# Patient Record
Sex: MALE | Race: WHITE | Employment: FULL TIME | URBAN - NONMETROPOLITAN AREA
[De-identification: names, ages, dates, MRNs, and addresses within clinical notes are randomized per-mention and may not be internally consistent; named-entity substitution may affect disease eponyms.]

---

## 2017-10-17 ENCOUNTER — APPOINTMENT (OUTPATIENT)
Dept: CT IMAGING | Age: 32
End: 2017-10-17
Payer: MEDICARE

## 2017-10-17 ENCOUNTER — HOSPITAL ENCOUNTER (EMERGENCY)
Age: 32
Discharge: HOME | End: 2017-10-18
Payer: MEDICARE

## 2017-10-17 DIAGNOSIS — D58.2 ELEVATED HEMOGLOBIN (HCC): Primary | ICD-10-CM

## 2017-10-17 LAB
ALBUMIN SERPL-MCNC: 4.7 G/DL (ref 3.5–5.1)
ALP BLD-CCNC: 144 U/L (ref 38–126)
ALT SERPL-CCNC: 101 U/L (ref 11–66)
ANION GAP SERPL CALCULATED.3IONS-SCNC: 24 MEQ/L (ref 8–16)
APTT: 38.1 SECONDS (ref 22–38)
AST SERPL-CCNC: 102 U/L (ref 5–40)
BASOPHILS # BLD: 0.7 %
BASOPHILS ABSOLUTE: 0.1 THOU/MM3 (ref 0–0.1)
BILIRUB SERPL-MCNC: 0.7 MG/DL (ref 0.3–1.2)
BILIRUBIN DIRECT: < 0.2 MG/DL (ref 0–0.3)
BUN BLDV-MCNC: 8 MG/DL (ref 7–22)
CALCIUM SERPL-MCNC: 10.3 MG/DL (ref 8.5–10.5)
CHLORIDE BLD-SCNC: 96 MEQ/L (ref 98–111)
CO2: 20 MEQ/L (ref 23–33)
CREAT SERPL-MCNC: 0.6 MG/DL (ref 0.4–1.2)
EOSINOPHIL # BLD: 2.2 %
EOSINOPHILS ABSOLUTE: 0.2 THOU/MM3 (ref 0–0.4)
ETHYL ALCOHOL, SERUM: 0.34 %
GFR SERPL CREATININE-BSD FRML MDRD: > 90 ML/MIN/1.73M2
GLUCOSE BLD-MCNC: 269 MG/DL (ref 70–108)
HCT VFR BLD CALC: 56.9 % (ref 42–52)
HEMOGLOBIN: 20 GM/DL (ref 14–18)
INR BLD: 0.86 (ref 0.85–1.13)
LIPASE: 26.3 U/L (ref 5.6–51.3)
LYMPHOCYTES # BLD: 19.2 %
LYMPHOCYTES ABSOLUTE: 2.1 THOU/MM3 (ref 1–4.8)
MAGNESIUM: 2.1 MG/DL (ref 1.6–2.4)
MCH RBC QN AUTO: 33.3 PG (ref 27–31)
MCHC RBC AUTO-ENTMCNC: 35.2 GM/DL (ref 33–37)
MCV RBC AUTO: 94.4 FL (ref 80–94)
MONOCYTES # BLD: 8.8 %
MONOCYTES ABSOLUTE: 1 THOU/MM3 (ref 0.4–1.3)
NUCLEATED RED BLOOD CELLS: 0 /100 WBC
OSMOLALITY CALCULATION: 287.2 MOSMOL/KG (ref 275–300)
PDW BLD-RTO: 13.5 % (ref 11.5–14.5)
PLATELET # BLD: 250 THOU/MM3 (ref 130–400)
PMV BLD AUTO: 8.6 MCM (ref 7.4–10.4)
POTASSIUM SERPL-SCNC: 4.4 MEQ/L (ref 3.5–5.2)
RBC # BLD: 6.02 MILL/MM3 (ref 4.7–6.1)
RBC # BLD: NORMAL 10*6/UL
SEG NEUTROPHILS: 69.1 %
SEGMENTED NEUTROPHILS ABSOLUTE COUNT: 7.5 THOU/MM3 (ref 1.8–7.7)
SODIUM BLD-SCNC: 140 MEQ/L (ref 135–145)
TOTAL PROTEIN: 8.1 G/DL (ref 6.1–8)
WBC # BLD: 10.8 THOU/MM3 (ref 4.8–10.8)

## 2017-10-17 PROCEDURE — 80307 DRUG TEST PRSMV CHEM ANLYZR: CPT

## 2017-10-17 PROCEDURE — G0480 DRUG TEST DEF 1-7 CLASSES: HCPCS

## 2017-10-17 PROCEDURE — 81001 URINALYSIS AUTO W/SCOPE: CPT

## 2017-10-17 PROCEDURE — 36415 COLL VENOUS BLD VENIPUNCTURE: CPT

## 2017-10-17 PROCEDURE — 93005 ELECTROCARDIOGRAM TRACING: CPT

## 2017-10-17 PROCEDURE — 83735 ASSAY OF MAGNESIUM: CPT

## 2017-10-17 PROCEDURE — 85025 COMPLETE CBC W/AUTO DIFF WBC: CPT

## 2017-10-17 PROCEDURE — 85610 PROTHROMBIN TIME: CPT

## 2017-10-17 PROCEDURE — 83690 ASSAY OF LIPASE: CPT

## 2017-10-17 PROCEDURE — 80053 COMPREHEN METABOLIC PANEL: CPT

## 2017-10-17 PROCEDURE — 83880 ASSAY OF NATRIURETIC PEPTIDE: CPT

## 2017-10-17 PROCEDURE — 84443 ASSAY THYROID STIM HORMONE: CPT

## 2017-10-17 PROCEDURE — 74177 CT ABD & PELVIS W/CONTRAST: CPT

## 2017-10-17 PROCEDURE — 6370000000 HC RX 637 (ALT 250 FOR IP): Performed by: EMERGENCY MEDICINE

## 2017-10-17 PROCEDURE — 82248 BILIRUBIN DIRECT: CPT

## 2017-10-17 PROCEDURE — 85730 THROMBOPLASTIN TIME PARTIAL: CPT

## 2017-10-17 PROCEDURE — 99285 EMERGENCY DEPT VISIT HI MDM: CPT

## 2017-10-17 PROCEDURE — 6360000002 HC RX W HCPCS: Performed by: EMERGENCY MEDICINE

## 2017-10-17 RX ORDER — PANTOPRAZOLE SODIUM 40 MG/1
40 TABLET, DELAYED RELEASE ORAL ONCE
Status: COMPLETED | OUTPATIENT
Start: 2017-10-17 | End: 2017-10-17

## 2017-10-17 RX ORDER — ONDANSETRON 4 MG/1
4 TABLET, ORALLY DISINTEGRATING ORAL ONCE
Status: COMPLETED | OUTPATIENT
Start: 2017-10-17 | End: 2017-10-17

## 2017-10-17 RX ORDER — ONDANSETRON 2 MG/ML
4 INJECTION INTRAMUSCULAR; INTRAVENOUS ONCE
Status: COMPLETED | OUTPATIENT
Start: 2017-10-18 | End: 2017-10-18

## 2017-10-17 RX ORDER — THIAMINE MONONITRATE (VIT B1) 100 MG
100 TABLET ORAL ONCE
Status: COMPLETED | OUTPATIENT
Start: 2017-10-17 | End: 2017-10-17

## 2017-10-17 RX ORDER — FOLIC ACID 1 MG/1
1 TABLET ORAL ONCE
Status: COMPLETED | OUTPATIENT
Start: 2017-10-17 | End: 2017-10-17

## 2017-10-17 RX ORDER — 0.9 % SODIUM CHLORIDE 0.9 %
1000 INTRAVENOUS SOLUTION INTRAVENOUS ONCE
Status: COMPLETED | OUTPATIENT
Start: 2017-10-17 | End: 2017-10-18

## 2017-10-17 RX ADMIN — PANTOPRAZOLE SODIUM 40 MG: 40 TABLET, DELAYED RELEASE ORAL at 22:47

## 2017-10-17 RX ADMIN — Medication 100 MG: at 22:47

## 2017-10-17 RX ADMIN — ONDANSETRON 4 MG: 4 TABLET, ORALLY DISINTEGRATING ORAL at 22:47

## 2017-10-17 RX ADMIN — FOLIC ACID 1 MG: 1 TABLET ORAL at 22:47

## 2017-10-17 ASSESSMENT — PAIN DESCRIPTION - PAIN TYPE: TYPE: ACUTE PAIN

## 2017-10-17 ASSESSMENT — PAIN SCALES - GENERAL: PAINLEVEL_OUTOF10: 8

## 2017-10-17 ASSESSMENT — PAIN DESCRIPTION - LOCATION: LOCATION: ABDOMEN

## 2017-10-18 VITALS
DIASTOLIC BLOOD PRESSURE: 88 MMHG | RESPIRATION RATE: 16 BRPM | BODY MASS INDEX: 34.96 KG/M2 | SYSTOLIC BLOOD PRESSURE: 127 MMHG | TEMPERATURE: 98.7 F | HEIGHT: 62 IN | HEART RATE: 113 BPM | OXYGEN SATURATION: 97 % | WEIGHT: 190 LBS

## 2017-10-18 LAB
AMPHETAMINE+METHAMPHETAMINE URINE SCREEN: NEGATIVE
BACTERIA: ABNORMAL /HPF
BARBITURATE QUANTITATIVE URINE: NEGATIVE
BENZODIAZEPINE QUANTITATIVE URINE: NEGATIVE
BILIRUBIN URINE: NEGATIVE
BLOOD, URINE: ABNORMAL
CANNABINOID QUANTITATIVE URINE: POSITIVE
CASTS 2: ABNORMAL /LPF
CASTS UA: ABNORMAL /LPF
CHARACTER, URINE: CLEAR
COCAINE METABOLITE QUANTITATIVE URINE: NEGATIVE
COLOR: YELLOW
CRYSTALS, UA: ABNORMAL
EKG ATRIAL RATE: 132 BPM
EKG P AXIS: 56 DEGREES
EKG P-R INTERVAL: 130 MS
EKG Q-T INTERVAL: 296 MS
EKG QRS DURATION: 74 MS
EKG QTC CALCULATION (BAZETT): 438 MS
EKG R AXIS: 37 DEGREES
EKG T AXIS: 17 DEGREES
EKG VENTRICULAR RATE: 132 BPM
EPITHELIAL CELLS, UA: ABNORMAL /HPF
GLUCOSE URINE: >= 1000 MG/DL
KETONES, URINE: 40
LEUKOCYTE ESTERASE, URINE: NEGATIVE
MISCELLANEOUS 2: ABNORMAL
MUCUS: ABNORMAL
NITRITE, URINE: NEGATIVE
OPIATES, URINE: NEGATIVE
OXYCODONE: NEGATIVE
PH UA: 5.5
PHENCYCLIDINE QUANTITATIVE URINE: NEGATIVE
PRO-BNP: < 5 PG/ML (ref 0–450)
PROTEIN UA: 300
RBC URINE: ABNORMAL /HPF
RENAL EPITHELIAL, UA: ABNORMAL
SPECIFIC GRAVITY, URINE: 1.02 (ref 1–1.03)
TSH SERPL DL<=0.05 MIU/L-ACNC: 1.94 UIU/ML (ref 0.4–4.2)
UROBILINOGEN, URINE: 0.2 EU/DL
WBC UA: ABNORMAL /HPF
YEAST: ABNORMAL

## 2017-10-18 PROCEDURE — 6360000002 HC RX W HCPCS: Performed by: PHYSICIAN ASSISTANT

## 2017-10-18 PROCEDURE — 2580000003 HC RX 258: Performed by: EMERGENCY MEDICINE

## 2017-10-18 PROCEDURE — 96366 THER/PROPH/DIAG IV INF ADDON: CPT

## 2017-10-18 PROCEDURE — 96365 THER/PROPH/DIAG IV INF INIT: CPT

## 2017-10-18 PROCEDURE — 93005 ELECTROCARDIOGRAM TRACING: CPT

## 2017-10-18 PROCEDURE — 2580000003 HC RX 258: Performed by: PHYSICIAN ASSISTANT

## 2017-10-18 PROCEDURE — 6370000000 HC RX 637 (ALT 250 FOR IP): Performed by: PHYSICIAN ASSISTANT

## 2017-10-18 PROCEDURE — 36415 COLL VENOUS BLD VENIPUNCTURE: CPT

## 2017-10-18 PROCEDURE — 6360000004 HC RX CONTRAST MEDICATION: Performed by: PHYSICIAN ASSISTANT

## 2017-10-18 PROCEDURE — 96375 TX/PRO/DX INJ NEW DRUG ADDON: CPT

## 2017-10-18 PROCEDURE — 96361 HYDRATE IV INFUSION ADD-ON: CPT

## 2017-10-18 PROCEDURE — 82668 ASSAY OF ERYTHROPOIETIN: CPT

## 2017-10-18 PROCEDURE — 2500000003 HC RX 250 WO HCPCS: Performed by: PHYSICIAN ASSISTANT

## 2017-10-18 RX ORDER — ASPIRIN 81 MG/1
81 TABLET, CHEWABLE ORAL DAILY
Qty: 30 TABLET | Refills: 0 | Status: SHIPPED | OUTPATIENT
Start: 2017-10-18 | End: 2017-12-12 | Stop reason: DRUGHIGH

## 2017-10-18 RX ADMIN — SODIUM CHLORIDE 1000 ML: 9 INJECTION, SOLUTION INTRAVENOUS at 00:01

## 2017-10-18 RX ADMIN — ONDANSETRON 4 MG: 2 INJECTION INTRAMUSCULAR; INTRAVENOUS at 00:08

## 2017-10-18 RX ADMIN — FOLIC ACID: 5 INJECTION, SOLUTION INTRAMUSCULAR; INTRAVENOUS; SUBCUTANEOUS at 01:40

## 2017-10-18 RX ADMIN — IOPAMIDOL 80 ML: 755 INJECTION, SOLUTION INTRAVENOUS at 02:09

## 2017-10-18 RX ADMIN — Medication 30 ML: at 02:26

## 2017-10-18 ASSESSMENT — PAIN DESCRIPTION - LOCATION
LOCATION: CHEST;ABDOMEN
LOCATION: CHEST;ABDOMEN

## 2017-10-18 ASSESSMENT — PAIN DESCRIPTION - FREQUENCY
FREQUENCY: CONTINUOUS
FREQUENCY: INTERMITTENT

## 2017-10-18 ASSESSMENT — PAIN DESCRIPTION - PAIN TYPE
TYPE: ACUTE PAIN
TYPE: ACUTE PAIN

## 2017-10-18 ASSESSMENT — PAIN DESCRIPTION - ORIENTATION
ORIENTATION: MID;UPPER
ORIENTATION: MID;UPPER

## 2017-10-18 ASSESSMENT — PAIN SCALES - GENERAL
PAINLEVEL_OUTOF10: 9
PAINLEVEL_OUTOF10: 8

## 2017-10-18 NOTE — ED PROVIDER NOTES
eMERGENCY dEPARTMENT eNCOUnter      [facility]    Chief Complaint   Patient presents with    Alcohol Problem    Abdominal Pain    Chest Pain       HPI    María Rodriguez is a 27 y.o. male who presents emergency Department complaining of left upper quadrant pain. Patient states she has a long-standing history of alcohol abuse. He is concerned that he is going to go into DTs. Patient states that he has had approximately 1/5 of vodka today. Patient denies nausea, vomiting, fever or complaints. Describes pain as sharp and moderately severe. Left upper quadrant. Without radiation. Not relieved by rest but is exacerbated by movement. Past medical history is reviewed. Temperature review of systems was reviewed and is otherwise unremarkable. REVIEW OF SYSTEMS    See HPI for further details. Review of systems otherwise negative. PAST MEDICAL HISTORY    Past Medical History:   Diagnosis Date    Alcohol abuse     Asthma     COPD (chronic obstructive pulmonary disease) ([facility] [address]     Eczema        SURGICAL HISTORY    History reviewed. No pertinent surgical history. CURRENT MEDICATIONS    Current Outpatient Rx   Medication Sig Dispense Refill    aspirin (ASPIRIN CHILDRENS) 81 MG chewable tablet Take 1 tablet by mouth daily 30 tablet 0    Multiple Vitamins-Minerals (MENS MULTIVITAMIN PLUS) TABS Take 1 tablet by mouth daily      fluticasone-salmeterol (ADVAIR) 250-50 MCG/DOSE AEPB Inhale 1 puff into the lungs every 12 hours      albuterol (PROVENTIL HFA) 108 (90 BASE) MCG/ACT inhaler Inhale 2 puffs into the lungs every 6 hours as needed for Wheezing. 1 Inhaler 0       ALLERGIES    Allergies   Allergen Reactions    Corn-Containing Products        FAMILY HISTORY    History reviewed. No pertinent family history.     SOCIAL HISTORY    Social History     Social History    Marital status:      Spouse name: N/A    Number of children: N/A    Years of education: N/A     Social History Main Topics  Smoking status: Current Some Day Smoker     Packs/day: 0.50    Smokeless tobacco: None    Alcohol use No      Comment: last drink August 26th     Drug use: No    Sexual activity: Not Asked     Other Topics Concern    None     Social History Narrative    None       PHYSICAL EXAM    VITAL SIGNS: /88   Pulse 113   Temp 98.7 °F (37.1 °C) (Oral)   Resp 16   Ht 5' 2\" (1.575 m)   Wt 190 lb (86.2 kg)   SpO2 97%   BMI 34.75 kg/m²   Constitutional:  Well developed, well nourished, no acute distress, non-toxic appearance   Eyes:  PERRL, conjunctiva normal   HENT:  Atraumatic, external ears normal, nose normal, oropharynx moist. Neck supple   Respiratory:  No respiratory distress, normal breath sounds   Cardiovascular:  Normal rate, normal rhythm, no murmurs, no gallops, no rubs   GI:  Mild left-sided tenderness without distention, guarding, rigidity, masses, bowel sounds are normal.   :  No CVA tenderness   Musculoskeletal:  No edema   Integument:  Well hydrated   Neurologic:  Alert & oriented x 3, no focal deficits     EKG    Genitourinary 132. QRS 74 QTc 438 sinus tachycardia without ST elevation or depression no ectopy was noted comparison to previous on October 2016    RADIOLOGY/PROCEDURES    Cerebrovascular contrast demonstrated considerable amount of sterile otherwise mild hepatomegaly no acute abnormalities were found please see radiology dictation    ED COURSE & MEDICAL DECISION MAKING    Pertinent Labs & Imaging studies reviewed. (See chart for details)   Labs demonstrated alcohol level all 0.3 2 ptherwise  no significant findings were noted patient resting more comfortably with pain medications. Discussed results with the patient. Patient is concerned that he is going to go into DTs. Explained to him that he would not according to DTs with an alcohol level as high as his. Patient was advised to follow up with his primary care physician who is at [facility] [address] [facility].   Patient will call in the morning for appointment. Return to the emergency department as needed. Discharged in stable condition. FINAL IMPRESSION    1. Acute alcohol intoxication  2.          POP Estrada  10/18/17 4417

## 2017-10-19 LAB — ERYTHROPOIETIN: 11 MU/ML (ref 4–27)

## 2017-12-07 ENCOUNTER — APPOINTMENT (OUTPATIENT)
Dept: INTERVENTIONAL RADIOLOGY/VASCULAR | Age: 32
DRG: 204 | End: 2017-12-07
Payer: MEDICARE

## 2017-12-07 ENCOUNTER — APPOINTMENT (OUTPATIENT)
Dept: GENERAL RADIOLOGY | Age: 32
DRG: 204 | End: 2017-12-07
Payer: MEDICARE

## 2017-12-07 ENCOUNTER — APPOINTMENT (OUTPATIENT)
Dept: CT IMAGING | Age: 32
DRG: 204 | End: 2017-12-07
Payer: MEDICARE

## 2017-12-07 ENCOUNTER — HOSPITAL ENCOUNTER (INPATIENT)
Age: 32
LOS: 2 days | Discharge: HOME | DRG: 204 | End: 2017-12-09
Attending: INTERNAL MEDICINE | Admitting: INTERNAL MEDICINE
Payer: MEDICARE

## 2017-12-07 DIAGNOSIS — E11.9 TYPE 2 DIABETES MELLITUS WITHOUT COMPLICATION, WITHOUT LONG-TERM CURRENT USE OF INSULIN (HCC): ICD-10-CM

## 2017-12-07 DIAGNOSIS — R55 SYNCOPE AND COLLAPSE: Primary | ICD-10-CM

## 2017-12-07 LAB
ALBUMIN SERPL-MCNC: 4.4 G/DL (ref 3.5–5.1)
ALP BLD-CCNC: 95 U/L (ref 38–126)
ALT SERPL-CCNC: 122 U/L (ref 11–66)
AMPHETAMINE+METHAMPHETAMINE URINE SCREEN: NEGATIVE
ANION GAP SERPL CALCULATED.3IONS-SCNC: 20 MEQ/L (ref 8–16)
AST SERPL-CCNC: 151 U/L (ref 5–40)
BACTERIA: ABNORMAL /HPF
BARBITURATE QUANTITATIVE URINE: NEGATIVE
BASOPHILS # BLD: 0.6 %
BASOPHILS ABSOLUTE: 0.1 THOU/MM3 (ref 0–0.1)
BENZODIAZEPINE QUANTITATIVE URINE: NEGATIVE
BILIRUB SERPL-MCNC: 1.3 MG/DL (ref 0.3–1.2)
BILIRUBIN DIRECT: 0.5 MG/DL (ref 0–0.3)
BILIRUBIN URINE: ABNORMAL
BLOOD, URINE: ABNORMAL
BUN BLDV-MCNC: 18 MG/DL (ref 7–22)
CALCIUM SERPL-MCNC: 8.9 MG/DL (ref 8.5–10.5)
CANNABINOID QUANTITATIVE URINE: POSITIVE
CASTS 2: ABNORMAL /LPF
CASTS UA: ABNORMAL /LPF
CHARACTER, URINE: CLEAR
CHLORIDE BLD-SCNC: 94 MEQ/L (ref 98–111)
CO2: 20 MEQ/L (ref 23–33)
COCAINE METABOLITE QUANTITATIVE URINE: NEGATIVE
COLOR: YELLOW
CREAT SERPL-MCNC: 0.5 MG/DL (ref 0.4–1.2)
CRYSTALS, UA: ABNORMAL
EKG ATRIAL RATE: 110 BPM
EKG P AXIS: 35 DEGREES
EKG P-R INTERVAL: 124 MS
EKG Q-T INTERVAL: 358 MS
EKG QRS DURATION: 80 MS
EKG QTC CALCULATION (BAZETT): 484 MS
EKG R AXIS: 26 DEGREES
EKG T AXIS: 35 DEGREES
EKG VENTRICULAR RATE: 110 BPM
EOSINOPHIL # BLD: 0.9 %
EOSINOPHILS ABSOLUTE: 0.1 THOU/MM3 (ref 0–0.4)
EPITHELIAL CELLS, UA: ABNORMAL /HPF
ETHYL ALCOHOL, SERUM: < 0.01 %
FOLATE: 13.1 NG/ML (ref 4.8–24.2)
GFR SERPL CREATININE-BSD FRML MDRD: > 90 ML/MIN/1.73M2
GLUCOSE BLD-MCNC: 175 MG/DL (ref 70–108)
GLUCOSE URINE: >= 1000 MG/DL
HCT VFR BLD CALC: 44 % (ref 42–52)
HEMOGLOBIN: 15 GM/DL (ref 14–18)
ICTOTEST: NEGATIVE
KETONES, URINE: 80
LEUKOCYTE ESTERASE, URINE: NEGATIVE
LIPASE: 106.4 U/L (ref 5.6–51.3)
LV EF: 60 %
LVEF MODALITY: NORMAL
LYMPHOCYTES # BLD: 7.3 %
LYMPHOCYTES ABSOLUTE: 0.8 THOU/MM3 (ref 1–4.8)
MCH RBC QN AUTO: 31.5 PG (ref 27–31)
MCHC RBC AUTO-ENTMCNC: 34.1 GM/DL (ref 33–37)
MCV RBC AUTO: 92.4 FL (ref 80–94)
MISCELLANEOUS 2: ABNORMAL
MONOCYTES # BLD: 8 %
MONOCYTES ABSOLUTE: 0.9 THOU/MM3 (ref 0.4–1.3)
NITRITE, URINE: NEGATIVE
NUCLEATED RED BLOOD CELLS: 0 /100 WBC
OPIATES, URINE: POSITIVE
OSMOLALITY CALCULATION: 274.4 MOSMOL/KG (ref 275–300)
OXYCODONE: NEGATIVE
PDW BLD-RTO: 13.3 % (ref 11.5–14.5)
PH UA: 6
PHENCYCLIDINE QUANTITATIVE URINE: NEGATIVE
PLATELET # BLD: 143 THOU/MM3 (ref 130–400)
PMV BLD AUTO: 9.8 MCM (ref 7.4–10.4)
POTASSIUM SERPL-SCNC: 3.8 MEQ/L (ref 3.5–5.2)
PROTEIN UA: ABNORMAL
RBC # BLD: 4.76 MILL/MM3 (ref 4.7–6.1)
RBC URINE: ABNORMAL /HPF
RENAL EPITHELIAL, UA: ABNORMAL
SEG NEUTROPHILS: 83.2 %
SEGMENTED NEUTROPHILS ABSOLUTE COUNT: 9 THOU/MM3 (ref 1.8–7.7)
SODIUM BLD-SCNC: 134 MEQ/L (ref 135–145)
SPECIFIC GRAVITY, URINE: 1.02 (ref 1–1.03)
TOTAL PROTEIN: 7 G/DL (ref 6.1–8)
TROPONIN T: < 0.01 NG/ML
UROBILINOGEN, URINE: 1 EU/DL
VITAMIN B-12: 721 PG/ML (ref 211–911)
WBC # BLD: 10.8 THOU/MM3 (ref 4.8–10.8)
WBC UA: ABNORMAL /HPF
YEAST: ABNORMAL

## 2017-12-07 PROCEDURE — 82746 ASSAY OF FOLIC ACID SERUM: CPT

## 2017-12-07 PROCEDURE — 6370000000 HC RX 637 (ALT 250 FOR IP): Performed by: INTERNAL MEDICINE

## 2017-12-07 PROCEDURE — 96374 THER/PROPH/DIAG INJ IV PUSH: CPT

## 2017-12-07 PROCEDURE — 6360000002 HC RX W HCPCS: Performed by: NURSE PRACTITIONER

## 2017-12-07 PROCEDURE — 93005 ELECTROCARDIOGRAM TRACING: CPT

## 2017-12-07 PROCEDURE — 6360000004 HC RX CONTRAST MEDICATION: Performed by: NURSE PRACTITIONER

## 2017-12-07 PROCEDURE — 99223 1ST HOSP IP/OBS HIGH 75: CPT | Performed by: INTERNAL MEDICINE

## 2017-12-07 PROCEDURE — 70487 CT MAXILLOFACIAL W/DYE: CPT

## 2017-12-07 PROCEDURE — 36415 COLL VENOUS BLD VENIPUNCTURE: CPT

## 2017-12-07 PROCEDURE — 96361 HYDRATE IV INFUSION ADD-ON: CPT

## 2017-12-07 PROCEDURE — 2580000003 HC RX 258: Performed by: NURSE PRACTITIONER

## 2017-12-07 PROCEDURE — 85025 COMPLETE CBC W/AUTO DIFF WBC: CPT

## 2017-12-07 PROCEDURE — 82248 BILIRUBIN DIRECT: CPT

## 2017-12-07 PROCEDURE — 80053 COMPREHEN METABOLIC PANEL: CPT

## 2017-12-07 PROCEDURE — C9113 INJ PANTOPRAZOLE SODIUM, VIA: HCPCS | Performed by: INTERNAL MEDICINE

## 2017-12-07 PROCEDURE — 94640 AIRWAY INHALATION TREATMENT: CPT

## 2017-12-07 PROCEDURE — 2580000003 HC RX 258: Performed by: INTERNAL MEDICINE

## 2017-12-07 PROCEDURE — 96375 TX/PRO/DX INJ NEW DRUG ADDON: CPT

## 2017-12-07 PROCEDURE — 6360000002 HC RX W HCPCS: Performed by: INTERNAL MEDICINE

## 2017-12-07 PROCEDURE — 99223 1ST HOSP IP/OBS HIGH 75: CPT | Performed by: PSYCHIATRY & NEUROLOGY

## 2017-12-07 PROCEDURE — 81001 URINALYSIS AUTO W/SCOPE: CPT

## 2017-12-07 PROCEDURE — 83690 ASSAY OF LIPASE: CPT

## 2017-12-07 PROCEDURE — 93306 TTE W/DOPPLER COMPLETE: CPT

## 2017-12-07 PROCEDURE — 80307 DRUG TEST PRSMV CHEM ANLYZR: CPT

## 2017-12-07 PROCEDURE — 1200000003 HC TELEMETRY R&B

## 2017-12-07 PROCEDURE — 71020 XR CHEST STANDARD TWO VW: CPT

## 2017-12-07 PROCEDURE — 82607 VITAMIN B-12: CPT

## 2017-12-07 PROCEDURE — 96376 TX/PRO/DX INJ SAME DRUG ADON: CPT

## 2017-12-07 PROCEDURE — 99285 EMERGENCY DEPT VISIT HI MDM: CPT

## 2017-12-07 PROCEDURE — 84484 ASSAY OF TROPONIN QUANT: CPT

## 2017-12-07 PROCEDURE — 70450 CT HEAD/BRAIN W/O DYE: CPT

## 2017-12-07 PROCEDURE — G0480 DRUG TEST DEF 1-7 CLASSES: HCPCS

## 2017-12-07 RX ORDER — MORPHINE SULFATE 2 MG/ML
2 INJECTION, SOLUTION INTRAMUSCULAR; INTRAVENOUS ONCE
Status: COMPLETED | OUTPATIENT
Start: 2017-12-07 | End: 2017-12-07

## 2017-12-07 RX ORDER — ALBUTEROL SULFATE 90 UG/1
2 AEROSOL, METERED RESPIRATORY (INHALATION) EVERY 6 HOURS PRN
Status: DISCONTINUED | OUTPATIENT
Start: 2017-12-07 | End: 2017-12-09 | Stop reason: HOSPADM

## 2017-12-07 RX ORDER — 0.9 % SODIUM CHLORIDE 0.9 %
1000 INTRAVENOUS SOLUTION INTRAVENOUS ONCE
Status: COMPLETED | OUTPATIENT
Start: 2017-12-07 | End: 2017-12-07

## 2017-12-07 RX ORDER — DIPHENHYDRAMINE HCL 25 MG
25 TABLET ORAL EVERY 6 HOURS PRN
Status: DISCONTINUED | OUTPATIENT
Start: 2017-12-07 | End: 2017-12-09 | Stop reason: HOSPADM

## 2017-12-07 RX ORDER — ACETAMINOPHEN 325 MG/1
650 TABLET ORAL EVERY 4 HOURS PRN
Status: DISCONTINUED | OUTPATIENT
Start: 2017-12-07 | End: 2017-12-09 | Stop reason: HOSPADM

## 2017-12-07 RX ORDER — METHYLPREDNISOLONE SODIUM SUCCINATE 125 MG/2ML
125 INJECTION, POWDER, LYOPHILIZED, FOR SOLUTION INTRAMUSCULAR; INTRAVENOUS ONCE
Status: COMPLETED | OUTPATIENT
Start: 2017-12-07 | End: 2017-12-07

## 2017-12-07 RX ORDER — SODIUM CHLORIDE 9 MG/ML
INJECTION, SOLUTION INTRAVENOUS CONTINUOUS
Status: DISCONTINUED | OUTPATIENT
Start: 2017-12-07 | End: 2017-12-08

## 2017-12-07 RX ORDER — PANTOPRAZOLE SODIUM 40 MG/10ML
40 INJECTION, POWDER, LYOPHILIZED, FOR SOLUTION INTRAVENOUS DAILY
Status: DISCONTINUED | OUTPATIENT
Start: 2017-12-07 | End: 2017-12-08

## 2017-12-07 RX ORDER — ASPIRIN 81 MG/1
81 TABLET, CHEWABLE ORAL DAILY
Status: DISCONTINUED | OUTPATIENT
Start: 2017-12-07 | End: 2017-12-09 | Stop reason: HOSPADM

## 2017-12-07 RX ORDER — SODIUM CHLORIDE 0.9 % (FLUSH) 0.9 %
10 SYRINGE (ML) INJECTION PRN
Status: DISCONTINUED | OUTPATIENT
Start: 2017-12-07 | End: 2017-12-08 | Stop reason: SDUPTHER

## 2017-12-07 RX ORDER — SODIUM CHLORIDE 0.9 % (FLUSH) 0.9 %
10 SYRINGE (ML) INJECTION EVERY 12 HOURS SCHEDULED
Status: DISCONTINUED | OUTPATIENT
Start: 2017-12-07 | End: 2017-12-08 | Stop reason: SDUPTHER

## 2017-12-07 RX ORDER — ONDANSETRON 2 MG/ML
4 INJECTION INTRAMUSCULAR; INTRAVENOUS EVERY 6 HOURS PRN
Status: DISCONTINUED | OUTPATIENT
Start: 2017-12-07 | End: 2017-12-09 | Stop reason: HOSPADM

## 2017-12-07 RX ORDER — M-VIT,TX,IRON,MINS/CALC/FOLIC 27MG-0.4MG
1 TABLET ORAL DAILY
Status: DISCONTINUED | OUTPATIENT
Start: 2017-12-07 | End: 2017-12-09 | Stop reason: HOSPADM

## 2017-12-07 RX ORDER — SODIUM CHLORIDE 9 MG/ML
INJECTION, SOLUTION INTRAVENOUS CONTINUOUS
Status: DISCONTINUED | OUTPATIENT
Start: 2017-12-07 | End: 2017-12-07

## 2017-12-07 RX ADMIN — ACETAMINOPHEN 650 MG: 325 TABLET ORAL at 10:40

## 2017-12-07 RX ADMIN — SODIUM CHLORIDE 1000 ML: 9 INJECTION, SOLUTION INTRAVENOUS at 05:13

## 2017-12-07 RX ADMIN — SODIUM CHLORIDE: 9 INJECTION, SOLUTION INTRAVENOUS at 20:08

## 2017-12-07 RX ADMIN — SODIUM CHLORIDE: 9 INJECTION, SOLUTION INTRAVENOUS at 08:40

## 2017-12-07 RX ADMIN — ASPIRIN 81 MG: 81 TABLET, CHEWABLE ORAL at 11:50

## 2017-12-07 RX ADMIN — METHYLPREDNISOLONE SODIUM SUCCINATE 125 MG: 125 INJECTION, POWDER, FOR SOLUTION INTRAMUSCULAR; INTRAVENOUS at 04:16

## 2017-12-07 RX ADMIN — MORPHINE SULFATE 2 MG: 2 INJECTION, SOLUTION INTRAMUSCULAR; INTRAVENOUS at 05:12

## 2017-12-07 RX ADMIN — IOPAMIDOL 75 ML: 755 INJECTION, SOLUTION INTRAVENOUS at 07:25

## 2017-12-07 RX ADMIN — ENOXAPARIN SODIUM 40 MG: 40 INJECTION SUBCUTANEOUS at 11:50

## 2017-12-07 RX ADMIN — Medication 2 PUFF: at 20:36

## 2017-12-07 RX ADMIN — ACETAMINOPHEN 650 MG: 325 TABLET ORAL at 17:23

## 2017-12-07 RX ADMIN — HYDROMORPHONE HYDROCHLORIDE 0.5 MG: 1 INJECTION, SOLUTION INTRAMUSCULAR; INTRAVENOUS; SUBCUTANEOUS at 22:35

## 2017-12-07 RX ADMIN — DIPHENHYDRAMINE HCL 25 MG: 25 TABLET ORAL at 21:32

## 2017-12-07 RX ADMIN — PANTOPRAZOLE SODIUM 40 MG: 40 INJECTION, POWDER, FOR SOLUTION INTRAVENOUS at 15:12

## 2017-12-07 RX ADMIN — HYDROMORPHONE HYDROCHLORIDE 0.5 MG: 1 INJECTION, SOLUTION INTRAMUSCULAR; INTRAVENOUS; SUBCUTANEOUS at 20:30

## 2017-12-07 RX ADMIN — DIPHENHYDRAMINE HCL 25 MG: 25 TABLET ORAL at 15:12

## 2017-12-07 RX ADMIN — MULTIPLE VITAMINS W/ MINERALS TAB 1 TABLET: TAB at 11:50

## 2017-12-07 RX ADMIN — MORPHINE SULFATE 2 MG: 2 INJECTION, SOLUTION INTRAMUSCULAR; INTRAVENOUS at 08:40

## 2017-12-07 ASSESSMENT — ENCOUNTER SYMPTOMS
ABDOMINAL PAIN: 0
VOMITING: 0
DIARRHEA: 0
BLOOD IN STOOL: 0
COUGH: 0
NAUSEA: 0
BACK PAIN: 0
SHORTNESS OF BREATH: 0
WHEEZING: 0
SORE THROAT: 0

## 2017-12-07 ASSESSMENT — PAIN SCALES - GENERAL
PAINLEVEL_OUTOF10: 6
PAINLEVEL_OUTOF10: 5
PAINLEVEL_OUTOF10: 8
PAINLEVEL_OUTOF10: 9
PAINLEVEL_OUTOF10: 10
PAINLEVEL_OUTOF10: 9
PAINLEVEL_OUTOF10: 7
PAINLEVEL_OUTOF10: 9
PAINLEVEL_OUTOF10: 10
PAINLEVEL_OUTOF10: 9
PAINLEVEL_OUTOF10: 10
PAINLEVEL_OUTOF10: 9

## 2017-12-07 ASSESSMENT — PAIN DESCRIPTION - FREQUENCY
FREQUENCY: INTERMITTENT
FREQUENCY: CONTINUOUS

## 2017-12-07 ASSESSMENT — PAIN DESCRIPTION - PAIN TYPE
TYPE: ACUTE PAIN

## 2017-12-07 ASSESSMENT — PAIN DESCRIPTION - ONSET
ONSET: ON-GOING

## 2017-12-07 ASSESSMENT — PAIN DESCRIPTION - LOCATION
LOCATION: MOUTH
LOCATION: MOUTH;THROAT;OTHER (COMMENT)

## 2017-12-07 ASSESSMENT — PAIN DESCRIPTION - ORIENTATION
ORIENTATION: INNER
ORIENTATION: RIGHT;LEFT
ORIENTATION: INNER

## 2017-12-07 ASSESSMENT — PAIN DESCRIPTION - DESCRIPTORS
DESCRIPTORS: THROBBING
DESCRIPTORS: DISCOMFORT;JABBING;SORE

## 2017-12-07 ASSESSMENT — PAIN DESCRIPTION - PROGRESSION: CLINICAL_PROGRESSION: NOT CHANGED

## 2017-12-07 NOTE — ED PROVIDER NOTES
External ear normal. No drainage. Nose: Nose normal. No epistaxis. Mouth/Throat: Mucous membranes are not dry and not cyanotic. No trismus in the jaw. Large, swollen tongue. Patient is maintaining his airway. Mild discomfort with swallowing. Hematoma noted on the forehead. Eyes: Conjunctivae and EOM are normal. Right eye exhibits no discharge. Left eye exhibits no discharge. No scleral icterus. Neck: Trachea normal, normal range of motion and phonation normal. Neck supple. No tracheal deviation present. Cardiovascular: Normal rate, regular rhythm, normal heart sounds and intact distal pulses. Exam reveals no gallop and no friction rub. No murmur heard. Pulses:       Radial pulses are 2+ on the right side. Pulmonary/Chest: Effort normal and breath sounds normal. No stridor. No respiratory distress. He has no wheezes. He has no rales. He exhibits no tenderness. Abdominal: Soft. Bowel sounds are normal. He exhibits no distension and no pulsatile midline mass. There is no tenderness. There is no rebound and no guarding. Musculoskeletal: Normal range of motion. He exhibits no edema or tenderness (No calf pain or tenderness. No evidence of DVT). Neurological: He is alert and oriented to person, place, and time. He has normal strength. He displays no tremor. He displays no seizure activity. Coordination normal. GCS eye subscore is 4. GCS verbal subscore is 5. GCS motor subscore is 6. Skin: Skin is warm and dry. No rash (on exposed surfaced) noted. He is not diaphoretic. No cyanosis or erythema. No pallor. Psychiatric: He has a normal mood and affect. His speech is normal and behavior is normal.   Nursing note and vitals reviewed.       DIFFERENTIAL DIAGNOSIS:   Syncope, ACS, Dizziness, ETOH withdrawal, Hematoma, Dehydration     DIAGNOSTIC RESULTS     EKG: All EKG's are interpreted by the Emergency Department Physician who either signs or Co-signs this chart in the absence of a

## 2017-12-07 NOTE — ED NOTES
Message left for Kaylee Trujillo (parent) per pt's request to call back.       Aleen Peabody, RN  12/07/17 0343

## 2017-12-07 NOTE — H&P
Vw)    Result Date: 12/7/2017  PROCEDURE: XR CHEST STANDARD TWO VW CLINICAL INFORMATION: syncope, . COMPARISON: PA and lateral chest x-ray July 31, 2013. TECHNIQUE: PA and lateral views the chest. FINDINGS: The heart size is normal.  The mediastinum is not widened. There are no pulmonary infiltrates or effusions. The pulmonary vascularity is normal. Redemonstrated is the thin deformed appearance of the right fourth rib probably developmental/congenital. Mild scoliosis of the upper thoracic spine to the left apex T4 looks unchanged. No suspicious osseous lesions are present. Stable radiographic appearance of the chest. No evidence of an acute process. **This report has been created using voice recognition software. It may contain minor errors which are inherent in voice recognition technology. ** Final report electronically signed by Dr. Gilmer Richardson on 12/7/2017 5:28 AM    Ct Head Wo Contrast    Result Date: 12/7/2017  PROCEDURE: CT HEAD WO CONTRAST CLINICAL INFORMATION: Syncope; dizziness. There is a major problem gadolinium earlier to get 1 on May Y cartilage sleep last night with are without associated with the longer mammographically) I header} the lateral iliac is occluded is a 10:30 according to the sign promptly at 1030 hours all over COMPARISON: No prior study. TECHNIQUE: 5 mm axial imaging through the head without IV contrast. All CT scans at this facility use dose modulation, iterative reconstruction, and/or weight based dosing when appropriate to reduce the radiation dose to as low as reasonably achievable. FINDINGS: POSTOPERATIVE CHANGES: None. BRAIN VOLUME: 1. Normal for the patient's age. VENTRICLES/CISTERNS: 1. Normal for the patient's age. INFARCT/WHITE MATTER DISEASE: Unremarkable. INTRACRANIAL HEMORRHAGE: None. MASS/MASS EFFECT/MIDLINE SHIFT: None. INTRA-AXIAL/EXTRA-AXIAL FLUID COLLECTIONS: None. INTRAORBITAL CONTENTS: Unremarkable. OTHER: None. SKULL/SCALP: Unremarkable.  PARANASAL SINUSES:

## 2017-12-07 NOTE — ED NOTES
Pt resting on cot, respires easy and unlabored. Pt stated pain is 10/10. Pain medication administered as ordered. Pt stated is dizzy, Ry Arriola, NP notified on waiting for orthostatic vital signs.       Javed Reilly RN  12/07/17 4762

## 2017-12-07 NOTE — ED NOTES
First contact w/pt. Pt sitting up on cot, breathing easy and unlabored. Pt rprts falling down in pt's own home after experiencing some sudden dizziness and lightheadedness. May have passed out and woke up to find himself in a pool of blood. States he had bit his tongue. Hematoma noted on mid anterior head just above forehead. Pt rprts hitting head on floor. Abrasion noted. No bleeding. Pt's tongue is swollen, red w/abrasions noted to tip and left side of tongue. Pt using Yankauer to self suction. Serosanguineous drainage noted in canister. Pt is shaky. States 10/10 pain to cheeks and tongue. Pt assisted to standing position to use urinal. Clear, yellow urine noted, approximately 400 ml. Pt repositioned back on cot and monitor. Call light within reach. Will continue to monitor for safety and comfort.              Patricia Cali RN  12/07/17 4233

## 2017-12-07 NOTE — PROGRESS NOTES
Pt admitted to  [phone] in a wheelchair. Complaints: pain in mouth. IV normal saline infusing into the forearm right, condition patent and no redness at a rate of 100 mls/ hour with about 600 mls in the bag still. IV site free of s/s of infection or infiltration. Vital signs obtained. Assessment and data collection initiated. Oriented to room. All questions answered with no further questions at this time. Fall prevention and safety brochure discussed with patient. The best day to schedule a follow up Dr appointment is:  Monday p.mShila Jang RN 12/7/2017 9:44 AM

## 2017-12-07 NOTE — ED NOTES
Pt resting in bed. VSS. Respirations even and unlabored. Call light within reach.      Laura Calderón RN  12/07/17 2001

## 2017-12-07 NOTE — ED TRIAGE NOTES
Pt to ED room 22. Pt stated passed out and bit his tongue. Pt stated was dizzy prior to loss of consciousness. EKG completed.

## 2017-12-07 NOTE — ED NOTES
Pt admitted to hospital. Transported to floor by wheelchair in stable condition. Nurse called at extension [phone] prior to transport.        Brigitte Paz, AMARJIT  [phone]

## 2017-12-08 ENCOUNTER — APPOINTMENT (OUTPATIENT)
Dept: INTERVENTIONAL RADIOLOGY/VASCULAR | Age: 32
DRG: 204 | End: 2017-12-08
Payer: MEDICARE

## 2017-12-08 ENCOUNTER — APPOINTMENT (OUTPATIENT)
Dept: NON INVASIVE DIAGNOSTICS | Age: 32
DRG: 204 | End: 2017-12-08
Payer: MEDICARE

## 2017-12-08 ENCOUNTER — APPOINTMENT (OUTPATIENT)
Dept: MRI IMAGING | Age: 32
DRG: 204 | End: 2017-12-08
Payer: MEDICARE

## 2017-12-08 LAB
ANION GAP SERPL CALCULATED.3IONS-SCNC: 20 MEQ/L (ref 8–16)
BASOPHILS # BLD: 0.3 %
BASOPHILS ABSOLUTE: 0 THOU/MM3 (ref 0–0.1)
BUN BLDV-MCNC: 9 MG/DL (ref 7–22)
CALCIUM SERPL-MCNC: 8.6 MG/DL (ref 8.5–10.5)
CHLORIDE BLD-SCNC: 95 MEQ/L (ref 98–111)
CO2: 21 MEQ/L (ref 23–33)
CREAT SERPL-MCNC: 0.5 MG/DL (ref 0.4–1.2)
EOSINOPHIL # BLD: 1.1 %
EOSINOPHILS ABSOLUTE: 0.1 THOU/MM3 (ref 0–0.4)
GFR SERPL CREATININE-BSD FRML MDRD: > 90 ML/MIN/1.73M2
GLUCOSE BLD-MCNC: 146 MG/DL (ref 70–108)
HCT VFR BLD CALC: 44 % (ref 42–52)
HEMOGLOBIN: 14.9 GM/DL (ref 14–18)
LYMPHOCYTES # BLD: 15 %
LYMPHOCYTES ABSOLUTE: 1.8 THOU/MM3 (ref 1–4.8)
MCH RBC QN AUTO: 32.2 PG (ref 27–31)
MCHC RBC AUTO-ENTMCNC: 33.8 GM/DL (ref 33–37)
MCV RBC AUTO: 95.2 FL (ref 80–94)
MONOCYTES # BLD: 12.8 %
MONOCYTES ABSOLUTE: 1.6 THOU/MM3 (ref 0.4–1.3)
NUCLEATED RED BLOOD CELLS: 0 /100 WBC
PDW BLD-RTO: 13.7 % (ref 11.5–14.5)
PLATELET # BLD: 135 THOU/MM3 (ref 130–400)
PMV BLD AUTO: 9.8 MCM (ref 7.4–10.4)
POTASSIUM SERPL-SCNC: 3.7 MEQ/L (ref 3.5–5.2)
RBC # BLD: 4.62 MILL/MM3 (ref 4.7–6.1)
SEG NEUTROPHILS: 70.8 %
SEGMENTED NEUTROPHILS ABSOLUTE COUNT: 8.6 THOU/MM3 (ref 1.8–7.7)
SODIUM BLD-SCNC: 136 MEQ/L (ref 135–145)
WBC # BLD: 12.2 THOU/MM3 (ref 4.8–10.8)

## 2017-12-08 PROCEDURE — 6360000002 HC RX W HCPCS: Performed by: INTERNAL MEDICINE

## 2017-12-08 PROCEDURE — 93880 EXTRACRANIAL BILAT STUDY: CPT

## 2017-12-08 PROCEDURE — 1200000003 HC TELEMETRY R&B

## 2017-12-08 PROCEDURE — 94640 AIRWAY INHALATION TREATMENT: CPT

## 2017-12-08 PROCEDURE — 80048 BASIC METABOLIC PNL TOTAL CA: CPT

## 2017-12-08 PROCEDURE — 93660 TILT TABLE EVALUATION: CPT

## 2017-12-08 PROCEDURE — 36415 COLL VENOUS BLD VENIPUNCTURE: CPT

## 2017-12-08 PROCEDURE — C9113 INJ PANTOPRAZOLE SODIUM, VIA: HCPCS | Performed by: INTERNAL MEDICINE

## 2017-12-08 PROCEDURE — 99232 SBSQ HOSP IP/OBS MODERATE 35: CPT | Performed by: PHYSICIAN ASSISTANT

## 2017-12-08 PROCEDURE — 6370000000 HC RX 637 (ALT 250 FOR IP): Performed by: PHYSICIAN ASSISTANT

## 2017-12-08 PROCEDURE — 2580000003 HC RX 258: Performed by: PHYSICIAN ASSISTANT

## 2017-12-08 PROCEDURE — 85025 COMPLETE CBC W/AUTO DIFF WBC: CPT

## 2017-12-08 PROCEDURE — 6370000000 HC RX 637 (ALT 250 FOR IP): Performed by: INTERNAL MEDICINE

## 2017-12-08 PROCEDURE — 87880 STREP A ASSAY W/OPTIC: CPT

## 2017-12-08 PROCEDURE — 99232 SBSQ HOSP IP/OBS MODERATE 35: CPT | Performed by: PSYCHIATRY & NEUROLOGY

## 2017-12-08 PROCEDURE — 95819 EEG AWAKE AND ASLEEP: CPT

## 2017-12-08 PROCEDURE — 87070 CULTURE OTHR SPECIMN AEROBIC: CPT

## 2017-12-08 PROCEDURE — 70551 MRI BRAIN STEM W/O DYE: CPT

## 2017-12-08 PROCEDURE — 93017 CV STRESS TEST TRACING ONLY: CPT

## 2017-12-08 PROCEDURE — 92610 EVALUATE SWALLOWING FUNCTION: CPT

## 2017-12-08 RX ORDER — LORAZEPAM 1 MG/1
1 TABLET ORAL
Status: DISCONTINUED | OUTPATIENT
Start: 2017-12-08 | End: 2017-12-09 | Stop reason: HOSPADM

## 2017-12-08 RX ORDER — LORAZEPAM 2 MG/ML
3 INJECTION INTRAMUSCULAR
Status: DISCONTINUED | OUTPATIENT
Start: 2017-12-08 | End: 2017-12-09 | Stop reason: HOSPADM

## 2017-12-08 RX ORDER — PANTOPRAZOLE SODIUM 40 MG/1
40 TABLET, DELAYED RELEASE ORAL
Status: DISCONTINUED | OUTPATIENT
Start: 2017-12-09 | End: 2017-12-09 | Stop reason: HOSPADM

## 2017-12-08 RX ORDER — SODIUM CHLORIDE 0.9 % (FLUSH) 0.9 %
10 SYRINGE (ML) INJECTION EVERY 12 HOURS SCHEDULED
Status: DISCONTINUED | OUTPATIENT
Start: 2017-12-08 | End: 2017-12-09 | Stop reason: HOSPADM

## 2017-12-08 RX ORDER — LORAZEPAM 2 MG/1
2 TABLET ORAL
Status: DISCONTINUED | OUTPATIENT
Start: 2017-12-08 | End: 2017-12-09 | Stop reason: HOSPADM

## 2017-12-08 RX ORDER — HYDROCODONE BITARTRATE AND ACETAMINOPHEN 5; 325 MG/1; MG/1
1 TABLET ORAL EVERY 6 HOURS PRN
Status: DISCONTINUED | OUTPATIENT
Start: 2017-12-08 | End: 2017-12-09 | Stop reason: HOSPADM

## 2017-12-08 RX ORDER — SODIUM CHLORIDE 0.9 % (FLUSH) 0.9 %
10 SYRINGE (ML) INJECTION PRN
Status: DISCONTINUED | OUTPATIENT
Start: 2017-12-08 | End: 2017-12-09 | Stop reason: HOSPADM

## 2017-12-08 RX ORDER — LORAZEPAM 2 MG/ML
2 INJECTION INTRAMUSCULAR
Status: DISCONTINUED | OUTPATIENT
Start: 2017-12-08 | End: 2017-12-09 | Stop reason: HOSPADM

## 2017-12-08 RX ORDER — LORAZEPAM 2 MG/ML
4 INJECTION INTRAMUSCULAR
Status: DISCONTINUED | OUTPATIENT
Start: 2017-12-08 | End: 2017-12-09 | Stop reason: HOSPADM

## 2017-12-08 RX ORDER — HYDROXYZINE PAMOATE 25 MG/1
25 CAPSULE ORAL 3 TIMES DAILY PRN
Status: DISCONTINUED | OUTPATIENT
Start: 2017-12-08 | End: 2017-12-09 | Stop reason: HOSPADM

## 2017-12-08 RX ORDER — LORAZEPAM 2 MG/1
4 TABLET ORAL
Status: DISCONTINUED | OUTPATIENT
Start: 2017-12-08 | End: 2017-12-09 | Stop reason: HOSPADM

## 2017-12-08 RX ORDER — LORAZEPAM 2 MG/ML
1 INJECTION INTRAMUSCULAR
Status: DISCONTINUED | OUTPATIENT
Start: 2017-12-08 | End: 2017-12-09 | Stop reason: HOSPADM

## 2017-12-08 RX ADMIN — CHLORASEPTIC 1 SPRAY: 1.5 LIQUID ORAL at 03:18

## 2017-12-08 RX ADMIN — CHLORASEPTIC 1 SPRAY: 1.5 LIQUID ORAL at 20:36

## 2017-12-08 RX ADMIN — HYDROMORPHONE HYDROCHLORIDE 0.5 MG: 1 INJECTION, SOLUTION INTRAMUSCULAR; INTRAVENOUS; SUBCUTANEOUS at 03:18

## 2017-12-08 RX ADMIN — HYDROMORPHONE HYDROCHLORIDE 0.5 MG: 1 INJECTION, SOLUTION INTRAMUSCULAR; INTRAVENOUS; SUBCUTANEOUS at 00:46

## 2017-12-08 RX ADMIN — Medication 2 PUFF: at 09:08

## 2017-12-08 RX ADMIN — ASPIRIN 81 MG: 81 TABLET, CHEWABLE ORAL at 11:51

## 2017-12-08 RX ADMIN — SODIUM CHLORIDE, PRESERVATIVE FREE 10 ML: 5 INJECTION INTRAVENOUS at 20:53

## 2017-12-08 RX ADMIN — LORAZEPAM 1 MG: 1 TABLET ORAL at 15:08

## 2017-12-08 RX ADMIN — Medication 2 PUFF: at 21:04

## 2017-12-08 RX ADMIN — ENOXAPARIN SODIUM 40 MG: 40 INJECTION SUBCUTANEOUS at 11:51

## 2017-12-08 RX ADMIN — HYDROCODONE BITARTRATE AND ACETAMINOPHEN 1 TABLET: 5; 325 TABLET ORAL at 15:08

## 2017-12-08 RX ADMIN — CHLORASEPTIC 1 SPRAY: 1.5 LIQUID ORAL at 05:38

## 2017-12-08 RX ADMIN — CHLORASEPTIC 1 SPRAY: 1.5 LIQUID ORAL at 11:51

## 2017-12-08 RX ADMIN — ACETAMINOPHEN 650 MG: 325 TABLET ORAL at 20:45

## 2017-12-08 RX ADMIN — LORAZEPAM 3 MG: 2 TABLET ORAL at 20:44

## 2017-12-08 RX ADMIN — Medication 10 ML: at 20:53

## 2017-12-08 RX ADMIN — MULTIPLE VITAMINS W/ MINERALS TAB 1 TABLET: TAB at 11:51

## 2017-12-08 RX ADMIN — DIPHENHYDRAMINE HCL 25 MG: 25 TABLET ORAL at 03:26

## 2017-12-08 RX ADMIN — HYDROMORPHONE HYDROCHLORIDE 0.5 MG: 1 INJECTION, SOLUTION INTRAMUSCULAR; INTRAVENOUS; SUBCUTANEOUS at 05:38

## 2017-12-08 RX ADMIN — LORAZEPAM 2 MG: 2 TABLET ORAL at 23:35

## 2017-12-08 RX ADMIN — HYDROMORPHONE HYDROCHLORIDE 0.5 MG: 1 INJECTION, SOLUTION INTRAMUSCULAR; INTRAVENOUS; SUBCUTANEOUS at 07:52

## 2017-12-08 RX ADMIN — HYDROMORPHONE HYDROCHLORIDE 0.5 MG: 1 INJECTION, SOLUTION INTRAMUSCULAR; INTRAVENOUS; SUBCUTANEOUS at 12:29

## 2017-12-08 RX ADMIN — PANTOPRAZOLE SODIUM 40 MG: 40 INJECTION, POWDER, FOR SOLUTION INTRAVENOUS at 05:40

## 2017-12-08 RX ADMIN — HYDROCODONE BITARTRATE AND ACETAMINOPHEN 1 TABLET: 5; 325 TABLET ORAL at 22:05

## 2017-12-08 RX ADMIN — CHLORASEPTIC 1 SPRAY: 1.5 LIQUID ORAL at 07:50

## 2017-12-08 ASSESSMENT — PAIN SCALES - GENERAL
PAINLEVEL_OUTOF10: 8
PAINLEVEL_OUTOF10: 8
PAINLEVEL_OUTOF10: 7
PAINLEVEL_OUTOF10: 7
PAINLEVEL_OUTOF10: 8
PAINLEVEL_OUTOF10: 6
PAINLEVEL_OUTOF10: 3
PAINLEVEL_OUTOF10: 4
PAINLEVEL_OUTOF10: 3
PAINLEVEL_OUTOF10: 7
PAINLEVEL_OUTOF10: 4
PAINLEVEL_OUTOF10: 8
PAINLEVEL_OUTOF10: 8
PAINLEVEL_OUTOF10: 6
PAINLEVEL_OUTOF10: 8
PAINLEVEL_OUTOF10: 4
PAINLEVEL_OUTOF10: 5

## 2017-12-08 ASSESSMENT — PAIN DESCRIPTION - PAIN TYPE
TYPE: ACUTE PAIN

## 2017-12-08 ASSESSMENT — PAIN DESCRIPTION - ORIENTATION: ORIENTATION: INNER

## 2017-12-08 ASSESSMENT — PAIN DESCRIPTION - LOCATION
LOCATION: THROAT
LOCATION: MOUTH;THROAT

## 2017-12-08 NOTE — PROGRESS NOTES
[address] [place] [address]  SPEECH THERAPY  STRZ ONC MED 5K  Bedside Swallowing Evaluation      SLP Individual Minutes  Time In: 4823  Time Out: 0155  Minutes: 11          Date: 2017  Patient Name: Wing Estevez      CSN: [id number]   : 1985  (27 y.o.)  Gender: male   Referring Physician:  Dr Keyona Perez  Diagnosis: Syncope and collapse  Secondary Diagnosis:  Dysphagia  History of Present Illness/Injury: Pt admitted to [facility] with above diagnosis. See H&P for further details. Pt does exhibit tongue trama post fall. Speech ordered to assess overall swallowing function and determine safest diet consistencies. Past Medical History:   Diagnosis Date    Alcohol abuse     Asthma     COPD (chronic obstructive pulmonary disease) (HCC)     Eczema        Pain:  Did not assess    Current Diet: General    Respiratory Status: [x] Independent [] Nasal Cannula [] Oxygen Mask      [] Tracheostomy [] Other:     [] Ventilator/Settings:    Behavioral Observation: [x] Alert [x] Oriented [] Confused [] Lethargic      [] Dysarthric [] Limited Direction Following [] Agitated      [] Other:    ORAL MECHANISM EVALUATION:         Comments:  Facial / Labial [x]WFL [] Impaired []DNT    Lingual []WFL [x] Impaired []DNT Decreased ROM. Tongue extremely swollen.    Dentition [x]WFL [] Impaired []DNT    Velum [x]WFL [] Impaired []DNT    Vocal Quality [x]WFL [] Impaired []DNT    Sensation [x]WFL [] Impaired []DNT    Cough []WFL [] Impaired [x]DNT    Other: []WFL [] Impaired []DNT    Other: []WFL [] Impaired []DNT        PATIENT WAS EVALUATED USING:  Ice chips, puree, soft solid, thin liquids via cup    ORAL PHASE: [] WFL  [x] Impaired   [] Loss of bolus from lips [x] Impaired AP movement [] Pocketing Left   [] Pocketing Right  [] Lingual Pumping  [x] Impaired Mastication   [x] Reduced Bolus Formation [] Impaired Oral Initiation    [] OTHER:    PHARYNGEAL PHASE: [x] WFL: Pharyngeal phase appears WFLs, but cannot completely rule out pharyngeal phase deficits from a bedside swallow evaluation alone. [] Impaired   [] Delayed Swallow  [] Decreased Hyolaryngeal Elevation [] Audible Swallow   [] Suspected Pharyngeal Residue due to spontaneous multiple swallow. [] OTHER:    SIGNS AND SYMPTOMS OF LARYNGEAL PENETRATION / ASPIRATION:  [x] NO sign/symptoms of aspiration evident with this evaluation, but cannot rule out silent aspiration. [] Throat Clear  [] Immediate Cough [] Delayed Cough [] Wet Vocal Quality  [] Change in Pulmonary Status  [] OTHER:    IMPRESSIONS: Pt presents with at least mild to moderate oral dysphagia as evidenced from findings above. Lingual strength and ROM decreased due to tongue trauma, causing pt being unable to masticate hard solids. Impaired, prolonged mastication noted, as well as reduced bolus formation, due to lack of lingual movement with all po trials. Timely swallow noted with good hyolaryngeal elevation and excursion. Recommend dental soft diet with thin liquids at this time as this is the safest diet consistency for the patient at this time. Speech therapy services recommended to improve lingual strength and ROM, as well as monitor cognition. RECOMMENDATIONS:     Modified Barium Swallow: [] Is indicated to further assess    [x] Is NOT indicated at this time; Will recommend as        appropriate. DIET RECOMMENDATIONS:  Dental soft with thin liquids    STRATEGIES: [] Strategies pending MBS results.   [x] Full upright position  [x] Small bite/sip [] No Straw [] Multiple Swallow  [] Chin tuck [] Head turn [] Pulmonary monitoring [x] Oral care after all meals  [] Supervision  [] Medication in applesauce []Direct 1:1 Supervision  [] Spoon all liquids [x] Alternate solid / liquid [] Limit distractions [x] Monitor for fatigue  [] PMV in place for all po [] OTHER:      EDUCATION:   Learner: [x]Patient [] Significant other [] Son/Daughter [x] Parent     [] Other:   Education: [x] Reviewed results and

## 2017-12-08 NOTE — PROGRESS NOTES
[address] [facility] Laboratory Technician Worksheet      EEG Date: 2017    Name: Claudell Hesselbach   : 1985   Age: 27 y.o.   SEX: male    ROOM: [facility] MRN: [id number]           CSN: [id number]      Ordering Provider: Yumi Rowland  EEG Number: [id number] Time of Test:  0800    Hand: Right   Sedation: no    H.V. Done: No VERY SORE TONGUE Photic: Yes    Sleep: Yes  Drowsy: Yes   Sleep Deprived: No    Seizures observed: no    Technician Impression:2    Mentality: alert      Clinical History:SYNCOPE VERSES SEIZURE,  FELT DIZZY, PASSED OUT HIT FOREHEAD AND BIT TONGUE    Past Medical History:       Diagnosis Date    Alcohol abuse     Asthma     COPD (chronic obstructive pulmonary disease) (HCC)     Eczema        Scheduled Meds:   mometasone-formoterol  2 puff Inhalation BID    therapeutic multivitamin-minerals  1 tablet Oral Daily    aspirin  81 mg Oral Daily    sodium chloride flush  10 mL Intravenous 2 times per day    enoxaparin  40 mg Subcutaneous Q24H    pantoprazole  40 mg Intravenous Daily     Continuous Infusions:   sodium chloride 75 mL/hr at 17     PRN Meds:.phenol, albuterol sulfate HFA, sodium chloride flush, acetaminophen, magnesium hydroxide, ondansetron, diphenhydrAMINE, HYDROmorphone    Technician: Cj Queen 2017

## 2017-12-08 NOTE — PROGRESS NOTES
stated age and cooperative. HEENT: Pupils equal, round, and reactive to light. Conjunctivae/corneas clear. Abrasion to forehead, no active bleeding. Noted tongue swelling, airway patent, patient talking and handling his secretions. Neck: Supple, with full range of motion. No jugular venous distention. Trachea midline. Respiratory:  Normal respiratory effort. Clear to auscultation, bilaterally without Rales/Wheezes/Rhonchi. On room air. Cardiovascular: Regular rate and rhythm with normal S1/S2 without murmurs, rubs or gallops. Abdomen: Soft, non-tender, non-distended with normal bowel sounds. Musculoskeletal: No clubbing, cyanosis or edema bilaterally. Full range of motion without deformity. Skin: Skin color, texture, turgor normal.  No rashes or lesions. Neurologic:  Neurovascularly intact without any focal sensory/motor deficits. Cranial nerves: II-XII intact, grossly non-focal.  Psychiatric: Alert and oriented  Capillary Refill: Brisk,< 3 seconds   Peripheral Pulses: +2 palpable, equal bilaterally       Labs:   Recent Labs      12/07/17   0411  12/08/17   0623   WBC  10.8  12.2*   HGB  15.0  14.9   HCT  44.0  44.0   PLT  143  135     Recent Labs      12/07/17   0550  12/08/17   0623   NA  134*  136   K  3.8  3.7   CL  94*  95*   CO2  20*  21*   BUN  18  9   CREATININE  0.5  0.5   CALCIUM  8.9  8.6     Recent Labs      12/07/17   0550   AST  151*   ALT  122*   BILIDIR  0.5*   BILITOT  1.3*   ALKPHOS  95       Urinalysis:    Lab Results   Component Value Date    NITRU NEGATIVE 12/07/2017    WBCUA 0-2 12/07/2017    BACTERIA NONE 12/07/2017    RBCUA 0-2 12/07/2017    BLOODU TRACE 12/07/2017    SPECGRAV 1.011 10/12/2013    GLUCOSEU >= 1000 12/07/2017       Radiology:  CT FACIAL BONES W CONTRAST   Final Result   1. There is no fracture. 2. There is a 1.7 x 0.9 cm mucous retention cyst or polyp within the inferior aspect of the left maxillary sinus.    3. There is a 1.1 x 0.6 cm mucous retention cyst or polyp 12/8: normal signal intensity in the brain, carotid ultrasound unremarkable, minimal stenosis. Echo on 12/7: normal LV size and systolic function, EF 22%. EEG and tilt table pending. 2. Tongue Swelling--airway patent, handling secretions, Discontinue IV Dilaudid and add prn Norco  3. Leukocytosis--likely reactive from #1. Afebrile, recheck in am.   4. Macrocytosis--MCV 95.2. Likely from chronic alcohol use. Folate and B12 are within normal limits on 12/7/17.  5. Elevated LFT--likely from chronic alcohol use. / suggestive of alcohol induced pattern. 6. Hyperglycemia--blood glucose 146, 175. Will check Hgb A1c in am  7. Alcohol Abuse--reports last drink 11/21/17. continue thiamine, folic acid, MVI. Add CIWA protocol. 8. Marijuana Use-- UDS positive  9. Anxiety--will add on prn vistaril    Dispo: Per review, has been followed by Dr. Elyssa Gomez group. I contacted Dr. Winston Joya on call for [facility] and asked Hospitalists continued to follow the patient during this admission. Follow pending testing above.      I have discussed this patient's care and plan with Dr. Enrrique Robertson   Electronically signed by Kodak Chowdary PA-C on 12/8/2017 at 10:26 AM

## 2017-12-08 NOTE — CONSULTS
Inpatient consult to Neurology  Consult performed by: [facility], [address] [facility] ordered by: [facility], [address] NOTE      Requesting Physician: Beny Fernandez MD    Reason for Consult:  Evaluate for Syncope, versus seizure. History of Present Illness:  Venita Jeffrey is a 27 y.o. male admitted to [facility] on 12/7/2017. He presents?to the Emergency Department for the evaluation of loss of consciousness. Patient states prior to arrival he was walking in the hallway when he passed out. The patient states that he felt dizzy prior to the event could not avoid a fall. He does not recall what happened afterwards. Unfortunately, he found that he has bit his tongue and had a bruise in his forehead. Symptoms were severe and intermittent. Modifiers are unknown. Onset was progressive. Frequency was once. He is not sure if he had seizure or not. He also had urinary incontinence. He complains of headaches. He reports history of EtOH abuse in the past. His last drink was 11/21/17 where he went on a binge and consumed a fifth of vodka. He actually got a DUI that night and is not currently driving. He denies any history of seizures in the past. Reports no chest pain. No shortness of breath with exertion. Reports no neck pain. No dysphagia. No fever. No rash. No weight loss. Past Medical History:        Diagnosis Date    Alcohol abuse     Asthma     COPD (chronic obstructive pulmonary disease) (HCC)     Eczema        History reviewed. No pertinent surgical history. Allergies:     Allergies   Allergen Reactions    Corn-Containing Products         Current Medications:     albuterol sulfate  (90 Base) MCG/ACT inhaler 2 puff Q6H PRN   mometasone-formoterol (DULERA) 200-5 MCG/ACT inhaler 2 puff BID   therapeutic multivitamin-minerals 1 tablet Daily   aspirin chewable tablet 81 mg Daily   sodium chloride flush 0.9 % injection 10 mL 2 times per day   sodium chloride flush Movements: intact   Cranial nerve V: Facial sensation: intact   Cranial nerve VII:Facial strength: intact   Cranial nerve VIII: Hearing: intact   Cranial nerve IX: Palate Elevation intact bilaterally  Cranial nerve XI: Shoulder shrug intact bilaterally  Cranial nerve XII: Tongue midline   neck supple without rigidity  DTR's are decreased distal and symmetric  Babinski sign is negative on bilaterally. Motor exam is 5/5 in the upper and lower extremities. Normal muscle tone . There is no muscle atrophy. There is no muscle fasciculation . Drift No.   Sensory is intact forlight touch and cortical sensation . Coordination: finger to nose intact  Gait and station not tested  Abnormal movement none. proprioception normal   Skin - no rashes or lesions  Superficial temporal artery pulses are normal.   Musculoskeletal: Has no hand arthritis, no limitation of ROM in any of the four extremities. no joint tenderness, deformity or swelling. There is no leg edema. The Heart was regular in rate and rhythm. No heart murmur  Chest- Clear  Abdomen: Soft, Intact bowel sounds. Labs:    CBC: Recent Labs      12/07/17   0411   WBC  10.8   HGB  15.0   PLT  143   MCV  92.4   MCH  31.5*   MCHC  34.1   RDW  13.3     CMP:  Recent Labs      12/07/17   0550   NA  134*   K  3.8   CL  94*   CO2  20*   BUN  18   CREATININE  0.5   LABGLOM  >90   GLUCOSE  175*   CALCIUM  8.9     Liver: Recent Labs      12/07/17   0550   AST  151*   ALT  122*   ALKPHOS  95   PROT  7.0   LABALBU  4.4   BILITOT  1.3*       CT head this 12/7/2017 Reviewed, agree with interpretation     Impression   1. Unremarkable noncontrast CT head. MRI BRAIN:  Pending. We reviewed the patient records and available information in the EHR       Impression:    Active Problems:    Syncope and collapse    This is a 26-year-old male with a presentation of syncope versus seizure. The patient states that he felt dizzy prior to the event could not avoid a fall.   He

## 2017-12-08 NOTE — PROGRESS NOTES
Patient continues to take telemetry off and on. Verbalized to patient that doctor wants telemetry to be on but patient continues to take telemetry stickers off and on.

## 2017-12-08 NOTE — CARE COORDINATION
12/8/17, 12:23 PM      Shelley Palomino day: 1  [facility]: [facility] Reason for admit: Syncope and collapse [R55]   Procedure: Echo 12/7/2017 EF at 60%  Treatment Plan of Care: Speech Therapy consult,IV fluids, CIWA scale, orthostatic B/P's every shift, telemetry  Core Measures: monitor,vte  PCP: Harvinder Melendez, CNP  Discharge Plan: Plans home at discharge, denies needs

## 2017-12-08 NOTE — PROCEDURES
[address] [place] [address]                                  TILT TABLE TEST    PATIENT NAME: Yeimi Abrams                   :        1985  MED REC NO:   [id number]                           ROOM:       0014  ACCOUNT NO:   [de-identified]                           ADMIT DATE: 2017  PROVIDER:     Cynthia Deng M.D.    DATE OF STUDY:  2017    CLINICAL HISTORY AND INDICATION:  This is a patient with syncope, referred  by Dr. Emily Estrella for tilt table test.  I was requested to perform the tilt  table test.    TILT TABLE TEST DESCRIPTION AND FINDINGS:  Baseline EKG showed sinus  rhythm. Baseline blood pressure was 137/78. Baseline heart rate was 94  beats per minute. The patient was tilted for a total of 28 minutes at  70-degree angle. Tilt was associated with no significant symptoms. Blood  pressure remained stable, fluctuating in the 130 to 318 range systolically. Heart rate was higher throughout the test ranging between 90 to 110. There  was no significant change in the hemodynamics to indicate or explain the  patient's syncope. Otherwise, unremarkable tilt. CONCLUSIONS:  1. Tilt table test associated with no significant symptoms. 2.  No significant hemodynamic changes to explain patient's symptoms. 3.  Further evaluation should follow accordingly. Nury Houston M.D.    D: 2017 10:05:01       T: 2017 10:26:37     [email or web address]  Job#: [id number]     Doc#: [id number]    CC: Tameka Reyes.  Emily Estrella MD

## 2017-12-08 NOTE — PROGRESS NOTES
CO2  20*  21*   BUN  18  9   CREATININE  0.5  0.5   GLUCOSE  175*  146*     MRI brain 12/8/2017 was reviewed, agree with interpretation. IMPRESSION:  This is a normal EEG. There was no evidence of epileptiform  activity appreciated. EEG 12/8/2017  IMPRESSION:  This is a normal EEG. There was no evidence of epileptiform  activity appreciated. Cardiac Echo:  Conclusions      Summary   Normal left ventricle size and systolic function. Ejection fraction was   estimated at 60 %. There were no regional left ventricular wall motion   abnormalities and wall thickness was within normal limits. Tilt table test 12/8/2017     CONCLUSIONS:  1. Tilt table test associated with no significant symptoms. 2.  No significant hemodynamic changes to explain patient's symptoms. 3.  Further evaluation should follow accordingly. Assessment:  Active Problems:    Syncope and collapse    This is a 78-year-old male, who presents with syncope, his workup is inconclusive from a neurologic standpoint. His EEG was unremarkable,   MRI brain was reviewed, agree with interpretation study was normal.  He will need to undergo further workup with cardiology. Plan:    1. Patient may be discharged from neurology standpoint. 2. Follow up with cardiology for further syncope workup  3. DVT prophylaxis. 4. Physical therapy. 5. Occupational therapy. 6. Discussed with the primary service.   7. All patient's questions were answered    Juarez Leahy MD, 12/8/2017 3:23 PM

## 2017-12-08 NOTE — PROCEDURES
[address] [place] [address]                            ELECTROENCEPHALOGRAM REPORT    PATIENT NAME: Francesca Coronado                   :        1985  MED REC NO:   [id number]                           ROOM:       0014  ACCOUNT NO:   [de-identified]                           ADMIT DATE: 2017  PROVIDER:     Zain Whelan. Suki Khan MD    DATE OF EE2017    REFERRING PROVIDER:  Zain Whelan. Suki Khan MD    CLINICAL HISTORY:  A 77-year-old male with a presentation of syncope with  hitting forehead, biting tongue, concern for possible seizure. This is a 17-channel EEG performed without sleep deprivation. Hyperventilation was not performed. Photic stimulation was performed. The  patient is described as alert. Background of activity is noted to be 10 Hz in the posterior parietal area,  symmetric, well modulated, attenuates with eye opening. The patient was  noted to be drowsy during parts of the recording. Hyperventilation was not  performed. Lead and muscle artifacts were noted limiting quality of data  obtained. Photic stimulation was performed without abnormality. There was  no evidence of epileptiform activity appreciated. IMPRESSION:  This is a normal EEG. There was no evidence of epileptiform  activity appreciated.         Eliseo Fernandez MD    D: 2017 15:28:39       T: 2017 15:29:31     [email or web address]  Job#: [id number]     Doc#: [id number]    CC:

## 2017-12-09 VITALS
DIASTOLIC BLOOD PRESSURE: 94 MMHG | OXYGEN SATURATION: 97 % | BODY MASS INDEX: 30.08 KG/M2 | WEIGHT: 163.44 LBS | SYSTOLIC BLOOD PRESSURE: 137 MMHG | RESPIRATION RATE: 18 BRPM | HEART RATE: 110 BPM | TEMPERATURE: 98.6 F | HEIGHT: 62 IN

## 2017-12-09 LAB
ANION GAP SERPL CALCULATED.3IONS-SCNC: 16 MEQ/L (ref 8–16)
AVERAGE GLUCOSE: 204 MG/DL (ref 70–126)
BUN BLDV-MCNC: 8 MG/DL (ref 7–22)
CALCIUM SERPL-MCNC: 9.2 MG/DL (ref 8.5–10.5)
CHLORIDE BLD-SCNC: 98 MEQ/L (ref 98–111)
CO2: 24 MEQ/L (ref 23–33)
CREAT SERPL-MCNC: 0.6 MG/DL (ref 0.4–1.2)
GFR SERPL CREATININE-BSD FRML MDRD: > 90 ML/MIN/1.73M2
GLUCOSE BLD-MCNC: 175 MG/DL (ref 70–108)
GROUP A STREP CULTURE, REFLEX: NEGATIVE
HBA1C MFR BLD: 8.8 % (ref 4.4–6.4)
HCT VFR BLD CALC: 45.4 % (ref 42–52)
HEMOGLOBIN: 15.5 GM/DL (ref 14–18)
MAGNESIUM: 2.2 MG/DL (ref 1.6–2.4)
MCH RBC QN AUTO: 32.1 PG (ref 27–31)
MCHC RBC AUTO-ENTMCNC: 34.1 GM/DL (ref 33–37)
MCV RBC AUTO: 93.9 FL (ref 80–94)
PDW BLD-RTO: 13.3 % (ref 11.5–14.5)
PLATELET # BLD: 145 THOU/MM3 (ref 130–400)
PMV BLD AUTO: 9.1 MCM (ref 7.4–10.4)
POTASSIUM SERPL-SCNC: 3.4 MEQ/L (ref 3.5–5.2)
RBC # BLD: 4.84 MILL/MM3 (ref 4.7–6.1)
REFLEX THROAT C + S: NORMAL
SODIUM BLD-SCNC: 138 MEQ/L (ref 135–145)
WBC # BLD: 10 THOU/MM3 (ref 4.8–10.8)

## 2017-12-09 PROCEDURE — 6360000002 HC RX W HCPCS: Performed by: INTERNAL MEDICINE

## 2017-12-09 PROCEDURE — 80048 BASIC METABOLIC PNL TOTAL CA: CPT

## 2017-12-09 PROCEDURE — 83735 ASSAY OF MAGNESIUM: CPT

## 2017-12-09 PROCEDURE — 99239 HOSP IP/OBS DSCHRG MGMT >30: CPT | Performed by: PHYSICIAN ASSISTANT

## 2017-12-09 PROCEDURE — 85027 COMPLETE CBC AUTOMATED: CPT

## 2017-12-09 PROCEDURE — 6370000000 HC RX 637 (ALT 250 FOR IP): Performed by: PHYSICIAN ASSISTANT

## 2017-12-09 PROCEDURE — 83036 HEMOGLOBIN GLYCOSYLATED A1C: CPT

## 2017-12-09 PROCEDURE — 36415 COLL VENOUS BLD VENIPUNCTURE: CPT

## 2017-12-09 PROCEDURE — 2580000003 HC RX 258: Performed by: PHYSICIAN ASSISTANT

## 2017-12-09 PROCEDURE — 6370000000 HC RX 637 (ALT 250 FOR IP): Performed by: INTERNAL MEDICINE

## 2017-12-09 RX ORDER — HYDROXYZINE PAMOATE 25 MG/1
25 CAPSULE ORAL 3 TIMES DAILY PRN
Qty: 15 CAPSULE | Refills: 0 | Status: SHIPPED | OUTPATIENT
Start: 2017-12-09 | End: 2017-12-16

## 2017-12-09 RX ORDER — PANTOPRAZOLE SODIUM 40 MG/1
40 TABLET, DELAYED RELEASE ORAL
Qty: 15 TABLET | Refills: 0 | Status: SHIPPED | OUTPATIENT
Start: 2017-12-10 | End: 2018-01-08

## 2017-12-09 RX ORDER — HYDROCODONE BITARTRATE AND ACETAMINOPHEN 5; 325 MG/1; MG/1
1 TABLET ORAL EVERY 6 HOURS PRN
Qty: 10 TABLET | Refills: 0 | Status: SHIPPED | OUTPATIENT
Start: 2017-12-09 | End: 2017-12-12 | Stop reason: ALTCHOICE

## 2017-12-09 RX ORDER — HYDROCODONE BITARTRATE AND ACETAMINOPHEN 5; 325 MG/1; MG/1
1 TABLET ORAL EVERY 6 HOURS PRN
Qty: 15 TABLET | Refills: 0 | Status: SHIPPED | OUTPATIENT
Start: 2017-12-09 | End: 2017-12-09

## 2017-12-09 RX ORDER — LANCETS
1 EACH MISCELLANEOUS DAILY
Qty: 100 EACH | Refills: 0 | Status: SHIPPED | OUTPATIENT
Start: 2017-12-09 | End: 2020-07-28

## 2017-12-09 RX ORDER — POTASSIUM CHLORIDE 20 MEQ/1
40 TABLET, EXTENDED RELEASE ORAL ONCE
Status: DISCONTINUED | OUTPATIENT
Start: 2017-12-09 | End: 2017-12-09 | Stop reason: HOSPADM

## 2017-12-09 RX ADMIN — HYDROCODONE BITARTRATE AND ACETAMINOPHEN 1 TABLET: 5; 325 TABLET ORAL at 04:17

## 2017-12-09 RX ADMIN — Medication 10 ML: at 09:03

## 2017-12-09 RX ADMIN — ASPIRIN 81 MG: 81 TABLET, CHEWABLE ORAL at 09:03

## 2017-12-09 RX ADMIN — LORAZEPAM 3 MG: 2 TABLET ORAL at 09:03

## 2017-12-09 RX ADMIN — PANTOPRAZOLE SODIUM 40 MG: 40 TABLET, DELAYED RELEASE ORAL at 06:29

## 2017-12-09 RX ADMIN — HYDROCODONE BITARTRATE AND ACETAMINOPHEN 1 TABLET: 5; 325 TABLET ORAL at 12:02

## 2017-12-09 RX ADMIN — ENOXAPARIN SODIUM 40 MG: 40 INJECTION SUBCUTANEOUS at 12:02

## 2017-12-09 RX ADMIN — LORAZEPAM 2 MG: 2 TABLET ORAL at 03:46

## 2017-12-09 RX ADMIN — ACETAMINOPHEN 650 MG: 325 TABLET ORAL at 09:03

## 2017-12-09 RX ADMIN — MULTIPLE VITAMINS W/ MINERALS TAB 1 TABLET: TAB at 09:03

## 2017-12-09 ASSESSMENT — PAIN SCALES - GENERAL
PAINLEVEL_OUTOF10: 8
PAINLEVEL_OUTOF10: 8
PAINLEVEL_OUTOF10: 7
PAINLEVEL_OUTOF10: 6
PAINLEVEL_OUTOF10: 6

## 2017-12-09 NOTE — DISCHARGE SUMMARY
Hospital Medicine Discharge Summary      Patient Identification:   Ce Levi   : 1985  MRN: [id number]   Account: [de-identified]      Patient's PCP: Brianda Elam CNP    Admit Date: 2017     Discharge Date: 2017    Admitting Physician: Atiya Vann MD     Discharge Physician: Zack Estrella PA-C     Discharge Diagnoses:  1. Syncope and collapse--unclear etiology. troponin (-) on , CT of head (-) acute on , CT of facial bones no fracture, CXR negative. Neurology consulted and followed, MRI of : normal signal intensity in the brain, carotid ultrasound unremarkable, minimal stenosis. Echo on : normal LV size and systolic function, EF 60%. EEG normal, tilt table no significant symptoms. Discharged on 48 hour holter monitor. 2. Tongue Swelling/Jaw Pain--airway patent, handling secretions, tolerating diet. Recommend PCP follow, short duration of Norco  3. Leukocytosis--likely reactive from #1. Resolved. 4. Macrocytosis--MCV 95.2. Likely from chronic alcohol use. Folate and B12 are within normal limits on 17.  5. Elevated LFT--likely from chronic alcohol use. / suggestive of alcohol induced pattern. 6. Hyperglycemia--DM-2, Of note, patient was noted to have persistent hyperglycemia on lab work, a Hgb A1c was checked at 8.8 and he was started on Metformin. Prescription provided for lancets, glucometer and testing strips. 7. Alcohol Abuse--reports last drink 17. Recommended continued cessation, MVI. 8. Marijuana Use-- UDS positive, counseled on cessation. 9. Anxiety--given prn vistaril, follow-up with PCP. The patient was seen and examined on day of discharge and this discharge summary is in conjunction with any daily progress note from day of discharge. Hospital Course:   Ce Levi is a 27 y.o. male admitted to [address] [place] [address] on 2017 for chief complaints of syncope.  The patient was admitted the brain. 2. Mild mucosal thickening along the floors of the maxillary sinuses. **This report has been created using voice recognition software. It may contain minor errors which are inherent in voice recognition technology. **      Final report electronically signed by Dr. Edward Gabriel on 12/8/2017 11:26 AM      CT FACIAL BONES W CONTRAST   Final Result   1. There is no fracture. 2. There is a 1.7 x 0.9 cm mucous retention cyst or polyp within the inferior aspect of the left maxillary sinus. 3. There is a 1.1 x 0.6 cm mucous retention cyst or polyp within the inferior aspect of the right maxillary sinus. 4. There is a 0.4 x 0.1 cm mucous retention cyst or polyp within the right side of the sphenoid sinus. **This report has been created using voice recognition software. It may contain minor errors which are inherent in voice recognition technology. **      Final report electronically signed by Dr. Ryder Perez on 12/7/2017 7:52 AM      CT HEAD WO CONTRAST   Final Result   1. Unremarkable noncontrast CT head. **This report has been created using voice recognition software. It may contain minor errors which are inherent in voice recognition technology. **      Final report electronically signed by Dr. Ryder Perez on 12/7/2017 7:45 AM      XR CHEST STANDARD (2 VW)   Final Result    Stable radiographic appearance of the chest. No evidence of an acute process. **This report has been created using voice recognition software. It may contain minor errors which are inherent in voice recognition technology. **      Final report electronically signed by Dr. Rosalie Bowen on 12/7/2017 5:28 AM        EEG  IMPRESSION:  This is a normal EEG. There was no evidence of epileptiform  activity appreciated. Tilt table test  CONCLUSIONS:  1. Tilt table test associated with no significant symptoms. 2.  No significant hemodynamic changes to explain patient's symptoms.   3. Further evaluation should follow accordingly. Echocardiogram  Summary   Normal left ventricle size and systolic function. Ejection fraction was   estimated at 60 %. There were no regional left ventricular wall motion   abnormalities and wall thickness was within normal limits.      Signature      ----------------------------------------------------------------   Electronically signed by Varun Mckeon MD (Interpreting   physician) on 12/07/2017 at 06:53 PM        Consults:     IP CONSULT TO NEUROLOGY    Disposition:    [x] Home       [] TCU       [] Rehab       [] Psych       [] SNF       [] [facility]       [] Other-    Condition at Discharge: Stable    Code Status:  Prior     Patient Instructions:    Discharge lab work: none  Activity: activity as tolerated  Diet:        Follow-up visits:   Star Chawla CNP  [address] [place] [phone]    In 1 week  as soon as possible         Discharge Medications:      Medication List      START taking these medications    ACCU-CHEK MULTICLIX LANCETS Misc  1 box by Does not apply route daily     HYDROcodone-acetaminophen 5-325 MG per tablet  Commonly known as:  Shantell Schlichter  Take 1 tablet by mouth every 6 hours as needed for Pain .     hydrOXYzine 25 MG capsule  Commonly known as:  VISTARIL  Take 1 capsule by mouth 3 times daily as needed for Anxiety     metFORMIN 500 MG tablet  Commonly known as:  GLUCOPHAGE  Take 1 tablet by mouth 2 times daily (with meals)     pantoprazole 40 MG tablet  Commonly known as:  PROTONIX  Take 1 tablet by mouth every morning (before breakfast)     phenol 1.4 % Liqd mouth spray  Take 1 spray by mouth every 2 hours as needed for Sore Throat        CONTINUE taking these medications    albuterol sulfate  (90 Base) MCG/ACT inhaler  Commonly known as:  PROVENTIL HFA  Inhale 2 puffs into the lungs every 6 hours as needed for Wheezing.      aspirin 81 MG chewable tablet  Commonly known as:  ASPIRIN CHILDRENS  Take 1 tablet

## 2017-12-09 NOTE — DISCHARGE INSTR - DIET

## 2017-12-09 NOTE — PLAN OF CARE
Problem: Pain:  Goal: Pain level will decrease  Pain level will decrease   Outcome: Ongoing  Patient continues to complain of throat pain. Phenol spray increased to q1 hour per Dr. Majano Cons. Receiving norco q4. Goal: Control of acute pain  Control of acute pain   Outcome: Ongoing    Goal: Control of chronic pain  Control of chronic pain   Outcome: Ongoing      Problem: Neurological  Goal: Maximum potential motor/sensory/cognitive function  Outcome: Ongoing  Patient continues to be amxious. Ativen given per CIWA scale. Problem: Cardiovascular  Goal: Anticoagulate/Hct stable  Outcome: Ongoing  Results for Sara Mon (MRN [id number]) as of 12/8/2017 23:05   Ref. Range 12/8/2017 06:23   Hematocrit Latest Ref Range: 42.0 - 52.0 % 44.0       Comments: Care plan reviewed with patient. Patient verbalizes understanding of plan of care.
Problem: Pain:  Goal: Pain level will decrease  Pain level will decrease   Outcome: Ongoing  Patient on PO pain meds. Patient satisfied with pain management at this time. Patient statws pain is 5-6 on pain scale. Goal: Control of acute pain  Control of acute pain   Outcome: Ongoing    Goal: Control of chronic pain  Control of chronic pain   Outcome: Ongoing      Problem: Neurological  Goal: Maximum potential motor/sensory/cognitive function  monitoring    Problem: Cardiovascular  Goal: Anticoagulate/Hct stable  Outcome: Ongoing      Problem: IP BREATHING  Goal: LTG - patient will mobilize secretions and maintain airway  Outcome: Ongoing  monitoring  Goal: STG - Patient/caregiver will maintain patent airway  Outcome: Ongoing    Goal: STG - Patient/caregiver demonstrates correct suctioning technique  Outcome: Ongoing      Comments: Care plan reviewed with patient. Patient  verbalize understanding of the plan of care and contribute to goal setting.
verbalizes understanding of the plan of care and is contributing to goal setting.

## 2017-12-09 NOTE — FLOWSHEET NOTE
12/08/17 2025   Encounter Summary   Services provided to: Patient   Referral/Consult From: [address] Family members;Parent   Place of [facility] None   Continue Visiting Yes  (12/8/17 continue support)   Complexity of Encounter Moderate   Length of Encounter 15 minutes   Spiritual Assessment Completed Yes   Routine   Type Initial   Assessment Approachable   Intervention Active listening;Nurtured hope;Discussed illness/injury and it's impact;Prayer   Outcome Expressed gratitude   Spiritual/Baptism   Type Spiritual support     Advance Directive Consult: Advance Directive materials were provided to patient and explained. Patient is not ready to complete at this time, gave information for Spiritual Care to be contacted if further assistance is needed.

## 2017-12-11 ENCOUNTER — TELEPHONE (OUTPATIENT)
Dept: FAMILY MEDICINE CLINIC | Age: 32
End: 2017-12-11

## 2017-12-11 ENCOUNTER — HOSPITAL ENCOUNTER (OUTPATIENT)
Dept: NON INVASIVE DIAGNOSTICS | Age: 32
Discharge: HOME | End: 2017-12-11
Payer: MEDICARE

## 2017-12-11 ENCOUNTER — TELEPHONE (OUTPATIENT)
Dept: INTERNAL MEDICINE CLINIC | Age: 32
End: 2017-12-11

## 2017-12-11 LAB — THROAT/NOSE CULTURE: NORMAL

## 2017-12-11 PROCEDURE — 93225 XTRNL ECG REC<48 HRS REC: CPT

## 2017-12-11 PROCEDURE — 93226 XTRNL ECG REC<48 HR SCAN A/R: CPT

## 2017-12-11 NOTE — PROCEDURES
The skin was prepped and a holter monitor was applied. The patient was instructed on the documentation of symptoms and the purpose of the holter as well as the things to avoid while wearing the holter. The patient was instructed to remove and return the holter on 12/13/17. The serial number of the holter that was applied is [id number].

## 2017-12-11 NOTE — TELEPHONE ENCOUNTER
Patient's mother, Joe Dick, called. She is requesting an appointment with Dr. Vikki Craig to establish a PCP. He was discharged from [facility] on 12/9/17 after a diabetic seizure. He is scheduled to follow up with Tony Hood CNP on 12/14/17. She stated that she sees Dr. Vikki Craig and she's requesting to get patient established with him as well. He has [place] medicaid and there are no medicaid slots available. Please advise.

## 2017-12-12 ENCOUNTER — OFFICE VISIT (OUTPATIENT)
Dept: INTERNAL MEDICINE CLINIC | Age: 32
End: 2017-12-12
Payer: MEDICARE

## 2017-12-12 VITALS
BODY MASS INDEX: 29.63 KG/M2 | HEART RATE: 88 BPM | WEIGHT: 161 LBS | HEIGHT: 62 IN | DIASTOLIC BLOOD PRESSURE: 76 MMHG | SYSTOLIC BLOOD PRESSURE: 118 MMHG

## 2017-12-12 DIAGNOSIS — Z23 NEED FOR INFLUENZA VACCINATION: ICD-10-CM

## 2017-12-12 DIAGNOSIS — Z13.220 LIPID SCREENING: ICD-10-CM

## 2017-12-12 DIAGNOSIS — S09.93XS: Primary | ICD-10-CM

## 2017-12-12 DIAGNOSIS — R74.8 ELEVATED LIVER ENZYMES: ICD-10-CM

## 2017-12-12 DIAGNOSIS — E11.69 TYPE 2 DIABETES MELLITUS WITH OTHER SPECIFIED COMPLICATION, WITHOUT LONG-TERM CURRENT USE OF INSULIN (HCC): ICD-10-CM

## 2017-12-12 DIAGNOSIS — R73.9 HYPERGLYCEMIA: ICD-10-CM

## 2017-12-12 DIAGNOSIS — R31.21 ASYMPTOMATIC MICROSCOPIC HEMATURIA: ICD-10-CM

## 2017-12-12 DIAGNOSIS — F10.10 ALCOHOL ABUSE: ICD-10-CM

## 2017-12-12 DIAGNOSIS — Z23 NEED FOR PNEUMOCOCCAL VACCINATION: ICD-10-CM

## 2017-12-12 DIAGNOSIS — R74.8 ELEVATED LIPASE: ICD-10-CM

## 2017-12-12 LAB — GRAM STAIN RESULT: NORMAL

## 2017-12-12 PROCEDURE — 90732 PPSV23 VACC 2 YRS+ SUBQ/IM: CPT | Performed by: NURSE PRACTITIONER

## 2017-12-12 PROCEDURE — 99496 TRANSJ CARE MGMT HIGH F2F 7D: CPT | Performed by: NURSE PRACTITIONER

## 2017-12-12 PROCEDURE — 90630 INFLUENZA, QUADV, 18-64 YRS, ID, PF, MICRO INJ, 0.1ML (FLUZONE QUADV, PF): CPT | Performed by: NURSE PRACTITIONER

## 2017-12-12 PROCEDURE — 90471 IMMUNIZATION ADMIN: CPT | Performed by: NURSE PRACTITIONER

## 2017-12-12 PROCEDURE — 90472 IMMUNIZATION ADMIN EACH ADD: CPT | Performed by: NURSE PRACTITIONER

## 2017-12-12 RX ORDER — HYDROCODONE BITARTRATE AND ACETAMINOPHEN 5; 325 MG/1; MG/1
1 TABLET ORAL EVERY 6 HOURS PRN
Qty: 28 TABLET | Refills: 0 | Status: SHIPPED | OUTPATIENT
Start: 2017-12-12 | End: 2017-12-19

## 2017-12-12 RX ORDER — ASPIRIN 325 MG
325 TABLET ORAL EVERY 6 HOURS PRN
COMMUNITY
End: 2017-12-21

## 2017-12-12 RX ORDER — IBUPROFEN 200 MG
400 TABLET ORAL EVERY 6 HOURS PRN
Status: ON HOLD | COMMUNITY
End: 2019-05-09 | Stop reason: HOSPADM

## 2017-12-12 RX ORDER — CLINDAMYCIN HYDROCHLORIDE 300 MG/1
300 CAPSULE ORAL 4 TIMES DAILY
Qty: 40 CAPSULE | Refills: 0 | Status: SHIPPED | OUTPATIENT
Start: 2017-12-12 | End: 2017-12-22

## 2017-12-12 RX ORDER — ACETAMINOPHEN 500 MG
1000 TABLET ORAL EVERY 6 HOURS PRN
COMMUNITY

## 2017-12-12 ASSESSMENT — ENCOUNTER SYMPTOMS
ABDOMINAL PAIN: 0
STRIDOR: 0
VOMITING: 0
COUGH: 0
PHOTOPHOBIA: 0
RHINORRHEA: 0
ABDOMINAL DISTENTION: 0
TROUBLE SWALLOWING: 1
WHEEZING: 0
SORE THROAT: 1
EYE PAIN: 0
SHORTNESS OF BREATH: 0
CHEST TIGHTNESS: 0
DIARRHEA: 0
CONSTIPATION: 0

## 2017-12-12 ASSESSMENT — PATIENT HEALTH QUESTIONNAIRE - PHQ9
SUM OF ALL RESPONSES TO PHQ QUESTIONS 1-9: 2
SUM OF ALL RESPONSES TO PHQ9 QUESTIONS 1 & 2: 2
2. FEELING DOWN, DEPRESSED OR HOPELESS: 1
1. LITTLE INTEREST OR PLEASURE IN DOING THINGS: 1

## 2017-12-12 NOTE — TELEPHONE ENCOUNTER
[facility] Transitions Initial Follow Up Call    Call within 2 business days of discharge: yes    Patient: Rosana Adrian Patient : 1985   MRN: [id number]  Reason for Admission: There are no discharge diagnoses documented for the most recent discharge. Discharge Date: 17 RARS: Scooby @TOO([phone])@     Spoke with: First attempt to contact patient , left message to call the office. Facility: [unfilled]    Non-face-to-face services provided: Transition of care questions .       Follow Up  Future Appointments  Date Time Provider Malachi Gray   2017 10:00 AM Sophia Cummings CNP [facility] - Kiana Stoll LPN

## 2017-12-12 NOTE — PATIENT INSTRUCTIONS
Obtain fasting lab work in 1 week. Then follow up with me to go over labs. Sip water, eat soft foods as tolerated. Consider Glucerna supplement drink. Take antibiotic as ordered. Magic mouth wash as needed. Norco as needed. You will be notified about an appointment with [facility].  I referred you for Intensive Outpatient therapy. AA is also an important part of rehab. I referred you to the [facility] to see a registered dietician and a diabetes nurse educator. Check blood sugars twice daily for the next week: Alternate days checking blood sugar before breakfast and dinner, with checking blood sugar before lunch and before bed. Bring your glucometer to next appointment. Goal blood sugar is . Patient Education        Learning About Diabetes Food Guidelines  Your Care Instructions    Meal planning is important to manage diabetes. It helps keep your blood sugar at a target level (which you set with your doctor). You don't have to eat special foods. You can eat what your family eats, including sweets once in a while. But you do have to pay attention to how often you eat and how much you eat of certain foods. You may want to work with a dietitian or a certified diabetes educator (CDE) to help you plan meals and snacks. A dietitian or CDE can also help you lose weight if that is one of your goals. What should you know about eating carbs? Managing the amount of carbohydrate (carbs) you eat is an important part of healthy meals when you have diabetes. Carbohydrate is found in many foods. · Learn which foods have carbs. And learn the amounts of carbs in different foods. ¨ Bread, cereal, pasta, and rice have about 15 grams of carbs in a serving. A serving is 1 slice of bread (1 ounce), ½ cup of cooked cereal, or 1/3 cup of cooked pasta or rice. ¨ Fruits have 15 grams of carbs in a serving.  A serving is 1 small fresh fruit, such as an apple or orange; ½ of a banana; ½ cup of cooked or canned fruit; ½ cup of fruit juice; 1 cup of melon or raspberries; or 2 tablespoons of dried fruit. ¨ Milk and no-sugar-added yogurt have 15 grams of carbs in a serving. A serving is 1 cup of milk or 2/3 cup of no-sugar-added yogurt. ¨ Starchy vegetables have 15 grams of carbs in a serving. A serving is ½ cup of mashed potatoes or sweet potato; 1 cup winter squash; ½ of a small baked potato; ½ cup of cooked beans; or ½ cup cooked corn or green peas. · Learn how much carbs to eat each day and at each meal. A dietitian or CDE can teach you how to keep track of the amount of carbs you eat. This is called carbohydrate counting. · If you are not sure how to count carbohydrate grams, use the Plate Method to plan meals. It is a good, quick way to make sure that you have a balanced meal. It also helps you spread carbs throughout the day. ¨ Divide your plate by types of foods. Put non-starchy vegetables on half the plate, meat or other protein food on one-quarter of the plate, and a grain or starchy vegetable in the final quarter of the plate. To this you can add a small piece of fruit and 1 cup of milk or yogurt, depending on how many carbs you are supposed to eat at a meal.  · Try to eat about the same amount of carbs at each meal. Do not \"save up\" your daily allowance of carbs to eat at one meal.  · Proteins have very little or no carbs per serving. Examples of proteins are beef, chicken, turkey, fish, eggs, tofu, cheese, cottage cheese, and peanut butter. A serving size of meat is 3 ounces, which is about the size of a deck of cards. Examples of meat substitute serving sizes (equal to 1 ounce of meat) are 1/4 cup of cottage cheese, 1 egg, 1 tablespoon of peanut butter, and ½ cup of tofu. How can you eat out and still eat healthy? · Learn to estimate the serving sizes of foods that have carbohydrate. If you measure food at home, it will be easier to estimate the amount in a serving of restaurant food.   · If the meal you order has too much carbohydrate (such as potatoes, corn, or baked beans), ask to have a low-carbohydrate food instead. Ask for a salad or green vegetables. · If you use insulin, check your blood sugar before and after eating out to help you plan how much to eat in the future. · If you eat more carbohydrate at a meal than you had planned, take a walk or do other exercise. This will help lower your blood sugar. What else should you know? · Limit saturated fat, such as the fat from meat and dairy products. This is a healthy choice because people who have diabetes are at higher risk of heart disease. So choose lean cuts of meat and nonfat or low-fat dairy products. Use olive or canola oil instead of butter or shortening when cooking. · Don't skip meals. Your blood sugar may drop too low if you skip meals and take insulin or certain medicines for diabetes. · Check with your doctor before you drink alcohol. Alcohol can cause your blood sugar to drop too low. Alcohol can also cause a bad reaction if you take certain diabetes medicines. Follow-up care is a key part of your treatment and safety. Be sure to make and go to all appointments, and call your doctor if you are having problems. It's also a good idea to know your test results and keep a list of the medicines you take. Where can you learn more? Go to https://Viraxgeraldine.EnerMotion. org and sign in to your Get Real Health account. Enter O571 in the Squrl box to learn more about \"Learning About Diabetes Food Guidelines. \"     If you do not have an account, please click on the \"Sign Up Now\" link. Current as of: March 13, 2017  Content Version: 11.4  © 9627-9546 Healthwise, [employer]. Care instructions adapted under license by [facility] ([facility]).  If you have questions about a medical condition or this instruction, always ask your healthcare professional. [address] any warranty or liability for your use of this about using the plate format. Talk to your doctor, a dietitian, or a diabetes educator about your concerns. Carbohydrate counting  With carbohydrate counting, you plan meals based on the amount of carbohydrate in each food. Carbohydrate raises blood sugar higher and more quickly than any other nutrient. It is found in desserts, breads and cereals, and fruit. It's also found in starchy vegetables such as potatoes and corn, grains such as rice and pasta, and milk and yogurt. Spreading carbohydrate throughout the day helps keep your blood sugar levels within your target range. Your daily amount depends on several things, including your weight, how active you are, which diabetes medicines you take, and what your goals are for your blood sugar levels. A registered dietitian or diabetes educator can help you plan how much carbohydrate to include in each meal and snack. A guideline for your daily amount of carbohydrate is:  · 45 to 60 grams at each meal. That's about the same as 3 to 4 carbohydrate servings. · 15 to 20 grams at each snack. That's about the same as 1 carbohydrate serving. The Nutrition Facts label on packaged foods tells you how much carbohydrate is in a serving of the food. First, look at the serving size on the food label. Is that the amount you eat in a serving? All of the nutrition information on a food label is based on that serving size. So if you eat more or less than that, you'll need to adjust the other numbers. Total carbohydrate is the next thing you need to look for on the label. If you count carbohydrate servings, one serving of carbohydrate is 15 grams. For foods that don't come with labels, such as fresh fruits and vegetables, you'll need a guide that lists carbohydrate in these foods. Ask your doctor, dietitian, or diabetes educator about books or other nutrition guides you can use.   If you take insulin, you need to know how many grams of carbohydrate are in a meal. This lets you know how much rapid-acting insulin to take before you eat. If you use an insulin pump, you get a constant rate of insulin during the day. So the pump must be programmed at meals to give you extra insulin to cover the rise in blood sugar after meals. When you know how much carbohydrate you will eat, you can take the right amount of insulin. Or, if you always use the same amount of insulin, you need to make sure that you eat the same amount of carbohydrate at meals. If you need more help to understand carbohydrate counting and food labels, ask your doctor, dietitian, or diabetes educator. How do you get started with meal planning? Here are some tips to get started:  · Plan your meals a week at a time. Don't forget to include snacks too. · Use cookbooks or online recipes to plan several main meals. Plan some quick meals for busy nights. You also can double some recipes that freeze well. Then you can save half for other busy nights when you don't have time to cook. · Make sure you have the ingredients you need for your recipes. If you're running low on basic items, put these items on your shopping list too. · List foods that you use to make breakfasts, lunches, and snacks. List plenty of fruits and vegetables. · Post this list on the refrigerator. Add to it as you think of more things you need. · Take the list to the store to do your weekly shopping. Follow-up care is a key part of your treatment and safety. Be sure to make and go to all appointments, and call your doctor if you are having problems. It's also a good idea to know your test results and keep a list of the medicines you take. Where can you learn more? Go to https://Studio Modernageraldine.3D Industri.es. org and sign in to your Hifi Engineering account. Enter M268 in the VSHORE box to learn more about \"Learning About Meal Planning for Diabetes. \"     If you do not have an account, please click on the \"Sign Up Now\" link.   Current as of: March 13, visit. If you have a foot problem, see your doctor. Do not try to treat an early foot problem at home. Home remedies or treatments that you can buy without a prescription (such as corn removers) can be harmful. · Always get early treatment for foot problems. A minor irritation can lead to a major problem if not properly cared for early. When should you call for help? Call your doctor now or seek immediate medical care if:  ? · You have a foot sore, an ulcer or break in the skin that is not healing after 4 days, bleeding corns or calluses, or an ingrown toenail. ? · You have blue or black areas, which can mean bruising or blood flow problems. ? · You have peeling skin or tiny blisters between your toes or cracking or oozing of the skin. ? · You have a fever for more than 24 hours and a foot sore. ? · You have new numbness or tingling in your feet that does not go away after you move your feet or change positions. ? · You have unexplained or unusual swelling of the foot or ankle. ? Watch closely for changes in your health, and be sure to contact your doctor if:  ? · You cannot do proper foot care. Where can you learn more? Go to https://Modustri.Indium Software Inc.. org and sign in to your Apptimize account. Enter J551 in the [facility] box to learn more about \"Diabetes Foot Health: Care Instructions. \"     If you do not have an account, please click on the \"Sign Up Now\" link. Current as of: March 13, 2017  Content Version: 11.4  © 2305-9481 Helijia. Care instructions adapted under license by [facility] ([facility]). If you have questions about a medical condition or this instruction, always ask your healthcare professional. [place] [address] any warranty or liability for your use of this information. Patient Education        Learning About Diabetes and Your Teeth  How does diabetes affect your teeth and gums?   When you have diabetes, managing blood sugar levels and taking good care of your teeth and gums are both important. When blood sugar levels are high, there's a greater risk for:  · Gum (periodontal) disease. · Tooth decay. · Fungal infections in the mouth, like thrush. · Dry mouth, or xerostomia (say \"uzair-del-STO-angelique-uh\"). The mouth needs saliva to neutralize the acids in your mouth. These acids can lead to gum disease and tooth decay. Keeping your blood sugar levels in your target range can help prevent problems with the teeth and gums. If you have any problems with your teeth or gums, see your dentist.  How do you care for your teeth and gums when you have diabetes? · Brush your teeth twice a day. · Floss daily. Make sure to press the floss against your teeth and not your gums. · Check each day for areas where your gums might be red or painful. Be sure to let your dentist know of any sores in your mouth. · See your dentist regularly for professional cleaning of your teeth and to look for gum problems. Many dentists recommend getting checkups twice a year. Remind your dentist that you have diabetes before any work is done. · Don't smoke or use smokeless tobacco. Tobacco use with diabetes can lead to a greater risk of severe gum disease. If you need help quitting, talk to your doctor about stop-smoking programs and medicines. These can increase your chances of quitting for good. Follow-up care is a key part of your treatment and safety. Be sure to make and go to all appointments, and call your doctor if you are having problems. It's also a good idea to know your test results and keep a list of the medicines you take. Where can you learn more? Go to https://Thrive Sologeraldine.Newvem. org and sign in to your Parkya account. Enter O319 in the zulily box to learn more about \"Learning About Diabetes and Your Teeth. \"     If you do not have an account, please click on the \"Sign Up Now\" link.   Current as of: March 13, 2017  Content Version: general, limit alcohol to 1 drink a day with a meal if you are a woman. If you are a man, limit alcohol to 2 drinks a day with a meal. The following is considered a standard drink:  ¨ One 12-ounce bottle of beer or wine cooler  ¨ One 5-ounce glass of wine  ¨ One mixed drink with 1.5 ounces of 80-proof hard liquor, such as gin, whiskey, or rum  · Choose alcoholic drinks wisely. With hard alcohol, use sugar-free mixers, such as diet tonic, water, or club soda. Pick drinks that have less alcohol, including light beer or dry wine. Or add club soda to wine to dilute it. Also remember that most alcoholic drinks have a lot of calories. · When you drink, check your blood sugar before you go to bed. Have a snack before bed so your blood sugar does not drop while you sleep. When not to drink  · Never drink on an empty stomach. If you do drink alcohol, drink it only with a meal or snack. Having as little as 2 drinks on an empty stomach could lead to low blood sugar. · Do not drink alcohol if you have problems recognizing the signs of low blood sugar until they become severe. · Do not drink alcohol after you exercise. The exercise itself lowers blood sugar. · Do not drink if you have nerve damage. Drinking can make it worse and increase the pain, numbness, and other symptoms. · Do not drink if you have high blood pressure. · Do not drink if you have diabetic eye disease. · Do not drink if you have high triglycerides, a type of fat in your blood. Drinking can raise triglycerides. · Do not drink if you are trying to lose weight. Alcohol provides empty calories that do not give you any nutrients. · Do not drink and drive. The effects of alcohol are greater if you have low blood sugar. When should you call for help? Call 911 anytime you think you may need emergency care. For example, call if:  ? · You passed out (lost consciousness). ? · You are confused or cannot think clearly.    ? · Your blood sugar is very high or your blood vessels, leading to heart disease and stroke. · Reduce blood flow and cause nerve damage to parts of your body, especially your feet. This can cause slow healing and pain when you walk. · Make your immune system weak and less able to fight infections. When people hear the word \"diabetes,\" they often think of problems like these. But daily care and treatment can help prevent or delay these problems. The goal is to keep your blood sugar in a target range. That's the best way to reduce your chance of having more problems from diabetes. What are the symptoms? Some people who have type 2 diabetes may not have any symptoms early on. Many people with the disease don't even know they have it at first. But with time, diabetes starts to cause symptoms. You experience most symptoms of type 2 diabetes when your blood sugar is either too high or too low. The most common symptoms of high blood sugar include:  · Thirst.  · Frequent urination. · Weight loss. · Blurry vision. The symptoms of low blood sugar include:  · Sweating. · Shakiness. · Weakness. · Hunger. · Confusion. How can you prevent type 2 diabetes? The best way to prevent or delay type 2 diabetes is to adopt healthy habits, which include:  · Staying at a healthy weight. · Exercising regularly. · Eating healthy foods. How is type 2 diabetes treated? If you have type 2 diabetes, here are the most important things you can do. · Take your diabetes medicines. · Check your blood sugar as often as your doctor recommends. Also, get a hemoglobin A1c test at least every 6 months. · Try to eat a variety of foods and to spread carbohydrate throughout the day. Carbohydrate raises blood sugar higher and more quickly than any other nutrient does. Carbohydrate is found in sugar, breads and cereals, fruit, starchy vegetables such as potatoes and corn, and milk and yogurt. · Get at least 30 minutes of exercise on most days of the week.  Walking is a good choice. You also may want to do other activities, such as running, swimming, cycling, or playing tennis or team sports. If your doctor says it's okay, do muscle-strengthening exercises at least 2 times a week. · See your doctor for checkups and tests on a regular schedule. · If you have high blood pressure or high cholesterol, take the medicines as prescribed by your doctor. · Do not smoke. Smoking can make health problems worse. This includes problems you might have with type 2 diabetes. If you need help quitting, talk to your doctor about stop-smoking programs and medicines. These can increase your chances of quitting for good. Follow-up care is a key part of your treatment and safety. Be sure to make and go to all appointments, and call your doctor if you are having problems. It's also a good idea to know your test results and keep a list of the medicines you take. Where can you learn more? Go to https://That's Us TechnologiespePentalum Technologies.Flocktory. org and sign in to your Vint Training account. Enter R089 in the IRX Therapeutics box to learn more about \"Learning About Type 2 Diabetes. \"     If you do not have an account, please click on the \"Sign Up Now\" link. Current as of: March 13, 2017  Content Version: 11.4  © 0274-2253 Healthwise, Incorporated. Care instructions adapted under license by [facility] ([facility]). If you have questions about a medical condition or this instruction, always ask your healthcare professional. [place] [address] any warranty or liability for your use of this information.

## 2017-12-12 NOTE — PROGRESS NOTES
[facility] PROFESSIONAL SERVS  PHYSICIANS INCShila Zhang [address]  [address]  [address]  Dept: [phone]  Dept Fax: [phone]  Loc: [phone]    Transition Care Office Visit    Date of Face-to-Face: 12/12/2017    Date of discharge: 12/9/2017    30 day post-discharge date: 1/7/2018    See transitional care telephone note on: 12/12/2017. Persons at visit: patient and his mother    Here for follow-up after hospitalization for: Syncope and collapse (unclear etiology), Tongue swelling/jaw pain, Leukocytosis (resolved), Macrocytosis, Elevated LFTs, Hyperglycemia/T2DM, Alcohol abuse, Marijuana use, Anxiety    Activity: activity as tolerated    Any medication changes since post-hospitalization phone call? No    Any treatment changes since post-hospitalization phone call? Yes - tongue pain and swelling    Any further education needed on medications/treatment plan? Alcoholism rehab, tongue injury management, diabetes    Current Medication List    Outpatient Encounter Prescriptions as of 12/12/2017   Medication Sig Dispense Refill    aspirin 325 MG tablet Take 325 mg by mouth every 6 hours as needed for Pain      ibuprofen (ADVIL;MOTRIN) 200 MG tablet Take 400 mg by mouth every 6 hours as needed for Pain      acetaminophen (TYLENOL) 500 MG tablet Take 1,000 mg by mouth every 6 hours as needed for Pain      clindamycin (CLEOCIN) 300 MG capsule Take 1 capsule by mouth 4 times daily for 10 days 40 capsule 0    HYDROcodone-acetaminophen (NORCO) 5-325 MG per tablet Take 1 tablet by mouth every 6 hours as needed for Pain . 28 tablet 0    Magic Mouthwash (MIRACLE MOUTHWASH) 2 oz. Viscous lidocaine, 2 oz. Nystatin 100,000 units, 2 oz.  Dexamethasone 0.5/5mL, 3 100mg capsules doxycyline dissolved in 2 oz. water    5 MLs by mouth every 4 hours swish and spit or swallow for pain 300 mL 1    hydrOXYzine (VISTARIL) 25 MG capsule Take 1 capsule by mouth 3 times daily as needed for Anxiety 15 capsule 0    phenol 1.4 % LIQD mouth spray Take 1 spray by mouth every 2 hours as needed for Sore Throat 1 Bottle 0    pantoprazole (PROTONIX) 40 MG tablet Take 1 tablet by mouth every morning (before breakfast) 15 tablet 0    metFORMIN (GLUCOPHAGE) 500 MG tablet Take 1 tablet by mouth 2 times daily (with meals) 60 tablet 0    Multiple Vitamins-Minerals (MENS MULTIVITAMIN PLUS) TABS Take 1 tablet by mouth daily      fluticasone-salmeterol (ADVAIR) 250-50 MCG/DOSE AEPB Inhale 1 puff into the lungs every 12 hours      albuterol (PROVENTIL HFA) 108 (90 BASE) MCG/ACT inhaler Inhale 2 puffs into the lungs every 6 hours as needed for Wheezing. 1 Inhaler 0    ACCU-CHEK MULTICLIX LANCETS MISC 1 box by Does not apply route daily 100 each 0    [DISCONTINUED] HYDROcodone-acetaminophen (NORCO) 5-325 MG per tablet Take 1 tablet by mouth every 6 hours as needed for Pain . 10 tablet 0    [DISCONTINUED] aspirin (ASPIRIN CHILDRENS) 81 MG chewable tablet Take 1 tablet by mouth daily 30 tablet 0     No facility-administered encounter medications on file as of 12/12/2017. Medication Reconciliation  Provider has reviewed medication record and it has been modified as necessary to accurately depict current medications since hospitalization. Kandis Parent has been instructed on these changes. HPI:     Patient was admitted to [facility]  from 12/7 to 12/9 for Syncope and collapse (unclear etiology), Tongue swelling/jaw pain, Leukocytosis (resolved), Macrocytosis, Elevated LFTs, Hyperglycemia/T2DM, Alcohol abuse, Marijuana use, Anxiety. Inpatient course: Discharge summary reviewed - see chart. Results of diagnostic tests performed in hospital reviewed - see chart. Current status: Patient moved up his appointment 2 days due to tongue pain, swelling, and concern for infection. HPI     Tongue injury  Since discharge in the past 3 days, patient reports tongue swelling and pain has worsened. Worse with eating, drinking, or swallowing. Unable to chew food. His carries a cup with him and frequently spits out foul-smelling bloody saliva/discharge. Associated with throat/ear pain. Norco helps pain. Ibuprofen and Tylenol do not help. He cannot eat foods and only is able to sip fluids. He is not sleeping well due to discomfort. History of alcohol abuse. He has been to rehab twice in the past, most recently 2 years ago in [place]. He denies alcohol intake since 11/21, no marijuana use since just prior to hospitalization. He currently has a court date set for 12/26 due to recent DUI. He is asking for a note recommending medical extension. He is concerned he will be sent to detention before his tongue has adequately healed. He is , currently . He had been living alone, and he shares caring for his 4 children (15, 15 10 and 1year olds) for part of the week. He is living with his mother temporarily, and his mother is his caregiver. History of syncope and collapse  No more episodes since discharge. He currently is wearing 48 hour Holter monitor. He reports this episode happened when he was high on marijuana. At that time he had also been taking Tylenol #3 \"left over\" for intermittent HAs. Diabetes  New diagnosis. A1C 8.8% in hospital. He just got glucometer and reports the following readings: 12/11 , 12/12 FBS , 1PM 136. He is adherent taking his Metformin 500mg twice daily as prescribed. Allergies   Allergen Reactions    Corn-Containing Products        Social History   Substance Use Topics    Smoking status: Former Smoker     Packs/day: 0.50     Quit date: 11/21/2017    Smokeless tobacco: Never Used      Comment: last smoked     Alcohol use No      Comment: last drink 21 days ago-last drink was 11/21/17       Past Medical History:   Diagnosis Date    Alcohol abuse     Asthma     COPD (chronic obstructive pulmonary disease) (HCC)     Eczema     Hx of seasonal allergies     Pancreatitis        History reviewed.  No pertinent surgical history. Family History   Problem Relation Age of Onset    Other Mother      gestational diabetes    Other Father 39     suicide    Other Sister      hypoglycemia       Diagnostic Tests performed in hospital:     CT of head neg  CT of facial bones no fracture  CXR neg  MRI normal signal intensity in the brain,  Carotid u/s unremarkable, minimal stenosis  Echo normal LV size and systolic function, EF 46%. EEG normal  Tilt table no significant symptoms  Discharged on 48 hour Holter Monitor    Labs:  HgbA1C 8.8%  K+3.4  UDS + Opiate and cannibinoid      Lipase 106.4  Component      Latest Ref Rng & Units 12/7/2017           5:59 AM   Glucose, UA      NEGATIVE mg/dl >= 1000 (A)   Bilirubin, Urine      NEGATIVE SMALL (A)   Ketones, Urine      NEGATIVE 80 (A)   Specific Gravity, Urine      1.002 - 1.03 1.020   Blood, Urine      NEGATIVE TRACE (A)   pH, UA      5.0 - 9.0 6.0   Protein, UA      NEGATIVE TRACE (A)   Urobilinogen, Urine      0.0 - 1.0 eu/dl 1.0   Nitrite, Urine      NEGATIVE NEGATIVE   Leukocyte Esterase, Urine      NEGATIVE NEGATIVE   Color, UA      STRAW-YELL YELLOW   Character, Urine      CLEAR-SL C CLEAR   RBC, UA      0-2/hpf /hpf 0-2   WBC, UA      0-4/hpf /hpf 0-2   Epi Cells      3-5/hpf /hpf 0-2   Bacteria, UA      FEW/NONE S /hpf NONE   Casts UA      NONE SEEN /lpf NONE SEEN   Crystals      NONE SEEN NONE SEEN   Renal Epithelial, Urine      NONE SEEN NONE SEEN   Yeast, Urine      NONE SEEN NONE SEEN   CASTS 2      NONE SEEN /lpf NONE SEEN   MISCELLANEOUS 2       NONE SEEN     Unremarkable BMP (except for slightly low potassium), mag, troponin, CBC, B12, folate  Group A Strep neg  Throat culture normal rizwana  Ethanol < 0.01    Review of Systems:     Review of Systems   Constitutional: Negative for activity change, appetite change, fatigue, fever and unexpected weight change.    HENT: Positive for ear pain (pressure), mouth sores (tongue), sore throat and trouble heard.  Pulmonary/Chest: Effort normal and breath sounds normal. No stridor. No respiratory distress. He has no wheezes. He has no rales. He exhibits no tenderness. Abdominal: Soft. Bowel sounds are normal. He exhibits no distension and no mass. There is no tenderness. There is no guarding. Musculoskeletal: He exhibits no edema or tenderness. Neurological: He is alert and oriented to person, place, and time. No cranial nerve deficit (grossly intact CNII-XII). Skin: Skin is warm and dry. He is not diaphoretic. No erythema. Psychiatric: He has a normal mood and affect. Judgment normal.   Vitals reviewed. Assessment/Plan:     1. Injury of tongue, sequela    Teeth evans on lateral aspects of tongue with concerning necrotic-looking area on left tip. Culture obtained. Start abx:  - clindamycin (CLEOCIN) 300 MG capsule; Take 1 capsule by mouth 4 times daily for 10 days  Dispense: 40 capsule; Refill: 0  For pain:  - HYDROcodone-acetaminophen (NORCO) 5-325 MG per tablet; Take 1 tablet by mouth every 6 hours as needed for Pain . Dispense: 28 tablet; Refill: 0  - Magic Mouthwash (MIRACLE MOUTHWASH); 2 oz. Viscous lidocaine, 2 oz. Nystatin 100,000 units, 2 oz. Dexamethasone 0.5/5mL, 3 100mg capsules doxycyline dissolved in 2 oz. Water  5 MLs by mouth every 4 hours swish and spit or swallow for pain  Dispense: 300 mL; Refill: 1  Per ENT recommendation, advised patient to rinse with half peroxide and half Scope rinse 4x daily to debride tongue. Sip water, eat soft foods as tolerated. Consider Glucerna supplement drink. Patient referred to ENT and will be evaluated there in 2 days. 2. Type 2 diabetes mellitus with other specified complication, without long-term current use of insulin ([address]    Newly diagnosed. In 1 week obtain fasting labs:  - C-Peptide, Serum; Future  - Lipid Panel; Future  - Basic Metabolic Panel;  Future  Referred to [facility] to see dietician and certified diabetes nurse

## 2017-12-13 NOTE — TELEPHONE ENCOUNTER
I can offer what is available, if not slots available then recommend offering appt with another provider.

## 2017-12-13 NOTE — TELEPHONE ENCOUNTER
Pt is already scheduled with lOe Bryant  on 1/11/17. Mom informed and was ok with it.     Future Appointments  Date Time Provider Malachi Marina   12/14/2017 2:00 PM Mariela Michele, [address] ENT [facility] [place] II.[email or web address]   12/20/2017 2:00 PM Erika Barrett CNP [facility]   1/11/2018 1:20 PM Billie Washington 86

## 2017-12-14 ENCOUNTER — OFFICE VISIT (OUTPATIENT)
Dept: ENT CLINIC | Age: 32
End: 2017-12-14
Payer: MEDICARE

## 2017-12-14 VITALS
HEART RATE: 88 BPM | DIASTOLIC BLOOD PRESSURE: 68 MMHG | TEMPERATURE: 98.2 F | RESPIRATION RATE: 14 BRPM | HEIGHT: 62 IN | WEIGHT: 157 LBS | SYSTOLIC BLOOD PRESSURE: 110 MMHG | BODY MASS INDEX: 28.89 KG/M2

## 2017-12-14 DIAGNOSIS — K14.0 TRAUMATIC ULCERATION OF TONGUE: Primary | ICD-10-CM

## 2017-12-14 PROCEDURE — 1036F TOBACCO NON-USER: CPT | Performed by: PHYSICIAN ASSISTANT

## 2017-12-14 PROCEDURE — 99203 OFFICE O/P NEW LOW 30 MIN: CPT | Performed by: PHYSICIAN ASSISTANT

## 2017-12-14 PROCEDURE — G8419 CALC BMI OUT NRM PARAM NOF/U: HCPCS | Performed by: PHYSICIAN ASSISTANT

## 2017-12-14 PROCEDURE — 1111F DSCHRG MED/CURRENT MED MERGE: CPT | Performed by: PHYSICIAN ASSISTANT

## 2017-12-14 PROCEDURE — G8427 DOCREV CUR MEDS BY ELIG CLIN: HCPCS | Performed by: PHYSICIAN ASSISTANT

## 2017-12-14 PROCEDURE — G8484 FLU IMMUNIZE NO ADMIN: HCPCS | Performed by: PHYSICIAN ASSISTANT

## 2017-12-14 ASSESSMENT — ENCOUNTER SYMPTOMS
CONSTIPATION: 0
VOMITING: 0
STRIDOR: 0
TROUBLE SWALLOWING: 0
WHEEZING: 0
COUGH: 0
APNEA: 0
ABDOMINAL DISTENTION: 0
EYE REDNESS: 0
PHOTOPHOBIA: 0
VOICE CHANGE: 0
ABDOMINAL PAIN: 0
EYE ITCHING: 0
EYE DISCHARGE: 0
RECTAL PAIN: 0
DIARRHEA: 0
CHEST TIGHTNESS: 0
BLOOD IN STOOL: 0
NAUSEA: 0
RHINORRHEA: 0
SHORTNESS OF BREATH: 0
FACIAL SWELLING: 0
COLOR CHANGE: 0
EYE PAIN: 0
ANAL BLEEDING: 0
CHOKING: 0
BACK PAIN: 0
SINUS PRESSURE: 0
SORE THROAT: 1

## 2017-12-14 NOTE — PROGRESS NOTES
[facility]  [address]  Libretad Ordonez  Dept: [phone]  Dept Fax: [phone]  Loc: [phone]      Wing Estevez is a 27 y.o. male who was referred by Aung Huitron CNP for:  Chief Complaint   Patient presents with   Sac Mustache     New patient is here for throat pain, ear pain and mouth soreness.  Pharyngitis   . HPI:     Alena Frias presents to the clinic after a traumatic fall due to a possible seizure where he had an injury to his tongue. Apparently he bit down of the lateral edges of his tongue. He saw [id number] on Tuesday. She put him on oral antibiotics and Peridex mouth wash. He has made immense improvement. His tongue has a normal color appearance with a view ridges. His pain is still fairly moderate. He is eating liquids through a straw at this point in time. Patient Active Problem List   Diagnosis    [address] withdrawal [address]    Alcoholic hepatitis    Pancreatitis, History    COPD (chronic obstructive pulmonary disease)/Asthma    Alcohol-induced acute pancreatitis    Syncope and collapse    Type 2 diabetes mellitus without complication, without long-term current use of insulin (HCC)    Asthma    Elevated liver enzymes    Elevated lipase     Allergies   Allergen Reactions    Corn-Containing Products      Past Medical History:   Diagnosis Date    Alcohol abuse     Asthma     COPD (chronic obstructive pulmonary disease) (HCC)     Eczema     Hx of seasonal allergies     Pancreatitis     [facility] mellitus [facility]      History reviewed. No pertinent surgical history. Subjective:      Review of Systems   Constitutional: Negative for activity change, appetite change, chills, diaphoresis, fatigue, fever and unexpected weight change. HENT: Positive for ear pain and sore throat.  Negative for congestion, dental problem, drooling, ear discharge, facial swelling, hearing loss, mouth sores, nosebleeds, postnasal drip, rhinorrhea, sinus pressure, sneezing, tinnitus, trouble swallowing and voice change. Eyes: Negative for photophobia, pain, discharge, redness, itching and visual disturbance. Respiratory: Negative for apnea, cough, choking, chest tightness, shortness of breath, wheezing and stridor. Cardiovascular: Negative for chest pain, palpitations and leg swelling. Gastrointestinal: Negative for abdominal distention, abdominal pain, anal bleeding, blood in stool, constipation, diarrhea, nausea, rectal pain and vomiting. Endocrine: Negative for cold intolerance, heat intolerance, polydipsia, polyphagia and polyuria. Genitourinary: Negative for decreased urine volume, difficulty urinating, discharge, dysuria, enuresis, flank pain, frequency, genital sores, hematuria, penile pain, penile swelling, scrotal swelling, testicular pain and urgency. Musculoskeletal: Negative for arthralgias, back pain, gait problem, joint swelling, myalgias, neck pain and neck stiffness. Skin: Negative for color change, pallor, rash and wound. Allergic/Immunologic: Negative for environmental allergies, food allergies and immunocompromised state. Neurological: Negative for dizziness, tremors, seizures, syncope, speech difficulty, weakness, light-headedness, numbness and headaches. Hematological: Negative for adenopathy. Does not bruise/bleed easily. Psychiatric/Behavioral: Negative for agitation, behavioral problems, confusion, decreased concentration, dysphoric mood, hallucinations, self-injury, sleep disturbance and suicidal ideas. The patient is not nervous/anxious and is not hyperactive. Objective:     Vitals:    12/14/17 1404   BP: 110/68   Site: Left Arm   Position: Sitting   Pulse: 88   Resp: 14   Temp: 98.2 °F (36.8 °C)   TempSrc: Oral   Weight: 157 lb (71.2 kg)   Height: 5' 2\" (1.575 m)       Physical Exam   Physical Exam    Head is normocephalic.  NOE, EOM full, no diplopia, no nystagmus. Conjunctivae pink, moist, no discharge. Pinnae are WNL  R External auditory canal clear and free of any pathology  L External auditory canal clear and free of any pathology   Tympanic membranes:  R normal  L normal    Nasal bones: intact  Septum:  normal  Mucosa: clear  Turbinates: normal   Discharge: normal    Lips, tongue and oral cavity show tongue is midline, mobile. Lateral edges appears mildly erythematous. Small divots noted on lateral edges. More so on Left vs Right. No active bleeding. Able to protrude tongue without difficulty. Dentition: good, no malocclusion  Oral mucosa: normal  Tonsils: normal  Oropharynx: normal-appearing mucosa and no pharyngitis, no exudate  Uvula midline. Gag reflex present  Nasopharynx: not seen    No facial redness, swelling or tenderness. Salivary glands not enlarged. Neck supple  Cervical adenopathy: no palpable lymphadenopathy    Trachea midline  Thyroid not enlarged, no palpable nodules or masses    Chest equal and symmetrical expansion, no retractions    Extremities: no clubbing, cyanosis or edema  Gait steady  Skin: normal exposed surfaces  Cranial nerves grossly intact      Data:  All of the past medical history, past surgical history, family history, social history, allergies and current medications were reviewed. Assessment:   Assessment   1. Traumatic ulceration of tongue            Plan:      Francesco Rachel has made immense improvement. He will continue with the antibiotics and the mouthwash. I have recommended the mouth wash for 6 weeks. His chewing and eating should improve over that time frame. No surgical intervention needed. He is to notify us if his tongue starts to change colors or if he starts to develop pus. He will also follow with Ivana Jenkins again. Medications Ordered:  No orders of the defined types were placed in this encounter. Orders Placed:  No orders of the defined types were placed in this encounter.       Return if

## 2017-12-16 LAB
AEROBIC CULTURE: NORMAL
ANAEROBIC CULTURE: NORMAL

## 2017-12-18 ENCOUNTER — HOSPITAL ENCOUNTER (OUTPATIENT)
Age: 32
Discharge: HOME | End: 2017-12-18
Payer: MEDICARE

## 2017-12-18 DIAGNOSIS — R74.8 ELEVATED LIPASE: ICD-10-CM

## 2017-12-18 DIAGNOSIS — F10.10 ALCOHOL ABUSE: ICD-10-CM

## 2017-12-18 DIAGNOSIS — R73.9 HYPERGLYCEMIA: ICD-10-CM

## 2017-12-18 DIAGNOSIS — E11.69 TYPE 2 DIABETES MELLITUS WITH OTHER SPECIFIED COMPLICATION, WITHOUT LONG-TERM CURRENT USE OF INSULIN (HCC): ICD-10-CM

## 2017-12-18 DIAGNOSIS — R74.8 ELEVATED LIVER ENZYMES: ICD-10-CM

## 2017-12-18 DIAGNOSIS — Z13.220 LIPID SCREENING: ICD-10-CM

## 2017-12-18 LAB
ALBUMIN SERPL-MCNC: 4 G/DL (ref 3.5–5.1)
ALP BLD-CCNC: 121 U/L (ref 38–126)
ALT SERPL-CCNC: 78 U/L (ref 11–66)
ANION GAP SERPL CALCULATED.3IONS-SCNC: 15 MEQ/L (ref 8–16)
AST SERPL-CCNC: 51 U/L (ref 5–40)
BILIRUB SERPL-MCNC: 0.5 MG/DL (ref 0.3–1.2)
BILIRUBIN DIRECT: < 0.2 MG/DL (ref 0–0.3)
BILIRUBIN URINE: NEGATIVE
BLOOD, URINE: NEGATIVE
BUN BLDV-MCNC: 8 MG/DL (ref 7–22)
C-PEPTIDE: 3 NG/ML (ref 1.1–4.4)
CALCIUM SERPL-MCNC: 9.5 MG/DL (ref 8.5–10.5)
CHARACTER, URINE: CLEAR
CHLORIDE BLD-SCNC: 97 MEQ/L (ref 98–111)
CHOLESTEROL, TOTAL: 165 MG/DL (ref 100–199)
CO2: 27 MEQ/L (ref 23–33)
COLOR: YELLOW
CREAT SERPL-MCNC: 0.6 MG/DL (ref 0.4–1.2)
GFR SERPL CREATININE-BSD FRML MDRD: > 90 ML/MIN/1.73M2
GLUCOSE BLD-MCNC: 94 MG/DL (ref 70–108)
GLUCOSE URINE: NEGATIVE MG/DL
HDLC SERPL-MCNC: 51 MG/DL
KETONES, URINE: NEGATIVE
LDL CHOLESTEROL CALCULATED: 87 MG/DL
LEUKOCYTE ESTERASE, URINE: NEGATIVE
LIPASE: 29.7 U/L (ref 5.6–51.3)
NITRITE, URINE: NEGATIVE
PH UA: 5.5
POTASSIUM SERPL-SCNC: 3.9 MEQ/L (ref 3.5–5.2)
PROTEIN UA: NEGATIVE
SODIUM BLD-SCNC: 139 MEQ/L (ref 135–145)
SPECIFIC GRAVITY, URINE: 1.01 (ref 1–1.03)
TOTAL PROTEIN: 6.9 G/DL (ref 6.1–8)
TRIGL SERPL-MCNC: 135 MG/DL (ref 0–199)
UROBILINOGEN, URINE: 0.2 EU/DL

## 2017-12-18 PROCEDURE — 81003 URINALYSIS AUTO W/O SCOPE: CPT

## 2017-12-18 PROCEDURE — 80053 COMPREHEN METABOLIC PANEL: CPT

## 2017-12-18 PROCEDURE — 84681 ASSAY OF C-PEPTIDE: CPT

## 2017-12-18 PROCEDURE — 36415 COLL VENOUS BLD VENIPUNCTURE: CPT

## 2017-12-18 PROCEDURE — 80061 LIPID PANEL: CPT

## 2017-12-18 PROCEDURE — 83690 ASSAY OF LIPASE: CPT

## 2017-12-18 PROCEDURE — 82248 BILIRUBIN DIRECT: CPT

## 2017-12-18 NOTE — PROCEDURES
[address] [place] [address]                                  HOLTER MONITOR    PATIENT NAME: Mihai Odell                   :        1985  MED REC NO:   [id number]                           ROOM:  ACCOUNT NO:   [de-identified]                           ADMIT DATE: 2017  PROVIDER:     DIPTI Maldonado STUDY:  2017    DURATION:  48 hours. QUALITY:  Reasonable. INDICATION:  Syncope. Diary presented and few entries noted. FINDING:  The patient is in normal sinus rhythm. Average heart rate of 104  beats per minute, ranging from 64 to 173 beats per minute. No ventricular  ectopic beat noted, 48 supraventricular ectopic beats noted, all isolated. No other form of arrhythmia. Diary presented and multiple entries noted on  the diaries. The diary, documentation showed, _____, dizziness. However, there is no  correlation between the diary and the Holter finding. CONCLUSION:  This is a normal sinus rhythm. Average heart rate of 104  beats per minute, ranging from 64 to 173 beats per minute. No ventricular  ectopic beat noted; 48 supraventricular ectopic beat noted, all isolated. No other form of arrhythmia. No atrial fibrillation. No correlation  between the diary and Holter finding. This Holter does not explain the syncope of the patient, need further  correlation. This patient's average heart rate is 104 beats per minute, that is a little  high, so consideration should be given if patient may benefit from beta  blocker. Tevin Abdi M.D.    D: 2017 21:35:34       T: 2017 1:58:50     [email or web address]  Job#: [id number]     Doc#: [id number]    CC:  Cathie Chester PA-C

## 2017-12-20 ENCOUNTER — OFFICE VISIT (OUTPATIENT)
Dept: INTERNAL MEDICINE CLINIC | Age: 32
End: 2017-12-20
Payer: MEDICARE

## 2017-12-20 VITALS
DIASTOLIC BLOOD PRESSURE: 64 MMHG | SYSTOLIC BLOOD PRESSURE: 116 MMHG | RESPIRATION RATE: 18 BRPM | BODY MASS INDEX: 30.09 KG/M2 | HEIGHT: 62 IN | WEIGHT: 163.5 LBS | HEART RATE: 74 BPM

## 2017-12-20 DIAGNOSIS — E11.9 TYPE 2 DIABETES MELLITUS WITHOUT COMPLICATION, WITHOUT LONG-TERM CURRENT USE OF INSULIN (HCC): Primary | ICD-10-CM

## 2017-12-20 DIAGNOSIS — F32.A ANXIETY AND DEPRESSION: ICD-10-CM

## 2017-12-20 DIAGNOSIS — F41.9 ANXIETY AND DEPRESSION: ICD-10-CM

## 2017-12-20 DIAGNOSIS — R74.8 ELEVATED LIVER ENZYMES: ICD-10-CM

## 2017-12-20 DIAGNOSIS — S09.93XS: ICD-10-CM

## 2017-12-20 PROCEDURE — 1111F DSCHRG MED/CURRENT MED MERGE: CPT | Performed by: NURSE PRACTITIONER

## 2017-12-20 PROCEDURE — 99214 OFFICE O/P EST MOD 30 MIN: CPT | Performed by: NURSE PRACTITIONER

## 2017-12-20 PROCEDURE — G8484 FLU IMMUNIZE NO ADMIN: HCPCS | Performed by: NURSE PRACTITIONER

## 2017-12-20 PROCEDURE — G8427 DOCREV CUR MEDS BY ELIG CLIN: HCPCS | Performed by: NURSE PRACTITIONER

## 2017-12-20 PROCEDURE — G8419 CALC BMI OUT NRM PARAM NOF/U: HCPCS | Performed by: NURSE PRACTITIONER

## 2017-12-20 PROCEDURE — 1036F TOBACCO NON-USER: CPT | Performed by: NURSE PRACTITIONER

## 2017-12-20 PROCEDURE — 3045F PR MOST RECENT HEMOGLOBIN A1C LEVEL 7.0-9.0%: CPT | Performed by: NURSE PRACTITIONER

## 2017-12-20 RX ORDER — CITALOPRAM 20 MG/1
TABLET ORAL
Qty: 30 TABLET | Refills: 0 | Status: SHIPPED | OUTPATIENT
Start: 2017-12-20 | End: 2018-01-11 | Stop reason: SDUPTHER

## 2017-12-20 NOTE — PROGRESS NOTES
[facility]AL [facility]  PHYSICIANS INCShila SenanherikaAnna V[place] [address]  [address]  [address] [facility]  Dept: [phone]  Dept Fax: [phone]  Loc: [phone]    Visit Date: 12/20/2017    Claudell Hesselbach is a 27 y.o. male who presents today for:  Chief Complaint   Patient presents with    Diabetes     Type 2 diabetes, uncontrolled. EP=274 fasting @ 7 am (his meter)     Other     elevated liver enzymes     Other     Tonuge pain - requesting refill of Magic Mouthwash    Other     patient states vistaril is not working \"doesn't do anything\"          Assessment/Plan:   1. Traumatic ulceration of tongue    Significant improvement. Complete course of Clindamycin. Continue half peroxide/Scope rinse. Refilled:  - Magic Mouthwash (MIRACLE MOUTHWASH); 2 oz. Viscous lidocaine, 2 oz. Nystatin 100,000 units, 2 oz. Dexamethasone 0.5/5mL, 3 100mg capsules doxycyline dissolved in 2 oz. water  5 MLs by mouth every 4 hours swish and spit or swallow for pain  Dispense: 300 mL; Refill: 2  Stop ASA he's been taking for pain. OTC Tylenol/Ibuprofen as needed. Recommend seeing dentist for mouth guard to prevent chewing his tongue at night. 2. Type 2 diabetes mellitus without complication, without long-term current use of insulin ([address]    Newly diagnosed earlier this month in the hospital.   Poorly controlled as evidenced by A1C of 8.8%, however blood sugar readings suggest improvement. Titrate Metformin to 1000mg twice daily. Counseled on blood sugar goals and treatment for hypoglycemia. Follow up with Diabetes Clinic as scheduled for nutritional counseling and diabetes education. Recommend rechecking A1C in 3 months. 3. Elevated liver enzymes    Moderate improvement. AST lowered to 51 and ALT lowered to 78. I anticipate continued lowering with avoidance of alcohol. Recommend rechecking hepatic panel in 3 months. 4. Anxiety and depression    Start Celexa 10mg daily x 1, then increasing to 20mg daily.   Recommended counseling. Referred to [facility] per his request.    5. H/o syncope    HR is not tachycardic today. He will continue to avoid use of marijuana and alcohol. Continue to monitor for dizziness, palpitations, and lightheadedness. Seek immediate medical attention if another episode occurs. 6. H/o alcoholism    Continue to abstain. Now that his tongue is feeling better, he will attend AA and IOP. He is motivated to move forward in his recovery. F/u with new PCP in 3 weeks. F/u with me in Transition of Care clinic as needed.     Future Appointments  Date Time Provider Malachi Gray   12/22/2017 8:00 AM [facility] GROUP THERAPY [facility] None   12/25/2017 8:00 AM [facility] GROUP THERAPY [facility] None   12/27/2017 8:00 AM [facility] GROUP THERAPY [facility] None   12/29/2017 8:00 AM STR [facility] GROUP THERAPY [facility] None   1/1/2018 8:00 AM STR [facility] GROUP THERAPY [facility] None   1/3/2018 8:00 AM STR [facility] GROUP THERAPY [facility] None   1/5/2018 8:00 AM STR [facility] GROUP THERAPY [facility] None   1/8/2018 8:00 AM STR [facility] GROUP THERAPY [facility] None   1/8/2018 3:00 PM Nahomy Thurston RN [facility] [address] [facility]   1/10/2018 8:00 AM STR B[facility] GROUP THERAPY [facility] None   1/11/2018 1:20 PM Vic Cotto CNP [facility] - [address] [place] [address]   1/12/2018 8:00 AM STR [facility] GROUP THERAPY [facility] None   1/15/2018 8:00 AM STR [facility] GROUP THERAPY [facility] None   1/17/2018 8:00 AM STR [facility] GROUP THERAPY [facility] None   1/19/2018 8:00 AM [facility] GROUP THERAPY [facility] None   1/22/2018 8:00 AM STR [facility] GROUP THERAPY [facility] None   1/24/2018 8:00 AM STR [facility] GROUP THERAPY [facility] None   1/26/2018 8:00 AM STR [facility] GROUP THERAPY [facility] None   1/29/2018 8:00 AM STR B[facility] GROUP THERAPY [facility] None   1/31/2018 8:00 AM STR B[facility] GROUP THERAPY [facility] None       HPI:     9-day follow-up in Transition of Care Clinic for: 1) traumatic ulceration of tongue, 2) h/o syncope, 3) h/o alcohol abuse with elevated liver enzymes, and 4) newly diagnosed type 2 diabetes. HPI    Traumatic ulceration of tongue  Doing significantly better since LOC and traumatic fall where he had an injury to his tongue. He is finishing up his course of Clindamycin, and he continues to rinse with magic mouth wash and 1/2 peroxide and half Scope rinse on a regular basis. He has started to eat solid foods now. Improved range of motion with opening his jaw. H/o syncope  Denies any more episodes. Denies dizziness, light headedness, weakness, LOC. Holter monitor results do not provide explanation for his episode - average HR slightly tachycardic (104). In hospital, Tilt table test negative, Echo unremarkable, and EEG unremarkable. TSH unremarkable in 10/2017. He reports he does feel palpitations about 3-4x per week when he experiences panic attacks. He has been taking Vistaril without any improvement in his anxiety/depression. He is requesting referral to behavioral health and is open to seeing counselor because of many stressors in his personal life. He does not wish to go to [employer]. H/o alcohol abuse with elevated liver enzymes  He has abstained from alcohol since 11/21/2017 when he was charged with [employer]. He prefers to go [facility], but would consider overnight detox rehab if necessary. Type 2 Diabetes  Diagnosed on 12/9/2017 with A1C of 8.8%. He is tolerating Metformin 500mg twice daily. Adherent checking his blood sugar twice daily. FBS AM range of 105-164 in the past week. AC lunch 106-192, AC supper 100-211, HS 80s-140s.     Recent labs  Component      Latest Ref Rng & Units 12/18/2017          10:27 AM   Glucose, UA      NEGATIVE mg/dl NEGATIVE   Bilirubin, Urine      NEGATIVE NEGATIVE   Ketones, Urine      NEGATIVE NEGATIVE   Specific Gravity, Urine      1.002 - 1.03 1.014   Blood, Urine      NEGATIVE NEGATIVE   pH, UA      5.0 - 9.0 5.5   Protein, UA      NEGATIVE NEGATIVE   Urobilinogen, Urine      0.0 - 1.0 eu/dl 0.2   Nitrite, Urine      NEGATIVE NEGATIVE   Leukocyte Esterase, Urine      NEGATIVE NEGATIVE   Color, UA      STRAW-YELL YELLOW   Character, Urine      CLEAR-SL C CLEAR   Lipase 29.7  Component      Latest Ref Rng & Units 12/18/2017          10:25 AM   Albumin      3.5 - 5.1 g/dL 4.0   Bilirubin      0.3 - 1.2 mg/dL 0.5   Bilirubin, Direct      0.0 - 0.3 mg/dL <0.2   Alk Phos      38 - 126 U/L 121   AST      5 - 40 U/L 51 (H)   ALT      11 - 66 U/L 78 (H)   Total Protein      6.1 - 8.0 g/dL 6.9     Component      Latest Ref Rng & Units 12/18/2017          10:25 AM   Sodium      135 - 145 meq/L 139   Potassium      3.5 - 5.2 meq/L 3.9   Chloride      98 - 111 meq/L 97 (L)   CO2      23 - 33 meq/L 27   Glucose      70 - 108 mg/dL 94   BUN      7 - 22 mg/dL 8   Creatinine      0.4 - 1.2 mg/dL 0.6   Calcium      8.5 - 10.5 mg/dL 9.5     Component      Latest Ref Rng & Units 12/18/2017          10:25 AM   Cholesterol, Total      100 - 199 mg/dL 165   Triglycerides      0 - 199 mg/dL 135   HDL Cholesterol      mg/dL 51   LDL Calculated      mg/dL 87   C-peptide 3.0  Tongue culture:  Component      Latest Ref Rng & Units 12/12/2017           4:20 PM   Aerobic Culture       Normal rizwana- preliminary . . .   Anaerobic Culture       No anaerobes isolated- preliminary . . . 48 Hour Holter Monitor Results  CONCLUSION:  This is a normal sinus rhythm. Average heart rate of 104  beats per minute, ranging from 64 to 173 beats per minute. No ventricular  ectopic beat noted; 48 supraventricular ectopic beat noted, all isolated. No other form of arrhythmia. No atrial fibrillation. No correlation  between the diary and Holter finding.     This Holter does not explain the syncope of the patient, need further  correlation.     This patient's average heart rate is 104 beats per minute, that is a little  high, so consideration should be given if patient may benefit from beta  blocker.     NABIL Ospina M.D.     D: 12/17/2017 21:35:34 PLUS) TABS Take 1 tablet by mouth daily      fluticasone-salmeterol (ADVAIR) 250-50 MCG/DOSE AEPB Inhale 1 puff into the lungs every 12 hours      albuterol (PROVENTIL HFA) 108 (90 BASE) MCG/ACT inhaler Inhale 2 puffs into the lungs every 6 hours as needed for Wheezing. 1 Inhaler 0     No current facility-administered medications for this visit. Allergies   Allergen Reactions    Corn-Containing Products        Subjective:      Review of Systems   Constitutional: Positive for appetite change (improving). Negative for chills and fever. HENT: Negative for ear pain, postnasal drip, sinus pain, sore throat and tinnitus. Tongue pain/irritation improving   Eyes: Negative for discharge and visual disturbance. Respiratory: Negative for cough, chest tightness and wheezing. Cardiovascular: Positive for palpitations (during panic attacks). Negative for chest pain and leg swelling. Gastrointestinal: Negative for abdominal pain, blood in stool, constipation and diarrhea. Endocrine: Positive for polyphagia. Negative for cold intolerance, heat intolerance, polydipsia and polyuria. Genitourinary: Negative for dysuria and hematuria. Neurological: Negative for dizziness, seizures, speech difficulty, weakness, light-headedness, numbness and headaches. Hematological: Does not bruise/bleed easily. Psychiatric/Behavioral: Negative for behavioral problems, self-injury and suicidal ideas. The patient is nervous/anxious (\"panic attacks\" - chronic). Objective:     Vitals:    12/20/17 1410   BP: 116/64   Site: Right Arm   Position: Sitting   Cuff Size: Medium Adult   Pulse: 74   Resp: 18   Weight: 163 lb 8 oz (74.2 kg)   Height: 5' 2.01\" (1.575 m)     Body mass index is 29.9 kg/m². Wt Readings from Last 3 Encounters:   12/20/17 163 lb 8 oz (74.2 kg)   12/14/17 157 lb (71.2 kg)   12/12/17 161 lb (73 kg)     Physical Exam   Constitutional: He is oriented to person, place, and time.  He appears

## 2017-12-21 ENCOUNTER — TELEPHONE (OUTPATIENT)
Dept: INTERNAL MEDICINE CLINIC | Age: 32
End: 2017-12-21

## 2017-12-21 PROBLEM — R74.8 ELEVATED LIPASE: Status: RESOLVED | Noted: 2017-12-12 | Resolved: 2017-12-21

## 2017-12-21 RX ORDER — CALCIUM CITRATE/VITAMIN D3 200MG-6.25
TABLET ORAL
COMMUNITY
Start: 2017-12-11 | End: 2017-12-21 | Stop reason: SDUPTHER

## 2017-12-21 ASSESSMENT — ENCOUNTER SYMPTOMS
CONSTIPATION: 0
EYE DISCHARGE: 0
SORE THROAT: 0
COUGH: 0
SINUS PAIN: 0
ABDOMINAL PAIN: 0
BLOOD IN STOOL: 0
DIARRHEA: 0
CHEST TIGHTNESS: 0
WHEEZING: 0

## 2017-12-21 NOTE — TELEPHONE ENCOUNTER
----- Message from Chandler Felty, CNP sent at 12/21/2017  9:16 AM EST -----  Please instruct patient to stop ASA he's been taking for pain along with Ibuprofen - due to risk for gi bleed. Recommend that he just take OTC Ibuprofen with food and/or Tylenol as needed. Recommend he check with dentist for mouth guard to protect teeth from chewing tongue - dentist should be able to help with that. Also, at our visit yesterday, we talked about overnight detox rehab, if necessary. Some local places for that are: [facility] in [place] or [address] [place] [address] in the [place] area. Continue with Intensive Outpatient Program for now as we discussed.

## 2017-12-21 NOTE — TELEPHONE ENCOUNTER
Notified patient that Lulumaryam Parekh recommends that he stop the aspirin that he is taking for pain with ibuprofen due to risj=k of GI bleed. Take the ibuprofen with food or use Tylenol as needed . Advise patient to check with his dentist for a mouth guard to protect teeth from chewing tongue . Gave patient some names of overnight detox rehabs and instructed to continue with intensive outpatient program as discussed.

## 2017-12-22 ENCOUNTER — HOSPITAL ENCOUNTER (OUTPATIENT)
Dept: PSYCHIATRY | Age: 32
Setting detail: THERAPIES SERIES
Discharge: HOME | End: 2017-12-22
Payer: MEDICARE

## 2017-12-22 PROCEDURE — 90853 GROUP PSYCHOTHERAPY: CPT | Performed by: SOCIAL WORKER

## 2017-12-22 PROCEDURE — 90853 GROUP PSYCHOTHERAPY: CPT

## 2017-12-22 RX ORDER — CALCIUM CITRATE/VITAMIN D3 200MG-6.25
TABLET ORAL
Qty: 100 EACH | Refills: 0 | Status: SHIPPED | OUTPATIENT
Start: 2017-12-22 | End: 2019-12-02 | Stop reason: SDUPTHER

## 2017-12-22 NOTE — BH NOTE
Group Therapy Note    Date: 12/22/2017  Start Time: 1115  End Time:  1215  Number of Participants: 5    Type of Group: Relapse Prevention    Patient's Goal:  Pt to complete [place] activity and share with peers one thing hoping to achieve in the program and improve upon during the holiday season during 60 minute treatment session. Notes:  Pt is engaging in group therapy and seen socializing with peers appropriately and is able to complete activity. Pt able to verbalize need to feel better about himself and plan to work on that through the program.  Status After Intervention:  Improved    Participation Level:  Active Listener and Interactive    Participation Quality: Appropriate, Attentive, Sharing and Supportive      Speech:  normal      Thought Process/Content: Logical      Affective Functioning: Congruent      Mood: euthymic      Level of consciousness:  Alert, Oriented x4 and Attentive      Response to Learning: Able to verbalize current knowledge/experience, Able to verbalize/acknowledge new learning, Able to retain information, Capable of insight and Progressing to goal      Endings: None Reported    Modes of Intervention: Education, Support, Socialization, Exploration, Problem-solving, Activity and Reality-testing      Discipline Responsible: Psychoeducational Specialist      Signature:  Angel Resendez

## 2017-12-22 NOTE — PROGRESS NOTES
Group Therapy Note    Date: 12/22/2017  Start Time: 8:30-9:45am  Number of Participants: 5    Type of Group: Psychotherapy    Wellness Binder Information  Module Name:  na    Patient's Goal:  To engage in the group psychotherapy process with peers    Notes:  Pt shared his struggles with depression and alcohol abuse. He states he is working on sobriety    Status After Intervention:  Unchanged    Participation Level:  Active Listener and Interactive    Participation Quality: Appropriate, Attentive, Sharing and Supportive      Speech:  clear      Thought Process/Content: Logical      Affective Functioning: Congruent      Mood: euthymic      Level of consciousness:  Alert, Oriented x4 and Attentive      Response to Learning: Able to verbalize current knowledge/experience, Able to verbalize/acknowledge new learning and Able to retain information      Endings: None Reported    Modes of Intervention: Education, Support, Socialization, Exploration, Clarifying and Problem-solving      Discipline Responsible: /Counselor      Signature:  WHITNEY Andersen

## 2017-12-22 NOTE — PROGRESS NOTES
Behavioral Health Day Treatment daily Check In      How is your day? \"Good\"  Are you having any thoughts of hurting yourself?  ____Yes _x___No  Are you having any thoughts of hurting someone else? ____Yes _x___No  If so, can you contract for safety?    ____NA ____Yes ____No    Using the following scale 0 = None 10 = worse  What is your depression level? _7___  What is your anger level? __4___  What is your anxiety level?  _7____    How is your:  Concentration:  ____Good __x__Fair ____Poor  Motivation:   ____Good ___x_Fair ____Poor  Appetite:   ____Good __x__Fair ____Poor  Sleep:    ____Good ____Fair __x__Poor  My meds are working  ____Good _x___Fair ____Poor  Concerns about meds? \"no\"  My thoughts are (clear, racing, paranoid) \"clear\"  I am remaining abstinent from non-prescribed mood altering substances   _x__Yes ___No  If not, I am using     My top 3 stressors are:  1.   \"money, kids, health issues\"

## 2017-12-27 ENCOUNTER — HOSPITAL ENCOUNTER (OUTPATIENT)
Dept: PSYCHIATRY | Age: 32
Setting detail: THERAPIES SERIES
Discharge: HOME | End: 2017-12-27
Payer: MEDICARE

## 2017-12-27 VITALS — DIASTOLIC BLOOD PRESSURE: 80 MMHG | HEART RATE: 88 BPM | SYSTOLIC BLOOD PRESSURE: 138 MMHG

## 2017-12-27 PROCEDURE — 90853 GROUP PSYCHOTHERAPY: CPT | Performed by: SOCIAL WORKER

## 2017-12-27 PROCEDURE — 99203 OFFICE O/P NEW LOW 30 MIN: CPT | Performed by: NURSE PRACTITIONER

## 2017-12-27 RX ORDER — TRAZODONE HYDROCHLORIDE 50 MG/1
50 TABLET ORAL NIGHTLY PRN
Status: DISCONTINUED | OUTPATIENT
Start: 2017-12-27 | End: 2017-12-28 | Stop reason: HOSPADM

## 2017-12-27 RX ORDER — TRAZODONE HYDROCHLORIDE 50 MG/1
50 TABLET ORAL NIGHTLY PRN
Qty: 30 TABLET | Refills: 0 | Status: SHIPPED | OUTPATIENT
Start: 2017-12-27 | End: 2018-01-11 | Stop reason: SDUPTHER

## 2017-12-27 NOTE — PROGRESS NOTES
Behavioral Health Day Treatment  Patient Daily Check In    How is your day? \"Good\"  Are you having any thoughts of hurting yourself?  ____Yes __x__No  Are you having any thoughts of hurting someone else? ____Yes __x__No  If so, can you contract for safety?    ____NA __x__Yes ____No    Using the following scale 0 = None 10 = worse  What is your depression level? __8___  What is your anger level?  __7___  What is your anxiety level?  __7___    How is your:  Concentration:  ____Good __x__Fair ____Poor  Motivation:   ____Good ____Fair __x__Poor  Appetite:   ____Good ____Fair _x___Poor  Sleep:    ____Good ____Fair __x__Poor  My meds are working  ____Good __x__Fair ____Poor  Concerns about meds? Nothing noted from patient  My thoughts are (clear, racing, paranoid) \"racing\"  I am remaining abstinent from non-prescribed mood altering substances   x___Yes ___No  If not, I am using     My top 3 stressors are:  1.   \"Family, anxiety, financial\"

## 2017-12-27 NOTE — PROGRESS NOTES
Pt admitted to [facility] on 12/22/2017. Allergy information reviewed - NKA. Medical history information reviewed. Patient did have a seizure  within the last month and was hospitalized for a couple days - the seizure was related to his diabetes. Medications reviewed. Pt verbalizes reason for admission is to \"get back on track with recovery and get help with his depression and anxiety\". Pt drug/alcohol use reviewed - patient attended rehab in [place] approximately 2 to 3 years ago. He has relapse twice since then in October and on November 21st of this year. He did get an DELANEY on Nov 21st.  He has not drank since that time. Pt denies current suicidal or homicidal thoughts.       Phone # to contact Pt = [phone]

## 2017-12-29 ENCOUNTER — HOSPITAL ENCOUNTER (OUTPATIENT)
Dept: PSYCHIATRY | Age: 32
Setting detail: THERAPIES SERIES
End: 2017-12-29
Payer: MEDICARE

## 2018-01-03 ENCOUNTER — HOSPITAL ENCOUNTER (OUTPATIENT)
Dept: PSYCHIATRY | Age: 33
Setting detail: THERAPIES SERIES
Discharge: HOME | End: 2018-01-03
Payer: MEDICARE

## 2018-01-03 PROCEDURE — 90853 GROUP PSYCHOTHERAPY: CPT | Performed by: SOCIAL WORKER

## 2018-01-03 NOTE — PROGRESS NOTES
Group Therapy Note    Date: 1/3/2018  Start Time: 11:15-12:15pm  Number of Participants: 3    Type of Group: Relapse Prevention    Wellness Binder Information  Module Name:  na    Patient's Goal:  To engage in the group discussion regarding affirmations and ways to think more positive about ourselves    Notes:  Pt shared ways to work on being a good father and coparent    Status After Intervention:  Improved    Participation Level:  Active Listener    Participation Quality: Appropriate, Attentive, Sharing and Supportive      Speech:  clear      Thought Process/Content: Logical      Affective Functioning: Congruent      Mood: euthymic      Level of consciousness:  Alert, Oriented x4 and Attentive      Response to Learning: Able to verbalize current knowledge/experience, Able to verbalize/acknowledge new learning, Able to retain information and Capable of insight      Endings: None Reported    Modes of Intervention: Education, Support, Socialization, Exploration, Clarifying and Problem-solving      Discipline Responsible: /Counselor      Signature:  WHITNEY Snadoval

## 2018-01-05 ENCOUNTER — HOSPITAL ENCOUNTER (OUTPATIENT)
Dept: PSYCHIATRY | Age: 33
Setting detail: THERAPIES SERIES
Discharge: HOME | End: 2018-01-05
Payer: MEDICARE

## 2018-01-05 PROCEDURE — 90853 GROUP PSYCHOTHERAPY: CPT | Performed by: SOCIAL WORKER

## 2018-01-08 ENCOUNTER — HOSPITAL ENCOUNTER (OUTPATIENT)
Dept: PSYCHIATRY | Age: 33
Setting detail: THERAPIES SERIES
Discharge: HOME | End: 2018-01-08
Payer: MEDICARE

## 2018-01-08 ENCOUNTER — HOSPITAL ENCOUNTER (OUTPATIENT)
Dept: PHARMACY | Age: 33
Setting detail: THERAPIES SERIES
Discharge: HOME | End: 2018-01-08
Payer: MEDICARE

## 2018-01-08 VITALS
BODY MASS INDEX: 28.98 KG/M2 | SYSTOLIC BLOOD PRESSURE: 122 MMHG | DIASTOLIC BLOOD PRESSURE: 64 MMHG | HEIGHT: 62 IN | WEIGHT: 157.5 LBS

## 2018-01-08 PROCEDURE — G0108 DIAB MANAGE TRN  PER INDIV: HCPCS | Performed by: REGISTERED NURSE

## 2018-01-08 RX ORDER — ASPIRIN 81 MG/1
81 TABLET ORAL DAILY
Status: ON HOLD | COMMUNITY
End: 2019-05-09 | Stop reason: HOSPADM

## 2018-01-08 NOTE — PROGRESS NOTES
and making some changes in his meal plan. He is receptive to making further changes to help get his blood sugars under better control. His mom is present with him today and appears supportive. He presents as motivated to make changes as needed. He does report his history of alcohol and the issues he has last Nov. He feels like he is in a good place at this time. Much encouragement given for his present efforts and goals set as below. Follow up-classes and appointment with RD for further dietary guidance. DSME PLAN:   Discussed general issues about diabetes pathophysiology and management. Counseling at today's visit: carbohdyrates, exercise, SGM, BG goals, metformin action. 1. Focus on getting 4 servings of carbohydrate (60 grams ) at each            15-20 grams carbohdyrate (1 serving) at a snack between meals          -no regular pop-no fruit juice. Remember 1 cup milk = 1 serving of carb          --limit  middeleof the night snacks to only 1 serving carbohydrate  2. Continue with exercise efforts. Great job! 3. Check blood sugars 1-2 times per day--rotate fasting, before meals and/ or bedtime             Goals     Appointments  Carb class 1/30/18 @ 10am                            Diabetes class 1/16/18 @ 4pm                            Dietician appintment 2/6/18 @ 1pm    Any questions--call the clinic [phone]           Meter download, medications, PMH and nursing assessment reviewed. Savanna Mak states He is willing to participate in this plan of care and verbalized understanding of all instructions provided. Teach back used to verify comprehension. Total time involved in direct patient education: 60 minutes.

## 2018-01-09 ENCOUNTER — TELEPHONE (OUTPATIENT)
Dept: FAMILY MEDICINE CLINIC | Age: 33
End: 2018-01-09

## 2018-01-10 ENCOUNTER — HOSPITAL ENCOUNTER (OUTPATIENT)
Dept: PSYCHIATRY | Age: 33
Setting detail: THERAPIES SERIES
Discharge: HOME | End: 2018-01-10
Payer: MEDICARE

## 2018-01-11 ENCOUNTER — OFFICE VISIT (OUTPATIENT)
Dept: FAMILY MEDICINE CLINIC | Age: 33
End: 2018-01-11
Payer: MEDICARE

## 2018-01-11 VITALS
TEMPERATURE: 99.1 F | HEIGHT: 63 IN | SYSTOLIC BLOOD PRESSURE: 134 MMHG | HEART RATE: 84 BPM | RESPIRATION RATE: 14 BRPM | BODY MASS INDEX: 28.53 KG/M2 | WEIGHT: 161 LBS | DIASTOLIC BLOOD PRESSURE: 64 MMHG

## 2018-01-11 DIAGNOSIS — B37.0 THRUSH: ICD-10-CM

## 2018-01-11 DIAGNOSIS — F10.10 ETOH ABUSE: ICD-10-CM

## 2018-01-11 DIAGNOSIS — J45.40 MODERATE PERSISTENT ASTHMA WITHOUT COMPLICATION: ICD-10-CM

## 2018-01-11 DIAGNOSIS — F33.1 MAJOR DEPRESSIVE DISORDER, RECURRENT EPISODE, MODERATE WITH ANXIOUS DISTRESS (HCC): ICD-10-CM

## 2018-01-11 DIAGNOSIS — E11.9 TYPE 2 DIABETES MELLITUS WITHOUT COMPLICATION, WITHOUT LONG-TERM CURRENT USE OF INSULIN (HCC): Primary | ICD-10-CM

## 2018-01-11 LAB
CREATININE URINE POCT: ABNORMAL
MICROALBUMIN/CREAT 24H UR: ABNORMAL MG/G{CREAT}
MICROALBUMIN/CREAT UR-RTO: ABNORMAL

## 2018-01-11 PROCEDURE — 82044 UR ALBUMIN SEMIQUANTITATIVE: CPT | Performed by: NURSE PRACTITIONER

## 2018-01-11 PROCEDURE — 99215 OFFICE O/P EST HI 40 MIN: CPT | Performed by: NURSE PRACTITIONER

## 2018-01-11 PROCEDURE — G8419 CALC BMI OUT NRM PARAM NOF/U: HCPCS | Performed by: NURSE PRACTITIONER

## 2018-01-11 PROCEDURE — 3046F HEMOGLOBIN A1C LEVEL >9.0%: CPT | Performed by: NURSE PRACTITIONER

## 2018-01-11 PROCEDURE — G8484 FLU IMMUNIZE NO ADMIN: HCPCS | Performed by: NURSE PRACTITIONER

## 2018-01-11 PROCEDURE — G8427 DOCREV CUR MEDS BY ELIG CLIN: HCPCS | Performed by: NURSE PRACTITIONER

## 2018-01-11 PROCEDURE — 1036F TOBACCO NON-USER: CPT | Performed by: NURSE PRACTITIONER

## 2018-01-11 RX ORDER — ALBUTEROL SULFATE 90 UG/1
2 AEROSOL, METERED RESPIRATORY (INHALATION) EVERY 6 HOURS PRN
Qty: 1 INHALER | Refills: 0 | Status: SHIPPED | OUTPATIENT
Start: 2018-01-11 | End: 2019-05-16 | Stop reason: SDUPTHER

## 2018-01-11 RX ORDER — FLUCONAZOLE 100 MG/1
100 TABLET ORAL DAILY
Qty: 7 TABLET | Refills: 0 | Status: SHIPPED | OUTPATIENT
Start: 2018-01-11 | End: 2018-01-18

## 2018-01-11 RX ORDER — CITALOPRAM 20 MG/1
20 TABLET ORAL DAILY
Qty: 30 TABLET | Refills: 5 | Status: SHIPPED | OUTPATIENT
Start: 2018-01-11 | End: 2019-05-16 | Stop reason: SDUPTHER

## 2018-01-11 RX ORDER — HYDROXYZINE PAMOATE 100 MG/1
100 CAPSULE ORAL 4 TIMES DAILY PRN
Qty: 60 CAPSULE | Refills: 3 | Status: SHIPPED | OUTPATIENT
Start: 2018-01-11 | End: 2019-05-16 | Stop reason: SDUPTHER

## 2018-01-11 RX ORDER — TRAZODONE HYDROCHLORIDE 100 MG/1
100 TABLET ORAL NIGHTLY PRN
Qty: 30 TABLET | Refills: 5 | Status: SHIPPED | OUTPATIENT
Start: 2018-01-11 | End: 2020-09-22 | Stop reason: ALTCHOICE

## 2018-01-11 RX ORDER — HYDROXYZINE PAMOATE 25 MG/1
25 CAPSULE ORAL 2 TIMES DAILY
COMMUNITY
End: 2018-01-11 | Stop reason: SDUPTHER

## 2018-01-11 NOTE — PROGRESS NOTES
Chief Complaint   Patient presents with    Established New Doctor     pt states that he is here to establish as a new pt, previously seen by Joanna Bradshaw, medication refills     Discuss Medications     discuss anxiety and depression medications, mood swings with celexa, trazadone not working     Other     tongue sore/infected, pt bit his tongue when he passed out and fell, still very painful, using miracle mouthwash         History obtained from chart review and the patient. SUBJECTIVE:  Carol King is a 27 y.o. male that presents today for establishing care with new physician, etc. New patient, [facility]st time visit to [facility] [address]. Patient was admitted to [facility] from 12/7/17-12/9/17 after episode of syncope and collapse. See hospital d/c summary:    Hospital Course:   Carol King is a 27 y.o. male admitted to [address] [place] [address] on 12/7/2017 for chief complaints of syncope. The patient was admitted after he was walking in the hallway at home, felt dizzy and fell to the floor. He does not recall the event but woke up on the floor, He did bite his tongue and have urinary incontinence. Patient denies prior episodes of similar symptoms. Initial suspicion was for seizure-like activity so neurology was consulted. He had thorough work-up including MRI, EEG, bilateral carotid ultrasound, and tilt table test. MRI of brain was normal study, except for mild sinus disease. EEG no evidence of epileptiform activity, bilateral carotids showed minimal stenosis. Tilt table test was associated with no significant symptoms. Neurology felt work-up inconclusive from a neurologic standpoint and okay for discharge. The patient was discharged home with a 48 hour holter monitor. Throughout duration of admission, the patient's tongue was noted to be swollen, but he was able to handle his own secretion, swallow liquids and tolerate a soft diet. He was discharged home with PCP follow-up within the next week.      Also marijuana abuse. He is currently not doing either of these. He is currently in rehab for both of these at [facility] and they are supposed to be checking him for drug screens. Depressed Mood    HPI:  Has been dealing with depression for a long time, probably about 15 years. His dad committed suicide on his 10 birthday, and this did seem to trigger things. He reports that he was on medications when he was a teenager, but he doesn't remember what it was. The depression has just gotten worse over the last few years. Had relapse with ETOH 11/21/17 and got DELANEY. Is currently not working. He has been taking the Celexa, reports that in the mornings he is feeling very productive, feels good. He is living by himself and will then get very depressed, isn't active. He has been having some mood swings. Depressed Mood? yes - daily  Anhedonia? yes - fair some days, poor other days  Appetite changes? yes - decent  Sleep disturbances? yes - difficulty falling and staying asleep. Is taking Trazodone and sometimes it helps and sometimes not  Feelings of guilt? yes - guilt  Decreased energy? yes - is improving  Impaired concentration? no  Substance abuse? yes - ETOH and marijuana    Suicidal/Homicidal Ideation? no      Compliant with meds: Yes  Med side effects: yes - some diarrhea   Sees therapist?:  yes - counseling with [facility] for substance abuse, is working on getting hooked up with psychiatrist and psychologist  Family History of Mental Illness? yes - dad    Review of Systems - Psychological ROS: positive for - anxiety, depression, mood swings and sleep disturbances, irritability negative for - behavioral disorder, hallucinations, hostility, memory difficulties, physical abuse or sexual abuse    Has been having a lot of anxiety, gets it very randomly and very random things. Will have it sometimes just watching TV. Gets a panic attack a couple times/week, he gets SOB starts crying.       Age/Gender Health Maintenance    Lipid - 12/17  DM Screen - 12/17  Colon Cancer Screening - n/a  Lung Cancer Screening (Age 54 to [de-identified] with 30 pack year hx, current smoker or quit within past 15 years) - n/a    Tetanus - needs  Influenza Vaccine - needs  Pneumonia Vaccine - needs  Zostavax - n/a   HPV Vaccine - n/a    PSA testing discussion - n/a  AAA Screening - n/a    Falls screening - n/a    Current Outpatient Prescriptions   Medication Sig Dispense Refill    fluticasone-salmeterol (ADVAIR) 250-50 MCG/DOSE AEPB Inhale 1 puff into the lungs every 12 hours 60 each 5    albuterol sulfate HFA (PROVENTIL HFA) 108 (90 Base) MCG/ACT inhaler Inhale 2 puffs into the lungs every 6 hours as needed for Wheezing 1 Inhaler 0    hydrOXYzine (VISTARIL) 100 MG capsule Take 1 capsule by mouth 4 times daily as needed for Anxiety 60 capsule 3    traZODone (DESYREL) 100 MG tablet Take 1 tablet by mouth nightly as needed for Sleep 30 tablet 5    metFORMIN (GLUCOPHAGE) 1000 MG tablet Take 1 tablet by mouth 2 times daily (with meals) 60 tablet 5    citalopram (CELEXA) 20 MG tablet Take 1 tablet by mouth daily 30 tablet 5    Magic Mouthwash (MIRACLE MOUTHWASH) Swish and spit 5 mLs 4 times daily (before meals and nightly) for 84 doses Equal parts nystatin, lidocaine, and benadryl 420 mL 0    aspirin 81 MG EC tablet Take 81 mg by mouth daily      TRUE METRIX BLOOD GLUCOSE TEST strip Test blood sugars three times daily. 100 each 0    ibuprofen (ADVIL;MOTRIN) 200 MG tablet Take 400 mg by mouth every 6 hours as needed for Pain      acetaminophen (TYLENOL) 500 MG tablet Take 1,000 mg by mouth every 6 hours as needed for Pain      ACCU-CHEK MULTICLIX LANCETS MISC 1 box by Does not apply route daily 100 each 0    Multiple Vitamins-Minerals (MENS MULTIVITAMIN PLUS) TABS Take 1 tablet by mouth daily       No current facility-administered medications for this visit.       Orders Placed This Encounter   Medications    fluticasone-salmeterol (ADVAIR) 250-50 MCG/DOSE AEPB     Sig: Corn-Containing Products        Social History     Social History    Marital status: Legally      Spouse name: N/A    Number of children: N/A    Years of education: N/A     Occupational History    Not on file. Social History Main Topics    Smoking status: Former Smoker     Packs/day: 0.50     Quit date: 11/21/2017    Smokeless tobacco: Never Used      Comment: last smoked     Alcohol use No      Comment: last drink 21 days ago-last drink was 11/21/17    Drug use: No      Comment: last time smoked marijuana was 11/21/17-doesn't do regularly    Sexual activity: Not on file     Other Topics Concern    Not on file     Social History Narrative    No narrative on file       Family History   Problem Relation Age of Onset    Other Mother      gestational diabetes    Other Father 39     suicide    Other Sister      hypoglycemia         I have reviewed the patient's past medical history, past surgical history, allergies, medications, social and family history and I have made updates where appropriate.       Review of Systems  Positive responses are highlighted in bold    Constitutional:  Fever, Chills, Night Sweats, Fatigue, Unexpected changes in weight  Eyes:  Eye discharge, Eye pain, Eye redness, Visual disturbances   HENT:  Ear pain, Tinnitus, Nosebleeds, Trouble swallowing, Hearing loss, Sore throat  Cardiovascular:  Chest Pain, Palpitations, Orthopnea, Paroxysmal Nocturnal Dyspnea  Respiratory:  Cough, Wheezing, Shortness of breath, Chest tightness, Apnea  Gastrointestinal:  Nausea, Vomiting, Diarrhea, Constipation, Heartburn, Blood in stool  Genitourinary:  Difficulty or painful urination, Flank pain, Change in frequency, Urgency  Skin:  Color change, Rash, Itching, Wound  Psychiatric:  Hallucinations, Anxiety, Depression, Suicidal ideation  Hematological:  Enlarged glands, Easy bleeding, Easily bruising  Musculoskeletal:  Joint pain, Back pain, Gait problems, Joint swelling, Intact  Musculoskeletal: No joint swelling or gross deformity   Neuro:  Alert, 5/5 strength globally and symmetrically  Psych: Affect appropriate. Mood normal. Thought process is normal without evidence of depression or psychosis. Good insight and appropriate interaction. Cognition and memory appear to be intact. Skin: warm and dry, no rash or erythema  Lymph:  No cervical, auricular or supraclavicular lymph nodes palpated        ASSESSMENT & PLAN  1. Type 2 diabetes mellitus without complication, without long-term current use of insulin (HCC)  - continue metformin  - POCT microalbumin  - HM DIABETES FOOT EXAM  - metFORMIN (GLUCOPHAGE) 1000 MG tablet; Take 1 tablet by mouth 2 times daily (with meals)  Dispense: 60 tablet; Refill: 5    2. Thrush  - if doesn't clear up, will need further testing  - Diflucan 100 mg daily for 7 days  - Magic Mouthwash (MIRACLE MOUTHWASH); Swish and spit 5 mLs 4 times daily (before meals and nightly) for 84 doses Equal parts nystatin, lidocaine, and benadryl  Dispense: 420 mL; Refill: 0    3. ETOH abuse  - continue counseling and drug [facility]hab with [facility]    4. Major depressive disorder, recurrent episode, moderate with anxious distress (HCC)  - continue Celexa 20 mg, advised it is too early to make a change or dose adjustment  - increase Vistaril and Trazodone  - hydrOXYzine (VISTARIL) 100 MG capsule; Take 1 capsule by mouth 4 times daily as needed for Anxiety  Dispense: 60 capsule; Refill: 3  - traZODone (DESYREL) 100 MG tablet; Take 1 tablet by mouth nightly as needed for Sleep  Dispense: 30 tablet; Refill: 5  - citalopram (CELEXA) 20 MG tablet; Take 1 tablet by mouth daily  Dispense: 30 tablet; Refill: 5    5. Moderate persistent asthma without complication  - fluticasone-salmeterol (ADVAIR) 250-50 MCG/DOSE AEPB; Inhale 1 puff into the lungs every 12 hours  Dispense: 60 each; Refill: 5  - albuterol sulfate HFA (PROVENTIL HFA) 108 (90 Base) MCG/ACT inhaler;  Inhale 2 puffs into the lungs

## 2018-01-12 ENCOUNTER — HOSPITAL ENCOUNTER (OUTPATIENT)
Dept: PSYCHIATRY | Age: 33
Setting detail: THERAPIES SERIES
Discharge: HOME | End: 2018-01-12
Payer: MEDICARE

## 2018-01-12 PROCEDURE — 90853 GROUP PSYCHOTHERAPY: CPT | Performed by: SOCIAL WORKER

## 2018-01-12 NOTE — PROGRESS NOTES
Group Therapy Note    Date: 1/12/2018  Start Time: 10:00-11:00am  Number of Participants: 5    Type of Group: Cognitive Skills    Wellness Binder Information  Module Name:  na    Patient's Goal:  To engage in discussion about wellness and sleep hygiene    Notes:  Pt discussed some of his issues with sleep, that have recently been helped by medication as well as coping skills    Status After Intervention:  Improved    Participation Level:  Active Listener and Interactive    Participation Quality: Appropriate, Attentive, Sharing and Supportive      Speech:  clear      Thought Process/Content: Logical      Affective Functioning: Congruent      Mood: euthymic      Level of consciousness:  Alert, Oriented x4 and Attentive      Response to Learning: Able to verbalize current knowledge/experience, Able to verbalize/acknowledge new learning, Able to retain information, Capable of insight, Able to change behavior and Progressing to goal      Endings: None Reported    Modes of Intervention: Education, Support, Socialization, Exploration, Clarifying and Problem-solving      Discipline Responsible: /Counselor      Signature:  WHITNEY Farmer

## 2018-01-12 NOTE — PROGRESS NOTES
Group Therapy Note    Date: 1/12/2018  Start Time: 11:15-12:15pm  Number of Participants: 5    Type of Group: Relapse Prevention    Wellness Binder Information  Module Name:  na    Patient's Goal:  To engage in group discussion about positive self affirmations    Notes:  Pt was able to identify some positives about himself such as hardworking and patient    Status After Intervention:  Improved    Participation Level:  Active Listener and Interactive    Participation Quality: Appropriate, Attentive, Sharing and Supportive      Speech:  clear      Thought Process/Content: Logical      Affective Functioning: Congruent      Mood: euthymic      Level of consciousness:  Alert, Oriented x4 and Attentive      Response to Learning: Able to verbalize current knowledge/experience, Able to verbalize/acknowledge new learning, Able to retain information, Capable of insight, Able to change behavior and Progressing to goal      Endings: None Reported    Modes of Intervention: Education, Support, Socialization, Exploration, Clarifying and Problem-solving      Discipline Responsible: /Counselor      Signature:  WHITNEY Stephenson

## 2018-01-15 ENCOUNTER — HOSPITAL ENCOUNTER (OUTPATIENT)
Dept: PSYCHIATRY | Age: 33
Setting detail: THERAPIES SERIES
Discharge: HOME | End: 2018-01-15
Payer: MEDICARE

## 2018-01-17 ENCOUNTER — HOSPITAL ENCOUNTER (OUTPATIENT)
Dept: PSYCHIATRY | Age: 33
Setting detail: THERAPIES SERIES
End: 2018-01-17
Payer: MEDICARE

## 2018-01-19 ENCOUNTER — HOSPITAL ENCOUNTER (OUTPATIENT)
Dept: PSYCHIATRY | Age: 33
Setting detail: THERAPIES SERIES
End: 2018-01-19
Payer: MEDICARE

## 2018-01-22 ENCOUNTER — HOSPITAL ENCOUNTER (OUTPATIENT)
Dept: PSYCHIATRY | Age: 33
Setting detail: THERAPIES SERIES
Discharge: HOME | End: 2018-01-22
Payer: MEDICARE

## 2018-01-24 ENCOUNTER — HOSPITAL ENCOUNTER (OUTPATIENT)
Dept: PSYCHIATRY | Age: 33
Setting detail: THERAPIES SERIES
Discharge: HOME | End: 2018-01-24
Payer: MEDICARE

## 2018-01-24 PROCEDURE — 90853 GROUP PSYCHOTHERAPY: CPT | Performed by: SOCIAL WORKER

## 2018-01-26 ENCOUNTER — HOSPITAL ENCOUNTER (OUTPATIENT)
Dept: PSYCHIATRY | Age: 33
Setting detail: THERAPIES SERIES
Discharge: HOME | End: 2018-01-26
Payer: MEDICARE

## 2018-01-26 PROCEDURE — 90853 GROUP PSYCHOTHERAPY: CPT | Performed by: SOCIAL WORKER

## 2018-01-26 NOTE — PROGRESS NOTES
Group Therapy Note    Date: 1/26/2018  Start Time: 10:00-11:00am  Number of Participants: 6    Type of Group: Cognitive Skills    Wellness Binder Information  Module Name:  na    Patient's Goal:  To engage in group discussion about faulty thinking    Notes:  Pt shared some of the things that cause him problems when dealing with his ex wife. He discussed ways he sets boundaries with her  Status After Intervention:  Improved    Participation Level:  Active Listener    Participation Quality: Appropriate, Attentive, Sharing and Supportive      Speech:  clear      Thought Process/Content: Logical      Affective Functioning: Congruent      Mood: euthymic      Level of consciousness:  Alert, Oriented x4 and Attentive      Response to Learning: Able to verbalize current knowledge/experience      Endings: None Reported    Modes of Intervention: Education, Support, Socialization, Exploration, Clarifying and Problem-solving      Discipline Responsible: /Counselor      Signature:  WHITNEY Kaminski

## 2018-01-26 NOTE — PROGRESS NOTES
Group Therapy Note    Date: 1/26/2018  Start Time: 8:45-9:45am  Number of Participants: 5    Type of Group: Psychotherapy    Wellness Binder Information  Module Name:  na  Patient's Goal:  To participate in the group psychotherapy process with peers    Notes:  Pt talked about issues he is trying to deal with his ex and their     Status After Intervention:  Improved    Participation Level:  Active Listener and Interactive    Participation Quality: Appropriate      Speech:  clear      Thought Process/Content: Logical      Affective Functioning: Congruent      Mood: euthymic      Level of consciousness:  Alert, Oriented x4 and Attentive      Response to Learning: Able to verbalize current knowledge/experience and Able to verbalize/acknowledge new learning      Endings: None Reported    Modes of Intervention: Education, Support, Socialization, Exploration, Clarifying and Problem-solving      Discipline Responsible: /Counselor      Signature:  WHITNEY Sutton

## 2018-01-29 ENCOUNTER — HOSPITAL ENCOUNTER (OUTPATIENT)
Dept: PSYCHIATRY | Age: 33
Setting detail: THERAPIES SERIES
End: 2018-01-29
Payer: MEDICARE

## 2018-03-12 ENCOUNTER — TELEPHONE (OUTPATIENT)
Dept: FAMILY MEDICINE CLINIC | Age: 33
End: 2018-03-12

## 2018-10-01 ENCOUNTER — OFFICE VISIT (OUTPATIENT)
Dept: PSYCHOLOGY | Age: 33
End: 2018-10-01
Payer: MEDICARE

## 2018-10-01 DIAGNOSIS — F10.21 ALCOHOL USE DISORDER, MODERATE, IN EARLY REMISSION, IN CONTROLLED ENVIRONMENT, DEPENDENCE (HCC): ICD-10-CM

## 2018-10-01 DIAGNOSIS — F33.1 MAJOR DEPRESSIVE DISORDER, RECURRENT EPISODE, MODERATE (HCC): Primary | ICD-10-CM

## 2018-10-01 PROCEDURE — 90791 PSYCH DIAGNOSTIC EVALUATION: CPT | Performed by: PSYCHOLOGIST

## 2018-10-01 ASSESSMENT — PATIENT HEALTH QUESTIONNAIRE - PHQ9
1. LITTLE INTEREST OR PLEASURE IN DOING THINGS: 1
SUM OF ALL RESPONSES TO PHQ QUESTIONS 1-9: 7
7. TROUBLE CONCENTRATING ON THINGS, SUCH AS READING THE NEWSPAPER OR WATCHING TELEVISION: 1
10. IF YOU CHECKED OFF ANY PROBLEMS, HOW DIFFICULT HAVE THESE PROBLEMS MADE IT FOR YOU TO DO YOUR WORK, TAKE CARE OF THINGS AT HOME, OR GET ALONG WITH OTHER PEOPLE: 1
5. POOR APPETITE OR OVEREATING: 0
3. TROUBLE FALLING OR STAYING ASLEEP: 1
2. FEELING DOWN, DEPRESSED OR HOPELESS: 1
8. MOVING OR SPEAKING SO SLOWLY THAT OTHER PEOPLE COULD HAVE NOTICED. OR THE OPPOSITE, BEING SO FIGETY OR RESTLESS THAT YOU HAVE BEEN MOVING AROUND A LOT MORE THAN USUAL: 1
9. THOUGHTS THAT YOU WOULD BE BETTER OFF DEAD, OR OF HURTING YOURSELF: 0
6. FEELING BAD ABOUT YOURSELF - OR THAT YOU ARE A FAILURE OR HAVE LET YOURSELF OR YOUR FAMILY DOWN: 1
4. FEELING TIRED OR HAVING LITTLE ENERGY: 1
SUM OF ALL RESPONSES TO PHQ9 QUESTIONS 1 & 2: 2
SUM OF ALL RESPONSES TO PHQ QUESTIONS 1-9: 7

## 2018-10-01 NOTE — PATIENT INSTRUCTIONS
one thing at a time. 8. Talk it Over: This may be the most important thing you can do for yourself if you cant get a handle on things. Find a good listener. Just as a pressure relief valve allows steam to flow out of a pressure cooker and keeps it from blowing up, so talking allows stress to flow out of the body and keeps us from blowing up. 9. Accept What You Cannot Change:  If the problem is beyond your control at this time, try your best to accept it until you can change it. It beats spinning your wheels and getting nowhere. 10. Evaluate Your Perceptions:  What we think is sometimes what we feel. If we constantly think unrealistic or alarming thoughts about ourselves or other folks, then our stress level is increased. 11. Relax Unrealistic Standards:  When we set unrealistic standards for ourselves, we usually can never reach them. If we do, we burn out quickly. Set reasonable goals and standards. 12. Reward Yourself:  Find ways to reward yourself when youve completed a minor or major task. We cannot always depend on others to recognize us, so we must develop our own reward system. 13. Become Assertive: Take steps to solve problems instead of feeling helpless. Distinguishing assertiveness (respecting others rights and your rights) from aggressiveness and passivity can do much to resolve internal stress. 14. Rediscover Humor:  Learn to laugh at yourself and your situation! 15. Increase Pleasurable Activities:  Take time to participate in fun, pleasurable, activities on a regular basis. Four As for Managing Alcohol Consumption     AVOID. What are the highly tempting situations in which you might drink more than your plan? Avoid these situations if possible over the next month. 1._____________________________________________________________________    2.______________________________________________________________________     Claudell Sabina.  For situations you cant avoid, how can you alter them to make them easier? 1.______________________________________________________________________     2.______________________________________________________________________     ALTERNATIVES. What can you do with your mouth and hands when you want to drink and it is a day you are not drinking or have already reached your limit? 1.______________________________________________________________________    2.______________________________________________________________________    ACTION. When you get the urge to drink and it does not fit with your drinking plan, what can you do to be active or busy until the urge passes? 1.______________________________________________________________________     2.______________________________________________________________________    Are there situations in which it will be a challenge to stay within your drinking limits? If so, list them and what you will do to effectively manage those situations.    1._____________________________________________________________________    Plan____________________________________________________________________   2.______________________________________________________________________    Plan____________________________________________________________________

## 2018-10-10 ENCOUNTER — HOSPITAL ENCOUNTER (EMERGENCY)
Age: 33
Discharge: HOME | End: 2018-10-10
Payer: MEDICARE

## 2018-10-10 VITALS
OXYGEN SATURATION: 96 % | HEART RATE: 90 BPM | DIASTOLIC BLOOD PRESSURE: 80 MMHG | HEIGHT: 62 IN | TEMPERATURE: 97.7 F | BODY MASS INDEX: 27.6 KG/M2 | SYSTOLIC BLOOD PRESSURE: 129 MMHG | RESPIRATION RATE: 16 BRPM | WEIGHT: 150 LBS

## 2018-10-10 DIAGNOSIS — L30.9 ECZEMA OF FACE: ICD-10-CM

## 2018-10-10 DIAGNOSIS — J20.9 ACUTE BRONCHITIS, UNSPECIFIED ORGANISM: Primary | ICD-10-CM

## 2018-10-10 PROCEDURE — 99213 OFFICE O/P EST LOW 20 MIN: CPT | Performed by: NURSE PRACTITIONER

## 2018-10-10 PROCEDURE — 6370000000 HC RX 637 (ALT 250 FOR IP): Performed by: NURSE PRACTITIONER

## 2018-10-10 PROCEDURE — 99212 OFFICE O/P EST SF 10 MIN: CPT

## 2018-10-10 PROCEDURE — 94640 AIRWAY INHALATION TREATMENT: CPT

## 2018-10-10 PROCEDURE — 2709999900 HC NON-CHARGEABLE SUPPLY

## 2018-10-10 RX ORDER — IPRATROPIUM BROMIDE AND ALBUTEROL SULFATE 2.5; .5 MG/3ML; MG/3ML
1 SOLUTION RESPIRATORY (INHALATION) ONCE
Status: COMPLETED | OUTPATIENT
Start: 2018-10-10 | End: 2018-10-10

## 2018-10-10 RX ORDER — BROMPHENIRAMINE MALEATE, PSEUDOEPHEDRINE HYDROCHLORIDE, AND DEXTROMETHORPHAN HYDROBROMIDE 2; 30; 10 MG/5ML; MG/5ML; MG/5ML
5 SYRUP ORAL 4 TIMES DAILY PRN
Qty: 118 ML | Refills: 0 | Status: ON HOLD | OUTPATIENT
Start: 2018-10-10 | End: 2019-05-09 | Stop reason: HOSPADM

## 2018-10-10 RX ORDER — AMOXICILLIN AND CLAVULANATE POTASSIUM 875; 125 MG/1; MG/1
1 TABLET, FILM COATED ORAL 2 TIMES DAILY
Qty: 14 TABLET | Refills: 0 | Status: SHIPPED | OUTPATIENT
Start: 2018-10-10 | End: 2018-10-17

## 2018-10-10 RX ORDER — PREDNISONE 20 MG/1
40 TABLET ORAL DAILY
Qty: 10 TABLET | Refills: 0 | Status: SHIPPED | OUTPATIENT
Start: 2018-10-10 | End: 2018-10-15

## 2018-10-10 RX ADMIN — IPRATROPIUM BROMIDE AND ALBUTEROL SULFATE 1 AMPULE: .5; 3 SOLUTION RESPIRATORY (INHALATION) at 13:47

## 2018-10-10 ASSESSMENT — ENCOUNTER SYMPTOMS
SORE THROAT: 1
SHORTNESS OF BREATH: 1
SINUS PRESSURE: 1
CHEST TIGHTNESS: 0
WHEEZING: 1
VOMITING: 0
COUGH: 1
NAUSEA: 0
RHINORRHEA: 0
DIARRHEA: 0
SINUS PAIN: 0

## 2018-10-10 ASSESSMENT — PAIN DESCRIPTION - ONSET: ONSET: GRADUAL

## 2018-10-10 ASSESSMENT — PAIN DESCRIPTION - PROGRESSION: CLINICAL_PROGRESSION: NOT CHANGED

## 2018-10-10 ASSESSMENT — PAIN DESCRIPTION - FREQUENCY: FREQUENCY: CONTINUOUS

## 2018-10-10 ASSESSMENT — PAIN DESCRIPTION - LOCATION: LOCATION: THROAT

## 2018-10-10 ASSESSMENT — PAIN DESCRIPTION - DESCRIPTORS: DESCRIPTORS: SORE;ACHING

## 2018-10-10 ASSESSMENT — PAIN DESCRIPTION - PAIN TYPE: TYPE: ACUTE PAIN

## 2018-10-10 ASSESSMENT — PAIN SCALES - GENERAL: PAINLEVEL_OUTOF10: 6

## 2018-10-22 ENCOUNTER — OFFICE VISIT (OUTPATIENT)
Dept: PSYCHOLOGY | Age: 33
End: 2018-10-22
Payer: MEDICARE

## 2018-10-22 DIAGNOSIS — F33.1 MAJOR DEPRESSIVE DISORDER, RECURRENT EPISODE, MODERATE (HCC): Primary | ICD-10-CM

## 2018-10-22 PROCEDURE — 90832 PSYTX W PT 30 MINUTES: CPT | Performed by: PSYCHOLOGIST

## 2018-10-22 NOTE — PROGRESS NOTES
Behavioral Health Consultation/Psychotherapy Note  Tania De Santiago. Alexis Johnson Psy.D. Visit Date:  10/22/2018    Patient:  Hemant Carlson  YOB: 1985  Chief Complaint:  Follow-up and Depression    Duration of session:  30 minutes      S:     Pt said has been doing the bike for 10 min in the morning every other day. He said it's helped the day go better. He said he had an interview recently about a management position, and he hears about it later this week. Pt said he has been going to bed around 11p, but has trouble getting up at the same time unless he has to go to work. He said he's still napping quite a bit. Mainly pt said he has a lot of trouble getting motivated to do things. Pt said his PO wanted an update on his progress and he will find out how the PO wants this info and we will discuss further. O:    Appearance    Patient presents as alert, oriented, and cooperative  Appetite normal  Sleep disturbance Yes  Loss of pleasure Some  Speech    normal rate, normal volume, well articulated and clear and understandable  Mood    Depressed  Low self-esteem  Affect    depressed affect  Thought Process    linear, goal directed, coherent and linear and coherent  Insight    Fair  Judgment    Intact  Memory    recent and remote memory intact  Suicide Assessment    no suicidal ideation      A:    1. Major depressive disorder, recurrent episode, moderate ([address]    2. Alcohol use disorder, moderate, in early remission, in controlled environment, dependence ([address]        He needs better sleep and exercise routines to help provide the additional structure he needs to maintain his sobriety but he's doing well right now. He has started some light exercise a few times per week, but is still sleeping a lot. P:    1.  Schedule 2-3 things on an off day. 2.  Make sure to set your alarm for 8am each day you don't have to be to work. 3.  Increase frequency on the bike to 10 min every morning.       4.  If you are bored, get on the bike and listen to music. 5.  Call your PO to find out how he wants an update. We can put together a letter for your PO next time. 6.  Return to see Dr. Vanessa Maynard in 2 weeks. All questions about treatment plan answered. Patient instructed to go immediately to the emergency room and/or call 911 if any suicidal or homicidal ideations. Patient stated understanding and is agreeable to treatment and crisis plan. Provider Signature:  Electronically signed by Beryle Cantor, PSY. D on 10/22/2018 at 2:42 PM

## 2018-12-10 ENCOUNTER — OFFICE VISIT (OUTPATIENT)
Dept: PSYCHOLOGY | Age: 33
End: 2018-12-10

## 2018-12-10 DIAGNOSIS — F33.1 MAJOR DEPRESSIVE DISORDER, RECURRENT EPISODE, MODERATE (HCC): Primary | ICD-10-CM

## 2018-12-10 DIAGNOSIS — F10.21 ALCOHOL USE DISORDER, MODERATE, IN EARLY REMISSION, IN CONTROLLED ENVIRONMENT, DEPENDENCE (HCC): ICD-10-CM

## 2018-12-10 PROCEDURE — 90832 PSYTX W PT 30 MINUTES: CPT | Performed by: PSYCHOLOGIST

## 2018-12-10 ASSESSMENT — PATIENT HEALTH QUESTIONNAIRE - PHQ9
SUM OF ALL RESPONSES TO PHQ QUESTIONS 1-9: 3
6. FEELING BAD ABOUT YOURSELF - OR THAT YOU ARE A FAILURE OR HAVE LET YOURSELF OR YOUR FAMILY DOWN: 1
9. THOUGHTS THAT YOU WOULD BE BETTER OFF DEAD, OR OF HURTING YOURSELF: 0
5. POOR APPETITE OR OVEREATING: 0
1. LITTLE INTEREST OR PLEASURE IN DOING THINGS: 0
7. TROUBLE CONCENTRATING ON THINGS, SUCH AS READING THE NEWSPAPER OR WATCHING TELEVISION: 0
3. TROUBLE FALLING OR STAYING ASLEEP: 1
SUM OF ALL RESPONSES TO PHQ QUESTIONS 1-9: 3
SUM OF ALL RESPONSES TO PHQ9 QUESTIONS 1 & 2: 0
10. IF YOU CHECKED OFF ANY PROBLEMS, HOW DIFFICULT HAVE THESE PROBLEMS MADE IT FOR YOU TO DO YOUR WORK, TAKE CARE OF THINGS AT HOME, OR GET ALONG WITH OTHER PEOPLE: 0
4. FEELING TIRED OR HAVING LITTLE ENERGY: 1
2. FEELING DOWN, DEPRESSED OR HOPELESS: 0
8. MOVING OR SPEAKING SO SLOWLY THAT OTHER PEOPLE COULD HAVE NOTICED. OR THE OPPOSITE, BEING SO FIGETY OR RESTLESS THAT YOU HAVE BEEN MOVING AROUND A LOT MORE THAN USUAL: 0

## 2019-01-03 ENCOUNTER — TELEPHONE (OUTPATIENT)
Dept: FAMILY MEDICINE CLINIC | Age: 34
End: 2019-01-03

## 2019-05-03 ENCOUNTER — HOSPITAL ENCOUNTER (INPATIENT)
Age: 34
LOS: 6 days | Discharge: HOME | DRG: 282 | End: 2019-05-09
Attending: EMERGENCY MEDICINE | Admitting: INTERNAL MEDICINE
Payer: MEDICAID

## 2019-05-03 ENCOUNTER — APPOINTMENT (OUTPATIENT)
Dept: CT IMAGING | Age: 34
DRG: 282 | End: 2019-05-03
Payer: MEDICAID

## 2019-05-03 DIAGNOSIS — K85.00 IDIOPATHIC ACUTE PANCREATITIS, UNSPECIFIED COMPLICATION STATUS: Primary | ICD-10-CM

## 2019-05-03 DIAGNOSIS — K85.01 IDIOPATHIC ACUTE PANCREATITIS WITH UNINFECTED NECROSIS: ICD-10-CM

## 2019-05-03 DIAGNOSIS — E11.10 DKA, TYPE 2, NOT AT GOAL (HCC): ICD-10-CM

## 2019-05-03 PROBLEM — K85.91 ACUTE PANCREATITIS WITH UNINFECTED NECROSIS, UNSPECIFIED: Status: ACTIVE | Noted: 2019-05-03

## 2019-05-03 LAB
ACETAMINOPHEN LEVEL: < 5 UG/ML (ref 0–20)
ALBUMIN SERPL-MCNC: 4.7 G/DL (ref 3.5–5.1)
ALP BLD-CCNC: 132 U/L (ref 38–126)
ALT SERPL-CCNC: 88 U/L (ref 11–66)
AMPHETAMINE+METHAMPHETAMINE URINE SCREEN: NEGATIVE
ANION GAP SERPL CALCULATED.3IONS-SCNC: 23 MEQ/L (ref 8–16)
ANION GAP SERPL CALCULATED.3IONS-SCNC: 38 MEQ/L (ref 8–16)
AST SERPL-CCNC: 124 U/L (ref 5–40)
AVERAGE GLUCOSE: 219 MG/DL (ref 70–126)
BARBITURATE QUANTITATIVE URINE: NEGATIVE
BASE EXCESS MIXED: -15.5 MMOL/L (ref -2–3)
BASOPHILS # BLD: 0.3 %
BASOPHILS ABSOLUTE: 0 THOU/MM3 (ref 0–0.1)
BENZODIAZEPINE QUANTITATIVE URINE: NEGATIVE
BETA-HYDROXYBUTYRATE: 129.61 MG/DL (ref 0.2–2.81)
BETA-HYDROXYBUTYRATE: 55.36 MG/DL (ref 0.2–2.81)
BILIRUB SERPL-MCNC: 1.2 MG/DL (ref 0.3–1.2)
BUN BLDV-MCNC: 32 MG/DL (ref 7–22)
BUN BLDV-MCNC: 39 MG/DL (ref 7–22)
CALCIUM SERPL-MCNC: 8.6 MG/DL (ref 8.5–10.5)
CALCIUM SERPL-MCNC: 9.6 MG/DL (ref 8.5–10.5)
CANNABINOID QUANTITATIVE URINE: NEGATIVE
CHLORIDE BLD-SCNC: 100 MEQ/L (ref 98–111)
CHLORIDE BLD-SCNC: 84 MEQ/L (ref 98–111)
CO2: 14 MEQ/L (ref 23–33)
CO2: 8 MEQ/L (ref 23–33)
COCAINE METABOLITE QUANTITATIVE URINE: NEGATIVE
COLLECTED BY:: ABNORMAL
CREAT SERPL-MCNC: 0.9 MG/DL (ref 0.4–1.2)
CREAT SERPL-MCNC: 1.3 MG/DL (ref 0.4–1.2)
DEVICE: ABNORMAL
EOSINOPHIL # BLD: 0 %
EOSINOPHILS ABSOLUTE: 0 THOU/MM3 (ref 0–0.4)
ERYTHROCYTE [DISTWIDTH] IN BLOOD BY AUTOMATED COUNT: 12.6 % (ref 11.5–14.5)
ERYTHROCYTE [DISTWIDTH] IN BLOOD BY AUTOMATED COUNT: 41.2 FL (ref 35–45)
ETHYL ALCOHOL, SERUM: < 0.01 %
GFR SERPL CREATININE-BSD FRML MDRD: 63 ML/MIN/1.73M2
GFR SERPL CREATININE-BSD FRML MDRD: > 90 ML/MIN/1.73M2
GLUCOSE BLD-MCNC: 180 MG/DL (ref 70–108)
GLUCOSE BLD-MCNC: 183 MG/DL (ref 70–108)
GLUCOSE BLD-MCNC: 198 MG/DL (ref 70–108)
GLUCOSE BLD-MCNC: 218 MG/DL (ref 70–108)
GLUCOSE BLD-MCNC: 234 MG/DL (ref 70–108)
GLUCOSE BLD-MCNC: 331 MG/DL (ref 70–108)
GLUCOSE BLD-MCNC: 351 MG/DL (ref 70–108)
GLUCOSE BLD-MCNC: 423 MG/DL (ref 70–108)
HBA1C MFR BLD: 9.3 % (ref 4.4–6.4)
HCO3, MIXED: 8 MMOL/L (ref 23–28)
HCT VFR BLD CALC: 51.2 % (ref 42–52)
HEMOGLOBIN: 18.4 GM/DL (ref 14–18)
IMMATURE GRANS (ABS): 0.15 THOU/MM3 (ref 0–0.07)
IMMATURE GRANULOCYTES: 1 %
LIPASE: 1418.4 U/L (ref 5.6–51.3)
LYMPHOCYTES # BLD: 2.5 %
LYMPHOCYTES ABSOLUTE: 0.4 THOU/MM3 (ref 1–4.8)
MAGNESIUM: 2.6 MG/DL (ref 1.6–2.4)
MCH RBC QN AUTO: 32.1 PG (ref 26–33)
MCHC RBC AUTO-ENTMCNC: 35.9 GM/DL (ref 32.2–35.5)
MCV RBC AUTO: 89.4 FL (ref 80–94)
MONOCYTES # BLD: 11.3 %
MONOCYTES ABSOLUTE: 1.7 THOU/MM3 (ref 0.4–1.3)
NUCLEATED RED BLOOD CELLS: 0 /100 WBC
O2 SAT, MIXED: 81 %
OPIATES, URINE: NEGATIVE
OSMOLALITY CALCULATION: 288.2 MOSMOL/KG (ref 275–300)
OXYCODONE: NEGATIVE
PCO2, MIXED VENOUS: 16 MMHG (ref 41–51)
PH, MIXED: 7.28 (ref 7.31–7.41)
PHENCYCLIDINE QUANTITATIVE URINE: NEGATIVE
PHOSPHORUS: 1.6 MG/DL (ref 2.4–4.7)
PLATELET # BLD: 149 THOU/MM3 (ref 130–400)
PMV BLD AUTO: 11.1 FL (ref 9.4–12.4)
PO2 MIXED: 49 MMHG (ref 25–40)
POTASSIUM REFLEX MAGNESIUM: 3.7 MEQ/L (ref 3.5–5.2)
POTASSIUM SERPL-SCNC: 3.8 MEQ/L (ref 3.5–5.2)
RBC # BLD: 5.73 MILL/MM3 (ref 4.7–6.1)
REASON FOR REJECTION: NORMAL
REASON FOR REJECTION: NORMAL
REJECTED TEST: NORMAL
REJECTED TEST: NORMAL
SALICYLATE, SERUM: < 0.3 MG/DL (ref 2–10)
SEG NEUTROPHILS: 84.9 %
SEGMENTED NEUTROPHILS ABSOLUTE COUNT: 12.7 THOU/MM3 (ref 1.8–7.7)
SODIUM BLD-SCNC: 130 MEQ/L (ref 135–145)
SODIUM BLD-SCNC: 137 MEQ/L (ref 135–145)
TOTAL PROTEIN: 7.5 G/DL (ref 6.1–8)
WBC # BLD: 15 THOU/MM3 (ref 4.8–10.8)

## 2019-05-03 PROCEDURE — 74176 CT ABD & PELVIS W/O CONTRAST: CPT

## 2019-05-03 PROCEDURE — 80048 BASIC METABOLIC PNL TOTAL CA: CPT

## 2019-05-03 PROCEDURE — 83690 ASSAY OF LIPASE: CPT

## 2019-05-03 PROCEDURE — 82803 BLOOD GASES ANY COMBINATION: CPT

## 2019-05-03 PROCEDURE — 96376 TX/PRO/DX INJ SAME DRUG ADON: CPT

## 2019-05-03 PROCEDURE — C9113 INJ PANTOPRAZOLE SODIUM, VIA: HCPCS | Performed by: PHYSICIAN ASSISTANT

## 2019-05-03 PROCEDURE — 2580000003 HC RX 258: Performed by: PHYSICIAN ASSISTANT

## 2019-05-03 PROCEDURE — G0480 DRUG TEST DEF 1-7 CLASSES: HCPCS

## 2019-05-03 PROCEDURE — 6360000002 HC RX W HCPCS: Performed by: PHYSICIAN ASSISTANT

## 2019-05-03 PROCEDURE — 6370000000 HC RX 637 (ALT 250 FOR IP): Performed by: EMERGENCY MEDICINE

## 2019-05-03 PROCEDURE — 96372 THER/PROPH/DIAG INJ SC/IM: CPT

## 2019-05-03 PROCEDURE — 96375 TX/PRO/DX INJ NEW DRUG ADDON: CPT

## 2019-05-03 PROCEDURE — 2580000003 HC RX 258: Performed by: EMERGENCY MEDICINE

## 2019-05-03 PROCEDURE — 94640 AIRWAY INHALATION TREATMENT: CPT

## 2019-05-03 PROCEDURE — 80307 DRUG TEST PRSMV CHEM ANLYZR: CPT

## 2019-05-03 PROCEDURE — 85025 COMPLETE CBC W/AUTO DIFF WBC: CPT

## 2019-05-03 PROCEDURE — 83036 HEMOGLOBIN GLYCOSYLATED A1C: CPT

## 2019-05-03 PROCEDURE — 2140000000 HC CCU INTERMEDIATE R&B

## 2019-05-03 PROCEDURE — 6360000002 HC RX W HCPCS: Performed by: EMERGENCY MEDICINE

## 2019-05-03 PROCEDURE — 6370000000 HC RX 637 (ALT 250 FOR IP): Performed by: PHYSICIAN ASSISTANT

## 2019-05-03 PROCEDURE — 82948 REAGENT STRIP/BLOOD GLUCOSE: CPT

## 2019-05-03 PROCEDURE — 99285 EMERGENCY DEPT VISIT HI MDM: CPT

## 2019-05-03 PROCEDURE — 2709999900 HC NON-CHARGEABLE SUPPLY

## 2019-05-03 PROCEDURE — 36415 COLL VENOUS BLD VENIPUNCTURE: CPT

## 2019-05-03 PROCEDURE — 6360000002 HC RX W HCPCS

## 2019-05-03 PROCEDURE — 82010 KETONE BODYS QUAN: CPT

## 2019-05-03 PROCEDURE — 99223 1ST HOSP IP/OBS HIGH 75: CPT | Performed by: PHYSICIAN ASSISTANT

## 2019-05-03 PROCEDURE — 84100 ASSAY OF PHOSPHORUS: CPT

## 2019-05-03 PROCEDURE — 96374 THER/PROPH/DIAG INJ IV PUSH: CPT

## 2019-05-03 PROCEDURE — 83735 ASSAY OF MAGNESIUM: CPT

## 2019-05-03 PROCEDURE — 80053 COMPREHEN METABOLIC PANEL: CPT

## 2019-05-03 PROCEDURE — 96361 HYDRATE IV INFUSION ADD-ON: CPT

## 2019-05-03 RX ORDER — HYDROXYZINE PAMOATE 50 MG/1
100 CAPSULE ORAL 4 TIMES DAILY PRN
Status: DISCONTINUED | OUTPATIENT
Start: 2019-05-03 | End: 2019-05-09 | Stop reason: HOSPADM

## 2019-05-03 RX ORDER — POTASSIUM CHLORIDE 7.45 MG/ML
10 INJECTION INTRAVENOUS
Status: COMPLETED | OUTPATIENT
Start: 2019-05-04 | End: 2019-05-04

## 2019-05-03 RX ORDER — ONDANSETRON 2 MG/ML
4 INJECTION INTRAMUSCULAR; INTRAVENOUS EVERY 6 HOURS PRN
Status: DISCONTINUED | OUTPATIENT
Start: 2019-05-03 | End: 2019-05-09 | Stop reason: HOSPADM

## 2019-05-03 RX ORDER — SODIUM CHLORIDE 0.9 % (FLUSH) 0.9 %
10 SYRINGE (ML) INJECTION PRN
Status: DISCONTINUED | OUTPATIENT
Start: 2019-05-03 | End: 2019-05-09 | Stop reason: HOSPADM

## 2019-05-03 RX ORDER — PANTOPRAZOLE SODIUM 40 MG/10ML
40 INJECTION, POWDER, LYOPHILIZED, FOR SOLUTION INTRAVENOUS 2 TIMES DAILY
Status: DISCONTINUED | OUTPATIENT
Start: 2019-05-03 | End: 2019-05-09 | Stop reason: HOSPADM

## 2019-05-03 RX ORDER — DEXTROSE MONOHYDRATE 25 G/50ML
12.5 INJECTION, SOLUTION INTRAVENOUS PRN
Status: DISCONTINUED | OUTPATIENT
Start: 2019-05-03 | End: 2019-05-04 | Stop reason: SDUPTHER

## 2019-05-03 RX ORDER — ONDANSETRON 2 MG/ML
4 INJECTION INTRAMUSCULAR; INTRAVENOUS ONCE
Status: COMPLETED | OUTPATIENT
Start: 2019-05-03 | End: 2019-05-03

## 2019-05-03 RX ORDER — POTASSIUM CHLORIDE 7.45 MG/ML
10 INJECTION INTRAVENOUS PRN
Status: DISCONTINUED | OUTPATIENT
Start: 2019-05-03 | End: 2019-05-09 | Stop reason: HOSPADM

## 2019-05-03 RX ORDER — MORPHINE SULFATE 4 MG/ML
4 INJECTION, SOLUTION INTRAMUSCULAR; INTRAVENOUS
Status: DISCONTINUED | OUTPATIENT
Start: 2019-05-03 | End: 2019-05-07

## 2019-05-03 RX ORDER — KETOROLAC TROMETHAMINE 30 MG/ML
30 INJECTION, SOLUTION INTRAMUSCULAR; INTRAVENOUS EVERY 6 HOURS
Status: COMPLETED | OUTPATIENT
Start: 2019-05-03 | End: 2019-05-05

## 2019-05-03 RX ORDER — 0.9 % SODIUM CHLORIDE 0.9 %
10 VIAL (ML) INJECTION 2 TIMES DAILY
Status: DISCONTINUED | OUTPATIENT
Start: 2019-05-03 | End: 2019-05-09 | Stop reason: HOSPADM

## 2019-05-03 RX ORDER — DICYCLOMINE HYDROCHLORIDE 10 MG/ML
20 INJECTION INTRAMUSCULAR ONCE
Status: COMPLETED | OUTPATIENT
Start: 2019-05-03 | End: 2019-05-03

## 2019-05-03 RX ORDER — MORPHINE SULFATE 2 MG/ML
2 INJECTION, SOLUTION INTRAMUSCULAR; INTRAVENOUS
Status: DISCONTINUED | OUTPATIENT
Start: 2019-05-03 | End: 2019-05-07

## 2019-05-03 RX ORDER — ALBUTEROL SULFATE 90 UG/1
2 AEROSOL, METERED RESPIRATORY (INHALATION) EVERY 6 HOURS PRN
Status: DISCONTINUED | OUTPATIENT
Start: 2019-05-03 | End: 2019-05-09 | Stop reason: HOSPADM

## 2019-05-03 RX ORDER — ACETAMINOPHEN 325 MG/1
650 TABLET ORAL EVERY 6 HOURS PRN
Status: DISCONTINUED | OUTPATIENT
Start: 2019-05-03 | End: 2019-05-03 | Stop reason: SDUPTHER

## 2019-05-03 RX ORDER — SODIUM CHLORIDE 0.9 % (FLUSH) 0.9 %
10 SYRINGE (ML) INJECTION EVERY 12 HOURS SCHEDULED
Status: DISCONTINUED | OUTPATIENT
Start: 2019-05-03 | End: 2019-05-09 | Stop reason: HOSPADM

## 2019-05-03 RX ORDER — CITALOPRAM 20 MG/1
20 TABLET ORAL DAILY
Status: DISCONTINUED | OUTPATIENT
Start: 2019-05-04 | End: 2019-05-09 | Stop reason: HOSPADM

## 2019-05-03 RX ORDER — ONDANSETRON 2 MG/ML
INJECTION INTRAMUSCULAR; INTRAVENOUS
Status: COMPLETED
Start: 2019-05-03 | End: 2019-05-03

## 2019-05-03 RX ORDER — TRAZODONE HYDROCHLORIDE 100 MG/1
100 TABLET ORAL NIGHTLY PRN
Status: DISCONTINUED | OUTPATIENT
Start: 2019-05-03 | End: 2019-05-09 | Stop reason: HOSPADM

## 2019-05-03 RX ORDER — MORPHINE SULFATE 4 MG/ML
4 INJECTION, SOLUTION INTRAMUSCULAR; INTRAVENOUS ONCE
Status: COMPLETED | OUTPATIENT
Start: 2019-05-03 | End: 2019-05-03

## 2019-05-03 RX ORDER — PANTOPRAZOLE SODIUM 40 MG/10ML
40 INJECTION, POWDER, LYOPHILIZED, FOR SOLUTION INTRAVENOUS 2 TIMES DAILY
Status: DISCONTINUED | OUTPATIENT
Start: 2019-05-03 | End: 2019-05-03 | Stop reason: SDUPTHER

## 2019-05-03 RX ORDER — 0.9 % SODIUM CHLORIDE 0.9 %
2000 INTRAVENOUS SOLUTION INTRAVENOUS ONCE
Status: COMPLETED | OUTPATIENT
Start: 2019-05-03 | End: 2019-05-03

## 2019-05-03 RX ORDER — SODIUM CHLORIDE 450 MG/100ML
INJECTION, SOLUTION INTRAVENOUS CONTINUOUS
Status: DISCONTINUED | OUTPATIENT
Start: 2019-05-03 | End: 2019-05-04

## 2019-05-03 RX ORDER — 0.9 % SODIUM CHLORIDE 0.9 %
1000 INTRAVENOUS SOLUTION INTRAVENOUS ONCE
Status: COMPLETED | OUTPATIENT
Start: 2019-05-03 | End: 2019-05-03

## 2019-05-03 RX ORDER — ASPIRIN 81 MG/1
81 TABLET ORAL DAILY
Status: DISCONTINUED | OUTPATIENT
Start: 2019-05-04 | End: 2019-05-09 | Stop reason: HOSPADM

## 2019-05-03 RX ORDER — ACETAMINOPHEN 325 MG/1
650 TABLET ORAL EVERY 4 HOURS PRN
Status: DISCONTINUED | OUTPATIENT
Start: 2019-05-03 | End: 2019-05-09 | Stop reason: HOSPADM

## 2019-05-03 RX ORDER — MORPHINE SULFATE 4 MG/ML
INJECTION, SOLUTION INTRAMUSCULAR; INTRAVENOUS
Status: COMPLETED
Start: 2019-05-03 | End: 2019-05-03

## 2019-05-03 RX ORDER — DEXTROSE AND SODIUM CHLORIDE 5; .45 G/100ML; G/100ML
INJECTION, SOLUTION INTRAVENOUS CONTINUOUS PRN
Status: DISCONTINUED | OUTPATIENT
Start: 2019-05-03 | End: 2019-05-04

## 2019-05-03 RX ADMIN — ONDANSETRON 4 MG: 2 INJECTION INTRAMUSCULAR; INTRAVENOUS at 16:50

## 2019-05-03 RX ADMIN — Medication 10 ML: at 20:11

## 2019-05-03 RX ADMIN — ENOXAPARIN SODIUM 40 MG: 40 INJECTION SUBCUTANEOUS at 23:13

## 2019-05-03 RX ADMIN — DICYCLOMINE HYDROCHLORIDE 20 MG: 20 INJECTION, SOLUTION INTRAMUSCULAR at 12:42

## 2019-05-03 RX ADMIN — SODIUM CHLORIDE: 4.5 INJECTION, SOLUTION INTRAVENOUS at 18:10

## 2019-05-03 RX ADMIN — KETOROLAC TROMETHAMINE 30 MG: 30 INJECTION, SOLUTION INTRAMUSCULAR at 20:09

## 2019-05-03 RX ADMIN — MORPHINE SULFATE 4 MG: 4 INJECTION, SOLUTION INTRAMUSCULAR; INTRAVENOUS at 16:49

## 2019-05-03 RX ADMIN — Medication 5.8 UNITS/HR: at 17:27

## 2019-05-03 RX ADMIN — SODIUM CHLORIDE 1000 ML: 9 INJECTION, SOLUTION INTRAVENOUS at 12:39

## 2019-05-03 RX ADMIN — ONDANSETRON 4 MG: 2 INJECTION INTRAMUSCULAR; INTRAVENOUS at 12:39

## 2019-05-03 RX ADMIN — Medication 2 PUFF: at 20:54

## 2019-05-03 RX ADMIN — TRAZODONE HYDROCHLORIDE 100 MG: 100 TABLET ORAL at 23:13

## 2019-05-03 RX ADMIN — DEXTROSE AND SODIUM CHLORIDE: 5; 450 INJECTION, SOLUTION INTRAVENOUS at 21:08

## 2019-05-03 RX ADMIN — SODIUM CHLORIDE 2000 ML: 9 INJECTION, SOLUTION INTRAVENOUS at 15:37

## 2019-05-03 RX ADMIN — MORPHINE SULFATE 4 MG: 4 INJECTION INTRAVENOUS at 16:49

## 2019-05-03 RX ADMIN — ONDANSETRON 4 MG: 2 INJECTION INTRAMUSCULAR; INTRAVENOUS at 23:12

## 2019-05-03 RX ADMIN — PANTOPRAZOLE SODIUM 40 MG: 40 INJECTION, POWDER, FOR SOLUTION INTRAVENOUS at 20:09

## 2019-05-03 ASSESSMENT — PAIN SCALES - GENERAL
PAINLEVEL_OUTOF10: 0
PAINLEVEL_OUTOF10: 10
PAINLEVEL_OUTOF10: 7
PAINLEVEL_OUTOF10: 9
PAINLEVEL_OUTOF10: 9
PAINLEVEL_OUTOF10: 8
PAINLEVEL_OUTOF10: 8
PAINLEVEL_OUTOF10: 9
PAINLEVEL_OUTOF10: 8
PAINLEVEL_OUTOF10: 8

## 2019-05-03 ASSESSMENT — ENCOUNTER SYMPTOMS
COUGH: 0
VOMITING: 1
WHEEZING: 0
BACK PAIN: 1
SORE THROAT: 0
SINUS PRESSURE: 0
SHORTNESS OF BREATH: 0
EYE DISCHARGE: 0
ABDOMINAL PAIN: 1
NAUSEA: 1
BACK PAIN: 0
CHEST TIGHTNESS: 0
SINUS PAIN: 0
EYE ITCHING: 0
DIARRHEA: 0
RHINORRHEA: 0
EYE REDNESS: 0
EYE PAIN: 0

## 2019-05-03 ASSESSMENT — PAIN DESCRIPTION - PAIN TYPE
TYPE: ACUTE PAIN

## 2019-05-03 ASSESSMENT — PAIN DESCRIPTION - LOCATION
LOCATION: ABDOMEN

## 2019-05-03 ASSESSMENT — PAIN DESCRIPTION - FREQUENCY: FREQUENCY: CONTINUOUS

## 2019-05-03 ASSESSMENT — PAIN DESCRIPTION - DESCRIPTORS
DESCRIPTORS: ACHING
DESCRIPTORS: ACHING

## 2019-05-03 ASSESSMENT — PAIN DESCRIPTION - ORIENTATION: ORIENTATION: RIGHT;LEFT;LOWER;MID;UPPER

## 2019-05-03 NOTE — ED NOTES
Pt resting on cot. Pt complaining of nausea at this time.      Juan Luis Prajapati RN  05/03/19 [id number]

## 2019-05-03 NOTE — ED NOTES
Pt resting on cot. Pt updated on plan of care and status of testing.      Hermelinda Denise, RN  05/03/19 [id number]

## 2019-05-03 NOTE — H&P
History & Physical        Patient:  Cody Nails  YOB: 1985    MRN: [id number]     Acct: [de-identified]    PCP: BARB Webber CNP    Date of Admission: 5/3/2019    Date of Service: Pt seen/examined on 5/3/2019 and Admitted to Inpatientwith expected LOS greater than two midnights due to medical therapy. Chief Complaint:  Abdominal Pain      History Of Present Illness:    26 y.o. male who presented to [address] [place] [address] with diffuse, severe abdominal pain. Pt states that he started to feel bad on Tuesday. On Wednesday, he started to be nausea and vomiting. Pt states that he has vomited too many times to count and has filled up a small trash can. Pt describes the vomit as a dark color. Pt denies vomiting in blood. Pt has not been able to take any of his PO meds and has not drank or ate since Wednesday. Pt rates the pain 5/10 now and he attributes the pain decrease to the Morphine, he was just given. Pt states that not eating is the only alleviating factor for his condition. Pt admits to headache, dizziness, fatigue and muscle aches. Of note, Pt has a history of severe alcohol abuse but now is sober and takes medication to stay sober. In the ED, the patient was found to be in DKA and have Acute Pancreatitis. Anion Gap 38. Bicarb 8. Creatinine 1.3. Lipase 1400. WBC 15. Pt was stable throughout his ED stay, pt was admitted to [facility] with DKA protocol in place. Past Medical History:          Diagnosis Date    Asthma     COPD (chronic obstructive pulmonary disease) ([facility] [address]     Eczema     History of alcohol abuse     History of marijuana use     History of tobacco abuse     quit 11/21/2017    Hx of seasonal allergies     Pancreatitis     Type 2 diabetes mellitus without complication ([facility] [address] 99/39/1924       PastSurgical History:      History reviewed. No pertinent surgical history.     Medications Prior to Admission:      Prior to Admission medications    Medication Sig Start Date End Date Taking? Authorizing Provider   hydrOXYzine (VISTARIL) 100 MG capsule Take 1 capsule by mouth 4 times daily as needed for Anxiety 1/11/18  Yes BARB Iglesias CNP   traZODone (DESYREL) 100 MG tablet Take 1 tablet by mouth nightly as needed for Sleep 1/11/18  Yes BARB Iglesias CNP   metFORMIN (GLUCOPHAGE) 1000 MG tablet Take 1 tablet by mouth 2 times daily (with meals) 1/11/18  Yes BARB Iglesias CNP   citalopram (CELEXA) 20 MG tablet Take 1 tablet by mouth daily 1/11/18  Yes BARB Iglesias CNP   aspirin 81 MG EC tablet Take 81 mg by mouth daily   Yes Historical Provider, MD   Multiple Vitamins-Minerals (MENS MULTIVITAMIN PLUS) TABS Take 1 tablet by mouth daily   Yes Historical Provider, MD   brompheniramine-pseudoephedrine-DM 30-2-10 MG/5ML syrup Take 5 mLs by mouth 4 times daily as needed for Congestion or Cough 10/10/18   Dillard Dubin Case, APRN - CNP   fluticasone-salmeterol (ADVAIR) 250-50 MCG/DOSE AEPB Inhale 1 puff into the lungs every 12 hours 1/11/18   BARB Iglesias CNP   albuterol sulfate HFA (PROVENTIL HFA) 108 (90 Base) MCG/ACT inhaler Inhale 2 puffs into the lungs every 6 hours as needed for Wheezing 1/11/18   BARB Iglesias CNP   TRUE METRIX BLOOD GLUCOSE TEST strip Test blood sugars three times daily. 12/22/17   BARB Seals CNP   ibuprofen (ADVIL;MOTRIN) 200 MG tablet Take 400 mg by mouth every 6 hours as needed for Pain    Historical Provider, MD   acetaminophen (TYLENOL) 500 MG tablet Take 1,000 mg by mouth every 6 hours as needed for Pain    Historical Provider, MD   ACCU-CHEK MULTICLIX LANCETS MISC 1 box by Does not apply route daily 12/9/17   Penny Saleh PA-C       Allergies:  Corn-containing products    Social History:      The patient currently lives at home    TOBACCO:   reports that he quit smoking about 17 months ago. He smoked 0.50 packs per day.  He has never used smokeless tobacco.  ETOH:   reports that he does not drink alcohol. Family History:       Reviewed in detail and negative for DM, CAD, Cancer, CVA. Positiveas follows:        Problem Relation Age of Onset    Other Mother         gestational diabetes    Other Father 39        suicide    Other Sister         hypoglycemia       Diet:  No diet orders on file    REVIEW OF SYSTEMS:   Review of Systems   Constitutional: Positive for activity change, appetite change, chills, diaphoresis and fatigue. HENT: Negative for sinus pressure, sinus pain and sneezing. Eyes: Negative for pain, redness and itching. Respiratory: Negative for cough, chest tightness and shortness of breath. Cardiovascular: Negative for chest pain, palpitations and leg swelling. Gastrointestinal: Positive for abdominal pain, nausea and vomiting. Genitourinary: Negative for difficulty urinating, flank pain and hematuria. Musculoskeletal: Positive for arthralgias, back pain and myalgias. Neurological: Positive for dizziness, light-headedness and headaches. Psychiatric/Behavioral: Negative for agitation, behavioral problems and confusion. PHYSICAL EXAM:    BP (!) 161/77   Pulse 116   Temp 97.6 °F (36.4 °C) (Oral)   Resp 20   Wt 160 lb (72.6 kg)   SpO2 100%   BMI 29.26 kg/m²     General appearance:  Acutely ill appearing white male   HEENT:  Normal cephalic, atraumatic without obvious deformity. Pupils equal, round, and reactive to light. Extra ocular muscles intact. Conjunctivae/corneas clear. Neck: Supple, with full range of motion. No jugularvenous distention. Trachea midline. Respiratory:  Normal respiratory effort on RA. Clear to auscultation,bilaterally without Rales/Wheezes/Rhonchi. Cardiovascular:  Regular rate and rhythm with normal S1/S2 withoutmurmurs, rubs or gallops. Abdomen: Soft, diffusely tender x4 quadrants   Musculoskeletal:  No clubbing, cyanosis or edema bilaterally. Full range of motion without deformity.   Skin: Skin color, texture, turgor normal.  No rashes or lesions. Neurologic:  Neurovascularly intact without any focal sensory/motor deficits. Cranial nerves: II-XII intact, grossly non-focal.  Psychiatric:  Alert and oriented, thought content appropriate, normal insight  CapillaryRefill: Brisk,< 3 seconds   Peripheral Pulses: +2palpable, equal bilaterally       Labs:     Recent Labs     05/03/19  1237   WBC 15.0*   HGB 18.4*   HCT 51.2        Recent Labs     05/03/19  1432   *   K 3.7   CL 84*   CO2 8*   BUN 39*   CREATININE 1.3*   CALCIUM 9.6     Recent Labs     05/03/19  1432   *   ALT 88*   BILITOT 1.2   ALKPHOS 132*     No results for input(s): INR in the last 72 hours. No results for input(s): Gulshanlivier Boyle in the last 72 hours. Urinalysis:      Lab Results   Component Value Date    NITRU NEGATIVE 12/18/2017    WBCUA 0-2 12/07/2017    BACTERIA NONE 12/07/2017    RBCUA 0-2 12/07/2017    BLOODU NEGATIVE 12/18/2017    SPECGRAV 1.011 10/12/2013    GLUCOSEU NEGATIVE 12/18/2017       Intake & Output:  No intake/output data recorded. No intake/output data recorded. Radiology:       CT ABDOMEN PELVIS WO CONTRAST Additional Contrast? None   Final Result      1. There is mild peripancreatic infiltration greatest around the pancreatic tail. Correlation with pancreatic enzymes is advised findings are concerning for mild pancreatitis. 2. There is a small hiatal hernia and mild thickening of the distal esophageal wall. Mild esophagitis cannot be excluded. 3. Diffuse fatty infiltration of the liver. **This report has been created using voice recognition software. It may contain minor errors which are inherent in voice recognition technology. **      Final report electronically signed by Dr. Tyrone Becerra on 5/3/2019 1:26 PM               DVTprophylaxis: [x] Lovenox  [] SCDs  [] SQ Heparin  [] Encourage ambulation    [] Already on Anticoagulation    Code Status: Prior      PT/OTEval Status: None at this time Disposition:    [x] Home       [] TCU       [] Rehab       [] Psych       [] SNF       []Long Term Care Facility       []Other-    ASSESSMENT & PLAN:    1. DKA, Type 2 2/2 Acute Pancreatitis    - Glucose 423, AG 38, CO2 8.    - Insulin Drip ordered, continue until AG closes. - BMP, Mg, Phos Q4H     - NPO     - 1/2 NS IVF @ 250 cc/hr    - Hgb A1c & Beta-Hydroxyurea pending     2. Acute Pancreatitis    - Lipase 1400    - NPO, IVF, Morphine PRN pain   - Pt states that he has had pancreatitis twice before. - Lipid panel in the AM     3. HANSEL 2/2 Dehydration    - On arrival, Creatinine 1.3    - 1/2 NS IVF @ 250 cc/hr     4. History of Alcohol Abuse    - Pt states that he use to have a severe alcohol problem but now takes medication that he cannot drink. Pt states that he has this medication with him. 5. Generalized Anxiety Disorder    - Continue home medication: Vistaril, Celexa, Trazadone     Thank you Percival Baumgarten, APRN - CNP for the opportunity to be involved in this patient's care.     Electronically signed by POP Jackson on5/3/2019 at 5:13 PM

## 2019-05-03 NOTE — ED NOTES
Unable to reach the floor, taking the patient to the floor at this time.      Ke Carrera, ZENIAN  60/97/21 [id number]

## 2019-05-03 NOTE — ED PROVIDER NOTES
[facility]  eMERGENCY dEPARTMENT eNCOUnter          [address] [facility]       Chief Complaint   Patient presents with    Emesis    Abdominal Pain       Nurses Notes reviewed and I agree except as noted in the HPI. HISTORY OF PRESENT ILLNESS    Chandler Knight is a 26 y.o. male who presents to the Emergency Department for the evaluation of nausea and vomiting onset 3 days ago. He reports abdominal pain. He describes his emesis as dark in character. He denies diarrhea or vomiting up blood. Patient is a smoker and chews tobacco. The patient denies alcohol or illicit drug use. He denies abdominal surgeries. The patient has a past medical history of asthma, COPD, alcohol abuse, pancreatitis and DM. No further complaints at the time of the initial encounter. The HPI was provided by the patient. REVIEW OF SYSTEMS     Review of Systems   Constitutional: Negative for appetite change, chills, fatigue and fever. HENT: Negative for congestion, ear pain, rhinorrhea and sore throat. Eyes: Negative for discharge, redness and visual disturbance. Respiratory: Negative for cough, shortness of breath and wheezing. Cardiovascular: Negative for chest pain, palpitations and leg swelling. Gastrointestinal: Positive for abdominal pain, nausea and vomiting. Negative for diarrhea. Genitourinary: Negative for decreased urine volume, difficulty urinating, dysuria and hematuria. Musculoskeletal: Negative for arthralgias, back pain, joint swelling and neck pain. Skin: Negative for pallor and rash. Allergic/Immunologic: Negative for environmental allergies. Neurological: Negative for dizziness, syncope, weakness, light-headedness, numbness and headaches. Hematological: Negative for adenopathy. Psychiatric/Behavioral: Negative for confusion and suicidal ideas. The patient is not nervous/anxious.         PAST MEDICAL HISTORY    has a past medical history of Asthma, COPD (chronic obstructive pulmonary disease) ([facility] [address] Eczema, GERD (gastroesophageal reflux disease), History of alcohol abuse, History of marijuana use, History of tobacco abuse, Hx of seasonal allergies, Hypertension, Pancreatitis, [facility] and Type 2 diabetes mellitus without complication ([facility] SURGICAL HISTORY    has a past surgical history that includes Upper gastrointestinal endoscopy (Left, 5/6/2019). CURRENT MEDICATIONS       Current Discharge Medication List      CONTINUE these medications which have NOT CHANGED    Details   albuterol sulfate HFA (PROVENTIL HFA) 108 (90 Base) MCG/ACT inhaler Inhale 2 puffs into the lungs every 6 hours as needed for Wheezing  Qty: 1 Inhaler, Refills: 0    Associated Diagnoses:  Moderate persistent asthma without complication      hydrOXYzine (VISTARIL) 100 MG capsule Take 1 capsule by mouth 4 times daily as needed for Anxiety  Qty: 60 capsule, Refills: 3    Associated Diagnoses: Major depressive disorder, recurrent episode, moderate with anxious distress (HCC)      traZODone (DESYREL) 100 MG tablet Take 1 tablet by mouth nightly as needed for Sleep  Qty: 30 tablet, Refills: 5    Associated Diagnoses: Major depressive disorder, recurrent episode, moderate with anxious distress (HCC)      metFORMIN (GLUCOPHAGE) 1000 MG tablet Take 1 tablet by mouth 2 times daily (with meals)  Qty: 60 tablet, Refills: 5    Associated Diagnoses: Type 2 diabetes mellitus without complication, without long-term current use of insulin (HCC)      citalopram (CELEXA) 20 MG tablet Take 1 tablet by mouth daily  Qty: 30 tablet, Refills: 5    Associated Diagnoses: Major depressive disorder, recurrent episode, moderate with anxious distress (HCC)      aspirin 81 MG EC tablet Take 81 mg by mouth daily      ibuprofen (ADVIL;MOTRIN) 200 MG tablet Take 400 mg by mouth every 6 hours as needed for Pain      acetaminophen (TYLENOL) 500 MG tablet Take 1,000 mg by mouth every 6 hours as needed for Pain      Multiple Vitamins-Minerals (MENS MULTIVITAMIN PLUS) TABS Take 1 tablet by mouth daily      brompheniramine-pseudoephedrine-DM 30-2-10 MG/5ML syrup Take 5 mLs by mouth 4 times daily as needed for Congestion or Cough  Qty: 118 mL, Refills: 0      fluticasone-salmeterol (ADVAIR) 250-50 MCG/DOSE AEPB Inhale 1 puff into the lungs every 12 hours  Qty: 60 each, Refills: 5    Associated Diagnoses: Moderate persistent asthma without complication      TRUE METRIX BLOOD GLUCOSE TEST strip Test blood sugars three times daily. Qty: 100 each, Refills: 0      ACCU-CHEK MULTICLIX LANCETS MISC 1 box by Does not apply route daily  Qty: 100 each, Refills: 0             ALLERGIES     is allergic to corn-containing products. FAMILY HISTORY     indicated that his mother is alive. He indicated that his father is . He indicated that the status of his sister is unknown.   family history includes Other in his mother and sister; Other (age of onset: 39) in his father. SOCIAL HISTORY      reports that he has quit smoking. His smoking use included cigarettes. He has a 11.00 pack-year smoking history. He has never used smokeless tobacco. He reports that he drank alcohol. He reports that he does not use drugs. PHYSICAL EXAM     INITIAL VITALS:  height is 5' 2\" (1.575 m) and weight is 174 lb 1.6 oz (79 kg). His oral temperature is 97.6 °F (36.4 °C). His blood pressure is 127/94 (abnormal) and his pulse is 91. His respiration is 16 and oxygen saturation is 98%. Physical Exam   Constitutional: He is oriented to person, place, and time. He appears well-developed and well-nourished. Non-toxic appearance. HENT:   Head: Normocephalic and atraumatic.    Right Ear: Tympanic membrane and external ear normal.   Left Ear: Tympanic membrane and external ear normal.   Nose: Nose normal.   Mouth/Throat: Oropharynx is clear and moist and mucous membranes are normal. No oropharyngeal exudate, posterior oropharyngeal edema or posterior oropharyngeal erythema. Dark material on tongue. Eyes: Conjunctivae and EOM are normal.   Neck: Normal range of motion. Neck supple. No JVD present. Cardiovascular: Normal rate, regular rhythm, normal heart sounds, intact distal pulses and normal pulses. Exam reveals no gallop and no friction rub. No murmur heard. Pulmonary/Chest: Effort normal and breath sounds normal. No respiratory distress. He has no decreased breath sounds. He has no wheezes. He has no rhonchi. He has no rales. Abdominal: Soft. He exhibits no distension. Bowel sounds are increased. There is no tenderness. There is no rebound, no guarding and no CVA tenderness. Diffuse abdominal pain. Musculoskeletal: Normal range of motion. He exhibits no edema. Neurological: He is alert and oriented to person, place, and time. He exhibits normal muscle tone. Coordination normal.   Skin: Skin is warm and dry. No rash noted. He is not diaphoretic. Nursing note and vitals reviewed. DIFFERENTIAL DIAGNOSIS:   Bowel obstruction, pancreatitis, ascending cholangitis, enteritis       DIAGNOSTIC RESULTS     EKG: All EKG's are interpreted by the Emergency Department Physician who either signs or Co-signs this chart in the absence of a cardiologist.  EKG interpreted by Eleanor Huynh DO:    None    RADIOLOGY: non-plain film images(s) such as CT, Ultrasound and MRI are read by the radiologist.    Jeyson Downs   Final Result       1. Echogenic liver suggesting fatty infiltration. 2. No gallstones. **This report has been created using voice recognition software. It may contain minor errors which are inherent in voice recognition technology. **      Final report electronically signed by Dr. Aramis Rivera on 5/4/2019 10:03 AM      CT ABDOMEN PELVIS WO CONTRAST Additional Contrast? None   Final Result      1. There is mild peripancreatic infiltration greatest around the pancreatic tail.  Correlation with pancreatic enzymes is advised findings are concerning for mild pancreatitis. 2. There is a small hiatal hernia and mild thickening of the distal esophageal wall. Mild esophagitis cannot be excluded. 3. Diffuse fatty infiltration of the liver. **This report has been created using voice recognition software. It may contain minor errors which are inherent in voice recognition technology. **      Final report electronically signed by Dr. Chad Olivera on 5/3/2019 1:26 PM           LABS:   Labs Reviewed   CBC WITH AUTO DIFFERENTIAL - Abnormal; Notable for the following components:       Result Value    WBC 15.0 (*)     Hemoglobin 18.4 (*)     MCHC 35.9 (*)     Segs Absolute 12.7 (*)     Lymphocytes # 0.4 (*)     Monocytes # 1.7 (*)     Immature Grans (Abs) 0.15 (*)     All other components within normal limits   COMPREHENSIVE METABOLIC PANEL W/ REFLEX TO MG FOR LOW K - Abnormal; Notable for the following components:    Glucose 423 (*)     CREATININE 1.3 (*)     BUN 39 (*)     Sodium 130 (*)     Chloride 84 (*)     CO2 8 (*)      (*)     Alkaline Phosphatase 132 (*)     ALT 88 (*)     All other components within normal limits   LIPASE - Abnormal; Notable for the following components:    Lipase 1,418.4 (*)     All other components within normal limits   SALICYLATE LEVEL - Abnormal; Notable for the following components:    Salicylate, Serum < 0.3 (*)     All other components within normal limits   ANION GAP - Abnormal; Notable for the following components:    Anion Gap 38.0 (*)     All other components within normal limits   GLOMERULAR FILTRATION RATE, ESTIMATED - Abnormal; Notable for the following components:    Est, Glom Filt Rate 63 (*)     All other components within normal limits   BLOOD GAS, VENOUS - Abnormal; Notable for the following components:    PH MIXED 7.28 (*)     PCO2, MIXED VENOUS 16 (*)     PO2, Mixed 49 (*)     HCO3, Mixed 8 (*)     Base Exc, Mixed -15.5 (*)     All other components within normal limits BETA-HYDROXYBUTYRATE - Abnormal; Notable for the following components:    Beta-Hydroxybutyrate 129.61 (*)     All other components within normal limits   HEMOGLOBIN A1C - Abnormal; Notable for the following components:    Hemoglobin A1C 9.3 (*)     AVERAGE GLUCOSE 219 (*)     All other components within normal limits   BASIC METABOLIC PANEL - Abnormal; Notable for the following components:    CO2 14 (*)     Glucose 183 (*)     BUN 32 (*)     All other components within normal limits   BASIC METABOLIC PANEL - Abnormal; Notable for the following components:    Potassium 3.4 (*)     CO2 16 (*)     Glucose 137 (*)     BUN 28 (*)     Calcium 8.2 (*)     All other components within normal limits   MAGNESIUM - Abnormal; Notable for the following components:    Magnesium 2.6 (*)     All other components within normal limits   PHOSPHORUS - Abnormal; Notable for the following components:    Phosphorus 1.6 (*)     All other components within normal limits   PHOSPHORUS - Abnormal; Notable for the following components:    Phosphorus 1.9 (*)     All other components within normal limits   BLOOD GAS, ARTERIAL - Abnormal; Notable for the following components:    PCO2 23 (*)     HCO3 15 (*)     Base Excess (Calculated) -8.0 (*)     All other components within normal limits   CBC WITH AUTO DIFFERENTIAL - Abnormal; Notable for the following components:    RBC 4.68 (*)     Hematocrit 41.9 (*)     MCHC 35.6 (*)     Platelets 90 (*)     Lymphocytes # 0.6 (*)     All other components within normal limits   HEPATIC FUNCTION PANEL - Abnormal; Notable for the following components:    Bilirubin, Direct 0.4 (*)     AST 93 (*)     ALT 69 (*)     All other components within normal limits   BASIC METABOLIC PANEL - Abnormal; Notable for the following components:    Sodium 133 (*)     CO2 15 (*)     Glucose 139 (*)     BUN 24 (*)     Calcium 8.2 (*)     All other components within normal limits   BETA-HYDROXYBUTYRATE - Abnormal; Notable for the following components:    Beta-Hydroxybutyrate 55.36 (*)     All other components within normal limits   ANION GAP - Abnormal; Notable for the following components:    Anion Gap 23.0 (*)     All other components within normal limits   BASIC METABOLIC PANEL - Abnormal; Notable for the following components:    Sodium 132 (*)     CO2 13 (*)     Glucose 174 (*)     BUN 23 (*)     Calcium 8.2 (*)     All other components within normal limits   BASIC METABOLIC PANEL - Abnormal; Notable for the following components:    Sodium 133 (*)     Potassium 3.3 (*)     CO2 14 (*)     Glucose 186 (*)     BUN 29 (*)     Calcium 8.3 (*)     All other components within normal limits   MAGNESIUM - Abnormal; Notable for the following components:    Magnesium 2.5 (*)     All other components within normal limits   PHOSPHORUS - Abnormal; Notable for the following components:    Phosphorus 1.0 (*)     All other components within normal limits   ANION GAP - Abnormal; Notable for the following components:    Anion Gap 20.0 (*)     All other components within normal limits   BASIC METABOLIC PANEL - Abnormal; Notable for the following components:    Sodium 133 (*)     Potassium 2.9 (*)     CO2 15 (*)     Glucose 111 (*)     Calcium 8.0 (*)     All other components within normal limits   ANION GAP - Abnormal; Notable for the following components:    Anion Gap 19.0 (*)     All other components within normal limits   LIPASE - Abnormal; Notable for the following components:    Lipase 343.1 (*)     All other components within normal limits   BASIC METABOLIC PANEL - Abnormal; Notable for the following components:    Sodium 133 (*)     CO2 14 (*)     Glucose 141 (*)     BUN 24 (*)     Calcium 8.0 (*)     All other components within normal limits   BASIC METABOLIC PANEL - Abnormal; Notable for the following components:    Sodium 133 (*)     Potassium 2.9 (*)     CO2 17 (*)     Glucose 124 (*)     Calcium 8.4 (*)     All other components within normal limits   ANION GAP - Abnormal; Notable for the following components:    Anion Gap 17.0 (*)     All other components within normal limits   ANION GAP - Abnormal; Notable for the following components:    Anion Gap 18.0 (*)     All other components within normal limits   PHOSPHORUS - Abnormal; Notable for the following components:    Phosphorus 0.9 (*)     All other components within normal limits   BASIC METABOLIC PANEL - Abnormal; Notable for the following components:    Potassium 2.9 (*)     CO2 17 (*)     Glucose 120 (*)     Calcium 8.2 (*)     All other components within normal limits   ANION GAP - Abnormal; Notable for the following components:    Anion Gap 19.0 (*)     All other components within normal limits   BASIC METABOLIC PANEL - Abnormal; Notable for the following components:    Sodium 133 (*)     Potassium 3.3 (*)     CO2 18 (*)     Glucose 147 (*)     Calcium 8.0 (*)     All other components within normal limits   ANION GAP - Abnormal; Notable for the following components:    Anion Gap 17.0 (*)     All other components within normal limits   LIPASE - Abnormal; Notable for the following components:    Lipase 182.9 (*)     All other components within normal limits   HEPATIC FUNCTION PANEL - Abnormal; Notable for the following components:    Alb 2.9 (*)     AST 61 (*)     Total Protein 5.3 (*)     All other components within normal limits   BASIC METABOLIC PANEL - Abnormal; Notable for the following components:    CO2 15 (*)     Glucose 158 (*)     Calcium 8.2 (*)     All other components within normal limits   BASIC METABOLIC PANEL - Abnormal; Notable for the following components:    CO2 16 (*)     Glucose 162 (*)     Calcium 8.2 (*)     All other components within normal limits   BASIC METABOLIC PANEL - Abnormal; Notable for the following components:    CO2 16 (*)     Glucose 166 (*)     All other components within normal limits   PHOSPHORUS - Abnormal; Notable for the following components:    Phosphorus 2.0 (*)     All other components within normal limits   ANION GAP - Abnormal; Notable for the following components:    Anion Gap 17.0 (*)     All other components within normal limits   ANION GAP - Abnormal; Notable for the following components:    Anion Gap 18.0 (*)     All other components within normal limits   LIPASE - Abnormal; Notable for the following components:    Lipase 293.1 (*)     All other components within normal limits   BASIC METABOLIC PANEL - Abnormal; Notable for the following components:    CO2 19 (*)     Glucose 144 (*)     All other components within normal limits   BASIC METABOLIC PANEL - Abnormal; Notable for the following components:    CO2 16 (*)     Glucose 182 (*)     All other components within normal limits   ANION GAP - Abnormal; Notable for the following components:    Anion Gap 19.0 (*)     All other components within normal limits   BASIC METABOLIC PANEL - Abnormal; Notable for the following components:    CO2 21 (*)     Glucose 157 (*)     All other components within normal limits   CBC - Abnormal; Notable for the following components:    RBC 4.53 (*)     Hematocrit 41.1 (*)     Platelets 297 (*)     All other components within normal limits   LIPASE - Abnormal; Notable for the following components:    Lipase 173.6 (*)     All other components within normal limits   BASIC METABOLIC PANEL - Abnormal; Notable for the following components:    CO2 21 (*)     All other components within normal limits   CBC - Abnormal; Notable for the following components:    RBC 4.57 (*)     All other components within normal limits   POCT GLUCOSE - Abnormal; Notable for the following components:    POC Glucose 351 (*)     All other components within normal limits   POCT GLUCOSE - Abnormal; Notable for the following components:    POC Glucose 331 (*)     All other components within normal limits   POCT GLUCOSE - Abnormal; Notable for the following components:    POC Glucose 218 (*)     All other components within normal limits   POCT GLUCOSE - Abnormal; Notable for the following components:    POC Glucose 180 (*)     All other components within normal limits   POCT GLUCOSE - Abnormal; Notable for the following components:    POC Glucose 198 (*)     All other components within normal limits   POCT GLUCOSE - Abnormal; Notable for the following components:    POC Glucose 234 (*)     All other components within normal limits   POCT GLUCOSE - Abnormal; Notable for the following components:    POC Glucose 214 (*)     All other components within normal limits   POCT GLUCOSE - Abnormal; Notable for the following components:    POC Glucose 183 (*)     All other components within normal limits   POCT GLUCOSE - Abnormal; Notable for the following components:    POC Glucose 159 (*)     All other components within normal limits   POCT GLUCOSE - Abnormal; Notable for the following components:    POC Glucose 147 (*)     All other components within normal limits   POCT GLUCOSE - Abnormal; Notable for the following components:    POC Glucose 112 (*)     All other components within normal limits   POCT GLUCOSE - Abnormal; Notable for the following components:    POC Glucose 127 (*)     All other components within normal limits   POCT GLUCOSE - Abnormal; Notable for the following components:    POC Glucose 155 (*)     All other components within normal limits   POCT GLUCOSE - Abnormal; Notable for the following components:    POC Glucose 137 (*)     All other components within normal limits   POCT GLUCOSE - Abnormal; Notable for the following components:    POC Glucose 135 (*)     All other components within normal limits   POCT GLUCOSE - Abnormal; Notable for the following components:    POC Glucose 136 (*)     All other components within normal limits   POCT GLUCOSE - Abnormal; Notable for the following components:    POC Glucose 163 (*)     All other components within normal limits   POCT GLUCOSE - Abnormal; Notable for the following components:    POC Glucose 163 (*)     All other components within normal limits   POCT GLUCOSE - Abnormal; Notable for the following components:    POC Glucose 195 (*)     All other components within normal limits   POCT GLUCOSE - Abnormal; Notable for the following components:    POC Glucose 168 (*)     All other components within normal limits   POCT GLUCOSE - Abnormal; Notable for the following components:    POC Glucose 109 (*)     All other components within normal limits   POCT GLUCOSE - Abnormal; Notable for the following components:    POC Glucose 128 (*)     All other components within normal limits   POCT GLUCOSE - Abnormal; Notable for the following components:    POC Glucose 121 (*)     All other components within normal limits   POCT GLUCOSE - Abnormal; Notable for the following components:    POC Glucose 122 (*)     All other components within normal limits   POCT GLUCOSE - Abnormal; Notable for the following components:    POC Glucose 135 (*)     All other components within normal limits   POCT GLUCOSE - Abnormal; Notable for the following components:    POC Glucose 156 (*)     All other components within normal limits   POCT GLUCOSE - Abnormal; Notable for the following components:    POC Glucose 146 (*)     All other components within normal limits   POCT GLUCOSE - Abnormal; Notable for the following components:    POC Glucose 153 (*)     All other components within normal limits   POCT GLUCOSE - Abnormal; Notable for the following components:    POC Glucose 138 (*)     All other components within normal limits   POCT GLUCOSE - Abnormal; Notable for the following components:    POC Glucose 187 (*)     All other components within normal limits   POCT GLUCOSE - Abnormal; Notable for the following components:    POC Glucose 150 (*)     All other components within normal limits   POCT GLUCOSE - Abnormal; Notable for the following components:    POC Glucose 162 (*)     All other components within normal limits   POCT GLUCOSE - Abnormal; Notable for the following components:    POC Glucose 152 (*)     All other components within normal limits   POCT GLUCOSE - Abnormal; Notable for the following components:    POC Glucose 125 (*)     All other components within normal limits   POCT GLUCOSE - Abnormal; Notable for the following components:    POC Glucose 122 (*)     All other components within normal limits   POCT GLUCOSE - Abnormal; Notable for the following components:    POC Glucose 136 (*)     All other components within normal limits   URINE DRUG SCREEN   SPECIMEN REJECTION   SPECIMEN REJECTION   ETHANOL   ACETAMINOPHEN LEVEL   OSMOLALITY   MAGNESIUM   LIPID PANEL   GLOMERULAR FILTRATION RATE, ESTIMATED   SPECIMEN REJECTION   GLOMERULAR FILTRATION RATE, ESTIMATED   GLOMERULAR FILTRATION RATE, ESTIMATED   BLOOD GAS, ARTERIAL   GLOMERULAR FILTRATION RATE, ESTIMATED   GLOMERULAR FILTRATION RATE, ESTIMATED   SCAN OF BLOOD SMEAR   GLOMERULAR FILTRATION RATE, ESTIMATED   GLOMERULAR FILTRATION RATE, ESTIMATED   ANION GAP   GLOMERULAR FILTRATION RATE, ESTIMATED   ANION GAP   GLOMERULAR FILTRATION RATE, ESTIMATED   ANION GAP   GLOMERULAR FILTRATION RATE, ESTIMATED   GLOMERULAR FILTRATION RATE, ESTIMATED   ANION GAP   GLOMERULAR FILTRATION RATE, ESTIMATED   GLOMERULAR FILTRATION RATE, ESTIMATED   PHOSPHORUS   GLOMERULAR FILTRATION RATE, ESTIMATED   ANION GAP   GLOMERULAR FILTRATION RATE, ESTIMATED   ANION GAP   GLOMERULAR FILTRATION RATE, ESTIMATED   PHOSPHORUS   ANION GAP   GLOMERULAR FILTRATION RATE, ESTIMATED   SURGICAL PATHOLOGY   SURGICAL PATHOLOGY   SURGICAL PATHOLOGY   POCT GLUCOSE   POCT GLUCOSE   POCT GLUCOSE   POCT GLUCOSE   POCT GLUCOSE   POCT GLUCOSE   POCT GLUCOSE   POCT GLUCOSE   POCT GLUCOSE   POCT GLUCOSE   POCT GLUCOSE   POCT GLUCOSE   POCT GLUCOSE   POCT GLUCOSE   POCT GLUCOSE   POCT GLUCOSE   POCT GLUCOSE   POCT GLUCOSE   POCT GLUCOSE   POCT GLUCOSE   POCT GLUCOSE   POCT GLUCOSE EMERGENCY DEPARTMENT COURSE:   Vitals:    Vitals:    05/06/19 1603 05/06/19 2050 05/07/19 0450 05/07/19 0854   BP: (!) 130/92 (!) 132/90 131/85 (!) 127/94   Pulse: 90 92 97 91   Resp: 18 16 14 16   Temp: 97.9 °F (36.6 °C) 98.1 °F (36.7 °C) 98.2 °F (36.8 °C) 97.6 °F (36.4 °C)   TempSrc: Oral Oral Oral Oral   SpO2: 98% 96% 100% 98%   Weight:       Height:           12:37 PM: The patient was seen and evaluated. MDM:  The patient was seen and evaluated within the department today following nausea and vomiting. Within the department, I observed the patient's vital signs to show tachycardia and hypertension. On exam, I appreciated diffuse abdominal pain, increased bowel sounds and dark material on tongue. Radiological studies revealed There is mild peripancreatic infiltration greatest around the pancreatic tail. Correlation with pancreatic enzymes is advised findings are concerning for mild pancreatitis. There is a small hiatal hernia and mild thickening of the distal esophageal wall. Mild esophagitis cannot be excluded. Diffuse fatty infiltration of the liver. Laboratory work were discussed with the patient. Within the department, the patient was treated with morphine, Zofran, IV fluids, Bentyl and insulin regular. I explained my proposed course of treatment to the patient, and they were amenable to my decision. The patient was admitted under Shahid Kearns PA-C.     CRITICAL CARE:     There was a high probability of clinically significant/life threatening deterioration in this patient's condition which required my urgent intervention. Total critical care time was 45 minutes. This excludes any time for separately reportable procedures. CONSULTS:  Shahid Kearns PA-C who accepts the patient for admission. PROCEDURES:  None     FINAL IMPRESSION      1. Idiopathic acute pancreatitis, unspecified complication status    2.  DKA, type 2, not at goal [facility])          DISPOSITION/PLAN   Admit     PATIENT

## 2019-05-03 NOTE — ED NOTES
Presents with complaints of abdominal pain and cramping for the past several days. States that he had also been vomiting and able to hold anything down. Denies any diarrhea. Does have some black/brown staining on his tongue.       Estuardo Davis RN  05/03/19 829 [address] Farrah Ansari RN  05/03/19 8524

## 2019-05-03 NOTE — ED NOTES
Dr. Myriam Schultz notified of CO2 level. Dr. Myriam Schultz at bedside.      Ke Montero RN  05/03/19 5624

## 2019-05-04 ENCOUNTER — APPOINTMENT (OUTPATIENT)
Dept: ULTRASOUND IMAGING | Age: 34
DRG: 282 | End: 2019-05-04
Payer: MEDICAID

## 2019-05-04 LAB
ALBUMIN SERPL-MCNC: 3.8 G/DL (ref 3.5–5.1)
ALLEN TEST: POSITIVE
ALP BLD-CCNC: 96 U/L (ref 38–126)
ALT SERPL-CCNC: 69 U/L (ref 11–66)
ANION GAP SERPL CALCULATED.3IONS-SCNC: 15 MEQ/L (ref 8–16)
ANION GAP SERPL CALCULATED.3IONS-SCNC: 17 MEQ/L (ref 8–16)
ANION GAP SERPL CALCULATED.3IONS-SCNC: 17 MEQ/L (ref 8–16)
ANION GAP SERPL CALCULATED.3IONS-SCNC: 18 MEQ/L (ref 8–16)
ANION GAP SERPL CALCULATED.3IONS-SCNC: 19 MEQ/L (ref 8–16)
ANION GAP SERPL CALCULATED.3IONS-SCNC: 19 MEQ/L (ref 8–16)
ANION GAP SERPL CALCULATED.3IONS-SCNC: 20 MEQ/L (ref 8–16)
AST SERPL-CCNC: 93 U/L (ref 5–40)
BASE EXCESS (CALCULATED): -8 MMOL/L (ref -2.5–2.5)
BASOPHILS # BLD: 0.1 %
BASOPHILS ABSOLUTE: 0 THOU/MM3 (ref 0–0.1)
BILIRUB SERPL-MCNC: 0.8 MG/DL (ref 0.3–1.2)
BILIRUBIN DIRECT: 0.4 MG/DL (ref 0–0.3)
BUN BLDV-MCNC: 15 MG/DL (ref 7–22)
BUN BLDV-MCNC: 18 MG/DL (ref 7–22)
BUN BLDV-MCNC: 23 MG/DL (ref 7–22)
BUN BLDV-MCNC: 24 MG/DL (ref 7–22)
BUN BLDV-MCNC: 24 MG/DL (ref 7–22)
BUN BLDV-MCNC: 28 MG/DL (ref 7–22)
BUN BLDV-MCNC: 29 MG/DL (ref 7–22)
CALCIUM SERPL-MCNC: 8 MG/DL (ref 8.5–10.5)
CALCIUM SERPL-MCNC: 8 MG/DL (ref 8.5–10.5)
CALCIUM SERPL-MCNC: 8.2 MG/DL (ref 8.5–10.5)
CALCIUM SERPL-MCNC: 8.3 MG/DL (ref 8.5–10.5)
CALCIUM SERPL-MCNC: 8.4 MG/DL (ref 8.5–10.5)
CHLORIDE BLD-SCNC: 100 MEQ/L (ref 98–111)
CHLORIDE BLD-SCNC: 101 MEQ/L (ref 98–111)
CHLORIDE BLD-SCNC: 103 MEQ/L (ref 98–111)
CHLORIDE BLD-SCNC: 99 MEQ/L (ref 98–111)
CHOLESTEROL, TOTAL: 182 MG/DL (ref 100–199)
CO2: 13 MEQ/L (ref 23–33)
CO2: 14 MEQ/L (ref 23–33)
CO2: 14 MEQ/L (ref 23–33)
CO2: 15 MEQ/L (ref 23–33)
CO2: 15 MEQ/L (ref 23–33)
CO2: 16 MEQ/L (ref 23–33)
CO2: 17 MEQ/L (ref 23–33)
COLLECTED BY:: ABNORMAL
CREAT SERPL-MCNC: 0.4 MG/DL (ref 0.4–1.2)
CREAT SERPL-MCNC: 0.5 MG/DL (ref 0.4–1.2)
CREAT SERPL-MCNC: 0.5 MG/DL (ref 0.4–1.2)
CREAT SERPL-MCNC: 0.6 MG/DL (ref 0.4–1.2)
CREAT SERPL-MCNC: 0.6 MG/DL (ref 0.4–1.2)
CREAT SERPL-MCNC: 0.7 MG/DL (ref 0.4–1.2)
CREAT SERPL-MCNC: 0.7 MG/DL (ref 0.4–1.2)
DEVICE: ABNORMAL
EOSINOPHIL # BLD: 0.1 %
EOSINOPHILS ABSOLUTE: 0 THOU/MM3 (ref 0–0.4)
ERYTHROCYTE [DISTWIDTH] IN BLOOD BY AUTOMATED COUNT: 13.1 % (ref 11.5–14.5)
ERYTHROCYTE [DISTWIDTH] IN BLOOD BY AUTOMATED COUNT: 42.5 FL (ref 35–45)
GFR SERPL CREATININE-BSD FRML MDRD: > 90 ML/MIN/1.73M2
GLUCOSE BLD-MCNC: 109 MG/DL (ref 70–108)
GLUCOSE BLD-MCNC: 111 MG/DL (ref 70–108)
GLUCOSE BLD-MCNC: 112 MG/DL (ref 70–108)
GLUCOSE BLD-MCNC: 121 MG/DL (ref 70–108)
GLUCOSE BLD-MCNC: 122 MG/DL (ref 70–108)
GLUCOSE BLD-MCNC: 124 MG/DL (ref 70–108)
GLUCOSE BLD-MCNC: 127 MG/DL (ref 70–108)
GLUCOSE BLD-MCNC: 128 MG/DL (ref 70–108)
GLUCOSE BLD-MCNC: 135 MG/DL (ref 70–108)
GLUCOSE BLD-MCNC: 135 MG/DL (ref 70–108)
GLUCOSE BLD-MCNC: 136 MG/DL (ref 70–108)
GLUCOSE BLD-MCNC: 137 MG/DL (ref 70–108)
GLUCOSE BLD-MCNC: 137 MG/DL (ref 70–108)
GLUCOSE BLD-MCNC: 139 MG/DL (ref 70–108)
GLUCOSE BLD-MCNC: 141 MG/DL (ref 70–108)
GLUCOSE BLD-MCNC: 147 MG/DL (ref 70–108)
GLUCOSE BLD-MCNC: 155 MG/DL (ref 70–108)
GLUCOSE BLD-MCNC: 156 MG/DL (ref 70–108)
GLUCOSE BLD-MCNC: 159 MG/DL (ref 70–108)
GLUCOSE BLD-MCNC: 163 MG/DL (ref 70–108)
GLUCOSE BLD-MCNC: 163 MG/DL (ref 70–108)
GLUCOSE BLD-MCNC: 168 MG/DL (ref 70–108)
GLUCOSE BLD-MCNC: 174 MG/DL (ref 70–108)
GLUCOSE BLD-MCNC: 183 MG/DL (ref 70–108)
GLUCOSE BLD-MCNC: 186 MG/DL (ref 70–108)
GLUCOSE BLD-MCNC: 195 MG/DL (ref 70–108)
GLUCOSE BLD-MCNC: 214 MG/DL (ref 70–108)
HCO3: 15 MMOL/L (ref 23–28)
HCT VFR BLD CALC: 41.9 % (ref 42–52)
HDLC SERPL-MCNC: 84 MG/DL
HEMOGLOBIN: 14.9 GM/DL (ref 14–18)
IMMATURE GRANS (ABS): 0.04 THOU/MM3 (ref 0–0.07)
IMMATURE GRANULOCYTES: 0.6 %
LDL CHOLESTEROL CALCULATED: 72 MG/DL
LIPASE: 343.1 U/L (ref 5.6–51.3)
LYMPHOCYTES # BLD: 8.6 %
LYMPHOCYTES ABSOLUTE: 0.6 THOU/MM3 (ref 1–4.8)
MAGNESIUM: 2.1 MG/DL (ref 1.6–2.4)
MAGNESIUM: 2.5 MG/DL (ref 1.6–2.4)
MCH RBC QN AUTO: 31.8 PG (ref 26–33)
MCHC RBC AUTO-ENTMCNC: 35.6 GM/DL (ref 32.2–35.5)
MCV RBC AUTO: 89.5 FL (ref 80–94)
MONOCYTES # BLD: 8.9 %
MONOCYTES ABSOLUTE: 0.6 THOU/MM3 (ref 0.4–1.3)
NUCLEATED RED BLOOD CELLS: 0 /100 WBC
O2 SATURATION: 98 %
PCO2: 23 MMHG (ref 35–45)
PH BLOOD GAS: 7.4 (ref 7.35–7.45)
PHOSPHORUS: 0.9 MG/DL (ref 2.4–4.7)
PHOSPHORUS: 1 MG/DL (ref 2.4–4.7)
PHOSPHORUS: 1.9 MG/DL (ref 2.4–4.7)
PLATELET # BLD: 90 THOU/MM3 (ref 130–400)
PLATELET ESTIMATE: ABNORMAL
PMV BLD AUTO: 10.4 FL (ref 9.4–12.4)
PO2: 94 MMHG (ref 71–104)
POTASSIUM SERPL-SCNC: 2.9 MEQ/L (ref 3.5–5.2)
POTASSIUM SERPL-SCNC: 2.9 MEQ/L (ref 3.5–5.2)
POTASSIUM SERPL-SCNC: 3.3 MEQ/L (ref 3.5–5.2)
POTASSIUM SERPL-SCNC: 3.4 MEQ/L (ref 3.5–5.2)
POTASSIUM SERPL-SCNC: 3.5 MEQ/L (ref 3.5–5.2)
POTASSIUM SERPL-SCNC: 3.7 MEQ/L (ref 3.5–5.2)
POTASSIUM SERPL-SCNC: 3.7 MEQ/L (ref 3.5–5.2)
RBC # BLD: 4.68 MILL/MM3 (ref 4.7–6.1)
REASON FOR REJECTION: NORMAL
REJECTED TEST: NORMAL
SCAN OF BLOOD SMEAR: NORMAL
SEG NEUTROPHILS: 81.7 %
SEGMENTED NEUTROPHILS ABSOLUTE COUNT: 5.6 THOU/MM3 (ref 1.8–7.7)
SODIUM BLD-SCNC: 132 MEQ/L (ref 135–145)
SODIUM BLD-SCNC: 133 MEQ/L (ref 135–145)
SODIUM BLD-SCNC: 138 MEQ/L (ref 135–145)
SOURCE, BLOOD GAS: ABNORMAL
TOTAL PROTEIN: 6.4 G/DL (ref 6.1–8)
TRIGL SERPL-MCNC: 128 MG/DL (ref 0–199)
WBC # BLD: 6.8 THOU/MM3 (ref 4.8–10.8)

## 2019-05-04 PROCEDURE — 36600 WITHDRAWAL OF ARTERIAL BLOOD: CPT

## 2019-05-04 PROCEDURE — 80061 LIPID PANEL: CPT

## 2019-05-04 PROCEDURE — 2140000000 HC CCU INTERMEDIATE R&B

## 2019-05-04 PROCEDURE — 82248 BILIRUBIN DIRECT: CPT

## 2019-05-04 PROCEDURE — 80048 BASIC METABOLIC PNL TOTAL CA: CPT

## 2019-05-04 PROCEDURE — 6360000002 HC RX W HCPCS: Performed by: PHYSICIAN ASSISTANT

## 2019-05-04 PROCEDURE — 2500000003 HC RX 250 WO HCPCS: Performed by: PHYSICIAN ASSISTANT

## 2019-05-04 PROCEDURE — 99232 SBSQ HOSP IP/OBS MODERATE 35: CPT | Performed by: FAMILY MEDICINE

## 2019-05-04 PROCEDURE — 2709999900 HC NON-CHARGEABLE SUPPLY

## 2019-05-04 PROCEDURE — 84100 ASSAY OF PHOSPHORUS: CPT

## 2019-05-04 PROCEDURE — 83735 ASSAY OF MAGNESIUM: CPT

## 2019-05-04 PROCEDURE — 2500000003 HC RX 250 WO HCPCS: Performed by: FAMILY MEDICINE

## 2019-05-04 PROCEDURE — 82803 BLOOD GASES ANY COMBINATION: CPT

## 2019-05-04 PROCEDURE — 76705 ECHO EXAM OF ABDOMEN: CPT

## 2019-05-04 PROCEDURE — 2580000003 HC RX 258: Performed by: PHYSICIAN ASSISTANT

## 2019-05-04 PROCEDURE — 80053 COMPREHEN METABOLIC PANEL: CPT

## 2019-05-04 PROCEDURE — 36415 COLL VENOUS BLD VENIPUNCTURE: CPT

## 2019-05-04 PROCEDURE — 82948 REAGENT STRIP/BLOOD GLUCOSE: CPT

## 2019-05-04 PROCEDURE — 6370000000 HC RX 637 (ALT 250 FOR IP): Performed by: PHYSICIAN ASSISTANT

## 2019-05-04 PROCEDURE — 2580000003 HC RX 258: Performed by: FAMILY MEDICINE

## 2019-05-04 PROCEDURE — 83690 ASSAY OF LIPASE: CPT

## 2019-05-04 PROCEDURE — C9113 INJ PANTOPRAZOLE SODIUM, VIA: HCPCS | Performed by: PHYSICIAN ASSISTANT

## 2019-05-04 PROCEDURE — 94640 AIRWAY INHALATION TREATMENT: CPT

## 2019-05-04 PROCEDURE — 85025 COMPLETE CBC W/AUTO DIFF WBC: CPT

## 2019-05-04 RX ORDER — POTASSIUM CHLORIDE 7.45 MG/ML
10 INJECTION INTRAVENOUS
Status: DISPENSED | OUTPATIENT
Start: 2019-05-05 | End: 2019-05-05

## 2019-05-04 RX ORDER — DEXTROSE MONOHYDRATE 50 MG/ML
100 INJECTION, SOLUTION INTRAVENOUS PRN
Status: DISCONTINUED | OUTPATIENT
Start: 2019-05-04 | End: 2019-05-09 | Stop reason: HOSPADM

## 2019-05-04 RX ORDER — DEXTROSE MONOHYDRATE 25 G/50ML
12.5 INJECTION, SOLUTION INTRAVENOUS PRN
Status: DISCONTINUED | OUTPATIENT
Start: 2019-05-04 | End: 2019-05-09 | Stop reason: HOSPADM

## 2019-05-04 RX ORDER — NICOTINE POLACRILEX 4 MG
15 LOZENGE BUCCAL PRN
Status: DISCONTINUED | OUTPATIENT
Start: 2019-05-04 | End: 2019-05-04 | Stop reason: SDUPTHER

## 2019-05-04 RX ORDER — DEXTROSE MONOHYDRATE 25 G/50ML
12.5 INJECTION, SOLUTION INTRAVENOUS PRN
Status: DISCONTINUED | OUTPATIENT
Start: 2019-05-04 | End: 2019-05-04 | Stop reason: SDUPTHER

## 2019-05-04 RX ORDER — DEXTROSE AND SODIUM CHLORIDE 5; .45 G/100ML; G/100ML
INJECTION, SOLUTION INTRAVENOUS CONTINUOUS
Status: DISCONTINUED | OUTPATIENT
Start: 2019-05-04 | End: 2019-05-09 | Stop reason: HOSPADM

## 2019-05-04 RX ORDER — DEXTROSE MONOHYDRATE 50 MG/ML
100 INJECTION, SOLUTION INTRAVENOUS PRN
Status: DISCONTINUED | OUTPATIENT
Start: 2019-05-04 | End: 2019-05-04 | Stop reason: SDUPTHER

## 2019-05-04 RX ORDER — BISACODYL 10 MG
10 SUPPOSITORY, RECTAL RECTAL PRN
Status: DISCONTINUED | OUTPATIENT
Start: 2019-05-04 | End: 2019-05-09 | Stop reason: HOSPADM

## 2019-05-04 RX ORDER — POTASSIUM CHLORIDE 7.45 MG/ML
10 INJECTION INTRAVENOUS
Status: DISPENSED | OUTPATIENT
Start: 2019-05-04 | End: 2019-05-04

## 2019-05-04 RX ORDER — INSULIN GLARGINE 100 [IU]/ML
0.25 INJECTION, SOLUTION SUBCUTANEOUS NIGHTLY
Status: DISCONTINUED | OUTPATIENT
Start: 2019-05-04 | End: 2019-05-05

## 2019-05-04 RX ORDER — NICOTINE POLACRILEX 4 MG
15 LOZENGE BUCCAL PRN
Status: DISCONTINUED | OUTPATIENT
Start: 2019-05-04 | End: 2019-05-09 | Stop reason: HOSPADM

## 2019-05-04 RX ADMIN — Medication 2 PUFF: at 10:11

## 2019-05-04 RX ADMIN — ONDANSETRON 4 MG: 2 INJECTION INTRAMUSCULAR; INTRAVENOUS at 14:49

## 2019-05-04 RX ADMIN — KETOROLAC TROMETHAMINE 30 MG: 30 INJECTION, SOLUTION INTRAMUSCULAR at 21:06

## 2019-05-04 RX ADMIN — ONDANSETRON 4 MG: 2 INJECTION INTRAMUSCULAR; INTRAVENOUS at 06:51

## 2019-05-04 RX ADMIN — ENOXAPARIN SODIUM 40 MG: 40 INJECTION SUBCUTANEOUS at 21:07

## 2019-05-04 RX ADMIN — POTASSIUM CHLORIDE 10 MEQ: 7.46 INJECTION, SOLUTION INTRAVENOUS at 23:23

## 2019-05-04 RX ADMIN — DEXTROSE AND SODIUM CHLORIDE: 5; 450 INJECTION, SOLUTION INTRAVENOUS at 04:08

## 2019-05-04 RX ADMIN — Medication 10 ML: at 10:53

## 2019-05-04 RX ADMIN — ASPIRIN 81 MG: 81 TABLET, COATED ORAL at 10:52

## 2019-05-04 RX ADMIN — POTASSIUM CHLORIDE 10 MEQ: 7.46 INJECTION, SOLUTION INTRAVENOUS at 01:25

## 2019-05-04 RX ADMIN — POTASSIUM CHLORIDE 10 MEQ: 7.46 INJECTION, SOLUTION INTRAVENOUS at 00:16

## 2019-05-04 RX ADMIN — PANTOPRAZOLE SODIUM 40 MG: 40 INJECTION, POWDER, FOR SOLUTION INTRAVENOUS at 21:08

## 2019-05-04 RX ADMIN — KETOROLAC TROMETHAMINE 30 MG: 30 INJECTION, SOLUTION INTRAMUSCULAR at 06:47

## 2019-05-04 RX ADMIN — Medication 10 ML: at 21:07

## 2019-05-04 RX ADMIN — SODIUM PHOSPHATE, MONOBASIC, MONOHYDRATE 27 MMOL: 276; 142 INJECTION, SOLUTION INTRAVENOUS at 13:04

## 2019-05-04 RX ADMIN — POTASSIUM CHLORIDE 10 MEQ: 7.46 INJECTION, SOLUTION INTRAVENOUS at 06:47

## 2019-05-04 RX ADMIN — SODIUM PHOSPHATE, MONOBASIC, MONOHYDRATE 15 MMOL: 276; 142 INJECTION, SOLUTION INTRAVENOUS at 01:25

## 2019-05-04 RX ADMIN — POTASSIUM CHLORIDE 10 MEQ: 7.46 INJECTION, SOLUTION INTRAVENOUS at 03:23

## 2019-05-04 RX ADMIN — Medication 2 PUFF: at 20:52

## 2019-05-04 RX ADMIN — KETOROLAC TROMETHAMINE 30 MG: 30 INJECTION, SOLUTION INTRAMUSCULAR at 01:28

## 2019-05-04 RX ADMIN — POTASSIUM CHLORIDE 10 MEQ: 7.46 INJECTION, SOLUTION INTRAVENOUS at 08:52

## 2019-05-04 RX ADMIN — CITALOPRAM 20 MG: 20 TABLET, FILM COATED ORAL at 10:52

## 2019-05-04 RX ADMIN — PANTOPRAZOLE SODIUM 40 MG: 40 INJECTION, POWDER, FOR SOLUTION INTRAVENOUS at 10:52

## 2019-05-04 RX ADMIN — ONDANSETRON 4 MG: 2 INJECTION INTRAMUSCULAR; INTRAVENOUS at 21:06

## 2019-05-04 RX ADMIN — KETOROLAC TROMETHAMINE 30 MG: 30 INJECTION, SOLUTION INTRAMUSCULAR at 14:50

## 2019-05-04 RX ADMIN — DEXTROSE AND SODIUM CHLORIDE: 5; 450 INJECTION, SOLUTION INTRAVENOUS at 13:03

## 2019-05-04 ASSESSMENT — PAIN SCALES - GENERAL
PAINLEVEL_OUTOF10: 6
PAINLEVEL_OUTOF10: 4
PAINLEVEL_OUTOF10: 3
PAINLEVEL_OUTOF10: 6
PAINLEVEL_OUTOF10: 4

## 2019-05-04 ASSESSMENT — PAIN DESCRIPTION - FREQUENCY: FREQUENCY: CONTINUOUS

## 2019-05-04 ASSESSMENT — PAIN DESCRIPTION - DESCRIPTORS: DESCRIPTORS: ACHING;DISCOMFORT

## 2019-05-04 ASSESSMENT — PAIN DESCRIPTION - PROGRESSION: CLINICAL_PROGRESSION: NOT CHANGED

## 2019-05-04 ASSESSMENT — PAIN DESCRIPTION - ONSET: ONSET: ON-GOING

## 2019-05-04 ASSESSMENT — PAIN DESCRIPTION - ORIENTATION: ORIENTATION: RIGHT;LEFT

## 2019-05-04 ASSESSMENT — PAIN DESCRIPTION - LOCATION: LOCATION: ABDOMEN

## 2019-05-04 ASSESSMENT — PAIN DESCRIPTION - PAIN TYPE: TYPE: ACUTE PAIN

## 2019-05-04 ASSESSMENT — PAIN - FUNCTIONAL ASSESSMENT: PAIN_FUNCTIONAL_ASSESSMENT: ACTIVITIES ARE NOT PREVENTED

## 2019-05-04 NOTE — FLOWSHEET NOTE
05/04/19 0800   Neurological   Neuro (WDL) WDL   Level of Consciousness 0   Orientation Level Oriented X4   Cognition Appropriate judgement; Appropriate safety awareness; Appropriate attention/concentration; Follows commands   Language Clear   R Hand  Moderate   L Hand  Moderate   R Foot Dorsiflexion Moderate   L Foot Dorsiflexion Moderate   R Foot Plantar Flexion Moderate   patient denies having any hallucinations through the night.  Dr. Luis Fernando Hernandez updated

## 2019-05-04 NOTE — CONSULTS
[address] [place] [address]                                  CONSULTATION    PATIENT NAME: Mario Cody                   :        1985  MED REC NO:   [id number]                           ROOM:       0030  ACCOUNT NO:   [de-identified]                           ADMIT DATE: 2019  PROVIDER:     Cassandra Nieto M.D.    Karsonjuana Shelley:  2019    REASON FOR CONSULTATION:  Abnormal CT, hematemesis and pancreatitis. HISTORY OF PRESENT ILLNESS:  This is a 26-year-old pleasant gentleman,  who was found to have diabetes one year back. The patient had a seizure  episode, and at that time, he was diagnosed to have diabetes and he has  been on medications, compliance has been in question, overall control  has been poor. The patient had been admitted with DKA and significant  acidosis and is being stabilized. Blood sugar initially was 423, bicarb  was only 8. After aggressive hydration and insulin drip, the sugars are improving  and the acidosis is improving. The patient tells me that he was not  able to take his medications in view of nausea, vomiting; and yesterday,  the nausea and vomiting came first, followed by an episode of where he  vomited some black charcoal-looking stuff, but there is no more vomiting  and no more hematemesis or coffee-ground emesis. His pain is also  better after the DKA is improving. The patient tells me that he had previous uncontrolled diabetes  admission as well. The patient also tells me that he had two attacks of  pancreatitis, but it was not this bad. The patient used to drink  alcohol, but he says he has quit now. He does take medication including  Vistaril, Desyrel, Glucophage, Celexa, and albuterol. There is no new  medication started. The patient has no melena.     The patient's initial labs showed white cells around 15, which is down  to 6.8; hemoglobin initially was 18, is down to 15; hematocrit around  42, platelet count is 90. The patient had initial acidosis, which has  improved from 8 to 15. Anion gap was initially 38, and it is improving  as though. Electrolytes are showing slightly low sodium in the range of  133. Potassium is 3.7, chloride is 101. The anion gap is down to 17. Recent sugars are running in the 140 to 150 range now. The patient had  a liver panel, which is initially showing alk phos of 132, it is down to  96; ALT was 88, it is down to 69; and the lipase was around 1400, it is  down to 343. The patient had imaging study, which shows the ultrasound of the  gallbladder showing gallstones and some fatty liver. The duct stones  look dilated. There is no pericholecystic fluid. CT of the abdomen is  showing pancreatitis, more on the tail area with peripancreatic  infiltration. Also, showing some thickening in the distal esophageal  wall and some fatty liver. PAST MEDICAL HISTORY:  Positive for diabetes and also asthma, COPD,  _____ pancreatitis, SEASONAL ALLERGIES, a history of alcoholism and  marijuana; and type 2 diabetes, which is poorly controlled over the  time. PAST SURGICAL HISTORY:  Negative. MEDICATION LIST:  As above. ALLERGIES:  To CORN-CONTAINING PRODUCT. PERSONAL HISTORY:  Lives at home. Quit smoking one and a half years  back. He does not drink alcohol. Denies any illicit drug use. FAMILY HISTORY:  Positive for diabetes and hypertension. REVIEW OF SYSTEMS:  GENERAL:  No fevers, chills, or weight loss, but fatigue is positive. EYES:  No cataract or double vision. ENT:  No history of hearing loss, sinus infections. CHEST:  No shortness of breath, cough, history of asthma. CVS:  No history of hypertension, ischemic heart disease. ABDOMEN:  As above. :  Negative. MUSCULOSKELETAL:  Positive for arthritis and back pain. NEUROLOGIC:  Dizziness, lightheadedness. No migraine, but positive for  seizures.   PSYCHIATRIC:  No anxiety or depression. CANCER:  None. PHYSICAL EXAMINATION:  GENERAL:  He is alert and oriented x3. He is sitting comfortably and  not in any distress. VITAL SIGNS:  Blood pressure is 124/78, pulse rate is 95, temperature  98, respiratory rate is 20. Weight is around 173 pounds. HEENT:  Pupils are equal and reactive to light. No pallor, cyanosis, or  jaundice. NECK:  Supple. No JVD. No palpable thyroid. No cervical adenopathy. CHEST:  Good air entry bilaterally. Normal to auscultation and  percussion. No added sounds. CVS:  S1 plus, S2 plus zero. No gallop or murmur. ABDOMEN:  Diffuse tenderness, more in the upper part. No guarding,  rebound tenderness. No hepatosplenomegaly. NEUROLOGIC:  No motor or sensory deficits. Normal cranial nerves. No  cerebral dysfunction. EXTREMITIES:  No edema. Normal peripheral pulses. SKIN:  Dry, warm to touch. LYMPHATICS:  No cervical or axillary lymph node. LABORATORY DATA:  As above. ASSESSMENT AND PLAN:  1. The patient came with DKA, which seems to be, with aggressive IV  fluid and insulin, improving with improvement in acidosis and overall  clinical picture. 2.  Episode of hematemesis after retching and thickening on the CTA of  the abdomen and the esophagus, likely Yasmine-Albert tear. The patient  is to stay NPO and once the patient is stable from DKA, we will think  about endoscopy, and IV Protonix will be continued. 3.  Pancreatitis. The patient does not drink alcohol. Denies any IVDA. The medicine-induced pancreatitis is possibility. Biliary area looks  fine. We will check for autoimmune markers and we will continue  aggressive IV fluid with pain control, and once it is somewhat stable,  we will think of imaging study to rule out any occult pathology in the  pancreas. 4.  Hemoconcentration. This is better after hydration. 5.  Acidosis, which is part of DKA, and we will followup the patient  closely.   I had a lot of discussion of about different angles of GI  bleed.         Winston Landis M.D.    D: 05/04/2019 12:02:30       T: 05/04/2019 13:24:21     [email or web address]  Job#: [id number]     Doc#: [id number]    CC:

## 2019-05-04 NOTE — PROGRESS NOTES
Hospitalist Progress Note    Patient:  Marisela Moore      Unit/Bed:3B-30/030-A    YOB: 1985    MRN: [id number]       Acct: [de-identified]     PCP: BARB Tucker CNP    Date of Admission: 5/3/2019    Chief Complaint:   Chief Complaint   Patient presents with    Emesis    Abdominal Pain         Hospital Course:     Please see H/P for details. In summary, this is a  26 y.o. male, with PMH DM type 2, COPD, hx of ETOH use (pt states his last drink was 1 year ago after he had DUI and was placed on monitoring), smoker, who  presented to [facility] with diffuse, severe abdominal pain. Pt states that he started to have abd pain 3 days prior admission, then 2 days PTA, he started having nausea and vomiting. Pt states that he has vomited too many times to count and has filled up a small trash can. Pt describes the vomit as black color. Pt denies vomiting in blood. Pt has not been able to take any of his PO meds and has not drank or ate since Wednesday.    In the ED, the patient was found to be in DKA and have Acute Pancreatitis. Anion Gap 38. Bicarb 8. Beta-hydroxybutyrate elevated. Creatinine 1.3. Lipase 1400. WBC 15. CT abd showed \"There is mild peripancreatic infiltration greatest around the pancreatic tail. Correlation with pancreatic enzymes is advised findings are concerning for mild pancreatitis. small hiatal hernia and mild thickening of the distal esophageal wall. Mild esophagitis cannot be excluded. Diffuse fatty infiltration of the liver\". Pt was stable throughout his ED stay, pt was admitted to [facility] under [facility] [place] [address] service for DKA protocol in place. RN reports overnight pt was seeing kids on his bathroom. Subjective:     Patient seen and examined. Pt states that his abd pain is getting better, located at epigastric and RUQ, intermittent, 4/10, from 10/10 on arrival. Still feels nauseous, no vomiting. Last vomiting was yesterday.  Denies fever and chills. Last BM was 4 days ago. When asked about hallucinations, pt denies visual and auditory hallucinations. Medications:  Reviewed    Infusion Medications    insulin (HUMAN R) non-weight based infusion 1.5 Units/hr (05/04/19 0748)    sodium chloride Stopped (05/03/19 2108)    dextrose 5 % and 0.45 % NaCl 150 mL/hr at 05/04/19 0408     Scheduled Medications    potassium chloride  10 mEq Intravenous every 2 hours    phosphorus replacement protocol   Other RX Placeholder    aspirin  81 mg Oral Daily    citalopram  20 mg Oral Daily    mometasone-formoterol  2 puff Inhalation BID    sodium chloride flush  10 mL Intravenous 2 times per day    enoxaparin  40 mg Subcutaneous Q24H    ketorolac  30 mg Intravenous Q6H    pantoprazole  40 mg Intravenous BID    And    sodium chloride (PF)  10 mL Intravenous BID     PRN Meds: albuterol sulfate HFA, hydrOXYzine, traZODone, insulin regular, dextrose, potassium chloride, magnesium sulfate, sodium phosphate IVPB **OR** sodium phosphate IVPB **OR** sodium phosphate IVPB, dextrose 5 % and 0.45 % NaCl, sodium chloride flush, acetaminophen, ondansetron, morphine **OR** morphine      Intake/Output Summary (Last 24 hours) at 5/4/2019 0751  Last data filed at 5/4/2019 0435  Gross per 24 hour   Intake 2286.16 ml   Output 750 ml   Net 1536.16 ml       Diet:  Diet NPO Effective Now Exceptions are: Ice Chips    Exam:  /70   Pulse 115   Temp 98.2 °F (36.8 °C) (Oral)   Resp 16   Ht 5' 2\" (1.575 m)   Wt 173 lb 6.4 oz (78.7 kg)   SpO2 99%   BMI 31.72 kg/m²     General appearance: alert, not in acute distress. HEENT: clear oral mucosa. Neck: Supple, with full range of motion. Respiratory:  Normal respiratory effort. Clear to auscultation, bilaterally without Rales/Wheezes/Rhonchi. Cardiovascular: normal rate, regular rhythm with normal S1/S2 without murmurs, rubs or gallops. Abdomen: non-distended with normal bowel sounds, soft, tender on epigastric area. Musculoskeletal: passive and active ROM x 4 extremities. Psychiatric: thought content appropriate, normal insight, denies hallucinations      Labs:   Recent Labs     05/03/19  1237 05/04/19  0509   WBC 15.0* 6.8   HGB 18.4* 14.9   HCT 51.2 41.9*    90*     Recent Labs     05/03/19  2022 05/04/19  0117 05/04/19  0509    133* 138   K 3.8 3.3* 3.4*    99 103   CO2 14* 14* 16*   BUN 32* 29* 28*   CREATININE 0.9 0.7 0.7   CALCIUM 8.6 8.3* 8.2*   PHOS 1.6* 1.0* 1.9*     Recent Labs     05/03/19  1432 05/04/19  0509   * 93*   ALT 88* 69*   BILIDIR  --  0.4*   BILITOT 1.2 0.8   ALKPHOS 132* 96     No results for input(s): INR in the last 72 hours. No results for input(s): Urbano Mylar in the last 72 hours. Urinalysis:      Lab Results   Component Value Date    NITRU NEGATIVE 12/18/2017    WBCUA 0-2 12/07/2017    BACTERIA NONE 12/07/2017    RBCUA 0-2 12/07/2017    BLOODU NEGATIVE 12/18/2017    SPECGRAV 1.011 10/12/2013    GLUCOSEU NEGATIVE 12/18/2017       Radiology:  CT ABDOMEN PELVIS WO CONTRAST Additional Contrast? None   Final Result      1. There is mild peripancreatic infiltration greatest around the pancreatic tail. Correlation with pancreatic enzymes is advised findings are concerning for mild pancreatitis. 2. There is a small hiatal hernia and mild thickening of the distal esophageal wall. Mild esophagitis cannot be excluded. 3. Diffuse fatty infiltration of the liver. **This report has been created using voice recognition software. It may contain minor errors which are inherent in voice recognition technology. **      Final report electronically signed by Dr. Destiny Barrera on 5/3/2019 1:26 PM      [address]    (Results Pending)           Assessment/Plan: This is a 26 y.o. Male      1. DKA possibly from acute pancreatitis vs ?non-compliance    -AG improving, blood sugar improving, now <200.  Will start SQ insulin and basal insulin and DC insulin gtt once AG close.   -cont insulin gtt, switch fluids to D5 1/2 NSS  -cont accu-checks, BMP q 4 hours  -NPO for now due to pancreatitis \  -A1C 9.3%, pt only takes metformin at home, discussed he will need insulin at [place]. Diabetic teaching. Discussed referral to diabetic health clinic.   -reviewed ABG ( however venous sample), low bicarb. Repeat ABG      2. Acute pancreatitis, etiology unclear yet    -pt denies recent ETOH intake  -triglyceride nl  -will check GB US to r/o gallstones   -CT abd/pelvis as stated above  -re-check lipase  -abd pain improving  -cont NPO    -pain meds prn       3. mild thickening of the distal esophageal wall, possible esophagitis    -cont PPI  -GI c/s for further recs    4. Leukocytosis, likely from problem #2, resolved       5. Mild hypokalemia    -likely related to DKA  -potassium replacement protocol  -re-check BMP as ordered    6. Hypophosphatemia     -phos replacement protocol  -phos level in am     7. Pseudohyponatremia, resolved     8. Constipation    -dulcolax supp prn  -monitor    9. \"black\" emesis     -reported by pt  -GI c/s for further eval and mx     10. Transaminases possibly from fatty liver     -check hepa panel  -RUQ US    11. Fatty liver noted on CT abd/pelvis    13. HANSEL likely pre-renal due to dehydration, resolved    -cont IVF as ordered  -monitor BMP    14. History of Alcohol Abuse     -pt states quit 1 year ago    15.  Generalized Anxiety Disorder               - Continue home medication: Vistaril, Celexa, Trazadone         Anticipated Discharge in : pending         Diet: Diet NPO Effective Now Exceptions are: Ice Chips    DVT prophylaxis: [] Lovenox                                 [] SCDs                                 [] SQ Heparin                                 [] Encourage ambulation           [] Already on Anticoagulation     Disposition:    [] Home       [] TCU       [] Rehab       [] Psych       [] SNF       [] [place]       [x] Other-Plan as above Code Status: Full Code        Electronically signed by Ashvin Suarez MD on 5/4/2019 at 7:51 AM       Addendum:    Reviewed labs, AG trended up to 19 again, and most recent accu-check 195. Cont insulin gtt and IVF as ordered. Repeat BMP q4 hours as ordered. Lipase improving. Keep NPO. Will cont to monitor. Electronically signed by Ashvin Suarez MD on 5/4/2019 at 3:03 PM     Addendum:    I received a message from [address] [place] [address] that pt is saying \"mom and sister were yelling and fighting in the hallway\". MELISSA Ragsdale, no family member(s) present in the hallway. MELISSA Dunn also reports this morning that overnight pt was seeing kids in his bathroom. When asked to pt this morning during my rounds, pt denies. Psych consult for further eval and mx. Electronically signed by Ashvin Suarez MD on 5/4/2019 at 4:14 PM     Addendum:    Reviewed recent BMP, AG closing. Cont Cont insulin gtt and IVF as ordered. Repeat BMP q4 hours as ordered. Monitor. Will start SQ insulin and basal insulin and DC insulin gtt once AG close. Electronically signed by Ashvin Suarez MD on 5/4/2019 at 5:39 PM     Addendum:    Signed off to [facility] to f/u next BMP and discuss plan for the DKA.      Electronically signed by Ashvin Suarez MD on 5/4/2019 at 7:29 PM

## 2019-05-05 LAB
ALBUMIN SERPL-MCNC: 2.9 G/DL (ref 3.5–5.1)
ALP BLD-CCNC: 83 U/L (ref 38–126)
ALT SERPL-CCNC: 52 U/L (ref 11–66)
ANION GAP SERPL CALCULATED.3IONS-SCNC: 12 MEQ/L (ref 8–16)
ANION GAP SERPL CALCULATED.3IONS-SCNC: 15 MEQ/L (ref 8–16)
ANION GAP SERPL CALCULATED.3IONS-SCNC: 16 MEQ/L (ref 8–16)
ANION GAP SERPL CALCULATED.3IONS-SCNC: 17 MEQ/L (ref 8–16)
ANION GAP SERPL CALCULATED.3IONS-SCNC: 18 MEQ/L (ref 8–16)
ANION GAP SERPL CALCULATED.3IONS-SCNC: 19 MEQ/L (ref 8–16)
AST SERPL-CCNC: 61 U/L (ref 5–40)
BILIRUB SERPL-MCNC: 0.8 MG/DL (ref 0.3–1.2)
BILIRUBIN DIRECT: 0.3 MG/DL (ref 0–0.3)
BUN BLDV-MCNC: 10 MG/DL (ref 7–22)
BUN BLDV-MCNC: 10 MG/DL (ref 7–22)
BUN BLDV-MCNC: 11 MG/DL (ref 7–22)
BUN BLDV-MCNC: 11 MG/DL (ref 7–22)
BUN BLDV-MCNC: 12 MG/DL (ref 7–22)
BUN BLDV-MCNC: 14 MG/DL (ref 7–22)
CALCIUM SERPL-MCNC: 8 MG/DL (ref 8.5–10.5)
CALCIUM SERPL-MCNC: 8.2 MG/DL (ref 8.5–10.5)
CALCIUM SERPL-MCNC: 8.6 MG/DL (ref 8.5–10.5)
CALCIUM SERPL-MCNC: 8.6 MG/DL (ref 8.5–10.5)
CHLORIDE BLD-SCNC: 102 MEQ/L (ref 98–111)
CHLORIDE BLD-SCNC: 102 MEQ/L (ref 98–111)
CHLORIDE BLD-SCNC: 103 MEQ/L (ref 98–111)
CHLORIDE BLD-SCNC: 104 MEQ/L (ref 98–111)
CO2: 15 MEQ/L (ref 23–33)
CO2: 16 MEQ/L (ref 23–33)
CO2: 17 MEQ/L (ref 23–33)
CO2: 18 MEQ/L (ref 23–33)
CREAT SERPL-MCNC: 0.4 MG/DL (ref 0.4–1.2)
CREAT SERPL-MCNC: 0.5 MG/DL (ref 0.4–1.2)
GFR SERPL CREATININE-BSD FRML MDRD: > 90 ML/MIN/1.73M2
GLUCOSE BLD-MCNC: 106 MG/DL (ref 70–108)
GLUCOSE BLD-MCNC: 120 MG/DL (ref 70–108)
GLUCOSE BLD-MCNC: 138 MG/DL (ref 70–108)
GLUCOSE BLD-MCNC: 146 MG/DL (ref 70–108)
GLUCOSE BLD-MCNC: 147 MG/DL (ref 70–108)
GLUCOSE BLD-MCNC: 150 MG/DL (ref 70–108)
GLUCOSE BLD-MCNC: 153 MG/DL (ref 70–108)
GLUCOSE BLD-MCNC: 158 MG/DL (ref 70–108)
GLUCOSE BLD-MCNC: 162 MG/DL (ref 70–108)
GLUCOSE BLD-MCNC: 162 MG/DL (ref 70–108)
GLUCOSE BLD-MCNC: 166 MG/DL (ref 70–108)
GLUCOSE BLD-MCNC: 182 MG/DL (ref 70–108)
GLUCOSE BLD-MCNC: 187 MG/DL (ref 70–108)
LIPASE: 182.9 U/L (ref 5.6–51.3)
PHOSPHORUS: 2 MG/DL (ref 2.4–4.7)
POTASSIUM SERPL-SCNC: 2.9 MEQ/L (ref 3.5–5.2)
POTASSIUM SERPL-SCNC: 3.3 MEQ/L (ref 3.5–5.2)
POTASSIUM SERPL-SCNC: 3.5 MEQ/L (ref 3.5–5.2)
POTASSIUM SERPL-SCNC: 3.7 MEQ/L (ref 3.5–5.2)
SODIUM BLD-SCNC: 133 MEQ/L (ref 135–145)
SODIUM BLD-SCNC: 136 MEQ/L (ref 135–145)
SODIUM BLD-SCNC: 137 MEQ/L (ref 135–145)
TOTAL PROTEIN: 5.3 G/DL (ref 6.1–8)

## 2019-05-05 PROCEDURE — 2709999900 HC NON-CHARGEABLE SUPPLY

## 2019-05-05 PROCEDURE — 6360000002 HC RX W HCPCS: Performed by: PHYSICIAN ASSISTANT

## 2019-05-05 PROCEDURE — 80053 COMPREHEN METABOLIC PANEL: CPT

## 2019-05-05 PROCEDURE — C9113 INJ PANTOPRAZOLE SODIUM, VIA: HCPCS | Performed by: PHYSICIAN ASSISTANT

## 2019-05-05 PROCEDURE — 80048 BASIC METABOLIC PNL TOTAL CA: CPT

## 2019-05-05 PROCEDURE — 6370000000 HC RX 637 (ALT 250 FOR IP): Performed by: PHYSICIAN ASSISTANT

## 2019-05-05 PROCEDURE — 82248 BILIRUBIN DIRECT: CPT

## 2019-05-05 PROCEDURE — 83690 ASSAY OF LIPASE: CPT

## 2019-05-05 PROCEDURE — 94640 AIRWAY INHALATION TREATMENT: CPT

## 2019-05-05 PROCEDURE — 2580000003 HC RX 258: Performed by: PHYSICIAN ASSISTANT

## 2019-05-05 PROCEDURE — 2580000003 HC RX 258: Performed by: FAMILY MEDICINE

## 2019-05-05 PROCEDURE — 36415 COLL VENOUS BLD VENIPUNCTURE: CPT

## 2019-05-05 PROCEDURE — 2500000003 HC RX 250 WO HCPCS: Performed by: FAMILY MEDICINE

## 2019-05-05 PROCEDURE — 84100 ASSAY OF PHOSPHORUS: CPT

## 2019-05-05 PROCEDURE — 82948 REAGENT STRIP/BLOOD GLUCOSE: CPT

## 2019-05-05 PROCEDURE — 2580000003 HC RX 258: Performed by: INTERNAL MEDICINE

## 2019-05-05 PROCEDURE — 2140000000 HC CCU INTERMEDIATE R&B

## 2019-05-05 PROCEDURE — 90792 PSYCH DIAG EVAL W/MED SRVCS: CPT | Performed by: PSYCHIATRY & NEUROLOGY

## 2019-05-05 PROCEDURE — 99232 SBSQ HOSP IP/OBS MODERATE 35: CPT | Performed by: INTERNAL MEDICINE

## 2019-05-05 PROCEDURE — 6370000000 HC RX 637 (ALT 250 FOR IP): Performed by: INTERNAL MEDICINE

## 2019-05-05 RX ORDER — POTASSIUM CHLORIDE 7.45 MG/ML
10 INJECTION INTRAVENOUS
Status: COMPLETED | OUTPATIENT
Start: 2019-05-05 | End: 2019-05-06

## 2019-05-05 RX ORDER — SODIUM CHLORIDE, SODIUM LACTATE, POTASSIUM CHLORIDE, CALCIUM CHLORIDE 600; 310; 30; 20 MG/100ML; MG/100ML; MG/100ML; MG/100ML
INJECTION, SOLUTION INTRAVENOUS CONTINUOUS
Status: DISCONTINUED | OUTPATIENT
Start: 2019-05-05 | End: 2019-05-09 | Stop reason: HOSPADM

## 2019-05-05 RX ORDER — INSULIN GLARGINE 100 [IU]/ML
15 INJECTION, SOLUTION SUBCUTANEOUS NIGHTLY
Status: DISCONTINUED | OUTPATIENT
Start: 2019-05-05 | End: 2019-05-09 | Stop reason: HOSPADM

## 2019-05-05 RX ADMIN — POTASSIUM CHLORIDE 10 MEQ: 7.46 INJECTION, SOLUTION INTRAVENOUS at 00:33

## 2019-05-05 RX ADMIN — POTASSIUM CHLORIDE 10 MEQ: 7.46 INJECTION, SOLUTION INTRAVENOUS at 02:56

## 2019-05-05 RX ADMIN — POTASSIUM CHLORIDE 10 MEQ: 7.46 INJECTION, SOLUTION INTRAVENOUS at 21:55

## 2019-05-05 RX ADMIN — ONDANSETRON 4 MG: 2 INJECTION INTRAMUSCULAR; INTRAVENOUS at 02:56

## 2019-05-05 RX ADMIN — MORPHINE SULFATE 4 MG: 4 INJECTION INTRAVENOUS at 19:40

## 2019-05-05 RX ADMIN — Medication 10 ML: at 09:00

## 2019-05-05 RX ADMIN — ONDANSETRON 4 MG: 2 INJECTION INTRAMUSCULAR; INTRAVENOUS at 21:59

## 2019-05-05 RX ADMIN — Medication 10 ML: at 08:51

## 2019-05-05 RX ADMIN — ONDANSETRON 4 MG: 2 INJECTION INTRAMUSCULAR; INTRAVENOUS at 15:33

## 2019-05-05 RX ADMIN — INSULIN GLARGINE 15 UNITS: 100 INJECTION, SOLUTION SUBCUTANEOUS at 22:01

## 2019-05-05 RX ADMIN — SODIUM CHLORIDE, POTASSIUM CHLORIDE, SODIUM LACTATE AND CALCIUM CHLORIDE: 600; 310; 30; 20 INJECTION, SOLUTION INTRAVENOUS at 19:40

## 2019-05-05 RX ADMIN — POTASSIUM CHLORIDE 10 MEQ: 7.46 INJECTION, SOLUTION INTRAVENOUS at 22:54

## 2019-05-05 RX ADMIN — KETOROLAC TROMETHAMINE 30 MG: 30 INJECTION, SOLUTION INTRAMUSCULAR at 02:56

## 2019-05-05 RX ADMIN — MORPHINE SULFATE 4 MG: 4 INJECTION INTRAVENOUS at 21:58

## 2019-05-05 RX ADMIN — POTASSIUM PHOSPHATE, MONOBASIC AND POTASSIUM PHOSPHATE, DIBASIC 12 MMOL: 224; 236 INJECTION, SOLUTION INTRAVENOUS at 15:40

## 2019-05-05 RX ADMIN — Medication 2 PUFF: at 08:30

## 2019-05-05 RX ADMIN — PANTOPRAZOLE SODIUM 40 MG: 40 INJECTION, POWDER, FOR SOLUTION INTRAVENOUS at 08:50

## 2019-05-05 RX ADMIN — POTASSIUM CHLORIDE 10 MEQ: 7.46 INJECTION, SOLUTION INTRAVENOUS at 10:51

## 2019-05-05 RX ADMIN — DEXTROSE AND SODIUM CHLORIDE: 5; 450 INJECTION, SOLUTION INTRAVENOUS at 03:25

## 2019-05-05 RX ADMIN — INSULIN LISPRO 1 UNITS: 100 INJECTION, SOLUTION INTRAVENOUS; SUBCUTANEOUS at 22:01

## 2019-05-05 RX ADMIN — ONDANSETRON 4 MG: 2 INJECTION INTRAMUSCULAR; INTRAVENOUS at 08:50

## 2019-05-05 RX ADMIN — Medication 10 ML: at 19:39

## 2019-05-05 RX ADMIN — ASPIRIN 81 MG: 81 TABLET, COATED ORAL at 10:57

## 2019-05-05 RX ADMIN — PANTOPRAZOLE SODIUM 40 MG: 40 INJECTION, POWDER, FOR SOLUTION INTRAVENOUS at 19:39

## 2019-05-05 RX ADMIN — POTASSIUM CHLORIDE 10 MEQ: 7.46 INJECTION, SOLUTION INTRAVENOUS at 04:07

## 2019-05-05 RX ADMIN — Medication 10 ML: at 19:41

## 2019-05-05 RX ADMIN — INSULIN LISPRO 2 UNITS: 100 INJECTION, SOLUTION INTRAVENOUS; SUBCUTANEOUS at 10:59

## 2019-05-05 RX ADMIN — KETOROLAC TROMETHAMINE 30 MG: 30 INJECTION, SOLUTION INTRAMUSCULAR at 08:50

## 2019-05-05 RX ADMIN — CITALOPRAM 20 MG: 20 TABLET, FILM COATED ORAL at 10:57

## 2019-05-05 RX ADMIN — INSULIN LISPRO 2 UNITS: 100 INJECTION, SOLUTION INTRAVENOUS; SUBCUTANEOUS at 16:04

## 2019-05-05 RX ADMIN — KETOROLAC TROMETHAMINE 30 MG: 30 INJECTION, SOLUTION INTRAMUSCULAR at 12:46

## 2019-05-05 ASSESSMENT — PAIN DESCRIPTION - PROGRESSION
CLINICAL_PROGRESSION: NOT CHANGED

## 2019-05-05 ASSESSMENT — PAIN DESCRIPTION - FREQUENCY
FREQUENCY: CONTINUOUS
FREQUENCY: CONTINUOUS

## 2019-05-05 ASSESSMENT — PAIN DESCRIPTION - PAIN TYPE
TYPE: ACUTE PAIN
TYPE: ACUTE PAIN

## 2019-05-05 ASSESSMENT — PAIN SCALES - GENERAL
PAINLEVEL_OUTOF10: 7
PAINLEVEL_OUTOF10: 4
PAINLEVEL_OUTOF10: 7
PAINLEVEL_OUTOF10: 8
PAINLEVEL_OUTOF10: 7

## 2019-05-05 ASSESSMENT — PAIN DESCRIPTION - DESCRIPTORS
DESCRIPTORS: DISCOMFORT;PRESSURE
DESCRIPTORS: PRESSURE

## 2019-05-05 ASSESSMENT — PAIN - FUNCTIONAL ASSESSMENT
PAIN_FUNCTIONAL_ASSESSMENT: ACTIVITIES ARE NOT PREVENTED
PAIN_FUNCTIONAL_ASSESSMENT: ACTIVITIES ARE NOT PREVENTED

## 2019-05-05 ASSESSMENT — PAIN DESCRIPTION - ORIENTATION
ORIENTATION: MID;LEFT
ORIENTATION: MID

## 2019-05-05 ASSESSMENT — PAIN DESCRIPTION - LOCATION
LOCATION: ABDOMEN
LOCATION: ABDOMEN

## 2019-05-05 ASSESSMENT — PAIN DESCRIPTION - ONSET
ONSET: ON-GOING
ONSET: ON-GOING

## 2019-05-05 NOTE — PROGRESS NOTES
Hospitalist Progress Note    Patient:  Heena Cabrera      Unit/Bed:3B-30/030-A    YOB: 1985    MRN: [id number]       Acct: [de-identified]     PCP: BARB Dupree CNP    Date of Admission: 5/3/2019    Chief Complaint:   Chief Complaint   Patient presents with    Emesis    Abdominal Pain         Hospital Course:     Please see H/P for details. In summary, this is a  26 y.o. male, with PMH DM type 2, COPD, hx of ETOH use (pt states his last drink was 1 year ago after he had DUI and was placed on monitoring), smoker, who  presented to [address] [place] [address] with diffuse, severe abdominal pain. Pt states that he started to have abd pain 3 days prior admission, then 2 days PTA, he started having nausea and vomiting. Pt states that he has vomited too many times to count and has filled up a small trash can. Pt describes the vomit as black color. Pt denies vomiting in blood. Pt has not been able to take any of his PO meds and has not drank or ate since Wednesday.    In the ED, the patient was found to be in DKA and have Acute Pancreatitis. Anion Gap 38. Bicarb 8. Beta-hydroxybutyrate elevated. Creatinine 1.3. Lipase 1400. WBC 15. CT abd showed \"There is mild peripancreatic infiltration greatest around the pancreatic tail. Correlation with pancreatic enzymes is advised findings are concerning for mild pancreatitis. small hiatal hernia and mild thickening of the distal esophageal wall. Mild esophagitis cannot be excluded. Diffuse fatty infiltration of the liver\". Pt was stable throughout his ED stay, pt was admitted to [facility] under [address] [place] [address] service for DKA protocol in place. RN reports overnight pt was seeing kids on his bathroom. Hallucinations resolved      Subjective:     Hallucinations resolved. Feeling improved. Still having nausea and abdominal pain.      Medications:  Reviewed    Infusion Medications    lactated ringers      dextrose      dextrose 5 % and 0.45 % NaCl Stopped (05/05/19 1710)     Scheduled Medications    insulin lispro  0-12 Units Subcutaneous TID WC    insulin lispro  0-6 Units Subcutaneous Nightly    potassium phosphate IVPB  12 mmol Intravenous Once    phosphorus replacement protocol   Other RX Placeholder    insulin glargine  0.25 Units/kg Subcutaneous Nightly    aspirin  81 mg Oral Daily    citalopram  20 mg Oral Daily    mometasone-formoterol  2 puff Inhalation BID    sodium chloride flush  10 mL Intravenous 2 times per day    pantoprazole  40 mg Intravenous BID    And    sodium chloride (PF)  10 mL Intravenous BID     PRN Meds: glucose, dextrose, glucagon (rDNA), dextrose, bisacodyl, albuterol sulfate HFA, hydrOXYzine, traZODone, insulin regular, potassium chloride, magnesium sulfate, sodium phosphate IVPB **OR** sodium phosphate IVPB **OR** sodium phosphate IVPB, sodium chloride flush, acetaminophen, ondansetron, morphine **OR** morphine      Intake/Output Summary (Last 24 hours) at 5/5/2019 1746  Last data filed at 5/5/2019 1342  Gross per 24 hour   Intake 1500.65 ml   Output 4050 ml   Net -2549.35 ml       Diet:  Diet NPO Effective Now Exceptions are: Ice Chips    Exam:  /89   Pulse 88   Temp 98.3 °F (36.8 °C) (Oral)   Resp 14   Ht 5' 2\" (1.575 m)   Wt 174 lb 1.6 oz (79 kg)   SpO2 98%   BMI 31.84 kg/m²     General appearance: alert, not in acute distress. HEENT: clear oral mucosa. Neck: Supple, with full range of motion. Respiratory:  Normal respiratory effort. Clear to auscultation, bilaterally without Rales/Wheezes/Rhonchi. Cardiovascular: normal rate, regular rhythm with normal S1/S2 without murmurs, rubs or gallops. Abdomen: non-distended with normal bowel sounds, soft, tender on epigastric area. Musculoskeletal: passive and active ROM x 4 extremities.   Psychiatric: thought content appropriate, normal insight, denies hallucinations      Labs:   Recent Labs     05/03/19  1237 05/04/19  0509   WBC 15.0* 6.8   HGB 18.4* 14.9   HCT 51.2 41.9*    90*     Recent Labs     05/04/19  0509 05/04/19  0835  05/05/19  0827 05/05/19  1143 05/05/19  1539    133*  133*   < > 136 136 136   K 3.4* 3.7  3.7   < > 3.7 3.7 3.5    101  101   < > 104 104 102   CO2 16* 14*  15*   < > 15* 16* 16*   BUN 28* 24*  24*   < > 10 12 11   CREATININE 0.7 0.6  0.6   < > 0.4 0.4 0.5   CALCIUM 8.2* 8.0*  8.2*   < > 8.2* 8.2* 8.6   PHOS 1.9* 0.9*  --  2.0*  --   --     < > = values in this interval not displayed. Recent Labs     05/03/19  1432 05/04/19  0509 05/05/19  0340   * 93* 61*   ALT 88* 69* 52   BILIDIR  --  0.4* 0.3   BILITOT 1.2 0.8 0.8   ALKPHOS 132* 96 83     No results for input(s): INR in the last 72 hours. No results for input(s): Jeffery Southerly in the last 72 hours. Urinalysis:      Lab Results   Component Value Date    NITRU NEGATIVE 12/18/2017    WBCUA 0-2 12/07/2017    BACTERIA NONE 12/07/2017    RBCUA 0-2 12/07/2017    BLOODU NEGATIVE 12/18/2017    SPECGRAV 1.011 10/12/2013    GLUCOSEU NEGATIVE 12/18/2017       Radiology:  US GALLBLADDER RUQ   Final Result       1. Echogenic liver suggesting fatty infiltration. 2. No gallstones. **This report has been created using voice recognition software. It may contain minor errors which are inherent in voice recognition technology. **      Final report electronically signed by Dr. Abdulkadir Tabares on 5/4/2019 10:03 AM      CT ABDOMEN PELVIS WO CONTRAST Additional Contrast? None   Final Result      1. There is mild peripancreatic infiltration greatest around the pancreatic tail. Correlation with pancreatic enzymes is advised findings are concerning for mild pancreatitis. 2. There is a small hiatal hernia and mild thickening of the distal esophageal wall. Mild esophagitis cannot be excluded. 3. Diffuse fatty infiltration of the liver. **This report has been created using voice recognition software.  It may contain minor errors which are inherent in voice recognition technology. **      Final report electronically signed by Dr. Fe Murphy on 5/3/2019 1:26 PM              Assessment/Plan: This is a 26 y.o. Male      1. DKA possibly from acute pancreatitis    -AG improving, blood sugar improving, now <200. Start lantus tonight.  -cont accu-checks, BMP q 4 hours  -NPO for now due to pancreatitis \  -A1C 9.3%, pt only takes metformin at home, discussed he will need insulin at [place]. Diabetic teaching. Discussed referral to diabetic health clinic. 2. Acute pancreatitis, etiology unclear yet    -pt denies recent ETOH intake  -triglyceride nl  -CT abd/pelvis as stated above  -re-check lipase  -abd pain improving  -cont NPO    -pain meds prn   - GI consulted      3. mild thickening of the distal esophageal wall, possible esophagitis    -cont PPI  -GI c/s for further recs  - EGD tomorrow    4. Leukocytosis, likely from problem #2, resolved       5. Mild hypokalemia    -likely related to DKA  -potassium replacement protocol  -re-check BMP as ordered    6. Hypophosphatemia     -phos replacement protocol  -phos level in am     7. Pseudohyponatremia, resolved     8. Constipation    -dulcolax supp prn  -monitor    9. \"black\" emesis     -reported by pt  -GI c/s for further eval and mx     10. Transaminases possibly from fatty liver     -check hepa panel  -RUQ US    11. Fatty liver noted on CT abd/pelvis    13. HANSEL likely pre-renal due to dehydration, resolved    -cont IVF as ordered  -monitor BMP    14. History of Alcohol Abuse     -pt states quit 1 year ago    15.  Generalized Anxiety Disorder               - Continue home medication: Vistaril, Celexa, Trazadone         Anticipated Discharge in : pending         Diet: Diet NPO Effective Now Exceptions are: Ice Chips    DVT prophylaxis: [] Lovenox                                 [] SCDs                                 [] SQ Heparin                                 [] Encourage ambulation           [] Already on Anticoagulation     Disposition:    [] Home       [] TCU       [] Rehab       [] Psych       [] SNF       [] [facility]       [x] Other-Plan as above       Code Status: Full Code        Electronically signed by Mack Al MD on 5/5/2019 at 5:46 PM          Electronically signed by Mack Al MD on 5/5/2019 at 5:46 PM

## 2019-05-05 NOTE — PLAN OF CARE
Problem: Impaired respiratory status  Goal: Clear lung sounds  5/5/2019 0834 by Steve Carrasquillo RCP  Outcome: Ongoing

## 2019-05-05 NOTE — PLAN OF CARE
Problem: Impaired respiratory status  Goal: Clear lung sounds  5/4/2019 2056 by Conner Bradshaw RCP  Outcome: Ongoing   Breath sounds clear, pt remains on dulera.

## 2019-05-05 NOTE — CONSULTS
Department of Psychiatry  Consult Service   Psychiatric Assessment      Thank you very much for allowing us to participate in the care of this patient. Reason for Consult:  hallucinations    HISTORY OF PRESENT ILLNESS:          The patient is a 26 y.o. male with significant history of alcohol use disorder who is admitted medically for DKA and acute pancreatitis. Staff reported the patient was having visual hallucinations as seeing children in the room, people telling him to stop the IV lnsulin and also him complaining about his family fighting in the hallway. Patient reports that he was feeling sick since last Tuesday and was continuously vomiting and retching for last 4 days. Patient reports that he had no sleep for last 4 days. Patient does report having some visual hallucinations yesterday as seeing children in the room. He denies ever having any hallucinations in past.  Patient denies having any hallucinations as people telling him to stop the IV line. Patient does agree that he does not fully recollect all of his hallucinations and mentions that it might have been true. Patient reports that he is feeling much better since today morning and denies having any more hallucinations. He denies any auditory hallucinations. He notes that this might be from sleep deprivation. Patient has an extensive history of alcohol abuse. He reports that he has been sober for over a year now. Patient is currently using a breathalyzer every few hours in a day and keeping the record as he is under probation after a DUI a year ago. He denies any recent alcohol use. He denies being in any withdrawals currently. Patient does report that he has been feeling down and low for last few days now. He identifies stressors as conflict with ex girlfriend and taking care of all 4 kids. He endorses some feelings of helplessness. He denies any suicidal or homicidal ideation plan or intent today.     PSYCHIATRIC HISTORY:h/o depression. Was on an antidepressant but not taking for a while now      · Outpatient psychiatric provider:  PCP- Dr Lisbet Woodson  · Suicide attempts: denies  · Inpatient psychiatric admissions: denies    Past psychiatric medications includes:     Reports trying a couple of antidepressants but could not recollect the names. Adverse reactions from psychotropic medications:    Denies any      Lifetime Psychiatric Review of Systems      ·    Obsessions and Compulsions: Denies    ·    Mellisa or Hypomania: Denies  ·    Hallucinations: Denies  ·    Panic Attacks:  Denies  ·    Delusions:  Denies  ·    Phobias:  Denies  ·    Trauma: Denies    Prior to Admission medications    Medication Sig Start Date End Date Taking?  Authorizing Provider   albuterol sulfate HFA (PROVENTIL HFA) 108 (90 Base) MCG/ACT inhaler Inhale 2 puffs into the lungs every 6 hours as needed for Wheezing 1/11/18  Yes BARB Chu CNP   hydrOXYzine (VISTARIL) 100 MG capsule Take 1 capsule by mouth 4 times daily as needed for Anxiety 1/11/18  Yes BARB Chu CNP   traZODone (DESYREL) 100 MG tablet Take 1 tablet by mouth nightly as needed for Sleep 1/11/18  Yes BARB Chu CNP   metFORMIN (GLUCOPHAGE) 1000 MG tablet Take 1 tablet by mouth 2 times daily (with meals) 1/11/18  Yes BARB Chu CNP   citalopram (CELEXA) 20 MG tablet Take 1 tablet by mouth daily 1/11/18  Yes BARB Chu CNP   aspirin 81 MG EC tablet Take 81 mg by mouth daily   Yes Historical Provider, MD   ibuprofen (ADVIL;MOTRIN) 200 MG tablet Take 400 mg by mouth every 6 hours as needed for Pain   Yes Historical Provider, MD   acetaminophen (TYLENOL) 500 MG tablet Take 1,000 mg by mouth every 6 hours as needed for Pain   Yes Historical Provider, MD   Multiple Vitamins-Minerals (MENS MULTIVITAMIN PLUS) TABS Take 1 tablet by mouth daily   Yes Historical Provider, MD   brompheniramine-pseudoephedrine-DM 30-2-10 MG/5ML syrup Take 5 mLs by mouth 4 dextrose 5 % 100 mL IVPB, 1 g, Intravenous, PRN  sodium phosphate 10 mmol in dextrose 5 % 250 mL IVPB, 10 mmol, Intravenous, PRN **OR** sodium phosphate 15 mmol in dextrose 5 % 250 mL IVPB, 15 mmol, Intravenous, PRN **OR** sodium phosphate 20 mmol in dextrose 5 % 500 mL IVPB, 20 mmol, Intravenous, PRN  sodium chloride flush 0.9 % injection 10 mL, 10 mL, Intravenous, 2 times per day  sodium chloride flush 0.9 % injection 10 mL, 10 mL, Intravenous, PRN  acetaminophen (TYLENOL) tablet 650 mg, 650 mg, Oral, Q4H PRN  ondansetron (ZOFRAN) injection 4 mg, 4 mg, Intravenous, Q6H PRN  enoxaparin (LOVENOX) injection 40 mg, 40 mg, Subcutaneous, Q24H  morphine (PF) injection 2 mg, 2 mg, Intravenous, Q2H PRN **OR** morphine injection 4 mg, 4 mg, Intravenous, Q2H PRN  ketorolac (TORADOL) injection 30 mg, 30 mg, Intravenous, Q6H  pantoprazole (PROTONIX) injection 40 mg, 40 mg, Intravenous, BID **AND** sodium chloride (PF) 0.9 % injection 10 mL, 10 mL, Intravenous, BID     Past Medical History:        Diagnosis Date    Asthma     COPD (chronic obstructive pulmonary disease) (HCC)     Eczema     GERD (gastroesophageal reflux disease)     History of alcohol abuse     History of marijuana use     History of tobacco abuse     quit 11/21/2017    Hx of seasonal allergies     Hypertension     Pancreatitis     Seizures ([facility]     etoh wdl    Type 2 diabetes mellitus without complication ([facility] 00/38/1823       Past Surgical History:    History reviewed. No pertinent surgical history. Allergies: Corn-containing products      Social History:    Patient reports he was born and raised in [place]. Moved to [place] for 10 years and moved back several years ago. He is  for 5 years and has 4 kids- 2 boys 2 girls. Lives by himself in [place]. He is currently unemployed. Worked as a cook in past.     SUBSTANCE USE HISTORY: Patient has an extensive history of alcohol use started at age 13.   Patient reports that he used to drink a fifth liver enzymes R74.8    Acute pancreatitis with uninfected necrosis, unspecified K85.91       Stressors     Severity of stressors is mild  Source of stressors include: Other psychosocial and environmental stressors    PLAN:    Per staff and patient, he is doing better today. No longer has any visual hallucinations. He is complaint with care  Continue Celexa and Trazodone  No further recommendations at this time. Will sign off. Please call back with any questions    Thank you very much for allowing us to participate in the care of this patient. Time spent 45 min.       Electronically signed by Kal Brown MD on 5/5/19 at 3:33 PM

## 2019-05-05 NOTE — PLAN OF CARE
Problem: Falls - Risk of:  Goal: Absence of physical injury  Description  Absence of physical injury  Outcome: Met This Shift     Problem: Pain:  Goal: Pain level will decrease  Description  Pain level will decrease  Outcome: Ongoing  Note:   Patient states has pain in upper abdomen/chest area. Has scheduled Toradol which seems to help as patient has seemed to sleep most of the shift     Problem: Falls - Risk of:  Goal: Will remain free from falls  Description  Will remain free from falls  Outcome: Ongoing  Note:   No falls this shift. Bed in locked and low position with side rails up x 2 and call light within reach.   Patient stands at bedside to void - is steady     Problem: Impaired respiratory status  Goal: Clear lung sounds  5/5/2019 1809 by Shira Matias RN  Outcome: Ongoing  Note:   Lungs are clear to ascultatio  5/5/2019 0834 by Lucia Ramirez RCP  Outcome: Ongoing     Problem: Discharge Planning:  Goal: Participates in care planning  Description  Participates in care planning  Outcome: Ongoing  Note:   Patient is from home and plans on returning there at discharge     Problem: Cardiac Output - Decreased:  Goal: Hemodynamic stability will improve  Description  Hemodynamic stability will improve  Outcome: Ongoing  Note:   Vital signs are stable, remains in NSR     Problem: Fluid Volume - Imbalance:  Goal: Absence of imbalanced fluid volume signs and symptoms  Description  Absence of imbalanced fluid volume signs and symptoms  Outcome: Ongoing     Problem: Serum Glucose Level - Abnormal:  Goal: Ability to maintain appropriate glucose levels will improve to within specified parameters  Description  Ability to maintain appropriate glucose levels will improve to within specified parameters  Outcome: Ongoing  Note:   Monitoring chems as ordered and giving insulin coverage per scale as ordered

## 2019-05-05 NOTE — PROGRESS NOTES
Hospital Follow Up Note  ELIZABETH   S   No more gi bleeding   Nausea and dry heaving    Current Facility-Administered Medications   Medication Dose Route Frequency Provider Last Rate Last Dose    insulin lispro (HUMALOG) injection vial 0-12 Units  0-12 Units Subcutaneous TID ABNER Smith PA-C   2 Units at 05/05/19 1059    insulin lispro (HUMALOG) injection vial 0-6 Units  0-6 Units Subcutaneous Nightly Carole Farley PA-C        potassium phosphate 12 mmol in dextrose 5 % 250 mL IVPB  12 mmol Intravenous Once Donald Nguyen MD        phosphorus replacement protocol   Other RX Placeholder Latoya Acosta MD        glucose (GLUTOSE) 40 % oral gel 15 g  15 g Oral PRN Latoya Acosta MD        dextrose 50 % solution 12.5 g  12.5 g Intravenous PRN Latoya Acosta MD        glucagon (rDNA) injection 1 mg  1 mg Intramuscular PRN Latoya Acosta MD        dextrose 5 % solution  100 mL/hr Intravenous PRN Latoya Acosta MD        dextrose 5 % and 0.45 % sodium chloride infusion   Intravenous Continuous Latoya Acosta MD   Stopped at 05/05/19 0456    bisacodyl (DULCOLAX) suppository 10 mg  10 mg Rectal PRN Latoya Acosta MD        insulin glargine (LANTUS) injection vial 20 Units  0.25 Units/kg Subcutaneous Nightly Carole Farley PA-C        albuterol sulfate  (90 Base) MCG/ACT inhaler 2 puff  2 puff Inhalation Q6H PRN POP Denson   2 puff at 05/03/19 2054    aspirin EC tablet 81 mg  81 mg Oral Daily Franci Densonma   81 mg at 05/05/19 1057    citalopram (CELEXA) tablet 20 mg  20 mg Oral Daily POP Denson   20 mg at 05/05/19 1057    mometasone-formoterol (DULERA) 100-5 MCG/ACT inhaler 2 puff  2 puff Inhalation BID POP Denson   2 puff at 05/05/19 0830    hydrOXYzine (VISTARIL) capsule 100 mg  100 mg Oral 4x Daily PRN POP Dumas        traZODone (DESYREL) tablet 100 mg  100 mg Oral Nightly PRN POP Denson   100 mg at 05/03/19 4800    insulin regular (HUMULIN R;NOVOLIN R) injection 0-10 Units  0-10 Units Intravenous PRN [employer], PA        potassium chloride 10 mEq/100 mL IVPB (Peripheral Line)  10 mEq Intravenous PRN POP Denson 100 mL/hr at 05/05/19 1051 10 mEq at 05/05/19 1051    magnesium sulfate 1 g in dextrose 5 % 100 mL IVPB  1 g Intravenous PRN [employer] PA        sodium phosphate 10 mmol in dextrose 5 % 250 mL IVPB  10 mmol Intravenous PRN POP Denson        Or    sodium phosphate 15 mmol in dextrose 5 % 250 mL IVPB  15 mmol Intravenous PRN POP Denson        Or    sodium phosphate 20 mmol in dextrose 5 % 500 mL IVPB  20 mmol Intravenous PRN [employer], PA        sodium chloride flush 0.9 % injection 10 mL  10 mL Intravenous 2 times per day [employer] PA   10 mL at 05/05/19 0900    sodium chloride flush 0.9 % injection 10 mL  10 mL Intravenous PRN [employer], [employer]        acetaminophen (TYLENOL) tablet 650 mg  650 mg Oral Q4H PRN POP Denson        ondansetron (ZOFRAN) injection 4 mg  4 mg Intravenous Q6H PRN POP Denson   4 mg at 05/05/19 0850    morphine (PF) injection 2 mg  2 mg Intravenous Q2H PRN POP Denson        Or    morphine injection 4 mg  4 mg Intravenous Q2H PRN POP Denson        pantoprazole (PROTONIX) injection 40 mg  40 mg Intravenous BID POP Denson   40 mg at 05/05/19 0850    And    sodium chloride (PF) 0.9 % injection 10 mL  10 mL Intravenous BID Alondra Lebron PA   10 mL at 05/05/19 0851           O. Afebrile, VSS  height is 5' 2\" (1.575 m) and weight is 174 lb 1.6 oz (79 kg). His oral temperature is 97.9 °F (36.6 °C). His blood pressure is 137/83 and his pulse is 86. His respiration is 14 and oxygen saturation is 99%.           A&O         Heart  RRR         Lungs CTAB          ABD S/NT/ND/+BS         Ext No LLE     CBC:   Recent Labs     05/03/19  1237 05/04/19  0509   WBC 15.0* 6.8   HGB 18.4* 14.9    90*     BMP:    Recent Labs     05/05/19  0340 05/05/19  0827 05/05/19  1143   * 136 136   K 3.3* 3.7 3.7    104 104   CO2 18* 15* 16*   BUN 11 10 12   CREATININE 0.4 0.4 0.4   GLUCOSE 147* 158* 162*     Calcium:  Recent Labs     05/05/19  1143   CALCIUM 8.2*     Ionized Calcium:No results for input(s): IONCA in the last 72 hours. Magnesium:  Recent Labs     05/04/19  0509   MG 2.1     Phosphorus:  Recent Labs     05/05/19  0827   PHOS 2.0*     BNP:No results for input(s): BNP in the last 72 hours. Glucose:  Recent Labs     05/05/19  0447 05/05/19  0634 05/05/19  1031   POCGLU 153* 138* 187*     HgbA1C:   Recent Labs     05/03/19  1237   LABA1C 9.3*     INR: No results for input(s): INR in the last 72 hours. Hepatic:   Recent Labs     05/05/19  0340   ALKPHOS 83   ALT 52   AST 61*   PROT 5.3*   BILITOT 0.8   BILIDIR 0.3   LABALBU 2.9*     Amylase and Lipase:  Recent Labs     05/05/19  0340   LIPASE 182.9*     Lactic Acid: No results for input(s): LACTA in the last 72 hours. Troponin: No results for input(s): CKTOTAL, CKMB, TROPONINI in the last 72 hours. Lipids:   Recent Labs     05/04/19  0509   CHOL 182   TRIG 128   HDL 84   LDLCALC 72     PT/INR:   No results for input(s): PROTIME, INR in the last 72 hours. PTT:   No results for input(s): APTT, PTT in the last 72 hours.      A. ugi bleed likely marianna-martinez tear  Pancreatitis better  dka better        P. egd at 11 with propofol, d/w pt  ppi  Npo  Hold lovonox

## 2019-05-05 NOTE — PLAN OF CARE
symptoms  Description  Absence of imbalanced fluid volume signs and symptoms  Outcome: Ongoing  Note:   Ongoing assessment & interventions monitored throughout shift. Intake & output measured. Patient has adequate urine output so far this shift. Encouraged adequate fluid intake. Problem: Serum Glucose Level - Abnormal:  Goal: Ability to maintain appropriate glucose levels will improve to within specified parameters  Description  Ability to maintain appropriate glucose levels will improve to within specified parameters  Outcome: Ongoing  Note:   Glucose checked & covered per physician's orders. Care plan reviewed with patient. Patient verbalizes understanding of the care plan and contributed to goal setting.

## 2019-05-05 NOTE — FLOWSHEET NOTE
Perfect serve message sent to [facility] in regards to patient's lab results    Patient: Tabitha Camejo   Sunday 05/05/2019   4:51 am  [phone] From: Florencia Gramajo RN [facility] 3B Step Down RE: Sindy De La O 3b-30/Current . Anion gap is 12 now. Glucose stabilizer is still stating for rate of 3.7 units/hr. Did you want him switched to sliding scale now?   Read 4:51 am   4:51 am  Yes please

## 2019-05-05 NOTE — FLOWSHEET NOTE
Perfect serve message sent to [facility] to clarify sliding scale coverage post insulin gtt for patient    5:16 am  DId you want the low sliding scale or higher level for Humalog?   Read 5:16 am   5:16 am  Medium please

## 2019-05-06 ENCOUNTER — ANESTHESIA EVENT (OUTPATIENT)
Dept: ENDOSCOPY | Age: 34
DRG: 282 | End: 2019-05-06
Payer: MEDICAID

## 2019-05-06 ENCOUNTER — ANESTHESIA (OUTPATIENT)
Dept: ENDOSCOPY | Age: 34
DRG: 282 | End: 2019-05-06
Payer: MEDICAID

## 2019-05-06 VITALS
DIASTOLIC BLOOD PRESSURE: 57 MMHG | RESPIRATION RATE: 10 BRPM | SYSTOLIC BLOOD PRESSURE: 106 MMHG | OXYGEN SATURATION: 99 %

## 2019-05-06 LAB
ANION GAP SERPL CALCULATED.3IONS-SCNC: 15 MEQ/L (ref 8–16)
ANION GAP SERPL CALCULATED.3IONS-SCNC: 15 MEQ/L (ref 8–16)
BUN BLDV-MCNC: 10 MG/DL (ref 7–22)
BUN BLDV-MCNC: 9 MG/DL (ref 7–22)
CALCIUM SERPL-MCNC: 8.5 MG/DL (ref 8.5–10.5)
CALCIUM SERPL-MCNC: 9.2 MG/DL (ref 8.5–10.5)
CHLORIDE BLD-SCNC: 100 MEQ/L (ref 98–111)
CHLORIDE BLD-SCNC: 102 MEQ/L (ref 98–111)
CO2: 19 MEQ/L (ref 23–33)
CO2: 21 MEQ/L (ref 23–33)
CREAT SERPL-MCNC: 0.4 MG/DL (ref 0.4–1.2)
CREAT SERPL-MCNC: 0.4 MG/DL (ref 0.4–1.2)
ERYTHROCYTE [DISTWIDTH] IN BLOOD BY AUTOMATED COUNT: 13.2 % (ref 11.5–14.5)
ERYTHROCYTE [DISTWIDTH] IN BLOOD BY AUTOMATED COUNT: 43.7 FL (ref 35–45)
GFR SERPL CREATININE-BSD FRML MDRD: > 90 ML/MIN/1.73M2
GFR SERPL CREATININE-BSD FRML MDRD: > 90 ML/MIN/1.73M2
GLUCOSE BLD-MCNC: 122 MG/DL (ref 70–108)
GLUCOSE BLD-MCNC: 125 MG/DL (ref 70–108)
GLUCOSE BLD-MCNC: 136 MG/DL (ref 70–108)
GLUCOSE BLD-MCNC: 144 MG/DL (ref 70–108)
GLUCOSE BLD-MCNC: 152 MG/DL (ref 70–108)
GLUCOSE BLD-MCNC: 157 MG/DL (ref 70–108)
HCT VFR BLD CALC: 41.1 % (ref 42–52)
HEMOGLOBIN: 14.6 GM/DL (ref 14–18)
LIPASE: 293.1 U/L (ref 5.6–51.3)
MCH RBC QN AUTO: 32.2 PG (ref 26–33)
MCHC RBC AUTO-ENTMCNC: 35.5 GM/DL (ref 32.2–35.5)
MCV RBC AUTO: 90.7 FL (ref 80–94)
PHOSPHORUS: 2.4 MG/DL (ref 2.4–4.7)
PLATELET # BLD: 125 THOU/MM3 (ref 130–400)
PMV BLD AUTO: 10.7 FL (ref 9.4–12.4)
POTASSIUM SERPL-SCNC: 3.6 MEQ/L (ref 3.5–5.2)
POTASSIUM SERPL-SCNC: 3.8 MEQ/L (ref 3.5–5.2)
RBC # BLD: 4.53 MILL/MM3 (ref 4.7–6.1)
SODIUM BLD-SCNC: 136 MEQ/L (ref 135–145)
SODIUM BLD-SCNC: 136 MEQ/L (ref 135–145)
WBC # BLD: 6.8 THOU/MM3 (ref 4.8–10.8)

## 2019-05-06 PROCEDURE — 80048 BASIC METABOLIC PNL TOTAL CA: CPT

## 2019-05-06 PROCEDURE — 6370000000 HC RX 637 (ALT 250 FOR IP): Performed by: FAMILY MEDICINE

## 2019-05-06 PROCEDURE — 2580000003 HC RX 258: Performed by: NURSE PRACTITIONER

## 2019-05-06 PROCEDURE — C9113 INJ PANTOPRAZOLE SODIUM, VIA: HCPCS | Performed by: PHYSICIAN ASSISTANT

## 2019-05-06 PROCEDURE — 0DB58ZX EXCISION OF ESOPHAGUS, VIA NATURAL OR ARTIFICIAL OPENING ENDOSCOPIC, DIAGNOSTIC: ICD-10-PCS | Performed by: INTERNAL MEDICINE

## 2019-05-06 PROCEDURE — 2709999900 HC NON-CHARGEABLE SUPPLY: Performed by: INTERNAL MEDICINE

## 2019-05-06 PROCEDURE — 2580000003 HC RX 258: Performed by: PHYSICIAN ASSISTANT

## 2019-05-06 PROCEDURE — 2500000003 HC RX 250 WO HCPCS: Performed by: REGISTERED NURSE

## 2019-05-06 PROCEDURE — 36415 COLL VENOUS BLD VENIPUNCTURE: CPT

## 2019-05-06 PROCEDURE — 82948 REAGENT STRIP/BLOOD GLUCOSE: CPT

## 2019-05-06 PROCEDURE — 6360000002 HC RX W HCPCS: Performed by: PHYSICIAN ASSISTANT

## 2019-05-06 PROCEDURE — 6370000000 HC RX 637 (ALT 250 FOR IP): Performed by: PHYSICIAN ASSISTANT

## 2019-05-06 PROCEDURE — 3700000000 HC ANESTHESIA ATTENDED CARE: Performed by: INTERNAL MEDICINE

## 2019-05-06 PROCEDURE — 6360000002 HC RX W HCPCS: Performed by: REGISTERED NURSE

## 2019-05-06 PROCEDURE — 0DB98ZX EXCISION OF DUODENUM, VIA NATURAL OR ARTIFICIAL OPENING ENDOSCOPIC, DIAGNOSTIC: ICD-10-PCS | Performed by: INTERNAL MEDICINE

## 2019-05-06 PROCEDURE — 88312 SPECIAL STAINS GROUP 1: CPT

## 2019-05-06 PROCEDURE — 94640 AIRWAY INHALATION TREATMENT: CPT

## 2019-05-06 PROCEDURE — 88305 TISSUE EXAM BY PATHOLOGIST: CPT

## 2019-05-06 PROCEDURE — 1200000003 HC TELEMETRY R&B

## 2019-05-06 PROCEDURE — 6370000000 HC RX 637 (ALT 250 FOR IP): Performed by: INTERNAL MEDICINE

## 2019-05-06 PROCEDURE — 2709999900 HC NON-CHARGEABLE SUPPLY

## 2019-05-06 PROCEDURE — 7100000000 HC PACU RECOVERY - FIRST 15 MIN: Performed by: INTERNAL MEDICINE

## 2019-05-06 PROCEDURE — 85027 COMPLETE CBC AUTOMATED: CPT

## 2019-05-06 PROCEDURE — 3609012400 HC EGD TRANSORAL BIOPSY SINGLE/MULTIPLE: Performed by: INTERNAL MEDICINE

## 2019-05-06 PROCEDURE — 0DB68ZX EXCISION OF STOMACH, VIA NATURAL OR ARTIFICIAL OPENING ENDOSCOPIC, DIAGNOSTIC: ICD-10-PCS | Performed by: INTERNAL MEDICINE

## 2019-05-06 PROCEDURE — 83690 ASSAY OF LIPASE: CPT

## 2019-05-06 PROCEDURE — 3700000001 HC ADD 15 MINUTES (ANESTHESIA): Performed by: INTERNAL MEDICINE

## 2019-05-06 PROCEDURE — 2580000003 HC RX 258: Performed by: INTERNAL MEDICINE

## 2019-05-06 PROCEDURE — 84100 ASSAY OF PHOSPHORUS: CPT

## 2019-05-06 RX ORDER — LIDOCAINE HYDROCHLORIDE 20 MG/ML
INJECTION, SOLUTION EPIDURAL; INFILTRATION; INTRACAUDAL; PERINEURAL PRN
Status: DISCONTINUED | OUTPATIENT
Start: 2019-05-06 | End: 2019-05-06 | Stop reason: SDUPTHER

## 2019-05-06 RX ORDER — POTASSIUM CHLORIDE 7.45 MG/ML
10 INJECTION INTRAVENOUS
Status: COMPLETED | OUTPATIENT
Start: 2019-05-06 | End: 2019-05-06

## 2019-05-06 RX ORDER — SODIUM CHLORIDE 450 MG/100ML
INJECTION, SOLUTION INTRAVENOUS CONTINUOUS
Status: DISCONTINUED | OUTPATIENT
Start: 2019-05-06 | End: 2019-05-07 | Stop reason: ALTCHOICE

## 2019-05-06 RX ORDER — SODIUM CHLORIDE 0.9 % (FLUSH) 0.9 %
10 SYRINGE (ML) INJECTION EVERY 12 HOURS SCHEDULED
Status: DISCONTINUED | OUTPATIENT
Start: 2019-05-06 | End: 2019-05-06 | Stop reason: SDUPTHER

## 2019-05-06 RX ORDER — PROPOFOL 10 MG/ML
INJECTION, EMULSION INTRAVENOUS PRN
Status: DISCONTINUED | OUTPATIENT
Start: 2019-05-06 | End: 2019-05-06 | Stop reason: SDUPTHER

## 2019-05-06 RX ORDER — 0.9 % SODIUM CHLORIDE 0.9 %
10 VIAL (ML) INJECTION PRN
Status: DISCONTINUED | OUTPATIENT
Start: 2019-05-06 | End: 2019-05-09 | Stop reason: HOSPADM

## 2019-05-06 RX ADMIN — Medication 10 ML: at 09:06

## 2019-05-06 RX ADMIN — POTASSIUM CHLORIDE 10 MEQ: 7.46 INJECTION, SOLUTION INTRAVENOUS at 18:40

## 2019-05-06 RX ADMIN — ONDANSETRON 4 MG: 2 INJECTION INTRAMUSCULAR; INTRAVENOUS at 03:28

## 2019-05-06 RX ADMIN — BISACODYL 10 MG: 10 SUPPOSITORY RECTAL at 22:41

## 2019-05-06 RX ADMIN — POTASSIUM CHLORIDE 10 MEQ: 7.46 INJECTION, SOLUTION INTRAVENOUS at 16:33

## 2019-05-06 RX ADMIN — ASPIRIN 81 MG: 81 TABLET, COATED ORAL at 11:50

## 2019-05-06 RX ADMIN — PANTOPRAZOLE SODIUM 40 MG: 40 INJECTION, POWDER, FOR SOLUTION INTRAVENOUS at 09:05

## 2019-05-06 RX ADMIN — ONDANSETRON 4 MG: 2 INJECTION INTRAMUSCULAR; INTRAVENOUS at 11:51

## 2019-05-06 RX ADMIN — SODIUM CHLORIDE, POTASSIUM CHLORIDE, SODIUM LACTATE AND CALCIUM CHLORIDE: 600; 310; 30; 20 INJECTION, SOLUTION INTRAVENOUS at 13:27

## 2019-05-06 RX ADMIN — SODIUM CHLORIDE, POTASSIUM CHLORIDE, SODIUM LACTATE AND CALCIUM CHLORIDE: 600; 310; 30; 20 INJECTION, SOLUTION INTRAVENOUS at 05:01

## 2019-05-06 RX ADMIN — MORPHINE SULFATE 2 MG: 2 INJECTION, SOLUTION INTRAMUSCULAR; INTRAVENOUS at 14:06

## 2019-05-06 RX ADMIN — SODIUM CHLORIDE: 4.5 INJECTION, SOLUTION INTRAVENOUS at 21:00

## 2019-05-06 RX ADMIN — CITALOPRAM 20 MG: 20 TABLET, FILM COATED ORAL at 11:50

## 2019-05-06 RX ADMIN — POTASSIUM CHLORIDE 10 MEQ: 7.46 INJECTION, SOLUTION INTRAVENOUS at 20:50

## 2019-05-06 RX ADMIN — MORPHINE SULFATE 2 MG: 2 INJECTION, SOLUTION INTRAMUSCULAR; INTRAVENOUS at 01:10

## 2019-05-06 RX ADMIN — INSULIN LISPRO 2 UNITS: 100 INJECTION, SOLUTION INTRAVENOUS; SUBCUTANEOUS at 09:08

## 2019-05-06 RX ADMIN — LIDOCAINE HYDROCHLORIDE 100 MG: 20 INJECTION, SOLUTION EPIDURAL; INFILTRATION; INTRACAUDAL; PERINEURAL at 09:54

## 2019-05-06 RX ADMIN — MORPHINE SULFATE 4 MG: 4 INJECTION INTRAVENOUS at 16:37

## 2019-05-06 RX ADMIN — MORPHINE SULFATE 2 MG: 2 INJECTION, SOLUTION INTRAMUSCULAR; INTRAVENOUS at 09:03

## 2019-05-06 RX ADMIN — MORPHINE SULFATE 2 MG: 2 INJECTION, SOLUTION INTRAMUSCULAR; INTRAVENOUS at 03:28

## 2019-05-06 RX ADMIN — POTASSIUM CHLORIDE 10 MEQ: 7.46 INJECTION, SOLUTION INTRAVENOUS at 01:13

## 2019-05-06 RX ADMIN — MORPHINE SULFATE 2 MG: 2 INJECTION, SOLUTION INTRAMUSCULAR; INTRAVENOUS at 06:19

## 2019-05-06 RX ADMIN — Medication 10 ML: at 21:04

## 2019-05-06 RX ADMIN — Medication 2 PUFF: at 08:03

## 2019-05-06 RX ADMIN — ONDANSETRON 4 MG: 2 INJECTION INTRAMUSCULAR; INTRAVENOUS at 18:41

## 2019-05-06 RX ADMIN — PROPOFOL 200 MG: 10 INJECTION, EMULSION INTRAVENOUS at 09:54

## 2019-05-06 RX ADMIN — INSULIN GLARGINE 15 UNITS: 100 INJECTION, SOLUTION SUBCUTANEOUS at 22:41

## 2019-05-06 RX ADMIN — MORPHINE SULFATE 4 MG: 4 INJECTION INTRAVENOUS at 22:40

## 2019-05-06 RX ADMIN — MORPHINE SULFATE 2 MG: 2 INJECTION, SOLUTION INTRAMUSCULAR; INTRAVENOUS at 11:51

## 2019-05-06 RX ADMIN — PANTOPRAZOLE SODIUM 40 MG: 40 INJECTION, POWDER, FOR SOLUTION INTRAVENOUS at 21:04

## 2019-05-06 RX ADMIN — MORPHINE SULFATE 4 MG: 4 INJECTION INTRAVENOUS at 18:41

## 2019-05-06 RX ADMIN — MORPHINE SULFATE 4 MG: 4 INJECTION INTRAVENOUS at 20:47

## 2019-05-06 ASSESSMENT — PAIN DESCRIPTION - DESCRIPTORS
DESCRIPTORS: DISCOMFORT
DESCRIPTORS: THROBBING

## 2019-05-06 ASSESSMENT — PAIN SCALES - GENERAL
PAINLEVEL_OUTOF10: 6
PAINLEVEL_OUTOF10: 7
PAINLEVEL_OUTOF10: 7
PAINLEVEL_OUTOF10: 6
PAINLEVEL_OUTOF10: 0
PAINLEVEL_OUTOF10: 6
PAINLEVEL_OUTOF10: 7
PAINLEVEL_OUTOF10: 7
PAINLEVEL_OUTOF10: 8
PAINLEVEL_OUTOF10: 6
PAINLEVEL_OUTOF10: 8
PAINLEVEL_OUTOF10: 8
PAINLEVEL_OUTOF10: 0
PAINLEVEL_OUTOF10: 7

## 2019-05-06 ASSESSMENT — PAIN DESCRIPTION - FREQUENCY
FREQUENCY: CONTINUOUS
FREQUENCY: CONTINUOUS

## 2019-05-06 ASSESSMENT — PAIN DESCRIPTION - PAIN TYPE
TYPE: ACUTE PAIN
TYPE: ACUTE PAIN

## 2019-05-06 ASSESSMENT — PAIN DESCRIPTION - LOCATION: LOCATION: CHEST

## 2019-05-06 ASSESSMENT — PAIN DESCRIPTION - PROGRESSION: CLINICAL_PROGRESSION: GRADUALLY IMPROVING

## 2019-05-06 ASSESSMENT — PAIN - FUNCTIONAL ASSESSMENT: PAIN_FUNCTIONAL_ASSESSMENT: ACTIVITIES ARE NOT PREVENTED

## 2019-05-06 ASSESSMENT — PAIN DESCRIPTION - ONSET
ONSET: ON-GOING
ONSET: ON-GOING

## 2019-05-06 NOTE — OP NOTE
[facility]  EGD Procedure Note    Patient: Sony Hill  : 1985      Procedure: Esophagogastroduodenoscopy with biopsy          Date:  2019     Endoscopist:   Alessandra Hernandez MD    Referring Physician: Tanner Butler MD    Indications: This is a 26y.o. year old male who presents with Hematemesis. Anesthesia: MAC per Anesthesia. Please see anesthesia report. Consent:  The patient or their legal guardian has signed a consent, and is aware of the potential risks, benefits, alternatives, and potential complications of this procedure. These include, but are not limited to hemorrhage, bleeding, post procedural pain, perforation, phlebitis, aspiration, hypotension, hypoxia, cardiovascular events such as arrhythmia, and possibly death. Description of Procedure: The patient was then taken to the endoscopy suite, placed in the left lateral decubitus position and the above IV sedation was administered. The Olympus video endoscope was placed through the patient's oropharynx without difficulty to the extent of the 2nd portion of the duodenum. Both forward and retroflexed views of the stomach were obtained. Findings:    Esophagus: The GE junction was noted to be at 35 cm. There was evidence of LA grade D esophagitis, localized to 25 cm to 35 cm. Cold biopsies were taken in placed in Jar 3. There was evidence of a possible pigmented spot, but given no active bleeding, the area was not treated. Stomach: The stomach appeared moderately to severely erythematous on forward and retroflexed views. Cold biopsies were taken and placed in Jar 2. Duodenum: The first portion of the duodenum appeared erythematous, cold biopsies were taken and placed in Jar 1. The 2nd portion of the duodenum appeared normal with normal villous pattern    The scope was then withdrawn back into the stomach, it was decompressed, and the scope was completely withdrawn.       Recommendations:   - Await pathology. - Follow clinical symptoms and laboratory studies for evidence of rebleeding  - [facility] to [facility] for [facility] care  - Continue BID PPI 40mg for at least 8 weeks  - Would recommend repeat EGD in 8 weeks to assess for healing of esophagitis  - Counseled patient on abstinence from alcohol use  - Unfortunately, the patient is a terminated patient of our practice and he will need to find another GI provider to follow up with upon discharge. We will provide hospital care if needed but he will need to transition to another GI provider upon discharge  - GI will sign off.  Please call for any questions or concerns    Megan Rouse MD  [facility]

## 2019-05-06 NOTE — ANESTHESIA PRE PROCEDURE
Department of Anesthesiology  Preprocedure Note       Name:  Susy Cerrato   Age:  26 y.o.  :  1985                                          MRN:  [id number]         Date:  2019      Surgeon: Rufino Katz):  Haylee Watkins MD    Procedure: EGD DIAGNOSTIC ONLY (Left )    Medications prior to admission:   Prior to Admission medications    Medication Sig Start Date End Date Taking?  Authorizing Provider   albuterol sulfate HFA (PROVENTIL HFA) 108 (90 Base) MCG/ACT inhaler Inhale 2 puffs into the lungs every 6 hours as needed for Wheezing 18  Yes BARB Kirkland CNP   hydrOXYzine (VISTARIL) 100 MG capsule Take 1 capsule by mouth 4 times daily as needed for Anxiety 18  Yes BARB Kirkland CNP   traZODone (DESYREL) 100 MG tablet Take 1 tablet by mouth nightly as needed for Sleep 18  Yes BARB Kirkland CNP   metFORMIN (GLUCOPHAGE) 1000 MG tablet Take 1 tablet by mouth 2 times daily (with meals) 18  Yes BARB Kirkland CNP   citalopram (CELEXA) 20 MG tablet Take 1 tablet by mouth daily 18  Yes BARB Kirkland CNP   aspirin 81 MG EC tablet Take 81 mg by mouth daily   Yes Historical Provider, MD   ibuprofen (ADVIL;MOTRIN) 200 MG tablet Take 400 mg by mouth every 6 hours as needed for Pain   Yes Historical Provider, MD   acetaminophen (TYLENOL) 500 MG tablet Take 1,000 mg by mouth every 6 hours as needed for Pain   Yes Historical Provider, MD   Multiple Vitamins-Minerals (MENS MULTIVITAMIN PLUS) TABS Take 1 tablet by mouth daily   Yes Historical Provider, MD   brompheniramine-pseudoephedrine-DM 30-2-10 MG/5ML syrup Take 5 mLs by mouth 4 times daily as needed for Congestion or Cough 10/10/18   BARB Cameron CNP   fluticasone-salmeterol (ADVAIR) 250-50 MCG/DOSE AEPB Inhale 1 puff into the lungs every 12 hours  Patient taking differently: Inhale 1 puff into the lungs every 12 hours as needed  18   BARB Kirkland CNP   TRUE METRIX BLOOD GLUCOSE TEST strip Test blood sugars three times daily.  12/22/17   Katharine Ramos, APRN - CNP   ACCU-CHEK MULTICLIX LANCETS MISC 1 box by Does not apply route daily 12/9/17   Penny Saleh PA-C       Current medications:    Current Facility-Administered Medications   Medication Dose Route Frequency Provider Last Rate Last Dose    insulin lispro (HUMALOG) injection vial 0-12 Units  0-12 Units Subcutaneous TID WC Hugoarlette MalesNAA   2 Units at 05/06/19 0908    insulin lispro (HUMALOG) injection vial 0-6 Units  0-6 Units Subcutaneous Nightly Hugovíctorlaurence Robertson PA-C   1 Units at 05/05/19 2201    lactated ringers infusion   Intravenous Continuous Kristan Silvestre  mL/hr at 05/06/19 0501      insulin glargine (LANTUS) injection vial 15 Units  15 Units Subcutaneous Nightly Kristan Silvestre MD   15 Units at 05/05/19 2201    phosphorus replacement protocol   Other RX Placeholder Latoya Negrete MD        glucose (GLUTOSE) 40 % oral gel 15 g  15 g Oral PRN Latoya Negrete MD        dextrose 50 % solution 12.5 g  12.5 g Intravenous PRN Latoya Negrete MD        glucagon (rDNA) injection 1 mg  1 mg Intramuscular PRN Zelalem Gloria MD        dextrose 5 % solution  100 mL/hr Intravenous PRN Zelalem Gloria MD        dextrose 5 % and 0.45 % sodium chloride infusion   Intravenous Continuous Latoya Negrete MD   Stopped at 05/05/19 0456    bisacodyl (DULCOLAX) suppository 10 mg  10 mg Rectal PRN Latoya Negrete MD        albuterol sulfate  (90 Base) MCG/ACT inhaler 2 puff  2 puff Inhalation Q6H PRN POP Denson   2 puff at 05/03/19 2054    aspirin EC tablet 81 mg  81 mg Oral Daily Melanie Denson [address]   81 mg at 05/05/19 1057    citalopram (CELEXA) tablet 20 mg  20 mg Oral Daily Melanie Denson [address]   20 mg at 05/05/19 1057    mometasone-formoterol (DULERA) 100-5 MCG/ACT inhaler 2 puff  2 puff Inhalation BID POP Denson   2 puff at 05/06/19 0803    hydrOXYzine (VISTARIL) capsule 100 mg  100 mg Oral 4x Daily PRN POP An        traZODone (DESYREL) tablet 100 mg  100 mg Oral Nightly PRN POP Denson   100 mg at 05/03/19 2313    potassium chloride 10 mEq/100 mL IVPB (Peripheral Line)  10 mEq Intravenous PRN POP Denson 100 mL/hr at 05/05/19 1051 10 mEq at 05/05/19 1051    magnesium sulfate 1 g in dextrose 5 % 100 mL IVPB  1 g Intravenous PRN POP An        sodium phosphate 10 mmol in dextrose 5 % 250 mL IVPB  10 mmol Intravenous PRN POP Denson        Or    sodium phosphate 15 mmol in dextrose 5 % 250 mL IVPB  15 mmol Intravenous PRN POP Denson        Or    sodium phosphate 20 mmol in dextrose 5 % 500 mL IVPB  20 mmol Intravenous PRN POP An        sodium chloride flush 0.9 % injection 10 mL  10 mL Intravenous 2 times per day POP An   10 mL at 05/05/19 1939    sodium chloride flush 0.9 % injection 10 mL  10 mL Intravenous PRN POP An        acetaminophen (TYLENOL) tablet 650 mg  650 mg Oral Q4H PRN POP Denson        ondansetron (ZOFRAN) injection 4 mg  4 mg Intravenous Q6H PRN POP Denson   4 mg at 05/06/19 0328    morphine (PF) injection 2 mg  2 mg Intravenous Q2H PRN POP Denson   2 mg at 05/06/19 4976    Or    morphine injection 4 mg  4 mg Intravenous Q2H PRN POP Denson   4 mg at 05/05/19 2158    pantoprazole (PROTONIX) injection 40 mg  40 mg Intravenous BID POP Denson   40 mg at 05/06/19 0905    And    sodium chloride (PF) 0.9 % injection 10 mL  10 mL Intravenous BID POP Denson   10 mL at 05/06/19 3601       Allergies:     Allergies   Allergen Reactions    Corn-Containing Products        Problem List:    Patient Active Problem List   Diagnosis Code    Alcohol withdrawal ([facility] [address] Q71.051    Alcoholic hepatitis Y76.61    Pancreatitis, History K85.90    COPD (chronic obstructive pulmonary disease)/Asthma J44.9    Date of last solid food consumption: 04/30/19    BMI:   Wt Readings from Last 3 Encounters:   05/05/19 174 lb 1.6 oz (79 kg)   10/10/18 150 lb (68 kg)   01/11/18 161 lb (73 kg)     Body mass index is 31.84 kg/m². CBC:   Lab Results   Component Value Date    WBC 6.8 05/06/2019    RBC 4.53 05/06/2019    RBC 4.75 04/09/2012    HGB 14.6 05/06/2019    HCT 41.1 05/06/2019    MCV 90.7 05/06/2019    RDW 13.3 12/09/2017     05/06/2019       CMP:   Lab Results   Component Value Date     05/05/2019    K 3.8 05/05/2019    K 3.7 05/03/2019     05/05/2019    CO2 19 05/05/2019    BUN 9 05/05/2019    CREATININE 0.4 05/05/2019    LABGLOM >90 05/05/2019    GLUCOSE 144 05/05/2019    PROT 5.3 05/05/2019    CALCIUM 8.5 05/05/2019    BILITOT 0.8 05/05/2019    ALKPHOS 83 05/05/2019    AST 61 05/05/2019    ALT 52 05/05/2019       POC Tests:   Recent Labs     05/06/19  0630   POCGLU 152*       Coags:   Lab Results   Component Value Date    INR 0.86 10/17/2017    APTT 38.1 10/17/2017       HCG (If Applicable): No results found for: PREGTESTUR, PREGSERUM, HCG, HCGQUANT     ABGs: No results found for: PHART, PO2ART, YJS7UFI, QDC3JHM, BEART, V6LZBCNJ     Type & Screen (If Applicable):  No results found for: LABABO, [address]    Anesthesia Evaluation  Patient summary reviewed no history of anesthetic complications:   Airway: Mallampati: II  TM distance: <3 FB   Neck ROM: full  Mouth opening: > = 3 FB Dental:          Pulmonary:normal exam    (+) COPD:  asthma:                            Cardiovascular:  Exercise tolerance: good (>4 METS),   (+) hypertension:,                   Neuro/Psych:   (+) seizures: no interval change, psychiatric history:             ROS comment: Seizure r/t DM/ETOH 1 year ago. No treatment. GI/Hepatic/Renal:   (+) GERD:, liver disease:,           Endo/Other:    (+) DiabetesType II DM, using insulin, . Abdominal:   (+) obese,     Abdomen: soft.     Vascular: negative vascular ROS. Anesthesia Plan      MAC     ASA 2       Induction: intravenous. Anesthetic plan and risks discussed with patient. Plan discussed with CRNA.     Attending anesthesiologist reviewed and agrees with [address] [place], [place] [id number] - CRNA   5/6/2019

## 2019-05-06 NOTE — PLAN OF CARE
Problem: Pain:  Goal: Pain level will decrease  Description  Pain level will decrease  Outcome: Ongoing  Note:   Patient continues to have pain of a 7 - 8 and takes IV Morphine fairly regularly. States Morphine usually only takes pain down to about a 6. Problem: Falls - Risk of:  Goal: Will remain free from falls  Description  Will remain free from falls  Outcome: Ongoing  Note:   No falls this shift. Bed in locked and low position with side rails up x 2 and call light within reach. Patient has spent most of the day in the bed with the exception of standing at side of the bed to urinate. Did walk to the cart for his EGD and gait was steady  Goal: Absence of physical injury  Description  Absence of physical injury  Outcome: Ongoing  Note:   No physical injury occurred to patient this shift     Problem: Impaired respiratory status  Goal: Clear lung sounds  Outcome: Ongoing  Note:   Lung sounds are clear to ascultation     Problem: Discharge Planning:  Goal: Participates in care planning  Description  Participates in care planning  Outcome: Ongoing  Note:   Patient from home by himself and plans on returning at discharge     Problem: Cardiac Output - Decreased:  Goal: Hemodynamic stability will improve  Description  Hemodynamic stability will improve  Outcome: Ongoing  Note:   Vital signs are stable and patient remains in NSR or borderline sinus tach. Problem: Fluid Volume - Imbalance:  Goal: Absence of imbalanced fluid volume signs and symptoms  Description  Absence of imbalanced fluid volume signs and symptoms  Outcome: Ongoing  Note:   Patient continues to be NPO due to his continued abdominal pain and nausea. Has LR IV at 125 ml per hr and does take some ice chips.   Voiding well in about 300 ml amounts several times a shift     Problem: Serum Glucose Level - Abnormal:  Goal: Ability to maintain appropriate glucose levels will improve to within specified parameters  Description  Ability to

## 2019-05-06 NOTE — FLOWSHEET NOTE
Pt is a young 26year old male in bed on 3B. He stated he is looking for a Yazidism. We talked about large active churches vs small ones. He welcomed prayer after our conversation about his family, his medical condition and his future. 05/06/19 1450   Encounter Summary   Services provided to: Patient   Referral/Consult From: [facility] [address] Visiting Yes  (5/6 looking for a Yazidism)   Complexity of Encounter Moderate   Length of Encounter 15 minutes   Spiritual/Hindu   Type Spiritual support   Assessment Approachable   Intervention Prayer;Nurtured hope; Active listening   Outcome Engaged in conversation   follow up as needed,

## 2019-05-06 NOTE — H&P
[facility]   Pre-Operative History and Physical: EGD    Patient: Umer Mattson  : 1985     History Obtained From:  patient, electronic medical record    HISTORY OF PRESENT ILLNESS:    The patient is a 26 y.o. male who presents for an EGD for hematemesis after a few days of N/V from DKA. Past Medical History:        Diagnosis Date    Asthma     COPD (chronic obstructive pulmonary disease) (HCC)     Eczema     GERD (gastroesophageal reflux disease)     History of alcohol abuse     History of marijuana use     History of tobacco abuse     quit 2017    Hx of seasonal allergies     Hypertension     Pancreatitis     Seizures (HCC)     etoh wdl    Type 2 diabetes mellitus without complication ([facility]      Past Surgical History:    History reviewed. No pertinent surgical history. Medications Prior to Admission:   No current facility-administered medications on file prior to encounter.       Current Outpatient Medications on File Prior to Encounter   Medication Sig Dispense Refill    albuterol sulfate HFA (PROVENTIL HFA) 108 (90 Base) MCG/ACT inhaler Inhale 2 puffs into the lungs every 6 hours as needed for Wheezing 1 Inhaler 0    hydrOXYzine (VISTARIL) 100 MG capsule Take 1 capsule by mouth 4 times daily as needed for Anxiety 60 capsule 3    traZODone (DESYREL) 100 MG tablet Take 1 tablet by mouth nightly as needed for Sleep 30 tablet 5    metFORMIN (GLUCOPHAGE) 1000 MG tablet Take 1 tablet by mouth 2 times daily (with meals) 60 tablet 5    citalopram (CELEXA) 20 MG tablet Take 1 tablet by mouth daily 30 tablet 5    aspirin 81 MG EC tablet Take 81 mg by mouth daily      ibuprofen (ADVIL;MOTRIN) 200 MG tablet Take 400 mg by mouth every 6 hours as needed for Pain      acetaminophen (TYLENOL) 500 MG tablet Take 1,000 mg by mouth every 6 hours as needed for Pain      Multiple Vitamins-Minerals (MENS MULTIVITAMIN PLUS) TABS Take 1 tablet by mouth daily [facility]

## 2019-05-06 NOTE — CARE COORDINATION
19, 1:52 PM      Belkys Calvillo       Admitted from: ED 5/3/2019/ [facility] [place] day: 3   Location: --A Reason for admit: Acute pancreatitis with uninfected necrosis, unspecified [K85.91] Status: IP  Admit order signed?: yes  PMH:  has a past medical history of Asthma, COPD (chronic obstructive pulmonary disease) ([facility] [address] Eczema, GERD (gastroesophageal reflux disease), History of alcohol abuse, History of marijuana use, History of tobacco abuse, Hx of seasonal allergies, Hypertension, Pancreatitis, Seizures ([facility] [address] and Type 2 diabetes mellitus without complication ([facility] [address] Procedure:  EGD  Pertinent abnormal Imagin/3 CT Abd - There is mild peripancreatic infiltration greatest around the pancreatic tail. Correlation with pancreatic enzymes is advised findings are concerning for mild pancreatitis. There is a small hiatal hernia and mild thickening of the distal esophageal wall. Mild esophagitis cannot be excluded. Diffuse fatty infiltration of the liver. Medications:  Scheduled Meds:   insulin lispro  0-12 Units Subcutaneous TID WC    insulin lispro  0-6 Units Subcutaneous Nightly    insulin glargine  15 Units Subcutaneous Nightly    phosphorus replacement protocol   Other RX Placeholder    aspirin  81 mg Oral Daily    citalopram  20 mg Oral Daily    mometasone-formoterol  2 puff Inhalation BID    sodium chloride flush  10 mL Intravenous 2 times per day    pantoprazole  40 mg Intravenous BID    And    sodium chloride (PF)  10 mL Intravenous BID     Continuous Infusions:   lactated ringers 125 mL/hr at 19 1327    dextrose      dextrose 5 % and 0.45 % NaCl Stopped (19 0456)      Pertinent Info/Orders/Treatment Plan:  Pt admitted through ED with complaints of severe abdominal pain, associated with nausea and vomiting. Lipase level over 1400, down to 293.1 today. CT abdomen results above. Pt has a history of severe alcohol abuse, states he takes medication to stay sober.  Started iv fluids and iv Protonix. Consulted GI. EGD completed today. Biopsies taken. Diet: Diet NPO Effective Now Exceptions are: Ice Chips   Smoking status:  reports that he has quit smoking. His smoking use included cigarettes. He has a 11.00 pack-year smoking history. He has never used smokeless tobacco.   PCP: BARB Guilelrmo CNP  Readmission: No  Readmission Risk Score: 8%    Discharge Planning  Current Residence:  Private Residence  Living Arrangements:  Alone   Support Systems:  Parent, Family Members  Current Services PTA:     Potential Assistance Needed:  N/A  Potential Assistance Purchasing Medications:  No  Does patient want to participate in local refill/ meds to beds program?  No  Type of Home Care Services:  None  Patient expects to be discharged to:  home alone  Expected Discharge date:  05/08/19  Follow Up Appointment: Best Day/ Time: Tuesday AM(anytime)    Discharge Plan: Pt lives at home alone. He is able to get to his appointments. He is a self pay, financial services to see. He states he has no equipment and is not interested in home health at this time.       Evaluation: no

## 2019-05-07 LAB
ANION GAP SERPL CALCULATED.3IONS-SCNC: 15 MEQ/L (ref 8–16)
BUN BLDV-MCNC: 10 MG/DL (ref 7–22)
CALCIUM SERPL-MCNC: 8.8 MG/DL (ref 8.5–10.5)
CHLORIDE BLD-SCNC: 102 MEQ/L (ref 98–111)
CO2: 21 MEQ/L (ref 23–33)
CREAT SERPL-MCNC: 0.4 MG/DL (ref 0.4–1.2)
ERYTHROCYTE [DISTWIDTH] IN BLOOD BY AUTOMATED COUNT: 13.2 % (ref 11.5–14.5)
ERYTHROCYTE [DISTWIDTH] IN BLOOD BY AUTOMATED COUNT: 44.7 FL (ref 35–45)
GFR SERPL CREATININE-BSD FRML MDRD: > 90 ML/MIN/1.73M2
GLUCOSE BLD-MCNC: 101 MG/DL (ref 70–108)
GLUCOSE BLD-MCNC: 103 MG/DL (ref 70–108)
GLUCOSE BLD-MCNC: 86 MG/DL (ref 70–108)
GLUCOSE BLD-MCNC: 88 MG/DL (ref 70–108)
GLUCOSE BLD-MCNC: 94 MG/DL (ref 70–108)
HCT VFR BLD CALC: 42.1 % (ref 42–52)
HEMOGLOBIN: 14.7 GM/DL (ref 14–18)
LIPASE: 173.6 U/L (ref 5.6–51.3)
MCH RBC QN AUTO: 32.2 PG (ref 26–33)
MCHC RBC AUTO-ENTMCNC: 34.9 GM/DL (ref 32.2–35.5)
MCV RBC AUTO: 92.1 FL (ref 80–94)
PHOSPHORUS: 3.2 MG/DL (ref 2.4–4.7)
PLATELET # BLD: 156 THOU/MM3 (ref 130–400)
PMV BLD AUTO: 10.3 FL (ref 9.4–12.4)
POTASSIUM SERPL-SCNC: 3.6 MEQ/L (ref 3.5–5.2)
RBC # BLD: 4.57 MILL/MM3 (ref 4.7–6.1)
SODIUM BLD-SCNC: 138 MEQ/L (ref 135–145)
WBC # BLD: 6.9 THOU/MM3 (ref 4.8–10.8)

## 2019-05-07 PROCEDURE — 99232 SBSQ HOSP IP/OBS MODERATE 35: CPT | Performed by: INTERNAL MEDICINE

## 2019-05-07 PROCEDURE — 36415 COLL VENOUS BLD VENIPUNCTURE: CPT

## 2019-05-07 PROCEDURE — C9113 INJ PANTOPRAZOLE SODIUM, VIA: HCPCS | Performed by: PHYSICIAN ASSISTANT

## 2019-05-07 PROCEDURE — 84100 ASSAY OF PHOSPHORUS: CPT

## 2019-05-07 PROCEDURE — 2709999900 HC NON-CHARGEABLE SUPPLY

## 2019-05-07 PROCEDURE — 6370000000 HC RX 637 (ALT 250 FOR IP): Performed by: INTERNAL MEDICINE

## 2019-05-07 PROCEDURE — 2580000003 HC RX 258: Performed by: PHYSICIAN ASSISTANT

## 2019-05-07 PROCEDURE — 82948 REAGENT STRIP/BLOOD GLUCOSE: CPT

## 2019-05-07 PROCEDURE — 6370000000 HC RX 637 (ALT 250 FOR IP): Performed by: PHYSICIAN ASSISTANT

## 2019-05-07 PROCEDURE — 1200000003 HC TELEMETRY R&B

## 2019-05-07 PROCEDURE — 83690 ASSAY OF LIPASE: CPT

## 2019-05-07 PROCEDURE — 6360000002 HC RX W HCPCS: Performed by: PHYSICIAN ASSISTANT

## 2019-05-07 PROCEDURE — 2580000003 HC RX 258: Performed by: INTERNAL MEDICINE

## 2019-05-07 PROCEDURE — 94640 AIRWAY INHALATION TREATMENT: CPT

## 2019-05-07 PROCEDURE — 85027 COMPLETE CBC AUTOMATED: CPT

## 2019-05-07 PROCEDURE — 80048 BASIC METABOLIC PNL TOTAL CA: CPT

## 2019-05-07 RX ORDER — OXYCODONE HYDROCHLORIDE AND ACETAMINOPHEN 5; 325 MG/1; MG/1
2 TABLET ORAL EVERY 4 HOURS PRN
Status: DISCONTINUED | OUTPATIENT
Start: 2019-05-07 | End: 2019-05-09 | Stop reason: HOSPADM

## 2019-05-07 RX ORDER — OXYCODONE HYDROCHLORIDE AND ACETAMINOPHEN 5; 325 MG/1; MG/1
1 TABLET ORAL EVERY 4 HOURS PRN
Status: DISCONTINUED | OUTPATIENT
Start: 2019-05-07 | End: 2019-05-09 | Stop reason: HOSPADM

## 2019-05-07 RX ADMIN — MORPHINE SULFATE 4 MG: 4 INJECTION INTRAVENOUS at 04:50

## 2019-05-07 RX ADMIN — ONDANSETRON 4 MG: 2 INJECTION INTRAMUSCULAR; INTRAVENOUS at 19:31

## 2019-05-07 RX ADMIN — OXYCODONE AND ACETAMINOPHEN 2 TABLET: 5; 325 TABLET ORAL at 17:48

## 2019-05-07 RX ADMIN — ONDANSETRON 4 MG: 2 INJECTION INTRAMUSCULAR; INTRAVENOUS at 00:56

## 2019-05-07 RX ADMIN — PANTOPRAZOLE SODIUM 40 MG: 40 INJECTION, POWDER, FOR SOLUTION INTRAVENOUS at 08:55

## 2019-05-07 RX ADMIN — MORPHINE SULFATE 4 MG: 4 INJECTION INTRAVENOUS at 11:04

## 2019-05-07 RX ADMIN — SODIUM CHLORIDE, POTASSIUM CHLORIDE, SODIUM LACTATE AND CALCIUM CHLORIDE: 600; 310; 30; 20 INJECTION, SOLUTION INTRAVENOUS at 19:45

## 2019-05-07 RX ADMIN — HYDROXYZINE PAMOATE 100 MG: 50 CAPSULE ORAL at 01:05

## 2019-05-07 RX ADMIN — Medication 10 ML: at 22:55

## 2019-05-07 RX ADMIN — HYDROXYZINE PAMOATE 100 MG: 50 CAPSULE ORAL at 22:41

## 2019-05-07 RX ADMIN — OXYCODONE AND ACETAMINOPHEN 2 TABLET: 5; 325 TABLET ORAL at 22:41

## 2019-05-07 RX ADMIN — ASPIRIN 81 MG: 81 TABLET, COATED ORAL at 08:54

## 2019-05-07 RX ADMIN — TRAZODONE HYDROCHLORIDE 100 MG: 100 TABLET ORAL at 01:05

## 2019-05-07 RX ADMIN — MORPHINE SULFATE 4 MG: 4 INJECTION INTRAVENOUS at 13:16

## 2019-05-07 RX ADMIN — MORPHINE SULFATE 2 MG: 2 INJECTION, SOLUTION INTRAMUSCULAR; INTRAVENOUS at 15:22

## 2019-05-07 RX ADMIN — HYDROXYZINE PAMOATE 100 MG: 50 CAPSULE ORAL at 15:25

## 2019-05-07 RX ADMIN — MORPHINE SULFATE 4 MG: 4 INJECTION INTRAVENOUS at 06:54

## 2019-05-07 RX ADMIN — MORPHINE SULFATE 4 MG: 4 INJECTION INTRAVENOUS at 08:54

## 2019-05-07 RX ADMIN — ONDANSETRON 4 MG: 2 INJECTION INTRAMUSCULAR; INTRAVENOUS at 13:16

## 2019-05-07 RX ADMIN — SODIUM CHLORIDE, POTASSIUM CHLORIDE, SODIUM LACTATE AND CALCIUM CHLORIDE: 600; 310; 30; 20 INJECTION, SOLUTION INTRAVENOUS at 11:52

## 2019-05-07 RX ADMIN — Medication 10 ML: at 08:55

## 2019-05-07 RX ADMIN — INSULIN GLARGINE 15 UNITS: 100 INJECTION, SOLUTION SUBCUTANEOUS at 23:33

## 2019-05-07 RX ADMIN — HYDROXYZINE PAMOATE 100 MG: 50 CAPSULE ORAL at 08:54

## 2019-05-07 RX ADMIN — Medication 2 PUFF: at 08:42

## 2019-05-07 RX ADMIN — CITALOPRAM 20 MG: 20 TABLET, FILM COATED ORAL at 08:57

## 2019-05-07 RX ADMIN — PANTOPRAZOLE SODIUM 40 MG: 40 INJECTION, POWDER, FOR SOLUTION INTRAVENOUS at 22:41

## 2019-05-07 RX ADMIN — ONDANSETRON 4 MG: 2 INJECTION INTRAMUSCULAR; INTRAVENOUS at 06:54

## 2019-05-07 RX ADMIN — Medication 10 ML: at 22:43

## 2019-05-07 RX ADMIN — Medication 2 PUFF: at 22:09

## 2019-05-07 RX ADMIN — MORPHINE SULFATE 4 MG: 4 INJECTION INTRAVENOUS at 00:56

## 2019-05-07 ASSESSMENT — PAIN SCALES - GENERAL
PAINLEVEL_OUTOF10: 8
PAINLEVEL_OUTOF10: 6
PAINLEVEL_OUTOF10: 7
PAINLEVEL_OUTOF10: 7
PAINLEVEL_OUTOF10: 5
PAINLEVEL_OUTOF10: 6
PAINLEVEL_OUTOF10: 6
PAINLEVEL_OUTOF10: 7
PAINLEVEL_OUTOF10: 6
PAINLEVEL_OUTOF10: 6
PAINLEVEL_OUTOF10: 3

## 2019-05-07 ASSESSMENT — PAIN DESCRIPTION - DESCRIPTORS: DESCRIPTORS: DISCOMFORT

## 2019-05-07 ASSESSMENT — PAIN DESCRIPTION - PAIN TYPE: TYPE: ACUTE PAIN

## 2019-05-07 ASSESSMENT — PAIN DESCRIPTION - PROGRESSION: CLINICAL_PROGRESSION: NOT CHANGED

## 2019-05-07 ASSESSMENT — PAIN DESCRIPTION - LOCATION: LOCATION: ABDOMEN

## 2019-05-07 ASSESSMENT — PAIN DESCRIPTION - FREQUENCY: FREQUENCY: CONTINUOUS

## 2019-05-07 ASSESSMENT — PAIN - FUNCTIONAL ASSESSMENT: PAIN_FUNCTIONAL_ASSESSMENT: ACTIVITIES ARE NOT PREVENTED

## 2019-05-07 ASSESSMENT — PAIN DESCRIPTION - ONSET: ONSET: ON-GOING

## 2019-05-08 LAB
ANION GAP SERPL CALCULATED.3IONS-SCNC: 16 MEQ/L (ref 8–16)
BUN BLDV-MCNC: 7 MG/DL (ref 7–22)
CALCIUM SERPL-MCNC: 8.4 MG/DL (ref 8.5–10.5)
CHLORIDE BLD-SCNC: 100 MEQ/L (ref 98–111)
CO2: 21 MEQ/L (ref 23–33)
CREAT SERPL-MCNC: 0.4 MG/DL (ref 0.4–1.2)
ERYTHROCYTE [DISTWIDTH] IN BLOOD BY AUTOMATED COUNT: 13.2 % (ref 11.5–14.5)
ERYTHROCYTE [DISTWIDTH] IN BLOOD BY AUTOMATED COUNT: 44.8 FL (ref 35–45)
GFR SERPL CREATININE-BSD FRML MDRD: > 90 ML/MIN/1.73M2
GLUCOSE BLD-MCNC: 128 MG/DL (ref 70–108)
GLUCOSE BLD-MCNC: 179 MG/DL (ref 70–108)
GLUCOSE BLD-MCNC: 180 MG/DL (ref 70–108)
GLUCOSE BLD-MCNC: 66 MG/DL (ref 70–108)
GLUCOSE BLD-MCNC: 71 MG/DL (ref 70–108)
GLUCOSE BLD-MCNC: 73 MG/DL (ref 70–108)
HCT VFR BLD CALC: 40.7 % (ref 42–52)
HEMOGLOBIN: 14 GM/DL (ref 14–18)
LIPASE: 112.3 U/L (ref 5.6–51.3)
MCH RBC QN AUTO: 32.1 PG (ref 26–33)
MCHC RBC AUTO-ENTMCNC: 34.4 GM/DL (ref 32.2–35.5)
MCV RBC AUTO: 93.3 FL (ref 80–94)
PHOSPHORUS: 3.9 MG/DL (ref 2.4–4.7)
PLATELET # BLD: 194 THOU/MM3 (ref 130–400)
PMV BLD AUTO: 9.8 FL (ref 9.4–12.4)
POTASSIUM SERPL-SCNC: 3.3 MEQ/L (ref 3.5–5.2)
POTASSIUM SERPL-SCNC: 3.5 MEQ/L (ref 3.5–5.2)
RBC # BLD: 4.36 MILL/MM3 (ref 4.7–6.1)
SODIUM BLD-SCNC: 137 MEQ/L (ref 135–145)
WBC # BLD: 7.2 THOU/MM3 (ref 4.8–10.8)

## 2019-05-08 PROCEDURE — 1200000003 HC TELEMETRY R&B

## 2019-05-08 PROCEDURE — 2580000003 HC RX 258: Performed by: PHYSICIAN ASSISTANT

## 2019-05-08 PROCEDURE — 36415 COLL VENOUS BLD VENIPUNCTURE: CPT

## 2019-05-08 PROCEDURE — 84132 ASSAY OF SERUM POTASSIUM: CPT

## 2019-05-08 PROCEDURE — 6370000000 HC RX 637 (ALT 250 FOR IP): Performed by: PHYSICIAN ASSISTANT

## 2019-05-08 PROCEDURE — 94760 N-INVAS EAR/PLS OXIMETRY 1: CPT

## 2019-05-08 PROCEDURE — 2580000003 HC RX 258: Performed by: INTERNAL MEDICINE

## 2019-05-08 PROCEDURE — 83690 ASSAY OF LIPASE: CPT

## 2019-05-08 PROCEDURE — 2709999900 HC NON-CHARGEABLE SUPPLY

## 2019-05-08 PROCEDURE — 84100 ASSAY OF PHOSPHORUS: CPT

## 2019-05-08 PROCEDURE — C9113 INJ PANTOPRAZOLE SODIUM, VIA: HCPCS | Performed by: PHYSICIAN ASSISTANT

## 2019-05-08 PROCEDURE — 6360000002 HC RX W HCPCS: Performed by: PHYSICIAN ASSISTANT

## 2019-05-08 PROCEDURE — 80048 BASIC METABOLIC PNL TOTAL CA: CPT

## 2019-05-08 PROCEDURE — 99232 SBSQ HOSP IP/OBS MODERATE 35: CPT | Performed by: FAMILY MEDICINE

## 2019-05-08 PROCEDURE — 82948 REAGENT STRIP/BLOOD GLUCOSE: CPT

## 2019-05-08 PROCEDURE — 94640 AIRWAY INHALATION TREATMENT: CPT

## 2019-05-08 PROCEDURE — 6370000000 HC RX 637 (ALT 250 FOR IP): Performed by: INTERNAL MEDICINE

## 2019-05-08 PROCEDURE — 85027 COMPLETE CBC AUTOMATED: CPT

## 2019-05-08 RX ORDER — POTASSIUM CHLORIDE 20 MEQ/1
40 TABLET, EXTENDED RELEASE ORAL ONCE
Status: COMPLETED | OUTPATIENT
Start: 2019-05-08 | End: 2019-05-08

## 2019-05-08 RX ADMIN — OXYCODONE AND ACETAMINOPHEN 2 TABLET: 5; 325 TABLET ORAL at 03:33

## 2019-05-08 RX ADMIN — OXYCODONE AND ACETAMINOPHEN 2 TABLET: 5; 325 TABLET ORAL at 19:11

## 2019-05-08 RX ADMIN — PANTOPRAZOLE SODIUM 40 MG: 40 INJECTION, POWDER, FOR SOLUTION INTRAVENOUS at 08:18

## 2019-05-08 RX ADMIN — Medication 10 ML: at 20:19

## 2019-05-08 RX ADMIN — ASPIRIN 81 MG: 81 TABLET, COATED ORAL at 08:29

## 2019-05-08 RX ADMIN — OXYCODONE AND ACETAMINOPHEN 2 TABLET: 5; 325 TABLET ORAL at 23:11

## 2019-05-08 RX ADMIN — PANTOPRAZOLE SODIUM 40 MG: 40 INJECTION, POWDER, FOR SOLUTION INTRAVENOUS at 20:17

## 2019-05-08 RX ADMIN — HYDROXYZINE PAMOATE 100 MG: 50 CAPSULE ORAL at 08:29

## 2019-05-08 RX ADMIN — Medication 10 ML: at 22:29

## 2019-05-08 RX ADMIN — SODIUM CHLORIDE, POTASSIUM CHLORIDE, SODIUM LACTATE AND CALCIUM CHLORIDE: 600; 310; 30; 20 INJECTION, SOLUTION INTRAVENOUS at 20:00

## 2019-05-08 RX ADMIN — Medication 10 ML: at 13:54

## 2019-05-08 RX ADMIN — ONDANSETRON 4 MG: 2 INJECTION INTRAMUSCULAR; INTRAVENOUS at 23:16

## 2019-05-08 RX ADMIN — Medication 10 ML: at 13:53

## 2019-05-08 RX ADMIN — OXYCODONE AND ACETAMINOPHEN 2 TABLET: 5; 325 TABLET ORAL at 08:29

## 2019-05-08 RX ADMIN — ONDANSETRON 4 MG: 2 INJECTION INTRAMUSCULAR; INTRAVENOUS at 16:31

## 2019-05-08 RX ADMIN — ONDANSETRON 4 MG: 2 INJECTION INTRAMUSCULAR; INTRAVENOUS at 03:33

## 2019-05-08 RX ADMIN — INSULIN GLARGINE 15 UNITS: 100 INJECTION, SOLUTION SUBCUTANEOUS at 22:27

## 2019-05-08 RX ADMIN — HYDROXYZINE PAMOATE 100 MG: 50 CAPSULE ORAL at 20:16

## 2019-05-08 RX ADMIN — POTASSIUM CHLORIDE 40 MEQ: 20 TABLET, EXTENDED RELEASE ORAL at 20:16

## 2019-05-08 RX ADMIN — ONDANSETRON 4 MG: 2 INJECTION INTRAMUSCULAR; INTRAVENOUS at 08:36

## 2019-05-08 RX ADMIN — TRAZODONE HYDROCHLORIDE 100 MG: 100 TABLET ORAL at 00:18

## 2019-05-08 RX ADMIN — CITALOPRAM 20 MG: 20 TABLET, FILM COATED ORAL at 08:32

## 2019-05-08 RX ADMIN — TRAZODONE HYDROCHLORIDE 100 MG: 100 TABLET ORAL at 23:11

## 2019-05-08 RX ADMIN — SODIUM CHLORIDE, POTASSIUM CHLORIDE, SODIUM LACTATE AND CALCIUM CHLORIDE: 600; 310; 30; 20 INJECTION, SOLUTION INTRAVENOUS at 03:26

## 2019-05-08 RX ADMIN — Medication 2 PUFF: at 21:52

## 2019-05-08 RX ADMIN — HYDROXYZINE PAMOATE 100 MG: 50 CAPSULE ORAL at 13:57

## 2019-05-08 RX ADMIN — OXYCODONE AND ACETAMINOPHEN 2 TABLET: 5; 325 TABLET ORAL at 13:56

## 2019-05-08 ASSESSMENT — PAIN SCALES - GENERAL
PAINLEVEL_OUTOF10: 5
PAINLEVEL_OUTOF10: 8
PAINLEVEL_OUTOF10: 7
PAINLEVEL_OUTOF10: 7
PAINLEVEL_OUTOF10: 4
PAINLEVEL_OUTOF10: 8
PAINLEVEL_OUTOF10: 7

## 2019-05-08 NOTE — PROGRESS NOTES
Hospitalist Progress Note    Patient:  Lewis Watkins      Unit/Bed:5K-20/020-A    YOB: 1985    MRN: [id number]       Acct: [de-identified]     PCP: BARB Ro CNP    Date of Admission: 5/3/2019    Chief Complaint:   Abdominal pain, vomiting    Hospital Course:   Please see H/P for details. In summary, this is a  26 y. o. male, with PMH DM type 2, COPD, hx of ETOH use (pt states his last drink was 1 year ago after he had DUI and was placed on monitoring), smoker, who  presented to [facility] with diffuse, severe abdominal pain. Pt states that he started to have abd pain 3 days prior admission, then 2 days PTA, he started having nausea and vomiting. Pt states that he has vomited too many times to count and has filled up a small trash can. Pt describes the vomit as black color. Pt denies vomiting in blood. Pt has not been able to take any of his PO meds and has not drank or ate since Wednesday.    In the ED, the patient was found to be in DKA and have Acute Pancreatitis. Anion Gap 38. Bicarb 8. Beta-hydroxybutyrate elevated. Creatinine 1.3. Lipase 1400. WBC 15. CT abd showed \"There is mild peripancreatic infiltration greatest around the pancreatic tail. Correlation with pancreatic enzymes is advised findings are concerning for mild pancreatitis. small hiatal hernia and mild thickening of the distal esophageal wall. Mild esophagitis cannot be excluded. Diffuse fatty infiltration of the liver\". Pt was stable throughout his ED stay, pt was admitted to [facility] under [address] service for DKA protocol in place.      Had hallucinations right after admission, likely due to sleep deprivation, now resolved.       Subjective:   Patient's abdominal is improving, currently about 6/10, epigastric. Tolerated clear liquids. No nausea or vomiting.      Medications:  Reviewed    Infusion Medications    lactated ringers 125 mL/hr at 05/08/19 0326    dextrose      dextrose 5 % and 0.45 % NaCl Stopped (05/05/19 0036)     Scheduled Medications    insulin lispro  0-12 Units Subcutaneous TID WC    insulin lispro  0-6 Units Subcutaneous Nightly    insulin glargine  15 Units Subcutaneous Nightly    phosphorus replacement protocol   Other RX Placeholder    aspirin  81 mg Oral Daily    citalopram  20 mg Oral Daily    mometasone-formoterol  2 puff Inhalation BID    sodium chloride flush  10 mL Intravenous 2 times per day    pantoprazole  40 mg Intravenous BID    And    sodium chloride (PF)  10 mL Intravenous BID     PRN Meds: oxyCODONE-acetaminophen **OR** oxyCODONE-acetaminophen, sodium chloride (PF), glucose, dextrose, glucagon (rDNA), dextrose, bisacodyl, albuterol sulfate HFA, hydrOXYzine, traZODone, potassium chloride, magnesium sulfate, sodium phosphate IVPB **OR** sodium phosphate IVPB **OR** sodium phosphate IVPB, sodium chloride flush, acetaminophen, ondansetron      Intake/Output Summary (Last 24 hours) at 5/8/2019 1445  Last data filed at 5/8/2019 0513  Gross per 24 hour   Intake 1809.01 ml   Output 0 ml   Net 1809.01 ml       Diet:  DIET CLEAR LIQUID;    Exam:  BP (!) 154/95   Pulse 79   Temp 97.8 °F (36.6 °C) (Oral)   Resp 16   Ht 5' 2\" (1.575 m)   Wt 174 lb 1.6 oz (79 kg)   SpO2 99%   BMI 31.84 kg/m²     General appearance: No apparent distress, appears stated age and cooperative. HEENT: Pupils equal, round, and reactive to light. Conjunctivae/corneas clear. Neck: Supple, with full range of motion. No jugular venous distention. Trachea midline. Respiratory:  Normal respiratory effort. Clear to auscultation, bilaterally without Rales/Wheezes/Rhonchi. Cardiovascular: Regular rate and rhythm with normal S1/S2 without murmurs, rubs or gallops. Abdomen: Soft, non-tender, non-distended with normal bowel sounds. Musculoskeletal: passive and active ROM x 4 extremities. Skin: Skin color, texture, turgor normal.  No rashes or lesions.   Neurologic:  Neurovascularly intact without any focal sensory/motor deficits. Cranial nerves: II-XII intact, grossly non-focal.  Psychiatric: Alert and oriented, thought content appropriate, normal insight  Capillary Refill: Brisk,< 3 seconds   Peripheral Pulses: +2 palpable, equal bilaterally       Labs:   Recent Labs     05/06/19  0907 05/07/19  0705 05/08/19  0630   WBC 6.8 6.9 7.2   HGB 14.6 14.7 14.0   HCT 41.1* 42.1 40.7*   * 156 194     Recent Labs     05/06/19  0407 05/06/19  0907 05/07/19  0705 05/08/19  0630   NA  --  136 138 137   K  --  3.6 3.6 3.3*   CL  --  100 102 100   CO2  --  21* 21* 21*   BUN  --  10 10 7   CREATININE  --  0.4 0.4 0.4   CALCIUM  --  9.2 8.8 8.4*   PHOS 2.4  --  3.2 3.9     No results for input(s): AST, ALT, BILIDIR, BILITOT, ALKPHOS in the last 72 hours. No results for input(s): INR in the last 72 hours. No results for input(s): Jeffery Southerly in the last 72 hours. Urinalysis:      Lab Results   Component Value Date    NITRU NEGATIVE 12/18/2017    WBCUA 0-2 12/07/2017    BACTERIA NONE 12/07/2017    RBCUA 0-2 12/07/2017    BLOODU NEGATIVE 12/18/2017    SPECGRAV 1.011 10/12/2013    GLUCOSEU NEGATIVE 12/18/2017       Radiology:  US GALLBLADDER RUQ   Final Result       1. Echogenic liver suggesting fatty infiltration. 2. No gallstones. **This report has been created using voice recognition software. It may contain minor errors which are inherent in voice recognition technology. **      Final report electronically signed by Dr. Abdulkadir Tabares on 5/4/2019 10:03 AM      CT ABDOMEN PELVIS WO CONTRAST Additional Contrast? None   Final Result      1. There is mild peripancreatic infiltration greatest around the pancreatic tail. Correlation with pancreatic enzymes is advised findings are concerning for mild pancreatitis. 2. There is a small hiatal hernia and mild thickening of the distal esophageal wall. Mild esophagitis cannot be excluded. 3. Diffuse fatty infiltration of the liver. **This report has been created using voice recognition software. It may contain minor errors which are inherent in voice recognition technology. **      Final report electronically signed by Dr. Boston Coker on 5/3/2019 1:26 PM          Diet: DIET CLEAR LIQUID;    DVT prophylaxis: [] Lovenox                                 [] SCDs                                 [] SQ Heparin                                 [] Encourage ambulation           [] Already on Anticoagulation     Disposition:    [] Home       [] TCU       [] Rehab       [] Psych       [] SNF       [] [place]       [] Other-    Code Status: Full Code    PT/OT Eval Status:   Assessment/Plan:    Anticipated Discharge in :     C/Lizzy Ng 110[address] Problems    Diagnosis Date Noted    Metabolic encephalopathy [C57.39]     Alcohol use disorder, moderate, in sustained remission, dependence ([facility] [address] [F10.21]     Acute pancreatitis with uninfected necrosis, unspecified [K85.91] 05/03/2019     1. DKA possibly from acute pancreatitis     -AG improving, blood sugar improving, now <200. On Lantus  -cont accu-checks, BMP q 4 hours  -A1C 9.3%, pt only takes metformin at home, discussed he will need insulin at [place]. Diabetic teaching. Discussed referral to diabetic health clinic.      2. Acute pancreatitis, etiology unclear yet     -pt denies recent ETOH intake  -triglyceride nl  -CT abd/pelvis as stated above  -re-check lipase, improving  -abd pain improving  - clears started. -pain meds prn   - GI consulted        3. mild thickening of the distal esophageal wall, possible esophagitis     -cont PPI  -GI c/s for further recs  - EGD tomorrow     4. Leukocytosis, likely from problem #2, resolved      5. Mild hypokalemia     -likely related to DKA  -potassium replacement protocol  -re-check BMP as ordered     6. Hypophosphatemia      -phos replacement protocol  -phos level in am      7. Pseudohyponatremia, resolved      8.  Constipation     -dulcolax supp prn  -monitor     9. \"black\" emesis      -reported by pt  -GI c/s for further eval and mx      10. Transaminases possibly from fatty liver      -hepa panel better  -RUQ US fatty liver     11. Fatty liver noted on CT abd/pelvis     13. HANSEL likely pre-renal due to dehydration, resolved     -cont IVF as ordered  -monitor BMP     14. History of Alcohol Abuse      -pt states quit 1 year ago     15.  Generalized Anxiety Disorder               - Continue home medication: Vistaril, Celexa, Trazadone      Continue BID PPI 40mg for at least 8 weeks  GI recommended repeat EGD in 8 weeks to assess for healing of esophagitis  Will need a new GI provider on discharge            Electronically signed by Nav Reyes MD on 5/8/2019 at 2:45 PM

## 2019-05-08 NOTE — PROGRESS NOTES
Hospitalist Progress Note    Patient:  Rhett Gee      Unit/Bed:5K-20/020-A    YOB: 1985    MRN: [id number]       Acct: [de-identified]     PCP: BARB Ye CNP    Date of Admission: 5/3/2019    Chief Complaint:   Chief Complaint   Patient presents with    Emesis    Abdominal Pain         Hospital Course:     Please see H/P for details. In summary, this is a  26 y.o. male, with PMH DM type 2, COPD, hx of ETOH use (pt states his last drink was 1 year ago after he had DUI and was placed on monitoring), smoker, who  presented to [address] [place] [address] with diffuse, severe abdominal pain. Pt states that he started to have abd pain 3 days prior admission, then 2 days PTA, he started having nausea and vomiting. Pt states that he has vomited too many times to count and has filled up a small trash can. Pt describes the vomit as black color. Pt denies vomiting in blood. Pt has not been able to take any of his PO meds and has not drank or ate since Wednesday.    In the ED, the patient was found to be in DKA and have Acute Pancreatitis. Anion Gap 38. Bicarb 8. Beta-hydroxybutyrate elevated. Creatinine 1.3. Lipase 1400. WBC 15. CT abd showed \"There is mild peripancreatic infiltration greatest around the pancreatic tail. Correlation with pancreatic enzymes is advised findings are concerning for mild pancreatitis. small hiatal hernia and mild thickening of the distal esophageal wall. Mild esophagitis cannot be excluded. Diffuse fatty infiltration of the liver\". Pt was stable throughout his ED stay, pt was admitted to [facility] under [address] service for DKA protocol in place. RN reports overnight pt was seeing kids on his bathroom. Hallucinations resolved      Subjective:     Abdominal pain is improving. Still quite tender.      Medications:  Reviewed    Infusion Medications    lactated ringers 125 mL/hr at 05/07/19 1945    dextrose      dextrose 5 % and 0.45 % NaCl 156     Recent Labs     05/05/19  0827  05/05/19  2352 05/06/19  0407 05/06/19  0907 05/07/19  0705      < > 136  --  136 138   K 3.7   < > 3.8  --  3.6 3.6      < > 102  --  100 102   CO2 15*   < > 19*  --  21* 21*   BUN 10   < > 9  --  10 10   CREATININE 0.4   < > 0.4  --  0.4 0.4   CALCIUM 8.2*   < > 8.5  --  9.2 8.8   PHOS 2.0*  --   --  2.4  --  3.2    < > = values in this interval not displayed. Recent Labs     05/05/19  0340   AST 61*   ALT 52   BILIDIR 0.3   BILITOT 0.8   ALKPHOS 83     No results for input(s): INR in the last 72 hours. No results for input(s): Vicky Lacrosse in the last 72 hours. Urinalysis:      Lab Results   Component Value Date    NITRU NEGATIVE 12/18/2017    WBCUA 0-2 12/07/2017    BACTERIA NONE 12/07/2017    RBCUA 0-2 12/07/2017    BLOODU NEGATIVE 12/18/2017    SPECGRAV 1.011 10/12/2013    GLUCOSEU NEGATIVE 12/18/2017       Radiology:  US GALLBLADDER RUQ   Final Result       1. Echogenic liver suggesting fatty infiltration. 2. No gallstones. **This report has been created using voice recognition software. It may contain minor errors which are inherent in voice recognition technology. **      Final report electronically signed by Dr. Alex Castaneda on 5/4/2019 10:03 AM      CT ABDOMEN PELVIS WO CONTRAST Additional Contrast? None   Final Result      1. There is mild peripancreatic infiltration greatest around the pancreatic tail. Correlation with pancreatic enzymes is advised findings are concerning for mild pancreatitis. 2. There is a small hiatal hernia and mild thickening of the distal esophageal wall. Mild esophagitis cannot be excluded. 3. Diffuse fatty infiltration of the liver. **This report has been created using voice recognition software. It may contain minor errors which are inherent in voice recognition technology. **      Final report electronically signed by Dr. Echo Jerome on 5/3/2019 1:26 PM [] Amy       [x] Other-Plan as above       Code Status: Full Code        Electronically signed by Alexander Bonds MD on 5/7/2019 at 11:21 PM          Electronically signed by Alexander Bonds MD on 5/7/2019 at 11:21 PM

## 2019-05-08 NOTE — PLAN OF CARE
Problem: Falls - Risk of:  Goal: Will remain free from falls  Description  Will remain free from falls  5/8/2019 1606 by Sandra Bryan RN  Outcome: Met This Shift  5/8/2019 0510 by Lit Childs RN  Outcome: Ongoing  Note:   Pt remains free of falls this shift. Pt uses call light approprietly, waits for staff before getting up. Bed in lowest position can call light within reach. Bed alarm on. Goal: Absence of physical injury  Description  Absence of physical injury  5/8/2019 1606 by Sandra Bryan RN  Outcome: Met This Shift  Note:   Patient verbalizes understanding of fall precautions, uses call light appropriately. 5/8/2019 0510 by Lit Childs RN  Outcome: Ongoing     Problem: Impaired respiratory status  Goal: Clear lung sounds  5/8/2019 1606 by Sandra Bryan RN  Outcome: Met This Shift  5/8/2019 0510 by Lit Childs RN  Outcome: Ongoing     Problem: Pain:  Goal: Pain level will decrease  Description  Pain level will decrease  5/8/2019 1606 by Sandra Bryan RN  Outcome: Ongoing  5/8/2019 0510 by Lit Childs RN  Outcome: Ongoing  Note:   Pts stated pain goal 4/10. Pt has abdomibal pain rated at 7/10. PO percocet given every 4 hrs. Goal: Control of acute pain  Description  Control of acute pain  5/8/2019 1606 by Sandra Bryan RN  Outcome: Ongoing  5/8/2019 0510 by Lit Childs RN  Outcome: Ongoing  Goal: Control of chronic pain  Description  Control of chronic pain  5/8/2019 1606 by Sandra Bryan RN  Outcome: Ongoing  Note:   Taking pain medication as ordered,satisfied with relief. 5/8/2019 0510 by Lit Childs RN  Outcome: Ongoing     Problem: Discharge Planning:  Goal: Participates in care planning  Description  Participates in care planning  5/8/2019 1606 by Sandra Bryan RN  Outcome: Ongoing  5/8/2019 0510 by Lit Childs RN  Outcome: Ongoing  Note:   DC plan back home.    Goal: Discharged to appropriate level of care  Description  Discharged to appropriate level of care  5/8/2019 1606 by Cristina Rosa RN  Outcome: Ongoing  Note:   Working toward goal of discharge to home. 5/8/2019 0510 by Bryan Grider RN  Outcome: Ongoing     Problem: Cardiac Output - Decreased:  Goal: Hemodynamic stability will improve  Description  Hemodynamic stability will improve  5/8/2019 1606 by Cristina Rosa RN  Outcome: Ongoing  5/8/2019 0510 by Bryan Grider RN  Outcome: Ongoing     Problem: Fluid Volume - Imbalance:  Goal: Absence of imbalanced fluid volume signs and symptoms  Description  Absence of imbalanced fluid volume signs and symptoms  5/8/2019 0510 by Bryan Grider RN  Outcome: Ongoing     Problem: Serum Glucose Level - Abnormal:  Goal: Ability to maintain appropriate glucose levels will improve to within specified parameters  Description  Ability to maintain appropriate glucose levels will improve to within specified parameters  5/8/2019 1606 by Cristina Rosa RN  Outcome: Ongoing  5/8/2019 0510 by Bryan Grider RN  Outcome: Ongoing  Note:   Pt on lantus and humalog sliding scale. This shift only lantus was given. Problem: Pain:  Goal: Pain level will decrease  Description  Pain level will decrease  5/8/2019 1606 by Cristina Rosa RN  Outcome: Ongoing  5/8/2019 0510 by Bryan Grider RN  Outcome: Ongoing  Note:   Pts stated pain goal 4/10. Pt has abdomibal pain rated at 7/10. PO percocet given every 4 hrs. Goal: Control of acute pain  Description  Control of acute pain  5/8/2019 1606 by Cristina Rosa RN  Outcome: Ongoing  5/8/2019 0510 by Bryan Grider RN  Outcome: Ongoing  Goal: Control of chronic pain  Description  Control of chronic pain  5/8/2019 1606 by Cristina Rosa RN  Outcome: Ongoing  Note:   Taking pain medication as ordered,satisfied with relief.   5/8/2019 0510 by Bryan Grider RN  Outcome: Ongoing     Problem: Discharge Planning:  Goal: Participates in care planning  Description  Participates in care planning  5/8/2019 1606 by Frederick Sanderson RN  Outcome: Ongoing  5/8/2019 0510 by Lesa Kramer RN  Outcome: Ongoing  Note:   DC plan back home. Goal: Discharged to appropriate level of care  Description  Discharged to appropriate level of care  5/8/2019 1606 by Frederick Sanderson RN  Outcome: Ongoing  Note:   Working toward goal of discharge to home. 5/8/2019 0510 by Lesa Kramer RN  Outcome: Ongoing     Problem: Cardiac Output - Decreased:  Goal: Hemodynamic stability will improve  Description  Hemodynamic stability will improve  5/8/2019 1606 by Frederick Sanderson RN  Outcome: Ongoing  5/8/2019 0510 by Lesa Kramer RN  Outcome: Ongoing     Problem: Fluid Volume - Imbalance:  Goal: Absence of imbalanced fluid volume signs and symptoms  Description  Absence of imbalanced fluid volume signs and symptoms  5/8/2019 0510 by Lesa Kramer RN  Outcome: Ongoing     Problem: Serum Glucose Level - Abnormal:  Goal: Ability to maintain appropriate glucose levels will improve to within specified parameters  Description  Ability to maintain appropriate glucose levels will improve to within specified parameters  5/8/2019 1606 by Frederick Sanderson RN  Outcome: Ongoing  5/8/2019 0510 by Lesa Kramer RN  Outcome: Ongoing  Note:   Pt on lantus and humalog sliding scale. This shift only lantus was given. Problem: Cardiac Output - Decreased:  Goal: Hemodynamic stability will improve  Description  Hemodynamic stability will improve  5/8/2019 1606 by Frederick Sanderson RN  Outcome: Ongoing     Problem: Discharge Planning:  Goal: Discharged to appropriate level of care  Description  Discharged to appropriate level of care  5/8/2019 1606 by Frederick Sanderson RN  Outcome: Ongoing  Note:   Working toward goal of discharge to home.

## 2019-05-09 ENCOUNTER — TELEPHONE (OUTPATIENT)
Dept: FAMILY MEDICINE CLINIC | Age: 34
End: 2019-05-09

## 2019-05-09 VITALS
WEIGHT: 174.1 LBS | RESPIRATION RATE: 18 BRPM | BODY MASS INDEX: 32.04 KG/M2 | SYSTOLIC BLOOD PRESSURE: 131 MMHG | HEIGHT: 62 IN | DIASTOLIC BLOOD PRESSURE: 87 MMHG | HEART RATE: 83 BPM | TEMPERATURE: 98 F | OXYGEN SATURATION: 98 %

## 2019-05-09 LAB
ANION GAP SERPL CALCULATED.3IONS-SCNC: 14 MEQ/L (ref 8–16)
BUN BLDV-MCNC: 4 MG/DL (ref 7–22)
CALCIUM SERPL-MCNC: 8.7 MG/DL (ref 8.5–10.5)
CHLORIDE BLD-SCNC: 103 MEQ/L (ref 98–111)
CO2: 25 MEQ/L (ref 23–33)
CREAT SERPL-MCNC: 0.4 MG/DL (ref 0.4–1.2)
ERYTHROCYTE [DISTWIDTH] IN BLOOD BY AUTOMATED COUNT: 13.1 % (ref 11.5–14.5)
ERYTHROCYTE [DISTWIDTH] IN BLOOD BY AUTOMATED COUNT: 44.1 FL (ref 35–45)
GFR SERPL CREATININE-BSD FRML MDRD: > 90 ML/MIN/1.73M2
GLUCOSE BLD-MCNC: 125 MG/DL (ref 70–108)
GLUCOSE BLD-MCNC: 130 MG/DL (ref 70–108)
GLUCOSE BLD-MCNC: 137 MG/DL (ref 70–108)
HCT VFR BLD CALC: 40.2 % (ref 42–52)
HEMOGLOBIN: 13.7 GM/DL (ref 14–18)
LIPASE: 112.7 U/L (ref 5.6–51.3)
MCH RBC QN AUTO: 31.4 PG (ref 26–33)
MCHC RBC AUTO-ENTMCNC: 34.1 GM/DL (ref 32.2–35.5)
MCV RBC AUTO: 92 FL (ref 80–94)
PHOSPHORUS: 3.2 MG/DL (ref 2.4–4.7)
PLATELET # BLD: 297 THOU/MM3 (ref 130–400)
PMV BLD AUTO: 9.8 FL (ref 9.4–12.4)
POTASSIUM SERPL-SCNC: 3.6 MEQ/L (ref 3.5–5.2)
RBC # BLD: 4.37 MILL/MM3 (ref 4.7–6.1)
SODIUM BLD-SCNC: 142 MEQ/L (ref 135–145)
WBC # BLD: 6.6 THOU/MM3 (ref 4.8–10.8)

## 2019-05-09 PROCEDURE — 6370000000 HC RX 637 (ALT 250 FOR IP): Performed by: INTERNAL MEDICINE

## 2019-05-09 PROCEDURE — 99238 HOSP IP/OBS DSCHRG MGMT 30/<: CPT | Performed by: FAMILY MEDICINE

## 2019-05-09 PROCEDURE — 94640 AIRWAY INHALATION TREATMENT: CPT

## 2019-05-09 PROCEDURE — 82948 REAGENT STRIP/BLOOD GLUCOSE: CPT

## 2019-05-09 PROCEDURE — 85027 COMPLETE CBC AUTOMATED: CPT

## 2019-05-09 PROCEDURE — 2580000003 HC RX 258: Performed by: INTERNAL MEDICINE

## 2019-05-09 PROCEDURE — 6360000002 HC RX W HCPCS: Performed by: PHYSICIAN ASSISTANT

## 2019-05-09 PROCEDURE — 2580000003 HC RX 258: Performed by: PHYSICIAN ASSISTANT

## 2019-05-09 PROCEDURE — 2709999900 HC NON-CHARGEABLE SUPPLY

## 2019-05-09 PROCEDURE — 80048 BASIC METABOLIC PNL TOTAL CA: CPT

## 2019-05-09 PROCEDURE — C9113 INJ PANTOPRAZOLE SODIUM, VIA: HCPCS | Performed by: PHYSICIAN ASSISTANT

## 2019-05-09 PROCEDURE — 36415 COLL VENOUS BLD VENIPUNCTURE: CPT

## 2019-05-09 PROCEDURE — 83690 ASSAY OF LIPASE: CPT

## 2019-05-09 PROCEDURE — 84100 ASSAY OF PHOSPHORUS: CPT

## 2019-05-09 PROCEDURE — 6370000000 HC RX 637 (ALT 250 FOR IP): Performed by: PHYSICIAN ASSISTANT

## 2019-05-09 RX ORDER — PANTOPRAZOLE SODIUM 40 MG/1
40 TABLET, DELAYED RELEASE ORAL
Qty: 120 TABLET | Refills: 0 | Status: SHIPPED | OUTPATIENT
Start: 2019-05-09 | End: 2019-09-01

## 2019-05-09 RX ORDER — DOCUSATE SODIUM 100 MG/1
100 CAPSULE, LIQUID FILLED ORAL 2 TIMES DAILY PRN
Qty: 60 CAPSULE | Refills: 0 | Status: SHIPPED | OUTPATIENT
Start: 2019-05-09 | End: 2019-06-08

## 2019-05-09 RX ORDER — PANTOPRAZOLE SODIUM 40 MG/1
40 TABLET, DELAYED RELEASE ORAL
Qty: 120 TABLET | Refills: 0 | Status: SHIPPED | OUTPATIENT
Start: 2019-05-09 | End: 2019-05-09 | Stop reason: SDUPTHER

## 2019-05-09 RX ORDER — ASPIRIN 81 MG/1
81 TABLET ORAL DAILY
Qty: 30 TABLET | Refills: 3 | Status: SHIPPED | OUTPATIENT
Start: 2019-05-10

## 2019-05-09 RX ORDER — OXYCODONE HYDROCHLORIDE AND ACETAMINOPHEN 5; 325 MG/1; MG/1
1 TABLET ORAL EVERY 8 HOURS PRN
Qty: 9 TABLET | Refills: 0 | Status: SHIPPED | OUTPATIENT
Start: 2019-05-09 | End: 2019-05-09

## 2019-05-09 RX ORDER — BISACODYL 10 MG
10 SUPPOSITORY, RECTAL RECTAL PRN
Qty: 30 SUPPOSITORY | Refills: 0 | Status: SHIPPED | OUTPATIENT
Start: 2019-05-09 | End: 2019-05-09

## 2019-05-09 RX ORDER — LANCETS 30 GAUGE
1 EACH MISCELLANEOUS 3 TIMES DAILY
Qty: 200 EACH | Refills: 0 | Status: SHIPPED | OUTPATIENT
Start: 2019-05-09 | End: 2020-07-28

## 2019-05-09 RX ORDER — DOCUSATE SODIUM 100 MG/1
100 CAPSULE, LIQUID FILLED ORAL 2 TIMES DAILY PRN
Qty: 60 CAPSULE | Refills: 0 | Status: SHIPPED | OUTPATIENT
Start: 2019-05-09 | End: 2019-05-09 | Stop reason: SDUPTHER

## 2019-05-09 RX ORDER — BISACODYL 10 MG
10 SUPPOSITORY, RECTAL RECTAL PRN
Qty: 30 SUPPOSITORY | Refills: 0 | Status: SHIPPED | OUTPATIENT
Start: 2019-05-09 | End: 2019-06-08

## 2019-05-09 RX ORDER — OXYCODONE HYDROCHLORIDE AND ACETAMINOPHEN 5; 325 MG/1; MG/1
1 TABLET ORAL EVERY 8 HOURS PRN
Qty: 9 TABLET | Refills: 0 | Status: SHIPPED | OUTPATIENT
Start: 2019-05-09 | End: 2019-05-12

## 2019-05-09 RX ORDER — LANCETS 30 GAUGE
1 EACH MISCELLANEOUS 3 TIMES DAILY
Qty: 200 EACH | Refills: 0 | Status: SHIPPED | OUTPATIENT
Start: 2019-05-09 | End: 2019-05-09 | Stop reason: SDUPTHER

## 2019-05-09 RX ADMIN — SODIUM CHLORIDE, POTASSIUM CHLORIDE, SODIUM LACTATE AND CALCIUM CHLORIDE: 600; 310; 30; 20 INJECTION, SOLUTION INTRAVENOUS at 04:09

## 2019-05-09 RX ADMIN — OXYCODONE AND ACETAMINOPHEN 2 TABLET: 5; 325 TABLET ORAL at 08:26

## 2019-05-09 RX ADMIN — Medication 2 PUFF: at 09:03

## 2019-05-09 RX ADMIN — Medication 10 ML: at 08:21

## 2019-05-09 RX ADMIN — CITALOPRAM 20 MG: 20 TABLET, FILM COATED ORAL at 08:21

## 2019-05-09 RX ADMIN — Medication 10 ML: at 08:22

## 2019-05-09 RX ADMIN — PANTOPRAZOLE SODIUM 40 MG: 40 INJECTION, POWDER, FOR SOLUTION INTRAVENOUS at 08:20

## 2019-05-09 RX ADMIN — OXYCODONE AND ACETAMINOPHEN 2 TABLET: 5; 325 TABLET ORAL at 04:12

## 2019-05-09 RX ADMIN — ASPIRIN 81 MG: 81 TABLET, COATED ORAL at 08:21

## 2019-05-09 RX ADMIN — SODIUM CHLORIDE, POTASSIUM CHLORIDE, SODIUM LACTATE AND CALCIUM CHLORIDE: 600; 310; 30; 20 INJECTION, SOLUTION INTRAVENOUS at 12:46

## 2019-05-09 RX ADMIN — OXYCODONE AND ACETAMINOPHEN 2 TABLET: 5; 325 TABLET ORAL at 12:46

## 2019-05-09 RX ADMIN — HYDROXYZINE PAMOATE 100 MG: 50 CAPSULE ORAL at 04:12

## 2019-05-09 RX ADMIN — ONDANSETRON 4 MG: 2 INJECTION INTRAMUSCULAR; INTRAVENOUS at 07:48

## 2019-05-09 ASSESSMENT — PAIN SCALES - GENERAL
PAINLEVEL_OUTOF10: 6
PAINLEVEL_OUTOF10: 8
PAINLEVEL_OUTOF10: 7
PAINLEVEL_OUTOF10: 7

## 2019-05-09 ASSESSMENT — PAIN DESCRIPTION - LOCATION: LOCATION: ABDOMEN

## 2019-05-09 ASSESSMENT — PAIN DESCRIPTION - PAIN TYPE: TYPE: ACUTE PAIN

## 2019-05-09 NOTE — PLAN OF CARE
Problem: Impaired respiratory status  Goal: Clear lung sounds  5/9/2019 0905 by Richard Low RCP  Outcome: Ongoing   Pt had no questions on purpose or side effects of medication   Will continue with treatments to help improve aeration t/o lungs

## 2019-05-09 NOTE — PLAN OF CARE
Problem: Pain:  Goal: Pain level will decrease  Description  Pain level will decrease  5/9/2019 0601 by Ryan Schaeffer RN  Outcome: Met This Shift  Note:   PO percocet given every 4 hrs. Problem: Falls - Risk of:  Goal: Will remain free from falls  Description  Will remain free from falls  5/9/2019 0601 by Ryan Schaeffer RN  Outcome: Ongoing  Note:   Pt remains free of falls this shift. Bed alarm on call light in reach. Problem: Discharge Planning:  Goal: Participates in care planning  Description  Participates in care planning  5/9/2019 0601 by Ryan Schaeffer RN  Outcome: Ongoing  Note:   DC plan back to home. Problem: Fluid Volume - Imbalance:  Goal: Absence of imbalanced fluid volume signs and symptoms  Description  Absence of imbalanced fluid volume signs and symptoms  Outcome: Ongoing  Note:   Pt receiving LR @ 125ml/hr.

## 2019-05-09 NOTE — FLOWSHEET NOTE
Prayer and encouragement as he will be going home today, he said. 05/09/19 1532   Encounter Summary   Services provided to: Patient   Referral/Consult From: Rounding   Continue Visiting No  (5/9 )   Complexity of Encounter Low   Length of Encounter 15 minutes   Routine   Type Follow up   Assessment Approachable;Calm   Intervention Prayer;Nurtured hope   Outcome Acceptance;Expressed gratitude;Encouraged; Hopeful;Receptive

## 2019-05-09 NOTE — PROGRESS NOTES
Hospitalist Progress Note    Patient:  Stella Roger      Unit/Bed:5K-20/020-A    YOB: 1985    MRN: [id number]       Acct: [de-identified]     PCP: BARB Landers CNP    Date of Admission: 5/3/2019    Chief Complaint:   Abdominal pain, vomiting    Hospital Course:   Please see H/P for details. In summary, this is a  26 y. o. male, with PMH DM type 2, COPD, hx of ETOH use (pt states his last drink was 1 year ago after he had DUI and was placed on monitoring), smoker, who  presented to [address] [place] [address] with diffuse, severe abdominal pain. Pt states that he started to have abd pain 3 days prior admission, then 2 days PTA, he started having nausea and vomiting. Pt states that he has vomited too many times to count and has filled up a small trash can. Pt describes the vomit as black color. Pt denies vomiting in blood. Pt has not been able to take any of his PO meds and has not drank or ate since Wednesday.    In the ED, the patient was found to be in DKA and have Acute Pancreatitis. Anion Gap 38. Bicarb 8. Beta-hydroxybutyrate elevated. Creatinine 1.3. Lipase 1400. WBC 15. CT abd showed \"There is mild peripancreatic infiltration greatest around the pancreatic tail. Correlation with pancreatic enzymes is advised findings are concerning for mild pancreatitis. small hiatal hernia and mild thickening of the distal esophageal wall. Mild esophagitis cannot be excluded. Diffuse fatty infiltration of the liver\". Pt was stable throughout his ED stay, pt was admitted to [facility] under [facility] [place] [address] service for DKA protocol in place.      Had hallucinations right after admission, likely due to sleep deprivation, now resolved. Subjective:   Patient's abdominal is better, epigastric. Appears comfortable in bed. Tolerated full liquids. No nausea or vomiting.  Transition to regular today    Medications:  Reviewed    Infusion Medications    lactated ringers 125 mL/hr at 05/09/19 9053    dextrose      dextrose 5 % and 0.45 % NaCl Stopped (05/05/19 4886)     Scheduled Medications    potassium replacement protocol   Other RX Placeholder    insulin lispro  0-12 Units Subcutaneous TID WC    insulin lispro  0-6 Units Subcutaneous Nightly    insulin glargine  15 Units Subcutaneous Nightly    phosphorus replacement protocol   Other RX Placeholder    aspirin  81 mg Oral Daily    citalopram  20 mg Oral Daily    mometasone-formoterol  2 puff Inhalation BID    sodium chloride flush  10 mL Intravenous 2 times per day    pantoprazole  40 mg Intravenous BID    And    sodium chloride (PF)  10 mL Intravenous BID     PRN Meds: oxyCODONE-acetaminophen **OR** oxyCODONE-acetaminophen, sodium chloride (PF), glucose, dextrose, glucagon (rDNA), dextrose, bisacodyl, albuterol sulfate HFA, hydrOXYzine, traZODone, potassium chloride, magnesium sulfate, sodium phosphate IVPB **OR** sodium phosphate IVPB **OR** sodium phosphate IVPB, sodium chloride flush, acetaminophen, ondansetron      Intake/Output Summary (Last 24 hours) at 5/9/2019 0934  Last data filed at 5/8/2019 2103  Gross per 24 hour   Intake 2066.92 ml   Output --   Net 2066.92 ml       Diet:  DIET GENERAL;    Exam:  /87   Pulse 83   Temp 98 °F (36.7 °C) (Oral)   Resp 18   Ht 5' 2\" (1.575 m)   Wt 174 lb 1.6 oz (79 kg)   SpO2 98%   BMI 31.84 kg/m²     General appearance: No apparent distress, appears stated age and cooperative. HEENT: Pupils equal, round, and reactive to light. Conjunctivae/corneas clear. Neck: Supple, with full range of motion. No jugular venous distention. Trachea midline. Respiratory:  Normal respiratory effort. Clear to auscultation, bilaterally without Rales/Wheezes/Rhonchi. Cardiovascular: Regular rate and rhythm with normal S1/S2 without murmurs, rubs or gallops. Abdomen: Soft, non-tender, non-distended with normal bowel sounds. Musculoskeletal: passive and active ROM x 4 extremities.   Skin: Skin color, texture, turgor normal.  No rashes or lesions. Neurologic:  Neurovascularly intact without any focal sensory/motor deficits. Cranial nerves: II-XII intact, grossly non-focal.  Psychiatric: Alert and oriented, thought content appropriate, normal insight  Capillary Refill: Brisk,< 3 seconds   Peripheral Pulses: +2 palpable, equal bilaterally       Labs:   Recent Labs     05/07/19  0705 05/08/19  0630 05/09/19  0659   WBC 6.9 7.2 6.6   HGB 14.7 14.0 13.7*   HCT 42.1 40.7* 40.2*    194 297     Recent Labs     05/07/19  0705 05/08/19  0630 05/08/19  1641 05/09/19  0659    137  --  142   K 3.6 3.3* 3.5 3.6    100  --  103   CO2 21* 21*  --  25   BUN 10 7  --  4*   CREATININE 0.4 0.4  --  0.4   CALCIUM 8.8 8.4*  --  8.7   PHOS 3.2 3.9  --  3.2     No results for input(s): AST, ALT, BILIDIR, BILITOT, ALKPHOS in the last 72 hours. No results for input(s): INR in the last 72 hours. No results for input(s): Susan Maha in the last 72 hours. Urinalysis:      Lab Results   Component Value Date    NITRU NEGATIVE 12/18/2017    WBCUA 0-2 12/07/2017    BACTERIA NONE 12/07/2017    RBCUA 0-2 12/07/2017    BLOODU NEGATIVE 12/18/2017    SPECGRAV 1.011 10/12/2013    GLUCOSEU NEGATIVE 12/18/2017       Radiology:  US GALLBLADDER RUQ   Final Result       1. Echogenic liver suggesting fatty infiltration. 2. No gallstones. **This report has been created using voice recognition software. It may contain minor errors which are inherent in voice recognition technology. **      Final report electronically signed by Dr. Nan Medina on 5/4/2019 10:03 AM      CT ABDOMEN PELVIS WO CONTRAST Additional Contrast? None   Final Result      1. There is mild peripancreatic infiltration greatest around the pancreatic tail. Correlation with pancreatic enzymes is advised findings are concerning for mild pancreatitis. 2. There is a small hiatal hernia and mild thickening of the distal esophageal wall.  Mild esophagitis cannot be excluded. 3. Diffuse fatty infiltration of the liver. **This report has been created using voice recognition software. It may contain minor errors which are inherent in voice recognition technology. **      Final report electronically signed by Dr. Judi Porter on 5/3/2019 1:26 PM          Diet: DIET GENERAL;    DVT prophylaxis: [] Lovenox                                 [] SCDs                                 [] SQ Heparin                                 [] Encourage ambulation           [] Already on Anticoagulation     Disposition:    [] Home       [] TCU       [] Rehab       [] Psych       [] SNF       [] [facility]       [] Other-    Code Status: Full Code    PT/OT Eval Status:   Assessment/Plan:    Anticipated Discharge in :     C/Lizzy Ng 1106 Problems    Diagnosis Date Noted    Metabolic encephalopathy [L51.04]     Alcohol use disorder, moderate, in sustained remission, dependence ([facility] [address] [F10.21]     Acute pancreatitis with uninfected necrosis, unspecified [K85.91] 05/03/2019     1. DKA possibly from acute pancreatitis     -AG improving, blood sugar improving, now <200. On Lantus  -cont accu-checks, BMP q 4 hours  -A1C 9.3%, pt only takes metformin at home, discussed he will need insulin at [place]. Diabetic teaching. Discussed referral to diabetic health clinic.      2. Acute pancreatitis, etiology unclear yet     -pt denies recent ETOH intake  -triglyceride nl  -CT abd/pelvis as stated above  -re-check lipase, improving  -abd pain improving  - clears started. -pain meds prn   - GI consulted        3. mild thickening of the distal esophageal wall, possible esophagitis     -cont PPI  -GI c/s for further recs  - EGD done     4. Leukocytosis, likely from problem #2, resolved      5. Mild hypokalemia     -likely related to DKA  -potassium replacement protocol  -re-check BMP as ordered     6. Hypophosphatemia      -phos replacement protocol  -phos level in am      7.

## 2019-05-10 ENCOUNTER — TELEPHONE (OUTPATIENT)
Dept: FAMILY MEDICINE CLINIC | Age: 34
End: 2019-05-10

## 2019-05-10 NOTE — TELEPHONE ENCOUNTER
[facility] Transitions Initial Follow Up Call    Outreach made within 2 business days of discharge: Yes    Patient: Cody Nails Patient : 1985   MRN: [id number]  Reason for Admission: There are no discharge diagnoses documented for the most recent discharge. Discharge Date: 19       Spoke with: patient     Discharge department/facility: [facility]     TCM Interactive Patient Contact:  Was patient able to fill all prescriptions: Yes  Was patient instructed to bring all medications to the follow-up visit: Yes  Is patient taking all medications as directed in the discharge summary?  Yes  Does patient understand their discharge instructions: Yes  Does patient have questions or concerns that need addressed prior to 7-14 day follow up office visit: no    Scheduled appointment with PCP within 7-14 days    Follow Up  Future Appointments   Date Time Provider Malachi Gray   2019  9:40 AM BARB Webber - MILADIS Ordaz LPN

## 2019-05-10 NOTE — CARE COORDINATION
Late entry, patient discharged to home last evening. 5/10/19, 7:10 AM    Discharge plan discussed by  and . Discharge plan reviewed with patient/ family. Patient/ family verbalize understanding of discharge plan and are in agreement with plan. Understanding was demonstrated using the teach back method.

## 2019-05-13 NOTE — DISCHARGE SUMMARY
Hospital Medicine Discharge Summary      Patient Identification:   Carollynn Hodgkins   : 1985  MRN: [id number]   Account: [de-identified]      Patient's PCP: BARB Bray CNP    Admit Date: 5/3/2019     Discharge Date: 2019     Admitting Physician: Flores Valdes MD     Discharge Physician: Cecilia Moe MD     Discharge Diagnoses: Active Hospital Problems    Diagnosis Date Noted    Metabolic encephalopathy [V70.75]     Alcohol use disorder, moderate, in sustained remission, dependence ([facility] [address] [F10.21]     Acute pancreatitis with uninfected necrosis, unspecified [K85.91] 2019       The patient was seen and examined on day of discharge and this discharge summary is in conjunction with any daily progress note from day of discharge. Hospital Course:   Carollynn Hodgkins is a 26 y.o. male admitted to [address] [place] [address] on 5/3/2019. Please see H/P for details. In summary, this is a  26 y. o. male, with PMH DM type 2, COPD, hx of ETOH use (pt states his last drink was 1 year ago after he had DUI and was placed on monitoring), smoker, who  presented to [address] [place] [address] with diffuse, severe abdominal pain. Pt states that he started to have abd pain 3 days prior admission, then 2 days PTA, he started having nausea and vomiting. Pt states that he has vomited too many times to count and has filled up a small trash can. Pt describes the vomit as black color. Pt denies vomiting in blood. Pt has not been able to take any of his PO meds and has not drank or ate since Wednesday.      In the ED, the patient was found to be in DKA and have Acute Pancreatitis. Anion Gap 38. Bicarb 8. Beta-hydroxybutyrate elevated.  Creatinine 1.3. Lipase 1400. WBC 15. CT abd showed \"There is mild peripancreatic infiltration greatest around the pancreatic tail. Correlation with pancreatic enzymes is advised findings are concerning for mild pancreatitis.  small hiatal hernia and mild thickening of the distal esophageal wall. Mild esophagitis cannot be excluded. Diffuse fatty infiltration of the liver\". Pt was stable throughout his ED stay, pt was admitted to [facility] under [address] [place] [address] service for DKA protocol in place. He was eventually started on Lantus, his hgbA1C was 9.3%.     Had hallucinations right after admission, likely due to sleep deprivation, now resolved. Abdominal pain continued to improve, lipase trended down and diet was progressed. Discharged stable and will follow-up with PCP and GI as outpatient. Referred to DM clinic. For rpt EGD in 8 weeks, but need to find another GI provider as he was terminated from [facility] in the past. PPI BID continued for at least 8 weeks. Exam:     Vitals:  Vitals:    05/08/19 2000 05/08/19 2152 05/09/19 0412 05/09/19 0730   BP: 125/89  135/87 131/87   Pulse: 102  79 83   Resp: 16 16 16 18   Temp: 98.6 °F (37 °C)  97.7 °F (36.5 °C) 98 °F (36.7 °C)   TempSrc: Oral  Oral Oral   SpO2: 96% 97% 98% 98%   Weight:       Height:         Weight: Weight: 174 lb 1.6 oz (79 kg)     24 hour intake/output:No intake or output data in the 24 hours ending 05/13/19 0109      General appearance: No apparent distress, appears stated age and cooperative. HEENT: Pupils equal, round, and reactive to light. Conjunctivae/corneas clear. Neck: Supple, with full range of motion. No jugular venous distention. Trachea midline. Respiratory:  Normal respiratory effort. Clear to auscultation, bilaterally without Rales/Wheezes/Rhonchi. Cardiovascular: Regular rate and rhythm with normal S1/S2 without murmurs, rubs or gallops. Abdomen: Soft, non-tender, non-distended with normal bowel sounds. Musculoskeletal: passive and active ROM x 4 extremities. Skin: Skin color, texture, turgor normal.  No rashes or lesions. Neurologic:  Neurovascularly intact without any focal sensory/motor deficits.  Cranial nerves: II-XII intact, grossly non-focal.  Psychiatric: Alert and oriented, thought content appropriate, normal insight  Capillary Refill: Brisk,< 3 seconds   Peripheral Pulses: +2 palpable, equal bilaterally         Labs: For convenience and continuity at follow-up the following most recent labs are provided:      CBC:    Lab Results   Component Value Date    WBC 6.6 05/09/2019    HGB 13.7 05/09/2019    HCT 40.2 05/09/2019     05/09/2019       Renal:    Lab Results   Component Value Date     05/09/2019    K 3.6 05/09/2019    K 3.7 05/03/2019     05/09/2019    CO2 25 05/09/2019    BUN 4 05/09/2019    CREATININE 0.4 05/09/2019    CALCIUM 8.7 05/09/2019    PHOS 3.2 05/09/2019         Significant Diagnostic Studies    Radiology:   US GALLBLADDER RUQ   Final Result       1. Echogenic liver suggesting fatty infiltration. 2. No gallstones. **This report has been created using voice recognition software. It may contain minor errors which are inherent in voice recognition technology. **      Final report electronically signed by Dr. Cortes Nicoel on 5/4/2019 10:03 AM      CT ABDOMEN PELVIS WO CONTRAST Additional Contrast? None   Final Result      1. There is mild peripancreatic infiltration greatest around the pancreatic tail. Correlation with pancreatic enzymes is advised findings are concerning for mild pancreatitis. 2. There is a small hiatal hernia and mild thickening of the distal esophageal wall. Mild esophagitis cannot be excluded. 3. Diffuse fatty infiltration of the liver. **This report has been created using voice recognition software. It may contain minor errors which are inherent in voice recognition technology. **      Final report electronically signed by Dr. Berna Herzog on 5/3/2019 1:26 PM             Consults:     IP CONSULT TO GI  IP CONSULT TO PSYCHIATRY    Disposition:    [x] Home       [] TCU       [] Rehab       [] Psych       [] SNF       [] [facility]       [] Other-    Condition at Discharge: TEST strip  Test blood sugars three times daily. Time Spent on discharge is more than 30 minutes in the examination, evaluation, counseling and review of medications and discharge plan. Signed: Thank you BARB Tucker CNP for the opportunity to be involved in this patient's care.     Electronically signed by Geneva Bess MD on 5/13/2019 at 1:09 AM

## 2019-05-14 ENCOUNTER — TELEPHONE (OUTPATIENT)
Dept: INTERNAL MEDICINE CLINIC | Age: 34
End: 2019-05-14

## 2019-05-15 ENCOUNTER — APPOINTMENT (OUTPATIENT)
Dept: GENERAL RADIOLOGY | Age: 34
End: 2019-05-15
Payer: MEDICAID

## 2019-05-15 ENCOUNTER — HOSPITAL ENCOUNTER (EMERGENCY)
Age: 34
Discharge: HOME | End: 2019-05-15
Attending: INTERNAL MEDICINE
Payer: MEDICAID

## 2019-05-15 VITALS
TEMPERATURE: 98.5 F | HEIGHT: 62 IN | BODY MASS INDEX: 30.36 KG/M2 | RESPIRATION RATE: 14 BRPM | HEART RATE: 78 BPM | SYSTOLIC BLOOD PRESSURE: 119 MMHG | DIASTOLIC BLOOD PRESSURE: 89 MMHG | WEIGHT: 165 LBS | OXYGEN SATURATION: 97 %

## 2019-05-15 DIAGNOSIS — R73.9 HYPERGLYCEMIA: Primary | ICD-10-CM

## 2019-05-15 LAB
ALBUMIN SERPL-MCNC: 3.3 G/DL (ref 3.5–5.1)
ALP BLD-CCNC: 122 U/L (ref 38–126)
ALT SERPL-CCNC: 77 U/L (ref 11–66)
ANION GAP SERPL CALCULATED.3IONS-SCNC: 14 MEQ/L (ref 8–16)
AST SERPL-CCNC: 94 U/L (ref 5–40)
BASOPHILS # BLD: 1 %
BASOPHILS ABSOLUTE: 0.1 THOU/MM3 (ref 0–0.1)
BETA-HYDROXYBUTYRATE: 1.12 MG/DL (ref 0.2–2.81)
BILIRUB SERPL-MCNC: 0.3 MG/DL (ref 0.3–1.2)
BILIRUBIN DIRECT: < 0.2 MG/DL (ref 0–0.3)
BILIRUBIN URINE: NEGATIVE
BLOOD, URINE: NEGATIVE
BUN BLDV-MCNC: 9 MG/DL (ref 7–22)
CALCIUM SERPL-MCNC: 9.6 MG/DL (ref 8.5–10.5)
CHARACTER, URINE: CLEAR
CHLORIDE BLD-SCNC: 103 MEQ/L (ref 98–111)
CO2: 22 MEQ/L (ref 23–33)
COLOR: YELLOW
CREAT SERPL-MCNC: 0.6 MG/DL (ref 0.4–1.2)
EKG ATRIAL RATE: 78 BPM
EKG P AXIS: 23 DEGREES
EKG P-R INTERVAL: 110 MS
EKG Q-T INTERVAL: 400 MS
EKG QRS DURATION: 82 MS
EKG QTC CALCULATION (BAZETT): 456 MS
EKG R AXIS: 9 DEGREES
EKG T AXIS: 34 DEGREES
EKG VENTRICULAR RATE: 78 BPM
EOSINOPHIL # BLD: 1.7 %
EOSINOPHILS ABSOLUTE: 0.2 THOU/MM3 (ref 0–0.4)
ERYTHROCYTE [DISTWIDTH] IN BLOOD BY AUTOMATED COUNT: 13.2 % (ref 11.5–14.5)
ERYTHROCYTE [DISTWIDTH] IN BLOOD BY AUTOMATED COUNT: 45 FL (ref 35–45)
GFR SERPL CREATININE-BSD FRML MDRD: > 90 ML/MIN/1.73M2
GLUCOSE BLD-MCNC: 221 MG/DL (ref 70–108)
GLUCOSE BLD-MCNC: 281 MG/DL (ref 70–108)
GLUCOSE URINE: >= 1000 MG/DL
HCT VFR BLD CALC: 44.3 % (ref 42–52)
HEMOGLOBIN: 14.9 GM/DL (ref 14–18)
IMMATURE GRANS (ABS): 0.07 THOU/MM3 (ref 0–0.07)
IMMATURE GRANULOCYTES: 0.7 %
KETONES, URINE: NEGATIVE
LEUKOCYTE ESTERASE, URINE: NEGATIVE
LIPASE: 41.5 U/L (ref 5.6–51.3)
LYMPHOCYTES # BLD: 29 %
LYMPHOCYTES ABSOLUTE: 2.8 THOU/MM3 (ref 1–4.8)
MCH RBC QN AUTO: 31.6 PG (ref 26–33)
MCHC RBC AUTO-ENTMCNC: 33.6 GM/DL (ref 32.2–35.5)
MCV RBC AUTO: 93.9 FL (ref 80–94)
MONOCYTES # BLD: 11.3 %
MONOCYTES ABSOLUTE: 1.1 THOU/MM3 (ref 0.4–1.3)
NITRITE, URINE: NEGATIVE
NUCLEATED RED BLOOD CELLS: 0 /100 WBC
OSMOLALITY CALCULATION: 286.4 MOSMOL/KG (ref 275–300)
PH UA: 5.5 (ref 5–9)
PLATELET # BLD: 463 THOU/MM3 (ref 130–400)
PMV BLD AUTO: 9.3 FL (ref 9.4–12.4)
POTASSIUM SERPL-SCNC: 4.2 MEQ/L (ref 3.5–5.2)
PROTEIN UA: NEGATIVE
RBC # BLD: 4.72 MILL/MM3 (ref 4.7–6.1)
SEG NEUTROPHILS: 56.3 %
SEGMENTED NEUTROPHILS ABSOLUTE COUNT: 5.5 THOU/MM3 (ref 1.8–7.7)
SODIUM BLD-SCNC: 139 MEQ/L (ref 135–145)
SPECIFIC GRAVITY, URINE: > 1.03 (ref 1–1.03)
TOTAL PROTEIN: 6.4 G/DL (ref 6.1–8)
UROBILINOGEN, URINE: 0.2 EU/DL (ref 0–1)
WBC # BLD: 9.8 THOU/MM3 (ref 4.8–10.8)

## 2019-05-15 PROCEDURE — 81003 URINALYSIS AUTO W/O SCOPE: CPT

## 2019-05-15 PROCEDURE — 82948 REAGENT STRIP/BLOOD GLUCOSE: CPT

## 2019-05-15 PROCEDURE — 83690 ASSAY OF LIPASE: CPT

## 2019-05-15 PROCEDURE — 99285 EMERGENCY DEPT VISIT HI MDM: CPT

## 2019-05-15 PROCEDURE — 85025 COMPLETE CBC W/AUTO DIFF WBC: CPT

## 2019-05-15 PROCEDURE — 93005 ELECTROCARDIOGRAM TRACING: CPT | Performed by: INTERNAL MEDICINE

## 2019-05-15 PROCEDURE — 80053 COMPREHEN METABOLIC PANEL: CPT

## 2019-05-15 PROCEDURE — 82248 BILIRUBIN DIRECT: CPT

## 2019-05-15 PROCEDURE — 2580000003 HC RX 258: Performed by: INTERNAL MEDICINE

## 2019-05-15 PROCEDURE — 82010 KETONE BODYS QUAN: CPT

## 2019-05-15 PROCEDURE — 36415 COLL VENOUS BLD VENIPUNCTURE: CPT

## 2019-05-15 PROCEDURE — 93010 ELECTROCARDIOGRAM REPORT: CPT | Performed by: INTERNAL MEDICINE

## 2019-05-15 PROCEDURE — 71046 X-RAY EXAM CHEST 2 VIEWS: CPT

## 2019-05-15 RX ORDER — 0.9 % SODIUM CHLORIDE 0.9 %
1000 INTRAVENOUS SOLUTION INTRAVENOUS ONCE
Status: COMPLETED | OUTPATIENT
Start: 2019-05-15 | End: 2019-05-15

## 2019-05-15 RX ADMIN — SODIUM CHLORIDE 1000 ML: 9 INJECTION, SOLUTION INTRAVENOUS at 08:23

## 2019-05-15 ASSESSMENT — ENCOUNTER SYMPTOMS
SHORTNESS OF BREATH: 0
RHINORRHEA: 0
ABDOMINAL PAIN: 0
EYE REDNESS: 0
DIARRHEA: 0
WHEEZING: 0
BACK PAIN: 0
COUGH: 0
SORE THROAT: 0
NAUSEA: 0
VOMITING: 0
EYE DISCHARGE: 0

## 2019-05-15 NOTE — PROGRESS NOTES
CLINICAL PHARMACY NOTE: MEDS TO [address] Select Patient?: No  Total # of Prescriptions Filled: 5   The following medications were delivered to the patient:    Total # of Interventions Completed: 4  Time Spent (min): 60    Additional Documentation:

## 2019-05-15 NOTE — ED TRIAGE NOTES
Pt presents to ER with c/o uncontrolled blood sugar. Pt reports he was recently released from hospital last week on Friday and he has been feeling dizziness, tremors and near passing out episodes since then. Pt reports his blood sugar was 205 around 7:30 am in the morning prior to arrival. Pt's blood sugar 221 at this time. Call light within reach. Dr Mark Causey at bedside.

## 2019-05-15 NOTE — ED PROVIDER NOTES
[facility]  eMERGENCY dEPARTMENT eNCOUnter          CHIEF COMPLAINT       Chief Complaint   Patient presents with    Blood Sugar Problem       Nurses Notes reviewed and I agree except as noted in the HPI. HISTORY OF PRESENT ILLNESS    Nick Klein is a 26 y.o. male who presents to the Emergency Department for the evaluation of uncontrolled blood sugar. Patient states he has been a diabetic for a year. He began on metformin and was changed to Lantus last week. Patient was admitted into the hospital for a week for acute pancreatitis and uncontrolled blood sugar. He was discharged on 5/10/19. Since discharge he has been feeling dizzy and \"shaking\". He checks his sugar every couple hours and at 0730 today it read 205. Patient explains that it goes from 205 to 400s quickly. He has an appointment with his PCP tomorrow but states he could not wait. He denies dysuria, hematuria, fever, chills, nausea, emesis, abdominal pain, cough, shortness of breath, or chest pain. No further complaints at initial evaluation. The HPI was provided by the patient. REVIEW OF SYSTEMS     Review of Systems   Constitutional: Negative for appetite change, chills, fatigue and fever. HENT: Negative for congestion, ear pain, rhinorrhea and sore throat. Eyes: Negative for discharge, redness and visual disturbance. Respiratory: Negative for cough, shortness of breath and wheezing. Cardiovascular: Negative for chest pain, palpitations and leg swelling. Gastrointestinal: Negative for abdominal pain, diarrhea, nausea and vomiting. Genitourinary: Negative for decreased urine volume, difficulty urinating, dysuria and hematuria. Musculoskeletal: Negative for arthralgias, back pain, joint swelling and neck pain. Skin: Negative for pallor and rash. Allergic/Immunologic: Negative for environmental allergies. Neurological: Positive for dizziness and tremors.  Negative for syncope, weakness, mouth daily    PANTOPRAZOLE (PROTONIX) 40 MG TABLET    Take 1 tablet by mouth 2 times daily (before meals)    TRAZODONE (DESYREL) 100 MG TABLET    Take 1 tablet by mouth nightly as needed for Sleep    TRUE METRIX BLOOD GLUCOSE TEST STRIP    Test blood sugars three times daily. ALLERGIES     has No Known Allergies. FAMILY HISTORY     indicated that his mother is alive. He indicated that his father is . He indicated that the status of his sister is unknown.   family history includes Other in his mother and sister; Other (age of onset: 39) in his father. SOCIAL HISTORY      reports that he has quit smoking. His smoking use included cigarettes. He has a 11.00 pack-year smoking history. He has never used smokeless tobacco. He reports that he drank alcohol. He reports that he does not use drugs. PHYSICAL EXAM     INITIAL VITALS:  height is 5' 2\" (1.575 m) and weight is 165 lb (74.8 kg). His oral temperature is 98.5 °F (36.9 °C). His blood pressure is 119/89 and his pulse is 78. His respiration is 14 and oxygen saturation is 97%. Physical Exam   Constitutional: He is oriented to person, place, and time. He appears well-developed and well-nourished. Non-toxic appearance. HENT:   Head: Normocephalic and atraumatic. Right Ear: Tympanic membrane and external ear normal.   Left Ear: Tympanic membrane and external ear normal.   Nose: Nose normal.   Mouth/Throat: Oropharynx is clear and moist and mucous membranes are normal. No oropharyngeal exudate, posterior oropharyngeal edema or posterior oropharyngeal erythema. Eyes: Conjunctivae and EOM are normal.   Neck: Normal range of motion. Neck supple. No JVD present. Cardiovascular: Normal rate, regular rhythm, normal heart sounds, intact distal pulses and normal pulses. Exam reveals no gallop and no friction rub. No murmur heard. Pulmonary/Chest: Effort normal and breath sounds normal. No respiratory distress.  He has no decreased breath 1.030 (*)     All other components within normal limits   POCT GLUCOSE - Abnormal; Notable for the following components:    POC Glucose 221 (*)     All other components within normal limits   BETA-HYDROXYBUTYRATE   LIPASE   ANION GAP   GLOMERULAR FILTRATION RATE, ESTIMATED   OSMOLALITY       EMERGENCY DEPARTMENT COURSE:   Vitals:    Vitals:    05/15/19 0800 05/15/19 0825 05/15/19 0948   BP: 112/73 106/71 119/89   Pulse: 88 73 78   Resp: 18 15 14   Temp: 98.5 °F (36.9 °C)     TempSrc: Oral     SpO2: 97%     Weight: 165 lb (74.8 kg)     Height: 5' 2\" (1.575 m)         8:07 AM: The patient was seen and evaluated in a timely fashion. MDM:   blood glucose was not too high. Patient beta hydroxybutyrate. Other labs are also reviewed. Patient was given a liter of fluid and patient started feeling better. Patient is advised to keep his appointment tomorrow with his primary care physician. Patient is also advised to come back to the emergency department with the symptoms get worse. All of patient's lab the testing that reviewed discussed with the patient    CRITICAL CARE:   None     CONSULTS:      PROCEDURES:  None     FINAL IMPRESSION      1.  Hyperglycemia          DISPOSITION/PLAN   Discharge    PATIENT REFERRED TO:  BARB Fairbanks - CNP  [address] [facility]  [phone]    Go in 1 day      [address] [facility] [address] [place] [address],[address]  Go in 1 day  If symptoms worsen      DISCHARGE MEDICATIONS:  New Prescriptions    No medications on file       (Please note that portions of this note were completed with a voice recognition program.Efforts were made to edit thedictations but occasionally words are mis-transcribed.)    Scribe:  Heather Hartman 5/15/19 8:07 AM Scribing forand in the presence Darvin Causey MD.    Signed by: Geoffrey Cardoso, 05/15/19 10:44 AM    Provider:  I personally performed the services described in the documentation, reviewed and edited the documentation which was dictated to the scribe in my presence, and it accurately records mywords and actions.     Osito Viramontes MD 5/15/19 10:44 AM                  Osito Viramontes MD  05/15/19 6645

## 2019-05-15 NOTE — ED NOTES
COMP METABOLIC PANEL   Order: [id number]   Status:  Final result   Visible to patient:  Yes (MyChart)   Notes recorded by JANE Agosto on 7/18/2018 at 3:15 PM PDT  His labs are abnormal. Can he follow up with us soon or FU with his PCP.  ------    Notes recorded by Olga Martinez R.N. on 7/18/2018 at 12:03 PM PDT  On recall to see you 5/2019     Patient called on home phone #. No answer, LVM.    Patient called on mobile phone #. No answer, LVM.     Left contact information and request for call back.      =============================================================    Pt calls back and  transfers call.    Pt is seeing Dr. Pradhan tomorrow at 1630     Pt will call us after that appt if Dr. Pradhan would like him to be seen here still.    Reassessment of the patients Blood Sugar Problem   is unchanged, the patients pain reassessment is a 0/10, Side rails up times 2, call light in reach, will continue to monitor.      Armida Miranda RN  05/15/19 5483

## 2019-05-16 ENCOUNTER — OFFICE VISIT (OUTPATIENT)
Dept: FAMILY MEDICINE CLINIC | Age: 34
End: 2019-05-16
Payer: MEDICAID

## 2019-05-16 VITALS
RESPIRATION RATE: 14 BRPM | TEMPERATURE: 98.2 F | HEART RATE: 70 BPM | SYSTOLIC BLOOD PRESSURE: 102 MMHG | WEIGHT: 167 LBS | DIASTOLIC BLOOD PRESSURE: 60 MMHG | HEIGHT: 67 IN | BODY MASS INDEX: 26.21 KG/M2

## 2019-05-16 DIAGNOSIS — K85.01 IDIOPATHIC ACUTE PANCREATITIS WITH UNINFECTED NECROSIS: ICD-10-CM

## 2019-05-16 DIAGNOSIS — R74.8 ELEVATED LIVER ENZYMES: Primary | ICD-10-CM

## 2019-05-16 DIAGNOSIS — J45.40 MODERATE PERSISTENT ASTHMA WITHOUT COMPLICATION: ICD-10-CM

## 2019-05-16 DIAGNOSIS — E11.9 TYPE 2 DIABETES MELLITUS WITHOUT COMPLICATION, WITHOUT LONG-TERM CURRENT USE OF INSULIN (HCC): ICD-10-CM

## 2019-05-16 DIAGNOSIS — F33.1 MAJOR DEPRESSIVE DISORDER, RECURRENT EPISODE, MODERATE WITH ANXIOUS DISTRESS (HCC): ICD-10-CM

## 2019-05-16 PROCEDURE — 1111F DSCHRG MED/CURRENT MED MERGE: CPT | Performed by: NURSE PRACTITIONER

## 2019-05-16 PROCEDURE — 99495 TRANSJ CARE MGMT MOD F2F 14D: CPT | Performed by: NURSE PRACTITIONER

## 2019-05-16 RX ORDER — CITALOPRAM 20 MG/1
20 TABLET ORAL DAILY
Qty: 30 TABLET | Refills: 5 | Status: SHIPPED | OUTPATIENT
Start: 2019-05-16 | End: 2019-12-02 | Stop reason: ALTCHOICE

## 2019-05-16 RX ORDER — HYDROXYZINE PAMOATE 100 MG/1
100 CAPSULE ORAL 4 TIMES DAILY PRN
Qty: 60 CAPSULE | Refills: 3 | Status: SHIPPED | OUTPATIENT
Start: 2019-05-16 | End: 2020-06-11 | Stop reason: SDUPTHER

## 2019-05-16 RX ORDER — ALBUTEROL SULFATE 90 UG/1
2 AEROSOL, METERED RESPIRATORY (INHALATION) EVERY 6 HOURS PRN
Qty: 1 INHALER | Refills: 0 | Status: SHIPPED | OUTPATIENT
Start: 2019-05-16 | End: 2019-12-02 | Stop reason: SDUPTHER

## 2019-05-16 ASSESSMENT — PATIENT HEALTH QUESTIONNAIRE - PHQ9
1. LITTLE INTEREST OR PLEASURE IN DOING THINGS: 0
SUM OF ALL RESPONSES TO PHQ QUESTIONS 1-9: 0
SUM OF ALL RESPONSES TO PHQ QUESTIONS 1-9: 0
SUM OF ALL RESPONSES TO PHQ9 QUESTIONS 1 & 2: 0
2. FEELING DOWN, DEPRESSED OR HOPELESS: 0

## 2019-05-16 NOTE — PROGRESS NOTES
humalTransition of Care Visit/Hospital Follow Up:      Yani Cabrera is a 26 y.o. male that presents for Follow-Up from Hospital (sugar has been high and dizzy and shakes) and Pharyngitis (started last night)        Date of Discharge:   5/9/19  Was patient contacted within 2 business days of discharge (see chart for documentation):  yes - 5/10/19      Patient presents for hospital follow up. Patient recently hospitalized at [facility] for treatment of acute pancreatitis, type 2 DM, metabolic encephalopathy, ETOH use disorder    Symptoms prior to admission:  abd pain    Hospital Course per DC Summary:    Hospital Course:   Yani Cabrera is a 26 y.o. male admitted to [facility] on 5/3/2019. Please see H/P for details. In summary, this is a  26 y. o. male, with PMH DM type 2, COPD, hx of ETOH use (pt states his last drink was 1 year ago after he had DUI and was placed on monitoring), smoker, who  presented to [facility] with diffuse, severe abdominal pain. Pt states that he started to have abd pain 3 days prior admission, then 2 days PTA, he started having nausea and vomiting. Pt states that he has vomited too many times to count and has filled up a small trash can. Pt describes the vomit as black color. Pt denies vomiting in blood. Pt has not been able to take any of his PO meds and has not drank or ate since Wednesday.       In the ED, the patient was found to be in DKA and have Acute Pancreatitis. Anion Gap 38. Bicarb 8. Beta-hydroxybutyrate elevated.  Creatinine 1.3. Lipase 1400. WBC 15. CT abd showed \"There is mild peripancreatic infiltration greatest around the pancreatic tail. Correlation with pancreatic enzymes is advised findings are concerning for mild pancreatitis. small hiatal hernia and mild thickening of the distal esophageal wall. Mild esophagitis cannot be excluded. Diffuse fatty infiltration of the liver\".    Pt was stable throughout his ED stay, pt was admitted to Stepdown under Hospital medicine service for DKA protocol in place. He was eventually started on Lantus, his hgbA1C was 9.3%.     Had hallucinations right after admission, likely due to sleep deprivation, now resolved.     Abdominal pain continued to improve, lipase trended down and diet was progressed.     Discharged stable and will follow-up with PCP and GI as outpatient. Referred to DM clinic. For rpt EGD in 8 weeks, but need to find another GI provider as he was [facility] from [facility] in the past. PPI BID continued for at least 8 weeks. Medication changes at discharge:  Lantus 15 units, Celexa, Trazodone    Clinical course since discharge:      Is home and is doing fairly well, he is still weak, tired, and he is getting dizzy and lightheaded whenever he gets up to do things. If he is up and moving around, he gets very lightheaded, even standing for a long period of time. DM  Is checking sugars 4-5 times/day. Fasting sugars have been running around 200-250. He is checking sugars at mealtimes, sugars run between 300-400. He is more weak and shaky at mealtimes when he is moving. He does complain of polyuria, polydipsia. No polyphagia. Denies any CP/SOB. He has been coughing up some phlegm. He does have some blurred vision, sometimes will have some tingling in his left arm. He saw Dr. Radha Greene in the hospital who did an EGD, but does not have a f/u appt with Dr. Radha Greene because he does have an outstanding balance there. He is taking Protonix twice a day. Denies any abd pain. ETOH- is 1 year and 40 days clean from alcohol. He does have to do frequent breathalyzer tests through the [employer] system. MDD  Moods are doing ok, he is tired a lot and drained, fatigue. When he gets shakes in his arms, he gets very anxious. He is taking the Celexa, he is tolerating it well. He does feel like his depression/anxiety is slowing improving. Denies any SI/HI.       Current Outpatient Medications   Medication Sig Dispense Refill    insulin aspart (NOVOLOG FLEXPEN) 100 UNIT/ML injection pen Inject 2-12 units SQ TID with meals as directed per sliding scale, max dose 36 units/day 5 pen 3    aspirin 81 MG EC tablet Take 1 tablet by mouth daily 30 tablet 3    docusate sodium (COLACE) 100 MG capsule Take 1 capsule by mouth 2 times daily as needed for Constipation 60 capsule 0    Insulin Pen Needle 30G X 8 MM MISC 1 each by Does not apply route daily 100 each 3    Lancets MISC 1 each by Does not apply route 3 times daily 200 each 0    pantoprazole (PROTONIX) 40 MG tablet Take 1 tablet by mouth 2 times daily (before meals) 120 tablet 0    insulin glargine (LANTUS SOLOSTAR) 100 UNIT/ML injection pen Inject 15 Units into the skin nightly 5 pen 0    bisacodyl (DULCOLAX) 10 MG suppository Place 1 suppository rectally as needed for Constipation 30 suppository 0    traZODone (DESYREL) 100 MG tablet Take 1 tablet by mouth nightly as needed for Sleep 30 tablet 5    TRUE METRIX BLOOD GLUCOSE TEST strip Test blood sugars three times daily. 100 each 0    acetaminophen (TYLENOL) 500 MG tablet Take 1,000 mg by mouth every 6 hours as needed for Pain      ACCU-CHEK MULTICLIX LANCETS MISC 1 box by Does not apply route daily 100 each 0    Multiple Vitamins-Minerals (MENS MULTIVITAMIN PLUS) TABS Take 1 tablet by mouth daily      hydrOXYzine (VISTARIL) 100 MG capsule Take 1 capsule by mouth 4 times daily as needed for Anxiety 60 capsule 3    fluticasone-salmeterol (ADVAIR) 250-50 MCG/DOSE AEPB Inhale 1 puff into the lungs every 12 hours 60 each 5    albuterol sulfate HFA (PROVENTIL HFA) 108 (90 Base) MCG/ACT inhaler Inhale 2 puffs into the lungs every 6 hours as needed for Wheezing 1 Inhaler 0    citalopram (CELEXA) 20 MG tablet Take 1 tablet by mouth daily 30 tablet 5     No current facility-administered medications for this visit.         Past Medical History:   Diagnosis Date    Asthma     COPD (chronic obstructive pulmonary disease) ([facility]     Eczema     GERD (gastroesophageal reflux disease)     History of alcohol abuse     History of marijuana use     History of tobacco abuse     quit 11/21/2017    Hx of seasonal allergies     Hypertension     Pancreatitis     Seizures (HCC)     etoh wdl    Type 2 diabetes mellitus without complication ([facility] 55/69/3899       Past Surgical History:   Procedure Laterality Date    UPPER GASTROINTESTINAL ENDOSCOPY Left 5/6/2019    EGD BIOPSY performed by Eduardo Hannah MD at [facility]       Social History     Tobacco Use    Smoking status: Former Smoker     Packs/day: 0.50     Years: 22.00     Pack years: 11.00     Types: Cigarettes    Smokeless tobacco: Never Used   Substance Use Topics    Alcohol use: Not Currently     Comment: last drink 4/6/19    Drug use: No     Types: Marijuana     Comment: last time smoked marijuana was 11/21/17-doesn't do regularly       Family History   Problem Relation Age of Onset    Other Mother         gestational diabetes    Other Father 39        suicide    Other Sister         hypoglycemia         I have reviewed the patient's past medical history, past surgical history, allergies, medications, social and family history and I have made updates where appropriate.         PHYSICAL EXAM:  /60   Pulse 70   Temp 98.2 °F (36.8 °C) (Oral)   Resp 14   Ht 5' 7\" (1.702 m)   Wt 167 lb (75.8 kg)   BMI 26.16 kg/m²   General Appearance: well developed and well- nourished, in no acute distress  Head: normocephalic and atraumatic  ENT: external ear and ear canal normal bilaterally, nose without deformity, nasal mucosa and turbinates normal without polyps, oropharynx normal, dentition is normal for age, no lip or gum lesions noted  Neck: supple and non-tender without mass, no thyromegaly or thyroid nodules, no cervical lymphadenopathy  Pulmonary/Chest: clear to auscultation bilaterally- no wheezes, rales or rhonchi, normal air movement, no respiratory depression. Level of medical complexity:  moderate    -Overall, patient is improving  -Continue current meds  -Advised to continue to closely monitor her symptoms and if any worsening to seek treatment    Mark Anthony Jensen received counseling on the following healthy behaviors: medication adherence  Reviewed prior labs and health maintenance. Continue current medications, diet and exercise. Discussed use, benefit, and side effects of prescribed medications. Barriers to medication compliance addressed. Patient given educational materials - see patient instructions. All patient questions answered. Patient voiced understanding.

## 2019-05-16 NOTE — PATIENT INSTRUCTIONS
Titrate Lantus up by 2 units every 2-3 days to keep fasting glucose < 140    Blood Sugar Dose fast acting insulin    None   140-199 2 unit   200-249 4 units   250-299 6 units   300-349 8 units   350-399 10 units   400 and above 12 units

## 2019-06-10 ENCOUNTER — TELEPHONE (OUTPATIENT)
Dept: FAMILY MEDICINE CLINIC | Age: 34
End: 2019-06-10

## 2019-06-10 DIAGNOSIS — K70.10 ALCOHOLIC HEPATITIS WITHOUT ASCITES: Primary | ICD-10-CM

## 2019-06-10 DIAGNOSIS — K85.20 ALCOHOL-INDUCED ACUTE PANCREATITIS, UNSPECIFIED COMPLICATION STATUS: ICD-10-CM

## 2019-06-10 NOTE — TELEPHONE ENCOUNTER
Lula Velázquez from Dr. Leopoldo Kwok office called and stated patient has been discharged from their office. Patient will need another referral for GI to another office. Please be advised.

## 2019-06-17 ENCOUNTER — OFFICE VISIT (OUTPATIENT)
Dept: FAMILY MEDICINE CLINIC | Age: 34
End: 2019-06-17
Payer: MEDICAID

## 2019-06-17 VITALS
BODY MASS INDEX: 32.35 KG/M2 | HEIGHT: 62 IN | RESPIRATION RATE: 16 BRPM | HEART RATE: 62 BPM | WEIGHT: 175.8 LBS | TEMPERATURE: 98.3 F | DIASTOLIC BLOOD PRESSURE: 78 MMHG | SYSTOLIC BLOOD PRESSURE: 124 MMHG

## 2019-06-17 DIAGNOSIS — E11.9 TYPE 2 DIABETES MELLITUS WITHOUT COMPLICATION, WITHOUT LONG-TERM CURRENT USE OF INSULIN (HCC): Primary | ICD-10-CM

## 2019-06-17 DIAGNOSIS — L30.9 ECZEMA, UNSPECIFIED TYPE: ICD-10-CM

## 2019-06-17 DIAGNOSIS — J20.9 ACUTE BRONCHITIS, UNSPECIFIED ORGANISM: ICD-10-CM

## 2019-06-17 LAB
CREATININE URINE POCT: 300
MICROALBUMIN/CREAT 24H UR: 30 MG/G{CREAT}
MICROALBUMIN/CREAT UR-RTO: <30

## 2019-06-17 PROCEDURE — 99214 OFFICE O/P EST MOD 30 MIN: CPT | Performed by: NURSE PRACTITIONER

## 2019-06-17 PROCEDURE — 82044 UR ALBUMIN SEMIQUANTITATIVE: CPT | Performed by: NURSE PRACTITIONER

## 2019-06-17 RX ORDER — AZITHROMYCIN 250 MG/1
250 TABLET, FILM COATED ORAL SEE ADMIN INSTRUCTIONS
Qty: 6 TABLET | Refills: 0 | Status: SHIPPED | OUTPATIENT
Start: 2019-06-17 | End: 2019-06-22

## 2019-06-17 NOTE — PROGRESS NOTES
Chief Complaint   Patient presents with    Follow-up     f/u Dm- going good (at 23u on lantus- barely used humalog, qd), anxiety & depression doing good    Shaking     shakes have gotten better but still comes and goes (5/3/19)     Cough     coughing up yellow mucuous for couple weeks- tried otc cough meds and nebulizer with no affect    Other     hasnt seen gastro doc b/c he doesnt have insurance anymore and they never called him- says he hasnt had any stomach pains       History obtained from chart review and the patient. SUBJECTIVE:  Bailey Melendez is a 26 y.o. male that presents today for follow up DM    Diabetes Type 2    Glucose control:   Does patient check blood glucoses at home? Yes - fasting sugars running 120, 190. Mealtime sugars running 120-190. Report of hypoglycemia: 70  Lab Results   Component Value Date    LABA1C 9.3 (H) 05/03/2019     No results found for: EAG    Symptoms  Polyuria, Polydipsia or Polyphagia? No  Chest Pain, SOB, or Palpitations? -  No  New Vision complaints? No  Paresthesias of the extremities? No    Medications  Current medication were reviewed. Compliant with medications? Yes Humalog per sliding scale, Lantus 23 units  Medication side effects? No  On ACE-I or ARB? No  On antiplatelet therapy? Yes  On Statin? No    Last Diabetic Eye Exam: needs    Exercise  Exercise? Yes   Wt Readings from Last 3 Encounters:   06/17/19 175 lb 12.8 oz (79.7 kg)   05/16/19 167 lb (75.8 kg)   05/15/19 165 lb (74.8 kg)       Diet discipline?:  Low salt, fat, sugar diet? yes    Blood pressure control:  BP Readings from Last 3 Encounters:   06/17/19 124/78   05/16/19 102/60   05/15/19 119/89       No results found for: Caroline Dumont    Lab Results   Component Value Date    LDLCALC 72 05/04/2019     URI Symptoms    HPI:      Symptoms have been present for 3 week(s). Symptoms are unchanged since they initially started. Fever? No  Runny nose or congestion? Yes   Cough? Yes  Sore throat? No  Headache, fatigue, joint pains, muscle aches? No  Shortness of breath/Wheezing? Yes - some wheezing and SOB  Nausea/Vomiting/Diarrhea? No  Double Sickening? No  Sick contacts? No    Patient has tried OTC medications, Albuterol with out improvement. Rash    HPI:    Length of time Sx have been present - years. Prior Hx of eczema. He had eczema on his hands which he was able to clear up with OTC hydrocortisone cream and moisture trapping. He still has the eczema on his feet. Rash has gotten worse since initially starting  Affected areas - feet  Inciting events or exposures prior to rash starting? No  Pruritic? Yes  Erythematous? Yes  Weeping or drainage? No  History of Urticaria? No  Fever? No  Painful?   No    Review of Systems - General ROS: negative for - chills, fever or night sweats  Respiratory ROS: negative for - shortness of breath, stridor or wheezing    Age/Gender Health Maintenance    Lipid   Lab Results   Component Value Date    CHOL 182 05/04/2019    CHOL 165 12/18/2017     Lab Results   Component Value Date    TRIG 128 05/04/2019    TRIG 135 12/18/2017     Lab Results   Component Value Date    HDL 84 05/04/2019    HDL 51 12/18/2017     Lab Results   Component Value Date    LDLCALC 72 05/04/2019    [address] 87 12/18/2017     DM Screen   Lab Results   Component Value Date    LABA1C 9.3 (H) 05/03/2019     Colon Cancer Screening - 50  Lung Cancer Screening (Age 54 to [de-identified] with 30 pack year hx, current smoker or quit within past 15 years) - n/a    Tetanus - needs  Influenza Vaccine - needs  Pneumonia Vaccine - 65  Zostavax - 50  HPV Vaccine - n/a    PSA testing discussion - 55  AAA Screening - n/a    Falls screening - n/a    Current Outpatient Medications   Medication Sig Dispense Refill    azithromycin (ZITHROMAX) 250 MG tablet Take 1 tablet by mouth See Admin Instructions for 5 days 500mg on day 1 followed by 250mg on days 2 - 5 6 tablet 0    hydrOXYzine (VISTARIL) 100 MG List   Diagnosis    Alcohol withdrawal ([facility]    Alcoholic hepatitis    Pancreatitis, History    COPD (chronic obstructive pulmonary disease)/Asthma    Alcohol-induced acute pancreatitis    Syncope and collapse    Type 2 diabetes mellitus without complication, without long-term current use of insulin (HCC)    Asthma    Elevated liver enzymes    Acute pancreatitis with uninfected necrosis, unspecified    Metabolic encephalopathy    Alcohol use disorder, moderate, in sustained remission, dependence ([facility]    Eczema       Past Medical History:   Diagnosis Date    Asthma     COPD (chronic obstructive pulmonary disease) (HCC)     Eczema     GERD (gastroesophageal reflux disease)     History of alcohol abuse     History of marijuana use     History of tobacco abuse     quit 11/21/2017    Hx of seasonal allergies     Hypertension     Pancreatitis     Seizures (HCC)     etoh wdl    Type 2 diabetes mellitus without complication ([facility] 08/99/1800       Past Surgical History:   Procedure Laterality Date    UPPER GASTROINTESTINAL ENDOSCOPY Left 5/6/2019    EGD BIOPSY performed by Josiane De Luna MD at [facility]       No Known Allergies    Social History     Socioeconomic History    Marital status: Legally      Spouse name: Not on file    Number of children: 3    Years of education: Not on file    Highest education level: Not on file   Occupational History    Not on file   Social Needs    Financial resource strain: Not on file    Food insecurity:     Worry: Not on file     Inability: Not on file    Transportation needs:     Medical: Not on file     Non-medical: Not on file   Tobacco Use    Smoking status: Former Smoker     Packs/day: 0.50     Years: 22.00     Pack years: 11.00     Types: Cigarettes    Smokeless tobacco: Never Used   Substance and Sexual Activity    Alcohol use: Not Currently     Comment: last drink 4/6/19    Drug use: No     Types: Marijuana     Comment: last time evidence of depression or psychosis. Good insight and appropriate interaction. Cognition and memory appear to be intact. Skin: warm and dry, erythematous maculopapular eczematous rash bilateral feet/ankles  Lymph:  No cervical, auricular or supraclavicular lymph nodes palpated    ASSESSMENT & PLAN  Devin Case was seen today for follow-up, shaking, cough and other. Diagnoses and all orders for this visit:    Type 2 diabetes mellitus without complication, without long-term current use of insulin ([facility])  -      DIABETES FOOT EXAM  -     POCT microalbumin    Acute bronchitis, unspecified organism  -     azithromycin (ZITHROMAX) 250 MG tablet; Take 1 tablet by mouth See Admin Instructions for 5 days 500mg on day 1 followed by 250mg on days 2 - 5    Eczema, unspecified type      - con't Lantus and Humalog per sliding scale  - start Zpak, con't Advair and albuterol, hold on prednisone for now due to DM  - ok to take the OTC hydrocortisone cream and use moisture trapping with both his feet, call if no improvement    DISPOSITION    Return in about 2 months (around 8/17/2019) for DM, needs A1C. Devin Case released without restrictions. PATIENT COUNSELING    Counseling was provided today regarding the following topics: Healthy eating habits, Regular exercise, substance abuse and healthy sleep habits. Devin Case received counseling on the following healthy behaviors: medication adherence and decrease in alcohol consumption    Patient given educational materials on: See Attached    I have instructed Devin Case to complete a self tracking handout on none and instructed them to bring it with them to his next appointment. Barriers to learning and self management: none    Discussed use, benefit, and side effects of prescribed medications. Barriers to medication compliance addressed. All patient questions answered. Pt voiced understanding.        Electronically signed by BARB Landers CNP on 6/17/2019 at 10:37 AM

## 2019-08-19 ENCOUNTER — TELEPHONE (OUTPATIENT)
Dept: FAMILY MEDICINE CLINIC | Age: 34
End: 2019-08-19

## 2019-08-19 DIAGNOSIS — E11.9 TYPE 2 DIABETES MELLITUS WITHOUT COMPLICATION, WITHOUT LONG-TERM CURRENT USE OF INSULIN (HCC): Primary | ICD-10-CM

## 2019-08-19 NOTE — TELEPHONE ENCOUNTER
Rx for Fixmo sent to [facility]. Advise patient that he no showed appt today and needs to be seen.

## 2019-08-19 NOTE — LETTER
[address] [place] [address]. [facility] [address]  Phone: [phone]  Fax: [phone]          August 22, 2019    Judy Sargent  [address] [place] [address]      Dear Hair Townsend: We have made several attempts to contact you by phone and have   been unsuccessful. Please call our office at your earliest convenience  At [phone] opt [phone]. Thank you.           Sincerely,        [email or web address] [email or web address]([facility])

## 2019-09-01 ENCOUNTER — APPOINTMENT (OUTPATIENT)
Dept: CT IMAGING | Age: 34
End: 2019-09-01

## 2019-09-01 ENCOUNTER — HOSPITAL ENCOUNTER (EMERGENCY)
Age: 34
Discharge: HOME | End: 2019-09-01

## 2019-09-01 VITALS
HEART RATE: 111 BPM | SYSTOLIC BLOOD PRESSURE: 134 MMHG | TEMPERATURE: 98.1 F | OXYGEN SATURATION: 97 % | WEIGHT: 160 LBS | RESPIRATION RATE: 16 BRPM | DIASTOLIC BLOOD PRESSURE: 83 MMHG | BODY MASS INDEX: 29.44 KG/M2 | HEIGHT: 62 IN
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
## 2019-09-01 DIAGNOSIS — S02.85XA CLOSED FRACTURE OF ORBIT, INITIAL ENCOUNTER (HCC): Primary | ICD-10-CM

## 2019-09-01 PROCEDURE — 2709999900 HC NON-CHARGEABLE SUPPLY

## 2019-09-01 PROCEDURE — 72125 CT NECK SPINE W/O DYE: CPT

## 2019-09-01 PROCEDURE — 70450 CT HEAD/BRAIN W/O DYE: CPT

## 2019-09-01 PROCEDURE — 99283 EMERGENCY DEPT VISIT LOW MDM: CPT

## 2019-09-01 PROCEDURE — 70486 CT MAXILLOFACIAL W/O DYE: CPT

## 2019-09-01 RX ORDER — HYDROCODONE BITARTRATE AND ACETAMINOPHEN 5; 325 MG/1; MG/1
1 TABLET ORAL EVERY 6 HOURS PRN
Qty: 12 TABLET | Refills: 0 | Status: SHIPPED | OUTPATIENT
Start: 2019-09-01 | End: 2019-09-04

## 2019-09-01 ASSESSMENT — ENCOUNTER SYMPTOMS
BACK PAIN: 0
CHEST TIGHTNESS: 0
SHORTNESS OF BREATH: 0
FACIAL SWELLING: 1
TROUBLE SWALLOWING: 0
PHOTOPHOBIA: 0
RHINORRHEA: 0
WHEEZING: 0
SORE THROAT: 0
SINUS PRESSURE: 0
VOMITING: 0
ABDOMINAL PAIN: 0
SINUS PAIN: 0
EYE DISCHARGE: 0
NAUSEA: 0
EYE PAIN: 0
COLOR CHANGE: 0

## 2019-09-01 ASSESSMENT — PAIN DESCRIPTION - ORIENTATION: ORIENTATION: LEFT;RIGHT

## 2019-09-01 ASSESSMENT — VISUAL ACUITY
OU: 20/20
OS: 20/30
OD: 20/30

## 2019-09-01 ASSESSMENT — PAIN DESCRIPTION - PAIN TYPE: TYPE: ACUTE PAIN

## 2019-09-01 ASSESSMENT — PAIN SCALES - GENERAL: PAINLEVEL_OUTOF10: 5

## 2019-09-01 ASSESSMENT — PAIN DESCRIPTION - LOCATION: LOCATION: FACE

## 2019-09-01 ASSESSMENT — PAIN DESCRIPTION - DESCRIPTORS: DESCRIPTORS: ACHING

## 2019-09-01 ASSESSMENT — PAIN DESCRIPTION - FREQUENCY: FREQUENCY: INTERMITTENT
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
## 2019-09-03 ENCOUNTER — NURSE ONLY (OUTPATIENT)
Dept: LAB | Age: 34
End: 2019-09-03

## 2019-09-03 ENCOUNTER — TELEPHONE (OUTPATIENT)
Dept: FAMILY MEDICINE CLINIC | Age: 34
End: 2019-09-03

## 2019-09-03 ENCOUNTER — OFFICE VISIT (OUTPATIENT)
Dept: FAMILY MEDICINE CLINIC | Age: 34
End: 2019-09-03

## 2019-09-03 VITALS
DIASTOLIC BLOOD PRESSURE: 84 MMHG | HEART RATE: 119 BPM | BODY MASS INDEX: 30.22 KG/M2 | TEMPERATURE: 99.1 F | RESPIRATION RATE: 16 BRPM | HEIGHT: 64 IN | WEIGHT: 177 LBS | SYSTOLIC BLOOD PRESSURE: 134 MMHG

## 2019-09-03 DIAGNOSIS — E11.9 TYPE 2 DIABETES MELLITUS WITHOUT COMPLICATION, WITHOUT LONG-TERM CURRENT USE OF INSULIN (HCC): Primary | ICD-10-CM

## 2019-09-03 DIAGNOSIS — E11.9 TYPE 2 DIABETES MELLITUS WITHOUT COMPLICATION, WITHOUT LONG-TERM CURRENT USE OF INSULIN (HCC): ICD-10-CM

## 2019-09-03 DIAGNOSIS — S02.85XA CLOSED FRACTURE OF RIGHT ORBIT, INITIAL ENCOUNTER (HCC): ICD-10-CM
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
## 2019-09-03 LAB
AVERAGE GLUCOSE: 132 MG/DL (ref 70–126)
HBA1C MFR BLD: 6.4 % (ref 4.4–6.4)

## 2019-09-03 PROCEDURE — 99214 OFFICE O/P EST MOD 30 MIN: CPT | Performed by: NURSE PRACTITIONER

## 2019-09-04 ENCOUNTER — TELEPHONE (OUTPATIENT)
Dept: FAMILY MEDICINE CLINIC | Age: 34
End: 2019-09-04

## 2019-09-04 DIAGNOSIS — S02.85XA CLOSED FRACTURE OF RIGHT ORBIT, INITIAL ENCOUNTER (HCC): Primary | ICD-10-CM
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
## 2019-12-02 ENCOUNTER — OFFICE VISIT (OUTPATIENT)
Dept: FAMILY MEDICINE CLINIC | Age: 34
End: 2019-12-02
Payer: COMMERCIAL

## 2019-12-02 VITALS
WEIGHT: 188.6 LBS | RESPIRATION RATE: 18 BRPM | SYSTOLIC BLOOD PRESSURE: 130 MMHG | BODY MASS INDEX: 32.2 KG/M2 | HEART RATE: 96 BPM | DIASTOLIC BLOOD PRESSURE: 82 MMHG | HEIGHT: 64 IN | TEMPERATURE: 98.3 F

## 2019-12-02 DIAGNOSIS — J01.90 ACUTE RHINOSINUSITIS: ICD-10-CM

## 2019-12-02 DIAGNOSIS — L30.9 ECZEMA, UNSPECIFIED TYPE: ICD-10-CM

## 2019-12-02 DIAGNOSIS — J44.9 CHRONIC OBSTRUCTIVE PULMONARY DISEASE, UNSPECIFIED COPD TYPE (HCC): ICD-10-CM

## 2019-12-02 DIAGNOSIS — K70.10 ALCOHOLIC HEPATITIS WITHOUT ASCITES: ICD-10-CM

## 2019-12-02 DIAGNOSIS — E11.9 TYPE 2 DIABETES MELLITUS WITHOUT COMPLICATION, UNSPECIFIED WHETHER LONG TERM INSULIN USE (HCC): Primary | ICD-10-CM

## 2019-12-02 DIAGNOSIS — J45.40 MODERATE PERSISTENT ASTHMA WITHOUT COMPLICATION: ICD-10-CM

## 2019-12-02 LAB — HBA1C MFR BLD: 8.3 %

## 2019-12-02 PROCEDURE — G8417 CALC BMI ABV UP PARAM F/U: HCPCS | Performed by: NURSE PRACTITIONER

## 2019-12-02 PROCEDURE — 2022F DILAT RTA XM EVC RTNOPTHY: CPT | Performed by: NURSE PRACTITIONER

## 2019-12-02 PROCEDURE — G8427 DOCREV CUR MEDS BY ELIG CLIN: HCPCS | Performed by: NURSE PRACTITIONER

## 2019-12-02 PROCEDURE — G8926 SPIRO NO PERF OR DOC: HCPCS | Performed by: NURSE PRACTITIONER

## 2019-12-02 PROCEDURE — 83036 HEMOGLOBIN GLYCOSYLATED A1C: CPT | Performed by: NURSE PRACTITIONER

## 2019-12-02 PROCEDURE — 3052F HG A1C>EQUAL 8.0%<EQUAL 9.0%: CPT | Performed by: NURSE PRACTITIONER

## 2019-12-02 PROCEDURE — G8484 FLU IMMUNIZE NO ADMIN: HCPCS | Performed by: NURSE PRACTITIONER

## 2019-12-02 PROCEDURE — 99214 OFFICE O/P EST MOD 30 MIN: CPT | Performed by: NURSE PRACTITIONER

## 2019-12-02 PROCEDURE — 3023F SPIROM DOC REV: CPT | Performed by: NURSE PRACTITIONER

## 2019-12-02 PROCEDURE — 4004F PT TOBACCO SCREEN RCVD TLK: CPT | Performed by: NURSE PRACTITIONER

## 2019-12-02 RX ORDER — ALBUTEROL SULFATE 90 UG/1
2 AEROSOL, METERED RESPIRATORY (INHALATION) EVERY 6 HOURS PRN
Qty: 1 INHALER | Refills: 0 | Status: SHIPPED | OUTPATIENT
Start: 2019-12-02 | End: 2021-03-12 | Stop reason: SDUPTHER

## 2019-12-02 RX ORDER — AZITHROMYCIN 250 MG/1
250 TABLET, FILM COATED ORAL SEE ADMIN INSTRUCTIONS
Qty: 6 TABLET | Refills: 0 | Status: SHIPPED | OUTPATIENT
Start: 2019-12-02 | End: 2019-12-07

## 2019-12-02 RX ORDER — CALCIUM CITRATE/VITAMIN D3 200MG-6.25
TABLET ORAL
Qty: 500 EACH | Refills: 3 | Status: SHIPPED | OUTPATIENT
Start: 2019-12-02 | End: 2020-07-28

## 2020-01-08 ENCOUNTER — CARE COORDINATION (OUTPATIENT)
Dept: CARE COORDINATION | Age: 35
End: 2020-01-08

## 2020-01-08 SDOH — SOCIAL STABILITY: SOCIAL NETWORK: HOW OFTEN DO YOU GET TOGETHER WITH FRIENDS OR RELATIVES?: MORE THAN THREE TIMES A WEEK

## 2020-01-08 SDOH — HEALTH STABILITY: MENTAL HEALTH
STRESS IS WHEN SOMEONE FEELS TENSE, NERVOUS, ANXIOUS, OR CAN'T SLEEP AT NIGHT BECAUSE THEIR MIND IS TROUBLED. HOW STRESSED ARE YOU?: ONLY A LITTLE

## 2020-01-08 SDOH — ECONOMIC STABILITY: FOOD INSECURITY: WITHIN THE PAST 12 MONTHS, YOU WORRIED THAT YOUR FOOD WOULD RUN OUT BEFORE YOU GOT MONEY TO BUY MORE.: NEVER TRUE

## 2020-01-08 SDOH — SOCIAL STABILITY: SOCIAL NETWORK: ARE YOU MARRIED, WIDOWED, DIVORCED, SEPARATED, NEVER MARRIED, OR LIVING WITH A PARTNER?: SEPARATED

## 2020-01-08 SDOH — HEALTH STABILITY: PHYSICAL HEALTH: ON AVERAGE, HOW MANY MINUTES DO YOU ENGAGE IN EXERCISE AT THIS LEVEL?: 40 MIN

## 2020-01-08 SDOH — ECONOMIC STABILITY: TRANSPORTATION INSECURITY
IN THE PAST 12 MONTHS, HAS LACK OF TRANSPORTATION KEPT YOU FROM MEETINGS, WORK, OR FROM GETTING THINGS NEEDED FOR DAILY LIVING?: NO

## 2020-01-08 SDOH — SOCIAL STABILITY: SOCIAL NETWORK
IN A TYPICAL WEEK, HOW MANY TIMES DO YOU TALK ON THE PHONE WITH FAMILY, FRIENDS, OR NEIGHBORS?: MORE THAN THREE TIMES A WEEK

## 2020-01-08 SDOH — ECONOMIC STABILITY: FOOD INSECURITY: WITHIN THE PAST 12 MONTHS, THE FOOD YOU BOUGHT JUST DIDN'T LAST AND YOU DIDN'T HAVE MONEY TO GET MORE.: NEVER TRUE

## 2020-01-08 SDOH — ECONOMIC STABILITY: TRANSPORTATION INSECURITY
IN THE PAST 12 MONTHS, HAS THE LACK OF TRANSPORTATION KEPT YOU FROM MEDICAL APPOINTMENTS OR FROM GETTING MEDICATIONS?: NO

## 2020-01-08 SDOH — HEALTH STABILITY: PHYSICAL HEALTH: ON AVERAGE, HOW MANY DAYS PER WEEK DO YOU ENGAGE IN MODERATE TO STRENUOUS EXERCISE (LIKE A BRISK WALK)?: 5 DAYS

## 2020-01-08 SDOH — ECONOMIC STABILITY: INCOME INSECURITY: HOW HARD IS IT FOR YOU TO PAY FOR THE VERY BASICS LIKE FOOD, HOUSING, MEDICAL CARE, AND HEATING?: NOT VERY HARD

## 2020-01-08 NOTE — CARE COORDINATION
use as reference  Diabetes Assessment    Medic Alert ID:  No  Meal Planning:  Avoidance of concentrated sweets   How often do you test your blood sugar?:  Daily, Meals   Do you have barriers with adherence to non-pharmacologic self-management interventions? (Nutrition/Exercise/Self-Monitoring):  No   Have you ever had to go to the ED for symptoms of low blood sugar?:  No       Increase or Decrease trend in Blood Sugars   Do you have hyperglycemia symptoms?:  No   Do you have hypoglycemia symptoms?:  No   Last Blood Sugar Value:  170   Blood Sugar Monitoring Regimen:  Morning Fasting, Before Meals   Blood Sugar Trends:  Steady Decrease          -    Ambulatory Care Coordination Assessment    Care Coordination Protocol  Program Enrollment:  Rising Risk  Referral from Primary Care Provider:  No  Week 1 - Initial Assessment     Do you have all of your prescriptions and are they filled?:  Yes  Barriers to medication adherence:  None  Are you able to afford your medications?:  Yes  How often do you have trouble taking your medications the way you have been told to take them?:  I always take them as prescribed.      Do you have Home O2 Therapy?:  No      Is patient able to live independently?:  Yes     Current Housing:  Private Residence        Per the Fall Risk Screening, did the patient have 2 or more falls or 1 fall with injury in the past year?:  No        Are you experiencing loss of meaning?:  No  Are you experiencing loss of hope and peace?:  No     Thinking about your patient's physical health needs, are there any symptoms or problems (risk indicators) you are unsure about that require further investigation?:  No identified areas of uncertainly or problems already being investigated   Are the patients physical health problems impacting on their mental well-being?:  No identified areas of concern   Are there any problems with your patients lifestyle behaviors (alcohol, drugs, diet, exercise) that are impacting on

## 2020-01-15 ENCOUNTER — CARE COORDINATION (OUTPATIENT)
Dept: CARE COORDINATION | Age: 35
End: 2020-01-15

## 2020-01-22 ENCOUNTER — CARE COORDINATION (OUTPATIENT)
Dept: CARE COORDINATION | Age: 35
End: 2020-01-22

## 2020-01-30 ENCOUNTER — CARE COORDINATION (OUTPATIENT)
Dept: CARE COORDINATION | Age: 35
End: 2020-01-30

## 2020-02-06 ENCOUNTER — CARE COORDINATION (OUTPATIENT)
Dept: CARE COORDINATION | Age: 35
End: 2020-02-06

## 2020-02-06 NOTE — CARE COORDINATION
Have been unable to reach with no return calls from messages left. Letter sent. Will discharge if no response.

## 2020-02-13 ENCOUNTER — CARE COORDINATION (OUTPATIENT)
Dept: CARE COORDINATION | Age: 35
End: 2020-02-13

## 2020-02-13 NOTE — CARE COORDINATION
Alva, I have been unable to reach [facility] for continued care coordination and he has not returned my phone calls.   I will discharge him due to inability to reach pending your approval.  Thank you

## 2020-03-02 ENCOUNTER — HOSPITAL ENCOUNTER (INPATIENT)
Age: 35
LOS: 2 days | Discharge: HOME | DRG: 420 | End: 2020-03-04
Attending: INTERNAL MEDICINE | Admitting: INTERNAL MEDICINE
Payer: COMMERCIAL

## 2020-03-02 ENCOUNTER — APPOINTMENT (OUTPATIENT)
Dept: CT IMAGING | Age: 35
DRG: 420 | End: 2020-03-02
Payer: COMMERCIAL

## 2020-03-02 PROBLEM — E11.10 DKA, TYPE 2, NOT AT GOAL (HCC): Status: ACTIVE | Noted: 2020-03-02

## 2020-03-02 PROBLEM — F17.200 CURRENT SMOKER: Status: ACTIVE | Noted: 2020-03-02

## 2020-03-02 PROBLEM — R79.89 ELEVATED LFTS: Status: ACTIVE | Noted: 2020-03-02

## 2020-03-02 LAB
ALBUMIN SERPL-MCNC: 5.5 G/DL (ref 3.5–5.1)
ALLEN TEST: POSITIVE
ALP BLD-CCNC: 155 U/L (ref 38–126)
ALT SERPL-CCNC: 161 U/L (ref 11–66)
AMORPHOUS: ABNORMAL
AMPHETAMINE+METHAMPHETAMINE URINE SCREEN: NEGATIVE
ANION GAP SERPL CALCULATED.3IONS-SCNC: 17 MEQ/L (ref 8–16)
ANION GAP SERPL CALCULATED.3IONS-SCNC: 20 MEQ/L (ref 8–16)
ANION GAP SERPL CALCULATED.3IONS-SCNC: 29 MEQ/L (ref 8–16)
ANION GAP SERPL CALCULATED.3IONS-SCNC: 39 MEQ/L (ref 8–16)
AST SERPL-CCNC: 144 U/L (ref 5–40)
BACTERIA: ABNORMAL /HPF
BARBITURATE QUANTITATIVE URINE: NEGATIVE
BASE EXCESS (CALCULATED): -12.3 MMOL/L (ref -2.5–2.5)
BASOPHILS # BLD: 0.2 %
BASOPHILS ABSOLUTE: 0 THOU/MM3 (ref 0–0.1)
BENZODIAZEPINE QUANTITATIVE URINE: NEGATIVE
BETA-HYDROXYBUTYRATE: 86.11 MG/DL (ref 0.2–2.81)
BILIRUB SERPL-MCNC: 0.9 MG/DL (ref 0.3–1.2)
BILIRUBIN DIRECT: 0.4 MG/DL (ref 0–0.3)
BILIRUBIN URINE: NEGATIVE
BLOOD, URINE: ABNORMAL
BUN BLDV-MCNC: 13 MG/DL (ref 7–22)
BUN BLDV-MCNC: 13 MG/DL (ref 7–22)
BUN BLDV-MCNC: 16 MG/DL (ref 7–22)
BUN BLDV-MCNC: 19 MG/DL (ref 7–22)
CALCIUM SERPL-MCNC: 8 MG/DL (ref 8.5–10.5)
CALCIUM SERPL-MCNC: 8 MG/DL (ref 8.5–10.5)
CALCIUM SERPL-MCNC: 8.1 MG/DL (ref 8.5–10.5)
CALCIUM SERPL-MCNC: 9 MG/DL (ref 8.5–10.5)
CANNABINOID QUANTITATIVE URINE: NEGATIVE
CASTS 2: ABNORMAL /LPF
CASTS UA: ABNORMAL /LPF
CHARACTER, URINE: CLEAR
CHLORIDE BLD-SCNC: 86 MEQ/L (ref 98–111)
CHLORIDE BLD-SCNC: 95 MEQ/L (ref 98–111)
CHLORIDE BLD-SCNC: 97 MEQ/L (ref 98–111)
CHLORIDE BLD-SCNC: 98 MEQ/L (ref 98–111)
CO2: 11 MEQ/L (ref 23–33)
CO2: 16 MEQ/L (ref 23–33)
CO2: 20 MEQ/L (ref 23–33)
CO2: 9 MEQ/L (ref 23–33)
COCAINE METABOLITE QUANTITATIVE URINE: NEGATIVE
COLLECTED BY:: ABNORMAL
COLOR: YELLOW
CREAT SERPL-MCNC: 0.5 MG/DL (ref 0.4–1.2)
CREAT SERPL-MCNC: 0.7 MG/DL (ref 0.4–1.2)
CREAT SERPL-MCNC: 0.7 MG/DL (ref 0.4–1.2)
CREAT SERPL-MCNC: 1.1 MG/DL (ref 0.4–1.2)
CRYSTALS, UA: ABNORMAL
DEVICE: ABNORMAL
EOSINOPHIL # BLD: 0 %
EOSINOPHILS ABSOLUTE: 0 THOU/MM3 (ref 0–0.4)
EPITHELIAL CELLS, UA: ABNORMAL /HPF
ERYTHROCYTE [DISTWIDTH] IN BLOOD BY AUTOMATED COUNT: 12.6 % (ref 11.5–14.5)
ERYTHROCYTE [DISTWIDTH] IN BLOOD BY AUTOMATED COUNT: 43.3 FL (ref 35–45)
ETHYL ALCOHOL, SERUM: 0.19 %
GAMMA GLUTAMYL TRANSFERASE: 449 U/L (ref 8–69)
GFR SERPL CREATININE-BSD FRML MDRD: 76 ML/MIN/1.73M2
GFR SERPL CREATININE-BSD FRML MDRD: > 90 ML/MIN/1.73M2
GLUCOSE BLD-MCNC: 123 MG/DL (ref 70–108)
GLUCOSE BLD-MCNC: 134 MG/DL (ref 70–108)
GLUCOSE BLD-MCNC: 140 MG/DL (ref 70–108)
GLUCOSE BLD-MCNC: 144 MG/DL (ref 70–108)
GLUCOSE BLD-MCNC: 144 MG/DL (ref 70–108)
GLUCOSE BLD-MCNC: 152 MG/DL (ref 70–108)
GLUCOSE BLD-MCNC: 153 MG/DL (ref 70–108)
GLUCOSE BLD-MCNC: 161 MG/DL (ref 70–108)
GLUCOSE BLD-MCNC: 162 MG/DL (ref 70–108)
GLUCOSE BLD-MCNC: 200 MG/DL (ref 70–108)
GLUCOSE BLD-MCNC: 207 MG/DL (ref 70–108)
GLUCOSE BLD-MCNC: 212 MG/DL (ref 70–108)
GLUCOSE BLD-MCNC: 213 MG/DL (ref 70–108)
GLUCOSE BLD-MCNC: 213 MG/DL (ref 70–108)
GLUCOSE BLD-MCNC: 221 MG/DL (ref 70–108)
GLUCOSE BLD-MCNC: 289 MG/DL (ref 70–108)
GLUCOSE BLD-MCNC: 332 MG/DL (ref 70–108)
GLUCOSE BLD-MCNC: 359 MG/DL (ref 70–108)
GLUCOSE URINE: >= 1000 MG/DL
HCO3: 9 MMOL/L (ref 23–28)
HCT VFR BLD CALC: 52.8 % (ref 42–52)
HEMOGLOBIN: 17.8 GM/DL (ref 14–18)
IMMATURE GRANS (ABS): 0.15 THOU/MM3 (ref 0–0.07)
IMMATURE GRANULOCYTES: 0.9 %
KETONES, URINE: 80
LACTIC ACID: 1.6 MMOL/L (ref 0.5–2.2)
LACTIC ACID: 3.5 MMOL/L (ref 0.5–2.2)
LEUKOCYTE ESTERASE, URINE: NEGATIVE
LIPASE: 438.5 U/L (ref 5.6–51.3)
LYMPHOCYTES # BLD: 4.2 %
LYMPHOCYTES ABSOLUTE: 0.7 THOU/MM3 (ref 1–4.8)
MAGNESIUM: 1.7 MG/DL (ref 1.6–2.4)
MAGNESIUM: 1.8 MG/DL (ref 1.6–2.4)
MAGNESIUM: 2 MG/DL (ref 1.6–2.4)
MCH RBC QN AUTO: 31.6 PG (ref 26–33)
MCHC RBC AUTO-ENTMCNC: 33.7 GM/DL (ref 32.2–35.5)
MCV RBC AUTO: 93.6 FL (ref 80–94)
MONOCYTES # BLD: 5.1 %
MONOCYTES ABSOLUTE: 0.8 THOU/MM3 (ref 0.4–1.3)
MUCUS: ABNORMAL
NITRITE, URINE: NEGATIVE
NUCLEATED RED BLOOD CELLS: 0 /100 WBC
O2 SATURATION: 99 %
OPIATES, URINE: NEGATIVE
OSMOLALITY CALCULATION: 288.7 MOSMOL/KG (ref 275–300)
OXYCODONE: NEGATIVE
PCO2: 16 MMHG (ref 35–45)
PH BLOOD GAS: 7.38 (ref 7.35–7.45)
PH UA: 5 (ref 5–9)
PHENCYCLIDINE QUANTITATIVE URINE: NEGATIVE
PHOSPHORUS: 1.3 MG/DL (ref 2.4–4.7)
PHOSPHORUS: 2 MG/DL (ref 2.4–4.7)
PHOSPHORUS: 2.3 MG/DL (ref 2.4–4.7)
PLATELET # BLD: 208 THOU/MM3 (ref 130–400)
PMV BLD AUTO: 10.3 FL (ref 9.4–12.4)
PO2: 136 MMHG (ref 71–104)
POTASSIUM REFLEX MAGNESIUM: 4.6 MEQ/L (ref 3.5–5.2)
POTASSIUM SERPL-SCNC: 3.4 MEQ/L (ref 3.5–5.2)
POTASSIUM SERPL-SCNC: 3.7 MEQ/L (ref 3.5–5.2)
POTASSIUM SERPL-SCNC: 4 MEQ/L (ref 3.5–5.2)
PROTEIN UA: 300
RBC # BLD: 5.64 MILL/MM3 (ref 4.7–6.1)
RBC URINE: ABNORMAL /HPF
SEG NEUTROPHILS: 89.6 %
SEGMENTED NEUTROPHILS ABSOLUTE COUNT: 14.3 THOU/MM3 (ref 1.8–7.7)
SODIUM BLD-SCNC: 132 MEQ/L (ref 135–145)
SODIUM BLD-SCNC: 134 MEQ/L (ref 135–145)
SODIUM BLD-SCNC: 135 MEQ/L (ref 135–145)
SODIUM BLD-SCNC: 136 MEQ/L (ref 135–145)
SOURCE, BLOOD GAS: ABNORMAL
SPECIFIC GRAVITY, URINE: 1.02 (ref 1–1.03)
TOTAL PROTEIN: 8.4 G/DL (ref 6.1–8)
UROBILINOGEN, URINE: 1 EU/DL (ref 0–1)
WBC # BLD: 16 THOU/MM3 (ref 4.8–10.8)
WBC UA: ABNORMAL /HPF

## 2020-03-02 PROCEDURE — 6360000004 HC RX CONTRAST MEDICATION: Performed by: NURSE PRACTITIONER

## 2020-03-02 PROCEDURE — 85025 COMPLETE CBC W/AUTO DIFF WBC: CPT

## 2020-03-02 PROCEDURE — 80076 HEPATIC FUNCTION PANEL: CPT

## 2020-03-02 PROCEDURE — 83735 ASSAY OF MAGNESIUM: CPT

## 2020-03-02 PROCEDURE — 6370000000 HC RX 637 (ALT 250 FOR IP): Performed by: NURSE PRACTITIONER

## 2020-03-02 PROCEDURE — 82803 BLOOD GASES ANY COMBINATION: CPT

## 2020-03-02 PROCEDURE — 82010 KETONE BODYS QUAN: CPT

## 2020-03-02 PROCEDURE — 80048 BASIC METABOLIC PNL TOTAL CA: CPT

## 2020-03-02 PROCEDURE — G0480 DRUG TEST DEF 1-7 CLASSES: HCPCS

## 2020-03-02 PROCEDURE — 2709999900 HC NON-CHARGEABLE SUPPLY

## 2020-03-02 PROCEDURE — 96374 THER/PROPH/DIAG INJ IV PUSH: CPT

## 2020-03-02 PROCEDURE — 82977 ASSAY OF GGT: CPT

## 2020-03-02 PROCEDURE — 36415 COLL VENOUS BLD VENIPUNCTURE: CPT

## 2020-03-02 PROCEDURE — 6360000002 HC RX W HCPCS: Performed by: INTERNAL MEDICINE

## 2020-03-02 PROCEDURE — 6360000002 HC RX W HCPCS

## 2020-03-02 PROCEDURE — 6370000000 HC RX 637 (ALT 250 FOR IP): Performed by: INTERNAL MEDICINE

## 2020-03-02 PROCEDURE — 96361 HYDRATE IV INFUSION ADD-ON: CPT

## 2020-03-02 PROCEDURE — 2500000003 HC RX 250 WO HCPCS: Performed by: INTERNAL MEDICINE

## 2020-03-02 PROCEDURE — 84100 ASSAY OF PHOSPHORUS: CPT

## 2020-03-02 PROCEDURE — 81001 URINALYSIS AUTO W/SCOPE: CPT

## 2020-03-02 PROCEDURE — 83690 ASSAY OF LIPASE: CPT

## 2020-03-02 PROCEDURE — 99223 1ST HOSP IP/OBS HIGH 75: CPT | Performed by: INTERNAL MEDICINE

## 2020-03-02 PROCEDURE — 2580000003 HC RX 258: Performed by: INTERNAL MEDICINE

## 2020-03-02 PROCEDURE — 99285 EMERGENCY DEPT VISIT HI MDM: CPT

## 2020-03-02 PROCEDURE — 6360000002 HC RX W HCPCS: Performed by: NURSE PRACTITIONER

## 2020-03-02 PROCEDURE — 80305 DRUG TEST PRSMV DIR OPT OBS: CPT

## 2020-03-02 PROCEDURE — 83605 ASSAY OF LACTIC ACID: CPT

## 2020-03-02 PROCEDURE — 6370000000 HC RX 637 (ALT 250 FOR IP): Performed by: PHYSICIAN ASSISTANT

## 2020-03-02 PROCEDURE — 94640 AIRWAY INHALATION TREATMENT: CPT

## 2020-03-02 PROCEDURE — 94761 N-INVAS EAR/PLS OXIMETRY MLT: CPT

## 2020-03-02 PROCEDURE — 74177 CT ABD & PELVIS W/CONTRAST: CPT

## 2020-03-02 PROCEDURE — 82948 REAGENT STRIP/BLOOD GLUCOSE: CPT

## 2020-03-02 PROCEDURE — 2580000003 HC RX 258: Performed by: NURSE PRACTITIONER

## 2020-03-02 PROCEDURE — 2060000000 HC ICU INTERMEDIATE R&B

## 2020-03-02 PROCEDURE — 36600 WITHDRAWAL OF ARTERIAL BLOOD: CPT

## 2020-03-02 PROCEDURE — C9113 INJ PANTOPRAZOLE SODIUM, VIA: HCPCS | Performed by: INTERNAL MEDICINE

## 2020-03-02 RX ORDER — ONDANSETRON 2 MG/ML
4 INJECTION INTRAMUSCULAR; INTRAVENOUS EVERY 6 HOURS PRN
Status: DISCONTINUED | OUTPATIENT
Start: 2020-03-02 | End: 2020-03-02

## 2020-03-02 RX ORDER — TRAZODONE HYDROCHLORIDE 100 MG/1
100 TABLET ORAL NIGHTLY PRN
Status: DISCONTINUED | OUTPATIENT
Start: 2020-03-02 | End: 2020-03-02

## 2020-03-02 RX ORDER — SODIUM CHLORIDE 9 MG/ML
INJECTION, SOLUTION INTRAVENOUS CONTINUOUS
Status: DISCONTINUED | OUTPATIENT
Start: 2020-03-02 | End: 2020-03-03 | Stop reason: ALTCHOICE

## 2020-03-02 RX ORDER — METOCLOPRAMIDE HYDROCHLORIDE 5 MG/ML
10 INJECTION INTRAMUSCULAR; INTRAVENOUS ONCE
Status: COMPLETED | OUTPATIENT
Start: 2020-03-02 | End: 2020-03-02

## 2020-03-02 RX ORDER — MORPHINE SULFATE 4 MG/ML
4 INJECTION, SOLUTION INTRAMUSCULAR; INTRAVENOUS ONCE
Status: COMPLETED | OUTPATIENT
Start: 2020-03-02 | End: 2020-03-02

## 2020-03-02 RX ORDER — BUDESONIDE AND FORMOTEROL FUMARATE DIHYDRATE 160; 4.5 UG/1; UG/1
2 AEROSOL RESPIRATORY (INHALATION) 2 TIMES DAILY
Status: DISCONTINUED | OUTPATIENT
Start: 2020-03-02 | End: 2020-03-04 | Stop reason: HOSPADM

## 2020-03-02 RX ORDER — ACETAMINOPHEN 325 MG/1
650 TABLET ORAL EVERY 4 HOURS PRN
Status: DISCONTINUED | OUTPATIENT
Start: 2020-03-02 | End: 2020-03-04 | Stop reason: HOSPADM

## 2020-03-02 RX ORDER — ALBUTEROL SULFATE 90 UG/1
2 AEROSOL, METERED RESPIRATORY (INHALATION) EVERY 6 HOURS PRN
Status: DISCONTINUED | OUTPATIENT
Start: 2020-03-02 | End: 2020-03-04 | Stop reason: HOSPADM

## 2020-03-02 RX ORDER — PANTOPRAZOLE SODIUM 40 MG/10ML
40 INJECTION, POWDER, LYOPHILIZED, FOR SOLUTION INTRAVENOUS DAILY
Status: DISCONTINUED | OUTPATIENT
Start: 2020-03-02 | End: 2020-03-04 | Stop reason: HOSPADM

## 2020-03-02 RX ORDER — LANOLIN ALCOHOL/MO/W.PET/CERES
5 CREAM (GRAM) TOPICAL NIGHTLY PRN
Status: DISCONTINUED | OUTPATIENT
Start: 2020-03-02 | End: 2020-03-04 | Stop reason: HOSPADM

## 2020-03-02 RX ORDER — PROMETHAZINE HYDROCHLORIDE 25 MG/ML
25 INJECTION, SOLUTION INTRAMUSCULAR; INTRAVENOUS ONCE
Status: COMPLETED | OUTPATIENT
Start: 2020-03-02 | End: 2020-03-02

## 2020-03-02 RX ORDER — 0.9 % SODIUM CHLORIDE 0.9 %
1000 INTRAVENOUS SOLUTION INTRAVENOUS ONCE
Status: COMPLETED | OUTPATIENT
Start: 2020-03-02 | End: 2020-03-02

## 2020-03-02 RX ORDER — DEXTROSE, SODIUM CHLORIDE, AND POTASSIUM CHLORIDE 5; .45; .15 G/100ML; G/100ML; G/100ML
INJECTION INTRAVENOUS CONTINUOUS PRN
Status: DISCONTINUED | OUTPATIENT
Start: 2020-03-02 | End: 2020-03-04 | Stop reason: HOSPADM

## 2020-03-02 RX ORDER — NICOTINE 21 MG/24HR
1 PATCH, TRANSDERMAL 24 HOURS TRANSDERMAL DAILY
Status: DISCONTINUED | OUTPATIENT
Start: 2020-03-02 | End: 2020-03-04 | Stop reason: HOSPADM

## 2020-03-02 RX ORDER — POTASSIUM CHLORIDE 20 MEQ/1
40 TABLET, EXTENDED RELEASE ORAL PRN
Status: DISCONTINUED | OUTPATIENT
Start: 2020-03-02 | End: 2020-03-04 | Stop reason: HOSPADM

## 2020-03-02 RX ORDER — ONDANSETRON 2 MG/ML
4 INJECTION INTRAMUSCULAR; INTRAVENOUS ONCE
Status: COMPLETED | OUTPATIENT
Start: 2020-03-02 | End: 2020-03-02

## 2020-03-02 RX ORDER — POTASSIUM CHLORIDE 7.45 MG/ML
10 INJECTION INTRAVENOUS PRN
Status: DISCONTINUED | OUTPATIENT
Start: 2020-03-02 | End: 2020-03-04 | Stop reason: HOSPADM

## 2020-03-02 RX ORDER — ONDANSETRON 2 MG/ML
INJECTION INTRAMUSCULAR; INTRAVENOUS
Status: COMPLETED
Start: 2020-03-02 | End: 2020-03-02

## 2020-03-02 RX ORDER — ONDANSETRON 2 MG/ML
4 INJECTION INTRAMUSCULAR; INTRAVENOUS EVERY 4 HOURS PRN
Status: DISCONTINUED | OUTPATIENT
Start: 2020-03-02 | End: 2020-03-04 | Stop reason: HOSPADM

## 2020-03-02 RX ORDER — HYDROXYZINE PAMOATE 50 MG/1
100 CAPSULE ORAL 4 TIMES DAILY PRN
Status: DISCONTINUED | OUTPATIENT
Start: 2020-03-02 | End: 2020-03-04 | Stop reason: HOSPADM

## 2020-03-02 RX ORDER — DEXTROSE MONOHYDRATE 25 G/50ML
12.5 INJECTION, SOLUTION INTRAVENOUS PRN
Status: DISCONTINUED | OUTPATIENT
Start: 2020-03-02 | End: 2020-03-04 | Stop reason: HOSPADM

## 2020-03-02 RX ORDER — TRAZODONE HYDROCHLORIDE 100 MG/1
100 TABLET ORAL DAILY PRN
Status: DISCONTINUED | OUTPATIENT
Start: 2020-03-02 | End: 2020-03-04 | Stop reason: HOSPADM

## 2020-03-02 RX ORDER — MORPHINE SULFATE 2 MG/ML
2 INJECTION, SOLUTION INTRAMUSCULAR; INTRAVENOUS EVERY 4 HOURS PRN
Status: DISCONTINUED | OUTPATIENT
Start: 2020-03-02 | End: 2020-03-03

## 2020-03-02 RX ORDER — MORPHINE SULFATE 2 MG/ML
1 INJECTION, SOLUTION INTRAMUSCULAR; INTRAVENOUS EVERY 4 HOURS PRN
Status: DISCONTINUED | OUTPATIENT
Start: 2020-03-02 | End: 2020-03-03

## 2020-03-02 RX ADMIN — IOPAMIDOL 80 ML: 755 INJECTION, SOLUTION INTRAVENOUS at 07:34

## 2020-03-02 RX ADMIN — MORPHINE SULFATE 2 MG: 2 INJECTION, SOLUTION INTRAMUSCULAR; INTRAVENOUS at 22:31

## 2020-03-02 RX ADMIN — TRAZODONE HYDROCHLORIDE 100 MG: 100 TABLET ORAL at 17:36

## 2020-03-02 RX ADMIN — SODIUM CHLORIDE 1000 ML: 9 INJECTION, SOLUTION INTRAVENOUS at 08:02

## 2020-03-02 RX ADMIN — METOCLOPRAMIDE 10 MG: 5 INJECTION, SOLUTION INTRAMUSCULAR; INTRAVENOUS at 06:21

## 2020-03-02 RX ADMIN — MORPHINE SULFATE 2 MG: 2 INJECTION, SOLUTION INTRAMUSCULAR; INTRAVENOUS at 11:28

## 2020-03-02 RX ADMIN — SODIUM PHOSPHATE, MONOBASIC, MONOHYDRATE 15 MMOL: 276; 142 INJECTION, SOLUTION INTRAVENOUS at 12:41

## 2020-03-02 RX ADMIN — Medication 2 PUFF: at 20:01

## 2020-03-02 RX ADMIN — PROMETHAZINE HYDROCHLORIDE 25 MG: 25 INJECTION INTRAMUSCULAR; INTRAVENOUS at 09:47

## 2020-03-02 RX ADMIN — ONDANSETRON 4 MG: 2 INJECTION INTRAMUSCULAR; INTRAVENOUS at 20:53

## 2020-03-02 RX ADMIN — POTASSIUM CHLORIDE, DEXTROSE MONOHYDRATE AND SODIUM CHLORIDE: 150; 5; 450 INJECTION, SOLUTION INTRAVENOUS at 15:46

## 2020-03-02 RX ADMIN — MORPHINE SULFATE 2 MG: 2 INJECTION, SOLUTION INTRAMUSCULAR; INTRAVENOUS at 19:27

## 2020-03-02 RX ADMIN — SODIUM PHOSPHATE, MONOBASIC, MONOHYDRATE 20 MMOL: 276; 142 INJECTION, SOLUTION INTRAVENOUS at 17:07

## 2020-03-02 RX ADMIN — ONDANSETRON 4 MG: 2 INJECTION INTRAMUSCULAR; INTRAVENOUS at 12:56

## 2020-03-02 RX ADMIN — ENOXAPARIN SODIUM 40 MG: 40 INJECTION SUBCUTANEOUS at 12:41

## 2020-03-02 RX ADMIN — Medication 4.5 MG: at 21:39

## 2020-03-02 RX ADMIN — ONDANSETRON 4 MG: 2 INJECTION INTRAMUSCULAR; INTRAVENOUS at 08:15

## 2020-03-02 RX ADMIN — INSULIN HUMAN 5 UNITS: 100 INJECTION, SOLUTION PARENTERAL at 08:14

## 2020-03-02 RX ADMIN — MORPHINE SULFATE 4 MG: 4 INJECTION, SOLUTION INTRAMUSCULAR; INTRAVENOUS at 08:16

## 2020-03-02 RX ADMIN — MORPHINE SULFATE 2 MG: 2 INJECTION, SOLUTION INTRAMUSCULAR; INTRAVENOUS at 15:34

## 2020-03-02 RX ADMIN — ONDANSETRON 4 MG: 2 INJECTION INTRAMUSCULAR; INTRAVENOUS at 17:08

## 2020-03-02 RX ADMIN — POTASSIUM CHLORIDE, DEXTROSE MONOHYDRATE AND SODIUM CHLORIDE: 150; 5; 450 INJECTION, SOLUTION INTRAVENOUS at 23:31

## 2020-03-02 RX ADMIN — POTASSIUM CHLORIDE 40 MEQ: 1500 TABLET, EXTENDED RELEASE ORAL at 21:56

## 2020-03-02 RX ADMIN — SODIUM CHLORIDE 3.1 UNITS/HR: 9 INJECTION, SOLUTION INTRAVENOUS at 09:39

## 2020-03-02 RX ADMIN — POTASSIUM CHLORIDE, DEXTROSE MONOHYDRATE AND SODIUM CHLORIDE: 150; 5; 450 INJECTION, SOLUTION INTRAVENOUS at 09:30

## 2020-03-02 RX ADMIN — PANTOPRAZOLE SODIUM 40 MG: 40 INJECTION, POWDER, FOR SOLUTION INTRAVENOUS at 09:47

## 2020-03-02 RX ADMIN — SODIUM CHLORIDE 1000 ML: 9 INJECTION, SOLUTION INTRAVENOUS at 06:20

## 2020-03-02 ASSESSMENT — ENCOUNTER SYMPTOMS
FACIAL SWELLING: 0
SHORTNESS OF BREATH: 0
COUGH: 0
RHINORRHEA: 0
SINUS PAIN: 0
NAUSEA: 1
CHEST TIGHTNESS: 0
SINUS PRESSURE: 0
BACK PAIN: 0
ABDOMINAL PAIN: 1
ABDOMINAL DISTENTION: 1
DIARRHEA: 0
CONSTIPATION: 0
VOMITING: 1
WHEEZING: 0
SORE THROAT: 0
TROUBLE SWALLOWING: 0
APNEA: 0
COLOR CHANGE: 0

## 2020-03-02 ASSESSMENT — PAIN DESCRIPTION - FREQUENCY
FREQUENCY: CONTINUOUS
FREQUENCY: CONTINUOUS

## 2020-03-02 ASSESSMENT — PAIN DESCRIPTION - PAIN TYPE
TYPE: ACUTE PAIN

## 2020-03-02 ASSESSMENT — PAIN - FUNCTIONAL ASSESSMENT
PAIN_FUNCTIONAL_ASSESSMENT: PREVENTS OR INTERFERES SOME ACTIVE ACTIVITIES AND ADLS
PAIN_FUNCTIONAL_ASSESSMENT: PREVENTS OR INTERFERES SOME ACTIVE ACTIVITIES AND ADLS

## 2020-03-02 ASSESSMENT — PAIN DESCRIPTION - PROGRESSION
CLINICAL_PROGRESSION: NOT CHANGED
CLINICAL_PROGRESSION: NOT CHANGED

## 2020-03-02 ASSESSMENT — PAIN DESCRIPTION - DESCRIPTORS
DESCRIPTORS: CONSTANT
DESCRIPTORS: CONSTANT

## 2020-03-02 ASSESSMENT — PAIN DESCRIPTION - ORIENTATION
ORIENTATION: MID
ORIENTATION: MID

## 2020-03-02 ASSESSMENT — PAIN SCALES - GENERAL
PAINLEVEL_OUTOF10: 8
PAINLEVEL_OUTOF10: 9
PAINLEVEL_OUTOF10: 9
PAINLEVEL_OUTOF10: 8
PAINLEVEL_OUTOF10: 9
PAINLEVEL_OUTOF10: 7
PAINLEVEL_OUTOF10: 9
PAINLEVEL_OUTOF10: 9

## 2020-03-02 ASSESSMENT — PAIN DESCRIPTION - ONSET
ONSET: ON-GOING
ONSET: ON-GOING

## 2020-03-02 ASSESSMENT — PAIN DESCRIPTION - LOCATION
LOCATION: ABDOMEN

## 2020-03-02 NOTE — ED NOTES
Called [facility] and informed Jen Stark that the patient is on their way to the unit.      Anamika Kristi  03/02/20 5083

## 2020-03-02 NOTE — H&P
History & Physical        Patient:  Kita Hui  YOB: 1985    MRN: [id number]     Acct: [de-identified]    PCP: BARB Alcantara CNP    Date of Admission: 3/2/2020    Date of Service: Pt seen/examined on 03/02/20  and Admitted to Inpatient with expected LOS greater than two midnights due to medical therapy. Chief Complaint:   nausea and vomiting and abdominal pain      History Of Present Illness:    37 y.o. male who presented to [facility] with with chief complaint of nausea and vomiting with mild epigastric abdominal pain. Patient presented to the emergency room with chief complaint of persistent, recurrent nausea and vomiting for the last 3 days and was not able to keep any food intake. Apparently patient missed 1 day of insulin prior to that and usually sugars are fairly well controlled as per him. This morning with recurrent vomiting he started having mild epigastric abdominal discomfort without any radiation and has been having associated darkish-looking vomitus. Denies drinking alcohol even though the alcohol level is elevated at 0.19. Could not rule out the possibility of false elevation     On further review of systems, states he was not feeling good prior to this and thought he was having the flu but denied having any runny nose or fever but did have the chills. Denies any diarrhea. No dizziness or lightheadedness or loss of consciousness. Admits to smoking 1 pack cigarettes per day. On further evaluation in the emergency room noticed to have elevated WBC of 16,000, BUN 19 creatinine 1.1, bicarb 11, anion gap 39, blood glucose 359. Elevated LFTs with , , bilirubin 0.9, lipase 438, alk phos 155.         Past Medical History:          Diagnosis Date    Asthma     COPD (chronic obstructive pulmonary disease) (HCC)     Eczema     GERD (gastroesophageal reflux disease)     History of alcohol abuse     History of marijuana use     management for nausea and vomiting, Phenergan 1 dose and continue Zofran, BMP magnesium and phosphorus every 4 hours  Once anion gap metabolic acidosis resolved-, will start diet and advance    Elevated LFTs unclear of the etiology: Patient firmly denies drinking alcohol since last April, will recheck, previous ultrasound suggested fatty infiltration, check GGT  If persistently elevated will need additional work-up-    Elevated lipase most likely from the nausea and vomiting-no definitive evidence of pancreatitis on CT, suggesting calcifications which are old. -Abdominal pain is most likely from the DKA,   -    Current smoker counseling done, nicotine patch    Elevated alcohol level-patient family denies drinking alcohol, will check for lactic acidosis, possibly false positive as patient is telling the truth, rare possibility of auto brewery syndrome, but patient does not feel drunk or has any symptoms from elevated alcohol level. COPD/asthma without exacerbation continue bronchodilators    History of  hypertension, currently not on any medications at home. Thank you BARB Rivas - [facility] for the opportunity to be involved in this patient's care.     Electronically signed by Lauren Carrasquillo MD on 3/2/2020 at 9:07 AM

## 2020-03-02 NOTE — ED TRIAGE NOTES
Pt presents to the ED with c/o black emesis for three days. Pt reports he is not able to keep anything down. Pt is a diabetic and last checked his sugar two days ago where it was 176. Pt blood sugar upon arrival was 336. Pt actively vomiting light black emesis in room. Pt alert and oriented. Pt made comfortable in cot. VS obtained.

## 2020-03-02 NOTE — ED NOTES
Pt resting more comfortably in cot. Pt breathing easy and unlabored. Pt alert and oriented. Fluids infusing without complications. Pt provided urine specimen. Specimen sent to lab at this time.       She Lee RN  03/02/20 0699

## 2020-03-02 NOTE — ED NOTES
Patient resting quietly in cot showing no signs of distress at this time. Complains of abdominal pain, 8/10 and is requesting pain and nausea medication. Jomar Flynn CNP notified. Will continue to monitor.       Sunil Michaels RN  03/02/20 [address] [place] Nicci Chappell RN  03/02/20 9439

## 2020-03-02 NOTE — ED NOTES
Upon first contact with patient this RN receives bedside shift report from [facility]. Patient resting quietly in cot showing no signs of distress at this time. Given ice chips. Updated on POC. Will continue to monitor.       Chris Ricardo RN  03/02/20 5903

## 2020-03-02 NOTE — ED PROVIDER NOTES
[facility]  eMERGENCY dEPARTMENT eNCOUnter          [address] [facility]       Chief Complaint   Patient presents with    Emesis    Hyperglycemia       Nurses Notes reviewed and I agree except as noted in the HPI. HISTORY OF PRESENT ILLNESS    Kiran Timmons is a 37 y.o. male who presents to the Emergency Department for the evaluation of 3 days of nausea and vomiting as well as epigastric pain. Patient has had dark liquid emesis. States that he has been able to keep nothing down, not even water. Patient has history of type 2 diabetes, states that he has had similar symptoms in the past when he has been in DKA. Patient also has history of alcohol induced pancreatitis, states that his last drink was 2 years ago. The HPI was provided by the patient. REVIEW OF SYSTEMS     Review of Systems   Constitutional: Negative for chills, diaphoresis, fatigue and fever. HENT: Negative for congestion, ear pain, facial swelling, rhinorrhea, sinus pressure, sinus pain, sneezing, sore throat and trouble swallowing. Respiratory: Negative for apnea, cough, chest tightness, shortness of breath and wheezing. Cardiovascular: Negative for chest pain and palpitations. Gastrointestinal: Positive for abdominal distention, abdominal pain, nausea and vomiting. Negative for constipation and diarrhea. Endocrine: Negative for polydipsia, polyphagia and polyuria. Genitourinary: Negative for decreased urine volume, dysuria, flank pain, frequency and urgency. Musculoskeletal: Negative for arthralgias, back pain, joint swelling, myalgias, neck pain and neck stiffness. Skin: Negative for color change and wound. Neurological: Negative for dizziness, tremors, weakness, light-headedness, numbness and headaches.        PAST MEDICAL HISTORY    has a past medical history of Asthma, COPD (chronic obstructive pulmonary disease) ([address] Eczema, GERD (gastroesophageal reflux disease), History of alcohol Behavior normal. Behavior is cooperative. DIFFERENTIAL DIAGNOSIS:   Pancreatitis, DKA, bowel obstruction, hyperglycemia    DIAGNOSTIC RESULTS     EKG: All EKG's are interpreted by the Emergency Department Physician who either signs or Co-signs this chart in the absence of a cardiologist.    None    RADIOLOGY: non-plainfilm images(s) such as CT, Ultrasound and MRI are read by the radiologist.    CT ABDOMEN PELVIS W IV CONTRAST Additional Contrast? None   Final Result       1. Coarse calcifications in the uncinate process of the pancreas consistent with prior pancreatitis. There is no CT evidence of acute pancreatitis. 2. Fatty infiltration of the liver. **This report has been created using voice recognition software. It may contain minor errors which are inherent in voice recognition technology. **      Final report electronically signed by Dr. Destiny Jon on 3/2/2020 7:53 AM          LABS:     Labs Reviewed   CBC WITH AUTO DIFFERENTIAL - Abnormal; Notable for the following components:       Result Value    WBC 16.0 (*)     Hematocrit 52.8 (*)     Segs Absolute 14.3 (*)     Lymphocytes Absolute 0.7 (*)     Immature Grans (Abs) 0.15 (*)     All other components within normal limits   BASIC METABOLIC PANEL W/ REFLEX TO MG FOR LOW K - Abnormal; Notable for the following components:    Chloride 86 (*)     CO2 11 (*)     Glucose 359 (*)     All other components within normal limits   HEPATIC FUNCTION PANEL - Abnormal; Notable for the following components:     Alb 5.5 (*)     Bilirubin, Direct 0.4 (*)     Alkaline Phosphatase 155 (*)      (*)      (*)     Total Protein 8.4 (*)     All other components within normal limits   LIPASE - Abnormal; Notable for the following components:    Lipase 438.5 (*)     All other components within normal limits   BETA-HYDROXYBUTYRATE - Abnormal; Notable for the following components:    Beta-Hydroxybutyrate 86.11 (*)     All other components within normal limits   BLOOD GAS, ARTERIAL - Abnormal; Notable for the following components:    PCO2 16 (*)     PO2 136 (*)     HCO3 9 (*)     Base Excess (Calculated) -12.3 (*)     All other components within normal limits   ANION GAP - Abnormal; Notable for the following components:    Anion Gap 39.0 (*)     All other components within normal limits   GLOMERULAR FILTRATION RATE, ESTIMATED - Abnormal; Notable for the following components:    Est, Glom Filt Rate 76 (*)     All other components within normal limits   URINE WITH REFLEXED MICRO - Abnormal; Notable for the following components:    Glucose, Ur >= 1000 (*)     Ketones, Urine 80 (*)     Blood, Urine MODERATE (*)     Protein,  (*)     All other components within normal limits   BASIC METABOLIC PANEL - Abnormal; Notable for the following components:    Chloride 97 (*)     CO2 9 (*)     Glucose 221 (*)     Calcium 8.0 (*)     All other components within normal limits   BASIC METABOLIC PANEL - Abnormal; Notable for the following components:    Sodium 134 (*)     CO2 16 (*)     Glucose 161 (*)     Calcium 8.1 (*)     All other components within normal limits   BASIC METABOLIC PANEL - Abnormal; Notable for the following components:    Sodium 132 (*)     Potassium 3.4 (*)     Chloride 95 (*)     CO2 20 (*)     Glucose 140 (*)     Calcium 8.0 (*)     All other components within normal limits   PHOSPHORUS - Abnormal; Notable for the following components:    Phosphorus 2.3 (*)     All other components within normal limits   PHOSPHORUS - Abnormal; Notable for the following components:    Phosphorus 1.3 (*)     All other components within normal limits   PHOSPHORUS - Abnormal; Notable for the following components:    Phosphorus 2.0 (*)     All other components within normal limits   LACTIC ACID, PLASMA - Abnormal; Notable for the following components:    Lactic Acid 3.5 (*)     All other components within normal limits   GAMMA GT - Abnormal; Notable for the following components:     (*)     All other components within normal limits   ANION GAP - Abnormal; Notable for the following components:    Anion Gap 29.0 (*)     All other components within normal limits   BASIC METABOLIC PANEL - Abnormal; Notable for the following components:    Sodium 131 (*)     Chloride 97 (*)     CO2 17 (*)     Glucose 129 (*)     Calcium 7.7 (*)     All other components within normal limits   PHOSPHORUS - Abnormal; Notable for the following components:    Phosphorus 2.0 (*)     All other components within normal limits   ANION GAP - Abnormal; Notable for the following components:    Anion Gap 20.0 (*)     All other components within normal limits   BASIC METABOLIC PANEL - Abnormal; Notable for the following components:    Sodium 132 (*)     CO2 19 (*)     Glucose 121 (*)     Calcium 8.0 (*)     All other components within normal limits   PHOSPHORUS - Abnormal; Notable for the following components:    Phosphorus 1.5 (*)     All other components within normal limits   HEPATIC FUNCTION PANEL - Abnormal; Notable for the following components:    AST 84 (*)     ALT 88 (*)     All other components within normal limits   CBC - Abnormal; Notable for the following components:    RBC 4.36 (*)     Hemoglobin 13.6 (*)     Hematocrit 40.3 (*)     Platelets 259 (*)     All other components within normal limits   LIPASE - Abnormal; Notable for the following components:    Lipase 589.7 (*)     All other components within normal limits   AMYLASE - Abnormal; Notable for the following components:    Amylase 255 (*)     All other components within normal limits   BASIC METABOLIC PANEL - Abnormal; Notable for the following components:    Sodium 134 (*)     CO2 19 (*)     Glucose 156 (*)     Calcium 8.4 (*)     All other components within normal limits   PHOSPHORUS - Abnormal; Notable for the following components:    Phosphorus 1.4 (*)     All other components within normal limits   ANION GAP - Abnormal; Notable for following components:    POC Glucose 207 (*)     All other components within normal limits   POCT GLUCOSE - Abnormal; Notable for the following components:    POC Glucose 212 (*)     All other components within normal limits   POCT GLUCOSE - Abnormal; Notable for the following components:    POC Glucose 200 (*)     All other components within normal limits   POCT GLUCOSE - Abnormal; Notable for the following components:    POC Glucose 162 (*)     All other components within normal limits   POCT GLUCOSE - Abnormal; Notable for the following components:    POC Glucose 152 (*)     All other components within normal limits   POCT GLUCOSE - Abnormal; Notable for the following components:    POC Glucose 144 (*)     All other components within normal limits   POCT GLUCOSE - Abnormal; Notable for the following components:    POC Glucose 144 (*)     All other components within normal limits   POCT GLUCOSE - Abnormal; Notable for the following components:    POC Glucose 153 (*)     All other components within normal limits   POCT GLUCOSE - Abnormal; Notable for the following components:    POC Glucose 134 (*)     All other components within normal limits   POCT GLUCOSE - Abnormal; Notable for the following components:    POC Glucose 123 (*)     All other components within normal limits   POCT GLUCOSE - Abnormal; Notable for the following components:    POC Glucose 156 (*)     All other components within normal limits   POCT GLUCOSE - Abnormal; Notable for the following components:    POC Glucose 109 (*)     All other components within normal limits   POCT GLUCOSE - Abnormal; Notable for the following components:    POC Glucose 131 (*)     All other components within normal limits   POCT GLUCOSE - Abnormal; Notable for the following components:    POC Glucose 162 (*)     All other components within normal limits   POCT GLUCOSE - Abnormal; Notable for the following components:    POC Glucose 163 (*)     All other components GLOMERULAR FILTRATION RATE, ESTIMATED   ANION GAP   GLOMERULAR FILTRATION RATE, ESTIMATED   MAGNESIUM   PHOSPHORUS   GLOMERULAR FILTRATION RATE, ESTIMATED   ANION GAP   GLOMERULAR FILTRATION RATE, ESTIMATED   POCT GLUCOSE   POCT GLUCOSE   POCT GLUCOSE       EMERGENCY DEPARTMENT COURSE:   Vitals:    Vitals:    03/04/20 0633 03/04/20 0645 03/04/20 0758 03/04/20 1122   BP:  127/82 (!) 155/90 (!) 141/87   Pulse:  95 88 91   Resp:  20 18 18   Temp:   98.2 °F (36.8 °C) 98.2 °F (36.8 °C)   TempSrc:   Oral Oral   SpO2: 98% 97% 98% 98%   Weight:       Height:           5:56 AM: The patient was seen and evaluated. MDM:  POCT glucose obtained by nursing staff upon patient's arrival, noted to be 332. Lipase noted to be greater than 400. Patient although he denied drinking alcohol, ethanol level of 0.19. Beta hydroxybutyrate was elevated. Believe this is accommodation of alcohol induced pancreatitis as well as poorly controlled diabetes resulting in DKA. Patient hydrated with IV fluids, given 5 units of insulin while in the emergency department. Patient's condition observed to mildly improved while in the ED, I believe he will benefit from admission, management of DKA, pain management of pancreatitis. I discussed my findings with Dr. Lola Vickers from the hospitalist service who agreed to admit the patient for further evaluation and treatment. CRITICAL CARE:   None    CONSULTS:  Dr. Lola Vickers, hospitalist    PROCEDURES:  None    FINAL IMPRESSION      1. Alcohol-induced acute pancreatitis without infection or necrosis    2. Diabetic ketoacidosis without coma associated with diabetes mellitus due to underlying condition ([facility] [address]    3. DKA, type 2, not at goal [facility])          DISPOSITION/PLAN   Admit    PATIENT REFERRED TO:  [place] [facility]  [address] [facility] [address] [facility]      As needed    BARB Moncada - CNP  [address] [facility]  [place] [address]  [phone]    On 3/11/2020  follow up with family physician, your appointment time is at 9:40am, bring medications, photo I.D., and insurance card, please arrive 15 minutes prior to appointment time      DISCHARGE MEDICATIONS:  Current Discharge Medication List      START taking these medications    Details   oxyCODONE-acetaminophen (PERCOCET) 5-325 MG per tablet Take 1 tablet by mouth every 6 hours as needed for Pain for up to 3 days. Qty: 6 tablet, Refills: 0    Comments: Reduce doses taken as pain becomes manageable  Associated Diagnoses: DKA, type 2, not at goal [facility])             (Please note that portions of this note were completed with a voice recognition program.  Efforts were made to edit the dictations but occasionally words are mis-transcribed.)    The patient was given an opportunity to see the Emergency Attending. The patient voiced understanding that I was a Mid-LevelProvider and was in agreement with being seen independently by myself.           Maria Guadalupe Burt, BARB - CNP  03/04/20 7917

## 2020-03-03 LAB
ALBUMIN SERPL-MCNC: 3.7 G/DL (ref 3.5–5.1)
ALP BLD-CCNC: 90 U/L (ref 38–126)
ALT SERPL-CCNC: 88 U/L (ref 11–66)
AMYLASE: 255 U/L (ref 20–104)
ANION GAP SERPL CALCULATED.3IONS-SCNC: 13 MEQ/L (ref 8–16)
ANION GAP SERPL CALCULATED.3IONS-SCNC: 14 MEQ/L (ref 8–16)
ANION GAP SERPL CALCULATED.3IONS-SCNC: 14 MEQ/L (ref 8–16)
ANION GAP SERPL CALCULATED.3IONS-SCNC: 16 MEQ/L (ref 8–16)
ANION GAP SERPL CALCULATED.3IONS-SCNC: 17 MEQ/L (ref 8–16)
AST SERPL-CCNC: 84 U/L (ref 5–40)
BILIRUB SERPL-MCNC: 1 MG/DL (ref 0.3–1.2)
BILIRUBIN DIRECT: 0.3 MG/DL (ref 0–0.3)
BUN BLDV-MCNC: 11 MG/DL (ref 7–22)
BUN BLDV-MCNC: 7 MG/DL (ref 7–22)
BUN BLDV-MCNC: 8 MG/DL (ref 7–22)
CALCIUM SERPL-MCNC: 7.7 MG/DL (ref 8.5–10.5)
CALCIUM SERPL-MCNC: 8 MG/DL (ref 8.5–10.5)
CALCIUM SERPL-MCNC: 8.3 MG/DL (ref 8.5–10.5)
CALCIUM SERPL-MCNC: 8.4 MG/DL (ref 8.5–10.5)
CALCIUM SERPL-MCNC: 8.4 MG/DL (ref 8.5–10.5)
CHLORIDE BLD-SCNC: 101 MEQ/L (ref 98–111)
CHLORIDE BLD-SCNC: 101 MEQ/L (ref 98–111)
CHLORIDE BLD-SCNC: 102 MEQ/L (ref 98–111)
CHLORIDE BLD-SCNC: 97 MEQ/L (ref 98–111)
CHLORIDE BLD-SCNC: 99 MEQ/L (ref 98–111)
CO2: 17 MEQ/L (ref 23–33)
CO2: 18 MEQ/L (ref 23–33)
CO2: 19 MEQ/L (ref 23–33)
CREAT SERPL-MCNC: 0.4 MG/DL (ref 0.4–1.2)
CREAT SERPL-MCNC: 0.5 MG/DL (ref 0.4–1.2)
CREAT SERPL-MCNC: 0.5 MG/DL (ref 0.4–1.2)
CREAT SERPL-MCNC: 0.6 MG/DL (ref 0.4–1.2)
CREAT SERPL-MCNC: 0.6 MG/DL (ref 0.4–1.2)
ERYTHROCYTE [DISTWIDTH] IN BLOOD BY AUTOMATED COUNT: 12.5 % (ref 11.5–14.5)
ERYTHROCYTE [DISTWIDTH] IN BLOOD BY AUTOMATED COUNT: 42.3 FL (ref 35–45)
GFR SERPL CREATININE-BSD FRML MDRD: > 90 ML/MIN/1.73M2
GLUCOSE BLD-MCNC: 102 MG/DL (ref 70–108)
GLUCOSE BLD-MCNC: 109 MG/DL (ref 70–108)
GLUCOSE BLD-MCNC: 109 MG/DL (ref 70–108)
GLUCOSE BLD-MCNC: 121 MG/DL (ref 70–108)
GLUCOSE BLD-MCNC: 125 MG/DL (ref 70–108)
GLUCOSE BLD-MCNC: 129 MG/DL (ref 70–108)
GLUCOSE BLD-MCNC: 130 MG/DL (ref 70–108)
GLUCOSE BLD-MCNC: 131 MG/DL (ref 70–108)
GLUCOSE BLD-MCNC: 134 MG/DL (ref 70–108)
GLUCOSE BLD-MCNC: 136 MG/DL (ref 70–108)
GLUCOSE BLD-MCNC: 140 MG/DL (ref 70–108)
GLUCOSE BLD-MCNC: 141 MG/DL (ref 70–108)
GLUCOSE BLD-MCNC: 156 MG/DL (ref 70–108)
GLUCOSE BLD-MCNC: 156 MG/DL (ref 70–108)
GLUCOSE BLD-MCNC: 159 MG/DL (ref 70–108)
GLUCOSE BLD-MCNC: 159 MG/DL (ref 70–108)
GLUCOSE BLD-MCNC: 161 MG/DL (ref 70–108)
GLUCOSE BLD-MCNC: 162 MG/DL (ref 70–108)
GLUCOSE BLD-MCNC: 163 MG/DL (ref 70–108)
GLUCOSE BLD-MCNC: 180 MG/DL (ref 70–108)
GLUCOSE BLD-MCNC: 91 MG/DL (ref 70–108)
GLUCOSE BLD-MCNC: 98 MG/DL (ref 70–108)
HCT VFR BLD CALC: 40.3 % (ref 42–52)
HEMOGLOBIN: 13.6 GM/DL (ref 14–18)
LIPASE: 589.7 U/L (ref 5.6–51.3)
MAGNESIUM: 1.7 MG/DL (ref 1.6–2.4)
MAGNESIUM: 1.8 MG/DL (ref 1.6–2.4)
MCH RBC QN AUTO: 31.2 PG (ref 26–33)
MCHC RBC AUTO-ENTMCNC: 33.7 GM/DL (ref 32.2–35.5)
MCV RBC AUTO: 92.4 FL (ref 80–94)
PHOSPHORUS: 1.4 MG/DL (ref 2.4–4.7)
PHOSPHORUS: 1.4 MG/DL (ref 2.4–4.7)
PHOSPHORUS: 1.5 MG/DL (ref 2.4–4.7)
PHOSPHORUS: 1.6 MG/DL (ref 2.4–4.7)
PHOSPHORUS: 2 MG/DL (ref 2.4–4.7)
PLATELET # BLD: 122 THOU/MM3 (ref 130–400)
PMV BLD AUTO: 10.7 FL (ref 9.4–12.4)
POTASSIUM SERPL-SCNC: 3.4 MEQ/L (ref 3.5–5.2)
POTASSIUM SERPL-SCNC: 3.5 MEQ/L (ref 3.5–5.2)
POTASSIUM SERPL-SCNC: 3.5 MEQ/L (ref 3.5–5.2)
POTASSIUM SERPL-SCNC: 3.6 MEQ/L (ref 3.5–5.2)
POTASSIUM SERPL-SCNC: 3.8 MEQ/L (ref 3.5–5.2)
RBC # BLD: 4.36 MILL/MM3 (ref 4.7–6.1)
SODIUM BLD-SCNC: 131 MEQ/L (ref 135–145)
SODIUM BLD-SCNC: 132 MEQ/L (ref 135–145)
SODIUM BLD-SCNC: 134 MEQ/L (ref 135–145)
SODIUM BLD-SCNC: 134 MEQ/L (ref 135–145)
SODIUM BLD-SCNC: 135 MEQ/L (ref 135–145)
TOTAL PROTEIN: 6.1 G/DL (ref 6.1–8)
WBC # BLD: 10.2 THOU/MM3 (ref 4.8–10.8)

## 2020-03-03 PROCEDURE — 6370000000 HC RX 637 (ALT 250 FOR IP): Performed by: PHYSICIAN ASSISTANT

## 2020-03-03 PROCEDURE — 84100 ASSAY OF PHOSPHORUS: CPT

## 2020-03-03 PROCEDURE — 82150 ASSAY OF AMYLASE: CPT

## 2020-03-03 PROCEDURE — 82248 BILIRUBIN DIRECT: CPT

## 2020-03-03 PROCEDURE — 83690 ASSAY OF LIPASE: CPT

## 2020-03-03 PROCEDURE — 36415 COLL VENOUS BLD VENIPUNCTURE: CPT

## 2020-03-03 PROCEDURE — 83735 ASSAY OF MAGNESIUM: CPT

## 2020-03-03 PROCEDURE — 80048 BASIC METABOLIC PNL TOTAL CA: CPT

## 2020-03-03 PROCEDURE — 2580000003 HC RX 258: Performed by: INTERNAL MEDICINE

## 2020-03-03 PROCEDURE — 6370000000 HC RX 637 (ALT 250 FOR IP): Performed by: INTERNAL MEDICINE

## 2020-03-03 PROCEDURE — 2500000003 HC RX 250 WO HCPCS: Performed by: INTERNAL MEDICINE

## 2020-03-03 PROCEDURE — 94760 N-INVAS EAR/PLS OXIMETRY 1: CPT

## 2020-03-03 PROCEDURE — 6360000002 HC RX W HCPCS: Performed by: INTERNAL MEDICINE

## 2020-03-03 PROCEDURE — 2060000000 HC ICU INTERMEDIATE R&B

## 2020-03-03 PROCEDURE — 80053 COMPREHEN METABOLIC PANEL: CPT

## 2020-03-03 PROCEDURE — 85027 COMPLETE CBC AUTOMATED: CPT

## 2020-03-03 PROCEDURE — 2709999900 HC NON-CHARGEABLE SUPPLY

## 2020-03-03 PROCEDURE — 94640 AIRWAY INHALATION TREATMENT: CPT

## 2020-03-03 PROCEDURE — C9113 INJ PANTOPRAZOLE SODIUM, VIA: HCPCS | Performed by: INTERNAL MEDICINE

## 2020-03-03 PROCEDURE — 99233 SBSQ HOSP IP/OBS HIGH 50: CPT | Performed by: INTERNAL MEDICINE

## 2020-03-03 PROCEDURE — 82948 REAGENT STRIP/BLOOD GLUCOSE: CPT

## 2020-03-03 RX ORDER — NICOTINE POLACRILEX 4 MG
15 LOZENGE BUCCAL PRN
Status: DISCONTINUED | OUTPATIENT
Start: 2020-03-03 | End: 2020-03-04 | Stop reason: HOSPADM

## 2020-03-03 RX ORDER — DEXTROSE MONOHYDRATE 25 G/50ML
12.5 INJECTION, SOLUTION INTRAVENOUS PRN
Status: DISCONTINUED | OUTPATIENT
Start: 2020-03-03 | End: 2020-03-04 | Stop reason: HOSPADM

## 2020-03-03 RX ORDER — OXYCODONE HYDROCHLORIDE AND ACETAMINOPHEN 5; 325 MG/1; MG/1
1 TABLET ORAL EVERY 4 HOURS PRN
Status: DISCONTINUED | OUTPATIENT
Start: 2020-03-03 | End: 2020-03-04 | Stop reason: HOSPADM

## 2020-03-03 RX ORDER — INSULIN GLARGINE 100 [IU]/ML
23 INJECTION, SOLUTION SUBCUTANEOUS NIGHTLY
Status: DISCONTINUED | OUTPATIENT
Start: 2020-03-03 | End: 2020-03-04 | Stop reason: HOSPADM

## 2020-03-03 RX ORDER — DEXTROSE MONOHYDRATE 50 MG/ML
100 INJECTION, SOLUTION INTRAVENOUS PRN
Status: DISCONTINUED | OUTPATIENT
Start: 2020-03-03 | End: 2020-03-04 | Stop reason: HOSPADM

## 2020-03-03 RX ADMIN — ONDANSETRON 4 MG: 2 INJECTION INTRAMUSCULAR; INTRAVENOUS at 13:26

## 2020-03-03 RX ADMIN — POTASSIUM CHLORIDE, DEXTROSE MONOHYDRATE AND SODIUM CHLORIDE: 150; 5; 450 INJECTION, SOLUTION INTRAVENOUS at 06:07

## 2020-03-03 RX ADMIN — INSULIN GLARGINE 23 UNITS: 100 INJECTION, SOLUTION SUBCUTANEOUS at 20:51

## 2020-03-03 RX ADMIN — POTASSIUM CHLORIDE 40 MEQ: 1500 TABLET, EXTENDED RELEASE ORAL at 13:23

## 2020-03-03 RX ADMIN — ONDANSETRON 4 MG: 2 INJECTION INTRAMUSCULAR; INTRAVENOUS at 01:07

## 2020-03-03 RX ADMIN — MORPHINE SULFATE 2 MG: 2 INJECTION, SOLUTION INTRAMUSCULAR; INTRAVENOUS at 02:31

## 2020-03-03 RX ADMIN — Medication 2 PUFF: at 18:14

## 2020-03-03 RX ADMIN — OXYCODONE HYDROCHLORIDE AND ACETAMINOPHEN 1 TABLET: 5; 325 TABLET ORAL at 22:41

## 2020-03-03 RX ADMIN — POTASSIUM CHLORIDE, DEXTROSE MONOHYDRATE AND SODIUM CHLORIDE: 150; 5; 450 INJECTION, SOLUTION INTRAVENOUS at 14:12

## 2020-03-03 RX ADMIN — ONDANSETRON 4 MG: 2 INJECTION INTRAMUSCULAR; INTRAVENOUS at 04:49

## 2020-03-03 RX ADMIN — SODIUM PHOSPHATE, MONOBASIC, MONOHYDRATE 15 MMOL: 276; 142 INJECTION, SOLUTION INTRAVENOUS at 17:07

## 2020-03-03 RX ADMIN — Medication 4.5 MG: at 22:41

## 2020-03-03 RX ADMIN — ONDANSETRON 4 MG: 2 INJECTION INTRAMUSCULAR; INTRAVENOUS at 21:33

## 2020-03-03 RX ADMIN — MORPHINE SULFATE 2 MG: 2 INJECTION, SOLUTION INTRAMUSCULAR; INTRAVENOUS at 10:53

## 2020-03-03 RX ADMIN — ONDANSETRON 4 MG: 2 INJECTION INTRAMUSCULAR; INTRAVENOUS at 09:04

## 2020-03-03 RX ADMIN — OXYCODONE HYDROCHLORIDE AND ACETAMINOPHEN 1 TABLET: 5; 325 TABLET ORAL at 14:16

## 2020-03-03 RX ADMIN — ONDANSETRON 4 MG: 2 INJECTION INTRAMUSCULAR; INTRAVENOUS at 17:32

## 2020-03-03 RX ADMIN — OXYCODONE HYDROCHLORIDE AND ACETAMINOPHEN 1 TABLET: 5; 325 TABLET ORAL at 18:40

## 2020-03-03 RX ADMIN — Medication 2 PUFF: at 07:41

## 2020-03-03 RX ADMIN — SODIUM CHLORIDE 4.8 UNITS/HR: 9 INJECTION, SOLUTION INTRAVENOUS at 02:31

## 2020-03-03 RX ADMIN — SODIUM PHOSPHATE, MONOBASIC, MONOHYDRATE 15 MMOL: 276; 142 INJECTION, SOLUTION INTRAVENOUS at 06:07

## 2020-03-03 RX ADMIN — ENOXAPARIN SODIUM 40 MG: 40 INJECTION SUBCUTANEOUS at 08:18

## 2020-03-03 RX ADMIN — MORPHINE SULFATE 2 MG: 2 INJECTION, SOLUTION INTRAMUSCULAR; INTRAVENOUS at 06:33

## 2020-03-03 RX ADMIN — PANTOPRAZOLE SODIUM 40 MG: 40 INJECTION, POWDER, FOR SOLUTION INTRAVENOUS at 08:15

## 2020-03-03 ASSESSMENT — PAIN DESCRIPTION - ONSET
ONSET: ON-GOING

## 2020-03-03 ASSESSMENT — PAIN DESCRIPTION - ORIENTATION
ORIENTATION: MID

## 2020-03-03 ASSESSMENT — PAIN DESCRIPTION - PROGRESSION
CLINICAL_PROGRESSION: NOT CHANGED
CLINICAL_PROGRESSION: GRADUALLY IMPROVING
CLINICAL_PROGRESSION: NOT CHANGED
CLINICAL_PROGRESSION: NOT CHANGED
CLINICAL_PROGRESSION: GRADUALLY IMPROVING
CLINICAL_PROGRESSION: NOT CHANGED

## 2020-03-03 ASSESSMENT — PAIN DESCRIPTION - LOCATION
LOCATION: ABDOMEN

## 2020-03-03 ASSESSMENT — PAIN DESCRIPTION - FREQUENCY
FREQUENCY: CONTINUOUS

## 2020-03-03 ASSESSMENT — PAIN DESCRIPTION - PAIN TYPE
TYPE: ACUTE PAIN

## 2020-03-03 ASSESSMENT — PAIN DESCRIPTION - DESCRIPTORS
DESCRIPTORS: CONSTANT

## 2020-03-03 ASSESSMENT — PAIN SCALES - GENERAL
PAINLEVEL_OUTOF10: 6
PAINLEVEL_OUTOF10: 7
PAINLEVEL_OUTOF10: 8
PAINLEVEL_OUTOF10: 9
PAINLEVEL_OUTOF10: 9
PAINLEVEL_OUTOF10: 7
PAINLEVEL_OUTOF10: 6
PAINLEVEL_OUTOF10: 9
PAINLEVEL_OUTOF10: 7
PAINLEVEL_OUTOF10: 6
PAINLEVEL_OUTOF10: 9

## 2020-03-03 ASSESSMENT — PAIN - FUNCTIONAL ASSESSMENT
PAIN_FUNCTIONAL_ASSESSMENT: PREVENTS OR INTERFERES SOME ACTIVE ACTIVITIES AND ADLS

## 2020-03-03 NOTE — PLAN OF CARE
Problem: Pain:  Goal: Pain level will decrease  Description  Pain level will decrease  Outcome: Ongoing  Note:   Pain Assessment: 0-10  Pain Level: 9   Patient's Stated Pain Goal: 3   Is pain goal met at this time? No     Non-Pharmaceutical Pain Intervention(s): Repositioned, Rest    Goal: Control of acute pain  Description  Control of acute pain  Outcome: Ongoing     Problem: Falls - Risk of:  Goal: Will remain free from falls  Description  Will remain free from falls  Outcome: Ongoing     Problem: Serum Glucose Level - Abnormal:  Goal: Ability to maintain appropriate glucose levels has stabilized  Description  Ability to maintain appropriate glucose levels has stabilized  Outcome: Ongoing  Note:   Patient remains on glucose stabilizer per order. Problem: Discharge Planning:  Goal: Discharged to appropriate level of care  Description  Discharged to appropriate level of care  Outcome: Ongoing  Note:   No discharge plans this shift. .Care plan reviewed with patient. Patient verbalize understanding of the plan of care and contribute to goal setting.

## 2020-03-03 NOTE — PROGRESS NOTES
Hospitalist Progress Note    Patient:  Maciel Mcleod      Unit/Bed:4K-12/012-A    YOB: 1985    MRN: [id number]       Acct: [de-identified]     PCP: BARB Estrada CNP    Date of Admission: 3/2/2020    Active Hospital Problems    Diagnosis Date Noted    DKA, type 2, not at goal [facility]) [E11.10] 03/02/2020    Elevated LFTs [R94.5] 03/02/2020    Current smoker [F17.200] 03/02/2020    Elevated lipase [R74.8] 12/12/2017       Assessment/Plan:    1. DKA, Type 2 Not at Goal: elevated AG. No evidence of acidosis on blood gases. Reports to have missed dose of insulin. Also endorsing having nausea, abdominal pain and vomiting. Induced by viral gastroenteritis? Going through a divorce. Denies any drug or alcohol abuse. Will cont insulin drip until tonight when we will start his home Lantus 23U + Humalog sliding scale. Start diet once off drip. Diabetic diet. 2. Viral Gastroenteritis: has been having nausea, vomiting and abdominal pain over past 3 days. Unable to keep food down. Conservative management. CT A/P showing fatty liver and pancreatic calcifications. Cont IV Fluids. IV Zofran prn    3. Elevated Blood Alcohol Level: pt reports to have stopped drinking alcohol two years ago. Used to be a very heavy drinker. Blood alcohol levels 0.19 on admission. Pt denies any alcohol use. Reports to have been drinking lots of NyQuil over the past 3 days. 4. Elevated Lipase: Elevated lipase most likely from the nausea and vomiting-no definitive evidence of pancreatitis on CT, suggesting calcifications which are old. 5. Tobacco Abuse: nicotine patch. Educated on smoking cessation. 6. COPD/Asthma: no in any acute exacerbation.  Resume home inhalers      Chief Complaint: nausea, vomiting and abdominal pain     Hospital Course: 37 y.o. male who presented to [facility] with with chief complaint of nausea and vomiting with mild epigastric abdominal pain.     Patient presented to the emergency room with chief complaint of persistent, recurrent nausea and vomiting for the last 3 days and was not able to keep any food intake. Apparently patient missed 1 day of insulin prior to that and usually sugars are fairly well controlled as per him. This morning with recurrent vomiting he started having mild epigastric abdominal discomfort without any radiation and has been having associated darkish-looking vomitus. Denies drinking alcohol even though the alcohol level is elevated at 0.19. Could not rule out the possibility of false elevation      On further review of systems, states he was not feeling good prior to this and thought he was having the flu but denied having any runny nose or fever but did have the chills. Denies any diarrhea. No dizziness or lightheadedness or loss of consciousness. Admits to smoking 1 pack cigarettes per day. On further evaluation in the emergency room noticed to have elevated WBC of 16,000, BUN 19 creatinine 1.1, bicarb 11, anion gap 39, blood glucose 359. Elevated LFTs with , , bilirubin 0.9, lipase 438, alk phos 155. Subjective: no acute events overnight. No fevers or chills. Pt reports feeling much better today, however still nauseas. No vomiting. Improvement in abdominal pain.        Medications:  Reviewed    Infusion Medications    dextrose      insulin 2.6 Units/hr (03/03/20 1214)    sodium chloride      dextrose 5% and 0.45% NaCl with KCl 20 mEq 150 mL/hr at 03/03/20 0607     Scheduled Medications    insulin glargine  23 Units Subcutaneous Nightly    [START ON 3/4/2020] insulin lispro  0-12 Units Subcutaneous TID WC    [START ON 3/4/2020] insulin lispro  0-6 Units Subcutaneous Nightly    budesonide-formoterol  2 puff Inhalation BID    enoxaparin  40 mg Subcutaneous Daily    nicotine  1 patch Transdermal Daily    pantoprazole  40 mg Intravenous Daily    phosphorus replacement protocol   Other RX Placeholder     PRN Meds: glucose, dextrose, glucagon (rDNA), dextrose, oxyCODONE-acetaminophen, albuterol sulfate HFA, hydrOXYzine, insulin regular, dextrose, potassium chloride, magnesium sulfate, sodium phosphate IVPB **OR** sodium phosphate IVPB **OR** sodium phosphate IVPB, dextrose 5% and 0.45% NaCl with KCl 20 mEq, acetaminophen, potassium chloride **OR** potassium alternative oral replacement **OR** potassium chloride, ondansetron, traZODone, melatonin      Intake/Output Summary (Last 24 hours) at 3/3/2020 1229  Last data filed at 3/3/2020 0785  Gross per 24 hour   Intake 5826.98 ml   Output 600 ml   Net 5226.98 ml       Diet:  Diet NPO Effective Now Exceptions are: Sips with Meds, Ice Chips    Exam:  /83   Pulse 109   Temp 98.6 °F (37 °C) (Oral)   Resp 17   Ht 5' 2\" (1.575 m)   Wt 179 lb 14.4 oz (81.6 kg)   SpO2 97%   BMI 32.90 kg/m²     General appearance: No apparent distress, appears stated age and cooperative. HEENT: Pupils equal, round, and reactive to light. Conjunctivae/corneas clear. Neck: Supple, with full range of motion. No jugular venous distention. Trachea midline. Respiratory:  Normal respiratory effort. Clear to auscultation, bilaterally without Rales/Wheezes/Rhonchi. Cardiovascular: Regular rate and rhythm with normal S1/S2 without murmurs, rubs or gallops. Abdomen: Soft, non-tender, non-distended with normal bowel sounds. Musculoskeletal: passive and active ROM x 4 extremities. Skin: Skin color, texture, turgor normal.  No rashes or lesions. Neurologic:  Neurovascularly intact without any focal sensory/motor deficits.  Cranial nerves: II-XII intact, grossly non-focal.  Psychiatric: Alert and oriented, thought content appropriate, normal insight  Capillary Refill: Brisk,< 3 seconds   Peripheral Pulses: +2 palpable, equal bilaterally       Labs:   Recent Labs     03/03/20  0457   WBC 10.2   HGB 13.6*   HCT 40.3*   *     Recent Labs     03/03/20  0822   *   K 3.6      CO2 19*   BUN 7 CREATININE 0.5   CALCIUM 8.4*   PHOS 1.4*     Recent Labs     03/03/20  0457   AST 84*   ALT 88*   BILIDIR 0.3   BILITOT 1.0   ALKPHOS 90     No results for input(s): INR in the last 72 hours. No results for input(s): [place] in the last 72 hours. Urinalysis:      Lab Results   Component Value Date    NITRU NEGATIVE 03/02/2020    WBCUA 0-2 03/02/2020    BACTERIA NONE SEEN 03/02/2020    RBCUA 0-2 03/02/2020    BLOODU MODERATE 03/02/2020    SPECGRAV 1.011 10/12/2013    GLUCOSEU >= 1000 03/02/2020       Radiology:   All imaging reviewed     Diet: DIET CLEAR LIQUID;      Code Status: Full Code              Electronically signed by Van Mohr MD on 3/3/2020 at 12:29 PM

## 2020-03-03 NOTE — CARE COORDINATION
3/3/20, 1:09 PM    DISCHARGE ON GOING [id number] Luciana Gill day: 1  Location: [facility]/012-A Reason for admit: DKA, type 2, not at goal [facility]) [E11.10]   Procedure:   3/2 CT Abdomen  calcifications in the uncinate process of the pancreas consistent with prior pancreatitis. Treatment Plan of Care: from 3B prior, Lipase 589; monitor  Barriers to Discharge: CL Diet continued; await diet advancement. ; monitor.  Insulin gtt continued  PCP: BARB Dueñas CNP  Readmission Risk Score: 15%  Patient Goals/Plan/Treatment Preferences: plans home with children and new DM Clinic, has nebulizer, glucometer            \

## 2020-03-04 ENCOUNTER — TELEPHONE (OUTPATIENT)
Dept: FAMILY MEDICINE CLINIC | Age: 35
End: 2020-03-04

## 2020-03-04 VITALS
RESPIRATION RATE: 18 BRPM | HEIGHT: 62 IN | OXYGEN SATURATION: 98 % | SYSTOLIC BLOOD PRESSURE: 141 MMHG | HEART RATE: 91 BPM | TEMPERATURE: 98.2 F | DIASTOLIC BLOOD PRESSURE: 87 MMHG | WEIGHT: 178.4 LBS | BODY MASS INDEX: 32.83 KG/M2

## 2020-03-04 LAB
ANION GAP SERPL CALCULATED.3IONS-SCNC: 15 MEQ/L (ref 8–16)
ANION GAP SERPL CALCULATED.3IONS-SCNC: 17 MEQ/L (ref 8–16)
BUN BLDV-MCNC: 6 MG/DL (ref 7–22)
BUN BLDV-MCNC: 9 MG/DL (ref 7–22)
CALCIUM SERPL-MCNC: 9.2 MG/DL (ref 8.5–10.5)
CALCIUM SERPL-MCNC: 9.3 MG/DL (ref 8.5–10.5)
CHLORIDE BLD-SCNC: 100 MEQ/L (ref 98–111)
CHLORIDE BLD-SCNC: 97 MEQ/L (ref 98–111)
CO2: 20 MEQ/L (ref 23–33)
CO2: 21 MEQ/L (ref 23–33)
CREAT SERPL-MCNC: 0.4 MG/DL (ref 0.4–1.2)
CREAT SERPL-MCNC: 0.5 MG/DL (ref 0.4–1.2)
GFR SERPL CREATININE-BSD FRML MDRD: > 90 ML/MIN/1.73M2
GFR SERPL CREATININE-BSD FRML MDRD: > 90 ML/MIN/1.73M2
GLUCOSE BLD-MCNC: 118 MG/DL (ref 70–108)
GLUCOSE BLD-MCNC: 120 MG/DL (ref 70–108)
GLUCOSE BLD-MCNC: 158 MG/DL (ref 70–108)
GLUCOSE BLD-MCNC: 180 MG/DL (ref 70–108)
MAGNESIUM: 1.9 MG/DL (ref 1.6–2.4)
PHOSPHORUS: 2.4 MG/DL (ref 2.4–4.7)
POTASSIUM SERPL-SCNC: 3.8 MEQ/L (ref 3.5–5.2)
POTASSIUM SERPL-SCNC: 4 MEQ/L (ref 3.5–5.2)
SODIUM BLD-SCNC: 132 MEQ/L (ref 135–145)
SODIUM BLD-SCNC: 138 MEQ/L (ref 135–145)

## 2020-03-04 PROCEDURE — 84100 ASSAY OF PHOSPHORUS: CPT

## 2020-03-04 PROCEDURE — 6370000000 HC RX 637 (ALT 250 FOR IP): Performed by: INTERNAL MEDICINE

## 2020-03-04 PROCEDURE — 83735 ASSAY OF MAGNESIUM: CPT

## 2020-03-04 PROCEDURE — 36415 COLL VENOUS BLD VENIPUNCTURE: CPT

## 2020-03-04 PROCEDURE — 82948 REAGENT STRIP/BLOOD GLUCOSE: CPT

## 2020-03-04 PROCEDURE — 94760 N-INVAS EAR/PLS OXIMETRY 1: CPT

## 2020-03-04 PROCEDURE — 80048 BASIC METABOLIC PNL TOTAL CA: CPT

## 2020-03-04 PROCEDURE — C9113 INJ PANTOPRAZOLE SODIUM, VIA: HCPCS | Performed by: INTERNAL MEDICINE

## 2020-03-04 PROCEDURE — 94640 AIRWAY INHALATION TREATMENT: CPT

## 2020-03-04 PROCEDURE — 6360000002 HC RX W HCPCS: Performed by: INTERNAL MEDICINE

## 2020-03-04 PROCEDURE — 99239 HOSP IP/OBS DSCHRG MGMT >30: CPT | Performed by: INTERNAL MEDICINE

## 2020-03-04 RX ORDER — OXYCODONE HYDROCHLORIDE AND ACETAMINOPHEN 5; 325 MG/1; MG/1
1 TABLET ORAL EVERY 6 HOURS PRN
Qty: 6 TABLET | Refills: 0 | Status: SHIPPED | OUTPATIENT
Start: 2020-03-04 | End: 2020-03-07

## 2020-03-04 RX ADMIN — ONDANSETRON 4 MG: 2 INJECTION INTRAMUSCULAR; INTRAVENOUS at 02:43

## 2020-03-04 RX ADMIN — ENOXAPARIN SODIUM 40 MG: 40 INJECTION SUBCUTANEOUS at 08:06

## 2020-03-04 RX ADMIN — ONDANSETRON 4 MG: 2 INJECTION INTRAMUSCULAR; INTRAVENOUS at 10:58

## 2020-03-04 RX ADMIN — OXYCODONE HYDROCHLORIDE AND ACETAMINOPHEN 1 TABLET: 5; 325 TABLET ORAL at 10:56

## 2020-03-04 RX ADMIN — Medication 2 PUFF: at 06:32

## 2020-03-04 RX ADMIN — PANTOPRAZOLE SODIUM 40 MG: 40 INJECTION, POWDER, FOR SOLUTION INTRAVENOUS at 08:06

## 2020-03-04 RX ADMIN — INSULIN LISPRO 2 UNITS: 100 INJECTION, SOLUTION INTRAVENOUS; SUBCUTANEOUS at 12:11

## 2020-03-04 RX ADMIN — ONDANSETRON 4 MG: 2 INJECTION INTRAMUSCULAR; INTRAVENOUS at 06:52

## 2020-03-04 RX ADMIN — OXYCODONE HYDROCHLORIDE AND ACETAMINOPHEN 1 TABLET: 5; 325 TABLET ORAL at 06:52

## 2020-03-04 RX ADMIN — OXYCODONE HYDROCHLORIDE AND ACETAMINOPHEN 1 TABLET: 5; 325 TABLET ORAL at 02:43

## 2020-03-04 ASSESSMENT — PAIN DESCRIPTION - ONSET
ONSET: ON-GOING
ONSET: ON-GOING

## 2020-03-04 ASSESSMENT — PAIN DESCRIPTION - PAIN TYPE
TYPE: ACUTE PAIN
TYPE: ACUTE PAIN

## 2020-03-04 ASSESSMENT — PAIN DESCRIPTION - ORIENTATION
ORIENTATION: MID

## 2020-03-04 ASSESSMENT — PAIN SCALES - GENERAL
PAINLEVEL_OUTOF10: 3
PAINLEVEL_OUTOF10: 5
PAINLEVEL_OUTOF10: 7
PAINLEVEL_OUTOF10: 5
PAINLEVEL_OUTOF10: 7

## 2020-03-04 ASSESSMENT — PAIN DESCRIPTION - FREQUENCY
FREQUENCY: CONTINUOUS
FREQUENCY: CONTINUOUS

## 2020-03-04 ASSESSMENT — PAIN DESCRIPTION - LOCATION
LOCATION: ABDOMEN

## 2020-03-04 ASSESSMENT — PAIN DESCRIPTION - PROGRESSION
CLINICAL_PROGRESSION: NOT CHANGED
CLINICAL_PROGRESSION: NOT CHANGED

## 2020-03-04 ASSESSMENT — PAIN DESCRIPTION - DESCRIPTORS
DESCRIPTORS: CONSTANT
DESCRIPTORS: CONSTANT

## 2020-03-04 NOTE — PROGRESS NOTES
Nutrition Education    Type and Reason for Visit: Initial, Positive Nutrition Screen(diet education)    Nutrition Assessment:   Pt. Seen - admits to being familiar with diabetic diet however does not follow. Reports HgbA1c had been less than 7% in the past however reports increased blood sugars with recent illness. · Verbally reviewed information with Patient - CHO counting, label reading, plate method and blood sugar goals. · Written educational materials provided. · Contact name and number provided. · Refer to Patient Education activity for more details.     Electronically signed by Rohit Kohli RD, LD on 3/4/20 at 10:19 AM    Contact Number: [phone]

## 2020-03-05 ENCOUNTER — TELEPHONE (OUTPATIENT)
Dept: FAMILY MEDICINE CLINIC | Age: 35
End: 2020-03-05

## 2020-03-05 NOTE — TELEPHONE ENCOUNTER
Kathi [facility] Transitions Initial Follow Up Call    Outreach made within 2 business days of discharge: Yes    Patient: Lio Lantigua Patient : 1985   MRN: [id number]  Reason for Admission: There are no discharge diagnoses documented for the most recent discharge. Discharge Date: 3/4/20       Spoke with: [facility] requesting pt to call back at earliest convenience.      Discharge department/facility: [facility]       Scheduled appointment with PCP within 7-14 days    Follow Up  Future Appointments   Date Time Provider Malachi Gray   3/5/2020 10:00 AM Jasmin Kauffman RD, LD [facility] Physic [email or web address]   3/11/2020  9:40 AM Radha Dyan, [address] [place] [facility] [place], LPN

## 2020-03-06 NOTE — PROGRESS NOTES
CLINICAL PHARMACY NOTE: MEDS TO [address] Select Patient?: Yes  Total # of Prescriptions Filled: 1   The following medications were delivered to the patient:  Percocet 5-325  Total # of Interventions Completed: 2  Time Spent (min): 30    Additional Documentation:

## 2020-03-12 ENCOUNTER — TELEPHONE (OUTPATIENT)
Dept: FAMILY MEDICINE CLINIC | Age: 35
End: 2020-03-12

## 2020-03-12 ENCOUNTER — OFFICE VISIT (OUTPATIENT)
Dept: FAMILY MEDICINE CLINIC | Age: 35
End: 2020-03-12
Payer: COMMERCIAL

## 2020-03-12 ENCOUNTER — NURSE ONLY (OUTPATIENT)
Dept: LAB | Age: 35
End: 2020-03-12

## 2020-03-12 VITALS
TEMPERATURE: 98.4 F | BODY MASS INDEX: 32.43 KG/M2 | RESPIRATION RATE: 12 BRPM | DIASTOLIC BLOOD PRESSURE: 76 MMHG | HEART RATE: 80 BPM | SYSTOLIC BLOOD PRESSURE: 132 MMHG | WEIGHT: 183 LBS | HEIGHT: 63 IN

## 2020-03-12 PROBLEM — F33.1 MAJOR DEPRESSIVE DISORDER, RECURRENT EPISODE, MODERATE WITH ANXIOUS DISTRESS (HCC): Status: ACTIVE | Noted: 2020-03-12

## 2020-03-12 PROBLEM — K85.91 ACUTE PANCREATITIS WITH UNINFECTED NECROSIS, UNSPECIFIED: Status: RESOLVED | Noted: 2019-05-03 | Resolved: 2020-03-12

## 2020-03-12 PROBLEM — R55 SYNCOPE AND COLLAPSE: Status: RESOLVED | Noted: 2017-12-07 | Resolved: 2020-03-12

## 2020-03-12 LAB
ALBUMIN SERPL-MCNC: 3.8 G/DL (ref 3.5–5.1)
ALP BLD-CCNC: 100 U/L (ref 38–126)
ALT SERPL-CCNC: 105 U/L (ref 11–66)
AMYLASE: 65 U/L (ref 20–104)
ANION GAP SERPL CALCULATED.3IONS-SCNC: 15 MEQ/L (ref 8–16)
AST SERPL-CCNC: 55 U/L (ref 5–40)
BILIRUB SERPL-MCNC: 0.4 MG/DL (ref 0.3–1.2)
BUN BLDV-MCNC: 10 MG/DL (ref 7–22)
CALCIUM SERPL-MCNC: 9.1 MG/DL (ref 8.5–10.5)
CHLORIDE BLD-SCNC: 104 MEQ/L (ref 98–111)
CHOLESTEROL, FASTING: 151 MG/DL (ref 100–199)
CO2: 21 MEQ/L (ref 23–33)
CREAT SERPL-MCNC: 0.7 MG/DL (ref 0.4–1.2)
GFR SERPL CREATININE-BSD FRML MDRD: > 90 ML/MIN/1.73M2
GLUCOSE BLD-MCNC: 252 MG/DL (ref 70–108)
HBA1C MFR BLD: 8.5 % (ref 4.3–5.7)
HDLC SERPL-MCNC: 49 MG/DL
LDL CHOLESTEROL CALCULATED: 75 MG/DL
LIPASE: 39.1 U/L (ref 5.6–51.3)
POTASSIUM SERPL-SCNC: 3.8 MEQ/L (ref 3.5–5.2)
SODIUM BLD-SCNC: 140 MEQ/L (ref 135–145)
TOTAL PROTEIN: 6.1 G/DL (ref 6.1–8)
TRIGLYCERIDE, FASTING: 134 MG/DL (ref 0–199)

## 2020-03-12 PROCEDURE — 99215 OFFICE O/P EST HI 40 MIN: CPT | Performed by: NURSE PRACTITIONER

## 2020-03-12 PROCEDURE — 1111F DSCHRG MED/CURRENT MED MERGE: CPT | Performed by: NURSE PRACTITIONER

## 2020-03-12 RX ORDER — INSULIN GLARGINE 100 [IU]/ML
25 INJECTION, SOLUTION SUBCUTANEOUS NIGHTLY
Qty: 5 PEN | Refills: 3 | Status: SHIPPED | OUTPATIENT
Start: 2020-03-12 | End: 2020-07-29 | Stop reason: SDUPTHER

## 2020-03-12 RX ORDER — AZITHROMYCIN 250 MG/1
250 TABLET, FILM COATED ORAL SEE ADMIN INSTRUCTIONS
Qty: 6 TABLET | Refills: 0 | Status: SHIPPED | OUTPATIENT
Start: 2020-03-12 | End: 2020-03-17

## 2020-03-12 ASSESSMENT — PATIENT HEALTH QUESTIONNAIRE - PHQ9
1. LITTLE INTEREST OR PLEASURE IN DOING THINGS: 0
SUM OF ALL RESPONSES TO PHQ9 QUESTIONS 1 & 2: 0
2. FEELING DOWN, DEPRESSED OR HOPELESS: 0
SUM OF ALL RESPONSES TO PHQ QUESTIONS 1-9: 0
SUM OF ALL RESPONSES TO PHQ QUESTIONS 1-9: 0

## 2020-03-12 NOTE — PROGRESS NOTES
Palpitations? -  No  New Vision complaints? No  Paresthesias of the extremities? No    Medications  Current medication were reviewed. Compliant with medications? Yes, Basaglar 23 units and Novolog per sliding scale. Uses the Novolog occasionally at dinner  Medication side effects? No  On ACE-I or ARB? No  On antiplatelet therapy? No  On Statin? No    Last Diabetic Eye Exam: needs    Exercise  Exercise? Yes - slight  Wt Readings from Last 3 Encounters:   03/12/20 183 lb (83 kg)   03/04/20 178 lb 6.4 oz (80.9 kg)   12/02/19 188 lb 9.6 oz (85.5 kg)       Diet discipline?:  Low salt, fat, sugar diet? yes    Blood pressure control:  BP Readings from Last 3 Encounters:   03/12/20 132/76   03/04/20 (!) 141/87   12/02/19 130/82       No results found for: LABMICR, [id number]    Lab Results   Component Value Date    LDLCALC 72 05/04/2019     URI Symptoms    HPI:      Symptoms have been present for 3 day(s). Symptoms are unchanged since they initially started. Fever? No  Runny nose or congestion? Yes   Cough? Yes - chest tightness, productive  Sore throat? Yes  Headache, fatigue, joint pains, muscle aches? No  Shortness of breath/Wheezing? Yes - mild  Nausea/Vomiting/Diarrhea? No  Double Sickening? No  Sick contacts? Yes    Patient has tried nothing without improvement. Asthma  Is still using albuterol and Advair. He has been using the albuterol periodically since being sick. Advair use is inconsistent. He really hasn't had any major breathing problems recently until being sick    Moods are doing well, denies any significant depression.  He had relapsed with ETOH use before going into the hospital. He drank about a pint of ETOH before going into the hospital.    Current Outpatient Medications   Medication Sig Dispense Refill    azithromycin (ZITHROMAX) 250 MG tablet Take 1 tablet by mouth See Admin Instructions for 5 days 500mg on day 1 followed by 250mg on days 2 - 5 6 tablet 0    insulin glargine (LANTUS SOLOSTAR) 100 UNIT/ML injection pen Inject 25 Units into the skin nightly 5 pen 3    betamethasone valerate (VALISONE) 0.1 % cream Apply topically 2 times daily. 45 g 1    insulin aspart (NOVOLOG FLEXPEN) 100 UNIT/ML injection pen Inject 2-12 units SQ TID with meals as directed per sliding scale, max dose 36 units/day 5 pen 3    albuterol sulfate HFA (PROVENTIL HFA) 108 (90 Base) MCG/ACT inhaler Inhale 2 puffs into the lungs every 6 hours as needed for Wheezing 1 Inhaler 0    TRUE METRIX BLOOD GLUCOSE TEST strip Test blood sugars three times daily. 500 each 3    Insulin Pen Needle 30G X 8 MM MISC 1 each by Does not apply route 3 times daily 500 each 3    hydrOXYzine (VISTARIL) 100 MG capsule Take 1 capsule by mouth 4 times daily as needed for Anxiety 60 capsule 3    fluticasone-salmeterol (ADVAIR) 250-50 MCG/DOSE AEPB Inhale 1 puff into the lungs every 12 hours 60 each 5    aspirin 81 MG EC tablet Take 1 tablet by mouth daily 30 tablet 3    Lancets MISC 1 each by Does not apply route 3 times daily 200 each 0    traZODone (DESYREL) 100 MG tablet Take 1 tablet by mouth nightly as needed for Sleep 30 tablet 5    acetaminophen (TYLENOL) 500 MG tablet Take 1,000 mg by mouth every 6 hours as needed for Pain      ACCU-CHEK MULTICLIX LANCETS MISC 1 box by Does not apply route daily 100 each 0    Multiple Vitamins-Minerals (MENS MULTIVITAMIN PLUS) TABS Take 1 tablet by mouth daily       No current facility-administered medications for this visit.         Past Medical History:   Diagnosis Date    Asthma     COPD (chronic obstructive pulmonary disease) (HCC)     Eczema     GERD (gastroesophageal reflux disease)     History of alcohol abuse     History of marijuana use     History of tobacco abuse     quit 11/21/2017    Hx of seasonal allergies     Hypertension     Pancreatitis     Seizures (HCC)     etoh wdl    Type 2 diabetes mellitus without complication ([facility] [address] 06/31/6175       Past Surgical Instructions for 5 days 500mg on day 1 followed by 250mg on days 2 - 5  -     MO DISCHARGE MEDS RECONCILED W/ CURRENT OUTPATIENT MED LIST    Eczema, unspecified type  -     betamethasone valerate (VALISONE) 0.1 % cream; Apply topically 2 times daily.  -     MO DISCHARGE MEDS RECONCILED W/ CURRENT OUTPATIENT MED LIST      - Neida Rink for sinus/bronchitis  - recommend complete ETOH cessation, recent relapse  - con't Novolog per sliding scale  - change to Lantus per insurance denying Basaglar, increase dose to 25 units  - recheck labs today    Return in about 3 months (around 6/12/2020) for chronic conditions. Level of medical complexity:  moderate    -Overall, patient is improving  -Continue current meds  -Advised to continue to closely monitor her symptoms and if any worsening to seek treatment    Maria R Boo received counseling on the following healthy behaviors: medication adherence and decrease in alcohol consumption  Reviewed prior labs and health maintenance. Continue current medications, diet and exercise. Discussed use, benefit, and side effects of prescribed medications. Barriers to medication compliance addressed. Patient given educational materials - see patient instructions. All patient questions answered. Patient voiced understanding.

## 2020-03-12 NOTE — TELEPHONE ENCOUNTER
----- Message from BARB Geronimo CNP sent at 3/12/2020  4:47 PM EDT -----  Let Charityalma Martinez know is pancreatic enzymes have returned to normal, kidney function is normal. His liver enzymes are still mildly elevated, but stable.

## 2020-03-14 ENCOUNTER — HOSPITAL ENCOUNTER (EMERGENCY)
Age: 35
Discharge: HOME | End: 2020-03-14
Attending: EMERGENCY MEDICINE
Payer: COMMERCIAL

## 2020-03-14 ENCOUNTER — APPOINTMENT (OUTPATIENT)
Dept: CT IMAGING | Age: 35
End: 2020-03-14
Payer: COMMERCIAL

## 2020-03-14 ENCOUNTER — APPOINTMENT (OUTPATIENT)
Dept: GENERAL RADIOLOGY | Age: 35
End: 2020-03-14
Payer: COMMERCIAL

## 2020-03-14 VITALS
DIASTOLIC BLOOD PRESSURE: 80 MMHG | RESPIRATION RATE: 18 BRPM | HEART RATE: 101 BPM | SYSTOLIC BLOOD PRESSURE: 118 MMHG | TEMPERATURE: 98.2 F | OXYGEN SATURATION: 98 %

## 2020-03-14 PROCEDURE — 99284 EMERGENCY DEPT VISIT MOD MDM: CPT

## 2020-03-14 PROCEDURE — 73630 X-RAY EXAM OF FOOT: CPT

## 2020-03-14 PROCEDURE — 29515 APPLICATION SHORT LEG SPLINT: CPT

## 2020-03-14 PROCEDURE — 6370000000 HC RX 637 (ALT 250 FOR IP): Performed by: PHYSICIAN ASSISTANT

## 2020-03-14 PROCEDURE — 73700 CT LOWER EXTREMITY W/O DYE: CPT

## 2020-03-14 PROCEDURE — 73610 X-RAY EXAM OF ANKLE: CPT

## 2020-03-14 PROCEDURE — 2709999900 HC NON-CHARGEABLE SUPPLY

## 2020-03-14 RX ORDER — TRAMADOL HYDROCHLORIDE 50 MG/1
50 TABLET ORAL ONCE
Status: COMPLETED | OUTPATIENT
Start: 2020-03-14 | End: 2020-03-14

## 2020-03-14 RX ORDER — HYDROCODONE BITARTRATE AND ACETAMINOPHEN 5; 325 MG/1; MG/1
1 TABLET ORAL ONCE
Status: DISCONTINUED | OUTPATIENT
Start: 2020-03-14 | End: 2020-03-14 | Stop reason: HOSPADM

## 2020-03-14 RX ORDER — HYDROCODONE BITARTRATE AND ACETAMINOPHEN 5; 325 MG/1; MG/1
1 TABLET ORAL EVERY 6 HOURS PRN
Qty: 12 TABLET | Refills: 0 | Status: SHIPPED | OUTPATIENT
Start: 2020-03-14 | End: 2020-03-17

## 2020-03-14 RX ADMIN — TRAMADOL HYDROCHLORIDE 50 MG: 50 TABLET, FILM COATED ORAL at 10:37

## 2020-03-14 ASSESSMENT — PAIN DESCRIPTION - PAIN TYPE: TYPE: ACUTE PAIN

## 2020-03-14 ASSESSMENT — ENCOUNTER SYMPTOMS
BACK PAIN: 0
VOMITING: 0
NAUSEA: 0
SHORTNESS OF BREATH: 0
COLOR CHANGE: 0
ABDOMINAL PAIN: 0
PHOTOPHOBIA: 0

## 2020-03-14 ASSESSMENT — PAIN DESCRIPTION - LOCATION: LOCATION: ANKLE

## 2020-03-14 ASSESSMENT — PAIN SCALES - GENERAL: PAINLEVEL_OUTOF10: 8

## 2020-03-14 ASSESSMENT — PAIN DESCRIPTION - FREQUENCY: FREQUENCY: CONTINUOUS

## 2020-03-14 ASSESSMENT — PAIN DESCRIPTION - ORIENTATION: ORIENTATION: RIGHT
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
## 2020-03-14 NOTE — CONSULTS
sudden weight loss or gain. HEENT: Negative for blurry/double vision, ears, nose, or throat pain. Negative for mass. Respiratory: Negative for shortness of breath or hemoptysis. Cardiovascular: Negative for angina, palpitations, or lower extremity edema. Gastrointestinal: Negative for nausea, vomiting, diarrhea, constipation, or abdominal pain. Genitourinary: Negative for increased urination, burning with urination, or hematuria. Musculoskeletal: See above HPI. Integument: See above HPI. Hematology: Negative for easy bruising or easy bleeding. Physical Examination:  General: Awake, alert, and oriented. In no acute distress. Resting in bed. HEENT: Atraumatic, normocephalic. Respiratory: CTA. No rales, rhonchi, or wheezing. Cardiovascular: RRR. Normal S1, S2.  Abdomen: Soft, non-tender, non-distended. Bowel sounds present x4 quadrants. Focused Lower Extremity Examination:    Vitals:    /84   Pulse 114   Temp 98.2 °F (36.8 °C) (Oral)   Resp 18   SpO2 97%      Dermatologic: edema and ecchymosis noted to right medial ankle. Edema circumferentially around right ankle. No skin tenting or open wounds. Vascular: Dorsalis pedis and posterior tibial pulses are palpable bilaterally. Skin temperature is warm to warm from proximal tibial tuberosity to distal digits. CFT less than 3 seconds to all 10 digits. Neurovascular: light touch and protective sensation intact to distal leg and digits     Musculoskeletal: pain with palpation distal medial malleolus and posterior to medial malleolus. No pain to lateral malleolus, navicular, 5th met base. No pain to palpation proximal fibula/tibia    STUDIES:  XR ANKLE RIGHT (MIN 3 VIEWS)     Impression    Comminuted medial malleolus and medial and posterior talar fractures.                   **This report has been created using voice recognition software. It may contain minor errors which are inherent in voice recognition technology. **       Final

## 2020-06-11 ENCOUNTER — HOSPITAL ENCOUNTER (OUTPATIENT)
Age: 35
Discharge: HOME | End: 2020-06-11
Payer: COMMERCIAL

## 2020-06-11 ENCOUNTER — TELEMEDICINE (OUTPATIENT)
Dept: FAMILY MEDICINE CLINIC | Age: 35
End: 2020-06-11
Payer: COMMERCIAL

## 2020-06-11 ENCOUNTER — TELEPHONE (OUTPATIENT)
Dept: FAMILY MEDICINE CLINIC | Age: 35
End: 2020-06-11

## 2020-06-11 LAB
ALBUMIN SERPL-MCNC: 4.8 G/DL (ref 3.5–5.1)
ALP BLD-CCNC: 119 U/L (ref 38–126)
ALT SERPL-CCNC: 63 U/L (ref 11–66)
AMYLASE: 45 U/L (ref 20–104)
ANION GAP SERPL CALCULATED.3IONS-SCNC: 22 MEQ/L (ref 8–16)
AST SERPL-CCNC: 50 U/L (ref 5–40)
AVERAGE GLUCOSE: 147 MG/DL (ref 70–126)
BILIRUB SERPL-MCNC: 0.6 MG/DL (ref 0.3–1.2)
BUN BLDV-MCNC: 12 MG/DL (ref 7–22)
CALCIUM SERPL-MCNC: 9.9 MG/DL (ref 8.5–10.5)
CHLORIDE BLD-SCNC: 91 MEQ/L (ref 98–111)
CO2: 23 MEQ/L (ref 23–33)
CREAT SERPL-MCNC: 0.6 MG/DL (ref 0.4–1.2)
CREATININE, URINE: 374.2 MG/DL
GFR SERPL CREATININE-BSD FRML MDRD: > 90 ML/MIN/1.73M2
GLUCOSE BLD-MCNC: 189 MG/DL (ref 70–108)
HBA1C MFR BLD: 6.9 % (ref 4.4–6.4)
LIPASE: 14.8 U/L (ref 5.6–51.3)
MICROALBUMIN UR-MCNC: 153.85 MG/DL
MICROALBUMIN/CREAT UR-RTO: 411 MG/G (ref 0–30)
POTASSIUM SERPL-SCNC: 3.8 MEQ/L (ref 3.5–5.2)
SODIUM BLD-SCNC: 136 MEQ/L (ref 135–145)
TOTAL PROTEIN: 7.8 G/DL (ref 6.1–8)

## 2020-06-11 PROCEDURE — 80053 COMPREHEN METABOLIC PANEL: CPT

## 2020-06-11 PROCEDURE — 83036 HEMOGLOBIN GLYCOSYLATED A1C: CPT

## 2020-06-11 PROCEDURE — 3052F HG A1C>EQUAL 8.0%<EQUAL 9.0%: CPT | Performed by: NURSE PRACTITIONER

## 2020-06-11 PROCEDURE — 2022F DILAT RTA XM EVC RTNOPTHY: CPT | Performed by: NURSE PRACTITIONER

## 2020-06-11 PROCEDURE — 82043 UR ALBUMIN QUANTITATIVE: CPT

## 2020-06-11 PROCEDURE — G8417 CALC BMI ABV UP PARAM F/U: HCPCS | Performed by: NURSE PRACTITIONER

## 2020-06-11 PROCEDURE — 36415 COLL VENOUS BLD VENIPUNCTURE: CPT

## 2020-06-11 PROCEDURE — 4004F PT TOBACCO SCREEN RCVD TLK: CPT | Performed by: NURSE PRACTITIONER

## 2020-06-11 PROCEDURE — G8428 CUR MEDS NOT DOCUMENT: HCPCS | Performed by: NURSE PRACTITIONER

## 2020-06-11 PROCEDURE — 83690 ASSAY OF LIPASE: CPT

## 2020-06-11 PROCEDURE — 99214 OFFICE O/P EST MOD 30 MIN: CPT | Performed by: NURSE PRACTITIONER

## 2020-06-11 PROCEDURE — 82150 ASSAY OF AMYLASE: CPT

## 2020-06-11 RX ORDER — PAROXETINE HYDROCHLORIDE 20 MG/1
20 TABLET, FILM COATED ORAL DAILY
Qty: 30 TABLET | Refills: 3 | Status: SHIPPED | OUTPATIENT
Start: 2020-06-11 | End: 2020-07-28

## 2020-06-11 RX ORDER — ONDANSETRON 4 MG/1
4 TABLET, FILM COATED ORAL 3 TIMES DAILY PRN
Qty: 15 TABLET | Refills: 0 | Status: ON HOLD | OUTPATIENT
Start: 2020-06-11 | End: 2020-11-17

## 2020-06-11 RX ORDER — HYDROXYZINE PAMOATE 100 MG/1
100 CAPSULE ORAL 4 TIMES DAILY PRN
Qty: 60 CAPSULE | Refills: 3 | Status: SHIPPED | OUTPATIENT
Start: 2020-06-11 | End: 2020-09-09 | Stop reason: SDUPTHER

## 2020-06-11 ASSESSMENT — ENCOUNTER SYMPTOMS
NAUSEA: 1
SHORTNESS OF BREATH: 0
COUGH: 0
COLOR CHANGE: 0
RHINORRHEA: 0
EYE PAIN: 0
ABDOMINAL PAIN: 0
VOMITING: 1
WHEEZING: 0
SORE THROAT: 0
TROUBLE SWALLOWING: 0
DIARRHEA: 0
EYE REDNESS: 0
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
## 2020-06-11 NOTE — TELEPHONE ENCOUNTER
VITO to return call at his earliest convenience     Pt can call his INS or find a ride [phone]

## 2020-06-11 NOTE — TELEPHONE ENCOUNTER
----- Message from BARB Frausto CNP sent at 6/11/2020  2:31 PM EDT -----  Let Hattie Later know his A1C came back at 6.9 which is great, and the rest of his labs are stable and within appropriate ranges.

## 2020-06-12 ENCOUNTER — TELEPHONE (OUTPATIENT)
Dept: FAMILY MEDICINE CLINIC | Age: 35
End: 2020-06-12

## 2020-06-12 RX ORDER — LISINOPRIL 2.5 MG/1
2.5 TABLET ORAL DAILY
Qty: 90 TABLET | Refills: 0 | Status: SHIPPED | OUTPATIENT
Start: 2020-06-12 | End: 2020-07-28

## 2020-06-12 NOTE — TELEPHONE ENCOUNTER
Pt informed. He will have labs completed at [facility]. Order in [address] [place] [address] Rd.

## 2020-07-07 ENCOUNTER — HOSPITAL ENCOUNTER (OUTPATIENT)
Dept: MRI IMAGING | Age: 35
Discharge: HOME | End: 2020-07-07
Payer: COMMERCIAL

## 2020-07-07 PROCEDURE — 73721 MRI JNT OF LWR EXTRE W/O DYE: CPT

## 2020-07-23 ENCOUNTER — VIRTUAL VISIT (OUTPATIENT)
Dept: FAMILY MEDICINE CLINIC | Age: 35
End: 2020-07-23
Payer: COMMERCIAL

## 2020-07-23 ENCOUNTER — TELEPHONE (OUTPATIENT)
Dept: FAMILY MEDICINE CLINIC | Age: 35
End: 2020-07-23

## 2020-07-23 PROCEDURE — G8427 DOCREV CUR MEDS BY ELIG CLIN: HCPCS | Performed by: NURSE PRACTITIONER

## 2020-07-23 PROCEDURE — 99214 OFFICE O/P EST MOD 30 MIN: CPT | Performed by: NURSE PRACTITIONER

## 2020-07-23 RX ORDER — ESCITALOPRAM OXALATE 10 MG/1
10 TABLET ORAL DAILY
Qty: 30 TABLET | Refills: 3 | Status: SHIPPED | OUTPATIENT
Start: 2020-07-23 | End: 2020-09-22

## 2020-07-23 RX ORDER — LORAZEPAM 0.5 MG/1
0.5 TABLET ORAL 2 TIMES DAILY
Qty: 14 TABLET | Refills: 0 | Status: SHIPPED | OUTPATIENT
Start: 2020-07-23 | End: 2020-07-29 | Stop reason: SDUPTHER

## 2020-07-23 ASSESSMENT — ENCOUNTER SYMPTOMS
GASTROINTESTINAL NEGATIVE: 1
RESPIRATORY NEGATIVE: 1

## 2020-07-23 NOTE — TELEPHONE ENCOUNTER
Tried to connect with pt for the visit today and he never logged on. Phone call attempted and pt did not have a VM set up.

## 2020-07-23 NOTE — TELEPHONE ENCOUNTER
Pt states he is having some issues with Paxil, denies suicidal thoughts, makes him feel high and \"weird\"    Requesting appt today    Future Appointments   Date Time Provider Malachi Gray   7/23/2020 12:00 PM BARB [place] - [address] [address] - [place]   7/29/2020  7:40 AM BARB [place] [address]

## 2020-07-23 NOTE — PROGRESS NOTES
2020    TELEHEALTH EVALUATION -- Audio/Visual (During JICES-08 public health emergency)    HPI:Visit conducted with pt at home and provider Chen Garcia CNP in office. Linn Hill (:  1985) has requested an audio/video evaluation for the following concern(s):    Pt here with complaints of increased anxiety. Pt has struggled with anxiety all of his life but lately it is getting worse. He recently reverted back to drinking, father was an alcoholic. Pt was started on paxil 4 weeks ago thought it ws working at first but has noticed recently it is making his anxiety worse, he is pacing the floor can't get calmed down, vistaril not working. Denies suicidal or homicidal thoughts. Did move in with his mother recently. Wants to stop the paxil. He did admit his father was bipolar, state he also gets irritable quickly. Anxiety     HPI:    Inciting events or triggers for anxiety - recent house containment and a foot injury   Frequency of anxiety - daily  Panic attacks? Yes  Symptoms of panic attacks -  difficulty concentrating and racing thoughts  Sleep Disturbances? Yes - can't sleep only getting 2 hours of sleep a night, does take trazodone 100 mg at night  Impaired concentration? No  Substance abuse? Yes - does drink alcohol  Suicidal/Homicidal Ideation? No    Compliant with meds: yes  Med side effects: Yes - states the paxil has made him feel drunk, jittery, does not like it. Thinks it made him agitated. He has taken multiple psych meds since a child, states none have worked. Sees therapist?: Yes - he states he has 2 counselors and a case manageer  Family History of Mental Illness? Yes - father with bipolar     Review of Systems   Respiratory: Negative. Cardiovascular: Negative. Gastrointestinal: Negative. Skin: Negative. Neurological: Negative for dizziness. Psychiatric/Behavioral: Positive for agitation, decreased concentration and sleep disturbance.  Negative for behavioral problems, confusion, self-injury and suicidal ideas. The patient is nervous/anxious and is hyperactive. Prior to Visit Medications    Medication Sig Taking? Authorizing Provider   escitalopram (LEXAPRO) 10 MG tablet Take 1 tablet by mouth daily Yes BARB Davis CNP   LORazepam (ATIVAN) 0.5 MG tablet Take 1 tablet by mouth 2 times daily for 7 days. Yes BARB Davis CNP   lisinopril (PRINIVIL;ZESTRIL) 2.5 MG tablet Take 1 tablet by mouth daily  BARB Frances CNP   hydrOXYzine (VISTARIL) 100 MG capsule Take 1 capsule by mouth 4 times daily as needed for Anxiety  BARB Frances CNP   PARoxetine (PAXIL) 20 MG tablet Take 1 tablet by mouth daily  BARB Frances CNP   ondansetron (ZOFRAN) 4 MG tablet Take 1 tablet by mouth 3 times daily as needed for Nausea or Vomiting  BARB Frances CNP   insulin glargine (LANTUS SOLOSTAR) 100 UNIT/ML injection pen Inject 25 Units into the skin nightly  BARB Frances CNP   insulin aspart (NOVOLOG FLEXPEN) 100 UNIT/ML injection pen Inject 2-12 units SQ TID with meals as directed per sliding scale, max dose 36 units/day  BARB Frances CNP   albuterol sulfate HFA (PROVENTIL HFA) 108 (90 Base) MCG/ACT inhaler Inhale 2 puffs into the lungs every 6 hours as needed for Wheezing  BARB Frances CNP   TRUE METRIX BLOOD GLUCOSE TEST strip Test blood sugars three times daily.   BARB Frances CNP   Insulin Pen Needle 30G X 8 MM MISC 1 each by Does not apply route 3 times daily  BARB Frances CNP   fluticasone-salmeterol (ADVAIR) 250-50 MCG/DOSE AEPB Inhale 1 puff into the lungs every 12 hours  BARB Frances CNP   aspirin 81 MG EC tablet Take 1 tablet by mouth daily  Classie Sacks, MD   Lancets MISC 1 each by Does not apply route 3 times daily  Classie Sacks, MD   traZODone (DESYREL) 100 MG tablet Take 1 tablet by mouth nightly as needed for Sleep  BARB Frances CNP acetaminophen (TYLENOL) 500 MG tablet Take 1,000 mg by mouth every 6 hours as needed for Pain  Historical Provider, MD   ACCU-CHEK MULTICLIX LANCETS MISC 1 box by Does not apply route daily  Penny Saleh PA-C   Multiple Vitamins-Minerals (MENS MULTIVITAMIN PLUS) TABS Take 1 tablet by mouth daily  Historical Provider, MD       Social History     Tobacco Use    Smoking status: Current Every Day Smoker     Packs/day: 0.50     Years: 22.00     Pack years: 11.00     Types: Cigarettes    Smokeless tobacco: Never Used   Substance Use Topics    Alcohol use: Not Currently     Comment: last drink 4/6/19    Drug use: Not Currently     Types: Marijuana     Comment: approx 2 or more years        No Known Allergies,   Past Medical History:   Diagnosis Date    Asthma     COPD (chronic obstructive pulmonary disease) (HCC)     Eczema     GERD (gastroesophageal reflux disease)     History of alcohol abuse     History of marijuana use     History of tobacco abuse     quit 11/21/2017    Hx of seasonal allergies     Hypertension     Pancreatitis     Seizures (HCC)     etoh wdl    Type 2 diabetes mellitus without complication ([facility] 44/89/0797   ,   Past Surgical History:   Procedure Laterality Date    EYE SURGERY Right 09/2019    DR [facility]    FRACTURE SURGERY Right 2019    orbital fracture surgery    UPPER GASTROINTESTINAL ENDOSCOPY Left 5/6/2019    EGD BIOPSY performed by Sly Craft MD at [address] [facility]   ,   Social History     Tobacco Use    Smoking status: Current Every Day Smoker     Packs/day: 0.50     Years: 22.00     Pack years: 11.00     Types: Cigarettes    Smokeless tobacco: Never Used   Substance Use Topics    Alcohol use: Not Currently     Comment: last drink 4/6/19    Drug use: Not Currently     Types: Marijuana     Comment: approx 2 or more years   ,   Family History   Problem Relation Age of Onset    Other Mother         gestational diabetes    Other Father 39 suicide    Other Sister         hypoglycemia   ,   Immunization History   Administered Date(s) Administered    Influenza Virus Vaccine 10/03/2018    Influenza, Intradermal, Quadrivalent, Preservative Free 12/12/2017    Pneumococcal Polysaccharide ([id number]) 12/12/2017   ,   Health Maintenance   Topic Date Due    Varicella vaccine (1 of 2 - 2-dose childhood series) 10/11/1984    Diabetic retinal exam  10/11/1993    HIV screen  10/11/1998    Hepatitis B vaccine (1 of 3 - Risk 3-dose series) 10/11/2002    DTaP/Tdap/Td vaccine (1 - Tdap) 10/11/2002    Diabetic foot exam  06/17/2020    Flu vaccine (1) 09/01/2020    Lipid screen  03/12/2021    A1C test (Diabetic or Prediabetic)  06/11/2021    Diabetic microalbuminuria test  06/11/2021    Potassium monitoring  06/11/2021    Creatinine monitoring  06/11/2021    Pneumococcal 0-64 years Vaccine  Completed    Hepatitis A vaccine  Aged Out    Hib vaccine  Aged Out    Meningococcal (ACWY) vaccine  Aged Out       PHYSICAL EXAMINATION:  Vital Signs: (As obtained by patient/caregiver or practitioner observation)    Blood pressure-  Heart rate-    Respiratory rate-    Temperature-  Pulse oximetry-     Constitutional: [x] Appears well-developed and well-nourished [] No apparent distress      [x] Abnormal- does appear to be distressed with anxietyMental status  [x] Alert and awake  [x] Oriented to person/place/time [x]Able to follow commands        Pulmonary/Chest: [x] Respiratory effort normal.  [x] No visualized signs of difficulty breathing or respiratory distress        []       Skin:        [x] No significant exanthematous lesions or discoloration noted on facial skin         []         Psychiatric:       [] Normal Affect [x] No Hallucinations        [x] Abnormal- can't sleep, is talking appropriately during visit, making eye contact  Other pertinent observable physical exam findings-     ASSESSMENT/PLAN:  1.  Anxiety  lexapro  Continue with counselors. Stop paxil    2. Agitation  ativan    3. Moderate episode of recurrent major depressive disorder (HCC)  Stop p[axil, trial lexapro  Short term ativan, may need to consider a mood stabilizer  Pt in agreement with plan. Controlled substances monitoring: possible medication side effects, risk of tolerance and/or dependence, and alternative treatments discussed, no signs of potential drug abuse or diversion identified and OARRS report reviewed today- activity consistent with treatment plan. Keep f/u with Mario Manrique on 7/29/2020  No follow-ups on file. Ramesh Lara is a 37 y.o. male being evaluated by a Virtual Visit (video visit) encounter to address concerns as mentioned above. A caregiver was present when appropriate. Due to this being a TeleHealth encounter (During KXKXB-53 public health emergency), evaluation of the following organ systems was limited: Vitals/Constitutional/EENT/Resp/CV/GI//MS/Neuro/Skin/Heme-Lymph-Imm. Pursuant to the emergency declaration under the [address] [place] [address] authority and the [employer] and [employer] Act, this Virtual Visit was conducted with patient's (and/or legal guardian's) consent, to reduce the patient's risk of exposure to COVID-19 and provide necessary medical care. The patient (and/or legal guardian) has also been advised to contact this office for worsening conditions or problems, and seek emergency medical treatment and/or call 911 if deemed necessary. Patient identification was verified at the start of the visit: Yes    Total time spent on this encounter: pt visit over 50% of time spent on counseling. Services were provided through a video synchronous discussion virtually to substitute for in-person clinic visit. Patient and provider were located at their individual homes.     --BARB Cool - CNP on 7/23/2020 at 12:33 PM    An electronic signature was used to

## 2020-07-25 ENCOUNTER — HOSPITAL ENCOUNTER (OUTPATIENT)
Age: 35
Discharge: HOME | End: 2020-07-25
Payer: COMMERCIAL

## 2020-07-25 ENCOUNTER — HOSPITAL ENCOUNTER (OUTPATIENT)
Dept: GENERAL RADIOLOGY | Age: 35
Discharge: HOME | End: 2020-07-25
Payer: COMMERCIAL

## 2020-07-25 LAB
ANION GAP SERPL CALCULATED.3IONS-SCNC: 13 MEQ/L (ref 8–16)
BUN BLDV-MCNC: 12 MG/DL (ref 7–22)
CALCIUM SERPL-MCNC: 9.3 MG/DL (ref 8.5–10.5)
CHLORIDE BLD-SCNC: 99 MEQ/L (ref 98–111)
CO2: 25 MEQ/L (ref 23–33)
CREAT SERPL-MCNC: 0.6 MG/DL (ref 0.4–1.2)
EKG ATRIAL RATE: 87 BPM
EKG P AXIS: 25 DEGREES
EKG P-R INTERVAL: 124 MS
EKG Q-T INTERVAL: 378 MS
EKG QRS DURATION: 78 MS
EKG QTC CALCULATION (BAZETT): 454 MS
EKG R AXIS: 18 DEGREES
EKG T AXIS: 31 DEGREES
EKG VENTRICULAR RATE: 87 BPM
ERYTHROCYTE [DISTWIDTH] IN BLOOD BY AUTOMATED COUNT: 14.1 % (ref 11.5–14.5)
ERYTHROCYTE [DISTWIDTH] IN BLOOD BY AUTOMATED COUNT: 48.3 FL (ref 35–45)
GFR SERPL CREATININE-BSD FRML MDRD: > 90 ML/MIN/1.73M2
GLUCOSE BLD-MCNC: 142 MG/DL (ref 70–108)
HCT VFR BLD CALC: 45.5 % (ref 42–52)
HEMOGLOBIN: 15.2 GM/DL (ref 14–18)
MCH RBC QN AUTO: 30.8 PG (ref 26–33)
MCHC RBC AUTO-ENTMCNC: 33.4 GM/DL (ref 32.2–35.5)
MCV RBC AUTO: 92.3 FL (ref 80–94)
PLATELET # BLD: 257 THOU/MM3 (ref 130–400)
PMV BLD AUTO: 10 FL (ref 9.4–12.4)
POTASSIUM SERPL-SCNC: 4.2 MEQ/L (ref 3.5–5.2)
RBC # BLD: 4.93 MILL/MM3 (ref 4.7–6.1)
SODIUM BLD-SCNC: 137 MEQ/L (ref 135–145)
WBC # BLD: 11.3 THOU/MM3 (ref 4.8–10.8)

## 2020-07-25 PROCEDURE — 36415 COLL VENOUS BLD VENIPUNCTURE: CPT

## 2020-07-25 PROCEDURE — 93005 ELECTROCARDIOGRAM TRACING: CPT | Performed by: PODIATRIST

## 2020-07-25 PROCEDURE — 85027 COMPLETE CBC AUTOMATED: CPT

## 2020-07-25 PROCEDURE — 71046 X-RAY EXAM CHEST 2 VIEWS: CPT

## 2020-07-25 PROCEDURE — 80048 BASIC METABOLIC PNL TOTAL CA: CPT

## 2020-07-28 NOTE — PROGRESS NOTES
Covid screening questionnaire complete and negative for symptoms or exposure see chart for documentation    NPO after midnight except sip of water with heart/BP meds  Follow all instructions given by surgeon including medications to hold  Bring insurance card and photo ID  Shower the night before or morning of procedure with liquid antibacterial soap  Wear comfortable clothing  Do not bring jewelry or valuables  Bring list of medications with dosage and how often taken if not reviewed   needed at discharge at least 25years old  Call PAT at [phone] for questions    Instructed to call surgery center at [phone] upon arrival to speak with  before entering building. Covid screen due  at [facility] 6 to 7 days before procedure.  Pt plans to have completed on

## 2020-07-29 ENCOUNTER — OFFICE VISIT (OUTPATIENT)
Dept: FAMILY MEDICINE CLINIC | Age: 35
End: 2020-07-29
Payer: COMMERCIAL

## 2020-07-29 VITALS
SYSTOLIC BLOOD PRESSURE: 122 MMHG | RESPIRATION RATE: 10 BRPM | DIASTOLIC BLOOD PRESSURE: 86 MMHG | HEART RATE: 93 BPM | WEIGHT: 179.8 LBS | BODY MASS INDEX: 31.86 KG/M2 | OXYGEN SATURATION: 98 % | HEIGHT: 63 IN | TEMPERATURE: 98.2 F

## 2020-07-29 PROBLEM — F17.210 CIGARETTE NICOTINE DEPENDENCE WITHOUT COMPLICATION: Status: ACTIVE | Noted: 2020-07-29

## 2020-07-29 PROCEDURE — 99242 OFF/OP CONSLTJ NEW/EST SF 20: CPT | Performed by: NURSE PRACTITIONER

## 2020-07-29 PROCEDURE — G8427 DOCREV CUR MEDS BY ELIG CLIN: HCPCS | Performed by: NURSE PRACTITIONER

## 2020-07-29 PROCEDURE — 99406 BEHAV CHNG SMOKING 3-10 MIN: CPT | Performed by: NURSE PRACTITIONER

## 2020-07-29 PROCEDURE — 2022F DILAT RTA XM EVC RTNOPTHY: CPT | Performed by: NURSE PRACTITIONER

## 2020-07-29 PROCEDURE — G8417 CALC BMI ABV UP PARAM F/U: HCPCS | Performed by: NURSE PRACTITIONER

## 2020-07-29 RX ORDER — OXYCODONE HYDROCHLORIDE AND ACETAMINOPHEN 5; 325 MG/1; MG/1
TABLET ORAL
Status: ON HOLD | COMMUNITY
Start: 2020-07-22 | End: 2020-10-09 | Stop reason: HOSPADM

## 2020-07-29 RX ORDER — NALOXONE HYDROCHLORIDE 4 MG/.1ML
SPRAY NASAL
Status: ON HOLD | COMMUNITY
Start: 2020-07-23 | End: 2020-10-23 | Stop reason: HOSPADM

## 2020-07-29 RX ORDER — INSULIN GLARGINE 100 [IU]/ML
25 INJECTION, SOLUTION SUBCUTANEOUS DAILY
Qty: 5 PEN | Refills: 3 | Status: SHIPPED | OUTPATIENT
Start: 2020-07-29 | End: 2020-09-09 | Stop reason: SDUPTHER

## 2020-07-29 RX ORDER — LORAZEPAM 0.5 MG/1
0.5 TABLET ORAL 2 TIMES DAILY
Qty: 14 TABLET | Refills: 0 | Status: SHIPPED | OUTPATIENT
Start: 2020-07-29 | End: 2020-08-05

## 2020-07-29 NOTE — PROGRESS NOTES
Kalpana Modi is a 37 y.o. male that presents for Pre-op Exam (pre op phys, foot surgery by clara [phone])        At the request of Dr. Joy Yan presents for pre-operative evaluation for his surgery. Planned Surgery: right ankle arthroscopy    Is still smoking about 1/2 ppd, he is interested in quitting. Has tried multiple times unsuccessfully. Usually starts getting stressed and returns to smoking. He tried cold turkey and that worked well x 3 months. Patches gave him nightmares. Tried Paxil and he didn't tolerate it., actually had a manic episode. Was switched to Lexapro. He is taking it and is tolerating it fine so far. Patient Active Problem List    Diagnosis Date Noted    Cigarette nicotine dependence without complication 10/86/0362    Major depressive disorder, recurrent episode, moderate with anxious distress ([facility] 03/12/2020    DKA, type 2, not at goal [facility]) 03/02/2020    Elevated LFTs 03/02/2020    Current smoker 03/02/2020    Eczema 06/17/2019    Alcohol use disorder, moderate, in sustained remission, dependence ([address]     Elevated liver enzymes 12/12/2017    Elevated lipase 12/12/2017    Type 2 diabetes mellitus without complication, with long-term current use of insulin ([address]     Asthma     Alcohol-induced acute pancreatitis 05/26/2016    Pancreatitis, History 10/13/2013    COPD (chronic obstructive pulmonary disease)/Asthma 10/13/2013    Alcohol withdrawal ([address] 63/58/9183    Alcoholic hepatitis 04/29/2902       PREOPERATIVE FAMILY HISTORY  - He reports that he does not have a history of bad reaction to anesthesia. -he does not have a family history of bleeding problems such as hemophilia, or Cody disease, or von Willebrand disease. PREOPERATIVE ALLERGIES QUESTION:  Allergies   Allergen Reactions    Paxil [Paroxetine] Rash     he does not have a history of rash or swelling from exposure to rubber or latex.     PREOPERATIVE MEDICATION medications for this visit. he has not been taking systemic glucocorticoid this past year    CARDIAC RISK FACTORS  he does not have a history of UA or MI within that past 30 days  he does not have decompensated CHF or significant valvular disease  he does not have a history of significant cardiac arrhythmia  he denies chest pain, exertional dyspnea, orthopnea, palpitations or LE edema. he can walk up 1 flight of stairs or 8 steps without stopping (4 METS)    he does not have a history of CAD  he does not have a history of CHF  he does not have a history of CVA or TIA  he does have a history of DM requiring insulin  he does not have a history of Renal insufficiency (Cr > 2.0)    EKG reveals sinus rhythm with a rate of 87. There are not Q waves and are not ST segment changes. http://circ. ahajournals. org/content/116/17/e418/F2. large.jpg    PREOPERATIVE REVIEW OF SYSTEMS:  he does have a history of MRSA or VRE. he does not have a history of seizures. he does not have a history of phlebitis or blood clots in arms or legs. he does not have a history of sleep apnea. he  does not have a history of snoring loudly enough to be heard through a closed door. General/Constitutional:  Negative for fever, chills, fatigue, recent weight gain or loss. HEENT:  Negative for changes in vision, ear pain, nose pain, sore throat, dysphagia or odynophagia. Cardiovascular:  Negative for palpitations, chest pain, dyspnea on exertion, or orthopnea   Respiratory:  Negative for  cough, hemoptysis, dyspnea or wheezing. Gastrointestinal:  Negative for abdominal pain, nausea, vomiting, diarrhea, constipation or changes in bowel habits. Genitourinary: Negative for dysuria or hematuria. No frequency, urgency, or hesitancy. Musculoskeletal: Negative for arthralgias, myalgias, or back pain. Neurological: Negative for dizziness, syncope, focal weakness, paresthesias or headaches.    Psychiatric: Negative for depression, anxiety, SI/HI   Skin: Negative for acute skin lesions. PHYSICAL EXAM:  Blood pressure 122/86, pulse 93, temperature 98.2 °F (36.8 °C), temperature source Oral, resp. rate 10, height 5' 3.39\" (1.61 m), weight 179 lb 12.8 oz (81.6 kg), SpO2 98 %. GEN: No acute distress  HEENT:  NCAT, PERRL, EOMI, Nares clear, turbinates pink, mucosa is moist.  Oropharynx  is clear. Hearing grossly intact. Dentition is normal for age. Neck: No lymphadenopathy or masses. Thyroid not palpable; no nodules or masses. Heart: RRR. S1 and S2 normal, no murmurs, clicks, gallops or rubs. No carotid bruits noted. Lungs:  CTAB,  No wheezing, ronchi, or rales. Normal symmetric air entry throughout both lung fields. Abdomen:  Soft, non tender, non distended. No rebound or guarding. No organomegaly. Extremities:  No gross deformity, erythema or edema of the lower extremities. Skin: No pathologic lesions or significant rash. Psych:  Affect appropriate. Thought process is normal without evidence of depression or psychosis. Good insight and appropriae interaction. Cognition and memory appear to be intact. ASSESSMENT & PLAN  Valarie Trinidad was seen today for pre-op exam.    Diagnoses and all orders for this visit:    Closed nondisplaced fracture of medial malleolus of right tibia, initial encounter    Sprain of right ankle, unspecified ligament, initial encounter    Cigarette nicotine dependence without complication  -     [place] TOBACCO USE CESSATION INTERMEDIATE 3-10 MINUTES  -     nicotine (NICOTROL) 10 MG inhaler; Inhale 1 puff into the lungs as needed for Smoking cessation    Major depressive disorder, recurrent episode, moderate with anxious distress (HCC)  -     LORazepam (ATIVAN) 0.5 MG tablet; Take 1 tablet by mouth 2 times daily for 7 days.       - acceptable surgical risk, may proceed with surgery  - start Nicotrol inhaler  - con't Lexapro, too early to make any further changes or additions  - refill Ativan, advised that after this I do not feel comfortable prescribing this further due to Hx of alcohol abuse. Return in about 2 months (around 9/29/2020) for DM, anxiety and depression. Per 2014 ACC/AHA guidelines, patient may proceed with planned surgery. Patient has a Jones Cardiac Risk Index score of 0 indicating 1% chance of perioperative cardiac complications. Http://annals. org/article. aspx?vwjnpziey=589533  http://gbqqmgl0156. com/CardiacRisk_G.htm

## 2020-07-30 ENCOUNTER — HOSPITAL ENCOUNTER (OUTPATIENT)
Age: 35
Discharge: HOME | End: 2020-07-30
Payer: COMMERCIAL

## 2020-07-30 PROCEDURE — U0003 INFECTIOUS AGENT DETECTION BY NUCLEIC ACID (DNA OR RNA); SEVERE ACUTE RESPIRATORY SYNDROME CORONAVIRUS 2 (SARS-COV-2) (CORONAVIRUS DISEASE [COVID-19]), AMPLIFIED PROBE TECHNIQUE, MAKING USE OF HIGH THROUGHPUT TECHNOLOGIES AS DESCRIBED BY CMS-2020-01-R: HCPCS

## 2020-07-31 LAB — SARS-COV-2: NOT DETECTED

## 2020-08-05 ENCOUNTER — HOSPITAL ENCOUNTER (OUTPATIENT)
Age: 35
Setting detail: OUTPATIENT SURGERY
Discharge: HOME | End: 2020-08-05
Attending: PODIATRIST | Admitting: PODIATRIST
Payer: COMMERCIAL

## 2020-08-05 ENCOUNTER — ANESTHESIA EVENT (OUTPATIENT)
Dept: OPERATING ROOM | Age: 35
End: 2020-08-05
Payer: COMMERCIAL

## 2020-08-05 ENCOUNTER — ANESTHESIA (OUTPATIENT)
Dept: OPERATING ROOM | Age: 35
End: 2020-08-05
Payer: COMMERCIAL

## 2020-08-05 VITALS
RESPIRATION RATE: 18 BRPM | WEIGHT: 178 LBS | HEART RATE: 93 BPM | DIASTOLIC BLOOD PRESSURE: 63 MMHG | OXYGEN SATURATION: 100 % | TEMPERATURE: 97.9 F | HEIGHT: 62 IN | SYSTOLIC BLOOD PRESSURE: 134 MMHG | BODY MASS INDEX: 32.76 KG/M2

## 2020-08-05 VITALS — DIASTOLIC BLOOD PRESSURE: 58 MMHG | TEMPERATURE: 98.8 F | SYSTOLIC BLOOD PRESSURE: 109 MMHG | OXYGEN SATURATION: 100 %

## 2020-08-05 LAB — GLUCOSE BLD-MCNC: 117 MG/DL (ref 70–108)

## 2020-08-05 PROCEDURE — 82948 REAGENT STRIP/BLOOD GLUCOSE: CPT

## 2020-08-05 PROCEDURE — 3600000004 HC SURGERY LEVEL 4 BASE: Performed by: PODIATRIST

## 2020-08-05 PROCEDURE — 3700000001 HC ADD 15 MINUTES (ANESTHESIA): Performed by: PODIATRIST

## 2020-08-05 PROCEDURE — 6370000000 HC RX 637 (ALT 250 FOR IP)

## 2020-08-05 PROCEDURE — 7100000010 HC PHASE II RECOVERY - FIRST 15 MIN: Performed by: PODIATRIST

## 2020-08-05 PROCEDURE — 2500000003 HC RX 250 WO HCPCS: Performed by: PODIATRIST

## 2020-08-05 PROCEDURE — 6360000002 HC RX W HCPCS: Performed by: NURSE ANESTHETIST, CERTIFIED REGISTERED

## 2020-08-05 PROCEDURE — 2580000003 HC RX 258: Performed by: NURSE ANESTHETIST, CERTIFIED REGISTERED

## 2020-08-05 PROCEDURE — 3600000014 HC SURGERY LEVEL 4 ADDTL 15MIN: Performed by: PODIATRIST

## 2020-08-05 PROCEDURE — 7100000001 HC PACU RECOVERY - ADDTL 15 MIN: Performed by: PODIATRIST

## 2020-08-05 PROCEDURE — 2709999900 HC NON-CHARGEABLE SUPPLY: Performed by: PODIATRIST

## 2020-08-05 PROCEDURE — 2500000003 HC RX 250 WO HCPCS: Performed by: NURSE ANESTHETIST, CERTIFIED REGISTERED

## 2020-08-05 PROCEDURE — 7100000000 HC PACU RECOVERY - FIRST 15 MIN: Performed by: PODIATRIST

## 2020-08-05 PROCEDURE — 3700000000 HC ANESTHESIA ATTENDED CARE: Performed by: PODIATRIST

## 2020-08-05 PROCEDURE — 7100000011 HC PHASE II RECOVERY - ADDTL 15 MIN: Performed by: PODIATRIST

## 2020-08-05 PROCEDURE — 6360000002 HC RX W HCPCS: Performed by: ANESTHESIOLOGY

## 2020-08-05 RX ORDER — PROMETHAZINE HYDROCHLORIDE 25 MG/1
12.5 TABLET ORAL EVERY 6 HOURS PRN
Status: DISCONTINUED | OUTPATIENT
Start: 2020-08-05 | End: 2020-08-05 | Stop reason: HOSPADM

## 2020-08-05 RX ORDER — PHENYLEPHRINE HYDROCHLORIDE 10 MG/ML
INJECTION INTRAVENOUS PRN
Status: DISCONTINUED | OUTPATIENT
Start: 2020-08-05 | End: 2020-08-05 | Stop reason: SDUPTHER

## 2020-08-05 RX ORDER — OXYCODONE HYDROCHLORIDE 5 MG/1
TABLET ORAL
Status: COMPLETED
Start: 2020-08-05 | End: 2020-08-05

## 2020-08-05 RX ORDER — CEFAZOLIN SODIUM 1 G/3ML
INJECTION, POWDER, FOR SOLUTION INTRAMUSCULAR; INTRAVENOUS PRN
Status: DISCONTINUED | OUTPATIENT
Start: 2020-08-05 | End: 2020-08-05 | Stop reason: SDUPTHER

## 2020-08-05 RX ORDER — SODIUM CHLORIDE 0.9 % (FLUSH) 0.9 %
10 SYRINGE (ML) INJECTION PRN
Status: DISCONTINUED | OUTPATIENT
Start: 2020-08-05 | End: 2020-08-05 | Stop reason: HOSPADM

## 2020-08-05 RX ORDER — MEPERIDINE HYDROCHLORIDE 25 MG/ML
12.5 INJECTION INTRAMUSCULAR; INTRAVENOUS; SUBCUTANEOUS EVERY 5 MIN PRN
Status: DISCONTINUED | OUTPATIENT
Start: 2020-08-05 | End: 2020-08-05 | Stop reason: HOSPADM

## 2020-08-05 RX ORDER — FENTANYL CITRATE 50 UG/ML
50 INJECTION, SOLUTION INTRAMUSCULAR; INTRAVENOUS EVERY 5 MIN PRN
Status: DISCONTINUED | OUTPATIENT
Start: 2020-08-05 | End: 2020-08-05 | Stop reason: HOSPADM

## 2020-08-05 RX ORDER — FENTANYL CITRATE 50 UG/ML
INJECTION, SOLUTION INTRAMUSCULAR; INTRAVENOUS PRN
Status: DISCONTINUED | OUTPATIENT
Start: 2020-08-05 | End: 2020-08-05 | Stop reason: SDUPTHER

## 2020-08-05 RX ORDER — MIDAZOLAM HYDROCHLORIDE 1 MG/ML
INJECTION INTRAMUSCULAR; INTRAVENOUS PRN
Status: DISCONTINUED | OUTPATIENT
Start: 2020-08-05 | End: 2020-08-05 | Stop reason: SDUPTHER

## 2020-08-05 RX ORDER — BUPIVACAINE HYDROCHLORIDE 5 MG/ML
INJECTION, SOLUTION EPIDURAL; INTRACAUDAL PRN
Status: DISCONTINUED | OUTPATIENT
Start: 2020-08-05 | End: 2020-08-05 | Stop reason: ALTCHOICE

## 2020-08-05 RX ORDER — ONDANSETRON 2 MG/ML
4 INJECTION INTRAMUSCULAR; INTRAVENOUS
Status: DISCONTINUED | OUTPATIENT
Start: 2020-08-05 | End: 2020-08-05 | Stop reason: HOSPADM

## 2020-08-05 RX ORDER — LIDOCAINE HYDROCHLORIDE 20 MG/ML
INJECTION, SOLUTION EPIDURAL; INFILTRATION; INTRACAUDAL; PERINEURAL PRN
Status: DISCONTINUED | OUTPATIENT
Start: 2020-08-05 | End: 2020-08-05 | Stop reason: SDUPTHER

## 2020-08-05 RX ORDER — PROMETHAZINE HYDROCHLORIDE 25 MG/ML
6.25 INJECTION, SOLUTION INTRAMUSCULAR; INTRAVENOUS
Status: DISCONTINUED | OUTPATIENT
Start: 2020-08-05 | End: 2020-08-05 | Stop reason: HOSPADM

## 2020-08-05 RX ORDER — LABETALOL 20 MG/4 ML (5 MG/ML) INTRAVENOUS SYRINGE
5 EVERY 10 MIN PRN
Status: DISCONTINUED | OUTPATIENT
Start: 2020-08-05 | End: 2020-08-05 | Stop reason: HOSPADM

## 2020-08-05 RX ORDER — FENTANYL CITRATE 50 UG/ML
25 INJECTION, SOLUTION INTRAMUSCULAR; INTRAVENOUS EVERY 5 MIN PRN
Status: DISCONTINUED | OUTPATIENT
Start: 2020-08-05 | End: 2020-08-05 | Stop reason: HOSPADM

## 2020-08-05 RX ORDER — SODIUM CHLORIDE 0.9 % (FLUSH) 0.9 %
10 SYRINGE (ML) INJECTION EVERY 12 HOURS SCHEDULED
Status: DISCONTINUED | OUTPATIENT
Start: 2020-08-05 | End: 2020-08-05 | Stop reason: HOSPADM

## 2020-08-05 RX ORDER — ONDANSETRON 2 MG/ML
4 INJECTION INTRAMUSCULAR; INTRAVENOUS EVERY 6 HOURS PRN
Status: DISCONTINUED | OUTPATIENT
Start: 2020-08-05 | End: 2020-08-05 | Stop reason: HOSPADM

## 2020-08-05 RX ORDER — OXYCODONE HYDROCHLORIDE 5 MG/1
5 TABLET ORAL EVERY 4 HOURS PRN
Status: DISCONTINUED | OUTPATIENT
Start: 2020-08-05 | End: 2020-08-05 | Stop reason: HOSPADM

## 2020-08-05 RX ORDER — SUCCINYLCHOLINE/SOD CL,ISO/PF 200MG/10ML
SYRINGE (ML) INTRAVENOUS PRN
Status: DISCONTINUED | OUTPATIENT
Start: 2020-08-05 | End: 2020-08-05 | Stop reason: SDUPTHER

## 2020-08-05 RX ORDER — LIDOCAINE HYDROCHLORIDE AND EPINEPHRINE 10; 10 MG/ML; UG/ML
INJECTION, SOLUTION INFILTRATION; PERINEURAL PRN
Status: DISCONTINUED | OUTPATIENT
Start: 2020-08-05 | End: 2020-08-05 | Stop reason: ALTCHOICE

## 2020-08-05 RX ORDER — PROPOFOL 10 MG/ML
INJECTION, EMULSION INTRAVENOUS PRN
Status: DISCONTINUED | OUTPATIENT
Start: 2020-08-05 | End: 2020-08-05 | Stop reason: SDUPTHER

## 2020-08-05 RX ORDER — SODIUM CHLORIDE 9 MG/ML
INJECTION, SOLUTION INTRAVENOUS CONTINUOUS PRN
Status: DISCONTINUED | OUTPATIENT
Start: 2020-08-05 | End: 2020-08-05 | Stop reason: SDUPTHER

## 2020-08-05 RX ORDER — ONDANSETRON 2 MG/ML
INJECTION INTRAMUSCULAR; INTRAVENOUS PRN
Status: DISCONTINUED | OUTPATIENT
Start: 2020-08-05 | End: 2020-08-05 | Stop reason: SDUPTHER

## 2020-08-05 RX ADMIN — FENTANYL CITRATE 50 MCG: 50 INJECTION INTRAMUSCULAR; INTRAVENOUS at 16:20

## 2020-08-05 RX ADMIN — PHENYLEPHRINE HYDROCHLORIDE 100 MCG: 10 INJECTION INTRAVENOUS at 15:25

## 2020-08-05 RX ADMIN — SODIUM CHLORIDE: 9 INJECTION, SOLUTION INTRAVENOUS at 14:49

## 2020-08-05 RX ADMIN — FENTANYL CITRATE 50 MCG: 50 INJECTION INTRAMUSCULAR; INTRAVENOUS at 16:15

## 2020-08-05 RX ADMIN — FENTANYL CITRATE 100 MCG: 50 INJECTION, SOLUTION INTRAMUSCULAR; INTRAVENOUS at 14:55

## 2020-08-05 RX ADMIN — SODIUM CHLORIDE: 9 INJECTION, SOLUTION INTRAVENOUS at 15:54

## 2020-08-05 RX ADMIN — CEFAZOLIN 2000 MG: 1 INJECTION, POWDER, FOR SOLUTION INTRAMUSCULAR; INTRAVENOUS; PARENTERAL at 15:00

## 2020-08-05 RX ADMIN — Medication 140 MG: at 14:55

## 2020-08-05 RX ADMIN — FENTANYL CITRATE 50 MCG: 50 INJECTION, SOLUTION INTRAMUSCULAR; INTRAVENOUS at 15:35

## 2020-08-05 RX ADMIN — FENTANYL CITRATE 50 MCG: 50 INJECTION, SOLUTION INTRAMUSCULAR; INTRAVENOUS at 15:31

## 2020-08-05 RX ADMIN — LIDOCAINE HYDROCHLORIDE 60 MG: 20 INJECTION, SOLUTION EPIDURAL; INFILTRATION; INTRACAUDAL; PERINEURAL at 14:55

## 2020-08-05 RX ADMIN — PROPOFOL 200 MG: 10 INJECTION, EMULSION INTRAVENOUS at 14:55

## 2020-08-05 RX ADMIN — MIDAZOLAM HYDROCHLORIDE 2 MG: 1 INJECTION, SOLUTION INTRAMUSCULAR; INTRAVENOUS at 14:55

## 2020-08-05 RX ADMIN — OXYCODONE HYDROCHLORIDE 5 MG: 5 TABLET ORAL at 17:23

## 2020-08-05 RX ADMIN — ONDANSETRON HYDROCHLORIDE 4 MG: 4 INJECTION, SOLUTION INTRAMUSCULAR; INTRAVENOUS at 15:00

## 2020-08-05 ASSESSMENT — PULMONARY FUNCTION TESTS
PIF_VALUE: 19
PIF_VALUE: 17
PIF_VALUE: 19
PIF_VALUE: 18
PIF_VALUE: 13
PIF_VALUE: 11
PIF_VALUE: 19
PIF_VALUE: 18
PIF_VALUE: 14
PIF_VALUE: 19
PIF_VALUE: 13
PIF_VALUE: 13
PIF_VALUE: 18
PIF_VALUE: 7
PIF_VALUE: 13
PIF_VALUE: 13
PIF_VALUE: 18
PIF_VALUE: 19
PIF_VALUE: 13
PIF_VALUE: 18
PIF_VALUE: 13
PIF_VALUE: 18
PIF_VALUE: 18
PIF_VALUE: 0
PIF_VALUE: 19
PIF_VALUE: 13
PIF_VALUE: 15
PIF_VALUE: 18
PIF_VALUE: 14
PIF_VALUE: 11
PIF_VALUE: 11
PIF_VALUE: 16
PIF_VALUE: 43
PIF_VALUE: 13
PIF_VALUE: 19
PIF_VALUE: 4
PIF_VALUE: 18
PIF_VALUE: 14
PIF_VALUE: 1
PIF_VALUE: 14
PIF_VALUE: 19
PIF_VALUE: 13
PIF_VALUE: 18
PIF_VALUE: 13
PIF_VALUE: 25
PIF_VALUE: 1
PIF_VALUE: 13
PIF_VALUE: 13
PIF_VALUE: 19
PIF_VALUE: 1
PIF_VALUE: 25
PIF_VALUE: 1
PIF_VALUE: 11
PIF_VALUE: 0
PIF_VALUE: 4
PIF_VALUE: 26
PIF_VALUE: 18
PIF_VALUE: 19
PIF_VALUE: 17
PIF_VALUE: 18
PIF_VALUE: 13
PIF_VALUE: 13
PIF_VALUE: 15
PIF_VALUE: 13
PIF_VALUE: 18
PIF_VALUE: 13
PIF_VALUE: 19
PIF_VALUE: 17
PIF_VALUE: 13
PIF_VALUE: 13
PIF_VALUE: 1
PIF_VALUE: 11
PIF_VALUE: 13
PIF_VALUE: 1
PIF_VALUE: 19
PIF_VALUE: 13
PIF_VALUE: 19
PIF_VALUE: 11
PIF_VALUE: 14
PIF_VALUE: 0
PIF_VALUE: 13
PIF_VALUE: 13
PIF_VALUE: 18
PIF_VALUE: 19
PIF_VALUE: 13

## 2020-08-05 ASSESSMENT — PAIN SCALES - GENERAL
PAINLEVEL_OUTOF10: 8
PAINLEVEL_OUTOF10: 4
PAINLEVEL_OUTOF10: 7
PAINLEVEL_OUTOF10: 6
PAINLEVEL_OUTOF10: 6
PAINLEVEL_OUTOF10: 7
PAINLEVEL_OUTOF10: 8

## 2020-08-05 ASSESSMENT — PAIN DESCRIPTION - LOCATION: LOCATION: FOOT

## 2020-08-05 ASSESSMENT — PAIN DESCRIPTION - DESCRIPTORS
DESCRIPTORS: CONSTANT;ACHING
DESCRIPTORS: ACHING

## 2020-08-05 ASSESSMENT — PAIN - FUNCTIONAL ASSESSMENT: PAIN_FUNCTIONAL_ASSESSMENT: 0-10

## 2020-08-05 ASSESSMENT — PAIN DESCRIPTION - PAIN TYPE: TYPE: SURGICAL PAIN

## 2020-08-05 ASSESSMENT — PAIN DESCRIPTION - FREQUENCY: FREQUENCY: CONTINUOUS

## 2020-08-05 NOTE — ANESTHESIA PRE PROCEDURE
Department of Anesthesiology  Preprocedure Note       Name:  Linn Hill   Age:  37 y.o.  :  1985                                          MRN:  [id number]         Date:  2020      Surgeon: Nyasia Marte):  Alireza Gómez DPM    Procedure: Procedure(s):  ANKLE ARTHROSCOPY WITH POSSIBLE MEDIAL DEBRIDEMENT RIGHT ANKLE, POSSIBLE ANKLE ARTHROTOMY WITH DEBRIDEMENT AND POSSIBLE PACKING OF MEDIAL TALUS DEFICIT, POSSIBLE HARVEST OF CALCANEAL BONE GRAFT RIGHT HEEL    Medications prior to admission:   Prior to Admission medications    Medication Sig Start Date End Date Taking? Authorizing Provider   oxyCODONE-acetaminophen (PERCOCET) 5-325 MG per tablet TAKE 1 TABLET BY MOUTH EVERY 6 HOURS AS NEEDED FOR POSTOP PAIN 20   Historical Provider, MD   NARCAN 4 MG/0.1ML LIQD nasal spray ADMINISTER A SINGLE SPRAY INTRANASALLY INTO ONE NOSTRIL. CALL 911. MAY REPEAT X 1. 20   Historical Provider, MD   nicotine (NICOTROL) 10 MG inhaler Inhale 1 puff into the lungs as needed for Smoking cessation 20   Charisse Ojeda APRN - CNP   LORazepam (ATIVAN) 0.5 MG tablet Take 1 tablet by mouth 2 times daily for 7 days.  20  Charisse Ojeda APRTERI - CNP   insulin glargine (LANTUS SOLOSTAR) 100 UNIT/ML injection pen Inject 25 Units into the skin daily 20   [facility] APRN - CNP   escitalopram (LEXAPRO) 10 MG tablet Take 1 tablet by mouth daily 20   Fina Rowan, APRN - CNP   hydrOXYzine (VISTARIL) 100 MG capsule Take 1 capsule by mouth 4 times daily as needed for Anxiety 20   Charisse Ojeda APRN - CNP   ondansetron (ZOFRAN) 4 MG tablet Take 1 tablet by mouth 3 times daily as needed for Nausea or Vomiting 20   Macielmaryam Ojeda APRN - CNP   insulin aspart (NOVOLOG FLEXPEN) 100 UNIT/ML injection pen Inject 2-12 units SQ TID with meals as directed per sliding scale, max dose 36 units/day 19   Charisse Ojeda APRN - CNP   albuterol sulfate HFA (PROVENTIL HFA) 108 (90 Base) MCG/ACT inhaler Inhale 2 puffs into the lungs every 6 hours as needed for Wheezing 12/2/19   Versie Bolus, APRN - CNP   Insulin Pen Needle 30G X 8 MM MISC 1 each by Does not apply route 3 times daily 12/2/19   Versie Bolus, APRN - CNP   fluticasone-salmeterol (ADVAIR) 250-50 MCG/DOSE AEPB Inhale 1 puff into the lungs every 12 hours 5/16/19   Versie Bolus, APRN - CNP   aspirin 81 MG EC tablet Take 1 tablet by mouth daily 5/10/19   Lencho Casas MD   traZODone (DESYREL) 100 MG tablet Take 1 tablet by mouth nightly as needed for Sleep 1/11/18   Versie Bolus, APRN - CNP   acetaminophen (TYLENOL) 500 MG tablet Take 1,000 mg by mouth every 6 hours as needed for Pain    Historical Provider, MD   Multiple Vitamins-Minerals (MENS MULTIVITAMIN PLUS) TABS Take 1 tablet by mouth daily    Historical Provider, MD       Current medications:    Current Facility-Administered Medications   Medication Dose Route Frequency Provider Last Rate Last Dose    sodium chloride flush 0.9 % injection 10 mL  10 mL Intravenous 2 times per day Laina Malady, DPM        sodium chloride flush 0.9 % injection 10 mL  10 mL Intravenous PRN Laina Corona, DPM        ceFAZolin (ANCEF) 2 g in dextrose 5 % 50 mL IVPB  2 g Intravenous On Call to [facility], DP           Allergies:     Allergies   Allergen Reactions    Paxil [Paroxetine] Rash       Problem List:    Patient Active Problem List   Diagnosis Code    Alcohol withdrawal ([facility] L14.256    Alcoholic hepatitis Z00.40    Pancreatitis, History K85.90    COPD (chronic obstructive pulmonary disease)/Asthma J44.9    Alcohol-induced acute pancreatitis K85.20    Type 2 diabetes mellitus without complication, with long-term current use of insulin (HCC) E11.9, Z79.4    Asthma J45.909    Elevated liver enzymes R74.8    Elevated lipase R74.8    Alcohol use disorder, moderate, in sustained remission, dependence ([facility] F10.21    Eczema L30.9    DKA, type 2, not at goal ([facility] E11.10    Elevated LFTs R94.5    Current smoker F17.200    Major depressive disorder, recurrent episode, moderate with anxious distress (HCC) F33.1    Cigarette nicotine dependence without complication S37.566       Past Medical History:        Diagnosis Date    Asthma     COPD (chronic obstructive pulmonary disease) (HCC)     Eczema     GERD (gastroesophageal reflux disease)     History of alcohol abuse     History of marijuana use     History of tobacco abuse     quit 11/21/2017    Hx of seasonal allergies     Pancreatitis     Seizures (HCC)     etoh wdl    Type 2 diabetes mellitus without complication ([facility] 54/03/0503       Past Surgical History:        Procedure Laterality Date    EYE SURGERY Right 09/2019    DR [facility]    FRACTURE SURGERY Right 2019    orbital fracture surgery    UPPER GASTROINTESTINAL ENDOSCOPY Left 5/6/2019    EGD BIOPSY [facility] Giselle Keita MD [facility]       Social History:    Social History     Tobacco Use    Smoking status: Current Every Day Smoker     Packs/day: 0.50     Years: 22.00     Pack years: 11.00     Types: Cigarettes    Smokeless tobacco: Never Used   Substance Use Topics    Alcohol use: Not Currently     Comment: 7/1/2020                                Ready to quit: Not Answered  Counseling given: Not Answered      Vital Signs (Current):   Vitals:    07/28/20 0940 08/05/20 1318   BP:  123/84   Pulse:  97   Resp:  16   Temp:  96.9 °F (36.1 °C)   TempSrc:  Temporal   SpO2:  98%   Weight: 165 lb (74.8 kg) 178 lb (80.7 kg)   Height: 5' 2\" (1.575 m)                                               BP Readings from Last 3 Encounters:   08/05/20 123/84   07/29/20 122/86   03/14/20 118/80       NPO Status: Time of last liquid consumption: 0700(sip)                        Time of last solid consumption: 2358                        Date of last liquid consumption: 08/05/20                        Date of last solid food consumption: 08/04/20    BMI:   Wt Readings from Last 3 Encounters:   08/05/20 178 lb (80.7 kg)   07/29/20 179 lb 12.8 oz (81.6 kg)   03/12/20 183 lb (83 kg)     Body mass index is 31.15 kg/m². CBC:   Lab Results   Component Value Date    WBC 11.3 07/25/2020    RBC 4.93 07/25/2020    RBC 4.75 04/09/2012    HGB 15.2 07/25/2020    HCT 45.5 07/25/2020    MCV 92.3 07/25/2020    RDW 13.3 12/09/2017     07/25/2020       CMP:   Lab Results   Component Value Date     07/25/2020    K 4.2 07/25/2020    K 4.6 03/02/2020    CL 99 07/25/2020    CO2 25 07/25/2020    BUN 12 07/25/2020    CREATININE 0.6 07/25/2020    LABGLOM >90 07/25/2020    GLUCOSE 142 07/25/2020    PROT 7.8 06/11/2020    CALCIUM 9.3 07/25/2020    BILITOT 0.6 06/11/2020    ALKPHOS 119 06/11/2020    AST 50 06/11/2020    ALT 63 06/11/2020       POC Tests: No results for input(s): POCGLU, POCNA, POCK, POCCL, POCBUN, POCHEMO, POCHCT in the last 72 hours. Coags:   Lab Results   Component Value Date    INR 0.86 10/17/2017    APTT 38.1 10/17/2017       HCG (If Applicable): No results found for: PREGTESTUR, PREGSERUM, HCG, HCGQUANT     ABGs: No results found for: PHART, PO2ART, ARS2TIY, UXE7LFX, BEART, D0VSZVET     Type & Screen (If Applicable):  No results found for: LABABO, LABRH    Drug/Infectious Status (If Applicable):  Lab Results   Component Value Date    HEPCAB Negative 05/27/2016       COVID-19 Screening (If Applicable):   Lab Results   Component Value Date    COVID19 Not Detected 07/30/2020         Anesthesia Evaluation   no history of anesthetic complications:   Airway: Mallampati: II  TM distance: >3 FB   Neck ROM: full  Mouth opening: > = 3 FB Dental:          Pulmonary:normal exam    (+) asthma:           Patient did not smoke on day of surgery.                  Cardiovascular:  Exercise tolerance: good (>4 METS),                     Neuro/Psych:   (+) psychiatric history:            GI/Hepatic/Renal:   (+) GERD:, liver disease:,           Endo/Other: (+) Diabetes, . Pt had no PAT visit       Abdominal:           Vascular: negative vascular ROS. Anesthesia Plan      general     ASA 3       Induction: intravenous. MIPS: Postoperative opioids intended and Prophylactic antiemetics administered. Anesthetic plan and risks discussed with patient. Plan discussed with CRNA.                   Rock Kaiser MD   8/5/2020

## 2020-08-05 NOTE — ANESTHESIA POSTPROCEDURE EVALUATION
Department of Anesthesiology  Postprocedure Note    Patient: Samuel Aviles  MRN: [id number]  YOB: 1985  Date of evaluation: 8/5/2020  Time:  4:56 PM     Procedure Summary     Date:  08/05/20 Room / Location:  [facility] / [facility]    Anesthesia Start:  7634 Anesthesia Stop:  5087    Procedure:  ANKLE ARTHROSCOPY WITH  MEDIAL DEBRIDEMENT RIGHT ANKLE, ANKLE ARTHROTOMY WITH DEBRIDEMENT (Right ) Diagnosis:  (NONDISPLACED FRACTURE OF MEDIAL MALLEOLUS OF RIGHT TIBIA, CLOSED FRACTURE WITH ROUTINE HEALING, FX OF RIGHT TALUS, SPRAIN OF LIGAMENT RIGHT ANKLE, SYNOVITIS AND TENOSYNOVITIS RIGHT LOWER LEG)    Surgeon:  Cruz Brock DPM Responsible Provider:  Rock Kaiser MD    Anesthesia Type:  general ASA Status:  3          Anesthesia Type: general    Cyrus Phase I: Cyrus Score: 9    Cyrus Phase II:      Last vitals: Reviewed and per EMR flowsheets.        Anesthesia Post Evaluation    Patient location during evaluation: PACU  Patient participation: complete - patient participated  Level of consciousness: awake  Airway patency: patent  Nausea & Vomiting: no vomiting and no nausea  Complications: no  Cardiovascular status: hemodynamically stable  Respiratory status: acceptable and nasal cannula  Hydration status: stable

## 2020-08-05 NOTE — PROGRESS NOTES
Pt returned to [facility] room 2 Vitals and assessment as charted. 0.9 infusing, @300ml to count from PACU. Pt has crackers and pop. Family at the bedside. Pt and family verbalized understanding of discharge criteria and call light use. Call light in reach.

## 2020-08-05 NOTE — BRIEF OP NOTE
Brief Postoperative Note      Patient: Placido Thornton  YOB: 1985  MRN: [id number]    Date of Procedure: 8/5/2020    Pre-Op Diagnosis: NONDISPLACED FRACTURE OF MEDIAL MALLEOLUS OF RIGHT TIBIA, CLOSED FRACTURE WITH ROUTINE HEALING, FX OF RIGHT TALUS, SPRAIN OF LIGAMENT RIGHT ANKLE, SYNOVITIS AND TENOSYNOVITIS RIGHT LOWER LEG    Post-Op Diagnosis: Same       Procedure(s):  ANKLE ARTHROSCOPY WITH  MEDIAL DEBRIDEMENT RIGHT ANKLE, ANKLE ARTHROTOMY WITH DEBRIDEMENT    Surgeon(s):  Alireza Dickson DPM    Assistant:  Resident: Cindy Fletcher DPM; Shaina Maria DPM    Anesthesia: General    Estimated Blood Loss (mL): less than 50     Complications: None    Specimens:   * No specimens in log *    Implants:  * No implants in log *      Drains: * No LDAs found *    Injectables: 50/50 mix of 26cc 0.5%marcaine plain and 1% lidocaine with epi     Findings: consistent with diagnosis     Electronically signed by Shaina Maria DPM on 8/5/2020 at 4:15 PM

## 2020-08-05 NOTE — PROGRESS NOTES
Pt has met discharge criteria and states he is ready for discharge to home. IV removed, gauze and tape applied. Dressed in own clothes and personal belongings gathered. Discharge instructions (with opioid medication education information) given to pt and family; pt and family verbalized understanding of discharge instructions, prescriptions and follow up appointments. Pt transported to discharge lobby by [facility] staff.

## 2020-08-05 NOTE — OP NOTE
plain and 1% lidocaine with epi was injected. The incision was dressed with adaptic, 4x4's, kerlix, etc.  The pneumatic thigh tourniquet was then deflated and an immediate hyperemic response was noted to all digits of the right foot. A CAM boot was then applied. The patient was transported to the PACU with VSS and NVS intact to all digits of the right foot. No complications were noted throughout the procedure. The patient is to be discharged per PACU protocol. The patient is to follow-up with Dr. Jennifer Angulo in the office.     NATALIYA Carroll DPM PGY-III  Foot and Ankle Surgical Resident  8/5/2020  4:16 PM          Electronically signed by Eduard Li DPM on 8/5/2020 at 4:16 PM

## 2020-08-05 NOTE — H&P
[address] [address]  History and Physical Update    Pt Name: Amy Alfaro  MRN: [id number]  YOB: 1985  Date of evaluation: 8/5/2020    [x] I have examined the patient and reviewed the H&P/Consult and there are no changes to the patient or plans.     [] I have examined the patient and reviewed the H&P/Consult and have noted the following changes:        Vesta Gadnara DPM  Electronically signed 8/5/2020 at 7:27 AM

## 2020-09-09 RX ORDER — HYDROXYZINE PAMOATE 100 MG/1
100 CAPSULE ORAL 4 TIMES DAILY PRN
Qty: 60 CAPSULE | Refills: 3 | Status: SHIPPED | OUTPATIENT
Start: 2020-09-09 | End: 2021-01-15

## 2020-09-09 RX ORDER — INSULIN GLARGINE 100 [IU]/ML
25 INJECTION, SOLUTION SUBCUTANEOUS DAILY
Qty: 5 PEN | Refills: 3 | Status: SHIPPED | OUTPATIENT
Start: 2020-09-09 | End: 2021-03-12 | Stop reason: SDUPTHER

## 2020-09-11 ENCOUNTER — APPOINTMENT (OUTPATIENT)
Dept: CT IMAGING | Age: 35
End: 2020-09-11
Payer: COMMERCIAL

## 2020-09-11 ENCOUNTER — HOSPITAL ENCOUNTER (EMERGENCY)
Age: 35
Discharge: HOME | End: 2020-09-11
Payer: COMMERCIAL

## 2020-09-11 VITALS
RESPIRATION RATE: 16 BRPM | BODY MASS INDEX: 33.13 KG/M2 | TEMPERATURE: 98.3 F | HEIGHT: 62 IN | WEIGHT: 180 LBS | DIASTOLIC BLOOD PRESSURE: 94 MMHG | HEART RATE: 92 BPM | SYSTOLIC BLOOD PRESSURE: 137 MMHG | OXYGEN SATURATION: 99 %

## 2020-09-11 PROCEDURE — 70450 CT HEAD/BRAIN W/O DYE: CPT

## 2020-09-11 PROCEDURE — 12002 RPR S/N/AX/GEN/TRNK2.6-7.5CM: CPT

## 2020-09-11 PROCEDURE — 99283 EMERGENCY DEPT VISIT LOW MDM: CPT

## 2020-09-11 PROCEDURE — 90715 TDAP VACCINE 7 YRS/> IM: CPT | Performed by: PHYSICIAN ASSISTANT

## 2020-09-11 PROCEDURE — 6370000000 HC RX 637 (ALT 250 FOR IP): Performed by: PHYSICIAN ASSISTANT

## 2020-09-11 PROCEDURE — 99282 EMERGENCY DEPT VISIT SF MDM: CPT

## 2020-09-11 PROCEDURE — 6360000002 HC RX W HCPCS: Performed by: PHYSICIAN ASSISTANT

## 2020-09-11 PROCEDURE — 90471 IMMUNIZATION ADMIN: CPT | Performed by: PHYSICIAN ASSISTANT

## 2020-09-11 RX ORDER — ACETAMINOPHEN 500 MG
1000 TABLET ORAL ONCE
Status: COMPLETED | OUTPATIENT
Start: 2020-09-11 | End: 2020-09-11

## 2020-09-11 RX ORDER — LIDOCAINE HYDROCHLORIDE 10 MG/ML
INJECTION, SOLUTION INFILTRATION; PERINEURAL
Status: DISCONTINUED
Start: 2020-09-11 | End: 2020-09-11 | Stop reason: HOSPADM

## 2020-09-11 RX ADMIN — TETANUS TOXOID, REDUCED DIPHTHERIA TOXOID AND ACELLULAR PERTUSSIS VACCINE, ADSORBED 0.5 ML: 5; 2.5; 8; 8; 2.5 SUSPENSION INTRAMUSCULAR at 14:30

## 2020-09-11 RX ADMIN — ACETAMINOPHEN 1000 MG: 500 TABLET ORAL at 14:30

## 2020-09-11 ASSESSMENT — ENCOUNTER SYMPTOMS
ROS SKIN COMMENTS: LACERATION
ABDOMINAL DISTENTION: 0
BLOOD IN STOOL: 0
SINUS PAIN: 0
SORE THROAT: 0
SHORTNESS OF BREATH: 0
EYE PAIN: 0
VOMITING: 0
CONSTIPATION: 0
DIARRHEA: 0
NAUSEA: 0
SINUS PRESSURE: 0
ABDOMINAL PAIN: 0
COUGH: 0

## 2020-09-11 ASSESSMENT — PAIN SCALES - GENERAL
PAINLEVEL_OUTOF10: 9
PAINLEVEL_OUTOF10: 9

## 2020-09-11 ASSESSMENT — PAIN DESCRIPTION - LOCATION: LOCATION: HEAD

## 2020-09-11 ASSESSMENT — PAIN DESCRIPTION - PAIN TYPE: TYPE: ACUTE PAIN

## 2020-09-11 ASSESSMENT — PAIN DESCRIPTION - ORIENTATION: ORIENTATION: POSTERIOR

## 2020-09-11 NOTE — ED TRIAGE NOTES
Presents to ED with c/o 2 head lacerations. Patient was walking backwards on his phone when he tripped on a odalys pot. Patient hit his head on the pot. Bleeding is controlled. Denies LOC. Alert and oriented.

## 2020-09-11 NOTE — ED PROVIDER NOTES
[facility] 22 COMPLAINT       Chief Complaint   Patient presents with   [facility] Laceration       Nurses Notes reviewed and I agree except as notedin the HPI. HISTORY OF PRESENT ILLNESS    Mark Anthony Torres is a 37 y.o. male who presents he tripped and fell and hit the back of his head on a on a odalys pot that busted. He states that he had no loss conscious no nausea vomiting on blood thinners. He states he does not know the date of his last tetanus. He is alert and oriented x3. GCS of 15. He has no neck pain. Location/Symptom: head laceration  Timing/Onset: 1 hour PTA  Context/Setting: home  Quality: none  Duration: constant  Modifying Factors: none  Severity: none    REVIEW OF SYSTEMS     Review of Systems   Constitutional: Negative for chills and fever. HENT: Negative for congestion, ear pain, sinus pressure, sinus pain and sore throat. Eyes: Negative for pain. Respiratory: Negative for cough and shortness of breath. Cardiovascular: Negative for chest pain and leg swelling. Gastrointestinal: Negative for abdominal distention, abdominal pain, blood in stool, constipation, diarrhea, nausea and vomiting. Genitourinary: Negative for difficulty urinating, frequency and hematuria. Musculoskeletal: Negative for arthralgias. Skin: Negative for rash. Laceration   Neurological: Negative for dizziness and headaches. All other systems reviewed and are negative. PAST MEDICAL HISTORY    has a past medical history of Asthma, COPD (chronic obstructive pulmonary disease) ([address] Eczema, GERD (gastroesophageal reflux disease), History of alcohol abuse, History of marijuana use, History of tobacco abuse, Hx of seasonal allergies, Pancreatitis, Seizures ([address] and Type 2 diabetes mellitus without complication ([address]     SURGICAL HISTORY      has a past surgical history that includes Upper gastrointestinal endoscopy (Left, 5/6/2019); Eye surgery (Right, 09/2019); fracture surgery (Right, 2019); and Ankle arthroscopy (Right, 8/5/2020). CURRENT MEDICATIONS       Discharge Medication List as of 9/11/2020  2:16 PM      CONTINUE these medications which have NOT CHANGED    Details   hydrOXYzine (VISTARIL) 100 MG capsule Take 1 capsule by mouth 4 times daily as needed for Anxiety, Disp-60 capsule,R-3Normal      insulin glargine (LANTUS SOLOSTAR) 100 UNIT/ML injection pen Inject 25 Units into the skin daily, Disp-5 pen,R-3Normal      betamethasone valerate (VALISONE) 0.1 % cream Apply topically 2 times daily. , Disp-45 g,R-1, Normal      oxyCODONE-acetaminophen (PERCOCET) 5-325 MG per tablet TAKE 1 TABLET BY MOUTH EVERY 6 HOURS AS NEEDED FOR POSTOP PAINHistorical Med      NARCAN 4 MG/0.1ML LIQD nasal spray ADMINISTER A SINGLE SPRAY INTRANASALLY INTO ONE NOSTRIL. CALL 911.  MAY REPEAT X 1., DAWHistorical Med      nicotine (NICOTROL) 10 MG inhaler Inhale 1 puff into the lungs as needed for Smoking cessation, Disp-1 Inhaler,R-3Normal      escitalopram (LEXAPRO) 10 MG tablet Take 1 tablet by mouth daily, Disp-30 tablet,R-3Normal      ondansetron (ZOFRAN) 4 MG tablet Take 1 tablet by mouth 3 times daily as needed for Nausea or Vomiting, Disp-15 tablet, R-0Normal      insulin aspart (NOVOLOG FLEXPEN) 100 UNIT/ML injection pen Inject 2-12 units SQ TID with meals as directed per sliding scale, max dose 36 units/day, Disp-5 pen, R-3Normal      albuterol sulfate HFA (PROVENTIL HFA) 108 (90 Base) MCG/ACT inhaler Inhale 2 puffs into the lungs every 6 hours as needed for Wheezing, Disp-1 Inhaler, R-0Normal      Insulin Pen Needle 30G X 8 MM MISC 3 TIMES DAILY Starting Mon 12/2/2019, Disp-500 each, R-3, Normal      fluticasone-salmeterol (ADVAIR) 250-50 MCG/DOSE AEPB Inhale 1 puff into the lungs every 12 hours, Disp-60 each, R-5Normal      aspirin 81 MG EC tablet Take 1 tablet by mouth daily, Disp-30 tablet, R-3Print      traZODone (DESYREL) 100 MG tablet Take 1 tablet by mouth nightly as needed for Sleep, Disp-30 tablet, R-5Normal      acetaminophen (TYLENOL) 500 MG tablet Take 1,000 mg by mouth every 6 hours as needed for PainHistorical Med      Multiple Vitamins-Minerals (MENS MULTIVITAMIN PLUS) TABS Take 1 tablet by mouth daily             ALLERGIES     is allergic to paxil [paroxetine]. HISTORY     He indicated that his mother is alive. He indicated that his father is . He indicated that the status of his sister is unknown.   family history includes Other in his mother and sister; Other (age of onset: 39) in his father. SOCIALHISTORY      reports that he has been smoking cigarettes. He has a 11.00 pack-year smoking history. He has never used smokeless tobacco. He reports previous alcohol use. He reports previous drug use. Drug: Marijuana. PHYSICAL EXAM     INITIAL VITALS:  height is 5' 2\" (1.575 m) and weight is 180 lb (81.6 kg). His oral temperature is 98.3 °F (36.8 °C). His blood pressure is 137/94 (abnormal) and his pulse is 92. His respiration is 16 and oxygen saturation is 99%. Physical Exam  Vitals signs and nursing note reviewed. Constitutional:       Comments: Well Developed Well Nourished Appearing     HENT:      Head: Normocephalic and atraumatic. Comments: 2 separate 3 cm lacerations on the proximal aspect of head. Please photograph below  Eyes:      Pupils: Pupils are equal, round, and reactive to light. Neck:      Musculoskeletal: Normal range of motion and neck supple. Cardiovascular:      Rate and Rhythm: Normal rate and regular rhythm. Heart sounds: Normal heart sounds. Pulmonary:      Effort: Pulmonary effort is normal. No respiratory distress. Breath sounds: Normal breath sounds. No wheezing. Abdominal:      General: Bowel sounds are normal. There is no distension. Palpations: Abdomen is soft.                DIFFERENTIAL DIAGNOSIS:   Scalp laceration    DIAGNOSTIC RESULTS     EKG: All EKG's are muscle damage noted, no nerve damage noted, no tendon damage noted, no underlying fracture noted and no vascular damage noted      Contaminated: no    Treatment:     Area cleansed with:  Gomez-Arielle    Amount of cleaning:  Standard    Irrigation solution:  Sterile saline    Visualized foreign bodies/material removed: no    Skin repair:     Repair method:  Sutures    Suture size:  4-0    Suture material:  Nylon    Suture technique:  Simple interrupted    Number of sutures:  13  Approximation:     Approximation:  Close  Post-procedure details:     Dressing:  Antibiotic ointment    Patient tolerance of procedure: Tolerated well, no immediate complications          FINAL IMPRESSION      1. Laceration of scalp, initial encounter          DISPOSITION/PLAN   Discharge    PATIENT REFERRED TO:  Conchita Aleman, APRN - CNP  [address]   [facility] [address]  [phone]      apply triple antibiotic twice a day.   Sutures out 7 days      DISCHARGE MEDICATIONS:  Discharge Medication List as of 9/11/2020  2:16 PM          (Please note that portions of this note were completed with a voice recognitionprogram.  Efforts were made to edit the dictations but occasionally words are mis-transcribed.)    POP Benjamin [place]  09/11/20 2910

## 2020-09-12 ENCOUNTER — CARE COORDINATION (OUTPATIENT)
Dept: CARE COORDINATION | Age: 35
End: 2020-09-12

## 2020-09-12 NOTE — CARE COORDINATION
Patient contacted regarding recent discharge and COVID-19 risk. Discussed COVID-19 related testing which was not done at this time. Test results were not done. Patient informed of results, if available? N/A      Care Transition Nurse/ Ambulatory Care Manager contacted the patient by telephone to perform post discharge assessment. Verified name and  with patient as identifiers. Patient has following risk factors of: COPD, asthma and diabetes. CTN/ACM reviewed discharge instructions, medical action plan and red flags related to discharge diagnosis. Reviewed and educated them on any new and changed medications related to discharge diagnosis. Advised obtaining a 90-day supply of all daily and as-needed medications. Education provided regarding infection prevention, and signs and symptoms of COVID-19 and when to seek medical attention with patient who verbalized understanding. Discussed exposure protocols and quarantine from [address] you at higher risk for severe illness  and given an opportunity for questions and concerns. The patient agrees to contact the COVID-19 hotline [phone] or PCP office for questions related to their healthcare. CTN/ACM provided contact information for future reference. From CDC: Are you at higher risk for severe illness?  Wash your hands often.  Avoid close contact (6 feet, which is about two arm lengths) with people who are sick.  Put distance between yourself and other people if COVID-19 is spreading in your community.  Clean and disinfect frequently touched surfaces.  Avoid all cruise travel and non-essential air travel.  Call your healthcare professional if you have concerns about COVID-19 and your underlying condition or if you are sick. Patient/family/caregiver given information for [employer] and agrees to enroll - LOOP enrollment completed for Self Monitoring POC  Patient's preferred e-mail: [email or web address]   Patient's preferred phone number: [phone]   Based on Loop alert triggers, patient will be contacted by nurse care manager for worsening symptoms. For more information on steps you can take to protect yourself, see CDC's How to Protect Yourself    Pt will be further monitored by COVID Loop Team based on severity of symptoms and risk factors. Patient called for covid-19 f/u s/p recent ED visit for head laceration. Patient denied any new/change/worsneing of symptoms. Patient reported he is doing \"ok\" today and he denied any questions re: his discharge instructions. Message sent to PCP office to call and schedule f/u appointment for suture removal per patient's request.  Covid-19 education was reviewed and patient verbalized understanding. Patient was reminded to call covid-19 hotline number with any new symptoms or questions/concerns. Patient verbalized understanding and is aware of hotline information. Healthcare Decision Maker information reviewed and verified with patient and ACP note completed. Patient denied any other questions, concerns, or needs.

## 2020-09-22 ENCOUNTER — OFFICE VISIT (OUTPATIENT)
Dept: FAMILY MEDICINE CLINIC | Age: 35
End: 2020-09-22
Payer: COMMERCIAL

## 2020-09-22 VITALS
WEIGHT: 176 LBS | TEMPERATURE: 98.1 F | HEART RATE: 100 BPM | SYSTOLIC BLOOD PRESSURE: 118 MMHG | BODY MASS INDEX: 30.05 KG/M2 | HEIGHT: 64 IN | RESPIRATION RATE: 14 BRPM | DIASTOLIC BLOOD PRESSURE: 70 MMHG

## 2020-09-22 PROBLEM — F31.9 AFFECTIVE PSYCHOSIS, BIPOLAR (HCC): Status: ACTIVE | Noted: 2020-09-22

## 2020-09-22 PROBLEM — F33.1 MAJOR DEPRESSIVE DISORDER, RECURRENT EPISODE, MODERATE WITH ANXIOUS DISTRESS (HCC): Status: RESOLVED | Noted: 2020-03-12 | Resolved: 2020-09-22

## 2020-09-22 PROCEDURE — G8427 DOCREV CUR MEDS BY ELIG CLIN: HCPCS | Performed by: NURSE PRACTITIONER

## 2020-09-22 PROCEDURE — G8417 CALC BMI ABV UP PARAM F/U: HCPCS | Performed by: NURSE PRACTITIONER

## 2020-09-22 PROCEDURE — 99214 OFFICE O/P EST MOD 30 MIN: CPT | Performed by: NURSE PRACTITIONER

## 2020-09-22 PROCEDURE — 4004F PT TOBACCO SCREEN RCVD TLK: CPT | Performed by: NURSE PRACTITIONER

## 2020-09-22 RX ORDER — ESCITALOPRAM OXALATE 20 MG/1
20 TABLET ORAL DAILY
Qty: 90 TABLET | Refills: 0 | Status: ON HOLD | OUTPATIENT
Start: 2020-09-22 | End: 2020-10-23 | Stop reason: HOSPADM

## 2020-09-22 RX ORDER — CHLORHEXIDINE GLUCONATE 0.12 MG/ML
15 RINSE ORAL 2 TIMES DAILY
Qty: 420 ML | Refills: 0 | Status: SHIPPED | OUTPATIENT
Start: 2020-09-22 | End: 2020-10-06

## 2020-09-22 RX ORDER — QUETIAPINE FUMARATE 25 MG/1
TABLET, FILM COATED ORAL
Qty: 60 TABLET | Refills: 3 | Status: ON HOLD | OUTPATIENT
Start: 2020-09-22 | End: 2020-10-23 | Stop reason: HOSPADM

## 2020-09-22 NOTE — PROGRESS NOTES
Chief Complaint   Patient presents with    Suture / Staple Removal       History obtained from chart review and the patient. SUBJECTIVE:  Ivon Stoll is a 37 y.o. male that presents today for follow up for suture removal    Skin Lesion    HPI:    Location - scalp  Length of time it has been present - 9/11. Tripped and hit his head on a odalys pot and cut his head  Recent change in size, color or shape? - No  Pruritic? No  Bleeding or drainage? No  Painful? No    Also when he fell he bit his tongue. It has been sore, the laceration has been white and swollen. He has been using mouthwash previously prescribed and this has been helping,would like refill. MDD  Feeling more depressed for several months. Feeling depressed pretty much daily. Has had a lot of family dynamic drama issues. Poor motivation and energy, doesn't want to get up and do anything. Denies any SI/HI. Appetite is \"off. \" he isn't sleeping well, struggling with falling and staying asleep. Increased irritability. Anxiety has also gotten worse, having tremors and shaking from the anxiety. Anxiety worse when he is out anywhere in public places being around people. He reports that he has had manic episodes in the past, typically will get bursts of energy, will clean his house. Bursts of energy will last a couple hours and then he crashes.     Age/Gender Health Maintenance    Lipid   Lab Results   Component Value Date    CHOL 182 05/04/2019    CHOL 165 12/18/2017     Lab Results   Component Value Date    TRIG 128 05/04/2019    TRIG 135 12/18/2017     Lab Results   Component Value Date    HDL 49 03/12/2020    HDL 84 05/04/2019    HDL 51 12/18/2017     Lab Results   Component Value Date    LDLCALC 75 03/12/2020    LDLCALC 72 05/04/2019    LDLCALC 87 12/18/2017     DM Screen   Lab Results   Component Value Date    LABA1C 6.9 (H) 06/11/2020     Colon Cancer Screening - 50  Lung Cancer Screening (Age 54 to [de-identified] with 30 pack year hx, current smoker or quit within past 15 years) - n/a    Tetanus - needs  Influenza Vaccine - needs  Pneumonia Vaccine - 65  Zostavax - 50  HPV Vaccine - n/a    PSA testing discussion - 54  AAA Screening - n/a    Falls screening - n/a    Current Outpatient Medications   Medication Sig Dispense Refill    chlorhexidine (PERIDEX) 0.12 % solution Take 15 mLs by mouth 2 times daily for 14 days 420 mL 0    escitalopram (LEXAPRO) 20 MG tablet Take 1 tablet by mouth daily 90 tablet 0    QUEtiapine (SEROQUEL) 25 MG tablet Take 1 tablet by mouth nightly for 1 week, then take 1 tablet by mouth twice a day. 60 tablet 3    hydrOXYzine (VISTARIL) 100 MG capsule Take 1 capsule by mouth 4 times daily as needed for Anxiety 60 capsule 3    insulin glargine (LANTUS SOLOSTAR) 100 UNIT/ML injection pen Inject 25 Units into the skin daily 5 pen 3    oxyCODONE-acetaminophen (PERCOCET) 5-325 MG per tablet TAKE 1 TABLET BY MOUTH EVERY 6 HOURS AS NEEDED FOR POSTOP PAIN      NARCAN 4 MG/0.1ML LIQD nasal spray ADMINISTER A SINGLE SPRAY INTRANASALLY INTO ONE NOSTRIL. CALL 911.  MAY REPEAT X 1.      nicotine (NICOTROL) 10 MG inhaler Inhale 1 puff into the lungs as needed for Smoking cessation 1 Inhaler 3    ondansetron (ZOFRAN) 4 MG tablet Take 1 tablet by mouth 3 times daily as needed for Nausea or Vomiting 15 tablet 0    insulin aspart (NOVOLOG FLEXPEN) 100 UNIT/ML injection pen Inject 2-12 units SQ TID with meals as directed per sliding scale, max dose 36 units/day 5 pen 3    albuterol sulfate HFA (PROVENTIL HFA) 108 (90 Base) MCG/ACT inhaler Inhale 2 puffs into the lungs every 6 hours as needed for Wheezing 1 Inhaler 0    Insulin Pen Needle 30G X 8 MM MISC 1 each by Does not apply route 3 times daily 500 each 3    fluticasone-salmeterol (ADVAIR) 250-50 MCG/DOSE AEPB Inhale 1 puff into the lungs every 12 hours 60 each 5    aspirin 81 MG EC tablet Take 1 tablet by mouth daily 30 tablet 3    acetaminophen (TYLENOL) 500 MG tablet Take 1,000 mg by Social History Narrative    No barriers with transportation    Medications are not being covered by insurance since back to work    No longer drinking alcohol    Denies transportation barriers    Denies need for community services       Family History   Problem Relation Age of Onset    Other Mother         gestational diabetes    Other Father 39        suicide    Other Sister         hypoglycemia         I have reviewed the patient's past medical history, past surgical history, allergies, medications, social and family history and I have made updates where appropriate.       Review of Systems  Positive responses are highlighted in bold    Constitutional:  Fever, Chills, Night Sweats, Fatigue, Unexpected changes in weight  Eyes:  Eye discharge, Eye pain, Eye redness, Visual disturbances   HENT:  Ear pain, Tinnitus, Nosebleeds, Trouble swallowing, Hearing loss, Sore throat  Cardiovascular:  Chest Pain, Palpitations, Orthopnea, Paroxysmal Nocturnal Dyspnea  Respiratory:  Cough, Wheezing, Shortness of breath, Chest tightness, Apnea  Gastrointestinal:  Nausea, Vomiting, Diarrhea, Constipation, Heartburn, Blood in stool  Genitourinary:  Difficulty or painful urination, Flank pain, Change in frequency, Urgency  Skin:  Color change, Rash, Itching, Wound  Psychiatric:  Hallucinations, Anxiety, Depression, Suicidal ideation  Hematological:  Enlarged glands, Easy bleeding, Easily bruising  Musculoskeletal:  Joint pain, Back pain, Gait problems, Joint swelling, Myalgias  Neurological:  Dizziness, Headaches, Presyncope, Numbness, Seizures, Tremors  Allergy:  Environmental allergies, Food allergies  Endocrine:  Heat Intolerance, Cold Intolerance, Polydipsia, Polyphagia, Polyuria    Lab Results   Component Value Date     07/25/2020    K 4.2 07/25/2020    CL 99 07/25/2020    CO2 25 07/25/2020    BUN 12 07/25/2020    CREATININE 0.6 07/25/2020    GLUCOSE 142 (H) 07/25/2020    CALCIUM 9.3 07/25/2020    PROT 7.8 06/11/2020 LABALBU 4.8 06/11/2020    BILITOT 0.6 06/11/2020    ALKPHOS 119 06/11/2020    AST 50 (H) 06/11/2020    ALT 63 06/11/2020    LABGLOM >90 07/25/2020     Lab Results   Component Value Date    WBC 11.3 (H) 07/25/2020    HGB 15.2 07/25/2020    HCT 45.5 07/25/2020    MCV 92.3 07/25/2020     07/25/2020     Lab Results   Component Value Date    TSH 1.940 10/17/2017     PHYSICAL EXAM:  Vitals:    09/22/20 0907   BP: 118/70   Pulse: 100   Resp: 14   Temp: 98.1 °F (36.7 °C)   Weight: 176 lb (79.8 kg)   Height: 5' 3.5\" (1.613 m)     Body mass index is 30.69 kg/m². VS Reviewed  General Appearance: A&O x 3, No acute distress,well developed and well- nourished  Head: normocephalic, 2 lacerations posterior scalp with 14 sutures intact  Eyes: pupils equal, round, and reactive to light, extraocular eye movements intact, conjunctivae and eye lids without erythema  Neck: supple and non-tender without mass, no thyromegaly or thyroid nodules, no cervical lymphadenopathy  ENT: right lateral side of tongue with healing superficial ulceration  Pulmonary/Chest: clear to auscultation bilaterally- no wheezes, rales or rhonchi, harsh cough  Cardiovascular: S1 and S2 auscultated w/ RRR. No murmurs, rubs, clicks, or gallops, distal pulses intact. Abdomen: soft, non-tender, non-distended, bowl sounds physiologic,  no rebound or guarding, no masses or hernias noted. Liver and spleen without enlargement. Extremities: no cyanosis, clubbing or edema of the lower extremities  Musculoskeletal: No joint swelling or gross deformity   Neuro:  Alert, 5/5 strength globally and symmetrically  Psych: Affect appropriate. Mood normal. Thought process is normal without evidence of depression or psychosis. Good insight and appropriate interaction. Cognition and memory appear to be intact.   Skin: warm and dry,   Lymph:  No cervical, auricular or supraclavicular lymph nodes palpated    ASSESSMENT & PLAN  José Luis Castellano was seen today for suture / staple removal.    Diagnoses and all orders for this visit:    Laceration of scalp, initial encounter    Visit for suture removal    Bipolar disorder, current episode depressed, severe, without psychotic features ([address]  -     escitalopram (LEXAPRO) 20 MG tablet; Take 1 tablet by mouth daily  -     QUEtiapine (SEROQUEL) 25 MG tablet; Take 1 tablet by mouth nightly for 1 week, then take 1 tablet by mouth twice a day. Tongue sore  -     chlorhexidine (PERIDEX) 0.12 % solution; Take 15 mLs by mouth 2 times daily for 14 days      - sutures removed without complication, lacerations healed with edges well approximated, no s/s infection  - add Peridex  - increase Lexapro and add Seroquel  - suicidal precautions given       DISPOSITION    Return in about 4 weeks (around 10/20/2020) for anxiety and depression, diabetes. Junito Mitchell released without restrictions. PATIENT COUNSELING    Counseling was provided today regarding the following topics: Healthy eating habits, Regular exercise, substance abuse and healthy sleep habits. Junito Mitchell received counseling on the following healthy behaviors: medication adherence and decrease in alcohol consumption    Patient given educational materials on: See Attached    I have instructed Junito Mitchell to complete a self tracking handout on none and instructed them to bring it with them to his next appointment. Barriers to learning and self management: none    Discussed use, benefit, and side effects of prescribed medications. Barriers to medication compliance addressed. All patient questions answered. Pt voiced understanding.        Electronically signed by BARB Yancey CNP on 9/22/2020 at 9:46 AM

## 2020-10-01 ENCOUNTER — VIRTUAL VISIT (OUTPATIENT)
Dept: FAMILY MEDICINE CLINIC | Age: 35
End: 2020-10-01
Payer: COMMERCIAL

## 2020-10-01 ENCOUNTER — HOSPITAL ENCOUNTER (OUTPATIENT)
Age: 35
Setting detail: SPECIMEN
Discharge: HOME | End: 2020-10-01
Payer: COMMERCIAL

## 2020-10-01 PROCEDURE — G8428 CUR MEDS NOT DOCUMENT: HCPCS | Performed by: NURSE PRACTITIONER

## 2020-10-01 PROCEDURE — 99213 OFFICE O/P EST LOW 20 MIN: CPT | Performed by: NURSE PRACTITIONER

## 2020-10-01 PROCEDURE — U0003 INFECTIOUS AGENT DETECTION BY NUCLEIC ACID (DNA OR RNA); SEVERE ACUTE RESPIRATORY SYNDROME CORONAVIRUS 2 (SARS-COV-2) (CORONAVIRUS DISEASE [COVID-19]), AMPLIFIED PROBE TECHNIQUE, MAKING USE OF HIGH THROUGHPUT TECHNOLOGIES AS DESCRIBED BY CMS-2020-01-R: HCPCS

## 2020-10-01 ASSESSMENT — ENCOUNTER SYMPTOMS
VOMITING: 1
SHORTNESS OF BREATH: 0
TROUBLE SWALLOWING: 0
ABDOMINAL PAIN: 0
NAUSEA: 1
WHEEZING: 0
RHINORRHEA: 1
COUGH: 0
EYE REDNESS: 0
EYE PAIN: 0
SORE THROAT: 1
DIARRHEA: 1
COLOR CHANGE: 0

## 2020-10-01 NOTE — PROGRESS NOTES
10/1/2020    TELEHEALTH EVALUATION -- Audio/Visual (During OQZKR-84 public health emergency)    HPI:    Noam Vargas (:  1985) has requested an audio/video evaluation for the following concern(s):    URI Symptoms    HPI:      Symptoms have been present for 1 week(s). Symptoms are better since they initially started. Fever? Yes - 100 for a couple days  Runny nose or congestion? No   Cough? Yes  Sore throat? Yes - this resolved  Headache, fatigue, joint pains, muscle aches? Yes - body aches, fatigue and body aches  Shortness of breath/Wheezing? No  Nausea/Vomiting/Diarrhea? Yes - had some nausea and vomiting, diarrhea as well  Double Sickening? No  Sick contacts? Yes - mom sick and she was negative for COVID    Patient has tried thera-flu tylenol with mild improvement. Review of Systems   Constitutional: Positive for fatigue and fever. Negative for chills and diaphoresis. HENT: Positive for rhinorrhea and sore throat. Negative for congestion and trouble swallowing. Eyes: Negative for pain, redness and visual disturbance. Respiratory: Negative for cough, shortness of breath and wheezing. Cardiovascular: Negative for chest pain, palpitations and leg swelling. Gastrointestinal: Positive for diarrhea, nausea and vomiting. Negative for abdominal pain. Endocrine: Negative for polydipsia, polyphagia and polyuria. Genitourinary: Negative for decreased urine volume, dysuria, frequency and urgency. Musculoskeletal: Positive for arthralgias and myalgias. Skin: Negative for color change and rash. Allergic/Immunologic: Negative for environmental allergies, food allergies and immunocompromised state. Neurological: Positive for headaches. Negative for dizziness, tremors and syncope. Hematological: Negative. Negative for adenopathy. Psychiatric/Behavioral: Negative for behavioral problems, confusion, self-injury and suicidal ideas.    All other systems reviewed and are negative. Prior to Visit Medications    Medication Sig Taking? Authorizing Provider   chlorhexidine (PERIDEX) 0.12 % solution Take 15 mLs by mouth 2 times daily for 14 days  BARB Woods CNP   escitalopram (LEXAPRO) 20 MG tablet Take 1 tablet by mouth daily  BARB Woods CNP   QUEtiapine (SEROQUEL) 25 MG tablet Take 1 tablet by mouth nightly for 1 week, then take 1 tablet by mouth twice a day. BARB Woods CNP   hydrOXYzine (VISTARIL) 100 MG capsule Take 1 capsule by mouth 4 times daily as needed for Anxiety  BARB Woods CNP   insulin glargine (LANTUS SOLOSTAR) 100 UNIT/ML injection pen Inject 25 Units into the skin daily  BARB Woods CNP   oxyCODONE-acetaminophen (PERCOCET) 5-325 MG per tablet TAKE 1 TABLET BY MOUTH EVERY 6 HOURS AS NEEDED FOR POSTOP PAIN  Historical Provider, MD   NARCAN 4 MG/0.1ML LIQD nasal spray ADMINISTER A SINGLE SPRAY INTRANASALLY INTO ONE NOSTRIL. CALL 911. MAY REPEAT X 1.   Historical Provider, MD   nicotine (NICOTROL) 10 MG inhaler Inhale 1 puff into the lungs as needed for Smoking cessation  BARB Woods CNP   ondansetron (ZOFRAN) 4 MG tablet Take 1 tablet by mouth 3 times daily as needed for Nausea or Vomiting  BARB Woods CNP   insulin aspart (NOVOLOG FLEXPEN) 100 UNIT/ML injection pen Inject 2-12 units SQ TID with meals as directed per sliding scale, max dose 36 units/day  BARB Woods CNP   albuterol sulfate HFA (PROVENTIL HFA) 108 (90 Base) MCG/ACT inhaler Inhale 2 puffs into the lungs every 6 hours as needed for Wheezing  BARB Woods CNP   Insulin Pen Needle 30G X 8 MM MISC 1 each by Does not apply route 3 times daily  BARB Woods CNP   fluticasone-salmeterol (ADVAIR) 250-50 MCG/DOSE AEPB Inhale 1 puff into the lungs every 12 hours  BARB Woods CNP   aspirin 81 MG EC tablet Take 1 tablet by mouth daily  Bonita Edgar MD   acetaminophen (TYLENOL) 500 MG tablet Take 1,000 mg by mouth every 6 hours as needed for Pain  Historical Provider, MD   Multiple Vitamins-Minerals (MENS MULTIVITAMIN PLUS) TABS Take 1 tablet by mouth daily  Historical Provider, MD       Social History     Tobacco Use    Smoking status: Current Every Day Smoker     Packs/day: 0.50     Years: 22.00     Pack years: 11.00     Types: Cigarettes    Smokeless tobacco: Never Used   Substance Use Topics    Alcohol use: Not Currently     Comment: 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 - recovering alcoholic    Drug use: Not Currently     Types: Marijuana     Comment: approx 2 or more years        Allergies   Allergen Reactions    Paxil [Paroxetine] Rash   ,   Past Medical History:   Diagnosis Date    Asthma     COPD (chronic obstructive pulmonary disease) ([facility])     Eczema     GERD (gastroesophageal reflux disease)     History of alcohol abuse     History of marijuana use     History of tobacco abuse     quit 11/21/2017    Hx of seasonal allergies     Pancreatitis     Seizures ([facility])     etoh wdl    Type 2 diabetes mellitus without complication ([facility] 31/34/6065   ,   Past Surgical History:   Procedure Laterality Date    ANKLE ARTHROSCOPY Right 8/5/2020    ANKLE ARTHROSCOPY WITH  MEDIAL DEBRIDEMENT RIGHT ANKLE, ANKLE ARTHROTOMY WITH DEBRIDEMENT performed by Brooke Flynn DPM at [address] [place] [address] Right 09/2019     [address] [place] [address]    FRACTURE SURGERY Right 2019    orbital fracture surgery    UPPER GASTROINTESTINAL ENDOSCOPY Left 5/6/2019    EGD BIOPSY performed by Roxanna Mayer MD at [address] [facility]   ,   Family History   Problem Relation Age of Onset    Other Mother         gestational diabetes    Other Father 39        suicide    Other Sister         hypoglycemia   ,   Immunization History   Administered Date(s) Administered    Influenza Virus Vaccine 10/03/2018    Influenza, Intradermal, Quadrivalent, Preservative Free 12/12/2017    Pneumococcal Polysaccharide ([id number]) 12/12/2017    Tdap significant exanthematous lesions or discoloration noted on facial skin         [] Abnormal-            Psychiatric:       [x] Normal Affect [x] No Hallucinations        [] Abnormal-     Other pertinent observable physical exam findings-     ASSESSMENT/PLAN:  1. Suspected COVID-19 virus infection  - proceed with COVID testing  - con't home supportive care measures  - COVID-19 Ambulatory; Future      Return if symptoms worsen or fail to improve. Ching Benítez is a 37 y.o. male being evaluated by a Virtual Visit (video visit) encounter to address concerns as mentioned above. A caregiver was present when appropriate. Due to this being a TeleHealth encounter (During YYBMP-23 public health emergency), evaluation of the following organ systems was limited: Vitals/Constitutional/EENT/Resp/CV/GI//MS/Neuro/Skin/Heme-Lymph-Imm. Pursuant to the emergency declaration under the [address] [place] [address], [address] [place] [address] and the [employer] and [employer] Act, this Virtual Visit was conducted with patient's (and/or legal guardian's) consent, to reduce the patient's risk of exposure to COVID-19 and provide necessary medical care. The patient (and/or legal guardian) has also been advised to contact this office for worsening conditions or problems, and seek emergency medical treatment and/or call 911 if deemed necessary. Services were provided through a video synchronous discussion virtually to substitute for in-person clinic visit. Patient and provider were located at their individual homes. --BARB Morales - CNP on 10/1/2020 at 11:49 AM    An electronic signature was used to authenticate this note.

## 2020-10-02 LAB — SARS-COV-2: NOT DETECTED

## 2020-10-05 ENCOUNTER — TELEPHONE (OUTPATIENT)
Dept: FAMILY MEDICINE CLINIC | Age: 35
End: 2020-10-05

## 2020-10-05 NOTE — TELEPHONE ENCOUNTER
----- Message from BARB Garza CNP sent at 10/5/2020  7:50 AM EDT -----  Let Linette Resendez know his covid test was negative. Is he feeling any better?

## 2020-10-09 ENCOUNTER — HOSPITAL ENCOUNTER (INPATIENT)
Age: 35
LOS: 4 days | Discharge: HOME | End: 2020-10-13
Attending: PHYSICIAN ASSISTANT | Admitting: PHYSICIAN ASSISTANT
Payer: COMMERCIAL

## 2020-10-09 PROBLEM — F10.930 ALCOHOL WITHDRAWAL SYNDROME, UNCOMPLICATED (HCC): Status: ACTIVE | Noted: 2020-10-09

## 2020-10-09 LAB
ACETAMINOPHEN LEVEL: < 5 UG/ML (ref 0–20)
ALBUMIN SERPL-MCNC: 4.4 G/DL (ref 3.5–5.1)
ALP BLD-CCNC: 99 U/L (ref 38–126)
ALT SERPL-CCNC: 50 U/L (ref 11–66)
AMPHETAMINE+METHAMPHETAMINE URINE SCREEN: NEGATIVE
ANION GAP SERPL CALCULATED.3IONS-SCNC: 22 MEQ/L (ref 8–16)
APTT: 30.7 SECONDS (ref 22–38)
AST SERPL-CCNC: 41 U/L (ref 5–40)
BARBITURATE QUANTITATIVE URINE: NEGATIVE
BASOPHILS # BLD: 1.6 %
BASOPHILS ABSOLUTE: 0.1 THOU/MM3 (ref 0–0.1)
BENZODIAZEPINE QUANTITATIVE URINE: NEGATIVE
BILIRUB SERPL-MCNC: 0.2 MG/DL (ref 0.3–1.2)
BUN BLDV-MCNC: 6 MG/DL (ref 7–22)
CALCIUM SERPL-MCNC: 9.1 MG/DL (ref 8.5–10.5)
CANNABINOID QUANTITATIVE URINE: NEGATIVE
CHLORIDE BLD-SCNC: 98 MEQ/L (ref 98–111)
CO2: 21 MEQ/L (ref 23–33)
COCAINE METABOLITE QUANTITATIVE URINE: NEGATIVE
CREAT SERPL-MCNC: 0.6 MG/DL (ref 0.4–1.2)
EKG ATRIAL RATE: 120 BPM
EKG P AXIS: 48 DEGREES
EKG P-R INTERVAL: 116 MS
EKG Q-T INTERVAL: 312 MS
EKG QRS DURATION: 72 MS
EKG QTC CALCULATION (BAZETT): 440 MS
EKG R AXIS: 28 DEGREES
EKG T AXIS: 43 DEGREES
EKG VENTRICULAR RATE: 120 BPM
EOSINOPHIL # BLD: 1.6 %
EOSINOPHILS ABSOLUTE: 0.1 THOU/MM3 (ref 0–0.4)
ERYTHROCYTE [DISTWIDTH] IN BLOOD BY AUTOMATED COUNT: 13.7 % (ref 11.5–14.5)
ERYTHROCYTE [DISTWIDTH] IN BLOOD BY AUTOMATED COUNT: 46.5 FL (ref 35–45)
ETHYL ALCOHOL, SERUM: 0.29 %
ETHYL ALCOHOL, SERUM: < 0.01 %
GFR SERPL CREATININE-BSD FRML MDRD: > 90 ML/MIN/1.73M2
GLUCOSE BLD-MCNC: 190 MG/DL (ref 70–108)
GLUCOSE BLD-MCNC: 208 MG/DL (ref 70–108)
GLUCOSE BLD-MCNC: 214 MG/DL (ref 70–108)
HCT VFR BLD CALC: 47.1 % (ref 42–52)
HEMOGLOBIN: 16.3 GM/DL (ref 14–18)
IMMATURE GRANS (ABS): 0.1 THOU/MM3 (ref 0–0.07)
IMMATURE GRANULOCYTES: 1.7 %
LYMPHOCYTES # BLD: 42.6 %
LYMPHOCYTES ABSOLUTE: 2.4 THOU/MM3 (ref 1–4.8)
MCH RBC QN AUTO: 32.3 PG (ref 26–33)
MCHC RBC AUTO-ENTMCNC: 34.6 GM/DL (ref 32.2–35.5)
MCV RBC AUTO: 93.3 FL (ref 80–94)
MONOCYTES # BLD: 16.4 %
MONOCYTES ABSOLUTE: 0.9 THOU/MM3 (ref 0.4–1.3)
NUCLEATED RED BLOOD CELLS: 0 /100 WBC
OPIATES, URINE: NEGATIVE
OSMOLALITY CALCULATION: 285.3 MOSMOL/KG (ref 275–300)
OXYCODONE: NEGATIVE
PHENCYCLIDINE QUANTITATIVE URINE: NEGATIVE
PLATELET # BLD: 388 THOU/MM3 (ref 130–400)
PMV BLD AUTO: 9.1 FL (ref 9.4–12.4)
POTASSIUM REFLEX MAGNESIUM: 4.1 MEQ/L (ref 3.5–5.2)
RBC # BLD: 5.05 MILL/MM3 (ref 4.7–6.1)
SALICYLATE, SERUM: < 0.3 MG/DL (ref 2–10)
SEG NEUTROPHILS: 36.1 %
SEGMENTED NEUTROPHILS ABSOLUTE COUNT: 2.1 THOU/MM3 (ref 1.8–7.7)
SODIUM BLD-SCNC: 141 MEQ/L (ref 135–145)
TOTAL PROTEIN: 7.5 G/DL (ref 6.1–8)
WBC # BLD: 5.7 THOU/MM3 (ref 4.8–10.8)

## 2020-10-09 PROCEDURE — 6360000002 HC RX W HCPCS: Performed by: NURSE PRACTITIONER

## 2020-10-09 PROCEDURE — 99223 1ST HOSP IP/OBS HIGH 75: CPT | Performed by: PHYSICIAN ASSISTANT

## 2020-10-09 PROCEDURE — 96375 TX/PRO/DX INJ NEW DRUG ADDON: CPT

## 2020-10-09 PROCEDURE — 2500000003 HC RX 250 WO HCPCS: Performed by: PHYSICIAN ASSISTANT

## 2020-10-09 PROCEDURE — 94760 N-INVAS EAR/PLS OXIMETRY 1: CPT

## 2020-10-09 PROCEDURE — 6370000000 HC RX 637 (ALT 250 FOR IP): Performed by: PHYSICIAN ASSISTANT

## 2020-10-09 PROCEDURE — 93005 ELECTROCARDIOGRAM TRACING: CPT | Performed by: NURSE PRACTITIONER

## 2020-10-09 PROCEDURE — 99285 EMERGENCY DEPT VISIT HI MDM: CPT

## 2020-10-09 PROCEDURE — 85730 THROMBOPLASTIN TIME PARTIAL: CPT

## 2020-10-09 PROCEDURE — 6360000002 HC RX W HCPCS: Performed by: PHYSICIAN ASSISTANT

## 2020-10-09 PROCEDURE — 93010 ELECTROCARDIOGRAM REPORT: CPT | Performed by: INTERNAL MEDICINE

## 2020-10-09 PROCEDURE — 36415 COLL VENOUS BLD VENIPUNCTURE: CPT

## 2020-10-09 PROCEDURE — 96374 THER/PROPH/DIAG INJ IV PUSH: CPT

## 2020-10-09 PROCEDURE — 80053 COMPREHEN METABOLIC PANEL: CPT

## 2020-10-09 PROCEDURE — 80307 DRUG TEST PRSMV CHEM ANLYZR: CPT

## 2020-10-09 PROCEDURE — 2580000003 HC RX 258: Performed by: NURSE PRACTITIONER

## 2020-10-09 PROCEDURE — 85025 COMPLETE CBC W/AUTO DIFF WBC: CPT

## 2020-10-09 PROCEDURE — 6360000002 HC RX W HCPCS

## 2020-10-09 PROCEDURE — G0480 DRUG TEST DEF 1-7 CLASSES: HCPCS

## 2020-10-09 PROCEDURE — 2580000003 HC RX 258: Performed by: PHYSICIAN ASSISTANT

## 2020-10-09 PROCEDURE — 82948 REAGENT STRIP/BLOOD GLUCOSE: CPT

## 2020-10-09 PROCEDURE — 1200000003 HC TELEMETRY R&B

## 2020-10-09 PROCEDURE — 94640 AIRWAY INHALATION TREATMENT: CPT

## 2020-10-09 RX ORDER — QUETIAPINE FUMARATE 25 MG/1
25 TABLET, FILM COATED ORAL NIGHTLY
Status: DISCONTINUED | OUTPATIENT
Start: 2020-10-09 | End: 2020-10-13 | Stop reason: HOSPADM

## 2020-10-09 RX ORDER — THIAMINE MONONITRATE (VIT B1) 100 MG
100 TABLET ORAL DAILY
Status: DISCONTINUED | OUTPATIENT
Start: 2020-10-10 | End: 2020-10-13 | Stop reason: HOSPADM

## 2020-10-09 RX ORDER — PHENOBARBITAL 32.4 MG/1
32.4 TABLET ORAL 2 TIMES DAILY
Status: DISCONTINUED | OUTPATIENT
Start: 2020-10-11 | End: 2020-10-11

## 2020-10-09 RX ORDER — POTASSIUM CHLORIDE 20 MEQ/1
40 TABLET, EXTENDED RELEASE ORAL PRN
Status: DISCONTINUED | OUTPATIENT
Start: 2020-10-09 | End: 2020-10-13 | Stop reason: HOSPADM

## 2020-10-09 RX ORDER — ESCITALOPRAM OXALATE 20 MG/1
20 TABLET ORAL DAILY
Status: DISCONTINUED | OUTPATIENT
Start: 2020-10-09 | End: 2020-10-13 | Stop reason: HOSPADM

## 2020-10-09 RX ORDER — SODIUM CHLORIDE 0.9 % (FLUSH) 0.9 %
10 SYRINGE (ML) INJECTION PRN
Status: DISCONTINUED | OUTPATIENT
Start: 2020-10-09 | End: 2020-10-13 | Stop reason: HOSPADM

## 2020-10-09 RX ORDER — ACETAMINOPHEN 650 MG/1
650 SUPPOSITORY RECTAL EVERY 6 HOURS PRN
Status: DISCONTINUED | OUTPATIENT
Start: 2020-10-09 | End: 2020-10-13 | Stop reason: HOSPADM

## 2020-10-09 RX ORDER — INSULIN GLARGINE 100 [IU]/ML
25 INJECTION, SOLUTION SUBCUTANEOUS NIGHTLY
Status: DISCONTINUED | OUTPATIENT
Start: 2020-10-09 | End: 2020-10-11

## 2020-10-09 RX ORDER — FOLIC ACID 1 MG/1
1 TABLET ORAL DAILY
Status: DISCONTINUED | OUTPATIENT
Start: 2020-10-10 | End: 2020-10-13 | Stop reason: HOSPADM

## 2020-10-09 RX ORDER — NICOTINE POLACRILEX 4 MG
15 LOZENGE BUCCAL PRN
Status: DISCONTINUED | OUTPATIENT
Start: 2020-10-09 | End: 2020-10-13 | Stop reason: HOSPADM

## 2020-10-09 RX ORDER — 0.9 % SODIUM CHLORIDE 0.9 %
1000 INTRAVENOUS SOLUTION INTRAVENOUS ONCE
Status: COMPLETED | OUTPATIENT
Start: 2020-10-09 | End: 2020-10-09

## 2020-10-09 RX ORDER — ONDANSETRON 2 MG/ML
INJECTION INTRAMUSCULAR; INTRAVENOUS
Status: COMPLETED
Start: 2020-10-09 | End: 2020-10-09

## 2020-10-09 RX ORDER — POLYETHYLENE GLYCOL 3350 17 G/17G
17 POWDER, FOR SOLUTION ORAL DAILY PRN
Status: DISCONTINUED | OUTPATIENT
Start: 2020-10-09 | End: 2020-10-13 | Stop reason: HOSPADM

## 2020-10-09 RX ORDER — DEXTROSE MONOHYDRATE 50 MG/ML
100 INJECTION, SOLUTION INTRAVENOUS PRN
Status: DISCONTINUED | OUTPATIENT
Start: 2020-10-09 | End: 2020-10-13 | Stop reason: HOSPADM

## 2020-10-09 RX ORDER — PHENOBARBITAL 32.4 MG/1
64.8 TABLET ORAL 2 TIMES DAILY
Status: COMPLETED | OUTPATIENT
Start: 2020-10-10 | End: 2020-10-10

## 2020-10-09 RX ORDER — ASPIRIN 81 MG/1
81 TABLET ORAL DAILY
Status: DISCONTINUED | OUTPATIENT
Start: 2020-10-09 | End: 2020-10-13 | Stop reason: HOSPADM

## 2020-10-09 RX ORDER — THIAMINE HYDROCHLORIDE 100 MG/ML
100 INJECTION, SOLUTION INTRAMUSCULAR; INTRAVENOUS ONCE
Status: COMPLETED | OUTPATIENT
Start: 2020-10-09 | End: 2020-10-09

## 2020-10-09 RX ORDER — BUDESONIDE AND FORMOTEROL FUMARATE DIHYDRATE 160; 4.5 UG/1; UG/1
2 AEROSOL RESPIRATORY (INHALATION) 2 TIMES DAILY
Status: DISCONTINUED | OUTPATIENT
Start: 2020-10-09 | End: 2020-10-13 | Stop reason: HOSPADM

## 2020-10-09 RX ORDER — ALBUTEROL SULFATE 2.5 MG/3ML
2.5 SOLUTION RESPIRATORY (INHALATION) EVERY 6 HOURS PRN
Status: DISCONTINUED | OUTPATIENT
Start: 2020-10-09 | End: 2020-10-13 | Stop reason: HOSPADM

## 2020-10-09 RX ORDER — M-VIT,TX,IRON,MINS/CALC/FOLIC 27MG-0.4MG
1 TABLET ORAL DAILY
Status: DISCONTINUED | OUTPATIENT
Start: 2020-10-10 | End: 2020-10-13 | Stop reason: HOSPADM

## 2020-10-09 RX ORDER — SODIUM CHLORIDE 0.9 % (FLUSH) 0.9 %
10 SYRINGE (ML) INJECTION EVERY 12 HOURS SCHEDULED
Status: DISCONTINUED | OUTPATIENT
Start: 2020-10-09 | End: 2020-10-13 | Stop reason: HOSPADM

## 2020-10-09 RX ORDER — ACETAMINOPHEN 325 MG/1
650 TABLET ORAL EVERY 6 HOURS PRN
Status: DISCONTINUED | OUTPATIENT
Start: 2020-10-09 | End: 2020-10-13 | Stop reason: HOSPADM

## 2020-10-09 RX ORDER — PROMETHAZINE HYDROCHLORIDE 25 MG/1
12.5 TABLET ORAL EVERY 6 HOURS PRN
Status: DISCONTINUED | OUTPATIENT
Start: 2020-10-09 | End: 2020-10-13 | Stop reason: HOSPADM

## 2020-10-09 RX ORDER — LORAZEPAM 2 MG/ML
1 INJECTION INTRAMUSCULAR ONCE
Status: COMPLETED | OUTPATIENT
Start: 2020-10-09 | End: 2020-10-09

## 2020-10-09 RX ORDER — POTASSIUM CHLORIDE 7.45 MG/ML
10 INJECTION INTRAVENOUS PRN
Status: DISCONTINUED | OUTPATIENT
Start: 2020-10-09 | End: 2020-10-13 | Stop reason: HOSPADM

## 2020-10-09 RX ORDER — HYDROXYZINE PAMOATE 25 MG/1
100 CAPSULE ORAL 4 TIMES DAILY PRN
Status: DISCONTINUED | OUTPATIENT
Start: 2020-10-09 | End: 2020-10-11

## 2020-10-09 RX ORDER — DEXTROSE MONOHYDRATE 25 G/50ML
12.5 INJECTION, SOLUTION INTRAVENOUS PRN
Status: DISCONTINUED | OUTPATIENT
Start: 2020-10-09 | End: 2020-10-13 | Stop reason: HOSPADM

## 2020-10-09 RX ORDER — ONDANSETRON 2 MG/ML
4 INJECTION INTRAMUSCULAR; INTRAVENOUS EVERY 6 HOURS PRN
Status: DISCONTINUED | OUTPATIENT
Start: 2020-10-09 | End: 2020-10-13 | Stop reason: HOSPADM

## 2020-10-09 RX ADMIN — INSULIN LISPRO 2 UNITS: 100 INJECTION, SOLUTION INTRAVENOUS; SUBCUTANEOUS at 18:58

## 2020-10-09 RX ADMIN — ASPIRIN 81 MG: 81 TABLET ORAL at 18:48

## 2020-10-09 RX ADMIN — INSULIN GLARGINE 25 UNITS: 100 INJECTION, SOLUTION SUBCUTANEOUS at 22:58

## 2020-10-09 RX ADMIN — ESCITALOPRAM 20 MG: 20 TABLET, FILM COATED ORAL at 22:57

## 2020-10-09 RX ADMIN — QUETIAPINE FUMARATE 25 MG: 25 TABLET ORAL at 22:57

## 2020-10-09 RX ADMIN — ONDANSETRON 4 MG: 2 INJECTION INTRAMUSCULAR; INTRAVENOUS at 15:36

## 2020-10-09 RX ADMIN — BUDESONIDE AND FORMOTEROL FUMARATE DIHYDRATE 2 PUFF: 160; 4.5 AEROSOL RESPIRATORY (INHALATION) at 17:39

## 2020-10-09 RX ADMIN — SODIUM CHLORIDE 1000 ML: 9 INJECTION, SOLUTION INTRAVENOUS at 15:35

## 2020-10-09 RX ADMIN — ONDANSETRON 4 MG: 2 INJECTION INTRAMUSCULAR; INTRAVENOUS at 22:58

## 2020-10-09 RX ADMIN — FOLIC ACID: 5 INJECTION, SOLUTION INTRAMUSCULAR; INTRAVENOUS; SUBCUTANEOUS at 20:31

## 2020-10-09 RX ADMIN — PHENOBARBITAL SODIUM 218.4 MG: 65 INJECTION INTRAMUSCULAR; INTRAVENOUS at 18:56

## 2020-10-09 RX ADMIN — SODIUM CHLORIDE, PRESERVATIVE FREE 10 ML: 5 INJECTION INTRAVENOUS at 20:31

## 2020-10-09 RX ADMIN — HYDROXYZINE PAMOATE 100 MG: 25 CAPSULE ORAL at 18:52

## 2020-10-09 RX ADMIN — LORAZEPAM 1 MG: 2 INJECTION INTRAMUSCULAR; INTRAVENOUS at 15:35

## 2020-10-09 RX ADMIN — THIAMINE HYDROCHLORIDE 100 MG: 100 INJECTION, SOLUTION INTRAMUSCULAR; INTRAVENOUS at 15:36

## 2020-10-09 RX ADMIN — ALBUTEROL SULFATE 2.5 MG: 2.5 SOLUTION RESPIRATORY (INHALATION) at 17:38

## 2020-10-09 RX ADMIN — ENOXAPARIN SODIUM 40 MG: 40 INJECTION SUBCUTANEOUS at 18:48

## 2020-10-09 ASSESSMENT — SLEEP AND FATIGUE QUESTIONNAIRES
DO YOU USE A SLEEP AID: NO
DO YOU HAVE DIFFICULTY SLEEPING: NO
AVERAGE NUMBER OF SLEEP HOURS: 8

## 2020-10-09 ASSESSMENT — PAIN DESCRIPTION - ORIENTATION
ORIENTATION: MID
ORIENTATION: MID

## 2020-10-09 ASSESSMENT — PATIENT HEALTH QUESTIONNAIRE - PHQ9: SUM OF ALL RESPONSES TO PHQ QUESTIONS 1-9: 18

## 2020-10-09 ASSESSMENT — LIFESTYLE VARIABLES: HISTORY_ALCOHOL_USE: YES

## 2020-10-09 ASSESSMENT — PAIN DESCRIPTION - PAIN TYPE
TYPE: ACUTE PAIN
TYPE: ACUTE PAIN

## 2020-10-09 ASSESSMENT — PAIN - FUNCTIONAL ASSESSMENT: PAIN_FUNCTIONAL_ASSESSMENT: ACTIVITIES ARE NOT PREVENTED

## 2020-10-09 ASSESSMENT — PAIN DESCRIPTION - FREQUENCY: FREQUENCY: CONTINUOUS

## 2020-10-09 ASSESSMENT — PAIN SCALES - GENERAL
PAINLEVEL_OUTOF10: 5
PAINLEVEL_OUTOF10: 5

## 2020-10-09 ASSESSMENT — PAIN DESCRIPTION - PROGRESSION: CLINICAL_PROGRESSION: NOT CHANGED

## 2020-10-09 ASSESSMENT — PAIN DESCRIPTION - LOCATION
LOCATION: HEAD
LOCATION: HEAD

## 2020-10-09 ASSESSMENT — PAIN DESCRIPTION - ONSET: ONSET: ON-GOING

## 2020-10-09 ASSESSMENT — PAIN DESCRIPTION - DESCRIPTORS: DESCRIPTORS: ACHING

## 2020-10-09 NOTE — ED NOTES
ED to inpatient nurses report    Chief Complaint   Patient presents with    Suicidal      Present to ED from home  LOC: alert and orientated to name, place, date  Vital signs   Vitals:    10/09/20 1036 10/09/20 1437 10/09/20 1607   BP: (!) 140/76 118/74 109/85   Pulse: 125 122 111   Resp: 22 20 16   Temp: 98.5 °F (36.9 °C)     SpO2: 99% 99% 99%   Weight: 160 lb (72.6 kg)     Height: 5' 2\" (1.575 m)        Oxygen Baseline RA    Current needs required RA Bipap/Cpap No  LDAs:   Peripheral IV 10/09/20 Left Antecubital (Active)     Mobility: Requires assistance * 1  Pending ED orders: NA  Present condition: STABLE    Electronically signed by Bryce Winter, RN on 10/9/2020 at 4:08 PM       Frandy Olivo RN  10/09/20 [id number]

## 2020-10-09 NOTE — CONSULTS
Patient interviewed  Meds and chart reviewed  Orders written  No  Full consult will be dictated  Thanks for the consult.

## 2020-10-09 NOTE — PROGRESS NOTES
Reinforced suicide precautions with patient. Reinforced that visitors will be screened and may be limited at the discretion of the nurse. Visitor belongings are subject to be searched. Belongings may not be allowed into the patient room. Reviewed any personal belongings from the previous shift believed to be a threat to patient safety removed from room. Belongings removed include:  Placed in secure area locked in cabinent . Room screened for safety, items removed to create a safe environment include:   Blood pressure cuff   Loose or extra cords, tubing (not of medical necessity)   Extra Linens   Telephone   Toiletries   Trash Liners   Patient's cell phone and charging cord (must be removed from room)      Safety tray ordered: yes    Expectations were discussed with sitter (unit support aide). Sitter positioned near exit. Sitter reminded that patient should be observed at all times including toileting and bathing. Sitter has security radio and documentation sheet. Patient and sitter included in hourly rounds.

## 2020-10-09 NOTE — ED NOTES
Patient to ED for suicidal ideation. Patient states for the past two days he has been very depressed. Patient states he wants to die however has no plan. Patient reports he has custody of his 4 children and their aunt is trying to take them away from him. Patient appears very sad, patient is calm and cooperative. Patient states he want to come to ER before he made a plan of intent on carrying out that plan. Patient denies HI and denies hallucinations.      Adolph Dear, RN  10/09/20 [id number]

## 2020-10-09 NOTE — H&P
Hospitalist History & Physical    Patient:  Marcial Bazan    Unit/Bed:18/018A  YOB: 1985  MRN: [id number]   Acct: [de-identified]   PCP: BARB Bennett CNP  Code Status: Prior    Date of Service: Pt seen/examined on 10/09/20 and admitted to Inpatient with expected LOS greater than two midnights due to medical therapy. Chief Complaint: suicidal ideation    Assessment/Plan:    1. Acute alcohol withdrawal: Phenobarb prophylaxis per protocol, banana bag, seizure precautions, addiction services consult. 2. Suicidal ideations: sitter/suicide precautions. Consult Psych. 3. COPD: without acute exacerbation. Continue home inhalers. 4. IDDMII: Resume home Lantus, 25U nightly plus SSI. Accu-checks. Hypoglycemia protocol. 5. Depression: resume home escitalopram, seroquel. Psych consulted. History of Present Illness:  33-year-old male with PMHx COPD, alcohol abuse with withdrawal seizures, DMII, MDD who presented to [facility] with suicidal thoughts. The patient states that he was recently ill and has not been taking his home medications for approximately 2 weeks. He reports a history of alcoholism and states that he has been sober for approximately 2 years with a recent relapse and had drank again last night prior to coming in. Patient states this morning he felt depressed and like he wanted to hurt himself and did not want to be alive. He states this scared him so he came to the ER. He has no plan or intent on suicide. Patient appears anxious, has tremors. He reports some shortness of breath. Denies fever/chills, diaphoresis, chest pain, abdominal pain, N/V/D, urinary symptoms. Patient is admitted to hospitalist service for acute alcohol withdrawal and suicidal ideations with psych consult. Review of Systems: Pertinent positives as noted in the HPI. All other systems reviewed and negative.     Past Medical History:        Diagnosis Date    Asthma     COPD (chronic obstructive pulmonary disease) (HCC)     Eczema     GERD (gastroesophageal reflux disease)     History of alcohol abuse     History of marijuana use     History of tobacco abuse     quit 11/21/2017    Hx of seasonal allergies     Pancreatitis     Seizures (HCC)     etoh wdl    Type [facility] complication [facility] 22/94/8746       Past Surgical History:        Procedure Laterality Date    ANKLE ARTHROSCOPY Right 8/5/2020    ANKLE ARTHROSCOPY WITH  MEDIAL DEBRIDEMENT RIGHT ANKLE, ANKLE ARTHROTOMY WITH DEBRIDEMENT performed by Dimas Andino DPM at [address] Right 09/2019    [facility]    FRACTURE SURGERY Right 2019    orbital fracture surgery    UPPER GASTROINTESTINAL ENDOSCOPY Left 5/6/2019    EGD BIOPSY performed by Janelle Cedillo MD at [facility]       Home Medications:   No current facility-administered medications on file prior to encounter. Current Outpatient Medications on File Prior to Encounter   Medication Sig Dispense Refill    escitalopram (LEXAPRO) 20 MG tablet Take 1 tablet by mouth daily 90 tablet 0    QUEtiapine (SEROQUEL) 25 MG tablet Take 1 tablet by mouth nightly for 1 week, then take 1 tablet by mouth twice a day. 60 tablet 3    hydrOXYzine (VISTARIL) 100 MG capsule Take 1 capsule by mouth 4 times daily as needed for Anxiety 60 capsule 3    insulin glargine (LANTUS SOLOSTAR) 100 UNIT/ML injection pen Inject 25 Units into the skin daily 5 pen 3    oxyCODONE-acetaminophen (PERCOCET) 5-325 MG per tablet TAKE 1 TABLET BY MOUTH EVERY 6 HOURS AS NEEDED FOR POSTOP PAIN      NARCAN 4 MG/0.1ML LIQD nasal spray ADMINISTER A SINGLE SPRAY INTRANASALLY INTO ONE NOSTRIL. CALL 911.  MAY REPEAT X 1.      nicotine (NICOTROL) 10 MG inhaler Inhale 1 puff into the lungs as needed for Smoking cessation 1 Inhaler 3    ondansetron (ZOFRAN) 4 MG tablet Take 1 tablet by mouth 3 times daily as needed for Nausea or Vomiting 15 tablet 0 supraclavicular adenopathy. Neurologic:  No focal deficit. No seizures. Data: (All radiographs, tracings, PFTs, and imaging are personally viewed and interpreted unless otherwise noted)  Labs:   Recent Labs     10/09/20  1110   WBC 5.7   HGB 16.3   HCT 47.1        Recent Labs     10/09/20  1110      K 4.1   CL 98   CO2 21*   BUN 6*   CREATININE 0.6   CALCIUM 9.1     Recent Labs     10/09/20  1110   AST 41*   ALT 50   BILITOT 0.2*   ALKPHOS 99     No results for input(s): INR in the last 72 hours. No results for input(s): Towana Ball in the last 72 hours. Urinalysis:   Lab Results   Component Value Date    NITRU NEGATIVE 03/02/2020    WBCUA 0-2 03/02/2020    BACTERIA NONE SEEN 03/02/2020    RBCUA 0-2 03/02/2020    BLOODU MODERATE 03/02/2020    SPECGRAV 1.011 10/12/2013    GLUCOSEU >= 1000 03/02/2020       EKG: sinus tachycardia    Radiology:  No orders to display     No results found. Tele:   [] yes             [] no      Thank you BARB Day CNP for the opportunity to be involved in this patient's care.     Electronically signed by Salome Dunbar PA-C on 10/9/2020 at 4:03 PM

## 2020-10-09 NOTE — ED NOTES
pt transported to [facility] [phone] accompanied by campus police     Terrell Moss, EMT-P  10/09/20 Dara 71, EMT-P  10/09/20 [id number]

## 2020-10-09 NOTE — PROGRESS NOTES
Pt admitted to  [phone] from ED. Complaints: None. IV normal saline infusing into the antecubital left, condition patent and no redness at a rate of 100 mls/ hour with about 200 mls in the bag still. IV site free of s/s of infection or infiltration. Vital signs obtained. Assessment and data collection initiated. Two nurse skin assessment performed by Dawn Herron RN and [facility] RN. Oriented to room. Policies and procedures for 6K explained. Dawn Herron RN discussed hourly rounding with patient addressing 5 P's. Fall prevention and safety brochure discussed with patient. Bed alarm on. Call light in reach. The best day to schedule a follow up Dr appointment is:  Monday and Tuesday a.m. Explained patients right to have family, representative or physician notified of their admission. Patient has Declined for physician to be notified. Patient has Declined for family/representative to be notified. All questions answered with no further questions at this time.        Which family member is the designated  Jake Pak  Number [phone]    Do you want them contacted on admission and updated every shift yes

## 2020-10-09 NOTE — PROGRESS NOTES
Provisional Diagnosis:    Unspecified Depressive Disorder      Risk, Psychosocial and Contextual Factors:   Relapsed on alcohol last night, lost custody of children to maternal aunt, sick with the flu for two weeks (covid test on 10/120, not detected)    Current  Treatment:  [facility] for counseling, PCP for psychotropic medication      Present Suicidal Behavior:      Verbal:  Denies          Attempt: Denies     Access to Weapons:  Denies      Current Suicide Risk: Low, Moderate or High:   Low      Past Suicidal Behavior:       Verbal: X    Attempt:  Denies     Self-Injurious/Self-Mutilation:  Denies     Traumatic Event Within Past 2 Weeks:   Lost custody of four children to maternal aunt    Current Abuse:  Denies     Legal:  DUI in 2017    Violence:  Denies     Protective Factors:  Linked to counseling services, PCP prescribes psychotropic medication    Housing:    Pt lives alone    CPAP/Oxygen/Ambulation Difficulties:  Denies     Basic Vital Signs Normal?: Check with Patients Nurse prior to Calling Psychiatry    Critical Labs?: Check with Patients Nurse prior to Calling Psychiatry    Clinical Summary:      Pt is a 26year old with a reported history of depression and bipolar disorder presenting to the ED voluntarily due to suicidal thoughts. Pts suicidal thoughts started two days ago, are intermittent, no plan, no current suicidal thoughts \"I did earlier that's why I rushed here\". Homicidal thoughts denied, hallucinations denied, no delusions noted. Pt has been quarantined during the last two weeks due to the flu \"they thought it was covid\", today is pts last day of quarantine. Pt has four children ages 10, 6, 15 and 12 who has been residing with their maternal aunt during pts quarantine. The aunt however filed an ex-parte and obtained custody of the children and will only allow pt to visit the children is pts mother is with him, reason unknown by pt.  Pt has a history of alcohol abuse, was sober for three months until he drank a pint of vodka last night, denies drug use. Pt is currently in counseling at [facility] and prescribed lexapro and a mood stabilizer by his PCP. Pt did no take his medication during the first week of quarantine as he was too ill, however has been compliant during the last week. Pt report normal sleep during the last week, continued loss of appetite. Pt is oriented x4, good insight, impaired judgment, good eye contact, cooperative. Pt is requesting inpatient psychiatric treatment. Level of Care Disposition:      BAL . 29 at 1110. Pt medically admitted to 6K-18.

## 2020-10-09 NOTE — ED NOTES
Patient placed in safe room that is ligature resistant with continuous monitoring in place. Provider notified, requested an assessment by behavioral health . Patient belongings secured in a locked lockers outside of the room. Explained suicide prevention precautions to the patient including constant observer.       Vanessa Doshi RN  10/09/20 [id number]

## 2020-10-09 NOTE — ED NOTES
Patient given oral hydration and nutrition. Denies other needs. Call light within reach.         Corey Wilson RN  10/09/20 [id number]

## 2020-10-10 LAB
ALBUMIN SERPL-MCNC: 3.7 G/DL (ref 3.5–5.1)
ALP BLD-CCNC: 76 U/L (ref 38–126)
ALT SERPL-CCNC: 40 U/L (ref 11–66)
ANION GAP SERPL CALCULATED.3IONS-SCNC: 13 MEQ/L (ref 8–16)
AST SERPL-CCNC: 40 U/L (ref 5–40)
BILIRUB SERPL-MCNC: 0.5 MG/DL (ref 0.3–1.2)
BUN BLDV-MCNC: 5 MG/DL (ref 7–22)
CALCIUM SERPL-MCNC: 8.4 MG/DL (ref 8.5–10.5)
CHLORIDE BLD-SCNC: 101 MEQ/L (ref 98–111)
CO2: 23 MEQ/L (ref 23–33)
CREAT SERPL-MCNC: 0.5 MG/DL (ref 0.4–1.2)
ERYTHROCYTE [DISTWIDTH] IN BLOOD BY AUTOMATED COUNT: 13.4 % (ref 11.5–14.5)
ERYTHROCYTE [DISTWIDTH] IN BLOOD BY AUTOMATED COUNT: 46.3 FL (ref 35–45)
GFR SERPL CREATININE-BSD FRML MDRD: > 90 ML/MIN/1.73M2
GLUCOSE BLD-MCNC: 105 MG/DL (ref 70–108)
GLUCOSE BLD-MCNC: 109 MG/DL (ref 70–108)
GLUCOSE BLD-MCNC: 112 MG/DL (ref 70–108)
GLUCOSE BLD-MCNC: 128 MG/DL (ref 70–108)
GLUCOSE BLD-MCNC: 133 MG/DL (ref 70–108)
HCT VFR BLD CALC: 40.4 % (ref 42–52)
HEMOGLOBIN: 13.8 GM/DL (ref 14–18)
LACTIC ACID: 2.1 MMOL/L (ref 0.5–2.2)
MCH RBC QN AUTO: 31.9 PG (ref 26–33)
MCHC RBC AUTO-ENTMCNC: 34.2 GM/DL (ref 32.2–35.5)
MCV RBC AUTO: 93.5 FL (ref 80–94)
PLATELET # BLD: 314 THOU/MM3 (ref 130–400)
PMV BLD AUTO: 9.4 FL (ref 9.4–12.4)
POTASSIUM REFLEX MAGNESIUM: 3.9 MEQ/L (ref 3.5–5.2)
RBC # BLD: 4.32 MILL/MM3 (ref 4.7–6.1)
SODIUM BLD-SCNC: 137 MEQ/L (ref 135–145)
TOTAL PROTEIN: 6 G/DL (ref 6.1–8)
WBC # BLD: 6.1 THOU/MM3 (ref 4.8–10.8)

## 2020-10-10 PROCEDURE — 36415 COLL VENOUS BLD VENIPUNCTURE: CPT

## 2020-10-10 PROCEDURE — 80053 COMPREHEN METABOLIC PANEL: CPT

## 2020-10-10 PROCEDURE — 82948 REAGENT STRIP/BLOOD GLUCOSE: CPT

## 2020-10-10 PROCEDURE — 6370000000 HC RX 637 (ALT 250 FOR IP): Performed by: PHYSICIAN ASSISTANT

## 2020-10-10 PROCEDURE — 85027 COMPLETE CBC AUTOMATED: CPT

## 2020-10-10 PROCEDURE — 6360000002 HC RX W HCPCS: Performed by: PHYSICIAN ASSISTANT

## 2020-10-10 PROCEDURE — 1200000003 HC TELEMETRY R&B

## 2020-10-10 PROCEDURE — 94640 AIRWAY INHALATION TREATMENT: CPT

## 2020-10-10 PROCEDURE — 2580000003 HC RX 258: Performed by: PHYSICIAN ASSISTANT

## 2020-10-10 PROCEDURE — 94760 N-INVAS EAR/PLS OXIMETRY 1: CPT

## 2020-10-10 PROCEDURE — 99232 SBSQ HOSP IP/OBS MODERATE 35: CPT | Performed by: PHYSICIAN ASSISTANT

## 2020-10-10 PROCEDURE — 83605 ASSAY OF LACTIC ACID: CPT

## 2020-10-10 RX ADMIN — ONDANSETRON 4 MG: 2 INJECTION INTRAMUSCULAR; INTRAVENOUS at 23:40

## 2020-10-10 RX ADMIN — MULTIPLE VITAMINS W/ MINERALS TAB 1 TABLET: TAB at 08:20

## 2020-10-10 RX ADMIN — ASPIRIN 81 MG: 81 TABLET ORAL at 08:20

## 2020-10-10 RX ADMIN — ENOXAPARIN SODIUM 40 MG: 40 INJECTION SUBCUTANEOUS at 16:14

## 2020-10-10 RX ADMIN — ONDANSETRON 4 MG: 2 INJECTION INTRAMUSCULAR; INTRAVENOUS at 16:39

## 2020-10-10 RX ADMIN — PHENOBARBITAL SODIUM 260 MG: 65 INJECTION INTRAMUSCULAR; INTRAVENOUS at 15:53

## 2020-10-10 RX ADMIN — BUDESONIDE AND FORMOTEROL FUMARATE DIHYDRATE 2 PUFF: 160; 4.5 AEROSOL RESPIRATORY (INHALATION) at 08:18

## 2020-10-10 RX ADMIN — PHENOBARBITAL 64.8 MG: 32.4 TABLET ORAL at 08:20

## 2020-10-10 RX ADMIN — BUDESONIDE AND FORMOTEROL FUMARATE DIHYDRATE 2 PUFF: 160; 4.5 AEROSOL RESPIRATORY (INHALATION) at 21:28

## 2020-10-10 RX ADMIN — QUETIAPINE FUMARATE 25 MG: 25 TABLET ORAL at 21:19

## 2020-10-10 RX ADMIN — FOLIC ACID 1 MG: 1 TABLET ORAL at 08:20

## 2020-10-10 RX ADMIN — ACETAMINOPHEN 650 MG: 325 TABLET ORAL at 08:22

## 2020-10-10 RX ADMIN — ONDANSETRON 4 MG: 2 INJECTION INTRAMUSCULAR; INTRAVENOUS at 06:33

## 2020-10-10 RX ADMIN — SODIUM CHLORIDE, PRESERVATIVE FREE 10 ML: 5 INJECTION INTRAVENOUS at 21:20

## 2020-10-10 RX ADMIN — PHENOBARBITAL SODIUM 218.4 MG: 65 INJECTION INTRAMUSCULAR; INTRAVENOUS at 05:55

## 2020-10-10 RX ADMIN — Medication 100 MG: at 08:20

## 2020-10-10 RX ADMIN — PHENOBARBITAL 64.8 MG: 32.4 TABLET ORAL at 21:19

## 2020-10-10 RX ADMIN — PHENOBARBITAL SODIUM 218.4 MG: 65 INJECTION INTRAMUSCULAR; INTRAVENOUS at 01:08

## 2020-10-10 RX ADMIN — ESCITALOPRAM 20 MG: 20 TABLET, FILM COATED ORAL at 08:20

## 2020-10-10 ASSESSMENT — ENCOUNTER SYMPTOMS
SINUS PRESSURE: 0
NAUSEA: 0
TROUBLE SWALLOWING: 0
RHINORRHEA: 0
SHORTNESS OF BREATH: 0
ABDOMINAL DISTENTION: 0
CHEST TIGHTNESS: 0
CONSTIPATION: 0
VOMITING: 0
DIARRHEA: 0
ABDOMINAL PAIN: 0
BACK PAIN: 0
SINUS PAIN: 0
COUGH: 0
SORE THROAT: 0
FACIAL SWELLING: 0
APNEA: 0
WHEEZING: 0
COLOR CHANGE: 0
PHOTOPHOBIA: 0

## 2020-10-10 ASSESSMENT — PAIN SCALES - GENERAL
PAINLEVEL_OUTOF10: 6
PAINLEVEL_OUTOF10: 0
PAINLEVEL_OUTOF10: 0

## 2020-10-10 NOTE — CONSULTS
Brief Intervention and Referral to Treatment Summary    Patient was provided PHQ-9, AUDIT and DAST Screening:      PHQ-9 Score: 18  AUDIT Score:  20  DAST Score:  n/a    Patients substance use is considered     Dependent    Patients depression is considered: Moderately Severe    Brief Education Was Provided    Patient was receptive    Brief Intervention Is Provided (Only for AUDIT or DAST)     Patient reports readiness to decrease and/or stop use and a plan was discussed     Recommendations/Referrals for Brief and/or Specialized Treatment Provided to Patient     Completed consult. Pt reports being sober for a period of time and relapsing two days ago. Patient is a client of [facility] and plans on continuing to follow with them. Patient denied drug use. Patient had a high PHQ-9 screening and psychiatry was consulted. Resources provided.

## 2020-10-10 NOTE — PROGRESS NOTES
Utilize [facility] alcohol withdrawal scale (Based on [place] Modified Alcohol Withdrawal Scale). Tabulate score based on classifications including Tremor, Sweating, Hallucination, Orientation, and Agitation. Tremor: 2  Sweatin  Hallucinations: 0  Orientation: 0  Agitation: 1  Total Score: 4  Action perform as described below     Tremor:  No tremor is 0 points. Tremor on movement is 1 point. Tremor at rest is 2 points. Sweating: No Sweat 0 points. Moist is 1 point. Drenching sweats is 2 points. Hallucinations: No present 0 points. Dissuadable is 1 point. Not dissuadable is 2 points. Orientation: Oriented 0 points. Vague/detached 1 point. Disoriented/no contact 2 points. Agitation: Calm 0 points. Anxious 1 point. Panicky 2 points. Check scale every 2 hours. Discontinue scoring with 4 consecutive scorings of 0. Scale 0: No phenobarbital given. Re-assess every 60 minutes as needed. Scale 1-3: Phenobarbital 130 mg IV over 3 minutes. Re-assess every 60 minutes as needed. May administer every 60 minutes to a maximum dose of phenobarbital 1040 mg in 24 hours! Scale 4-8: Phenobarbital 260 mg IV over 5 minutes. Re-assess every 60 minutes as needed. May administer every 60 minutes to a maximum dose of phenobarbital 1040mg in 24 hours! Scale 9-10: Transfer to ICU (if not already in ICU). Administer 10mg/kg phenobarbital IV over 60 minutes. Maximum dose phenobarbital is 1040mg in 24 hours!     Scheduled pheno given per order will continue tor reassess

## 2020-10-10 NOTE — PROGRESS NOTES
Pt last dose of IV phenobarbital was given late due to the prior dose not arriving on the floor till roughly 0045. This RN the pushed his last dose back by approximately 4 hours.

## 2020-10-10 NOTE — PROGRESS NOTES
Hospitalist Progress Note    Patient:  Edyta Ugarte    Unit/Bed:6K-18/018-A  YOB: 1985  MRN: [id number]   Acct: [de-identified]   PCP: Claude Members, APRN - CNP  Code Status: Full Code  Date of Admission: 10/9/2020    Expected Discharge: 10/11-12? Disposition: Home vs psych admit? Continue phenobarb panel. Assessment/Plan:    1. Acute alcohol withdrawal: Phenobarb prophylaxis per protocol started 10/9. S/p banana bag, continue MTV, folic acid, thiamine. Seizure precautions, addiction services consult. - Add phenobarb withdrawal panel 10/10.     2. Suicidal ideations: On arrival; he denies suicidal thoughts at this time. He had no intent or plan. Sitter/suicide precautions. Psych following. 3. COPD: without acute exacerbation. Continue home inhalers. 4. IDDMII: HgbA1c 6.9. Resume home Lantus, 25U nightly plus SSI. Accu-checks. Hypoglycemia protocol. 5. MDD, recurrent: resume home escitalopram, seroquel. Psych following. Chief Complaint: suicidal ideation     HPI / Hospital Course: 38-year-old male with PMHx COPD, alcohol abuse with withdrawal seizures, DMII, MDD who presented to [facility] with suicidal thoughts. The patient states that he was recently ill and has not been taking his home medications for approximately 2 weeks. He reports a history of alcoholism and states that he has been sober for approximately 2 years with a recent relapse and had drank again last night prior to coming in. Patient states this morning he felt depressed and like he wanted to hurt himself and did not want to be alive. He states this scared him so he came to the ER. He has no plan or intent on suicide. Patient appears anxious, has tremors. He reports some shortness of breath. Denies fever/chills, diaphoresis, chest pain, abdominal pain, N/V/D, urinary symptoms. Patient is admitted to hospitalist service for acute alcohol withdrawal and suicidal ideations with psych consult. General:   Pleasant male. NAD. Tremors noted. HEENT:  normocephalic and atraumatic. No scleral icterus. PERR. Neck: supple. No JVD. No thyromegaly. Lungs: clear to auscultation. No retractions  Cardiac: RRR without murmur. Abdomen: soft. Nontender. Bowel sounds positive. Extremities:  No clubbing, cyanosis, or edema x 4. Vasculature: capillary refill < 3 seconds. Palpable LE pulses bilaterally. Skin:  Warm, diaphoretic. Psych:  Alert and oriented x3. Affect withdrawn. Lymph:  No supraclavicular adenopathy. Neurologic:  No focal deficit. No seizures. Data: (All radiographs, tracings, PFTs, and imaging are personally viewed and interpreted unless otherwise noted)  Labs:   Recent Labs     10/09/20  1110 10/10/20  0619   WBC 5.7 6.1   HGB 16.3 13.8*   HCT 47.1 40.4*    314     Recent Labs     10/09/20  1110 10/10/20  0619    137   K 4.1 3.9   CL 98 101   CO2 21* 23   BUN 6* 5*   CREATININE 0.6 0.5   CALCIUM 9.1 8.4*     Recent Labs     10/09/20  1110 10/10/20  0619   AST 41* 40   ALT 50 40   BILITOT 0.2* 0.5   ALKPHOS 99 76     No results for input(s): INR in the last 72 hours. No results for input(s): Joyice Tombstone in the last 72 hours. Urinalysis:   Lab Results   Component Value Date    NITRU NEGATIVE 03/02/2020    WBCUA 0-2 03/02/2020    BACTERIA NONE SEEN 03/02/2020    RBCUA 0-2 03/02/2020    BLOODU MODERATE 03/02/2020    SPECGRAV 1.011 10/12/2013    GLUCOSEU >= 1000 03/02/2020     Urine culture:   Lab Results   Component Value Date    LABURIN 300 06/17/2019     Micro:   Blood culture #1:   Lab Results   Component Value Date    BC No growth-preliminary  No growth   10/02/2016     Blood culture #2:No results found for: Yasmine Betts  Organism:No results found for: [facility]      Lab Results   Component Value Date    LABGRAM  12/12/2017     Rare segmented neutrophils observed. Rare epithelial cells observed. Many gram positive cocci occurring singly and in pairs.   Many gram negative bacilli. Many gram positive bacilli. MRSA culture only:No results found for: [address]   Respiratory culture: No results found for: CULTRESP  Aerobic and Anaerobic :  Lab Results   Component Value Date    LABAERO Normal rizwana- preliminary  Normal rizwana   12/12/2017     Lab Results   Component Value Date    LABANAE  12/12/2017     No anaerobes isolated- preliminary  Culture yielded heavy mixed growth which included anaerobic  gram positive cocci. If a true mixed aerobic and anaerobic  infection is suspected, then broad spectrum empiric  antibiotic therapy is indicated and should include coverage  for anaerobic organisms. Radiology Reports:  No orders to display     No results found.       Tele:   [x] yes             [] no      Active Hospital Problems    Diagnosis Date Noted    Alcohol withdrawal syndrome, uncomplicated ([facility] [D87.032] 10/09/2020       Electronically signed by Lilia Moore PA-C on 10/10/2020 at 8:23 AM

## 2020-10-10 NOTE — CONSULTS
07/01/2020 by drinking for two or three days although one  year ago he was also admitted for alcohol. He reports at this time he  has been drinking heavy for two to three days again. He smokes half  pack of cigarettes everyday. He has two DUIs, last one was in 2017, 12  years apart. He was in [facility] for three days for the second DUI. He  attends [employer] at times. MEDICAL AND SURGICAL HISTORY:  COPD, diabetes mellitus, GERD, ankle  surgery, eye surgery. MEDICATIONS:  Humalog, multivitamin, folic acid, Symbicort, Proventil,  Zofran, phenobarbital, potassium chloride, sodium chloride, Phenergan,  Lexapro 20 mg daily, quetiapine 25 mg at bedtime, Lantus, Lovenox,  hydroxyzine, and Tylenol. ALLERGIES:  PAROXETINE. MENTAL STATUS EXAMINATION:  The patient appears stated age, dressed in  hospital gown. He has good eye contact. Poor grooming and fair  hygiene. He is cooperative with the interview. Speech clear, coherent,  and spontaneous. Mood depressed and anxious and affect restricted. He  denies current suicidal thoughts. No homicidal ideation. No psychosis. No major mood swings. No flight of ideas and no racing thoughts. Thought process is goal oriented. He is alert and oriented x3. He has  fair attention and concentration. Memory appears to be intact as tested  within the context of the interview. Intelligence appears average. Judgment and insight are poor. DIAGNOSES:  1.  Major depressive disorder, recurrent, severe, without psychotic  features. 2.  Unspecified anxiety. 3.  Alcohol use disorder, severe. 4.  Rule out alcohol-induced mood disorder. 5.  See medical and surgical history. 6.  Chronic substance use. ASSESSMENT:  The patient is a 61-year-old   male. He  presented to [facility] [place] [address] due to suicidal ideation. He has  a long history of alcohol use. His alcohol level upon presentation was  0.29.   He reports a history of depression since his father committed  suicide on his 15th birthday, but he never received much treatment until  about two months ago, but he admits that he was not compliant on his  psychotropics. He reports some depressive symptoms, but he denies  current suicidal thoughts. PLAN:  1. Continue medical management per Bartolome Gutierrez and her associates. 2.  Agreeable with detox protocol. 3.  Continue with psychotropics as prescribed. 4.  Support and reassurance given. [facility] for allowing me to participate in the care of this patient.         Mynor Berumen M.D.    D: 10/09/2020 21:47:59       T: 10/10/2020 0:05:51     [email or web address]  Job#: [id number]     Doc#: [id number]    CC:  Bartolome Gutierrez PA-C

## 2020-10-10 NOTE — PROGRESS NOTES
Utilize [facility] alcohol withdrawal scale (Based on [place] Modified Alcohol Withdrawal Scale). Tabulate score based on classifications including Tremor, Sweating, Hallucination, Orientation, and Agitation. Tremor: 1  Sweatin  Hallucinations: 0  Orientation: 0  Agitation: 0  Total Score: 2  Action perform as described below     Tremor:  No tremor is 0 points. Tremor on movement is 1 point. Tremor at rest is 2 points. Sweating: No Sweat 0 points. Moist is 1 point. Drenching sweats is 2 points. Hallucinations: No present 0 points. Dissuadable is 1 point. Not dissuadable is 2 points. Orientation: Oriented 0 points. Vague/detached 1 point. Disoriented/no contact 2 points. Agitation: Calm 0 points. Anxious 1 point. Panicky 2 points. Check scale every 2 hours. Discontinue scoring with 4 consecutive scorings of 0. Scale 0: No phenobarbital given. Re-assess every 60 minutes as needed. Scale 1-3: Phenobarbital 130 mg IV over 3 minutes. Re-assess every 60 minutes as needed. May administer every 60 minutes to a maximum dose of phenobarbital 1040 mg in 24 hours! Scale 4-8: Phenobarbital 260 mg IV over 5 minutes. Re-assess every 60 minutes as needed. May administer every 60 minutes to a maximum dose of phenobarbital 1040mg in 24 hours! Scale 9-10: Transfer to ICU (if not already in ICU). Administer 10mg/kg phenobarbital IV over 60 minutes. Maximum dose phenobarbital is 1040mg in 24 hours!

## 2020-10-10 NOTE — PROGRESS NOTES
Utilize [facility] alcohol withdrawal scale (Based on [place] Modified Alcohol Withdrawal Scale). Tabulate score based on classifications including Tremor, Sweating, Hallucination, Orientation, and Agitation. Tremor: 2  Sweatin  Hallucinations: 0  Orientation: 0  Agitation: 0  Total Score: 4  Action perform as described below     Tremor:  No tremor is 0 points. Tremor on movement is 1 point. Tremor at rest is 2 points. Sweating: No Sweat 0 points. Moist is 1 point. Drenching sweats is 2 points. Hallucinations: No present 0 points. Dissuadable is 1 point. Not dissuadable is 2 points. Orientation: Oriented 0 points. Vague/detached 1 point. Disoriented/no contact 2 points. Agitation: Calm 0 points. Anxious 1 point. Panicky 2 points. Check scale every 2 hours. Discontinue scoring with 4 consecutive scorings of 0. Scale 0: No phenobarbital given. Re-assess every 60 minutes as needed. Scale 1-3: Phenobarbital 130 mg IV over 3 minutes. Re-assess every 60 minutes as needed. May administer every 60 minutes to a maximum dose of phenobarbital 1040 mg in 24 hours! Scale 4-8: Phenobarbital 260 mg IV over 5 minutes. Re-assess every 60 minutes as needed. May administer every 60 minutes to a maximum dose of phenobarbital 1040mg in 24 hours! Scale 9-10: Transfer to ICU (if not already in ICU). Administer 10mg/kg phenobarbital IV over 60 minutes. Maximum dose phenobarbital is 1040mg in 24 hours! PRN orders for phenobarbital are in as IV push of 4mL over 20 minutes instead of IV piggyback. This RN spoke to pharmacy, they stated order would need changed to IV piggy back to prepare PRN phenobarbital for patient like that. This RN paged Bethany Vazquez regarding if we can change this order so phenobarbital can be safely given as directed. Orders changed, see MAR.

## 2020-10-10 NOTE — PROGRESS NOTES
Daily Progress Note  Charito Plascencia MD  10/10/2020    Reviewed patient's current plan of care and vital signs with nursing staff. Patient is feeling better. He denies suicidal thoughts. He is more hopeful about his future. SUBJECTIVE:    Patient is feeling better. denies suicidal ideation. ROS: Patient has new complaints:  No  Sleeping adequately:  Yes      Mental Status Examination:  Patient is cooperative. Speech normal pitch and normal volume. No abnormal movements, tics or mannerisms. Mood dysthymic, affect normal affect. Suicidal ideation Present. Homicidal ideations Absent. Hallucinations Absent. Delusions Absent. Thought process Goal oriented. Alert and oriented X 3. Attention and concentration fair. MEMORY intact. Insight and Judgement Limited. Medications  Current Facility-Administered Medications:  PHENobarbital (LUMINAL) 130 mg in sodium chloride 0.9 % 100 mL IVPB, 130 mg, Intravenous, Q1H PRN **OR** PHENobarbital (LUMINAL) 260 mg in sodium chloride 0.9 % 100 mL IVPB, 260 mg, Intravenous, Q1H PRN **OR** PHENobarbital (LUMINAL) 843.7 mg in sodium chloride 0.9 % 100 mL IVPB, 10 mg/kg, Intravenous, Daily PRN  sodium chloride flush 0.9 % injection 10 mL, 10 mL, Intravenous, 2 times per day  sodium chloride flush 0.9 % injection 10 mL, 10 mL, Intravenous, PRN  acetaminophen (TYLENOL) tablet 650 mg, 650 mg, Oral, Q6H PRN **OR** acetaminophen (TYLENOL) suppository 650 mg, 650 mg, Rectal, Q6H PRN  polyethylene glycol (GLYCOLAX) packet 17 g, 17 g, Oral, Daily PRN  promethazine (PHENERGAN) tablet 12.5 mg, 12.5 mg, Oral, Q6H PRN **OR** ondansetron (ZOFRAN) injection 4 mg, 4 mg, Intravenous, Q6H PRN  enoxaparin (LOVENOX) injection 40 mg, 40 mg, Subcutaneous, Q24H  potassium chloride (KLOR-CON M) extended release tablet 40 mEq, 40 mEq, Oral, PRN **OR** potassium bicarb-citric acid (EFFER-K) effervescent tablet 40 mEq, 40 mEq, Oral, PRN **OR** potassium chloride 10 mEq/100 mL IVPB (Peripheral

## 2020-10-10 NOTE — ED PROVIDER NOTES
[address] COMPLAINT       Chief Complaint   Patient presents with    Suicidal       Nurses Notes reviewed and I agree except as noted in the HPI. HISTORY OF PRESENT ILLNESS    Severino Lewis is a 37 y.o. male who presents to the Emergency Department for the evaluation of suicidal thoughts as well as drinking relapse. States that he has been sober since July first.  Has struggled for years with alcohol addiction, states that he went into rehab, was clean since July 1. Patient has recently had increased stress over custody of his 4 children. States that custody has been reported to his sister-in-law and he is having difficulty coping with this. States that he drank a bottle of liquor yesterday night. Denies plan for suicide, denies previous admission for psychiatric issues. The HPI was provided by the patient. REVIEW OF SYSTEMS     Review of Systems   Constitutional: Negative for chills, diaphoresis, fatigue and fever. HENT: Negative for congestion, ear pain, facial swelling, rhinorrhea, sinus pressure, sinus pain, sneezing, sore throat and trouble swallowing. Eyes: Negative for photophobia. Respiratory: Negative for apnea, cough, chest tightness, shortness of breath and wheezing. Cardiovascular: Negative for chest pain and palpitations. Gastrointestinal: Negative for abdominal distention, abdominal pain, constipation, diarrhea, nausea and vomiting. Endocrine: Negative for polydipsia, polyphagia and polyuria. Genitourinary: Negative for decreased urine volume, dysuria, flank pain, frequency and urgency. Musculoskeletal: Negative for arthralgias, back pain, joint swelling, myalgias, neck pain and neck stiffness. Skin: Negative for color change and wound. Neurological: Negative for dizziness, tremors, weakness, light-headedness, numbness and headaches.    Psychiatric/Behavioral: Positive for self-injury and suicidal ideas. Negative for dysphoric mood, hallucinations and sleep disturbance. The patient is nervous/anxious. The patient is not hyperactive. PAST MEDICAL HISTORY    has a past medical history of Asthma, COPD (chronic obstructive pulmonary disease) ([address] Eczema, GERD (gastroesophageal reflux disease), History of alcohol abuse, History of marijuana use, History of tobacco abuse, Hx of seasonal allergies, Pancreatitis, Seizures ([address] and Type 2 diabetes mellitus without complication ([address] SURGICAL HISTORY      has a past surgical history that includes Upper gastrointestinal endoscopy (Left, 5/6/2019); Eye surgery (Right, 09/2019); fracture surgery (Right, 2019); and Ankle arthroscopy (Right, 8/5/2020). CURRENT MEDICATIONS       Current Discharge Medication List      CONTINUE these medications which have NOT CHANGED    Details   escitalopram (LEXAPRO) 20 MG tablet Take 1 tablet by mouth daily  Qty: 90 tablet, Refills: 0    Associated Diagnoses: Bipolar disorder, current episode depressed, severe, without psychotic features (HCC)      QUEtiapine (SEROQUEL) 25 MG tablet Take 1 tablet by mouth nightly for 1 week, then take 1 tablet by mouth twice a day. Qty: 60 tablet, Refills: 3    Associated Diagnoses: Bipolar disorder, current episode depressed, severe, without psychotic features (HCC)      hydrOXYzine (VISTARIL) 100 MG capsule Take 1 capsule by mouth 4 times daily as needed for Anxiety  Qty: 60 capsule, Refills: 3    Associated Diagnoses: Major depressive disorder, recurrent episode, moderate with anxious distress ([address]  Alcohol withdrawal syndrome without complication (HCC)      insulin glargine (LANTUS SOLOSTAR) 100 UNIT/ML injection pen Inject 25 Units into the skin daily  Qty: 5 pen, Refills: 3    Associated Diagnoses: Type 2 diabetes mellitus without complication, with long-term current use of insulin (HCC)      ondansetron (ZOFRAN) 4 MG tablet Take 1 tablet by mouth 3 times daily as needed for Nausea or Vomiting  Qty: 15 tablet, Refills: 0    Associated Diagnoses: Non-intractable vomiting with nausea, unspecified vomiting type      insulin aspart (NOVOLOG FLEXPEN) 100 UNIT/ML injection pen Inject 2-12 units SQ TID with meals as directed per sliding scale, max dose 36 units/day  Qty: 5 pen, Refills: 3    Associated Diagnoses: Type 2 diabetes mellitus without complication, unspecified whether long term insulin use (HCC)      albuterol sulfate HFA (PROVENTIL HFA) 108 (90 Base) MCG/ACT inhaler Inhale 2 puffs into the lungs every 6 hours as needed for Wheezing  Qty: 1 Inhaler, Refills: 0    Associated Diagnoses: Moderate persistent asthma without complication      Insulin Pen Needle 30G X 8 MM MISC 1 each by Does not apply route 3 times daily  Qty: 500 each, Refills: 3      fluticasone-salmeterol (ADVAIR) 250-50 MCG/DOSE AEPB Inhale 1 puff into the lungs every 12 hours  Qty: 60 each, Refills: 5    Associated Diagnoses: Moderate persistent asthma without complication      aspirin 81 MG EC tablet Take 1 tablet by mouth daily  Qty: 30 tablet, Refills: 3      acetaminophen (TYLENOL) 500 MG tablet Take 1,000 mg by mouth every 6 hours as needed for Pain      Multiple Vitamins-Minerals (MENS MULTIVITAMIN PLUS) TABS Take 1 tablet by mouth daily      NARCAN 4 MG/0.1ML LIQD nasal spray ADMINISTER A SINGLE SPRAY INTRANASALLY INTO ONE NOSTRIL. CALL 911. MAY REPEAT X 1.      nicotine (NICOTROL) 10 MG inhaler Inhale 1 puff into the lungs as needed for Smoking cessation  Qty: 1 Inhaler, Refills: 3    Associated Diagnoses: Cigarette nicotine dependence without complication             ALLERGIES     is allergic to paxil [paroxetine]. FAMILY HISTORY     He indicated that his mother is alive. He indicated that his father is . He indicated that the status of his sister is unknown.   family history includes Other in his mother and sister; Other (age of onset: 39) in his father.     SOCIAL HISTORY      reports that he has been smoking cigarettes. He has a 11.00 pack-year smoking history. He has never used smokeless tobacco. He reports previous alcohol use. He reports previous drug use. Drug: Marijuana. PHYSICAL EXAM     INITIAL VITALS:  height is 5' 2\" (1.575 m) and weight is 186 lb (84.4 kg). His oral temperature is 98 °F (36.7 °C). His blood pressure is 117/74 and his pulse is 84. His respiration is 18 and oxygen saturation is 97%. Physical Exam  Vitals signs and nursing note reviewed. Constitutional:       General: He is awake. He is not in acute distress. Appearance: Normal appearance. He is well-developed and normal weight. He is not ill-appearing, toxic-appearing or diaphoretic. HENT:      Head: Normocephalic and atraumatic. Nose: Nose normal.      Mouth/Throat:      Mouth: Mucous membranes are moist.      Pharynx: Oropharynx is clear. Eyes:      Extraocular Movements: Extraocular movements intact. Pupils: Pupils are equal, round, and reactive to light. Neck:      Musculoskeletal: Normal range of motion and neck supple. No neck rigidity or muscular tenderness. Cardiovascular:      Rate and Rhythm: Regular rhythm. Tachycardia present. No extrasystoles are present. Pulses: Normal pulses. Heart sounds: Normal heart sounds, S1 normal and S2 normal. Heart sounds not distant. No murmur. No friction rub. No gallop. Pulmonary:      Effort: Pulmonary effort is normal. No tachypnea, bradypnea, accessory muscle usage, prolonged expiration, respiratory distress or retractions. Breath sounds: Normal breath sounds. No stridor. No wheezing, rhonchi or rales. Chest:      Chest wall: No tenderness. Abdominal:      General: Abdomen is flat. Bowel sounds are normal. There is no distension. Palpations: Abdomen is soft. There is no shifting dullness, hepatomegaly, splenomegaly or mass. Tenderness: There is no abdominal tenderness. Hernia: No hernia is present.    Musculoskeletal: Normal range of motion. General: No swelling, tenderness, deformity or signs of injury. Right lower leg: No edema. Left lower leg: No edema. Skin:     General: Skin is warm and dry. Capillary Refill: Capillary refill takes less than 2 seconds. Coloration: Skin is not jaundiced or pale. Neurological:      General: No focal deficit present. Mental Status: He is alert and oriented to person, place, and time. Mental status is at baseline. GCS: GCS eye subscore is 4. GCS verbal subscore is 5. GCS motor subscore is 6. Cranial Nerves: Cranial nerves are intact. Sensory: Sensation is intact. Psychiatric:         Mood and Affect: Mood is depressed. Affect is tearful. Behavior: Behavior normal. Behavior is cooperative. Thought Content: Thought content includes suicidal ideation. Thought content does not include homicidal ideation. Thought content does not include homicidal or suicidal plan. DIFFERENTIAL DIAGNOSIS:   Alcohol intoxication, suicidal ideation, acute withdrawal    DIAGNOSTIC RESULTS     EKG: All EKG's are interpreted by the Emergency Department Physician who either signs or Co-signs this chart in the absence of a cardiologist.    Sinus tachycardia with rate of 120, IN of 116, QRS of 72, QTc of 440.   Compared with EKG from July 25, 2020 with no significant changes    EKG interpreted by ED physician    RADIOLOGY: non-plainfilm images(s) such as CT, Ultrasound and MRI are read by the radiologist.    No orders to display       LABS:     Labs Reviewed   SALICYLATE LEVEL - Abnormal; Notable for the following components:       Result Value    Salicylate, Serum < 0.3 (*)     All other components within normal limits   CBC WITH AUTO DIFFERENTIAL - Abnormal; Notable for the following components:    RDW-SD 46.5 (*)     MPV 9.1 (*)     Immature Grans (Abs) 0.10 (*)     All other components within normal limits   COMPREHENSIVE METABOLIC PANEL W/ REFLEX TO MG FOR LOW K - Abnormal; Notable for the following components:    Glucose 214 (*)     BUN 6 (*)     CO2 21 (*)     AST 41 (*)     Total Bilirubin 0.2 (*)     All other components within normal limits   ANION GAP - Abnormal; Notable for the following components:    Anion Gap 22.0 (*)     All other components within normal limits   POCT GLUCOSE - Abnormal; Notable for the following components:    POC Glucose 190 (*)     All other components within normal limits   POCT GLUCOSE - Abnormal; Notable for the following components:    POC Glucose 208 (*)     All other components within normal limits   ACETAMINOPHEN LEVEL   ETHANOL   URINE DRUG SCREEN   APTT   GLOMERULAR FILTRATION RATE, ESTIMATED   OSMOLALITY   ETHANOL   LACTIC ACID, PLASMA   CBC   COMPREHENSIVE METABOLIC PANEL W/ REFLEX TO MG FOR LOW K       EMERGENCY DEPARTMENT COURSE:   Vitals:    Vitals:    10/09/20 2300 10/10/20 0004 10/10/20 0307 10/10/20 0512   BP: (!) 148/92 (!) 106/57 117/74    Pulse: 95 88 84    Resp: 18 20 18    Temp: 98.2 °F (36.8 °C) 98.4 °F (36.9 °C) 98 °F (36.7 °C)    TempSrc: Oral Oral Oral    SpO2: 94% 98% 97%    Weight:    186 lb (84.4 kg)   Height:           6:02 AM EDT: The patient was seen and evaluated. MDM:  Patient seen and evaluated today for suicidal ideation as well as acute alcohol intoxication. Patient found to have ethanol level of 0.29. He appeared anxious, nervous, tearful and withdrawn on my initial exam.  Initially labs were ordered for medical clearance. Patient found to be tachycardic, EKG completed by nursing staff. I initially attributed this to anxiety however patient remained tachycardic. Was moved to medical bed, IV fluids initiated. Patient treated with thiamine and Ativan. Tachycardia persisted. Believe the patient is going through acute withdrawal and requires medical admission while withdrawing.   CAROLINE was consulted however patient will be evaluated for psychiatric issues as an inpatient due

## 2020-10-11 LAB
GLUCOSE BLD-MCNC: 133 MG/DL (ref 70–108)
GLUCOSE BLD-MCNC: 166 MG/DL (ref 70–108)
GLUCOSE BLD-MCNC: 169 MG/DL (ref 70–108)
GLUCOSE BLD-MCNC: 269 MG/DL (ref 70–108)

## 2020-10-11 PROCEDURE — 99232 SBSQ HOSP IP/OBS MODERATE 35: CPT | Performed by: PHYSICIAN ASSISTANT

## 2020-10-11 PROCEDURE — 6370000000 HC RX 637 (ALT 250 FOR IP): Performed by: PHYSICIAN ASSISTANT

## 2020-10-11 PROCEDURE — 82948 REAGENT STRIP/BLOOD GLUCOSE: CPT

## 2020-10-11 PROCEDURE — 94760 N-INVAS EAR/PLS OXIMETRY 1: CPT

## 2020-10-11 PROCEDURE — 1200000003 HC TELEMETRY R&B

## 2020-10-11 PROCEDURE — 6360000002 HC RX W HCPCS: Performed by: PHYSICIAN ASSISTANT

## 2020-10-11 PROCEDURE — 2580000003 HC RX 258: Performed by: PHYSICIAN ASSISTANT

## 2020-10-11 PROCEDURE — 94640 AIRWAY INHALATION TREATMENT: CPT

## 2020-10-11 RX ORDER — LORAZEPAM 2 MG/1
2 TABLET ORAL
Status: DISCONTINUED | OUTPATIENT
Start: 2020-10-11 | End: 2020-10-13 | Stop reason: HOSPADM

## 2020-10-11 RX ORDER — LORAZEPAM 2 MG/1
4 TABLET ORAL
Status: DISCONTINUED | OUTPATIENT
Start: 2020-10-11 | End: 2020-10-13 | Stop reason: HOSPADM

## 2020-10-11 RX ORDER — LORAZEPAM 2 MG/ML
2 INJECTION INTRAMUSCULAR
Status: DISCONTINUED | OUTPATIENT
Start: 2020-10-11 | End: 2020-10-13 | Stop reason: HOSPADM

## 2020-10-11 RX ORDER — LORAZEPAM 1 MG/1
1 TABLET ORAL
Status: DISCONTINUED | OUTPATIENT
Start: 2020-10-11 | End: 2020-10-13 | Stop reason: HOSPADM

## 2020-10-11 RX ORDER — DIPHENHYDRAMINE HYDROCHLORIDE 50 MG/ML
25 INJECTION INTRAMUSCULAR; INTRAVENOUS EVERY 6 HOURS PRN
Status: DISCONTINUED | OUTPATIENT
Start: 2020-10-11 | End: 2020-10-12

## 2020-10-11 RX ORDER — LORAZEPAM 2 MG/ML
3 INJECTION INTRAMUSCULAR
Status: DISCONTINUED | OUTPATIENT
Start: 2020-10-11 | End: 2020-10-13 | Stop reason: HOSPADM

## 2020-10-11 RX ORDER — LORAZEPAM 2 MG/ML
1 INJECTION INTRAMUSCULAR
Status: DISCONTINUED | OUTPATIENT
Start: 2020-10-11 | End: 2020-10-13 | Stop reason: HOSPADM

## 2020-10-11 RX ORDER — HYDROXYZINE PAMOATE 25 MG/1
25 CAPSULE ORAL 3 TIMES DAILY PRN
Status: DISCONTINUED | OUTPATIENT
Start: 2020-10-11 | End: 2020-10-12

## 2020-10-11 RX ORDER — LORAZEPAM 2 MG/ML
4 INJECTION INTRAMUSCULAR
Status: DISCONTINUED | OUTPATIENT
Start: 2020-10-11 | End: 2020-10-13 | Stop reason: HOSPADM

## 2020-10-11 RX ADMIN — PHENOBARBITAL 32.4 MG: 32.4 TABLET ORAL at 07:55

## 2020-10-11 RX ADMIN — HYDROXYZINE PAMOATE 25 MG: 25 CAPSULE ORAL at 17:14

## 2020-10-11 RX ADMIN — BUDESONIDE AND FORMOTEROL FUMARATE DIHYDRATE 2 PUFF: 160; 4.5 AEROSOL RESPIRATORY (INHALATION) at 08:52

## 2020-10-11 RX ADMIN — FOLIC ACID 1 MG: 1 TABLET ORAL at 07:55

## 2020-10-11 RX ADMIN — INSULIN LISPRO 2 UNITS: 100 INJECTION, SOLUTION INTRAVENOUS; SUBCUTANEOUS at 16:15

## 2020-10-11 RX ADMIN — MULTIPLE VITAMINS W/ MINERALS TAB 1 TABLET: TAB at 07:55

## 2020-10-11 RX ADMIN — PHENOBARBITAL SODIUM 260 MG: 65 INJECTION INTRAMUSCULAR; INTRAVENOUS at 13:57

## 2020-10-11 RX ADMIN — ASPIRIN 81 MG: 81 TABLET ORAL at 07:55

## 2020-10-11 RX ADMIN — ENOXAPARIN SODIUM 40 MG: 40 INJECTION SUBCUTANEOUS at 16:13

## 2020-10-11 RX ADMIN — ONDANSETRON 4 MG: 2 INJECTION INTRAMUSCULAR; INTRAVENOUS at 10:40

## 2020-10-11 RX ADMIN — DIPHENHYDRAMINE HYDROCHLORIDE 25 MG: 50 INJECTION, SOLUTION INTRAMUSCULAR; INTRAVENOUS at 14:24

## 2020-10-11 RX ADMIN — LORAZEPAM 1 MG: 1 TABLET ORAL at 21:47

## 2020-10-11 RX ADMIN — DIPHENHYDRAMINE HYDROCHLORIDE 25 MG: 50 INJECTION, SOLUTION INTRAMUSCULAR; INTRAVENOUS at 20:21

## 2020-10-11 RX ADMIN — ACETAMINOPHEN 650 MG: 325 TABLET ORAL at 09:38

## 2020-10-11 RX ADMIN — SODIUM CHLORIDE, PRESERVATIVE FREE 10 ML: 5 INJECTION INTRAVENOUS at 07:56

## 2020-10-11 RX ADMIN — INSULIN LISPRO 2 UNITS: 100 INJECTION, SOLUTION INTRAVENOUS; SUBCUTANEOUS at 11:30

## 2020-10-11 RX ADMIN — BUDESONIDE AND FORMOTEROL FUMARATE DIHYDRATE 2 PUFF: 160; 4.5 AEROSOL RESPIRATORY (INHALATION) at 19:28

## 2020-10-11 RX ADMIN — SODIUM CHLORIDE, PRESERVATIVE FREE 10 ML: 5 INJECTION INTRAVENOUS at 21:00

## 2020-10-11 RX ADMIN — Medication 100 MG: at 07:55

## 2020-10-11 RX ADMIN — ACETAMINOPHEN 650 MG: 325 TABLET ORAL at 20:21

## 2020-10-11 RX ADMIN — ESCITALOPRAM 20 MG: 20 TABLET, FILM COATED ORAL at 07:55

## 2020-10-11 RX ADMIN — ONDANSETRON 4 MG: 2 INJECTION INTRAMUSCULAR; INTRAVENOUS at 17:14

## 2020-10-11 RX ADMIN — QUETIAPINE FUMARATE 25 MG: 25 TABLET ORAL at 21:44

## 2020-10-11 ASSESSMENT — PAIN SCALES - GENERAL
PAINLEVEL_OUTOF10: 0
PAINLEVEL_OUTOF10: 3

## 2020-10-11 NOTE — PLAN OF CARE
Problem: Impaired respiratory status  Goal: Clear lung sounds  10/10/2020 2134 by Cassi Garcia RCP  Outcome: Met This Shift

## 2020-10-11 NOTE — PLAN OF CARE
Care plan reviewed with patient. Patient verbalizes understanding of the plan of care and contribute to goal setting. Problem: Falls - Risk of:  Goal: Will remain free from falls  Description: Will remain free from falls  10/11/2020 0858 by Ying Guzman RN  Outcome: Ongoing  Note: No falls noted this shift. Continue falling star program. Bed alarm on, bed in low position. Call light and personal belongings in reach. Patient uses call light appropriately. Goal: Absence of physical injury  Description: Absence of physical injury  10/11/2020 0858 by Ying Guzman RN  Outcome: Ongoing  Note: No falls noted this shift. Continue falling star program. Bed alarm on, bed in low position. Call light and personal belongings in reach. Patient uses call light appropriately. Problem: Pain:  Description: Pain management should include both nonpharmacologic and pharmacologic interventions. Goal: Pain level will decrease  Description: Pain level will decrease  10/11/2020 0858 by Ying Guzman RN  Outcome: Ongoing  Note: Patient denies pain so far this shift, will continue to monitor.      Goal: Control of acute pain  Description: Control of acute pain  10/11/2020 0858 by Ying Guzman RN  Outcome: Ongoing    Goal: Control of chronic pain  Description: Control of chronic pain  10/11/2020 0858 by Ying Guzman RN  Outcome: Ongoing    Problem: Impaired respiratory status  Goal: Clear lung sounds  10/11/2020 0858 by Ying Guzman RN  Outcome: Ongoing  Note: Lungs clear and diminished       Problem: Discharge Planning:  Goal: Participates in care planning  Description: Participates in care planning  10/11/2020 0858 by Ying Guzman RN  Outcome: Ongoing  Note: Patient aware and updated on plan of care    Goal: Discharged to appropriate level of care  Description: Discharged to appropriate level of care  10/11/2020 0858 by Ying Guzman RN  Outcome: Ongoing  Note: Plan to discharge home with support or possibly 4E, psych following    Problem: Anxiety/Stress:  Goal: Level of anxiety will decrease  Description: Level of anxiety will decrease  10/11/2020 0858 by Marito Ybarra RN  Outcome: Ongoing  Note: Calm and cooperative so far this shift, monitoring, PRN pheno if needed and getting scheduled pheno    Problem: Serum Glucose Level - Abnormal:  Goal: Ability to maintain appropriate glucose levels will improve to within specified parameters  Description: Ability to maintain appropriate glucose levels will improve to within specified parameters  10/11/2020 0858 by Marito Ybarra RN  Outcome: Ongoing  Note: [place] ACHS, insulin SS, monitoring    Problem: Skin Integrity - Impaired:  Goal: Will show no infection signs and symptoms  Description: Will show no infection signs and symptoms  10/11/2020 0858 by Marito Ybarra RN  Outcome: Ongoing  Note: VSS, no fevers or signs of infection    Goal: Absence of new skin breakdown  Description: Absence of new skin breakdown  10/11/2020 0858 by Marito Ybarra RN  Outcome: Ongoing  Note: No new signs or symptoms of skin breakdown noted this shift, encouraging patient to turn and reposition self in bed q2h

## 2020-10-11 NOTE — PROGRESS NOTES
This RN spoke with Dr. Ricarda Lizama at bedside, he is okay to discontinue suicide sitter. Sitter removed from room. Patient educated on safety measures.

## 2020-10-11 NOTE — PROGRESS NOTES
Utilize [facility] alcohol withdrawal scale (Based on [place] Modified Alcohol Withdrawal Scale). Tabulate score based on classifications including Tremor, Sweating, Hallucination, Orientation, and Agitation. Tremor: 2  Sweatin  Hallucinations: 0  Orientation: 0  Agitation: 1  Total Score: 4  Action perform as described below     Tremor:  No tremor is 0 points. Tremor on movement is 1 point. Tremor at rest is 2 points. Sweating: No Sweat 0 points. Moist is 1 point. Drenching sweats is 2 points. Hallucinations: No present 0 points. Dissuadable is 1 point. Not dissuadable is 2 points. Orientation: Oriented 0 points. Vague/detached 1 point. Disoriented/no contact 2 points. Agitation: Calm 0 points. Anxious 1 point. Panicky 2 points. Check scale every 2 hours. Discontinue scoring with 4 consecutive scorings of 0. Scale 0: No phenobarbital given. Re-assess every 60 minutes as needed. Scale 1-3: Phenobarbital 130 mg IV over 3 minutes. Re-assess every 60 minutes as needed. May administer every 60 minutes to a maximum dose of phenobarbital 1040 mg in 24 hours! Scale 4-8: Phenobarbital 260 mg IV over 5 minutes. Re-assess every 60 minutes as needed. May administer every 60 minutes to a maximum dose of phenobarbital 1040mg in 24 hours! Scale 9-10: Transfer to ICU (if not already in ICU). Administer 10mg/kg phenobarbital IV over 60 minutes. Maximum dose phenobarbital is 1040mg in 24 hours!

## 2020-10-11 NOTE — PLAN OF CARE
Problem: Falls - Risk of:  Goal: Will remain free from falls  Outcome: Ongoing  Note: The patient has been free of falls this shift. Bed is in low position, 2/4 rails are up, and alarm is active. Call light is in reach, 2/4 rails are up, and hourly rounding performed. Goal: Absence of physical injury  Outcome: Ongoing     Problem: Pain:  Description: Pain management should include both nonpharmacologic and pharmacologic interventions. Goal: Pain level will decrease  Outcome: Ongoing  Note: No c/o pain at this time. Goal: Control of acute pain  Outcome: Ongoing  Goal: Control of chronic pain  Outcome: Ongoing     Problem: Discharge Planning:  Goal: Participates in care planning  Outcome: Ongoing  Note: Discharge is pending at this time. Goal: Discharged to appropriate level of care  Outcome: Ongoing     Problem: Anxiety/Stress:  Goal: Level of anxiety will decrease  Outcome: Ongoing     Problem: Serum Glucose Level - Abnormal:  Goal: Ability to maintain appropriate glucose levels will improve to within specified parameters  Outcome: Ongoing  Note: Chem was 128. No insulin given. Problem: Skin Integrity - Impaired:  Goal: Will show no infection signs and symptoms  Outcome: Ongoing  Note: No new skin breakdown this shift. Goal: Absence of new skin breakdown  Outcome: Ongoing     Problem: Impaired respiratory status  Goal: Clear lung sounds  10/10/2020 2236 by Ekta Stern RN  Outcome: Ongoing  10/10/2020 2134 by Cliff Kessler RCP  Outcome: Met This Shift   Care plan reviewed with patient. The patient verbalizes understanding and contributed to plan of care.

## 2020-10-11 NOTE — PROGRESS NOTES
Utilize [facility] alcohol withdrawal scale (Based on [place] Modified Alcohol Withdrawal Scale). Tabulate score based on classifications including Tremor, Sweating, Hallucination, Orientation, and Agitation. Tremor: 0  Sweatin  Hallucinations: 0  Orientation: 0  Agitation: 0  Total Score: 1  Action perform as described below     Tremor:  No tremor is 0 points. Tremor on movement is 1 point. Tremor at rest is 2 points. Sweating: No Sweat 0 points. Moist is 1 point. Drenching sweats is 2 points. Hallucinations: No present 0 points. Dissuadable is 1 point. Not dissuadable is 2 points. Orientation: Oriented 0 points. Vague/detached 1 point. Disoriented/no contact 2 points. Agitation: Calm 0 points. Anxious 1 point. Panicky 2 points. Check scale every 2 hours. Discontinue scoring with 4 consecutive scorings of 0. Scale 0: No phenobarbital given. Re-assess every 60 minutes as needed. Scale 1-3: Phenobarbital 130 mg IV over 3 minutes. Re-assess every 60 minutes as needed. May administer every 60 minutes to a maximum dose of phenobarbital 1040 mg in 24 hours! Scale 4-8: Phenobarbital 260 mg IV over 5 minutes. Re-assess every 60 minutes as needed. May administer every 60 minutes to a maximum dose of phenobarbital 1040mg in 24 hours! Scale 9-10: Transfer to ICU (if not already in ICU). Administer 10mg/kg phenobarbital IV over 60 minutes. Maximum dose phenobarbital is 1040mg in 24 hours!

## 2020-10-11 NOTE — PROGRESS NOTES
Utilize [facility] alcohol withdrawal scale (Based on [place] Modified Alcohol Withdrawal Scale). Tabulate score based on classifications including Tremor, Sweating, Hallucination, Orientation, and Agitation. Tremor: 1  Sweatin  Hallucinations: 0  Orientation: 0  Agitation: 0  Total Score: 0  Action perform as described below     Tremor:  No tremor is 0 points. Tremor on movement is 1 point. Tremor at rest is 2 points. Sweating: No Sweat 0 points. Moist is 1 point. Drenching sweats is 2 points. Hallucinations: No present 0 points. Dissuadable is 1 point. Not dissuadable is 2 points. Orientation: Oriented 0 points. Vague/detached 1 point. Disoriented/no contact 2 points. Agitation: Calm 0 points. Anxious 1 point. Panicky 2 points. Check scale every 2 hours. Discontinue scoring with 4 consecutive scorings of 0. Scale 0: No phenobarbital given. Re-assess every 60 minutes as needed. Scale 1-3: Phenobarbital 130 mg IV over 3 minutes. Re-assess every 60 minutes as needed. May administer every 60 minutes to a maximum dose of phenobarbital 1040 mg in 24 hours! Scale 4-8: Phenobarbital 260 mg IV over 5 minutes. Re-assess every 60 minutes as needed. May administer every 60 minutes to a maximum dose of phenobarbital 1040mg in 24 hours! Scale 9-10: Transfer to ICU (if not already in ICU). Administer 10mg/kg phenobarbital IV over 60 minutes. Maximum dose phenobarbital is 1040mg in 24 hours! Pt is receiving scheduled Phenobarbital at this time.

## 2020-10-11 NOTE — PROGRESS NOTES
Daily Progress Note  Luiz Nugent MD  10/11/2020    Reviewed patient's current plan of care and vital signs with nursing staff. Patient is feeling better. He denies suicidal thoughts. His mood continues to improve. Mood 6 on a scale of 1 to 10 with 10 is feeling normal      SUBJECTIVE:    Patient is feeling better. denies suicidal ideation. ROS: Patient has new complaints:  No  Sleeping adequately:  Yes      Mental Status Examination:  Patient is cooperative. Speech normal pitch and normal volume. No abnormal movements, tics or mannerisms. Mood dysthymic, affect normal affect. Suicidal ideation Present. Homicidal ideations Absent. Hallucinations Absent. Delusions Absent. Thought process Goal oriented. Alert and oriented X 3. Attention and concentration fair. MEMORY intact. Insight and Judgement Limited.       Medications  Current Facility-Administered Medications: diphenhydrAMINE (BENADRYL) injection 25 mg, 25 mg, Intravenous, Q6H PRN  hydrOXYzine (VISTARIL) capsule 25 mg, 25 mg, Oral, TID PRN  LORazepam (ATIVAN) tablet 1 mg, 1 mg, Oral, Q1H PRN **OR** LORazepam (ATIVAN) injection 1 mg, 1 mg, Intravenous, Q1H PRN **OR** LORazepam (ATIVAN) tablet 2 mg, 2 mg, Oral, Q1H PRN **OR** LORazepam (ATIVAN) injection 2 mg, 2 mg, Intravenous, Q1H PRN **OR** LORazepam (ATIVAN) tablet 3 mg, 3 mg, Oral, Q1H PRN **OR** LORazepam (ATIVAN) injection 3 mg, 3 mg, Intravenous, Q1H PRN **OR** LORazepam (ATIVAN) tablet 4 mg, 4 mg, Oral, Q1H PRN **OR** LORazepam (ATIVAN) injection 4 mg, 4 mg, Intravenous, Q1H PRN  sodium chloride flush 0.9 % injection 10 mL, 10 mL, Intravenous, 2 times per day  sodium chloride flush 0.9 % injection 10 mL, 10 mL, Intravenous, PRN  acetaminophen (TYLENOL) tablet 650 mg, 650 mg, Oral, Q6H PRN **OR** acetaminophen (TYLENOL) suppository 650 mg, 650 mg, Rectal, Q6H PRN  polyethylene glycol (GLYCOLAX) packet 17 g, 17 g, Oral, Daily PRN  promethazine (PHENERGAN) tablet 12.5 mg, 12.5 mg, Oral, Q6H PRN **OR** ondansetron (ZOFRAN) injection 4 mg, 4 mg, Intravenous, Q6H PRN  enoxaparin (LOVENOX) injection 40 mg, 40 mg, Subcutaneous, Q24H  potassium chloride (KLOR-CON M) extended release tablet 40 mEq, 40 mEq, Oral, PRN **OR** potassium bicarb-citric acid (EFFER-K) effervescent tablet 40 mEq, 40 mEq, Oral, PRN **OR** potassium chloride 10 mEq/100 mL IVPB (Peripheral Line), 10 mEq, Intravenous, PRN  albuterol (PROVENTIL) nebulizer solution 2.5 mg, 2.5 mg, Nebulization, Q6H PRN  aspirin EC tablet 81 mg, 81 mg, Oral, Daily  escitalopram (LEXAPRO) tablet 20 mg, 20 mg, Oral, Daily  budesonide-formoterol (SYMBICORT) 160-4.5 MCG/ACT inhaler 2 puff, 2 puff, Inhalation, BID  QUEtiapine (SEROQUEL) tablet 25 mg, 25 mg, Oral, Nightly  insulin lispro (HUMALOG) injection vial 0-12 Units, 0-12 Units, Subcutaneous, TID WC  insulin lispro (HUMALOG) injection vial 0-6 Units, 0-6 Units, Subcutaneous, Nightly  glucose (GLUTOSE) 40 % oral gel 15 g, 15 g, Oral, PRN  dextrose 50 % IV solution, 12.5 g, Intravenous, PRN  glucagon (rDNA) injection 1 mg, 1 mg, Intramuscular, PRN  dextrose 5 % solution, 100 mL/hr, Intravenous, PRN  folic acid (FOLVITE) tablet 1 mg, 1 mg, Oral, Daily  vitamin B-1 (THIAMINE) tablet 100 mg, 100 mg, Oral, Daily  therapeutic multivitamin-minerals 1 tablet, 1 tablet, Oral, Daily    ASSESSMENT AND PLAN  Patient s symptoms  are improving  Continue with current psychotropics. Attempt to develop insight  Psycho-education conducted. Supportive Therapy conducted.

## 2020-10-11 NOTE — PLAN OF CARE
Problem: Impaired respiratory status  Goal: Clear lung sounds  10/11/2020 1932 by Cassy Torres RCP  Outcome: Met This Shift

## 2020-10-11 NOTE — PROGRESS NOTES
Hospitalist Progress Note    Patient:  Oz Pérez    Unit/Bed:6K-18/018-A  YOB: 1985  MRN: [id number]   Acct: [de-identified]   PCP: BARB Sanchez CNP  Code Status: Full Code  Date of Admission: 10/9/2020    Expected Discharge: 10/12? Disposition: Home vs psych admit? Continue phenobarb panel. Assessment/Plan:    1. Acute alcohol withdrawal: Phenobarb prophylaxis per protocol started 10/9. Continue MTV, folic acid, thiamine. Seizure precautions, addiction services consult. Phenobarb withdrawal panel added 10/10.     2. Suicidal ideations: On arrival; he denies suicidal thoughts at this time. He had no intent or plan. Sitter/suicide precautions. Psych following. 3. COPD: without acute exacerbation. Continue home inhalers. 4. IDDMII: HgbA1c 6.9. Resume home Lantus, 25U nightly plus SSI. Accu-checks. Hypoglycemia protocol.   - 10/11: Glucose has been low, pt did not receive lantus or SSI in last 24hrs. Will stop Lantus, continue SSI and monitor. 5. MDD, recurrent: resume home escitalopram, seroquel. Psych following. Chief Complaint: suicidal ideation     HPI / Hospital Course: 57-year-old male with PMHx COPD, alcohol abuse with withdrawal seizures, DMII, MDD who presented to [facility] with suicidal thoughts. The patient states that he was recently ill and has not been taking his home medications for approximately 2 weeks. He reports a history of alcoholism and states that he has been sober for approximately 2 years with a recent relapse and had drank again last night prior to coming in. Patient states this morning he felt depressed and like he wanted to hurt himself and did not want to be alive. He states this scared him so he came to the ER. He has no plan or intent on suicide. Patient appears anxious, has tremors. He reports some shortness of breath. Denies fever/chills, diaphoresis, chest pain, abdominal pain, N/V/D, urinary symptoms.   Patient is admitted to hospitalist service for acute alcohol withdrawal and suicidal ideations with psych consult. 10/10: Patient reports tremors, diaphoresis. Denies hallucinations, no seizure activity. Patient denies suicidal thoughts or ideations; states he continues to feel down and depressed but has no further thoughts of hurting himself. Subjective (past 24 hours): Pt continues to require prn phenobarb. Reports mild tremors, improved, night sweats. States he is still feeling down. Discussed his hardships going on at home and findings healthy ways to deal with stress. Patient denies fever/chills, sob, cp, abd pain, constipation. ROS: Pertinent positives as noted in HPI. All other systems reviewed and negative. Past medical history, family history, social history and allergies reviewed again and is unchanged since admission. Medications:  Reviewed. Infusion Medications    dextrose       Scheduled Medications    sodium chloride flush  10 mL Intravenous 2 times per day    enoxaparin  40 mg Subcutaneous Q24H    aspirin  81 mg Oral Daily    escitalopram  20 mg Oral Daily    budesonide-formoterol  2 puff Inhalation BID    insulin glargine  25 Units Subcutaneous Nightly    QUEtiapine  25 mg Oral Nightly    insulin lispro  0-12 Units Subcutaneous TID WC    insulin lispro  0-6 Units Subcutaneous Nightly    folic acid  1 mg Oral Daily    thiamine  100 mg Oral Daily    therapeutic multivitamin-minerals  1 tablet Oral Daily    PHENobarbital  32.4 mg Oral BID     PRN Meds:  PHENobarbital IVPB **OR** PHENobarbital IVPB **OR** PHENobarbital IVPB, sodium chloride flush, acetaminophen **OR** acetaminophen, polyethylene glycol, promethazine **OR** ondansetron, potassium chloride **OR** potassium alternative oral replacement **OR** potassium chloride, albuterol, hydrOXYzine, glucose, dextrose, glucagon (rDNA), dextrose    I/O:     Intake/Output Summary (Last 24 hours) at 10/11/2020 4763  Last data filed at 10/11/2020 1251  Gross per 24 hour   Intake 1563.84 ml   Output 0 ml   Net 1563.84 ml       Diet:  DIET GENERAL; No Added Salt (3-4 GM); Safety Tray; Safety Tray (Disposables)    Exam:  /88   Pulse 92   Temp 98.6 °F (37 °C) (Oral)   Resp 16   Ht 5' 2\" (1.575 m)   Wt 184 lb 6.4 oz (83.6 kg)   SpO2 97%   BMI 33.73 kg/m²   General:   Pleasant male. NAD. Tremors noted. HEENT:  normocephalic and atraumatic. No scleral icterus. PERR. Neck: supple. No JVD. No thyromegaly. Lungs: clear to auscultation. No retractions  Cardiac: RRR without murmur. Abdomen: soft. Nontender. Bowel sounds positive. Extremities:  No clubbing, cyanosis, or edema x 4. Vasculature: capillary refill < 3 seconds. Palpable LE pulses bilaterally. Skin:  Warm, diaphoretic. Psych:  Alert and oriented x3. Affect withdrawn. Lymph:  No supraclavicular adenopathy. Neurologic:  No focal deficit. No seizures. Data: (All radiographs, tracings, PFTs, and imaging are personally viewed and interpreted unless otherwise noted)  Labs:   Recent Labs     10/09/20  1110 10/10/20  0619   WBC 5.7 6.1   HGB 16.3 13.8*   HCT 47.1 40.4*    314     Recent Labs     10/09/20  1110 10/10/20  0619    137   K 4.1 3.9   CL 98 101   CO2 21* 23   BUN 6* 5*   CREATININE 0.6 0.5   CALCIUM 9.1 8.4*     Recent Labs     10/09/20  1110 10/10/20  0619   AST 41* 40   ALT 50 40   BILITOT 0.2* 0.5   ALKPHOS 99 76     No results for input(s): INR in the last 72 hours. No results for input(s): Verlena Moise in the last 72 hours.   Urinalysis:   Lab Results   Component Value Date    NITRU NEGATIVE 03/02/2020    WBCUA 0-2 03/02/2020    BACTERIA NONE SEEN 03/02/2020    RBCUA 0-2 03/02/2020    BLOODU MODERATE 03/02/2020    SPECGRAV 1.011 10/12/2013    GLUCOSEU >= 1000 03/02/2020     Urine culture:   Lab Results   Component Value Date    LABURIN 300 06/17/2019     Micro:   Blood culture #1:   Lab Results   Component Value Date    BC No growth-preliminary  No growth   10/02/2016     Blood culture #2:No results found for: Ingrid Tony  Organism:No results found for: [facility]      Lab Results   Component Value Date    LABGRAM  12/12/2017     Rare segmented neutrophils observed. Rare epithelial cells observed. Many gram positive cocci occurring singly and in pairs. Many gram negative bacilli. Many gram positive bacilli. MRSA culture only:No results found for: [facility]  Respiratory culture: No results found for: CULTRESP  Aerobic and Anaerobic :  Lab Results   Component Value Date    LABAERO Normal rizwana- preliminary  Normal rizwana   12/12/2017     Lab Results   Component Value Date    LABANAE  12/12/2017     No anaerobes isolated- preliminary  Culture yielded heavy mixed growth which included anaerobic  gram positive cocci. If a true mixed aerobic and anaerobic  infection is suspected, then broad spectrum empiric  antibiotic therapy is indicated and should include coverage  for anaerobic organisms. Radiology Reports:  No orders to display     No results found.       Tele:   [x] yes             [] no      Active Hospital Problems    Diagnosis Date Noted    [facility] withdrawal [facility], [facility]com[facility] ([facility] [C82.461] 10/09/2020       Electronically signed by Bisi Russo PA-C on 10/11/2020 at 1:17 PM

## 2020-10-12 LAB
ANION GAP SERPL CALCULATED.3IONS-SCNC: 13 MEQ/L (ref 8–16)
BUN BLDV-MCNC: 8 MG/DL (ref 7–22)
CALCIUM SERPL-MCNC: 9 MG/DL (ref 8.5–10.5)
CHLORIDE BLD-SCNC: 101 MEQ/L (ref 98–111)
CO2: 22 MEQ/L (ref 23–33)
CREAT SERPL-MCNC: 0.5 MG/DL (ref 0.4–1.2)
ERYTHROCYTE [DISTWIDTH] IN BLOOD BY AUTOMATED COUNT: 13.3 % (ref 11.5–14.5)
ERYTHROCYTE [DISTWIDTH] IN BLOOD BY AUTOMATED COUNT: 45.7 FL (ref 35–45)
GFR SERPL CREATININE-BSD FRML MDRD: > 90 ML/MIN/1.73M2
GLUCOSE BLD-MCNC: 142 MG/DL (ref 70–108)
GLUCOSE BLD-MCNC: 151 MG/DL (ref 70–108)
GLUCOSE BLD-MCNC: 153 MG/DL (ref 70–108)
GLUCOSE BLD-MCNC: 203 MG/DL (ref 70–108)
GLUCOSE BLD-MCNC: 215 MG/DL (ref 70–108)
HCT VFR BLD CALC: 45.4 % (ref 42–52)
HEMOGLOBIN: 15.1 GM/DL (ref 14–18)
MCH RBC QN AUTO: 31.3 PG (ref 26–33)
MCHC RBC AUTO-ENTMCNC: 33.3 GM/DL (ref 32.2–35.5)
MCV RBC AUTO: 94 FL (ref 80–94)
PLATELET # BLD: 293 THOU/MM3 (ref 130–400)
PMV BLD AUTO: 9.7 FL (ref 9.4–12.4)
POTASSIUM SERPL-SCNC: 4.2 MEQ/L (ref 3.5–5.2)
RBC # BLD: 4.83 MILL/MM3 (ref 4.7–6.1)
SODIUM BLD-SCNC: 136 MEQ/L (ref 135–145)
WBC # BLD: 7 THOU/MM3 (ref 4.8–10.8)

## 2020-10-12 PROCEDURE — 6360000002 HC RX W HCPCS: Performed by: PHYSICIAN ASSISTANT

## 2020-10-12 PROCEDURE — 94640 AIRWAY INHALATION TREATMENT: CPT

## 2020-10-12 PROCEDURE — 1200000003 HC TELEMETRY R&B

## 2020-10-12 PROCEDURE — 99232 SBSQ HOSP IP/OBS MODERATE 35: CPT | Performed by: PHYSICIAN ASSISTANT

## 2020-10-12 PROCEDURE — 94760 N-INVAS EAR/PLS OXIMETRY 1: CPT

## 2020-10-12 PROCEDURE — 85027 COMPLETE CBC AUTOMATED: CPT

## 2020-10-12 PROCEDURE — 80048 BASIC METABOLIC PNL TOTAL CA: CPT

## 2020-10-12 PROCEDURE — 36415 COLL VENOUS BLD VENIPUNCTURE: CPT

## 2020-10-12 PROCEDURE — 82948 REAGENT STRIP/BLOOD GLUCOSE: CPT

## 2020-10-12 PROCEDURE — 6370000000 HC RX 637 (ALT 250 FOR IP): Performed by: PHYSICIAN ASSISTANT

## 2020-10-12 PROCEDURE — 2580000003 HC RX 258: Performed by: PHYSICIAN ASSISTANT

## 2020-10-12 RX ORDER — DIPHENHYDRAMINE HYDROCHLORIDE 50 MG/ML
50 INJECTION INTRAMUSCULAR; INTRAVENOUS EVERY 6 HOURS PRN
Status: DISCONTINUED | OUTPATIENT
Start: 2020-10-12 | End: 2020-10-13 | Stop reason: HOSPADM

## 2020-10-12 RX ADMIN — Medication 100 MG: at 08:40

## 2020-10-12 RX ADMIN — SODIUM CHLORIDE, PRESERVATIVE FREE 10 ML: 5 INJECTION INTRAVENOUS at 20:43

## 2020-10-12 RX ADMIN — ESCITALOPRAM 20 MG: 20 TABLET, FILM COATED ORAL at 08:40

## 2020-10-12 RX ADMIN — ACETAMINOPHEN 650 MG: 325 TABLET ORAL at 15:48

## 2020-10-12 RX ADMIN — HYDROCORTISONE SODIUM SUCCINATE 50 MG: 100 INJECTION, POWDER, FOR SOLUTION INTRAMUSCULAR; INTRAVENOUS at 17:21

## 2020-10-12 RX ADMIN — QUETIAPINE FUMARATE 25 MG: 25 TABLET ORAL at 20:43

## 2020-10-12 RX ADMIN — DIPHENHYDRAMINE HYDROCHLORIDE 25 MG: 50 INJECTION, SOLUTION INTRAMUSCULAR; INTRAVENOUS at 08:38

## 2020-10-12 RX ADMIN — LORAZEPAM 1 MG: 1 TABLET ORAL at 03:38

## 2020-10-12 RX ADMIN — INSULIN LISPRO 4 UNITS: 100 INJECTION, SOLUTION INTRAVENOUS; SUBCUTANEOUS at 16:17

## 2020-10-12 RX ADMIN — ACETAMINOPHEN 650 MG: 325 TABLET ORAL at 08:38

## 2020-10-12 RX ADMIN — ENOXAPARIN SODIUM 40 MG: 40 INJECTION SUBCUTANEOUS at 16:15

## 2020-10-12 RX ADMIN — BUDESONIDE AND FORMOTEROL FUMARATE DIHYDRATE 2 PUFF: 160; 4.5 AEROSOL RESPIRATORY (INHALATION) at 07:38

## 2020-10-12 RX ADMIN — DIPHENHYDRAMINE HYDROCHLORIDE 50 MG: 50 INJECTION, SOLUTION INTRAMUSCULAR; INTRAVENOUS at 15:48

## 2020-10-12 RX ADMIN — BUDESONIDE AND FORMOTEROL FUMARATE DIHYDRATE 2 PUFF: 160; 4.5 AEROSOL RESPIRATORY (INHALATION) at 18:01

## 2020-10-12 RX ADMIN — INSULIN LISPRO 2 UNITS: 100 INJECTION, SOLUTION INTRAVENOUS; SUBCUTANEOUS at 11:35

## 2020-10-12 RX ADMIN — LORAZEPAM 2 MG: 2 INJECTION INTRAMUSCULAR; INTRAVENOUS at 20:58

## 2020-10-12 RX ADMIN — ONDANSETRON 4 MG: 2 INJECTION INTRAMUSCULAR; INTRAVENOUS at 16:23

## 2020-10-12 RX ADMIN — DIPHENHYDRAMINE HYDROCHLORIDE 25 MG: 50 INJECTION, SOLUTION INTRAMUSCULAR; INTRAVENOUS at 03:38

## 2020-10-12 RX ADMIN — FOLIC ACID 1 MG: 1 TABLET ORAL at 08:40

## 2020-10-12 RX ADMIN — ONDANSETRON 4 MG: 2 INJECTION INTRAMUSCULAR; INTRAVENOUS at 10:34

## 2020-10-12 RX ADMIN — INSULIN LISPRO 2 UNITS: 100 INJECTION, SOLUTION INTRAVENOUS; SUBCUTANEOUS at 08:41

## 2020-10-12 RX ADMIN — MULTIPLE VITAMINS W/ MINERALS TAB 1 TABLET: TAB at 08:40

## 2020-10-12 RX ADMIN — SODIUM CHLORIDE, PRESERVATIVE FREE 10 ML: 5 INJECTION INTRAVENOUS at 08:38

## 2020-10-12 RX ADMIN — LORAZEPAM 1 MG: 1 TABLET ORAL at 08:38

## 2020-10-12 RX ADMIN — ASPIRIN 81 MG: 81 TABLET ORAL at 08:38

## 2020-10-12 RX ADMIN — ONDANSETRON 4 MG: 2 INJECTION INTRAMUSCULAR; INTRAVENOUS at 03:38

## 2020-10-12 ASSESSMENT — PAIN SCALES - GENERAL
PAINLEVEL_OUTOF10: 0
PAINLEVEL_OUTOF10: 3
PAINLEVEL_OUTOF10: 0
PAINLEVEL_OUTOF10: 3
PAINLEVEL_OUTOF10: 4

## 2020-10-12 NOTE — PROGRESS NOTES
injection 4 mg, 4 mg, Intravenous, Q6H PRN  enoxaparin (LOVENOX) injection 40 mg, 40 mg, Subcutaneous, Q24H  potassium chloride (KLOR-CON M) extended release tablet 40 mEq, 40 mEq, Oral, PRN **OR** potassium bicarb-citric acid (EFFER-K) effervescent tablet 40 mEq, 40 mEq, Oral, PRN **OR** potassium chloride 10 mEq/100 mL IVPB (Peripheral Line), 10 mEq, Intravenous, PRN  albuterol (PROVENTIL) nebulizer solution 2.5 mg, 2.5 mg, Nebulization, Q6H PRN  aspirin EC tablet 81 mg, 81 mg, Oral, Daily  escitalopram (LEXAPRO) tablet 20 mg, 20 mg, Oral, Daily  budesonide-formoterol (SYMBICORT) 160-4.5 MCG/ACT inhaler 2 puff, 2 puff, Inhalation, BID  QUEtiapine (SEROQUEL) tablet 25 mg, 25 mg, Oral, Nightly  insulin lispro (HUMALOG) injection vial 0-12 Units, 0-12 Units, Subcutaneous, TID WC  insulin lispro (HUMALOG) injection vial 0-6 Units, 0-6 Units, Subcutaneous, Nightly  glucose (GLUTOSE) 40 % oral gel 15 g, 15 g, Oral, PRN  dextrose 50 % IV solution, 12.5 g, Intravenous, PRN  glucagon (rDNA) injection 1 mg, 1 mg, Intramuscular, PRN  dextrose 5 % solution, 100 mL/hr, Intravenous, PRN  folic acid (FOLVITE) tablet 1 mg, 1 mg, Oral, Daily  vitamin B-1 (THIAMINE) tablet 100 mg, 100 mg, Oral, Daily  therapeutic multivitamin-minerals 1 tablet, 1 tablet, Oral, Daily    ASSESSMENT AND PLAN  Patient s symptoms  are improving  Continue with current psychotropics. Attempt to develop insight  Psycho-education conducted. Supportive Therapy conducted.

## 2020-10-12 NOTE — PLAN OF CARE
Problem: Falls - Risk of:  Goal: Will remain free from falls  Description: Will remain free from falls  10/12/2020 1210 by Chano Hernandez RN  Outcome: Ongoing  Note: No falls noted this shift. Continue falling star program. Bed alarm on, bed in low position. Call light and personal belongings in reach. Patient uses call light appropriately. Problem: Pain:  Goal: Pain level will decrease  Description: Pain level will decrease  10/12/2020 1210 by Chano Hernandez RN  Outcome: Ongoing  Note: Patient complaints of headache, Tylenol PRN somewhat effective. Patient receive Ativan per CIWAA Scale , has so far received 1 mg PO . Problem: Discharge Planning:  Goal: Participates in care planning  Description: Participates in care planning  10/12/2020 1210 by Chano Hernandez RN  Outcome: Ongoing  Note: Patient preference to discharge home alone. Patient may have to transfer to  once medically stable. Problem: Anxiety/Stress:  Goal: Level of anxiety will decrease  Description: Level of anxiety will decrease  10/12/2020 1210 by Chano Hernandez RN  Outcome: Ongoing  Note: Patient continues to have tremors, headache , nausea, and mild anxiety from alcohol withdrawal, will continue to monitor. Problem: Serum Glucose Level - Abnormal:  Goal: Ability to maintain appropriate glucose levels will improve to within specified parameters  Description: Ability to maintain appropriate glucose levels will improve to within specified parameters  10/12/2020 1210 by Chano Hernandez RN  Outcome: Ongoing  Note: BS elevated slightly, has sliding scale Humalog available and Lantus at night time.       Problem: Skin Integrity - Impaired:  Goal: Will show no infection signs and symptoms  Description: Will show no infection signs and symptoms  10/12/2020 1210 by Chano Hernandez RN  Outcome: Ongoing  Note: No new signs or symptoms of skin breakdown noted this shift, encouraging patient to turn and reposition self in bed q2h  Patient has generalized rash that may have been related to Phenobarbital, Benadryl and Vistaril available . Patient refuses Vistaril, states Benadryl is not strong enough. Physician notified. Problem: Impaired respiratory status  Goal: Clear lung sounds  10/12/2020 1210 by Eleanor Kahn RN  Outcome: Ongoing  Note: Lung sounds are clear through out, denies shortness of breath, denies chest pain. Oxygen level WNL , VS Stable. Care plan reviewed with patient. Patient verbalize understanding of the plan of care and contribute to goal setting.

## 2020-10-12 NOTE — PROGRESS NOTES
Hospitalist Progress Note    Patient:  Nando Mo    Unit/Bed:6K-18/018-A  YOB: 1985  MRN: [id number]   Acct: [de-identified]   PCP: BARB Greenberg CNP  Code Status: Full Code  Date of Admission: 10/9/2020    Expected Discharge: 10/13  Disposition: Home; patient continuing to require prn ativan. Assessment/Plan:    1. Acute alcohol withdrawal: Phenobarb prophylaxis per protocol started 10/9 with withdrawal panel added 10/10. Continue MTV, folic acid, thiamine. Seizure precautions, addiction services consult. - Pt developed rash on 10/11 and phenobarb was changed to Ativan with CIWA scale. 2. Suicidal ideations: On arrival; he denies suicidal thoughts at this time. He had no intent or plan. Suicide precautions. Psych following. 3. COPD: without acute exacerbation. Continue home inhalers. 4. IDDMII: HgbA1c 6.9. Resume home Lantus, 25U nightly plus SSI. Accu-checks. Hypoglycemia protocol.   - 10/11: Glucose has been low, pt did not receive lantus or SSI in last 24hrs. Will stop Lantus, continue SSI and monitor. 5. MDD, recurrent: resume home escitalopram, seroquel. Psych following, plan for discharge home with OP [facility]. 6. Rash: developed on chest 10/11. Patient complaining the itching has gotten worse and only slight relief with benadryl. Will try one time dose iv hydrocortisone. Monitor. Chief Complaint: suicidal ideation     HPI / Hospital Course: 57-year-old male with PMHx COPD, alcohol abuse with withdrawal seizures, DMII, MDD who presented to [address] [place] [address] with suicidal thoughts. The patient states that he was recently ill and has not been taking his home medications for approximately 2 weeks. He reports a history of alcoholism and states that he has been sober for approximately 2 years with a recent relapse and had drank again last night prior to coming in.   Patient states this morning he felt depressed and like he wanted to hurt himself and did not want to be alive. He states this scared him so he came to the ER. He has no plan or intent on suicide. Patient appears anxious, has tremors. He reports some shortness of breath. Denies fever/chills, diaphoresis, chest pain, abdominal pain, N/V/D, urinary symptoms. Patient is admitted to hospitalist service for acute alcohol withdrawal and suicidal ideations with psych consult. 10/10: Patient reports tremors, diaphoresis. Denies hallucinations, no seizure activity. Patient denies suicidal thoughts or ideations; states he continues to feel down and depressed but has no further thoughts of hurting himself. 10/11: Patient developed itchy rash on chest. Benadryl ordered. Switched phenobarb to Ativan with CIWA scale. Subjective (past 24 hours): Patient continues to have tremors. Reports rash with worsening itching. Denies fever/chills, sob, cp, abd pain. ROS: Pertinent positives as noted in HPI. All other systems reviewed and negative. Past medical history, family history, social history and allergies reviewed again and is unchanged since admission. Medications:  Reviewed.   Infusion Medications    dextrose       Scheduled Medications    sodium chloride flush  10 mL Intravenous 2 times per day    enoxaparin  40 mg Subcutaneous Q24H    aspirin  81 mg Oral Daily    escitalopram  20 mg Oral Daily    budesonide-formoterol  2 puff Inhalation BID    QUEtiapine  25 mg Oral Nightly    insulin lispro  0-12 Units Subcutaneous TID     insulin lispro  0-6 Units Subcutaneous Nightly    folic acid  1 mg Oral Daily    thiamine  100 mg Oral Daily    therapeutic multivitamin-minerals  1 tablet Oral Daily     PRN Meds: diphenhydrAMINE, hydrOXYzine, LORazepam **OR** LORazepam **OR** LORazepam **OR** LORazepam **OR** LORazepam **OR** LORazepam **OR** LORazepam **OR** LORazepam, sodium chloride flush, acetaminophen **OR** acetaminophen, polyethylene glycol, promethazine **OR** ondansetron, potassium chloride **OR** potassium alternative oral replacement **OR** potassium chloride, albuterol, glucose, dextrose, glucagon (rDNA), dextrose    I/O:     Intake/Output Summary (Last 24 hours) at 10/12/2020 1016  Last data filed at 10/12/2020 0334  Gross per 24 hour   Intake 610 ml   Output 0 ml   Net 610 ml       Diet:  DIET GENERAL; Safety Tray; Safety Tray (Disposables)    Exam:  /77   Pulse 88   Temp 98.4 °F (36.9 °C) (Oral)   Resp 16   Ht 5' 2\" (1.575 m)   Wt 168 lb 6.4 oz (76.4 kg)   SpO2 99%   BMI 30.80 kg/m²   General:   Pleasant male. NAD. Tremors noted. HEENT:  normocephalic and atraumatic. No scleral icterus. PERR. Neck: supple. No JVD. No thyromegaly. Lungs: clear to auscultation. No retractions  Cardiac: RRR without murmur. Abdomen: soft. Nontender. Bowel sounds positive. Extremities:  No clubbing, cyanosis, or edema x 4. Vasculature: capillary refill < 3 seconds. Palpable LE pulses bilaterally. Skin:  Warm, diaphoretic. Psych:  Alert and oriented x3. Affect withdrawn. Lymph:  No supraclavicular adenopathy. Neurologic:  No focal deficit. No seizures. Data: (All radiographs, tracings, PFTs, and imaging are personally viewed and interpreted unless otherwise noted)  Labs:   Recent Labs     10/09/20  1110 10/10/20  0619 10/12/20  0627   WBC 5.7 6.1 7.0   HGB 16.3 13.8* 15.1   HCT 47.1 40.4* 45.4    314 293     Recent Labs     10/09/20  1110 10/10/20  0619 10/12/20  0627    137 136   K 4.1 3.9 4.2   CL 98 101 101   CO2 21* 23 22*   BUN 6* 5* 8   CREATININE 0.6 0.5 0.5   CALCIUM 9.1 8.4* 9.0     Recent Labs     10/09/20  1110 10/10/20  0619   AST 41* 40   ALT 50 40   BILITOT 0.2* 0.5   ALKPHOS 99 76     No results for input(s): INR in the last 72 hours. No results for input(s): Edward Ramírezks in the last 72 hours.   Urinalysis:   Lab Results   Component Value Date    NITRU NEGATIVE 03/02/2020    WBCUA 0-2 03/02/2020    BACTERIA NONE SEEN 03/02/2020    RBCUA 0-2 03/02/2020    BLOODU MODERATE 03/02/2020    SPECGRAV 1.011 10/12/2013    GLUCOSEU >= 1000 03/02/2020     Urine culture:   Lab Results   Component Value Date    LABURIN 300 06/17/2019     Micro:   Blood culture #1:   Lab Results   Component Value Date    BC No growth-preliminary  No growth   10/02/2016     Blood culture #2:No results found for: Ryne Fonseca  Organism:No results found for: [facility]      Lab Results   Component Value Date    LABGRAM  12/12/2017     Rare segmented neutrophils observed. Rare epithelial cells observed. Many gram positive cocci occurring singly and in pairs. Many gram negative bacilli. Many gram positive bacilli. MRSA culture only:No results found for: [facility]  Respiratory culture: No results found for: CULTRESP  Aerobic and Anaerobic :  Lab Results   Component Value Date    LABAERO Normal rizwana- preliminary  Normal rizwana   12/12/2017     Lab Results   Component Value Date    LABANAE  12/12/2017     No anaerobes isolated- preliminary  Culture yielded heavy mixed growth which included anaerobic  gram positive cocci. If a true mixed aerobic and anaerobic  infection is suspected, then broad spectrum empiric  antibiotic therapy is indicated and should include coverage  for anaerobic organisms. Radiology Reports:  No orders to display     No results found.       Tele:   [x] yes             [] no      Active Hospital Problems    Diagnosis Date Noted    [facility], [facility]com[facility] ([facility] [R76.681] 10/09/2020       Electronically signed by Misa Cordova PA-C on 10/12/2020 at 10:16 AM

## 2020-10-12 NOTE — PLAN OF CARE
Problem: Falls - Risk of:  Goal: Will remain free from falls  Outcome: Ongoing  Note: The patient has been free of falls this shift. Bed is in low position, 2/4 rails are up, and alarm is active. Call light is in reach, 2/4 rails are up, and hourly rounding performed. Goal: Absence of physical injury  Outcome: Ongoing     Problem: Pain:  Description: Pain management should include both nonpharmacologic and pharmacologic interventions. Goal: Pain level will decrease  Outcome: Ongoing  Note: No c/o pain at this time. Goal: Control of acute pain  Outcome: Ongoing  Goal: Control of chronic pain  Outcome: Ongoing     Problem: Discharge Planning:  Goal: Participates in care planning  Outcome: Ongoing  Note: Discharge is pending at this time. Goal: Discharged to appropriate level of care  Outcome: Ongoing     Problem: Anxiety/Stress:  Goal: Level of anxiety will decrease  Outcome: Ongoing  Note: CIWA scale initiated and ativan ordered prn. Problem: Serum Glucose Level - Abnormal:  Goal: Ability to maintain appropriate glucose levels will improve to within specified parameters  Outcome: Ongoing     Problem: Skin Integrity - Impaired:  Goal: Will show no infection signs and symptoms  Outcome: Ongoing  Note: No new skin breakdown this shift. Goal: Absence of new skin breakdown  Outcome: Ongoing   Care plan reviewed with patient. The patient verbalizes understanding and contributed to plan of care.

## 2020-10-12 NOTE — CARE COORDINATION
10/12/20, 9:51 AM EDT  DISCHARGE PLANNING EVALUATION:    Samy Otoole       Admitted from: ER 10/9/2020/ [address] [facility] day: 3   Location: On license of [facility]18/[facility] Reason for admit: Alcohol withdrawal syndrome, uncomplicated ([facility].) [I28.877] Status: IP   Admit order signed?: yes  PMH:  has a past medical history of Asthma, COPD (chronic obstructive pulmonary disease) ([facility] Eczema, GERD (gastroesophageal reflux disease), History of alcohol abuse, History of marijuana use, History of tobacco abuse, Hx of seasonal allergies, Pancreatitis, Seizures ([facility] and Type 2 diabetes mellitus without complication ([facility] Medications:  Scheduled Meds:   sodium chloride flush  10 mL Intravenous 2 times per day    enoxaparin  40 mg Subcutaneous Q24H    aspirin  81 mg Oral Daily    escitalopram  20 mg Oral Daily    budesonide-formoterol  2 puff Inhalation BID    QUEtiapine  25 mg Oral Nightly    insulin lispro  0-12 Units Subcutaneous TID WC    insulin lispro  0-6 Units Subcutaneous Nightly    folic acid  1 mg Oral Daily    thiamine  100 mg Oral Daily    therapeutic multivitamin-minerals  1 tablet Oral Daily     Continuous Infusions:   dextrose        Pertinent Info/Orders/Treatment Plan:  CIWA protocol, switched from phenobarb to ativan due to rash. Psych following, feel patient can return home with f/u at [facility]. Diet: DIET GENERAL; Safety Tray; Safety Tray (Disposables)   Smoking status:  reports that he has been smoking cigarettes. He has a 11.00 pack-year smoking history.  He has never used smokeless tobacco.   PCP: BARB Garza CNP  Readmission 30 days or less: no  Readmission Risk Score: 17%    Discharge Planning Evaluation  Current Residence:  Private Residence  Living Arrangements:  Alone   Support Systems:  Children, Family Members  Current Services PTA:     Potential Assistance Needed:  N/A  Potential Assistance Purchasing Medications:  No  Does patient want to participate in local refill/ meds to beds program?  Yes  Type of Home Care Services:  None  Patient expects to be discharged to:  [facility]  Expected Discharge date:  10/12/20  Follow Up Appointment: Best Day/ Time: Monday AM    Patient Goals/Plan/Treatment Preferences: Spoke with  Willa Perez, he is from home alone. He states that he is already aligned with [facility] services and will have f/u at discharge. He denies all other needs. Anticipate discharge tomorrow. Transportation/Food Security/Housekeeping Addressed:  No issues identified.     Evaluation: no

## 2020-10-12 NOTE — PLAN OF CARE
Problem: Impaired respiratory status  Goal: Clear lung sounds  10/12/2020 1803 by Leti Ang RCP  Outcome: Ongoing

## 2020-10-12 NOTE — PLAN OF CARE
Problem: Impaired respiratory status  Goal: Clear lung sounds  10/12/2020 0743 by Linda Ferrell RCP  Outcome: Ongoing

## 2020-10-13 VITALS
SYSTOLIC BLOOD PRESSURE: 123 MMHG | DIASTOLIC BLOOD PRESSURE: 85 MMHG | HEIGHT: 62 IN | WEIGHT: 169.6 LBS | RESPIRATION RATE: 16 BRPM | OXYGEN SATURATION: 99 % | TEMPERATURE: 98.4 F | BODY MASS INDEX: 31.21 KG/M2 | HEART RATE: 89 BPM

## 2020-10-13 LAB
GLUCOSE BLD-MCNC: 116 MG/DL (ref 70–108)
GLUCOSE BLD-MCNC: 183 MG/DL (ref 70–108)

## 2020-10-13 PROCEDURE — 6370000000 HC RX 637 (ALT 250 FOR IP): Performed by: PHYSICIAN ASSISTANT

## 2020-10-13 PROCEDURE — 6360000002 HC RX W HCPCS: Performed by: PHYSICIAN ASSISTANT

## 2020-10-13 PROCEDURE — 94760 N-INVAS EAR/PLS OXIMETRY 1: CPT

## 2020-10-13 PROCEDURE — 99239 HOSP IP/OBS DSCHRG MGMT >30: CPT | Performed by: PHYSICIAN ASSISTANT

## 2020-10-13 PROCEDURE — 2580000003 HC RX 258: Performed by: PHYSICIAN ASSISTANT

## 2020-10-13 PROCEDURE — 94640 AIRWAY INHALATION TREATMENT: CPT

## 2020-10-13 PROCEDURE — 82948 REAGENT STRIP/BLOOD GLUCOSE: CPT

## 2020-10-13 RX ORDER — LANOLIN ALCOHOL/MO/W.PET/CERES
100 CREAM (GRAM) TOPICAL DAILY
Qty: 30 TABLET | Refills: 3 | Status: SHIPPED | OUTPATIENT
Start: 2020-10-14

## 2020-10-13 RX ORDER — FOLIC ACID 1 MG/1
1 TABLET ORAL DAILY
Qty: 30 TABLET | Refills: 3 | Status: ON HOLD | OUTPATIENT
Start: 2020-10-14 | End: 2020-11-17

## 2020-10-13 RX ADMIN — DIPHENHYDRAMINE HYDROCHLORIDE 50 MG: 50 INJECTION, SOLUTION INTRAMUSCULAR; INTRAVENOUS at 14:35

## 2020-10-13 RX ADMIN — ONDANSETRON 4 MG: 2 INJECTION INTRAMUSCULAR; INTRAVENOUS at 11:26

## 2020-10-13 RX ADMIN — LORAZEPAM 1 MG: 1 TABLET ORAL at 07:55

## 2020-10-13 RX ADMIN — BUDESONIDE AND FORMOTEROL FUMARATE DIHYDRATE 2 PUFF: 160; 4.5 AEROSOL RESPIRATORY (INHALATION) at 07:51

## 2020-10-13 RX ADMIN — SODIUM CHLORIDE, PRESERVATIVE FREE 10 ML: 5 INJECTION INTRAVENOUS at 07:42

## 2020-10-13 RX ADMIN — LORAZEPAM 1 MG: 2 INJECTION INTRAMUSCULAR; INTRAVENOUS at 04:38

## 2020-10-13 RX ADMIN — ESCITALOPRAM 20 MG: 20 TABLET, FILM COATED ORAL at 07:55

## 2020-10-13 RX ADMIN — Medication 100 MG: at 07:41

## 2020-10-13 RX ADMIN — HYDROCORTISONE SODIUM SUCCINATE 50 MG: 100 INJECTION, POWDER, FOR SOLUTION INTRAMUSCULAR; INTRAVENOUS at 16:08

## 2020-10-13 RX ADMIN — ACETAMINOPHEN 650 MG: 325 TABLET ORAL at 00:16

## 2020-10-13 RX ADMIN — MULTIPLE VITAMINS W/ MINERALS TAB 1 TABLET: TAB at 07:41

## 2020-10-13 RX ADMIN — INSULIN LISPRO 2 UNITS: 100 INJECTION, SOLUTION INTRAVENOUS; SUBCUTANEOUS at 07:41

## 2020-10-13 RX ADMIN — FOLIC ACID 1 MG: 1 TABLET ORAL at 07:41

## 2020-10-13 RX ADMIN — ASPIRIN 81 MG: 81 TABLET ORAL at 07:42

## 2020-10-13 RX ADMIN — ONDANSETRON 4 MG: 2 INJECTION INTRAMUSCULAR; INTRAVENOUS at 04:39

## 2020-10-13 RX ADMIN — DIPHENHYDRAMINE HYDROCHLORIDE 50 MG: 50 INJECTION, SOLUTION INTRAMUSCULAR; INTRAVENOUS at 00:16

## 2020-10-13 RX ADMIN — LORAZEPAM 1 MG: 2 INJECTION INTRAMUSCULAR; INTRAVENOUS at 00:26

## 2020-10-13 RX ADMIN — DIPHENHYDRAMINE HYDROCHLORIDE 50 MG: 50 INJECTION, SOLUTION INTRAMUSCULAR; INTRAVENOUS at 07:42

## 2020-10-13 ASSESSMENT — PAIN DESCRIPTION - PROGRESSION
CLINICAL_PROGRESSION: GRADUALLY WORSENING

## 2020-10-13 ASSESSMENT — PAIN - FUNCTIONAL ASSESSMENT: PAIN_FUNCTIONAL_ASSESSMENT: ACTIVITIES ARE NOT PREVENTED

## 2020-10-13 ASSESSMENT — PAIN DESCRIPTION - FREQUENCY: FREQUENCY: CONTINUOUS

## 2020-10-13 ASSESSMENT — PAIN DESCRIPTION - ONSET: ONSET: ON-GOING

## 2020-10-13 ASSESSMENT — PAIN SCALES - GENERAL
PAINLEVEL_OUTOF10: 4
PAINLEVEL_OUTOF10: 4

## 2020-10-13 ASSESSMENT — PAIN DESCRIPTION - DESCRIPTORS: DESCRIPTORS: HEADACHE

## 2020-10-13 ASSESSMENT — PAIN DESCRIPTION - ORIENTATION: ORIENTATION: POSTERIOR

## 2020-10-13 ASSESSMENT — PAIN DESCRIPTION - LOCATION: LOCATION: HEAD

## 2020-10-13 ASSESSMENT — PAIN DESCRIPTION - PAIN TYPE: TYPE: ACUTE PAIN

## 2020-10-13 NOTE — PLAN OF CARE
Problem: Impaired respiratory status  Goal: Clear lung sounds  10/13/2020 0755 by Staci Sandifer, RCP  Outcome: Ongoing

## 2020-10-13 NOTE — PLAN OF CARE
Problem: Falls - Risk of:  Goal: Will remain free from falls  Description: Will remain free from falls  Outcome: Ongoing  Note: No falls noted this shift. Falling star protocol in place and call light within reach. Bed alarm active and nonskid socks on. Patient utilizes call light appropriately        Problem: Falls - Risk of:  Goal: Absence of physical injury  Description: Absence of physical injury  Outcome: Ongoing     Problem: Pain:  Goal: Pain level will decrease  Description: Pain level will decrease  Outcome: Ongoing     Problem: Pain:  Goal: Control of acute pain  Description: Control of acute pain  Outcome: Ongoing  Note: Pt states pain is a 4/10 on the 0 to 10 pain scale. PRN pain medication given per [facility]     Problem: Pain:  Goal: Control of chronic pain  Description: Control of chronic pain  Outcome: Ongoing     Problem: Impaired respiratory status  Goal: Clear lung sounds  10/13/2020 0301 by Amie Cevallos RN  Outcome: Ongoing  Note: Lung sounds are clear throughout. Will continue to assess      Problem: Discharge Planning:  Goal: Participates in care planning  Description: Participates in care planning  Outcome: Ongoing     Problem: Discharge Planning:  Goal: Discharged to appropriate level of care  Description: Discharged to appropriate level of care  Outcome: Ongoing  Note: Pt plans to return home alone when medically stable      Problem: Anxiety/Stress:  Goal: Level of anxiety will decrease  Description: Level of anxiety will decrease  Outcome: Ongoing     Problem: Serum Glucose Level - Abnormal:  Goal: Ability to maintain appropriate glucose levels will improve to within specified parameters  Description: Ability to maintain appropriate glucose levels will improve to within specified parameters  Outcome: Ongoing  Note: ACHS protocol in place. Hyperglycemic and hypoglycemic orders utilized when needed per [facility]. Will continue to monitor.        Problem: Skin Integrity - Impaired:  Goal: Will show no infection signs and symptoms  Description: Will show no infection signs and symptoms  Outcome: Ongoing     Problem: Skin Integrity - Impaired:  Goal: Absence of new skin breakdown  Description: Absence of new skin breakdown  Outcome: Ongoing  Note: No new skin breakdown noted this shift. Patient encouraged to turn and make frequent positional changes. Will continue to monitor. Care plan reviewed with patient. Patient  verbalize understanding of the plan of care and contribute to goal setting.

## 2020-10-13 NOTE — PROGRESS NOTES
Daily Progress Note  Osvaldo Moreno MD  10/13/2020    Reviewed patient's current plan of care and vital signs with nursing staff. Patient is feeling somewhat better. Mood 6 on a scale of 1 to 10 with 10 is feeling normal.  He misses his children; he is hoping that his mood will get better after interacting with his children. He feels ready for discharge. SUBJECTIVE:    Patient is feeling better. denies suicidal ideation. ROS: Patient has new complaints:  No  Sleeping adequately:  Yes      Mental Status Examination:  Patient is cooperative. Speech normal pitch and normal volume. No abnormal movements, tics or mannerisms. Mood dysthymic, affect normal affect. Suicidal ideation Present. Homicidal ideations Absent. Hallucinations Absent. Delusions Absent. Thought process Goal oriented. Alert and oriented X 3. Attention and concentration fair. MEMORY intact. Insight and Judgement Limited.       Medications  Current Facility-Administered Medications: diphenhydrAMINE (BENADRYL) injection 50 mg, 50 mg, Intravenous, Q6H PRN  LORazepam (ATIVAN) tablet 1 mg, 1 mg, Oral, Q1H PRN **OR** LORazepam (ATIVAN) injection 1 mg, 1 mg, Intravenous, Q1H PRN **OR** LORazepam (ATIVAN) tablet 2 mg, 2 mg, Oral, Q1H PRN **OR** LORazepam (ATIVAN) injection 2 mg, 2 mg, Intravenous, Q1H PRN **OR** LORazepam (ATIVAN) tablet 3 mg, 3 mg, Oral, Q1H PRN **OR** LORazepam (ATIVAN) injection 3 mg, 3 mg, Intravenous, Q1H PRN **OR** LORazepam (ATIVAN) tablet 4 mg, 4 mg, Oral, Q1H PRN **OR** LORazepam (ATIVAN) injection 4 mg, 4 mg, Intravenous, Q1H PRN  sodium chloride flush 0.9 % injection 10 mL, 10 mL, Intravenous, 2 times per day  sodium chloride flush 0.9 % injection 10 mL, 10 mL, Intravenous, PRN  acetaminophen (TYLENOL) tablet 650 mg, 650 mg, Oral, Q6H PRN **OR** acetaminophen (TYLENOL) suppository 650 mg, 650 mg, Rectal, Q6H PRN  polyethylene glycol (GLYCOLAX) packet 17 g, 17 g, Oral, Daily PRN  promethazine (PHENERGAN) tablet 12.5 mg, 12.5 mg, Oral, Q6H PRN **OR** ondansetron (ZOFRAN) injection 4 mg, 4 mg, Intravenous, Q6H PRN  enoxaparin (LOVENOX) injection 40 mg, 40 mg, Subcutaneous, Q24H  potassium chloride (KLOR-CON M) extended release tablet 40 mEq, 40 mEq, Oral, PRN **OR** potassium bicarb-citric acid (EFFER-K) effervescent tablet 40 mEq, 40 mEq, Oral, PRN **OR** potassium chloride 10 mEq/100 mL IVPB (Peripheral Line), 10 mEq, Intravenous, PRN  albuterol (PROVENTIL) nebulizer solution 2.5 mg, 2.5 mg, Nebulization, Q6H PRN  aspirin EC tablet 81 mg, 81 mg, Oral, Daily  escitalopram (LEXAPRO) tablet 20 mg, 20 mg, Oral, Daily  budesonide-formoterol (SYMBICORT) 160-4.5 MCG/ACT inhaler 2 puff, 2 puff, Inhalation, BID  QUEtiapine (SEROQUEL) tablet 25 mg, 25 mg, Oral, Nightly  insulin lispro (HUMALOG) injection vial 0-12 Units, 0-12 Units, Subcutaneous, TID WC  insulin lispro (HUMALOG) injection vial 0-6 Units, 0-6 Units, Subcutaneous, Nightly  glucose (GLUTOSE) 40 % oral gel 15 g, 15 g, Oral, PRN  dextrose 50 % IV solution, 12.5 g, Intravenous, PRN  glucagon (rDNA) injection 1 mg, 1 mg, Intramuscular, PRN  dextrose 5 % solution, 100 mL/hr, Intravenous, PRN  folic acid (FOLVITE) tablet 1 mg, 1 mg, Oral, Daily  vitamin B-1 (THIAMINE) tablet 100 mg, 100 mg, Oral, Daily  therapeutic multivitamin-minerals 1 tablet, 1 tablet, Oral, Daily    ASSESSMENT AND PLAN  Patient s symptoms  are improving  Continue with current psychotropics. Attempt to develop insight  Psycho-education conducted. Supportive Therapy conducted.   Ok to discharge per Psych standpoint  Follow-up @ [address]

## 2020-10-13 NOTE — FLOWSHEET NOTE
Patient   Referral/Consult From: Radha [facility] Family members; /;12 step group  (follows with Noah Cassidy)   Continue Visiting Yes  (10/13)   Complexity of Encounter Moderate   Spiritual Assessment Completed Yes   Spiritual/Sabianist   Type Spiritual struggle   Assessment Approachable;Grieving; Hopeless;Guilt; Shame; Concerns with suffering;Questioning meaning/purpose   Intervention Active listening;Explored feelings, thoughts, concerns;Explored coping resources;Nurtured hope;Sustaining presence/ Ministry of presence; Discussed illness/injury and it's impact   Outcome Comfort;Expressed gratitude;Engaged in conversation; Less anxious, less agitated;Expressed regrets;Grieving;Receptive

## 2020-10-13 NOTE — CARE COORDINATION
10/13/20, 1:33 PM EDT    DISCHARGE ONGOING EVALUATION:     Mark Bull day: 4  Location: [facility]/018- Reason for admit: Alcohol withdrawal syndrome, uncomplicated ([facility] [S46.169]   Treatment Plan of Care: Dallin Basurto last had ativan at [phone]. Psych has signed off. Beto LORD states may be able to discharge tonight if continues to feel well and not need ativan. Barriers to Discharge: await symptoms of detox to subside  PCP: BARB Cordoba CNP  Readmission Risk Score: 18%  Patient Goals/Plan/Treatment Preferences: Home alone. Follows with Gia Evans. Denies further needs.

## 2020-10-13 NOTE — PROGRESS NOTES
Discharge teaching and instructions for diagnosis/procedure of Alcohol use disorder completed with patient using teachback method. AVS reviewed. Prescriptions sent to pharmacy. Patient voiced understanding regarding prescriptions, follow up appointments, and care of self at home.  Discharged in a wheelchair to  home with support per self

## 2020-10-14 ENCOUNTER — TELEPHONE (OUTPATIENT)
Dept: FAMILY MEDICINE CLINIC | Age: 35
End: 2020-10-14

## 2020-10-14 NOTE — TELEPHONE ENCOUNTER
[facility] Transitions Initial Follow Up Call    Outreach made within 2 business days of discharge: Yes    Patient: Ariadne Awad Patient : 1985   MRN: [id number]  Reason for Admission: There are no discharge diagnoses documented for the most recent discharge. Discharge Date: 10/13/20       Spoke with: Nancy Brewster    Discharge department/facility: [facility]    TCM Interactive Patient Contact:  Was patient able to fill all prescriptions: Yes  Was patient instructed to bring all medications to the follow-up visit: Yes  Is patient taking all medications as directed in the discharge summary?  Yes  Does patient understand their discharge instructions: Yes  Does patient have questions or concerns that need addressed prior to 7-14 day follow up office visit: no    Scheduled appointment with PCP within 7-14 days    Follow Up  Future Appointments   Date Time Provider Malachi Gray   10/20/2020  9:00 AM Gilberto Kaur 4955, 100[address] [facility] ([address] [place] [address])

## 2020-10-16 ENCOUNTER — HOSPITAL ENCOUNTER (INPATIENT)
Age: 35
LOS: 4 days | Discharge: PSYCHIATRIC OR SUBSTANCE USE FACILITY | End: 2020-10-20
Attending: INTERNAL MEDICINE
Payer: COMMERCIAL

## 2020-10-16 PROBLEM — F10.930 ALCOHOL WITHDRAWAL, UNCOMPLICATED (HCC): Status: ACTIVE | Noted: 2020-10-16

## 2020-10-16 LAB
ACETAMINOPHEN LEVEL: < 5 UG/ML (ref 0–20)
ALBUMIN SERPL-MCNC: 4.5 G/DL (ref 3.5–5.1)
ALP BLD-CCNC: 107 U/L (ref 38–126)
ALT SERPL-CCNC: 57 U/L (ref 11–66)
AMPHETAMINE+METHAMPHETAMINE URINE SCREEN: NEGATIVE
ANION GAP SERPL CALCULATED.3IONS-SCNC: 16 MEQ/L (ref 8–16)
AST SERPL-CCNC: 29 U/L (ref 5–40)
BARBITURATE QUANTITATIVE URINE: POSITIVE
BASOPHILS # BLD: 1.2 %
BASOPHILS ABSOLUTE: 0.1 THOU/MM3 (ref 0–0.1)
BENZODIAZEPINE QUANTITATIVE URINE: NEGATIVE
BILIRUB SERPL-MCNC: 0.2 MG/DL (ref 0.3–1.2)
BILIRUBIN URINE: NEGATIVE
BLOOD, URINE: NEGATIVE
BUN BLDV-MCNC: 5 MG/DL (ref 7–22)
CALCIUM SERPL-MCNC: 9.1 MG/DL (ref 8.5–10.5)
CANNABINOID QUANTITATIVE URINE: NEGATIVE
CHARACTER, URINE: CLEAR
CHLORIDE BLD-SCNC: 107 MEQ/L (ref 98–111)
CO2: 23 MEQ/L (ref 23–33)
COCAINE METABOLITE QUANTITATIVE URINE: NEGATIVE
COLOR: YELLOW
CREAT SERPL-MCNC: 0.5 MG/DL (ref 0.4–1.2)
EKG ATRIAL RATE: 113 BPM
EKG P AXIS: 46 DEGREES
EKG P-R INTERVAL: 144 MS
EKG Q-T INTERVAL: 340 MS
EKG QRS DURATION: 84 MS
EKG QTC CALCULATION (BAZETT): 466 MS
EKG R AXIS: 29 DEGREES
EKG T AXIS: 55 DEGREES
EKG VENTRICULAR RATE: 113 BPM
EOSINOPHIL # BLD: 1.6 %
EOSINOPHILS ABSOLUTE: 0.1 THOU/MM3 (ref 0–0.4)
ERYTHROCYTE [DISTWIDTH] IN BLOOD BY AUTOMATED COUNT: 13.9 % (ref 11.5–14.5)
ERYTHROCYTE [DISTWIDTH] IN BLOOD BY AUTOMATED COUNT: 47.8 FL (ref 35–45)
ETHYL ALCOHOL, SERUM: 0.32 %
FOLATE: 7.7 NG/ML (ref 4.8–24.2)
GFR SERPL CREATININE-BSD FRML MDRD: > 90 ML/MIN/1.73M2
GLUCOSE BLD-MCNC: 138 MG/DL (ref 70–108)
GLUCOSE BLD-MCNC: 179 MG/DL (ref 70–108)
GLUCOSE URINE: 100 MG/DL
HCT VFR BLD CALC: 44.9 % (ref 42–52)
HEMOGLOBIN: 15.3 GM/DL (ref 14–18)
IMMATURE GRANS (ABS): 0.13 THOU/MM3 (ref 0–0.07)
IMMATURE GRANULOCYTES: 1.9 %
KETONES, URINE: NEGATIVE
LEUKOCYTE ESTERASE, URINE: NEGATIVE
LIPASE: 16.6 U/L (ref 5.6–51.3)
LYMPHOCYTES # BLD: 26.8 %
LYMPHOCYTES ABSOLUTE: 1.9 THOU/MM3 (ref 1–4.8)
MCH RBC QN AUTO: 31.8 PG (ref 26–33)
MCHC RBC AUTO-ENTMCNC: 34.1 GM/DL (ref 32.2–35.5)
MCV RBC AUTO: 93.3 FL (ref 80–94)
MONOCYTES # BLD: 9.8 %
MONOCYTES ABSOLUTE: 0.7 THOU/MM3 (ref 0.4–1.3)
NITRITE, URINE: NEGATIVE
NUCLEATED RED BLOOD CELLS: 0 /100 WBC
OPIATES, URINE: NEGATIVE
OSMOLALITY CALCULATION: 292.3 MOSMOL/KG (ref 275–300)
OXYCODONE: NEGATIVE
PH UA: 5.5 (ref 5–9)
PHENCYCLIDINE QUANTITATIVE URINE: NEGATIVE
PLATELET # BLD: 274 THOU/MM3 (ref 130–400)
PMV BLD AUTO: 9.3 FL (ref 9.4–12.4)
POTASSIUM REFLEX MAGNESIUM: 3.6 MEQ/L (ref 3.5–5.2)
PROTEIN UA: NEGATIVE
RBC # BLD: 4.81 MILL/MM3 (ref 4.7–6.1)
SALICYLATE, SERUM: < 0.3 MG/DL (ref 2–10)
SEG NEUTROPHILS: 58.7 %
SEGMENTED NEUTROPHILS ABSOLUTE COUNT: 4.1 THOU/MM3 (ref 1.8–7.7)
SODIUM BLD-SCNC: 146 MEQ/L (ref 135–145)
SPECIFIC GRAVITY, URINE: 1.01 (ref 1–1.03)
TOTAL PROTEIN: 7.3 G/DL (ref 6.1–8)
TSH SERPL DL<=0.05 MIU/L-ACNC: 0.53 UIU/ML (ref 0.4–4.2)
UROBILINOGEN, URINE: 0.2 EU/DL (ref 0–1)
VITAMIN B-12: 556 PG/ML (ref 211–911)
WBC # BLD: 7 THOU/MM3 (ref 4.8–10.8)

## 2020-10-16 PROCEDURE — 96375 TX/PRO/DX INJ NEW DRUG ADDON: CPT

## 2020-10-16 PROCEDURE — 1200000003 HC TELEMETRY R&B

## 2020-10-16 PROCEDURE — 81003 URINALYSIS AUTO W/O SCOPE: CPT

## 2020-10-16 PROCEDURE — 6370000000 HC RX 637 (ALT 250 FOR IP): Performed by: INTERNAL MEDICINE

## 2020-10-16 PROCEDURE — 82746 ASSAY OF FOLIC ACID SERUM: CPT

## 2020-10-16 PROCEDURE — G0480 DRUG TEST DEF 1-7 CLASSES: HCPCS

## 2020-10-16 PROCEDURE — 6360000002 HC RX W HCPCS: Performed by: INTERNAL MEDICINE

## 2020-10-16 PROCEDURE — 6360000002 HC RX W HCPCS: Performed by: PHYSICIAN ASSISTANT

## 2020-10-16 PROCEDURE — 83690 ASSAY OF LIPASE: CPT

## 2020-10-16 PROCEDURE — 93005 ELECTROCARDIOGRAM TRACING: CPT | Performed by: PHYSICIAN ASSISTANT

## 2020-10-16 PROCEDURE — 6370000000 HC RX 637 (ALT 250 FOR IP): Performed by: PHYSICIAN ASSISTANT

## 2020-10-16 PROCEDURE — 2580000003 HC RX 258: Performed by: PHYSICIAN ASSISTANT

## 2020-10-16 PROCEDURE — 80053 COMPREHEN METABOLIC PANEL: CPT

## 2020-10-16 PROCEDURE — 96374 THER/PROPH/DIAG INJ IV PUSH: CPT

## 2020-10-16 PROCEDURE — C9113 INJ PANTOPRAZOLE SODIUM, VIA: HCPCS | Performed by: PHYSICIAN ASSISTANT

## 2020-10-16 PROCEDURE — 2580000003 HC RX 258: Performed by: INTERNAL MEDICINE

## 2020-10-16 PROCEDURE — 84443 ASSAY THYROID STIM HORMONE: CPT

## 2020-10-16 PROCEDURE — 99285 EMERGENCY DEPT VISIT HI MDM: CPT

## 2020-10-16 PROCEDURE — 36415 COLL VENOUS BLD VENIPUNCTURE: CPT

## 2020-10-16 PROCEDURE — 80307 DRUG TEST PRSMV CHEM ANLYZR: CPT

## 2020-10-16 PROCEDURE — 99223 1ST HOSP IP/OBS HIGH 75: CPT | Performed by: INTERNAL MEDICINE

## 2020-10-16 PROCEDURE — 85025 COMPLETE CBC W/AUTO DIFF WBC: CPT

## 2020-10-16 PROCEDURE — 82607 VITAMIN B-12: CPT

## 2020-10-16 PROCEDURE — 82948 REAGENT STRIP/BLOOD GLUCOSE: CPT

## 2020-10-16 RX ORDER — LORAZEPAM 1 MG/1
4 TABLET ORAL
Status: DISCONTINUED | OUTPATIENT
Start: 2020-10-16 | End: 2020-10-20 | Stop reason: HOSPADM

## 2020-10-16 RX ORDER — ONDANSETRON 2 MG/ML
4 INJECTION INTRAMUSCULAR; INTRAVENOUS EVERY 6 HOURS PRN
Status: DISCONTINUED | OUTPATIENT
Start: 2020-10-16 | End: 2020-10-20 | Stop reason: HOSPADM

## 2020-10-16 RX ORDER — LORAZEPAM 2 MG/ML
3 INJECTION INTRAMUSCULAR
Status: DISCONTINUED | OUTPATIENT
Start: 2020-10-16 | End: 2020-10-20 | Stop reason: HOSPADM

## 2020-10-16 RX ORDER — FOLIC ACID 1 MG/1
1 TABLET ORAL DAILY
Status: DISCONTINUED | OUTPATIENT
Start: 2020-10-17 | End: 2020-10-20 | Stop reason: HOSPADM

## 2020-10-16 RX ORDER — ACETAMINOPHEN 650 MG/1
650 SUPPOSITORY RECTAL EVERY 6 HOURS PRN
Status: DISCONTINUED | OUTPATIENT
Start: 2020-10-16 | End: 2020-10-20 | Stop reason: HOSPADM

## 2020-10-16 RX ORDER — MULTIVITAMIN WITH IRON
1 TABLET ORAL DAILY
Status: DISCONTINUED | OUTPATIENT
Start: 2020-10-16 | End: 2020-10-20 | Stop reason: HOSPADM

## 2020-10-16 RX ORDER — THIAMINE MONONITRATE (VIT B1) 100 MG
100 TABLET ORAL DAILY
Status: DISCONTINUED | OUTPATIENT
Start: 2020-10-16 | End: 2020-10-20 | Stop reason: HOSPADM

## 2020-10-16 RX ORDER — BUDESONIDE AND FORMOTEROL FUMARATE DIHYDRATE 160; 4.5 UG/1; UG/1
2 AEROSOL RESPIRATORY (INHALATION) EVERY 12 HOURS
Status: DISCONTINUED | OUTPATIENT
Start: 2020-10-17 | End: 2020-10-20 | Stop reason: HOSPADM

## 2020-10-16 RX ORDER — 0.9 % SODIUM CHLORIDE 0.9 %
1000 INTRAVENOUS SOLUTION INTRAVENOUS ONCE
Status: COMPLETED | OUTPATIENT
Start: 2020-10-16 | End: 2020-10-16

## 2020-10-16 RX ORDER — POLYETHYLENE GLYCOL 3350 17 G/17G
17 POWDER, FOR SOLUTION ORAL DAILY PRN
Status: DISCONTINUED | OUTPATIENT
Start: 2020-10-16 | End: 2020-10-20 | Stop reason: HOSPADM

## 2020-10-16 RX ORDER — ESCITALOPRAM OXALATE 20 MG/1
20 TABLET ORAL DAILY
Status: DISCONTINUED | OUTPATIENT
Start: 2020-10-17 | End: 2020-10-20 | Stop reason: HOSPADM

## 2020-10-16 RX ORDER — LORAZEPAM 1 MG/1
3 TABLET ORAL
Status: DISCONTINUED | OUTPATIENT
Start: 2020-10-16 | End: 2020-10-20 | Stop reason: HOSPADM

## 2020-10-16 RX ORDER — LORAZEPAM 2 MG/ML
1 INJECTION INTRAMUSCULAR
Status: DISCONTINUED | OUTPATIENT
Start: 2020-10-16 | End: 2020-10-20 | Stop reason: HOSPADM

## 2020-10-16 RX ORDER — ACETAMINOPHEN 500 MG
1000 TABLET ORAL EVERY 6 HOURS PRN
Status: DISCONTINUED | OUTPATIENT
Start: 2020-10-16 | End: 2020-10-16 | Stop reason: SDUPTHER

## 2020-10-16 RX ORDER — SODIUM CHLORIDE 0.9 % (FLUSH) 0.9 %
10 SYRINGE (ML) INJECTION PRN
Status: DISCONTINUED | OUTPATIENT
Start: 2020-10-16 | End: 2020-10-20 | Stop reason: HOSPADM

## 2020-10-16 RX ORDER — HYDROXYZINE PAMOATE 50 MG/1
100 CAPSULE ORAL 4 TIMES DAILY PRN
Status: DISCONTINUED | OUTPATIENT
Start: 2020-10-16 | End: 2020-10-20 | Stop reason: HOSPADM

## 2020-10-16 RX ORDER — ACETAMINOPHEN 325 MG/1
650 TABLET ORAL EVERY 6 HOURS PRN
Status: DISCONTINUED | OUTPATIENT
Start: 2020-10-16 | End: 2020-10-20 | Stop reason: HOSPADM

## 2020-10-16 RX ORDER — LORAZEPAM 2 MG/ML
2 INJECTION INTRAMUSCULAR
Status: DISCONTINUED | OUTPATIENT
Start: 2020-10-16 | End: 2020-10-20 | Stop reason: HOSPADM

## 2020-10-16 RX ORDER — ALBUTEROL SULFATE 90 UG/1
2 AEROSOL, METERED RESPIRATORY (INHALATION) EVERY 6 HOURS PRN
Status: DISCONTINUED | OUTPATIENT
Start: 2020-10-16 | End: 2020-10-20 | Stop reason: HOSPADM

## 2020-10-16 RX ORDER — LORAZEPAM 1 MG/1
1 TABLET ORAL
Status: DISCONTINUED | OUTPATIENT
Start: 2020-10-16 | End: 2020-10-20 | Stop reason: HOSPADM

## 2020-10-16 RX ORDER — SODIUM CHLORIDE 9 MG/ML
INJECTION, SOLUTION INTRAVENOUS CONTINUOUS
Status: DISCONTINUED | OUTPATIENT
Start: 2020-10-16 | End: 2020-10-20 | Stop reason: HOSPADM

## 2020-10-16 RX ORDER — NICOTINE 21 MG/24HR
1 PATCH, TRANSDERMAL 24 HOURS TRANSDERMAL DAILY
Status: DISCONTINUED | OUTPATIENT
Start: 2020-10-16 | End: 2020-10-20 | Stop reason: HOSPADM

## 2020-10-16 RX ORDER — LORAZEPAM 1 MG/1
2 TABLET ORAL
Status: DISCONTINUED | OUTPATIENT
Start: 2020-10-16 | End: 2020-10-20 | Stop reason: HOSPADM

## 2020-10-16 RX ORDER — PROMETHAZINE HYDROCHLORIDE 25 MG/1
12.5 TABLET ORAL EVERY 6 HOURS PRN
Status: DISCONTINUED | OUTPATIENT
Start: 2020-10-16 | End: 2020-10-20 | Stop reason: HOSPADM

## 2020-10-16 RX ORDER — POTASSIUM CHLORIDE 7.45 MG/ML
10 INJECTION INTRAVENOUS PRN
Status: DISCONTINUED | OUTPATIENT
Start: 2020-10-16 | End: 2020-10-20 | Stop reason: HOSPADM

## 2020-10-16 RX ORDER — ASPIRIN 81 MG/1
81 TABLET ORAL DAILY
Status: DISCONTINUED | OUTPATIENT
Start: 2020-10-17 | End: 2020-10-20 | Stop reason: HOSPADM

## 2020-10-16 RX ORDER — LORAZEPAM 2 MG/ML
4 INJECTION INTRAMUSCULAR
Status: DISCONTINUED | OUTPATIENT
Start: 2020-10-16 | End: 2020-10-20 | Stop reason: HOSPADM

## 2020-10-16 RX ORDER — PANTOPRAZOLE SODIUM 40 MG/10ML
40 INJECTION, POWDER, LYOPHILIZED, FOR SOLUTION INTRAVENOUS ONCE
Status: COMPLETED | OUTPATIENT
Start: 2020-10-16 | End: 2020-10-16

## 2020-10-16 RX ORDER — LORAZEPAM 2 MG/ML
0.5 INJECTION INTRAMUSCULAR ONCE
Status: COMPLETED | OUTPATIENT
Start: 2020-10-16 | End: 2020-10-16

## 2020-10-16 RX ORDER — INSULIN GLARGINE 100 [IU]/ML
25 INJECTION, SOLUTION SUBCUTANEOUS DAILY
Status: DISCONTINUED | OUTPATIENT
Start: 2020-10-16 | End: 2020-10-20 | Stop reason: HOSPADM

## 2020-10-16 RX ORDER — SODIUM CHLORIDE 0.9 % (FLUSH) 0.9 %
10 SYRINGE (ML) INJECTION EVERY 12 HOURS SCHEDULED
Status: DISCONTINUED | OUTPATIENT
Start: 2020-10-16 | End: 2020-10-20 | Stop reason: HOSPADM

## 2020-10-16 RX ORDER — QUETIAPINE FUMARATE 25 MG/1
25 TABLET, FILM COATED ORAL 2 TIMES DAILY
Status: DISCONTINUED | OUTPATIENT
Start: 2020-10-16 | End: 2020-10-20 | Stop reason: HOSPADM

## 2020-10-16 RX ADMIN — SODIUM CHLORIDE 1000 ML: 9 INJECTION, SOLUTION INTRAVENOUS at 15:31

## 2020-10-16 RX ADMIN — HYDROXYZINE PAMOATE 100 MG: 50 CAPSULE ORAL at 22:06

## 2020-10-16 RX ADMIN — ONDANSETRON 4 MG: 2 INJECTION INTRAMUSCULAR; INTRAVENOUS at 18:31

## 2020-10-16 RX ADMIN — Medication 100 MG: at 19:01

## 2020-10-16 RX ADMIN — THERA TABS 1 TABLET: TAB at 19:01

## 2020-10-16 RX ADMIN — LORAZEPAM 0.5 MG: 2 INJECTION INTRAMUSCULAR; INTRAVENOUS at 15:00

## 2020-10-16 RX ADMIN — QUETIAPINE FUMARATE 25 MG: 25 TABLET ORAL at 22:06

## 2020-10-16 RX ADMIN — SODIUM CHLORIDE: 9 INJECTION, SOLUTION INTRAVENOUS at 19:01

## 2020-10-16 RX ADMIN — LIDOCAINE HYDROCHLORIDE: 20 SOLUTION ORAL; TOPICAL at 12:21

## 2020-10-16 RX ADMIN — LORAZEPAM 2 MG: 2 INJECTION INTRAMUSCULAR; INTRAVENOUS at 18:30

## 2020-10-16 RX ADMIN — SODIUM CHLORIDE 1000 ML: 9 INJECTION, SOLUTION INTRAVENOUS at 12:20

## 2020-10-16 RX ADMIN — Medication 10 ML: at 22:06

## 2020-10-16 RX ADMIN — PANTOPRAZOLE SODIUM 40 MG: 40 INJECTION, POWDER, FOR SOLUTION INTRAVENOUS at 12:21

## 2020-10-16 RX ADMIN — LORAZEPAM 2 MG: 2 INJECTION INTRAMUSCULAR; INTRAVENOUS at 22:06

## 2020-10-16 RX ADMIN — ENOXAPARIN SODIUM 40 MG: 40 INJECTION SUBCUTANEOUS at 19:01

## 2020-10-16 ASSESSMENT — PAIN DESCRIPTION - ORIENTATION: ORIENTATION: ANTERIOR;POSTERIOR

## 2020-10-16 ASSESSMENT — ENCOUNTER SYMPTOMS
SHORTNESS OF BREATH: 0
DIARRHEA: 0
SORE THROAT: 0
COLOR CHANGE: 0
VOMITING: 0
ABDOMINAL PAIN: 1

## 2020-10-16 ASSESSMENT — PAIN DESCRIPTION - PAIN TYPE
TYPE: ACUTE PAIN

## 2020-10-16 ASSESSMENT — PAIN - FUNCTIONAL ASSESSMENT: PAIN_FUNCTIONAL_ASSESSMENT: ACTIVITIES ARE NOT PREVENTED

## 2020-10-16 ASSESSMENT — PAIN DESCRIPTION - PROGRESSION: CLINICAL_PROGRESSION: GRADUALLY WORSENING

## 2020-10-16 ASSESSMENT — PAIN DESCRIPTION - ONSET: ONSET: ON-GOING

## 2020-10-16 ASSESSMENT — PAIN DESCRIPTION - FREQUENCY: FREQUENCY: CONTINUOUS

## 2020-10-16 ASSESSMENT — PAIN DESCRIPTION - LOCATION
LOCATION: ABDOMEN
LOCATION: ABDOMEN
LOCATION: HEAD

## 2020-10-16 ASSESSMENT — PAIN SCALES - GENERAL
PAINLEVEL_OUTOF10: 6
PAINLEVEL_OUTOF10: 9

## 2020-10-16 ASSESSMENT — PAIN DESCRIPTION - DESCRIPTORS: DESCRIPTORS: HEADACHE

## 2020-10-16 NOTE — ED NOTES
Pt medicated per orders. Updated on POC.  Will order pt a lunch tray per request.      Ricky Cárdenas RN  10/16/20 5693

## 2020-10-16 NOTE — ED NOTES
Spoke with 6K who states to take the patient up to room [address] [place] [address] [place], [address] [place] [address]  10/16/20 1958

## 2020-10-16 NOTE — ED NOTES
Pt moved to  05 c/o upper ABD pain, hx pancreatitis and ETOH intoxication hx of seizures w/ DTs.       Ricky Cárdenas RN  10/16/20 4885

## 2020-10-16 NOTE — ED PROVIDER NOTES
[facility]  eMERGENCY dEPARTMENT eNCOUnter          CHIEF COMPLAINT       Chief Complaint   Patient presents with    Suicidal    Abdominal Pain     pancreatitis     Alcohol Intoxication       Nurses Notes reviewed and I agree except as noted inthe HPI. HISTORY OF PRESENT ILLNESS    Sveta Herrera is a 38 y.o. male who presents to the Emergency Department for the evaluation of suicidal ideation. Patient states has been going on for weeks and he has had a lot of different things going on including problems with his father and his children. He states he believes a recent trigger may have been the fact that his father committed suicide on the patient's 15th birthday and the patient had a birthday 5 days ago. He states he does not have any plan of harming himself and denies any intent to act on his thoughts but does not have any prior history of suicidal ideation prior to the past few weeks. He denies any associated homicidal ideation. He does note he had some brief hallucinations 3 weeks ago when he had flulike symptoms with a negative Covid test.  He states he was unable to sleep for 4 days due to his symptoms and during the time where he was unable to sleep he did hallucinate some children but this resolved after he was able to sleep. He feels his suicidal ideation has been worse since his recent hospitalization a few days ago so he came back. Patient was admitted 1 week ago for alcohol withdrawal and depression. He was discharged 3 days ago. He denies any current withdrawal symptoms and does report he has been drinking again since discharge. He reports consuming 1.5 bottles of vodka and pouring out the rest.  Last alcohol intake was 2 hours ago. He denies any drug use. He reports some upper abdominal pain that feels like prior pancreatitis. He states he has had this for \"quite a while\" and it is intermittent, coming and going. Denies any current withdrawal symptoms.       The HPI complication (HCC)      insulin glargine (LANTUS SOLOSTAR) 100 UNIT/ML injection pen Inject 25 Units into the skin daily  Qty: 5 pen, Refills: 3    Associated Diagnoses: Type 2 diabetes mellitus without complication, with long-term current use of insulin (HCC)      NARCAN 4 MG/0.1ML LIQD nasal spray ADMINISTER A SINGLE SPRAY INTRANASALLY INTO ONE NOSTRIL. CALL 911. MAY REPEAT X 1.      insulin aspart (NOVOLOG FLEXPEN) 100 UNIT/ML injection pen Inject 2-12 units SQ TID with meals as directed per sliding scale, max dose 36 units/day  Qty: 5 pen, Refills: 3    Associated Diagnoses: Type 2 diabetes mellitus without complication, unspecified whether long term insulin use (HCC)      albuterol sulfate HFA (PROVENTIL HFA) 108 (90 Base) MCG/ACT inhaler Inhale 2 puffs into the lungs every 6 hours as needed for Wheezing  Qty: 1 Inhaler, Refills: 0    Associated Diagnoses: Moderate persistent asthma without complication      Insulin Pen Needle 30G X 8 MM MISC 1 each by Does not apply route 3 times daily  Qty: 500 each, Refills: 3      fluticasone-salmeterol (ADVAIR) 250-50 MCG/DOSE AEPB Inhale 1 puff into the lungs every 12 hours  Qty: 60 each, Refills: 5    Associated Diagnoses:  Moderate persistent asthma without complication      aspirin 81 MG EC tablet Take 1 tablet by mouth daily  Qty: 30 tablet, Refills: 3      acetaminophen (TYLENOL) 500 MG tablet Take 1,000 mg by mouth every 6 hours as needed for Pain      Multiple Vitamins-Minerals (MENS MULTIVITAMIN PLUS) TABS Take 1 tablet by mouth daily      folic acid (FOLVITE) 1 MG tablet Take 1 tablet by mouth daily  Qty: 30 tablet, Refills: 3      vitamin B-1 100 MG tablet Take 1 tablet by mouth daily  Qty: 30 tablet, Refills: 3      nicotine (NICOTROL) 10 MG inhaler Inhale 1 puff into the lungs as needed for Smoking cessation  Qty: 1 Inhaler, Refills: 3    Associated Diagnoses: Cigarette nicotine dependence without complication      ondansetron (ZOFRAN) 4 MG tablet Take 1 tablet by mouth 3 times daily as needed for Nausea or Vomiting  Qty: 15 tablet, Refills: 0    Associated Diagnoses: Non-intractable vomiting with nausea, unspecified vomiting type             ALLERGIES     is allergic to paxil [paroxetine]. FAMILY HISTORY     He indicated that his mother is alive. He indicated that his father is . He indicated that the status of his sister is unknown.   family history includes Other in his mother and sister; Other (age of onset: 39) in his father. SOCIAL HISTORY      reports that he has been smoking cigarettes. He has a 11.00 pack-year smoking history. He has never used smokeless tobacco. He reports current alcohol use. He reports previous drug use. Drug: Marijuana. PHYSICAL EXAM     INITIAL VITALS:  weight is 176 lb 3.2 oz (79.9 kg). His oral temperature is 98.2 °F (36.8 °C). His blood pressure is 128/87 and his pulse is 98. His respiration is 18 and oxygen saturation is 97%. Physical Exam  Vitals signs and nursing note reviewed. Constitutional:       Appearance: He is well-developed. HENT:      Head: Normocephalic and atraumatic. Cardiovascular:      Rate and Rhythm: Regular rhythm. Tachycardia present. Heart sounds: Normal heart sounds. Pulmonary:      Effort: Pulmonary effort is normal.      Breath sounds: Normal breath sounds. Abdominal:      Palpations: Abdomen is soft. Tenderness: There is abdominal tenderness in the epigastric area. There is no guarding or rebound. Negative signs include Smith's sign. Skin:     General: Skin is warm and dry. Neurological:      General: No focal deficit present. Mental Status: He is alert. Psychiatric:         Mood and Affect: Affect is flat. Thought Content: Thought content includes suicidal ideation. Thought content does not include homicidal ideation. Thought content does not include suicidal plan.          DIFFERENTIAL DIAGNOSIS:   Differential diagnoses are discussed    DIAGNOSTIC RESULTS     EKG: All EKG's are interpreted by the Emergency Department Physician who either signs or Co-signsthis chart in the absence of a cardiologist.    Shaneka Hill. Rate: 113 bpm  PRinterval: 144 ms  QRS duration: 84 ms  QTc: 466 ms  P-R-T axes: 46, 29, 55  Sinus tachycardia. No STEMI.   Compared to old EKG on 10-9-2020      RADIOLOGY: non-plain film images(s) such as CT, Ultrasound and MRI are read by the radiologist.    No orders to display       LABS:      Labs Reviewed   CBC WITH AUTO DIFFERENTIAL - Abnormal; Notable for the following components:       Result Value    RDW-SD 47.8 (*)     MPV 9.3 (*)     Immature Grans (Abs) 0.13 (*)     All other components within normal limits   COMPREHENSIVE METABOLIC PANEL W/ REFLEX TO MG FOR LOW K - Abnormal; Notable for the following components:    Glucose 179 (*)     BUN 5 (*)     Sodium 146 (*)     Total Bilirubin 0.2 (*)     All other components within normal limits   SALICYLATE LEVEL - Abnormal; Notable for the following components:    Salicylate, Serum < 0.3 (*)     All other components within normal limits   URINE RT REFLEX TO CULTURE - Abnormal; Notable for the following components:    Glucose, Ur 100 (*)     All other components within normal limits   POCT GLUCOSE - Abnormal; Notable for the following components:    POC Glucose 138 (*)     All other components within normal limits   TSH WITH REFLEX   ETHANOL   ACETAMINOPHEN LEVEL   URINE DRUG SCREEN   LIPASE   VITAMIN B12 & FOLATE   ANION GAP   GLOMERULAR FILTRATION RATE, ESTIMATED   OSMOLALITY   CBC WITH AUTO DIFFERENTIAL   BASIC METABOLIC PANEL W/ REFLEX TO MG FOR LOW K       EMERGENCY DEPARTMENT COURSE:   Vitals:    Vitals:    10/16/20 1653 10/16/20 1654 10/16/20 1817 10/16/20 2022   BP: 130/89  (!) 124/113 128/87   Pulse: 106 105 119 98   Resp:  18  18   Temp:    98.2 °F (36.8 °C)   TempSrc:    Oral   SpO2:  100%  97%   Weight:    176 lb 3.2 oz (79.9 kg)      12:12 PM EDT: The patient was seen and evaluated. Patient presents 3 days after recent discharge for complaints of suicidal ideation. He arrives under [facility] placed by law enforcement. He is tachycardic and hypertensive upon arrival and this persist despite 1 L normal saline bolus. He did begin to develop some mild withdrawal symptoms during his ED stay despite presenting blood alcohol level of 0.32. This was treated with low-dose Ativan with improvement in his vital signs and his clinical symptoms. Laboratory results were otherwise fairly reassuring. Due to his developing alcohol withdrawal symptoms and anticipated need for medical monitoring during this process, he will be admitted to the hospitalist service with psychiatric consult. CAROLINE was notified. Discussed with the hospitalist who is agreeable with the above plan. CRITICAL CARE:   None    CONSULTS:  Hospitalist    PROCEDURES:  None    FINAL IMPRESSION      1. Alcohol withdrawal, uncomplicated ([address]    2. Suicidal ideation          DISPOSITION/PLAN   Admit    PATIENT REFERRED TO:  No follow-up provider specified.     DISCHARGEMEDICATIONS:  Current Discharge Medication List          (Please note that portions of this note were completedwith a voice recognition program.  Efforts were made to edit the dictations but occasionally words are mis-transcribed.)        Robb Pena PA-C  10/16/20 7653

## 2020-10-16 NOTE — ED NOTES
Support person at bedside keeping pt 200 [facility] Deepali, [address] [place] [address]  10/16/20 1216

## 2020-10-16 NOTE — ED NOTES
Pt updated on POC- CCUA collected and sent. Reminded pt to keep arm down to allow fluids to complete.       Calli Westfall, RN  10/16/20 1148

## 2020-10-16 NOTE — DISCHARGE SUMMARY
Hospitalist Discharge Summary    Patient: Sunil Elaine  YOB: 1985  MRN: [id number]   Acct: [de-identified]    Primary Care Physician: BARB Otoole CNP    Admit date  10/9/2020    Discharge date:  10/13/2020  Disposition: Home       Discharge Assessment and Plan:    1. Acute alcohol withdrawal: Phenobarb prophylaxis per protocol started 10/9 with withdrawal panel added 10/10. Pt developed rash on 10/11 and phenobarb was changed to Ativan with CIWA scale. Rash did not improve with cessation of phenobarb, unclear if this was the cause. Continue MTV, folic acid, thiamine at discharge. Follow up with [facility]. 2. Suicidal ideations: On arrival; he denies suicidal thoughts at this time. He had no intent or plan. Suicide precautions. Psych consult appreciated - patient stable for dc home with follow up at [facility] and does not require Psych admission. 3. COPD: without acute exacerbation. Continue home inhalers. 4. IDDMII: HgbA1c 6.9. Glucose had been low, pt did not receive lantus or SSI in last 24hrs. Lantus was stopped. Continue SSI and instructed to monitor at discharge. Follow up with PCP for further diabetic management. 5. MDD, recurrent: resume home escitalopram, seroquel. Psych following, ok with discharge home with OP follow up at [facility]. 6. Rash: developed on chest 10/11. Patient complaining the itching has gotten worse and only slight relief with benadryl. Treated with one time dose iv hydrocortisone. May take PO benadryl at home for itching. Follow up with PCP.          Chief Complaint on presentation: suicidal ideation     Initial H&P / Hospital Course: 57-year-old male with PMHx COPD, alcohol abuse with withdrawal seizures, DMII, MDD who presented to [facility] with suicidal thoughts.  The patient states that he was recently ill and has not been taking his home medications for approximately 2 weeks. [facility] reports a history of alcoholism and states that he has been sober for approximately 2 years with a recent relapse and had drank again last night prior to coming in.  Patient states this morning he felt depressed and like he wanted to hurt himself and did not want to be alive. Hugh Robles states this scared him so he came to the ER. Hugh Robles has no plan or intent on suicide.  Patient appears anxious, has tremors. He reports some shortness of breath.  Denies fever/chills, diaphoresis, chest pain, abdominal pain, N/V/D, urinary symptoms.  Patient is admitted to hospitalist service for acute alcohol withdrawal and suicidal ideations with psych consult.      10/10: Patient reports tremors, diaphoresis. Denies hallucinations, no seizure activity. Patient denies suicidal thoughts or ideations; states he continues to feel down and depressed but has no further thoughts of hurting himself.      10/11: Patient developed itchy rash on chest. Benadryl ordered. Switched phenobarb to Ativan with CIWA scale. 10/12: Patient continues to have tremors. Reports rash with worsening itching. Treated with one time dose Solu-cortef. Subjective (day of discharge): Discussed with Psych who agrees patient is stable for discharge home and follow up [facility], he does not need Psych admit at this time. Patient continues to have very mild tremor, does not require further ativan treatment. Denies diaphoresis, hallucinations, he has had no seizure activity or severe withdrawal symptoms since arrival. He denies suicidal ideations. Patient denies fever/chills, sob, cp, abd pain, n/v/d, urinary sx. Patient responded well to medical management and is discharged in stable condition with appropriate follow up. Physical Exam:-  Vitals: No data found. Weight: Weight: 169 lb 9.6 oz (76.9 kg)   24 hour intake/output: No intake or output data in the 24 hours ending 10/16/20 1344    General appearance: No apparent distress, appears stated age and cooperative.    HEENT: Normal cephalic, atraumatic without obvious Eosinophils Absolute 0.1 0.0 - 0.4 thou/mm3    Basophils Absolute 0.1 0.0 - 0.1 thou/mm3    Immature Grans (Abs) 0.13 (H) 0.00 - 0.07 thou/mm3    nRBC 0 /100 wbc   Comprehensive Metabolic Panel w/ Reflex to MG    Collection Time: 10/16/20 10:43 AM   Result Value Ref Range    Glucose 179 (H) 70 - 108 mg/dL    CREATININE 0.5 0.4 - 1.2 mg/dL    BUN 5 (L) 7 - 22 mg/dL    Sodium 146 (H) 135 - 145 meq/L    Potassium reflex Magnesium 3.6 3.5 - 5.2 meq/L    Chloride 107 98 - 111 meq/L    CO2 23 23 - 33 meq/L    Calcium 9.1 8.5 - 10.5 mg/dL    AST 29 5 - 40 U/L    Alkaline Phosphatase 107 38 - 126 U/L    Total Protein 7.3 6.1 - 8.0 g/dL    Alb 4.5 3.5 - 5.1 g/dL    Total Bilirubin 0.2 (L) 0.3 - 1.2 mg/dL    ALT 57 11 - 66 U/L   TSH with Reflex    Collection Time: 10/16/20 10:43 AM   Result Value Ref Range    TSH 0.526 0.400 - 4.200 uIU/mL   Ethanol    Collection Time: 10/16/20 10:43 AM   Result Value Ref Range    ETHYL ALCOHOL, SERUM 7.25 2.94 %   Salicylate    Collection Time: 10/16/20 10:43 AM   Result Value Ref Range    Salicylate, Serum < 0.3 (L) 2.0 - 10.0 mg/dL   Acetaminophen level    Collection Time: 10/16/20 10:43 AM   Result Value Ref Range    Acetaminophen Level < 5.0 0.0 - 20.0 ug/mL   Lipase    Collection Time: 10/16/20 10:43 AM   Result Value Ref Range    Lipase 16.6 5.6 - 51.3 U/L   Vitamin B12 & folate    Collection Time: 10/16/20 10:43 AM   Result Value Ref Range    Vitamin B-12 556 211 - 911 pg/mL    Folate 7.7 4.8 - 24.2 ng/mL   Anion Gap    Collection Time: 10/16/20 10:43 AM   Result Value Ref Range    Anion Gap 16.0 8.0 - 16.0 meq/L   Glomerular Filtration Rate, Estimated    Collection Time: 10/16/20 10:43 AM   Result Value Ref Range    Est, Glom Filt Rate >90 ml/min/1.73m2   Osmolality    Collection Time: 10/16/20 10:43 AM   Result Value Ref Range    Osmolality Calc 292.3 275.0 - 300.0 mOsmol/kg        Microbiology:    Blood culture #1:   Lab Results   Component Value Date    BC No growth-preliminary  No growth   10/02/2016     Blood culture #2:No results found for: BLOODCULT2  Organism:    Lab Results   Component Value Date    LABGRAM  12/12/2017     Rare segmented neutrophils observed. Rare epithelial cells observed. Many gram positive cocci occurring singly and in pairs. Many gram negative bacilli. Many gram positive bacilli. MRSA culture only:No results found for: [facility]  Urine culture:   Lab Results   Component Value Date    LABURIN 300 06/17/2019     No results found for: [facility]   Respiratory culture: No results found for: CULTRESP  Aerobic and Anaerobic :  Lab Results   Component Value Date    LABAERO Normal rizwana- preliminary  Normal rizwana   12/12/2017     Lab Results   Component Value Date    LABANAE  12/12/2017     No anaerobes isolated- preliminary  Culture yielded heavy mixed growth which included anaerobic  gram positive cocci. If a true mixed aerobic and anaerobic  infection is suspected, then broad spectrum empiric  antibiotic therapy is indicated and should include coverage  for anaerobic organisms. Urinalysis:     Lab Results   Component Value Date    NITRU NEGATIVE 03/02/2020    WBCUA 0-2 03/02/2020    BACTERIA NONE SEEN 03/02/2020    RBCUA 0-2 03/02/2020    BLOODU MODERATE 03/02/2020    SPECGRAV 1.011 10/12/2013    GLUCOSEU >= 1000 03/02/2020       Radiology:  No results found.      Consults:   IP CONSULT TO PSYCHIATRY  IP CONSULT TO ADDICTION SERVICES    Discharge Medications:      Medication List      START taking these medications    folic acid 1 MG tablet  Commonly known as:  FOLVITE  Take 1 tablet by mouth daily     thiamine 100 MG tablet  Take 1 tablet by mouth daily        CONTINUE taking these medications    acetaminophen 500 MG tablet  Commonly known as:  TYLENOL     albuterol sulfate  (90 Base) MCG/ACT inhaler  Commonly known as:  Proventil HFA  Inhale 2 puffs into the lungs every 6 hours as needed for Wheezing     aspirin 81 MG EC tablet  Take 1 tablet by mouth daily     escitalopram 20 MG tablet  Commonly known as:  Lexapro  Take 1 tablet by mouth daily     fluticasone-salmeterol 250-50 MCG/DOSE Aepb  Commonly known as:  ADVAIR  Inhale 1 puff into the lungs every 12 hours     hydrOXYzine 100 MG capsule  Commonly known as:  VISTARIL  Take 1 capsule by mouth 4 times daily as needed for Anxiety     insulin aspart 100 UNIT/ML injection pen  Commonly known as:  NovoLOG FlexPen  Inject 2-12 units SQ TID with meals as directed per sliding scale, max dose 36 units/day     Insulin Pen Needle 30G X 8 MM Misc  1 each by Does not apply route 3 times daily     Lantus SoloStar 100 UNIT/ML injection pen  Generic drug:  insulin glargine  Inject 25 Units into the skin daily     Mens Multivitamin Plus Tabs     Narcan 4 MG/0.1ML Liqd nasal spray  Generic drug:  naloxone     nicotine 10 MG inhaler  Commonly known as:  NICOTROL  Inhale 1 puff into the lungs as needed for Smoking cessation     ondansetron 4 MG tablet  Commonly known as:  ZOFRAN  Take 1 tablet by mouth 3 times daily as needed for Nausea or Vomiting     QUEtiapine 25 MG tablet  Commonly known as:  SEROquel  Take 1 tablet by mouth nightly for 1 week, then take 1 tablet by mouth twice a day. STOP taking these medications    oxyCODONE-acetaminophen 5-325 MG per tablet  Commonly known as:  PERCOCET           Where to Get Your Medications      These medications were sent to [facility] [employer] - F [phone]  [address] [address]    Phone:  [phone]   · folic acid 1 MG tablet  · thiamine 100 MG tablet          Patient Instructions:    Discharge lab work: none  Activity: activity as tolerated and no driving for today  Diet: No diet orders on file      Follow-up visits:   [address] [facility].  [address] [facility] [address] [phone]    Office will contact you if need be    Ulises Smoker, APRN - CNP  [address]   [facility] [address]  [phone]    On 10/20/2020  Follow UP Appt Time 9:00 AM          Disposition: home  Condition at Discharge: Stable    Time Spent: 60 minutes    Signed: Thank you BARB Morales CNP for the opportunity to be involved in this patient's care.     Electronically signed by Andrei Elkins PA-C on 10/16/2020 at 1:44 PM  Discharging Hospitalist

## 2020-10-17 LAB
ANION GAP SERPL CALCULATED.3IONS-SCNC: 11 MEQ/L (ref 8–16)
BASOPHILS # BLD: 1.1 %
BASOPHILS ABSOLUTE: 0.1 THOU/MM3 (ref 0–0.1)
BUN BLDV-MCNC: 6 MG/DL (ref 7–22)
CALCIUM SERPL-MCNC: 8.4 MG/DL (ref 8.5–10.5)
CHLORIDE BLD-SCNC: 103 MEQ/L (ref 98–111)
CO2: 27 MEQ/L (ref 23–33)
CREAT SERPL-MCNC: 0.5 MG/DL (ref 0.4–1.2)
EOSINOPHIL # BLD: 1.8 %
EOSINOPHILS ABSOLUTE: 0.1 THOU/MM3 (ref 0–0.4)
ERYTHROCYTE [DISTWIDTH] IN BLOOD BY AUTOMATED COUNT: 13.9 % (ref 11.5–14.5)
ERYTHROCYTE [DISTWIDTH] IN BLOOD BY AUTOMATED COUNT: 48.3 FL (ref 35–45)
GFR SERPL CREATININE-BSD FRML MDRD: > 90 ML/MIN/1.73M2
GLUCOSE BLD-MCNC: 138 MG/DL (ref 70–108)
GLUCOSE BLD-MCNC: 148 MG/DL (ref 70–108)
GLUCOSE BLD-MCNC: 208 MG/DL (ref 70–108)
GLUCOSE BLD-MCNC: 83 MG/DL (ref 70–108)
GLUCOSE BLD-MCNC: 90 MG/DL (ref 70–108)
GLUCOSE BLD-MCNC: 92 MG/DL (ref 70–108)
GLUCOSE BLD-MCNC: 95 MG/DL (ref 70–108)
HCT VFR BLD CALC: 38.8 % (ref 42–52)
HEMOGLOBIN: 13.1 GM/DL (ref 14–18)
IMMATURE GRANS (ABS): 0.04 THOU/MM3 (ref 0–0.07)
IMMATURE GRANULOCYTES: 0.6 %
LYMPHOCYTES # BLD: 31.9 %
LYMPHOCYTES ABSOLUTE: 2 THOU/MM3 (ref 1–4.8)
MAGNESIUM: 1.7 MG/DL (ref 1.6–2.4)
MCH RBC QN AUTO: 32 PG (ref 26–33)
MCHC RBC AUTO-ENTMCNC: 33.8 GM/DL (ref 32.2–35.5)
MCV RBC AUTO: 94.6 FL (ref 80–94)
MONOCYTES # BLD: 17.2 %
MONOCYTES ABSOLUTE: 1.1 THOU/MM3 (ref 0.4–1.3)
NUCLEATED RED BLOOD CELLS: 0 /100 WBC
OSMOLALITY CALCULATION: 278 MOSMOL/KG (ref 275–300)
PHOSPHORUS: 2.7 MG/DL (ref 2.4–4.7)
PLATELET # BLD: 233 THOU/MM3 (ref 130–400)
PMV BLD AUTO: 9.9 FL (ref 9.4–12.4)
POTASSIUM REFLEX MAGNESIUM: 3.4 MEQ/L (ref 3.5–5.2)
RBC # BLD: 4.1 MILL/MM3 (ref 4.7–6.1)
SEG NEUTROPHILS: 47.4 %
SEGMENTED NEUTROPHILS ABSOLUTE COUNT: 2.9 THOU/MM3 (ref 1.8–7.7)
SODIUM BLD-SCNC: 141 MEQ/L (ref 135–145)
VITAMIN D 25-HYDROXY: 21 NG/ML (ref 30–100)
WBC # BLD: 6.2 THOU/MM3 (ref 4.8–10.8)

## 2020-10-17 PROCEDURE — 36415 COLL VENOUS BLD VENIPUNCTURE: CPT

## 2020-10-17 PROCEDURE — 2580000003 HC RX 258: Performed by: INTERNAL MEDICINE

## 2020-10-17 PROCEDURE — 6360000002 HC RX W HCPCS: Performed by: INTERNAL MEDICINE

## 2020-10-17 PROCEDURE — 6370000000 HC RX 637 (ALT 250 FOR IP): Performed by: FAMILY MEDICINE

## 2020-10-17 PROCEDURE — 82948 REAGENT STRIP/BLOOD GLUCOSE: CPT

## 2020-10-17 PROCEDURE — 84100 ASSAY OF PHOSPHORUS: CPT

## 2020-10-17 PROCEDURE — 6360000002 HC RX W HCPCS: Performed by: FAMILY MEDICINE

## 2020-10-17 PROCEDURE — 83735 ASSAY OF MAGNESIUM: CPT

## 2020-10-17 PROCEDURE — 6370000000 HC RX 637 (ALT 250 FOR IP): Performed by: INTERNAL MEDICINE

## 2020-10-17 PROCEDURE — 99232 SBSQ HOSP IP/OBS MODERATE 35: CPT | Performed by: FAMILY MEDICINE

## 2020-10-17 PROCEDURE — 6370000000 HC RX 637 (ALT 250 FOR IP): Performed by: STUDENT IN AN ORGANIZED HEALTH CARE EDUCATION/TRAINING PROGRAM

## 2020-10-17 PROCEDURE — 1200000003 HC TELEMETRY R&B

## 2020-10-17 PROCEDURE — 80048 BASIC METABOLIC PNL TOTAL CA: CPT

## 2020-10-17 PROCEDURE — 85025 COMPLETE CBC W/AUTO DIFF WBC: CPT

## 2020-10-17 PROCEDURE — 82306 VITAMIN D 25 HYDROXY: CPT

## 2020-10-17 PROCEDURE — 2580000003 HC RX 258: Performed by: FAMILY MEDICINE

## 2020-10-17 PROCEDURE — 99253 IP/OBS CNSLTJ NEW/EST LOW 45: CPT | Performed by: PSYCHIATRY & NEUROLOGY

## 2020-10-17 PROCEDURE — 94761 N-INVAS EAR/PLS OXIMETRY MLT: CPT

## 2020-10-17 PROCEDURE — 94640 AIRWAY INHALATION TREATMENT: CPT

## 2020-10-17 RX ORDER — NICOTINE POLACRILEX 4 MG
15 LOZENGE BUCCAL PRN
Status: DISCONTINUED | OUTPATIENT
Start: 2020-10-17 | End: 2020-10-20 | Stop reason: HOSPADM

## 2020-10-17 RX ORDER — DIPHENHYDRAMINE HCL 25 MG
25 TABLET ORAL NIGHTLY PRN
Status: DISCONTINUED | OUTPATIENT
Start: 2020-10-17 | End: 2020-10-17

## 2020-10-17 RX ORDER — DIPHENHYDRAMINE HCL 25 MG
25 TABLET ORAL EVERY 6 HOURS PRN
Status: DISCONTINUED | OUTPATIENT
Start: 2020-10-17 | End: 2020-10-20 | Stop reason: HOSPADM

## 2020-10-17 RX ORDER — DEXTROSE MONOHYDRATE 25 G/50ML
12.5 INJECTION, SOLUTION INTRAVENOUS PRN
Status: DISCONTINUED | OUTPATIENT
Start: 2020-10-17 | End: 2020-10-20 | Stop reason: HOSPADM

## 2020-10-17 RX ORDER — MAGNESIUM SULFATE IN WATER 40 MG/ML
2 INJECTION, SOLUTION INTRAVENOUS PRN
Status: DISCONTINUED | OUTPATIENT
Start: 2020-10-17 | End: 2020-10-20 | Stop reason: HOSPADM

## 2020-10-17 RX ORDER — DEXTROSE MONOHYDRATE 50 MG/ML
100 INJECTION, SOLUTION INTRAVENOUS PRN
Status: DISCONTINUED | OUTPATIENT
Start: 2020-10-17 | End: 2020-10-20 | Stop reason: HOSPADM

## 2020-10-17 RX ADMIN — INSULIN GLARGINE 25 UNITS: 100 INJECTION, SOLUTION SUBCUTANEOUS at 00:39

## 2020-10-17 RX ADMIN — BUDESONIDE AND FORMOTEROL FUMARATE DIHYDRATE 2 PUFF: 160; 4.5 AEROSOL RESPIRATORY (INHALATION) at 07:44

## 2020-10-17 RX ADMIN — MAGNESIUM SULFATE HEPTAHYDRATE 1 G: 500 INJECTION, SOLUTION INTRAMUSCULAR; INTRAVENOUS at 08:42

## 2020-10-17 RX ADMIN — THERA TABS 1 TABLET: TAB at 08:25

## 2020-10-17 RX ADMIN — LORAZEPAM 1 MG: 2 INJECTION INTRAMUSCULAR; INTRAVENOUS at 08:42

## 2020-10-17 RX ADMIN — BUDESONIDE AND FORMOTEROL FUMARATE DIHYDRATE 2 PUFF: 160; 4.5 AEROSOL RESPIRATORY (INHALATION) at 18:35

## 2020-10-17 RX ADMIN — SODIUM CHLORIDE: 9 INJECTION, SOLUTION INTRAVENOUS at 08:30

## 2020-10-17 RX ADMIN — HYDROXYZINE PAMOATE 100 MG: 50 CAPSULE ORAL at 08:24

## 2020-10-17 RX ADMIN — DIPHENHYDRAMINE HCL 25 MG: 25 TABLET ORAL at 13:21

## 2020-10-17 RX ADMIN — LORAZEPAM 1 MG: 1 TABLET ORAL at 16:36

## 2020-10-17 RX ADMIN — Medication 100 MG: at 11:37

## 2020-10-17 RX ADMIN — QUETIAPINE FUMARATE 25 MG: 25 TABLET ORAL at 08:25

## 2020-10-17 RX ADMIN — LORAZEPAM 1 MG: 1 TABLET ORAL at 22:29

## 2020-10-17 RX ADMIN — ASPIRIN 81 MG: 81 TABLET ORAL at 08:25

## 2020-10-17 RX ADMIN — ACETAMINOPHEN 650 MG: 325 TABLET ORAL at 19:55

## 2020-10-17 RX ADMIN — DIPHENHYDRAMINE HCL 25 MG: 25 TABLET ORAL at 19:55

## 2020-10-17 RX ADMIN — ACETAMINOPHEN 650 MG: 325 TABLET ORAL at 03:40

## 2020-10-17 RX ADMIN — ESCITALOPRAM 20 MG: 20 TABLET, FILM COATED ORAL at 08:25

## 2020-10-17 RX ADMIN — FOLIC ACID 1 MG: 1 TABLET ORAL at 08:25

## 2020-10-17 RX ADMIN — LORAZEPAM 1 MG: 1 TABLET ORAL at 19:55

## 2020-10-17 RX ADMIN — DIPHENHYDRAMINE HCL 25 MG: 25 TABLET ORAL at 03:57

## 2020-10-17 RX ADMIN — ONDANSETRON 4 MG: 2 INJECTION INTRAMUSCULAR; INTRAVENOUS at 19:55

## 2020-10-17 RX ADMIN — LORAZEPAM 1 MG: 1 TABLET ORAL at 11:44

## 2020-10-17 RX ADMIN — ENOXAPARIN SODIUM 40 MG: 40 INJECTION SUBCUTANEOUS at 08:24

## 2020-10-17 RX ADMIN — INSULIN LISPRO 2 UNITS: 100 INJECTION, SOLUTION INTRAVENOUS; SUBCUTANEOUS at 16:28

## 2020-10-17 RX ADMIN — LORAZEPAM 1 MG: 1 TABLET ORAL at 13:22

## 2020-10-17 RX ADMIN — PROMETHAZINE HYDROCHLORIDE 12.5 MG: 25 TABLET ORAL at 13:21

## 2020-10-17 RX ADMIN — Medication 10 ML: at 19:55

## 2020-10-17 RX ADMIN — QUETIAPINE FUMARATE 25 MG: 25 TABLET ORAL at 19:55

## 2020-10-17 RX ADMIN — LORAZEPAM 2 MG: 2 INJECTION INTRAMUSCULAR; INTRAVENOUS at 03:40

## 2020-10-17 RX ADMIN — ONDANSETRON 4 MG: 2 INJECTION INTRAMUSCULAR; INTRAVENOUS at 03:40

## 2020-10-17 RX ADMIN — SODIUM CHLORIDE: 9 INJECTION, SOLUTION INTRAVENOUS at 21:01

## 2020-10-17 ASSESSMENT — PAIN DESCRIPTION - LOCATION: LOCATION: HEAD

## 2020-10-17 ASSESSMENT — PAIN SCALES - GENERAL
PAINLEVEL_OUTOF10: 0
PAINLEVEL_OUTOF10: 6
PAINLEVEL_OUTOF10: 5
PAINLEVEL_OUTOF10: 0
PAINLEVEL_OUTOF10: 3

## 2020-10-17 ASSESSMENT — PAIN DESCRIPTION - DESCRIPTORS: DESCRIPTORS: HEADACHE

## 2020-10-17 ASSESSMENT — PAIN DESCRIPTION - PAIN TYPE: TYPE: ACUTE PAIN

## 2020-10-17 NOTE — CONSULTS
Department of Psychiatry  Consult Service  Attending Consult Note        Reason for Consult:  Suicidal ideation  Requesting Physician:  Marybeth Yun MD    History obtained from:  patient    HISTORY OF PRESENT ILLNESS:         The patient is a 38 y.o. male with significant past medical history of depression and alcohol Use disorder who presents intoxicated with alcohol and having suicidal ideation. Patient seen and interviewed in detail. Found him very depression , anxious and actively withdrawing from Alcohol. He still has suicidal ideation.         Current Psychiatric Medications:  none    Medications:    Current Facility-Administered Medications: diphenhydrAMINE (BENADRYL) tablet 25 mg, 25 mg, Oral, Q6H PRN  insulin lispro (HUMALOG) injection vial 0-6 Units, 0-6 Units, Subcutaneous, TID WC  insulin lispro (HUMALOG) injection vial 0-3 Units, 0-3 Units, Subcutaneous, Nightly  glucose (GLUTOSE) 40 % oral gel 15 g, 15 g, Oral, PRN  dextrose 50 % IV solution, 12.5 g, Intravenous, PRN  glucagon (rDNA) injection 1 mg, 1 mg, Intramuscular, PRN  dextrose 5 % solution, 100 mL/hr, Intravenous, PRN  magnesium sulfate 2 g in 50 mL IVPB premix, 2 g, Intravenous, PRN  albuterol sulfate  (90 Base) MCG/ACT inhaler 2 puff, 2 puff, Inhalation, Q6H PRN  aspirin EC tablet 81 mg, 81 mg, Oral, Daily  escitalopram (LEXAPRO) tablet 20 mg, 20 mg, Oral, Daily  folic acid (FOLVITE) tablet 1 mg, 1 mg, Oral, Daily  hydrOXYzine (VISTARIL) capsule 100 mg, 100 mg, Oral, 4x Daily PRN  insulin glargine (LANTUS) injection vial 25 Units, 25 Units, Subcutaneous, Daily  QUEtiapine (SEROQUEL) tablet 25 mg, 25 mg, Oral, BID  sodium chloride flush 0.9 % injection 10 mL, 10 mL, Intravenous, 2 times per day  sodium chloride flush 0.9 % injection 10 mL, 10 mL, Intravenous, PRN  acetaminophen (TYLENOL) tablet 650 mg, 650 mg, Oral, Q6H PRN **OR** acetaminophen (TYLENOL) suppository 650 mg, 650 mg, Rectal, Q6H PRN  polyethylene glycol (GLYCOLAX) packet Procedure Laterality Date    ANKLE ARTHROSCOPY Right 8/5/2020    ANKLE ARTHROSCOPY WITH  MEDIAL DEBRIDEMENT RIGHT ANKLE, ANKLE ARTHROTOMY WITH DEBRIDEMENT performed by Constantino Rajput DPM at [address] Right 09/2019    [facility]    FRACTURE SURGERY Right 2019    orbital fracture surgery    UPPER GASTROINTESTINAL ENDOSCOPY Left 5/6/2019    EGD BIOPSY performed by Keli Reynolds MD at [address] [facility]     Allergies:  Paxil [paroxetine]      Family Psychiatric History:  unknown    Family History:       Problem Relation Age of Onset    Other Mother         gestational diabetes    Other Father 39        suicide    Other Sister         hypoglycemia     REVIEW OF SYSTEMS:    See History of Present Illness    PHYSICAL EXAM:    VITALS:  BP (!) 143/83   Pulse 83   Temp 98.5 °F (36.9 °C) (Axillary)   Resp 12   Wt 176 lb 3.2 oz (79.9 kg)   SpO2 100%   BMI 32.23 kg/m²     Mental Status Examination:  Level of consciousness:  Mild dysattention (reduced clarity of awareness with impaired ability to focus, sustain, or shift attention)  Appearance:  ill-appearing  Behavior/Motor:  psychomotor retardation  Attitude toward examiner:  cooperative and attentive  Speech:  slow  Mood:  depressed  Affect:  mood congruent  Thought processes:  linear and goal directed  Thought content:  Homocidal ideation denies  Suicidal Ideation:  active  Delusions:  no evidence of delusions  Perceptual Disturbance:  denies any perceptual disturbance  Cognition:  oriented to person, place, and time  Concentration succeeded  Memory intact  Insight:  poor  Judgment:  poor        DSM-IV DIAGNOSIS:      Impression (Axis I):       Major depressive disorder; recurrent and severe  Alcohol withdrawals      PLAN:    1:1 Sitter indicated for suicidal ideation  Medications: Continue Ativan taper, thiamine and folic acid  Transfer to psychiatric unit when medically stable

## 2020-10-17 NOTE — PROGRESS NOTES
Hospitalist Progress Note      Patient:  Noam Vargas    Unit/Bed:6K-01/001-A  YOB: 1985  MRN: [id number]   Acct: [de-identified]   PCP: BARB Craig CNP  Date of Admission: 10/16/2020    Assessment/Plan:    1. Alcohol Withdrawal/Suicidal Ideations/Hypokalemia/Hypomagnesemia  - 4mg overnight   - stable  - continue ativan, ciwa, electrolyte replacement  - Folic acid, IVF  - K and Mag replacements, Phos ordered  - Vitamin D ordered as low levels can induce fatigue/muslce weakness and mimic depression and or make it worse. - recheck labs in am.   - Psych consulted for suicidal ideations    2. MDD w/ SI  - lexapro, seroquel  - sitter  - SI precautions  - Psych consulted    3. IDDMII  - ISS, Glu checks achs, sliding scale + home insulin  - hypoglycemia protocol + diabetic diet    4. COPD  - no exac  - home inhalers    5. Rash  - this is from the last visit attributed to his phenobarb  - benadryl helps itching  - suspect some of this is due to facial flushing  - on exam, no rash is evident at this time  - monitor    Dispo: 2-3 days   Pending:   withdrawal improvement  Lytes/vit D/K/Phos  psych eval for SI      Chief Complaint: SI, detox    Initial H and P:-      61-year-old gentleman with past medical history of COPD, alcohol abuse, seizures in the past, suicidal ideation, came to ER due to suicidal ideation again. Patient was just discharged home and states that he was feeling well but started to feel depressed again. He has been taking his antidepressants without any help. Patient states that he started drinking alcohol again. Patient did have suicidal ideation but no active plan. Patient states that he has just been feeling down. He is concerned about his social issues including his children. He states that he is depressed because his father passed away long time ago around his age.   He has been actively drinking with his last drink this morning. Patient has had seizures in the past due to alcohol withdrawal.  Denies any nausea, vomiting, chest pain, shortness of breath, headaches. No other complaints. In ER, patient was noted to be tachycardic. Labs otherwise were unremarkable. Patient alcohol abuse of 0.32. Due to this he was admitted for further care. Subjective (past 24 hours):   Doing well. Given benadryl overnight for a rash    Past medical history, family history, social history and allergies reviewed again and is unchanged since admission. ROS (12 point review of systems completed. Pertinent positives noted.  Otherwise ROS is negative)     Medications:  Reviewed    Infusion Medications    sodium chloride 100 mL/hr at 10/16/20 1901     Scheduled Medications    aspirin  81 mg Oral Daily    escitalopram  20 mg Oral Daily    folic acid  1 mg Oral Daily    insulin glargine  25 Units Subcutaneous Daily    QUEtiapine  25 mg Oral BID    sodium chloride flush  10 mL Intravenous 2 times per day    enoxaparin  40 mg Subcutaneous Daily    thiamine  100 mg Oral Daily    multivitamin  1 tablet Oral Daily    nicotine  1 patch Transdermal Daily    budesonide-formoterol  2 puff Inhalation Q12H     PRN Meds: diphenhydrAMINE, albuterol sulfate HFA, hydrOXYzine, sodium chloride flush, acetaminophen **OR** acetaminophen, polyethylene glycol, promethazine **OR** ondansetron, potassium chloride, LORazepam **OR** LORazepam **OR** LORazepam **OR** LORazepam **OR** LORazepam **OR** LORazepam **OR** LORazepam **OR** LORazepam      Intake/Output Summary (Last 24 hours) at 10/17/2020 0739  Last data filed at 10/17/2020 0355  Gross per 24 hour   Intake 818.61 ml   Output --   Net 818.61 ml       Diet:  DIET CLEAR LIQUID;    Exam:  /72   Pulse 77   Temp 97.4 °F (36.3 °C) (Oral)   Resp 20   Wt 176 lb 3.2 oz (79.9 kg)   SpO2 99%   BMI 32.23 kg/m²      General appearance: No apparent distress, appears stated age and cooperative. HEENT: Pupils equal, round, and reactive to light. Conjunctivae/corneas clear. MM dry  Neck: Supple, with full range of motion. No jugular venous distention. Trachea midline. Respiratory:  Normal respiratory effort. Clear  Cardiovascular: Regular rate and rhythm, tachycardic at times  Abdomen: Soft, non-tender, non-distended   Musculoskeletal: passive and active ROM x 4 extremities. Skin: Skin color, texture, turgor normal.  No rashes or lesions. Face flushed, no obvious rash on trunk/face  Neurologic:  grossly non-focal.  Psychiatric: Alert and oriented, thought content appropriate, normal insight  Capillary Refill: Brisk,< 3 seconds   Peripheral Pulses: +2 palpable, equal bilaterally     Labs:   Recent Labs     10/16/20  1043 10/17/20  0615   WBC 7.0 6.2   HGB 15.3 13.1*   HCT 44.9 38.8*    233     Recent Labs     10/16/20  1043 10/17/20  0615   * 141   K 3.6 3.4*    103   CO2 23 27   BUN 5* 6*   CREATININE 0.5 0.5   CALCIUM 9.1 8.4*     Recent Labs     10/16/20  1043   AST 29   ALT 57   BILITOT 0.2*   ALKPHOS 107     No results for input(s): INR in the last 72 hours. No results for input(s): Leonila Basques in the last 72 hours. Microbiology:    Blood culture #1:   Lab Results   Component Value Date    BC No growth-preliminary  No growth   10/02/2016       Blood culture #2:No results found for: Rosendo Juarez    Organism:No results found for: [facility]      Lab Results   Component Value Date    LABGRAM  12/12/2017     Rare segmented neutrophils observed. Rare epithelial cells observed. Many gram positive cocci occurring singly and in pairs. Many gram negative bacilli. Many gram positive bacilli.          MRSA culture only:No results found for: [facility]    Urine culture:   Lab Results   Component Value Date    LABURIN 300 06/17/2019       Respiratory culture: No results found for: CULTRESP    Aerobic and Anaerobic :  Lab Results   Component Value Date    LABAERO Normal rizwana-

## 2020-10-17 NOTE — FLOWSHEET NOTE
10/17/20 0201   Provider Notification   Reason for Communication Patient request   Provider Name Dr. Vielka Hernandez   Provider Notification Physician   Method of Communication Secure Message   Response See orders   Notification Time 0115   pt requesting Benadryl for rash, stated they had it on PRN last admission and it helped with the itchiness

## 2020-10-18 LAB
ALBUMIN SERPL-MCNC: 3.4 G/DL (ref 3.5–5.1)
ALP BLD-CCNC: 92 U/L (ref 38–126)
ALT SERPL-CCNC: 42 U/L (ref 11–66)
ANION GAP SERPL CALCULATED.3IONS-SCNC: 11 MEQ/L (ref 8–16)
AST SERPL-CCNC: 43 U/L (ref 5–40)
BILIRUB SERPL-MCNC: 0.4 MG/DL (ref 0.3–1.2)
BUN BLDV-MCNC: 7 MG/DL (ref 7–22)
CALCIUM SERPL-MCNC: 8.8 MG/DL (ref 8.5–10.5)
CHLORIDE BLD-SCNC: 104 MEQ/L (ref 98–111)
CO2: 25 MEQ/L (ref 23–33)
CREAT SERPL-MCNC: 0.4 MG/DL (ref 0.4–1.2)
GFR SERPL CREATININE-BSD FRML MDRD: > 90 ML/MIN/1.73M2
GLUCOSE BLD-MCNC: 120 MG/DL (ref 70–108)
GLUCOSE BLD-MCNC: 124 MG/DL (ref 70–108)
GLUCOSE BLD-MCNC: 143 MG/DL (ref 70–108)
GLUCOSE BLD-MCNC: 143 MG/DL (ref 70–108)
GLUCOSE BLD-MCNC: 185 MG/DL (ref 70–108)
MAGNESIUM: 2.1 MG/DL (ref 1.6–2.4)
PHOSPHORUS: 3.4 MG/DL (ref 2.4–4.7)
POTASSIUM SERPL-SCNC: 4.3 MEQ/L (ref 3.5–5.2)
SODIUM BLD-SCNC: 140 MEQ/L (ref 135–145)
TOTAL PROTEIN: 5.9 G/DL (ref 6.1–8)

## 2020-10-18 PROCEDURE — 82948 REAGENT STRIP/BLOOD GLUCOSE: CPT

## 2020-10-18 PROCEDURE — 84100 ASSAY OF PHOSPHORUS: CPT

## 2020-10-18 PROCEDURE — 6360000002 HC RX W HCPCS: Performed by: INTERNAL MEDICINE

## 2020-10-18 PROCEDURE — 1200000003 HC TELEMETRY R&B

## 2020-10-18 PROCEDURE — 99232 SBSQ HOSP IP/OBS MODERATE 35: CPT | Performed by: FAMILY MEDICINE

## 2020-10-18 PROCEDURE — 94640 AIRWAY INHALATION TREATMENT: CPT

## 2020-10-18 PROCEDURE — 2580000003 HC RX 258: Performed by: INTERNAL MEDICINE

## 2020-10-18 PROCEDURE — 6370000000 HC RX 637 (ALT 250 FOR IP): Performed by: FAMILY MEDICINE

## 2020-10-18 PROCEDURE — 6370000000 HC RX 637 (ALT 250 FOR IP): Performed by: INTERNAL MEDICINE

## 2020-10-18 PROCEDURE — 6370000000 HC RX 637 (ALT 250 FOR IP): Performed by: STUDENT IN AN ORGANIZED HEALTH CARE EDUCATION/TRAINING PROGRAM

## 2020-10-18 PROCEDURE — 83735 ASSAY OF MAGNESIUM: CPT

## 2020-10-18 PROCEDURE — 80053 COMPREHEN METABOLIC PANEL: CPT

## 2020-10-18 PROCEDURE — 94760 N-INVAS EAR/PLS OXIMETRY 1: CPT

## 2020-10-18 PROCEDURE — 36415 COLL VENOUS BLD VENIPUNCTURE: CPT

## 2020-10-18 RX ORDER — PANTOPRAZOLE SODIUM 40 MG/1
40 TABLET, DELAYED RELEASE ORAL
Status: DISCONTINUED | OUTPATIENT
Start: 2020-10-18 | End: 2020-10-20 | Stop reason: HOSPADM

## 2020-10-18 RX ORDER — ERGOCALCIFEROL 1.25 MG/1
50000 CAPSULE ORAL WEEKLY
Status: DISCONTINUED | OUTPATIENT
Start: 2020-10-18 | End: 2020-10-20 | Stop reason: HOSPADM

## 2020-10-18 RX ADMIN — ERGOCALCIFEROL 50000 UNITS: 1.25 CAPSULE ORAL at 09:21

## 2020-10-18 RX ADMIN — Medication 100 MG: at 08:20

## 2020-10-18 RX ADMIN — ACETAMINOPHEN 650 MG: 325 TABLET ORAL at 10:06

## 2020-10-18 RX ADMIN — BUDESONIDE AND FORMOTEROL FUMARATE DIHYDRATE 2 PUFF: 160; 4.5 AEROSOL RESPIRATORY (INHALATION) at 18:33

## 2020-10-18 RX ADMIN — BUDESONIDE AND FORMOTEROL FUMARATE DIHYDRATE 2 PUFF: 160; 4.5 AEROSOL RESPIRATORY (INHALATION) at 08:21

## 2020-10-18 RX ADMIN — LORAZEPAM 2 MG: 1 TABLET ORAL at 10:06

## 2020-10-18 RX ADMIN — THERA TABS 1 TABLET: TAB at 08:20

## 2020-10-18 RX ADMIN — ESCITALOPRAM 20 MG: 20 TABLET, FILM COATED ORAL at 08:20

## 2020-10-18 RX ADMIN — LIDOCAINE HYDROCHLORIDE: 20 SOLUTION ORAL; TOPICAL at 09:21

## 2020-10-18 RX ADMIN — LORAZEPAM 2 MG: 1 TABLET ORAL at 12:56

## 2020-10-18 RX ADMIN — SODIUM CHLORIDE: 9 INJECTION, SOLUTION INTRAVENOUS at 08:25

## 2020-10-18 RX ADMIN — DIPHENHYDRAMINE HCL 25 MG: 25 TABLET ORAL at 16:14

## 2020-10-18 RX ADMIN — DIPHENHYDRAMINE HCL 25 MG: 25 TABLET ORAL at 10:06

## 2020-10-18 RX ADMIN — QUETIAPINE FUMARATE 25 MG: 25 TABLET ORAL at 08:20

## 2020-10-18 RX ADMIN — ONDANSETRON 4 MG: 2 INJECTION INTRAMUSCULAR; INTRAVENOUS at 10:06

## 2020-10-18 RX ADMIN — LORAZEPAM 2 MG: 1 TABLET ORAL at 20:23

## 2020-10-18 RX ADMIN — LORAZEPAM 2 MG: 1 TABLET ORAL at 03:52

## 2020-10-18 RX ADMIN — DIPHENHYDRAMINE HCL 25 MG: 25 TABLET ORAL at 03:52

## 2020-10-18 RX ADMIN — ONDANSETRON 4 MG: 2 INJECTION INTRAMUSCULAR; INTRAVENOUS at 03:51

## 2020-10-18 RX ADMIN — INSULIN GLARGINE 25 UNITS: 100 INJECTION, SOLUTION SUBCUTANEOUS at 22:03

## 2020-10-18 RX ADMIN — ASPIRIN 81 MG: 81 TABLET ORAL at 08:21

## 2020-10-18 RX ADMIN — LORAZEPAM 2 MG: 1 TABLET ORAL at 16:14

## 2020-10-18 RX ADMIN — PANTOPRAZOLE SODIUM 40 MG: 40 TABLET, DELAYED RELEASE ORAL at 08:21

## 2020-10-18 RX ADMIN — SODIUM CHLORIDE: 9 INJECTION, SOLUTION INTRAVENOUS at 20:25

## 2020-10-18 RX ADMIN — ENOXAPARIN SODIUM 40 MG: 40 INJECTION SUBCUTANEOUS at 08:21

## 2020-10-18 RX ADMIN — ACETAMINOPHEN 650 MG: 325 TABLET ORAL at 16:14

## 2020-10-18 RX ADMIN — INSULIN LISPRO 1 UNITS: 100 INJECTION, SOLUTION INTRAVENOUS; SUBCUTANEOUS at 16:15

## 2020-10-18 RX ADMIN — ACETAMINOPHEN 650 MG: 325 TABLET ORAL at 03:51

## 2020-10-18 RX ADMIN — FOLIC ACID 1 MG: 1 TABLET ORAL at 08:20

## 2020-10-18 RX ADMIN — ONDANSETRON 4 MG: 2 INJECTION INTRAMUSCULAR; INTRAVENOUS at 16:14

## 2020-10-18 RX ADMIN — QUETIAPINE FUMARATE 25 MG: 25 TABLET ORAL at 20:23

## 2020-10-18 RX ADMIN — LORAZEPAM 2 MG: 1 TABLET ORAL at 08:21

## 2020-10-18 ASSESSMENT — PAIN DESCRIPTION - LOCATION: LOCATION: HEAD

## 2020-10-18 ASSESSMENT — PAIN DESCRIPTION - DESCRIPTORS: DESCRIPTORS: HEADACHE

## 2020-10-18 ASSESSMENT — PAIN SCALES - GENERAL
PAINLEVEL_OUTOF10: 3
PAINLEVEL_OUTOF10: 6
PAINLEVEL_OUTOF10: 5
PAINLEVEL_OUTOF10: 3
PAINLEVEL_OUTOF10: 6
PAINLEVEL_OUTOF10: 5

## 2020-10-18 ASSESSMENT — PAIN DESCRIPTION - PAIN TYPE: TYPE: ACUTE PAIN

## 2020-10-18 ASSESSMENT — PAIN DESCRIPTION - FREQUENCY: FREQUENCY: CONTINUOUS

## 2020-10-18 ASSESSMENT — PAIN - FUNCTIONAL ASSESSMENT: PAIN_FUNCTIONAL_ASSESSMENT: ACTIVITIES ARE NOT PREVENTED

## 2020-10-18 ASSESSMENT — PAIN DESCRIPTION - ONSET: ONSET: ON-GOING

## 2020-10-18 ASSESSMENT — PAIN DESCRIPTION - PROGRESSION: CLINICAL_PROGRESSION: NOT CHANGED

## 2020-10-18 NOTE — PLAN OF CARE
Problem: Pain:  Goal: Pain level will decrease  Description: Pain level will decrease  Outcome: Ongoing  Note: Patient taking PRN Tylenol for pain this shift. Tylenol decreasing patient's pain this shift. Problem: Falls - Risk of:  Goal: Will remain free from falls  Description: Will remain free from falls  Outcome: Ongoing  Note: Patient free from falls this shift. Hourly rounding continued. Sitter remains at bedside. Problem: Suicide risk  Goal: Provide patient with safe environment  Description: Provide patient with safe environment  Outcome: Ongoing  Note: Sitter remains at bedside. Problem: Discharge Planning:  Goal: Discharged to appropriate level of care  Description: Discharged to appropriate level of care  Outcome: Ongoing  Note: Patient is not a discharge this shift. Patient to be DC to 4E when medically stable    Care plan reviewed with patient. Patient verbalizes understanding of the plan of care and contribute to goal setting.

## 2020-10-18 NOTE — PLAN OF CARE
Problem: Impaired respiratory status  Goal: Clear lung sounds  10/18/2020 1838 by Osvaldo Delgado  Outcome: Ongoing

## 2020-10-18 NOTE — PLAN OF CARE
Problem: Pain:  Description: Pain management should include both nonpharmacologic and pharmacologic interventions. Goal: Pain level will decrease  Outcome: Ongoing  Note: The patient did have a c/o headache. Tylenol given as ordered. Goal: Control of acute pain  Outcome: Ongoing  Goal: Control of chronic pain  Outcome: Ongoing     Problem: Falls - Risk of:  Goal: Will remain free from falls  Outcome: Ongoing  Note: The patient has been free of falls this shift. Bed is in low position, 2/4 rails are up, and alarm is active. Call light is in reach, 2/4 rails are up, and hourly rounding performed. Goal: Absence of physical injury  Outcome: Ongoing     Problem: Suicide risk  Description: Suicide risk  Goal: Provide patient with safe environment  Outcome: Ongoing  Note: Sitter in place. Problem: Discharge Planning:  Goal: Discharged to appropriate level of care  Outcome: Ongoing  Note: Discharge is pending at this time. Pt is currently [facility]. Problem: Fluid Volume - Deficit:  Goal: Absence of fluid volume deficit signs and symptoms  Outcome: Ongoing  Note: 0.9 running at 100ml/hr. Problem: Nutrition Deficit:  Goal: Ability to achieve adequate nutritional intake will improve  Outcome: Ongoing     Problem: Sleep Pattern Disturbance:  Goal: Appears well-rested  Outcome: Ongoing     Problem: Violence - Risk of, Self/Other-Directed:  Goal: Knowledge of developmental care interventions  Outcome: Ongoing   Care plan reviewed with patient. The patient verbalizes understanding and contributed to plan of care.

## 2020-10-18 NOTE — PROGRESS NOTES
Hospitalist Progress Note      Patient:  Lino Manriquez    Unit/Bed:6K-01/001-A  YOB: 1985  MRN: [id number]   Acct: [de-identified]   PCP: BARB Dubois CNP  Date of Admission: 10/16/2020    Assessment/Plan:    1. Alcohol Withdrawal/Suicidal Ideations/Hypokalemia/Hypomagnesemia/Nausea  - stable, tremors on exam  - continue ativan, ciwa, electrolyte replacement, Folic acid, IVF, sitter  - K, Mag, Phos supplemented  - Nausea + epigastric tenderness--> Lipase nml on admission; pt likely has gastritis--> GI cocktail/Protonix 40mg qd/avoid NSAIDs and Alcohol  - Psych consulted for suicidal ideations --> transfer to 4E when stable     2. MDD w/ SI  - lexapro, seroquel  - sitter  - SI precautions  - Psych consulted --> transfer to 4E when medically stable     3. IDDMII  - ISS, Glu checks achs, sliding scale + home insulin  - hypoglycemia protocol + diabetic diet  - eating well.      4. COPD  - no exac  - home inhalers     5. Rash  - this is from the last visit attributed to his phenobarb  - benadryl helps itching but use sparingly as it has sedative properties along with ativan thus increasing risk of resp and cns depression. Discussed with patient. - suspect some of this is due to facial flushing  - on exam seems like eczema -->moisturize and use topical treatment. Do not recommend systemic steroids as pt is going through withdrawal and steroids can make him more agitated. 6. Vitamin D deficiency  - 43607f qweekly x 6 weeks with recheck at that point by pcp  - first dose 10/18/2020 hence a tablet every sunday     Dispo: 1-2 days   Pending:   withdrawal improvement --> Day 2 w/o alcohol. Last drink 10/16. Day 3/4 will tell how pt will do.    Lytes/vit D/K/Phos --> supplemented  psych eval for SI --> transfer to 4E when medically stable        Chief Complaint: SI, detox     Initial H and P:-       13-year-old gentleman with past medical history of COPD, alcohol abuse, seizures in the past, suicidal ideation, came to ER due to suicidal ideation again. Elia Vilchis was just discharged home and states that he was feeling well but started to feel depressed again. [facility] has been taking his antidepressants without any help.  Patient states that he started drinking alcohol again.  Patient did have suicidal ideation but no active plan.  Patient states that he has just been feeling down. [facility] is concerned about his social issues including his children. [facility] states that he is depressed because his father passed away long time ago around his age. [facility] has been actively drinking with his last drink this morning.  Patient has had seizures in the past due to alcohol withdrawal.  Denies any nausea, vomiting, chest pain, shortness of breath, headaches.  No other complaints. In ER, patient was noted to be tachycardic.  Labs otherwise were unremarkable.  Patient alcohol abuse of 0.32.  Due to this he was admitted for further care.     Subjective (past 24 hours):     Doing well. On admission intoxicated hence today is day 2 without drink. Day 3 to 4 are worse for withdrawal sxms. C/o nausea with food and tremors on exam and rash on face. Will monitor one more day and reassess in the morning and if stable plan DC to psych. Past medical history, family history, social history and allergies reviewed again and is unchanged since admission. ROS (12 point review of systems completed. Pertinent positives noted.  Otherwise ROS is negative)     Medications:  Reviewed    Infusion Medications    dextrose      sodium chloride 100 mL/hr at 10/17/20 2101     Scheduled Medications    vitamin D  50,000 Units Oral Weekly    GI cocktail   Oral Once    pantoprazole  40 mg Oral QAM AC    insulin lispro  0-6 Units Subcutaneous TID WC    insulin lispro  0-3 Units Subcutaneous Nightly    aspirin  81 mg Oral Daily    escitalopram  20 mg Oral Daily    folic acid  1 mg Oral Daily    insulin glargine  25 Units Subcutaneous Daily    QUEtiapine  25 mg Oral BID    sodium chloride flush  10 mL Intravenous 2 times per day    enoxaparin  40 mg Subcutaneous Daily    thiamine  100 mg Oral Daily    multivitamin  1 tablet Oral Daily    nicotine  1 patch Transdermal Daily    budesonide-formoterol  2 puff Inhalation Q12H     PRN Meds: diphenhydrAMINE, glucose, dextrose, glucagon (rDNA), dextrose, magnesium sulfate, albuterol sulfate HFA, hydrOXYzine, sodium chloride flush, acetaminophen **OR** acetaminophen, polyethylene glycol, promethazine **OR** ondansetron, potassium chloride, LORazepam **OR** LORazepam **OR** LORazepam **OR** LORazepam **OR** LORazepam **OR** LORazepam **OR** LORazepam **OR** LORazepam      Intake/Output Summary (Last 24 hours) at 10/18/2020 0810  Last data filed at 10/18/2020 0745  Gross per 24 hour   Intake 2491.64 ml   Output 1 ml   Net 2490.64 ml       Diet:  DIET CARB CONTROL; Safety Tray; Safety Tray (Disposables)    Exam:  /79   Pulse 69   Temp 97.8 °F (36.6 °C) (Oral)   Resp 17   Wt 176 lb 3.2 oz (79.9 kg)   SpO2 98%   BMI 32.23 kg/m²         General appearance: No apparent distress, appears stated age and cooperative. HEENT: Pupils equal, round, and reactive to light. Conjunctivae/corneas clear. MM dry  Neck: Supple, with full range of motion. No jugular venous distention. Trachea midline. Respiratory:  Normal respiratory effort. Clear  Cardiovascular: Regular rate and rhythm, tachycardic at times  Abdomen: Soft, non-tender, non-distended   Musculoskeletal: passive and active ROM x 4 extremities. Skin: Erythema/Eczema on face, including cheeks, chin, itching at times.   Neurologic:  grossly non-focal. Tremor on exam RUE>LUE  Psychiatric: Alert and oriented, thought content appropriate, normal insight  Capillary Refill: Brisk,< 3 seconds   Peripheral Pulses: +2 palpable, equal bilaterally     Labs:   Recent Labs     10/16/20  1043 10/17/20  0615   WBC 7.0 No results found.     Electronically signed by Magdaleno Rajan MD on 10/18/2020 at 8:10 AM

## 2020-10-19 LAB
ALBUMIN SERPL-MCNC: 4 G/DL (ref 3.5–5.1)
ALP BLD-CCNC: 108 U/L (ref 38–126)
ALT SERPL-CCNC: 41 U/L (ref 11–66)
ANION GAP SERPL CALCULATED.3IONS-SCNC: 12 MEQ/L (ref 8–16)
AST SERPL-CCNC: 29 U/L (ref 5–40)
BILIRUB SERPL-MCNC: 0.5 MG/DL (ref 0.3–1.2)
BUN BLDV-MCNC: 7 MG/DL (ref 7–22)
CALCIUM SERPL-MCNC: 9.1 MG/DL (ref 8.5–10.5)
CHLORIDE BLD-SCNC: 105 MEQ/L (ref 98–111)
CO2: 22 MEQ/L (ref 23–33)
CREAT SERPL-MCNC: 0.4 MG/DL (ref 0.4–1.2)
GFR SERPL CREATININE-BSD FRML MDRD: > 90 ML/MIN/1.73M2
GLUCOSE BLD-MCNC: 111 MG/DL (ref 70–108)
GLUCOSE BLD-MCNC: 115 MG/DL (ref 70–108)
GLUCOSE BLD-MCNC: 116 MG/DL (ref 70–108)
GLUCOSE BLD-MCNC: 141 MG/DL (ref 70–108)
GLUCOSE BLD-MCNC: 174 MG/DL (ref 70–108)
MAGNESIUM: 2.2 MG/DL (ref 1.6–2.4)
PHOSPHORUS: 3.4 MG/DL (ref 2.4–4.7)
POTASSIUM SERPL-SCNC: 4.2 MEQ/L (ref 3.5–5.2)
SODIUM BLD-SCNC: 139 MEQ/L (ref 135–145)
TOTAL PROTEIN: 6.8 G/DL (ref 6.1–8)

## 2020-10-19 PROCEDURE — 1200000003 HC TELEMETRY R&B

## 2020-10-19 PROCEDURE — 80053 COMPREHEN METABOLIC PANEL: CPT

## 2020-10-19 PROCEDURE — 83735 ASSAY OF MAGNESIUM: CPT

## 2020-10-19 PROCEDURE — 2580000003 HC RX 258: Performed by: INTERNAL MEDICINE

## 2020-10-19 PROCEDURE — 6370000000 HC RX 637 (ALT 250 FOR IP): Performed by: INTERNAL MEDICINE

## 2020-10-19 PROCEDURE — 6370000000 HC RX 637 (ALT 250 FOR IP): Performed by: STUDENT IN AN ORGANIZED HEALTH CARE EDUCATION/TRAINING PROGRAM

## 2020-10-19 PROCEDURE — 36415 COLL VENOUS BLD VENIPUNCTURE: CPT

## 2020-10-19 PROCEDURE — 84100 ASSAY OF PHOSPHORUS: CPT

## 2020-10-19 PROCEDURE — 6370000000 HC RX 637 (ALT 250 FOR IP): Performed by: FAMILY MEDICINE

## 2020-10-19 PROCEDURE — 94760 N-INVAS EAR/PLS OXIMETRY 1: CPT

## 2020-10-19 PROCEDURE — 6360000002 HC RX W HCPCS: Performed by: INTERNAL MEDICINE

## 2020-10-19 PROCEDURE — 82948 REAGENT STRIP/BLOOD GLUCOSE: CPT

## 2020-10-19 PROCEDURE — 99232 SBSQ HOSP IP/OBS MODERATE 35: CPT | Performed by: FAMILY MEDICINE

## 2020-10-19 PROCEDURE — 94640 AIRWAY INHALATION TREATMENT: CPT

## 2020-10-19 RX ORDER — ALBUTEROL SULFATE 90 UG/1
2 AEROSOL, METERED RESPIRATORY (INHALATION) 4 TIMES DAILY PRN
Qty: 3 INHALER | Refills: 1 | Status: ON HOLD | OUTPATIENT
Start: 2020-10-19 | End: 2020-10-21 | Stop reason: HOSPADM

## 2020-10-19 RX ADMIN — ASPIRIN 81 MG: 81 TABLET ORAL at 08:25

## 2020-10-19 RX ADMIN — SODIUM CHLORIDE: 9 INJECTION, SOLUTION INTRAVENOUS at 09:47

## 2020-10-19 RX ADMIN — INSULIN LISPRO 1 UNITS: 100 INJECTION, SOLUTION INTRAVENOUS; SUBCUTANEOUS at 11:37

## 2020-10-19 RX ADMIN — ACETAMINOPHEN 650 MG: 325 TABLET ORAL at 11:37

## 2020-10-19 RX ADMIN — QUETIAPINE FUMARATE 25 MG: 25 TABLET ORAL at 20:25

## 2020-10-19 RX ADMIN — DIPHENHYDRAMINE HCL 25 MG: 25 TABLET ORAL at 00:35

## 2020-10-19 RX ADMIN — ENOXAPARIN SODIUM 40 MG: 40 INJECTION SUBCUTANEOUS at 08:25

## 2020-10-19 RX ADMIN — BUDESONIDE AND FORMOTEROL FUMARATE DIHYDRATE 2 PUFF: 160; 4.5 AEROSOL RESPIRATORY (INHALATION) at 07:40

## 2020-10-19 RX ADMIN — LORAZEPAM 3 MG: 1 TABLET ORAL at 13:25

## 2020-10-19 RX ADMIN — LORAZEPAM 3 MG: 1 TABLET ORAL at 11:49

## 2020-10-19 RX ADMIN — LORAZEPAM 2 MG: 1 TABLET ORAL at 02:50

## 2020-10-19 RX ADMIN — LORAZEPAM 2 MG: 1 TABLET ORAL at 00:35

## 2020-10-19 RX ADMIN — INSULIN GLARGINE 25 UNITS: 100 INJECTION, SOLUTION SUBCUTANEOUS at 20:24

## 2020-10-19 RX ADMIN — ACETAMINOPHEN 650 MG: 325 TABLET ORAL at 00:35

## 2020-10-19 RX ADMIN — ESCITALOPRAM 20 MG: 20 TABLET, FILM COATED ORAL at 09:47

## 2020-10-19 RX ADMIN — DIPHENHYDRAMINE HCL 25 MG: 25 TABLET ORAL at 08:35

## 2020-10-19 RX ADMIN — LORAZEPAM 2 MG: 1 TABLET ORAL at 06:07

## 2020-10-19 RX ADMIN — DIPHENHYDRAMINE HCL 25 MG: 25 TABLET ORAL at 17:24

## 2020-10-19 RX ADMIN — THERA TABS 1 TABLET: TAB at 08:25

## 2020-10-19 RX ADMIN — ACETAMINOPHEN 650 MG: 325 TABLET ORAL at 20:34

## 2020-10-19 RX ADMIN — LORAZEPAM 3 MG: 1 TABLET ORAL at 20:25

## 2020-10-19 RX ADMIN — ONDANSETRON 4 MG: 2 INJECTION INTRAMUSCULAR; INTRAVENOUS at 00:35

## 2020-10-19 RX ADMIN — Medication 100 MG: at 08:25

## 2020-10-19 RX ADMIN — QUETIAPINE FUMARATE 25 MG: 25 TABLET ORAL at 08:26

## 2020-10-19 RX ADMIN — INSULIN LISPRO 1 UNITS: 100 INJECTION, SOLUTION INTRAVENOUS; SUBCUTANEOUS at 17:19

## 2020-10-19 RX ADMIN — BUDESONIDE AND FORMOTEROL FUMARATE DIHYDRATE 2 PUFF: 160; 4.5 AEROSOL RESPIRATORY (INHALATION) at 18:06

## 2020-10-19 RX ADMIN — LORAZEPAM 2 MG: 1 TABLET ORAL at 10:48

## 2020-10-19 RX ADMIN — LORAZEPAM 3 MG: 1 TABLET ORAL at 15:59

## 2020-10-19 RX ADMIN — FOLIC ACID 1 MG: 1 TABLET ORAL at 08:25

## 2020-10-19 RX ADMIN — LORAZEPAM 2 MG: 1 TABLET ORAL at 08:25

## 2020-10-19 RX ADMIN — LORAZEPAM 2 MG: 1 TABLET ORAL at 09:47

## 2020-10-19 RX ADMIN — PANTOPRAZOLE SODIUM 40 MG: 40 TABLET, DELAYED RELEASE ORAL at 06:07

## 2020-10-19 ASSESSMENT — PAIN SCALES - GENERAL
PAINLEVEL_OUTOF10: 6
PAINLEVEL_OUTOF10: 6
PAINLEVEL_OUTOF10: 3
PAINLEVEL_OUTOF10: 7

## 2020-10-19 NOTE — PLAN OF CARE
Problem: Pain:  Goal: Pain level will decrease  Description: Pain level will decrease  10/18/2020 2246 by Cecil Tavarez RN  Outcome: Ongoing  Note: No complaint of pain voiced at this time. Continue to monitor. PRN medications available if needed. 10/18/2020 1427 by Cleve Hamilton RN  Outcome: Ongoing  Note: Patient taking PRN Tylenol for pain this shift. Tylenol decreasing patient's pain this shift. Goal: Control of acute pain  Description: Control of acute pain  10/18/2020 2246 by Cecil Tavarez RN  Outcome: Ongoing  10/18/2020 1427 by Cleve Hamilton RN  Outcome: Ongoing  Goal: Control of chronic pain  Description: Control of chronic pain  10/18/2020 2246 by Cecil Tavarez RN  Outcome: Ongoing  10/18/2020 1427 by Cleve Hamilton RN  Outcome: Ongoing     Problem: Falls - Risk of:  Goal: Will remain free from falls  Description: Will remain free from falls  10/18/2020 2246 by Cecil Tavarez RN  Outcome: Ongoing  Note: No falls noted this shift. Continue falling star program. Bed alarm on, bed in low position. Call light and personal belongings in reach. Patient uses call light appropriately. Sitter at bedside. 10/18/2020 1427 by Cleve Hamilton RN  Outcome: Ongoing  Note: Patient free from falls this shift. Hourly rounding continued. Sitter remains at bedside. Goal: Absence of physical injury  Description: Absence of physical injury  10/18/2020 2246 by Cecil Tavarez RN  Outcome: Ongoing  Note: No physical injury noted this shift. Safe environment provided. Will monitor. Sitter at bedside    10/18/2020 1427 by Cleve Hamilton RN  Outcome: Ongoing     Problem: Suicide risk  Goal: Provide patient with safe environment  Description: Provide patient with safe environment  10/18/2020 2246 by Cecil Tavarez RN  Outcome: Ongoing  Note: Suicide precautions this shift. Sitter at bedside. 10/18/2020 1427 by Cleve Hamilton RN  Outcome: Ongoing  Note: Sitter remains at bedside. Problem: Discharge Planning:  Goal: Discharged to appropriate level of care  Description: Discharged to appropriate level of care  10/18/2020 2246 by Afsaneh Jasso RN  Outcome: Ongoing  Note: Plans to go to 4E when medically stable. 10/18/2020 1427 by Grover Nick RN  Outcome: Ongoing  Note: Patient is not a discharge this shift. Patient to be DC to 4E when medically stable     Problem: Fluid Volume - Deficit:  Goal: Absence of fluid volume deficit signs and symptoms  Description: Absence of fluid volume deficit signs and symptoms  10/18/2020 2246 by Afsaneh Jasso RN  Outcome: Ongoing  Note: Tolerating PO intake. 0.9 @ 100. Urinating adequately. 10/18/2020 1427 by Grover Nick RN  Outcome: Ongoing     Problem: Nutrition Deficit:  Goal: Ability to achieve adequate nutritional intake will improve  Description: Ability to achieve adequate nutritional intake will improve  10/18/2020 2246 by Afsaneh Jasso RN  Outcome: Ongoing  Note: Tolerating PO intake. 10/18/2020 1427 by Grover Nick RN  Outcome: Ongoing     Problem: Sleep Pattern Disturbance:  Goal: Appears well-rested  Description: Appears well-rested  10/18/2020 2246 by Afsaneh Jasso RN  Outcome: Ongoing  Note: Resting well this shift. 10/18/2020 1427 by Grover Nick RN  Outcome: Ongoing     Problem: Violence - Risk of, Self/Other-Directed:  Goal: Knowledge of developmental care interventions  Description: Absence of violence  10/18/2020 2246 by Afsaneh Jasso RN  Outcome: Ongoing  10/18/2020 1427 by Grover Nick RN  Outcome: Ongoing     Problem: Impaired respiratory status  Goal: Clear lung sounds  10/18/2020 1838 by Toma Hull  Outcome: Ongoing   Care plan reviewed with patient. Patient verbalizes understanding of plan of care and contributes to goal setting.

## 2020-10-19 NOTE — PROGRESS NOTES
Hospitalist Progress Note      Patient:  Severo Stamp    Unit/Bed:6K-01/001-A  YOB: 1985  MRN: [id number]   Acct: [de-identified]   PCP: BARB Chacon CNP  Date of Admission: 10/16/2020    Assessment/Plan:    1. Alcohol Withdrawal/Suicidal Ideations/Hypokalemia/Hypomagnesemia/Nausea  - day 3 --> 16mg of ativan overnight  - keep one more day to help with withdrawals and then transfer if stable  - continue ativan, ciwa, electrolyte replacement, Folic acid, IVF, sitter  - K, Mag, Phos supplemented  - Nausea + epigastric tenderness--> Lipase nml on admission; pt likely has gastritis--> GI cocktail/Protonix 40mg qd/avoid NSAIDs and Alcohol  - Psych consulted for suicidal ideations --> transfer to 4E when stable     2. MDD w/ SI  - lexapro, seroquel  - sitter  - SI precautions  - Psych consulted --> transfer to 4E when medically stable     3. IDDMII  - ISS, Glu checks achs, sliding scale + home insulin  - hypoglycemia protocol + diabetic diet  - eating well.      4. COPD  - no exac  - home inhalers     5. Rash  - Hives on abd today -- benadryl helps  - this is from the last visit attributed to his phenobarb  - benadryl helps itching but use sparingly as it has sedative properties along with ativan thus increasing risk of resp and cns depression. Discussed with patient. - suspect some of this is due to facial flushing  - on exam seems like eczema -->moisturize and use topical treatment. Do not recommend systemic steroids as pt is going through withdrawal and steroids can make him more agitated. 6. Vitamin D deficiency  - [id number] qweekly x 6 weeks with recheck at that point by pcp  - first dose 10/18/2020 hence a tablet every sunday     Dispo: 1 day  Pending:   withdrawal improvement --> Day 3 w/o alcohol. Last drink 10/16.  Lytes/vit D/K/Phos --> supplemented  psych eval for SI --> transfer to 4E when medically stable        Chief Complaint: SI, detox     Initial H and P:-       26-year-old gentleman with past medical history of COPD, alcohol abuse, seizures in the past, suicidal ideation, came to ER due to suicidal ideation again. Arnaldo Ledesma was just discharged home and states that he was feeling well but started to feel depressed again. [facility] has been taking his antidepressants without any help.  Patient states that he started drinking alcohol again.  Patient did have suicidal ideation but no active plan.  Patient states that he has just been feeling down. [facility] is concerned about his social issues including his children. [facility] states that he is depressed because his father passed away long time ago around his age. [facility] has been actively drinking with his last drink this morning.  Patient has had seizures in the past due to alcohol withdrawal.  Denies any nausea, vomiting, chest pain, shortness of breath, headaches.  No other complaints. In ER, patient was noted to be tachycardic.  Labs otherwise were unremarkable.  Patient alcohol abuse of 0.32.  Due to this he was admitted for further care.     Subjective (past 24 hours): Worsening sxms of withdrawal overnight. Continue tx  Will monitor one more day and reassess in the morning and if stable plan DC to psych. Past medical history, family history, social history and allergies reviewed again and is unchanged since admission. ROS (12 point review of systems completed. Pertinent positives noted.  Otherwise ROS is negative)     Medications:  Reviewed    Infusion Medications    dextrose      sodium chloride 100 mL/hr at 10/19/20 0928     Scheduled Medications    vitamin D  50,000 Units Oral Weekly    pantoprazole  40 mg Oral QAM AC    insulin lispro  0-6 Units Subcutaneous TID WC    insulin lispro  0-3 Units Subcutaneous Nightly    aspirin  81 mg Oral Daily    escitalopram  20 mg Oral Daily    folic acid  1 mg Oral Daily    insulin glargine  25 Units Subcutaneous Daily    QUEtiapine  25 mg Oral BID    sodium chloride flush  10 mL Intravenous 2 times per day    enoxaparin  40 mg Subcutaneous Daily    thiamine  100 mg Oral Daily    multivitamin  1 tablet Oral Daily    nicotine  1 patch Transdermal Daily    budesonide-formoterol  2 puff Inhalation Q12H     PRN Meds: diphenhydrAMINE, glucose, dextrose, glucagon (rDNA), dextrose, magnesium sulfate, albuterol sulfate HFA, hydrOXYzine, sodium chloride flush, acetaminophen **OR** acetaminophen, polyethylene glycol, promethazine **OR** ondansetron, potassium chloride, LORazepam **OR** LORazepam **OR** LORazepam **OR** LORazepam **OR** LORazepam **OR** LORazepam **OR** LORazepam **OR** LORazepam      Intake/Output Summary (Last 24 hours) at 10/19/2020 1801  Last data filed at 10/19/2020 1343  Gross per 24 hour   Intake 3288.36 ml   Output 0 ml   Net 3288.36 ml       Diet:  DIET CARB CONTROL; Safety Tray; Safety Tray (Disposables)    Exam:  /86   Pulse 77   Temp 98.2 °F (36.8 °C) (Oral)   Resp 16   Wt 176 lb 3.2 oz (79.9 kg)   SpO2 97%   BMI 32.23 kg/m²         General appearance: No apparent distress, appears stated age and cooperative. HEENT: Pupils equal, round, and reactive to light. Conjunctivae/corneas clear. MM dry  Neck: Supple, with full range of motion. No jugular venous distention. Trachea midline. Respiratory:  Normal respiratory effort. Clear  Cardiovascular: Regular rate and rhythm, tachycardic at times  Abdomen: Soft, non-tender, non-distended , hives on abd  Musculoskeletal: passive and active ROM x 4 extremities. Skin: Erythema/Eczema on face, including cheeks, chin, itching at times.   Neurologic:  grossly non-focal. Tremor on exam RUE>LUE  Psychiatric: Alert and oriented, thought content appropriate, normal insight  Capillary Refill: Brisk,< 3 seconds   Peripheral Pulses: +2 palpable, equal bilaterally     Labs:   Recent Labs     10/17/20  0615   WBC 6.2   HGB 13.1*   HCT 38.8*        Recent Labs     10/17/20  0615 10/18/20  0600 10/19/20  0608    140 139   K 3.4* 4.3 4.2    104 105   CO2 27 25 22*   BUN 6* 7 7   CREATININE 0.5 0.4 0.4   CALCIUM 8.4* 8.8 9.1   PHOS 2.7 3.4 3.4     Recent Labs     10/18/20  0600 10/19/20  0608   AST 43* 29   ALT 42 41   BILITOT 0.4 0.5   ALKPHOS 92 108     No results for input(s): INR in the last 72 hours. No results for input(s): Reyne Occitan in the last 72 hours. Microbiology:    Blood culture #1:   Lab Results   Component Value Date    BC No growth-preliminary  No growth   10/02/2016       Blood culture #2:No results found for: Sindy Reyesdavid    Organism:No results found for: [facility]      Lab Results   Component Value Date    LABGRAM  12/12/2017     Rare segmented neutrophils observed. Rare epithelial cells observed. Many gram positive cocci occurring singly and in pairs. Many gram negative bacilli. Many gram positive bacilli. MRSA culture only:No results found for: [facility]    Urine culture:   Lab Results   Component Value Date    LABURIN 300 06/17/2019       Respiratory culture: No results found for: CULTRESP    Aerobic and Anaerobic :  Lab Results   Component Value Date    LABAERO Normal rizwana- preliminary  Normal rizwana   12/12/2017     Lab Results   Component Value Date    LABANAE  12/12/2017     No anaerobes isolated- preliminary  Culture yielded heavy mixed growth which included anaerobic  gram positive cocci. If a true mixed aerobic and anaerobic  infection is suspected, then broad spectrum empiric  antibiotic therapy is indicated and should include coverage  for anaerobic organisms. Urinalysis:      Lab Results   Component Value Date    NITRU NEGATIVE 10/16/2020    WBCUA 0-2 03/02/2020    BACTERIA NONE SEEN 03/02/2020    RBCUA 0-2 03/02/2020    BLOODU NEGATIVE 10/16/2020    SPECGRAV 1.011 10/12/2013    GLUCOSEU 100 10/16/2020       Radiology:  No orders to display     No results found.     Electronically signed by Pavithra Luque MD on 10/19/2020 at 6:01 PM

## 2020-10-19 NOTE — PLAN OF CARE
Problem: Impaired respiratory status  Goal: Clear lung sounds  10/19/2020 0746 by Susanna Abraham RCP  Outcome: Ongoing

## 2020-10-19 NOTE — CARE COORDINATION
10/19/20 0800   Readmission Assessment   Number of Days since last admission? 1-7 days   Previous Disposition Home Alone   Who is being Interviewed Patient   What was the patient's/caregiver's perception as to why they think they needed to return back to the hospital? Other (Comment)  (felt suicidal again)   Did you visit your Primary Care Physician after you left the hospital, before you returned this time? No   Why weren't you able to visit your PCP? Other (Comment)  (appt scheduled for 10/20)   Did you see a specialist, such as Cardiac, Pulmonary, Orthopedic Physician, etc. after you left the hospital? No   Who advised the patient to return to the hospital? Self-referral   Does the patient report anything that got in the way of taking their medications? No   In our efforts to provide the best possible care to you and others like you, can you think of anything that we could have done to help you after you left the hospital the first time, so that you might not have needed to return so soon?  Other (Comment)  (psych admission)

## 2020-10-19 NOTE — PLAN OF CARE
Problem: Pain:  Goal: Pain level will decrease  Description: Pain level will decrease  10/19/2020 1000 by Benedetta Severs, RN  Outcome: Ongoing  Note: Pt complains of a headache this shift. PRN tylenol on MAR. Monitoring. Problem: Falls - Risk of:  Goal: Will remain free from falls  Description: Will remain free from falls  10/19/2020 1000 by Benedetta Severs, RN  Outcome: Ongoing  Note: No falls this shift. Patient educated on not getting up without help. Falling star protocol in place. Call light in reach. Bed in lowest position. Side rails up x2. Nonskid footwear on. Bed alarm set. Patient visually checked on hourly rounds. Problem: Falls - Risk of:  Goal: Absence of physical injury  Description: Absence of physical injury  10/19/2020 1000 by Benedetta Severs, RN  Outcome: Ongoing     Problem: Suicide risk  Goal: Provide patient with safe environment  Description: Provide patient with safe environment  10/19/2020 1000 by Benedetta Severs, RN  Outcome: Ongoing  Note: Sitter remains at bedside for patient safety. Pt denies suicidal thoughts this shift. Problem: Discharge Planning:  Goal: Discharged to appropriate level of care  Description: Discharged to appropriate level of care  10/19/2020 1000 by Benedetta Severs, RN  Outcome: Ongoing  Note: Pt plans 4E when medically stable. Problem: Fluid Volume - Deficit:  Goal: Absence of fluid volume deficit signs and symptoms  Description: Absence of fluid volume deficit signs and symptoms  10/19/2020 1000 by Benedetta Severs, RN  Outcome: Ongoing  Note: Monitoring intake and output. IV fluids infusing.       Problem: Nutrition Deficit:  Goal: Ability to achieve adequate nutritional intake will improve  Description: Ability to achieve adequate nutritional intake will improve  10/19/2020 1000 by Benedetta Severs, RN  Outcome: Ongoing     Problem: Sleep Pattern Disturbance:  Goal: Appears well-rested  Description: Appears well-rested  10/19/2020 1000 by Clau Bran RN  Outcome: Ongoing  Note: PRN ativan on MAR for CIWA. Care plan reviewed with patient. Patient verbalizes understanding of the plan of care and contribute to goal setting.

## 2020-10-19 NOTE — CARE COORDINATION
10/19/20, 7:23 AM EDT  DISCHARGE PLANNING EVALUATION:    Kayleigh Hidalgo       Admitted from: ER 10/16/2020/ [facility] day: 3   Location: [facility]01/001-A Reason for admit: Alcohol withdrawal, uncomplicated ([facility] [M08.071] Status: IP   Admit order signed?: yes  PMH:  has a past medical history of Asthma, COPD (chronic obstructive pulmonary disease) ([facility] Eczema, GERD (gastroesophageal reflux disease), History of alcohol abuse, History of marijuana use, History of tobacco abuse, Hx of seasonal allergies, Pancreatitis, Seizures ([facility] and Type 2 diabetes mellitus without complication ([facility] Medications:  Scheduled Meds:   vitamin D  50,000 Units Oral Weekly    pantoprazole  40 mg Oral QAM AC    insulin lispro  0-6 Units Subcutaneous TID WC    insulin lispro  0-3 Units Subcutaneous Nightly    aspirin  81 mg Oral Daily    escitalopram  20 mg Oral Daily    folic acid  1 mg Oral Daily    insulin glargine  25 Units Subcutaneous Daily    QUEtiapine  25 mg Oral BID    sodium chloride flush  10 mL Intravenous 2 times per day    enoxaparin  40 mg Subcutaneous Daily    thiamine  100 mg Oral Daily    multivitamin  1 tablet Oral Daily    nicotine  1 patch Transdermal Daily    budesonide-formoterol  2 puff Inhalation Q12H     Continuous Infusions:   dextrose      sodium chloride 100 mL/hr at 10/18/20 2025      Pertinent Info/Orders/Treatment Plan:  Patient discharged on 10/13, readmitted on 10/16 with return of suicidal ideations and need for alcohol withdrawal.  IVF, DM management, CIWA protocol with ativan. Psych consulted, plans to admit to  once stable. Diet: DIET CARB CONTROL; Safety Tray; Safety Tray (Disposables)   Smoking status:  reports that he has been smoking cigarettes. He has a 11.00 pack-year smoking history.  He has never used smokeless tobacco.   PCP: BARB Hubbard CNP  Readmission 30 days or less: yes  Readmission Risk Score: 25%    Discharge Planning Evaluation  Current Residence: Private Residence  Living Arrangements:  Alone   Support Systems:  Children, Family Members  Current Services PTA:     Potential Assistance Needed:  N/A  Potential Assistance Purchasing Medications:  No  Does patient want to participate in local refill/ meds to beds program?  Yes  Type of Home Care Services:  None  Patient expects to be discharged to:  home alone  Expected Discharge date:  10/19/20  Follow Up Appointment: Best Day/ Time: Monday AM    Patient Goals/Plan/Treatment Preferences: Tez Landis is from home alone and is aligned with [facility] services. Plan is for 4E when medically stable. Transportation/Food Security/Housekeeping Addressed:  No issues identified.     Evaluation: yes

## 2020-10-20 ENCOUNTER — HOSPITAL ENCOUNTER (INPATIENT)
Age: 35
LOS: 3 days | Discharge: HOME | End: 2020-10-23
Attending: PSYCHIATRY & NEUROLOGY | Admitting: PSYCHIATRY & NEUROLOGY
Payer: COMMERCIAL

## 2020-10-20 VITALS
BODY MASS INDEX: 33.53 KG/M2 | RESPIRATION RATE: 18 BRPM | OXYGEN SATURATION: 97 % | SYSTOLIC BLOOD PRESSURE: 133 MMHG | TEMPERATURE: 98.1 F | HEART RATE: 96 BPM | WEIGHT: 183.3 LBS | DIASTOLIC BLOOD PRESSURE: 96 MMHG

## 2020-10-20 PROBLEM — F32.9 MAJOR DEPRESSION, CHRONIC: Status: ACTIVE | Noted: 2020-10-20

## 2020-10-20 LAB
ALBUMIN SERPL-MCNC: 3.7 G/DL (ref 3.5–5.1)
ALP BLD-CCNC: 99 U/L (ref 38–126)
ALT SERPL-CCNC: 38 U/L (ref 11–66)
ANION GAP SERPL CALCULATED.3IONS-SCNC: 11 MEQ/L (ref 8–16)
AST SERPL-CCNC: 27 U/L (ref 5–40)
BILIRUB SERPL-MCNC: 0.3 MG/DL (ref 0.3–1.2)
BUN BLDV-MCNC: 8 MG/DL (ref 7–22)
CALCIUM SERPL-MCNC: 9.3 MG/DL (ref 8.5–10.5)
CHLORIDE BLD-SCNC: 104 MEQ/L (ref 98–111)
CO2: 24 MEQ/L (ref 23–33)
CREAT SERPL-MCNC: 0.4 MG/DL (ref 0.4–1.2)
GFR SERPL CREATININE-BSD FRML MDRD: > 90 ML/MIN/1.73M2
GLUCOSE BLD-MCNC: 115 MG/DL (ref 70–108)
GLUCOSE BLD-MCNC: 151 MG/DL (ref 70–108)
GLUCOSE BLD-MCNC: 219 MG/DL (ref 70–108)
GLUCOSE BLD-MCNC: 84 MG/DL (ref 70–108)
GLUCOSE BLD-MCNC: 99 MG/DL (ref 70–108)
MAGNESIUM: 2 MG/DL (ref 1.6–2.4)
PHOSPHORUS: 3.7 MG/DL (ref 2.4–4.7)
POTASSIUM SERPL-SCNC: 3.9 MEQ/L (ref 3.5–5.2)
SODIUM BLD-SCNC: 139 MEQ/L (ref 135–145)
TOTAL PROTEIN: 6.2 G/DL (ref 6.1–8)

## 2020-10-20 PROCEDURE — 6370000000 HC RX 637 (ALT 250 FOR IP): Performed by: STUDENT IN AN ORGANIZED HEALTH CARE EDUCATION/TRAINING PROGRAM

## 2020-10-20 PROCEDURE — 82948 REAGENT STRIP/BLOOD GLUCOSE: CPT

## 2020-10-20 PROCEDURE — 94760 N-INVAS EAR/PLS OXIMETRY 1: CPT

## 2020-10-20 PROCEDURE — 6370000000 HC RX 637 (ALT 250 FOR IP): Performed by: FAMILY MEDICINE

## 2020-10-20 PROCEDURE — 6370000000 HC RX 637 (ALT 250 FOR IP): Performed by: INTERNAL MEDICINE

## 2020-10-20 PROCEDURE — 99239 HOSP IP/OBS DSCHRG MGMT >30: CPT | Performed by: FAMILY MEDICINE

## 2020-10-20 PROCEDURE — 84100 ASSAY OF PHOSPHORUS: CPT

## 2020-10-20 PROCEDURE — 6370000000 HC RX 637 (ALT 250 FOR IP): Performed by: PSYCHIATRY & NEUROLOGY

## 2020-10-20 PROCEDURE — 83735 ASSAY OF MAGNESIUM: CPT

## 2020-10-20 PROCEDURE — 1240000000 HC EMOTIONAL WELLNESS R&B

## 2020-10-20 PROCEDURE — 94640 AIRWAY INHALATION TREATMENT: CPT

## 2020-10-20 PROCEDURE — 2580000003 HC RX 258: Performed by: INTERNAL MEDICINE

## 2020-10-20 PROCEDURE — 80053 COMPREHEN METABOLIC PANEL: CPT

## 2020-10-20 PROCEDURE — 36415 COLL VENOUS BLD VENIPUNCTURE: CPT

## 2020-10-20 PROCEDURE — 6360000002 HC RX W HCPCS: Performed by: INTERNAL MEDICINE

## 2020-10-20 RX ORDER — ASPIRIN 81 MG/1
81 TABLET ORAL DAILY
Status: CANCELLED | OUTPATIENT
Start: 2020-10-20

## 2020-10-20 RX ORDER — FOLIC ACID 1 MG/1
1 TABLET ORAL DAILY
Status: CANCELLED | OUTPATIENT
Start: 2020-10-20

## 2020-10-20 RX ORDER — LORAZEPAM 2 MG/1
4 TABLET ORAL
Status: DISCONTINUED | OUTPATIENT
Start: 2020-10-20 | End: 2020-10-23 | Stop reason: HOSPADM

## 2020-10-20 RX ORDER — ESCITALOPRAM OXALATE 20 MG/1
20 TABLET ORAL DAILY
Status: CANCELLED | OUTPATIENT
Start: 2020-10-20

## 2020-10-20 RX ORDER — LORAZEPAM 2 MG/1
2 TABLET ORAL
Status: DISCONTINUED | OUTPATIENT
Start: 2020-10-20 | End: 2020-10-23 | Stop reason: HOSPADM

## 2020-10-20 RX ORDER — HYDROXYZINE PAMOATE 50 MG/1
100 CAPSULE ORAL 4 TIMES DAILY PRN
Status: DISCONTINUED | OUTPATIENT
Start: 2020-10-20 | End: 2020-10-23 | Stop reason: HOSPADM

## 2020-10-20 RX ORDER — THIAMINE MONONITRATE (VIT B1) 100 MG
100 TABLET ORAL DAILY
Status: CANCELLED | OUTPATIENT
Start: 2020-10-20

## 2020-10-20 RX ORDER — ACETAMINOPHEN 500 MG
1000 TABLET ORAL EVERY 6 HOURS PRN
Status: CANCELLED | OUTPATIENT
Start: 2020-10-20

## 2020-10-20 RX ORDER — LORAZEPAM 1 MG/1
1 TABLET ORAL
Status: DISCONTINUED | OUTPATIENT
Start: 2020-10-20 | End: 2020-10-23 | Stop reason: HOSPADM

## 2020-10-20 RX ORDER — NALOXONE HYDROCHLORIDE 4 MG/.1ML
1 SPRAY NASAL PRN
Status: CANCELLED | OUTPATIENT
Start: 2020-10-20

## 2020-10-20 RX ORDER — QUETIAPINE FUMARATE 25 MG/1
25 TABLET, FILM COATED ORAL 2 TIMES DAILY
Status: CANCELLED | OUTPATIENT
Start: 2020-10-20

## 2020-10-20 RX ORDER — IBUPROFEN 400 MG/1
400 TABLET ORAL EVERY 6 HOURS PRN
Status: DISCONTINUED | OUTPATIENT
Start: 2020-10-20 | End: 2020-10-23 | Stop reason: HOSPADM

## 2020-10-20 RX ORDER — HYDROXYZINE PAMOATE 50 MG/1
100 CAPSULE ORAL 4 TIMES DAILY PRN
Status: CANCELLED | OUTPATIENT
Start: 2020-10-20

## 2020-10-20 RX ORDER — NALOXONE HYDROCHLORIDE 4 MG/.1ML
1 SPRAY NASAL PRN
Status: DISCONTINUED | OUTPATIENT
Start: 2020-10-20 | End: 2020-10-20 | Stop reason: RX

## 2020-10-20 RX ORDER — THIAMINE MONONITRATE (VIT B1) 100 MG
100 TABLET ORAL DAILY
Status: DISCONTINUED | OUTPATIENT
Start: 2020-10-20 | End: 2020-10-23 | Stop reason: HOSPADM

## 2020-10-20 RX ORDER — ALBUTEROL SULFATE 90 UG/1
2 AEROSOL, METERED RESPIRATORY (INHALATION) EVERY 6 HOURS PRN
Status: DISCONTINUED | OUTPATIENT
Start: 2020-10-20 | End: 2020-10-23 | Stop reason: HOSPADM

## 2020-10-20 RX ORDER — ACETAMINOPHEN 500 MG
1000 TABLET ORAL EVERY 6 HOURS PRN
Status: DISCONTINUED | OUTPATIENT
Start: 2020-10-20 | End: 2020-10-20 | Stop reason: SDUPTHER

## 2020-10-20 RX ORDER — ASPIRIN 81 MG/1
81 TABLET ORAL DAILY
Status: DISCONTINUED | OUTPATIENT
Start: 2020-10-20 | End: 2020-10-23 | Stop reason: HOSPADM

## 2020-10-20 RX ORDER — BUDESONIDE AND FORMOTEROL FUMARATE DIHYDRATE 160; 4.5 UG/1; UG/1
2 AEROSOL RESPIRATORY (INHALATION) 2 TIMES DAILY
Status: DISCONTINUED | OUTPATIENT
Start: 2020-10-20 | End: 2020-10-23 | Stop reason: HOSPADM

## 2020-10-20 RX ORDER — M-VIT,TX,IRON,MINS/CALC/FOLIC 27MG-0.4MG
1 TABLET ORAL DAILY
Status: DISCONTINUED | OUTPATIENT
Start: 2020-10-20 | End: 2020-10-23 | Stop reason: HOSPADM

## 2020-10-20 RX ORDER — ACETAMINOPHEN 325 MG/1
650 TABLET ORAL EVERY 4 HOURS PRN
Status: DISCONTINUED | OUTPATIENT
Start: 2020-10-20 | End: 2020-10-23 | Stop reason: HOSPADM

## 2020-10-20 RX ORDER — TRAZODONE HYDROCHLORIDE 50 MG/1
50 TABLET ORAL NIGHTLY PRN
Status: DISCONTINUED | OUTPATIENT
Start: 2020-10-20 | End: 2020-10-23 | Stop reason: HOSPADM

## 2020-10-20 RX ORDER — ONDANSETRON 4 MG/1
4 TABLET, FILM COATED ORAL 3 TIMES DAILY PRN
Status: DISCONTINUED | OUTPATIENT
Start: 2020-10-20 | End: 2020-10-23 | Stop reason: HOSPADM

## 2020-10-20 RX ORDER — NICOTINE 21 MG/24HR
1 PATCH, TRANSDERMAL 24 HOURS TRANSDERMAL DAILY
Status: DISCONTINUED | OUTPATIENT
Start: 2020-10-20 | End: 2020-10-23 | Stop reason: HOSPADM

## 2020-10-20 RX ORDER — MAGNESIUM HYDROXIDE/ALUMINUM HYDROXICE/SIMETHICONE 120; 1200; 1200 MG/30ML; MG/30ML; MG/30ML
30 SUSPENSION ORAL EVERY 6 HOURS PRN
Status: DISCONTINUED | OUTPATIENT
Start: 2020-10-20 | End: 2020-10-23 | Stop reason: HOSPADM

## 2020-10-20 RX ORDER — ALBUTEROL SULFATE 90 UG/1
2 AEROSOL, METERED RESPIRATORY (INHALATION) EVERY 6 HOURS PRN
Status: CANCELLED | OUTPATIENT
Start: 2020-10-20

## 2020-10-20 RX ORDER — INSULIN GLARGINE 100 [IU]/ML
25 INJECTION, SOLUTION SUBCUTANEOUS DAILY
Status: DISCONTINUED | OUTPATIENT
Start: 2020-10-20 | End: 2020-10-23 | Stop reason: HOSPADM

## 2020-10-20 RX ORDER — FOLIC ACID 1 MG/1
1 TABLET ORAL DAILY
Status: DISCONTINUED | OUTPATIENT
Start: 2020-10-20 | End: 2020-10-23 | Stop reason: HOSPADM

## 2020-10-20 RX ORDER — ONDANSETRON 4 MG/1
4 TABLET, FILM COATED ORAL 3 TIMES DAILY PRN
Status: CANCELLED | OUTPATIENT
Start: 2020-10-20

## 2020-10-20 RX ORDER — QUETIAPINE FUMARATE 25 MG/1
25 TABLET, FILM COATED ORAL 2 TIMES DAILY
Status: DISCONTINUED | OUTPATIENT
Start: 2020-10-20 | End: 2020-10-23 | Stop reason: HOSPADM

## 2020-10-20 RX ORDER — ESCITALOPRAM OXALATE 20 MG/1
20 TABLET ORAL DAILY
Status: DISCONTINUED | OUTPATIENT
Start: 2020-10-20 | End: 2020-10-21

## 2020-10-20 RX ADMIN — LORAZEPAM 2 MG: 1 TABLET ORAL at 12:15

## 2020-10-20 RX ADMIN — INSULIN LISPRO 2 UNITS: 100 INJECTION, SOLUTION INTRAVENOUS; SUBCUTANEOUS at 12:13

## 2020-10-20 RX ADMIN — SODIUM CHLORIDE: 9 INJECTION, SOLUTION INTRAVENOUS at 08:03

## 2020-10-20 RX ADMIN — BUDESONIDE AND FORMOTEROL FUMARATE DIHYDRATE 2 PUFF: 160; 4.5 AEROSOL RESPIRATORY (INHALATION) at 08:00

## 2020-10-20 RX ADMIN — FOLIC ACID 1 MG: 1 TABLET ORAL at 10:06

## 2020-10-20 RX ADMIN — ASPIRIN 81 MG: 81 TABLET ORAL at 10:06

## 2020-10-20 RX ADMIN — ESCITALOPRAM 20 MG: 20 TABLET, FILM COATED ORAL at 10:06

## 2020-10-20 RX ADMIN — DIPHENHYDRAMINE HCL 25 MG: 25 TABLET ORAL at 00:25

## 2020-10-20 RX ADMIN — QUETIAPINE FUMARATE 25 MG: 25 TABLET ORAL at 10:06

## 2020-10-20 RX ADMIN — IBUPROFEN 400 MG: 400 TABLET, FILM COATED ORAL at 20:18

## 2020-10-20 RX ADMIN — ONDANSETRON 4 MG: 2 INJECTION INTRAMUSCULAR; INTRAVENOUS at 00:17

## 2020-10-20 RX ADMIN — LORAZEPAM 3 MG: 2 INJECTION INTRAMUSCULAR; INTRAVENOUS at 00:30

## 2020-10-20 RX ADMIN — ENOXAPARIN SODIUM 40 MG: 40 INJECTION SUBCUTANEOUS at 10:06

## 2020-10-20 RX ADMIN — THERA TABS 1 TABLET: TAB at 10:06

## 2020-10-20 RX ADMIN — ASPIRIN 81 MG: 81 TABLET ORAL at 16:36

## 2020-10-20 RX ADMIN — QUETIAPINE FUMARATE 25 MG: 25 TABLET ORAL at 20:17

## 2020-10-20 RX ADMIN — Medication 100 MG: at 10:06

## 2020-10-20 RX ADMIN — INSULIN GLARGINE 25 UNITS: 100 INJECTION, SOLUTION SUBCUTANEOUS at 20:12

## 2020-10-20 RX ADMIN — PANTOPRAZOLE SODIUM 40 MG: 40 TABLET, DELAYED RELEASE ORAL at 04:28

## 2020-10-20 RX ADMIN — LORAZEPAM 3 MG: 1 TABLET ORAL at 07:20

## 2020-10-20 RX ADMIN — LORAZEPAM 2 MG: 2 TABLET ORAL at 16:42

## 2020-10-20 RX ADMIN — ACETAMINOPHEN 650 MG: 325 TABLET ORAL at 12:15

## 2020-10-20 RX ADMIN — LORAZEPAM 2 MG: 1 TABLET ORAL at 04:28

## 2020-10-20 RX ADMIN — FOLIC ACID 1 MG: 1 TABLET ORAL at 16:36

## 2020-10-20 RX ADMIN — LORAZEPAM 2 MG: 1 TABLET ORAL at 10:06

## 2020-10-20 RX ADMIN — ESCITALOPRAM 20 MG: 20 TABLET, FILM COATED ORAL at 16:36

## 2020-10-20 RX ADMIN — LORAZEPAM 1 MG: 1 TABLET ORAL at 13:49

## 2020-10-20 RX ADMIN — LORAZEPAM 1 MG: 1 TABLET ORAL at 20:17

## 2020-10-20 ASSESSMENT — SLEEP AND FATIGUE QUESTIONNAIRES
DO YOU HAVE DIFFICULTY SLEEPING: NO
DO YOU USE A SLEEP AID: NO
AVERAGE NUMBER OF SLEEP HOURS: 8

## 2020-10-20 ASSESSMENT — PAIN DESCRIPTION - ORIENTATION: ORIENTATION: MID

## 2020-10-20 ASSESSMENT — PAIN DESCRIPTION - PROGRESSION: CLINICAL_PROGRESSION: NOT CHANGED

## 2020-10-20 ASSESSMENT — PAIN DESCRIPTION - PAIN TYPE
TYPE: ACUTE PAIN
TYPE: ACUTE PAIN

## 2020-10-20 ASSESSMENT — PAIN DESCRIPTION - FREQUENCY: FREQUENCY: CONTINUOUS

## 2020-10-20 ASSESSMENT — PAIN SCALES - GENERAL
PAINLEVEL_OUTOF10: 5
PAINLEVEL_OUTOF10: 7
PAINLEVEL_OUTOF10: 4
PAINLEVEL_OUTOF10: 7

## 2020-10-20 ASSESSMENT — PAIN DESCRIPTION - LOCATION
LOCATION: HEAD
LOCATION: HEAD

## 2020-10-20 ASSESSMENT — PAIN DESCRIPTION - ONSET: ONSET: ON-GOING

## 2020-10-20 ASSESSMENT — PAIN - FUNCTIONAL ASSESSMENT: PAIN_FUNCTIONAL_ASSESSMENT: ACTIVITIES ARE NOT PREVENTED

## 2020-10-20 ASSESSMENT — PAIN DESCRIPTION - DESCRIPTORS: DESCRIPTORS: HEADACHE

## 2020-10-20 ASSESSMENT — LIFESTYLE VARIABLES: HISTORY_ALCOHOL_USE: YES

## 2020-10-20 ASSESSMENT — PATIENT HEALTH QUESTIONNAIRE - PHQ9: SUM OF ALL RESPONSES TO PHQ QUESTIONS 1-9: 19

## 2020-10-20 NOTE — FLOWSHEET NOTE
10/20/20 1001   Encounter Summary   Services provided to: Patient   Referral/Consult From: Rounding   Continue Visiting No  (10/20)   Complexity of Encounter Low   Length of Encounter 15 minutes   Routine   Type Initial   Assessment Approachable   Intervention Nurtured hope   Outcome Encouraged   Assessment: In my encounter with the 35yr old patient, while rounding  the unit I provided spiritual care to patient through conversation, I also came to assess the patients spiritual needs present. The pt was recovering from alcohol withdrawal.     Interventions:  I provided prayer, emotional support and words of comfort. Outcomes: The patient was encouraged and didnt share any further spiritual needs at this time. The pt remains optimistic and hopeful. The pt shared that they were appreciative for the support. Plan:  1. No follow up is needed. The pt might be discharged today.

## 2020-10-20 NOTE — DISCHARGE SUMMARY
Hospital Medicine Discharge Summary      Patient Identification:   Noam Vargas   : 1985  MRN: [id number]   Account: [de-identified]      Patient's PCP: BARB Craig CNP    Admit Date: 10/16/2020     Discharge Date:   10/20/20    Admitting Physician: No admitting provider for patient encounter. Discharge Physician: Darryl Hanley MD     Discharge Diagnoses: Active Hospital Problems    Diagnosis Date Noted    Alcohol withdrawal, uncomplicated ([facility] [address] [L45.123] 10/16/2020       The patient was seen and examined on day of discharge and this discharge summary is in conjunction with any daily progress note from day of discharge. Hospital Course:   Noam Vargas is a 38 y.o. male admitted to [address] [place] [address] on 10/16/2020 for alcohol intoxication and suicidal ideations, looking for detox. Patient was recently discharged from the hospital 2 days prior to admission. He was treated with ativan per protocol as he states phenobarb gives him a rash. Psych evaluated patient and wanted him transferred to the psych unit after medically stable. Patient is doing well. Due to staffing concerns, patient was kept on medical floor with plans to transfer to [facility] once medically stable. Patient is day 4 of withdrawal and doing better. He will be transferred to [facility], as his symptoms have improved.      1. Alcohol Withdrawal/Suicidal Ideations/Hypokalemia/Hypomagnesemia/Nasuea  - stable, right hand tremors/shacking>left. Unsure if this purely withdrawal related. While asleep there is not shaking or tremors etc. When he wakes up, the right hand/arm tremors more than left and this doesn't change much with meds, I believe. - continue ativan po prn  - K and Mag and Phos supplemented  - Nausea--> pt likely has gastritis--> GI cocktail/Protonix 40mg qd/avoid NSAIDs and Alcohol  - Psych consulted for suicidal ideations --> transfer to       2.  MDD w/ SI  - lexapro, seroquel  - sitter  - SI Stable    Code Status:  Full Code     Patient Instructions:    Discharge lab work: none  Activity: activity as tolerated  Diet: DIET CARB CONTROL; Safety Tray; Safety Tray (Disposables)      Follow-up visits:   No follow-up provider specified. Discharge Medications:      Rosy Colbert   Home Medication Instructions [id number]    Printed on:10/20/20 4114   Medication Information                      acetaminophen (TYLENOL) 500 MG tablet  Take 1,000 mg by mouth every 6 hours as needed for Pain             albuterol sulfate HFA (PROVENTIL HFA) 108 (90 Base) MCG/ACT inhaler  Inhale 2 puffs into the lungs every 6 hours as needed for Wheezing             albuterol sulfate HFA (VENTOLIN HFA) 108 (90 Base) MCG/ACT inhaler  Inhale 2 puffs into the lungs 4 times daily as needed for Wheezing             aspirin 81 MG EC tablet  Take 1 tablet by mouth daily             escitalopram (LEXAPRO) 20 MG tablet  Take 1 tablet by mouth daily             fluticasone-salmeterol (ADVAIR) 250-50 MCG/DOSE AEPB  Inhale 1 puff into the lungs every 12 hours             folic acid (FOLVITE) 1 MG tablet  Take 1 tablet by mouth daily             hydrOXYzine (VISTARIL) 100 MG capsule  Take 1 capsule by mouth 4 times daily as needed for Anxiety             insulin aspart (NOVOLOG FLEXPEN) 100 UNIT/ML injection pen  Inject 2-12 units SQ TID with meals as directed per sliding scale, max dose 36 units/day             insulin glargine (LANTUS SOLOSTAR) 100 UNIT/ML injection pen  Inject 25 Units into the skin daily             Insulin Pen Needle 30G X 8 MM MISC  1 each by Does not apply route 3 times daily             Multiple Vitamins-Minerals (MENS MULTIVITAMIN PLUS) TABS  Take 1 tablet by mouth daily             NARCAN 4 MG/0.1ML LIQD nasal spray  ADMINISTER A SINGLE SPRAY INTRANASALLY INTO ONE NOSTRIL. CALL 911.  MAY REPEAT X 1.             nicotine (NICOTROL) 10 MG inhaler  Inhale 1 puff into the lungs as needed for Smoking cessation             ondansetron (ZOFRAN) 4 MG tablet  Take 1 tablet by mouth 3 times daily as needed for Nausea or Vomiting             QUEtiapine (SEROQUEL) 25 MG tablet  Take 1 tablet by mouth nightly for 1 week, then take 1 tablet by mouth twice a day. vitamin B-1 100 MG tablet  Take 1 tablet by mouth daily                 Time Spent on discharge is more than 45 minutes in the examination, evaluation, counseling and review of medications and discharge plan. Signed: Thank you BARB Barron CNP for the opportunity to be involved in this patient's care.     Electronically signed by Bronwyn Horne MD on 10/20/2020 at 12:37 PM

## 2020-10-20 NOTE — PLAN OF CARE
Problem: Pain:  Goal: Pain level will decrease  Description: Pain level will decrease    Outcome: Ongoing  Note: No complaint of pain voiced at this time. Continue to monitor. PRN medications available if needed. Goal: Control of acute pain  Description: Control of acute pain    Outcome: Ongoing  Problem: Falls - Risk of:  Goal: Will remain free from falls    Outcome: Ongoing  Note: No falls noted this shift. Continue falling star program. Bed alarm on, bed in low position. Call light and personal belongings in reach. Patient uses call light appropriately. Sitter at bedside. 1Goal: Absence of physical injury  Description: Absence of physical injury    Outcome: Ongoing  Note: No physical injury noted this shift. Safe environment provided. Will monitor. Sitter at bedside      Problem: Suicide risk  Goal: Provide patient with safe environment  Description: Provide patient with safe environment    Outcome: Ongoing  Note: Suicide precautions this shift. Sitter at bedside. Problem: Discharge Planning:  Goal: Discharged to appropriate level of care  Description: Discharged to appropriate level of care    Outcome: Ongoing  Note: Plans to go to 4E when medically stable. Problem: Fluid Volume - Deficit:  Goal: Absence of fluid volume deficit signs and symptoms    Outcome: Ongoing       Problem: Nutrition Deficit:  Goal: Ability to achieve adequate nutritional intake will improve  Description: Ability to achieve adequate nutritional intake will improve    Outcome: Ongoing  Note: Tolerating PO intake. Problem: Sleep Pattern Disturbance:  Goal: Appears well-rested  Description: Appears well-rested    Outcome: Ongoing  Note: Resting well this shift. Problem: Violence - Risk of, Self/Other-Directed:  Goal: Knowledge of developmental care interventions  Description: Absence of violence    Outcome: Ongoing     Care plan reviewed with patient.  Patient verbalizes understanding of plan of care and contributes to goal setting.

## 2020-10-20 NOTE — PLAN OF CARE
Patient discharged to  today for suicidal ideation. We have been consulted to follow blood sugars. Please Dr. Abby Contreras note for more details. Sugars are currently controlled, Hospital medicine will continue to follow.     Electronically signed by Karina Huertas MD on 10/20/2020 at 6:41 PM

## 2020-10-21 PROBLEM — S02.85XA CLOSED FRACTURE OF ORBIT (HCC): Status: ACTIVE | Noted: 2020-10-21

## 2020-10-21 PROBLEM — S92.109A CLOSED FRACTURE OF TALUS: Status: ACTIVE | Noted: 2020-10-21

## 2020-10-21 PROBLEM — E11.9 TYPE 2 DIABETES MELLITUS WITHOUT COMPLICATION (HCC): Status: ACTIVE | Noted: 2020-03-02

## 2020-10-21 PROBLEM — E11.10 DIABETIC KETOACIDOSIS WITHOUT COMA (HCC): Status: ACTIVE | Noted: 2020-10-21

## 2020-10-21 PROBLEM — F31.32 BIPOLAR AFFECTIVE DISORDER, CURRENTLY DEPRESSED, MODERATE (HCC): Status: ACTIVE | Noted: 2020-10-21
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
## 2020-10-21 LAB
GLUCOSE BLD-MCNC: 130 MG/DL (ref 70–108)
GLUCOSE BLD-MCNC: 152 MG/DL (ref 70–108)

## 2020-10-21 PROCEDURE — 99233 SBSQ HOSP IP/OBS HIGH 50: CPT | Performed by: NURSE PRACTITIONER

## 2020-10-21 PROCEDURE — 6370000000 HC RX 637 (ALT 250 FOR IP): Performed by: FAMILY MEDICINE

## 2020-10-21 PROCEDURE — 1240000000 HC EMOTIONAL WELLNESS R&B

## 2020-10-21 PROCEDURE — APPSS30 APP SPLIT SHARED TIME 16-30 MINUTES: Performed by: REGISTERED NURSE

## 2020-10-21 PROCEDURE — 99223 1ST HOSP IP/OBS HIGH 75: CPT | Performed by: PSYCHIATRY & NEUROLOGY

## 2020-10-21 PROCEDURE — 82948 REAGENT STRIP/BLOOD GLUCOSE: CPT

## 2020-10-21 PROCEDURE — 6370000000 HC RX 637 (ALT 250 FOR IP): Performed by: PSYCHIATRY & NEUROLOGY

## 2020-10-21 PROCEDURE — 94640 AIRWAY INHALATION TREATMENT: CPT

## 2020-10-21 RX ORDER — VENLAFAXINE HYDROCHLORIDE 37.5 MG/1
37.5 CAPSULE, EXTENDED RELEASE ORAL
Status: DISCONTINUED | OUTPATIENT
Start: 2020-10-22 | End: 2020-10-23 | Stop reason: HOSPADM

## 2020-10-21 RX ORDER — ESCITALOPRAM OXALATE 10 MG/1
TABLET ORAL
Status: ON HOLD | COMMUNITY
Start: 2020-10-17 | End: 2020-10-23 | Stop reason: HOSPADM

## 2020-10-21 RX ADMIN — Medication 100 MG: at 08:46

## 2020-10-21 RX ADMIN — ONDANSETRON HYDROCHLORIDE 4 MG: 4 TABLET, FILM COATED ORAL at 08:54

## 2020-10-21 RX ADMIN — QUETIAPINE FUMARATE 25 MG: 25 TABLET ORAL at 20:21

## 2020-10-21 RX ADMIN — ESCITALOPRAM 20 MG: 20 TABLET, FILM COATED ORAL at 08:46

## 2020-10-21 RX ADMIN — MULTIPLE VITAMINS W/ MINERALS TAB 1 TABLET: TAB at 08:46

## 2020-10-21 RX ADMIN — BUDESONIDE AND FORMOTEROL FUMARATE DIHYDRATE 2 PUFF: 160; 4.5 AEROSOL RESPIRATORY (INHALATION) at 10:49

## 2020-10-21 RX ADMIN — IBUPROFEN 400 MG: 400 TABLET, FILM COATED ORAL at 06:23

## 2020-10-21 RX ADMIN — HYDROXYZINE PAMOATE 100 MG: 50 CAPSULE ORAL at 15:38

## 2020-10-21 RX ADMIN — QUETIAPINE FUMARATE 25 MG: 25 TABLET ORAL at 08:46

## 2020-10-21 RX ADMIN — LORAZEPAM 1 MG: 1 TABLET ORAL at 11:35

## 2020-10-21 RX ADMIN — BUDESONIDE AND FORMOTEROL FUMARATE DIHYDRATE 2 PUFF: 160; 4.5 AEROSOL RESPIRATORY (INHALATION) at 20:09

## 2020-10-21 RX ADMIN — LORAZEPAM 2 MG: 2 TABLET ORAL at 06:23

## 2020-10-21 RX ADMIN — LORAZEPAM 1 MG: 1 TABLET ORAL at 13:43

## 2020-10-21 RX ADMIN — ACETAMINOPHEN 650 MG: 325 TABLET ORAL at 11:38

## 2020-10-21 RX ADMIN — ASPIRIN 81 MG: 81 TABLET ORAL at 08:48

## 2020-10-21 RX ADMIN — LORAZEPAM 1 MG: 1 TABLET ORAL at 20:22

## 2020-10-21 RX ADMIN — IBUPROFEN 400 MG: 400 TABLET, FILM COATED ORAL at 20:23

## 2020-10-21 RX ADMIN — INSULIN GLARGINE 25 UNITS: 100 INJECTION, SOLUTION SUBCUTANEOUS at 20:25

## 2020-10-21 RX ADMIN — TRAZODONE HYDROCHLORIDE 50 MG: 50 TABLET ORAL at 20:22

## 2020-10-21 RX ADMIN — LORAZEPAM 1 MG: 1 TABLET ORAL at 08:49

## 2020-10-21 RX ADMIN — FOLIC ACID 1 MG: 1 TABLET ORAL at 08:46

## 2020-10-21 ASSESSMENT — SLEEP AND FATIGUE QUESTIONNAIRES
DO YOU USE A SLEEP AID: NO
AVERAGE NUMBER OF SLEEP HOURS: 8
DO YOU HAVE DIFFICULTY SLEEPING: NO

## 2020-10-21 ASSESSMENT — PAIN DESCRIPTION - ORIENTATION: ORIENTATION: ANTERIOR;POSTERIOR

## 2020-10-21 ASSESSMENT — PAIN DESCRIPTION - LOCATION: LOCATION: HEAD

## 2020-10-21 ASSESSMENT — PAIN DESCRIPTION - DESCRIPTORS: DESCRIPTORS: HEADACHE

## 2020-10-21 ASSESSMENT — PAIN DESCRIPTION - PROGRESSION: CLINICAL_PROGRESSION: NOT CHANGED

## 2020-10-21 ASSESSMENT — PAIN SCALES - GENERAL
PAINLEVEL_OUTOF10: 1
PAINLEVEL_OUTOF10: 1
PAINLEVEL_OUTOF10: 6
PAINLEVEL_OUTOF10: 0
PAINLEVEL_OUTOF10: 8
PAINLEVEL_OUTOF10: 7

## 2020-10-21 ASSESSMENT — PAIN DESCRIPTION - ONSET: ONSET: AWAKENED FROM SLEEP

## 2020-10-21 ASSESSMENT — PAIN DESCRIPTION - FREQUENCY: FREQUENCY: INTERMITTENT

## 2020-10-21 ASSESSMENT — PAIN DESCRIPTION - PAIN TYPE: TYPE: ACUTE PAIN

## 2020-10-21 ASSESSMENT — PAIN - FUNCTIONAL ASSESSMENT: PAIN_FUNCTIONAL_ASSESSMENT: ACTIVITIES ARE NOT PREVENTED

## 2020-10-21 NOTE — PROGRESS NOTES
while he was incarcerated on pts 10 birthday. Pt is thinking more about this event as he is now the same age as his father when his father committed suicide. Pt resides in [facility] and denies present suicidal ideation. Pt has custody of his 4 children as his ex-wife is alcoholic, however the Aunt is attempting to seeking custody.

## 2020-10-21 NOTE — PLAN OF CARE
Problem: Pain:  Goal: Pain level will decrease  Description: Pain level will decrease  Outcome: Ongoing  Note: Patient states a current pain level of a 7.  Goal: Control of acute pain  Description: Control of acute pain  Outcome: Ongoing  Note: Patient states a current headache. Goal: Control of chronic pain  Description: Control of chronic pain  Outcome: Ongoing  Note: None verbalized this shift. Problem: Altered Mood, Depressive Behavior:  Goal: Able to verbalize acceptance of life and situations over which he or she has no control  Description: Able to verbalize acceptance of life and situations over which he or she has no control  Outcome: Ongoing  Note: Patient verbalizes very limited acceptance of situations over which he has no control. Goal: Able to verbalize and/or display a decrease in depressive symptoms  Description: Able to verbalize and/or display a decrease in depressive symptoms  Outcome: Ongoing  Note: Patient states he continues to feel very depressed and noted with a flat affect. Goal: Able to verbalize support systems  Description: Able to verbalize support systems  Outcome: Ongoing  Note: Patient states his mother and counselor are his current support system. Goal: Patient specific goal  Description: Patient specific goal  Outcome: Ongoing  Note: Patient did not state a goal this shift. Goal: Participates in care planning  Description: Participates in care planning  Outcome: Ongoing  Note: Care plan reviewed with patient and limited understanding verbalized. Problem: Substance Abuse:  Goal: Absence of drug withdrawal signs and symptoms  Description: Absence of drug withdrawal signs and symptoms  Outcome: Ongoing  Note: Patient continues with tremors, anxiety and nausea at times. Care plan reviewed with patient.   Patient does not verbalize understanding of the plan of care and does not contribute to goal setting

## 2020-10-21 NOTE — PROGRESS NOTES
Group Therapy Note    Date: 10/21/2020  Start Time: 1330  End Time:  1440  Number of Participants: 7    Type of Group: Psychotherapy      Notes:  Patient attended group. Peers discussed how the experiences that they go through can be used as instruments for change. Group discussed how it is important to focus on today because the feelings attached to the negative experiences do not go away, only fade in time. Group discussed being solution focused rather than problem focused. Patient shared his experience with sobriety and relapse with his alcoholism. Status After Intervention: Unchanged    Participation Level:  Active Listener and Interactive    Participation Quality: Appropriate, Attentive, Sharing and Supportive      Speech:  Unsteady in tone and pace      Thought Process/Content: Logical, Linear      Affective Functioning: Congruent      Mood: dysphoric      Level of consciousness:  Alert, Oriented x4 and Attentive      Response to Learning: Able to verbalize current knowledge/experience, Able to verbalize/acknowledge new learning, Able to retain information, Capable of insight, Able to change behavior and Progressing to goal      Endings: None Reported    Modes of Intervention: Education, Support, Socialization, Exploration, Clarifying and Problem-solving      Discipline Responsible: /Counselor      Signature:  BRANDO Machado

## 2020-10-21 NOTE — H&P
[email or web address], and [email or web address]. He reports feeling better and is \"looking forward to make it through detox and get back to life\". He expresses marked guilt over starting to use alcohol after having been sober for 2 years until July 2020. He is unsure what caused him to start using alcohol at that time; stating \"it was just everything\". Unable to say how many drinks he would have in a day; only that he would drink to excess and black out occasionally. Denies AoD.          PSYCHIATRIC HISTORY:  yes - major depression, anxiety, alcohol abuse, alcohol withdrawal  Currently follows with Rosalia Rubio at [address] [facility] [place], [place] two lifetime suicide attempts  Denies psychiatric hospital admissions    Past psychiatric medications includes: Lexapro, Seroquel, Vistaril, Ativan, Paxil, Celexa    Adverse reactions from psychotropic medications: yes - Paxil: rash    Lifetime Psychiatric Review of Systems         Mellisa or Hypomania: endorses; last episode at end of September      Panic Attacks: endorses; last episode in September      Phobias: denies     Obsessions and Compulsions:denies     Body or Vocal Tics:  denies     Hallucinations: endorses auditory and visual hallucinations     Delusions: endorses grandiose    Past Medical History:        Diagnosis Date    Asthma     COPD (chronic obstructive pulmonary disease) (HCC)     Eczema     GERD (gastroesophageal reflux disease)     History of alcohol abuse     History of marijuana use     History of tobacco abuse     quit 11/21/2017    Hx of seasonal allergies     Pancreatitis     Seizures ([facility] [address]     etoh wdl    Type 2 diabetes mellitus without complication ([facility] [address] 67/24/6183       Past Surgical History:        Procedure Laterality Date    ANKLE ARTHROSCOPY Right 8/5/2020    ANKLE ARTHROSCOPY WITH  MEDIAL DEBRIDEMENT RIGHT ANKLE, ANKLE ARTHROTOMY WITH DEBRIDEMENT performed by Landy Hussein DPM at [address] Right 09/2019    DR Florentino [facility]  Right 2019    orbital fracture surgery    UPPER GASTROINTESTINAL ENDOSCOPY Left 5/6/2019    EGD BIOPSY performed by Janelle Cedillo MD at [facility]       Allergies:  Paxil [paroxetine]    Social History:     BORN IN [facility] [address]. LEVEL OF EDUCATION: high school  MARITAL STATUS: . CHILDREN: four   OCCUPATION: [employer]  RESIDENCE: Currently lives alone/own house      DRUG USE HISTORY  Social History     Tobacco Use   Smoking Status Current Every Day Smoker    Packs/day: 0.50    Years: 15.00    Pack years: 7.50    Types: Cigarettes   Smokeless Tobacco Never Used     Social History     Substance and Sexual Activity   Alcohol Use Yes    Comment: 1 gallon of vodka a day      Social History     Substance and Sexual Activity   Drug Use Not Currently    Types: Marijuana    Comment: approx 2 or more years       LEGAL HISTORY:   HISTORY OF INCARCERATION: yes - on probation for a DUI incurred in November 2017 (spent 5d in custodial)     Family History:       Problem Relation Age of Onset    Other Mother         gestational diabetes    Other Father 39        suicide    Other Sister         hypoglycemia       Psychiatric Family History  Confirms father was dx with MH condition; unsure what he was dx with; father did not take psych meds  one suicide in family--father by hanging  confirms substance use in family--father drank heavily and used drugs (pt unsure which drugs)    PHYSICAL EXAM:  Vitals:  /82   Pulse 98   Temp 98.1 °F (36.7 °C) (Tympanic)   Resp 16   Ht 5' 1.5\" (1.562 m)   Wt 176 lb (79.8 kg)   SpO2 98%   BMI 32.72 kg/m²      Review of Systems   Constitutional: Negative for chills and weight loss. HENT: Negative for ear pain and nosebleeds. Eyes: Negative for blurred vision and photophobia. Respiratory: Negative for cough, shortness of breath and wheezing. Cardiovascular: Negative for chest pain and palpitations.    Gastrointestinal: Negative for abdominal pain, diarrhea and vomiting. Genitourinary: Negative for dysuria and urgency. Musculoskeletal: Negative for falls and joint pain. Skin: Negative for itching and rash. Neurological: Negative for tremors, seizures and weakness. Endo/Heme/Allergies: Does not bruise/bleed easily. Physical Exam:      Constitutional:  Appears well-developed and well-nourished, no acute distress  HENT:   Head: Normocephalic and atraumatic. Eyes: Conjunctivae are normal. Right eye exhibits no discharge. Left eye exhibits no discharge. No scleral icterus. Neck: Normal range of motion. Neck supple. Pulmonary/Chest:  No respiratory distress or accessory muscle use, no wheezing. Abdominal: Soft. Exhibits no distension. Musculoskeletal: Normal range of motion. Exhibits no edema. Neurological: cranial nerves II-XII grossly in tact, normal gait and station  Skin: Skin is warm and dry. Patient is not diaphoretic. No erythema.          Mental Status Examination:    Level of consciousness:  within normal limits   Appearance:  Hospital attire, seated on the side of bed, fair grooming   Behavior/Motor: no abnormalities noted  Attitude toward examiner:  Cooperative  Speech: normal rate and volume  Mood:  Depressed  Affect:  blunted  Thought processes:  Goal directed, linear  Thought content: active suicidal ideations without current plan or intent               denies homicidal ideations               Denies hallucinations              denies delusions  Cognition:  Oriented to self, location, time, situation  Concentration clinically adequate  Memory: intact  Insight &Judgment: poor    DSM-5 Diagnosis  Bipolar disorder, currently depressed  Alcohol withdrawal uncomplicated      Psychosocial and Contextual factors:  Financial  Occupational  Relationship  Legal   Living situation  Educational     Past Medical History:   Diagnosis Date    Asthma     COPD [facility]chronic obstructive pulmonary disease) ([facility] [address]     Eczema     GERD (gastroesophageal reflux disease)     History of alcohol abuse     History of marijuana use     History of tobacco abuse     quit 11/21/2017    Hx of seasonal allergies     Pancreatitis     Seizures (HCC)     etoh wdl    Type 2 diabetes mellitus without complication ([facility] 74/81/9863        TREATMENT PLAN    Risk Management:  close watch per standard protocol      Psychotherapy:  participation in milieu and group and individual sessions with Attending Physician,  and Physician Assistant/CNP      Estimated length of stay: It might take more than 2 midnights to stabilize patient's mood/thoughts and titrate medications to effect. GENERAL PATIENT/FAMILY EDUCATION  Patient will understand basic signs and symptoms, Patient will understand benefits/risks and potential side effects from proposed meds and Patient will understand their role in recovery. Family is  active in patient's care. Patient assets that may be helpful during treatment include: Intent to participate and engage in treatment, sufficient fund of knowledge and intellect to understand and utilize treatments. Risk level: High     Goals:    Reviewed labs  Reviewed EKG  Will obtain records and review them today. Medication adjustment: Will discontinue Lexapro and start Effexor 37.5mg tomorrow  Consults: none        Behavioral Services  Medicare Certification     Admission Day 1  I certify that this patient's inpatient psychiatric hospital admission is medically necessary for:    x (1) treatment which could reasonably be expected to improve this patient's condition, or    x (2) diagnostic study or its equivalent.           Physicians Signature: Electronically signed by Aniya Tay MD on 10/21/20 at 9:33 PM EDT

## 2020-10-21 NOTE — PROGRESS NOTES
Hospitalist Progress Note    Patient:  Marcial Bazan      Unit/Bed:4E-66/066-B    YOB: 1985    MRN: [id number]       Acct: [de-identified]     PCP: BARB Bennett CNP    Date of Admission: 10/20/2020    Assessment/Plan:    1. Diabetes mellitus type 2, uncontrolled--on Lantus; sugars are stable and ranging 115-219 the past 24 hours; patient is not on sliding scale as his sugars are stable on the Lantus; will change his diet to diabetic  2. COPD--stable on room air; on Symbicort; has albuterol inhaler 2 puffs every 6 hours as needed  3. Alcohol withdrawal--on Ativan and being managed by psychiatry    Expected discharge date: Per psychiatry; hospitalist will sign off please call any questions or concerns, med rec is completed    Disposition:    [x] Home       [] TCU       [] Rehab       [] Psych       [] SNF       [] [place]       [] Other-    Chief Complaint: Management of diabetes    Hospital Course: Per discharge summary dated 10/20: Valorie Mayorga is a 38 y.o. male admitted to [address] [place] [address] on 10/16/2020 for alcohol intoxication and suicidal ideations, looking for detox. Patient was recently discharged from the hospital 2 days prior to admission. He was treated with ativan per protocol as he states phenobarb gives him a rash. Psych evaluated patient and wanted him transferred to the psych unit after medically stable. Patient is doing well. Due to staffing concerns, patient was kept on medical floor with plans to transfer to Psych once medically stable. Patient is day 4 of withdrawal and doing better. He will be transferred to psych, as his symptoms have improved. \"    10/21--> patient awaken easily, he still has tremors noted however fully alert and oriented     Subjective (past 24 hours): He complains of a generalized headache currently rating it 8 out of 10; he denies any nausea      Medications:  Reviewed    Infusion Medications   Scheduled Medications    thiamine  100 mg Oral Daily    QUEtiapine  25 mg Oral BID    therapeutic multivitamin-minerals  1 tablet Oral Daily    folic acid  1 mg Oral Daily    escitalopram  20 mg Oral Daily    aspirin  81 mg Oral Daily    nicotine  1 patch Transdermal Daily    insulin glargine  25 Units Subcutaneous Daily    budesonide-formoterol  2 puff Inhalation BID     PRN Meds: ondansetron, hydrOXYzine, albuterol sulfate HFA, acetaminophen, ibuprofen, traZODone, magnesium hydroxide, aluminum & magnesium hydroxide-simethicone, LORazepam **OR** LORazepam **OR** LORazepam **OR** LORazepam      Intake/Output Summary (Last 24 hours) at 10/21/2020 0916  Last data filed at 10/20/2020 2000  Gross per 24 hour   Intake 200 ml   Output --   Net 200 ml       Diet:  DIET GENERAL;    Exam:  /77   Pulse 94   Temp 97.5 °F (36.4 °C) (Tympanic)   Resp 16   Ht 5' 1.5\" (1.562 m)   Wt 176 lb (79.8 kg)   SpO2 98%   BMI 32.72 kg/m²     General appearance: No apparent distress, appears stated age and cooperative. Some tremoring noted  HEENT: Pupils equal, round, and reactive to light. Conjunctivae/corneas clear. Neck: Supple, with full range of motion. No jugular venous distention. Trachea midline. Respiratory:  Normal respiratory effort. Clear to auscultation, bilaterally without Rales/Wheezes/Rhonchi. Cardiovascular: Regular rate and rhythm with normal S1/S2 without murmurs, rubs or gallops. Abdomen: Soft, non-tender, non-distended with normal bowel sounds. Musculoskeletal: passive and active ROM x 4 extremities. Skin: Skin color, texture, turgor normal.    Neurologic:  Neurovascularly intact without any focal sensory/motor deficits. Cranial nerves: II-XII intact, grossly non-focal.  Psychiatric: Alert and oriented, thought content appropriate  Capillary Refill: Brisk,< 3 seconds   Peripheral Pulses: +2 palpable, equal bilaterally       Labs:   No results for input(s): WBC, HGB, HCT, PLT in the last 72 hours.   Recent Labs 10/19/20  0608 10/20/20  0618    139   K 4.2 3.9    104   CO2 22* 24   BUN 7 8   CREATININE 0.4 0.4   CALCIUM 9.1 9.3   PHOS 3.4 3.7     Recent Labs     10/19/20  0608 10/20/20  0618   AST 29 27   ALT 41 38   BILITOT 0.5 0.3   ALKPHOS 108 99     Microbiology:    COVID-19 negative on 10/1    DVT prophylaxis: [] Lovenox                                 [] SCDs                                 [] SQ Heparin                                 [x] Encourage ambulation           [] Already on Anticoagulation     Code Status: Full Code    Tele:   [] yes             [x] no    Active Hospital Problems    Diagnosis Date Noted    Major depression, chronic [F32.9] 10/20/2020       Electronically signed by BARB Gill CNP on 10/21/2020 at 9:16 AM

## 2020-10-21 NOTE — PROGRESS NOTES
Education provided on disease concept of alcoholism. Processed the benefits and purpose of 12 step fellowship in [place] [address]. Handout provided from [address] from the Dr. Dustin Junior (page XXVI) and chapter 3 \"More about Alcoholism\" Pt voiced understanding and shared is lack of control once he takes the first drink.  Support provided
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
## 2020-10-21 NOTE — PLAN OF CARE
Patient has attended at least one group so far today and has been out of his room for meals so he is working toward his socialization goal for this shift. Patient will be encouraged to attend all groups on the unit daily and to come out of his room for social interaction with others during the rest of his hospital stay.

## 2020-10-21 NOTE — PLAN OF CARE
Problem: Skin Integrity/Risk  Goal: No skin breakdown during hospitalization  Outcome: Met This Shift  Note: No skin breakdown noted this shift     Problem: Pain:  Goal: Pain level will decrease  Description: Pain level will decrease  10/21/2020 1107 by Bri Gallagher RN  Outcome: Ongoing  Note: Pt reports a generalized achiness rates it at a #8  10/20/2020 2312 by Gianluca Gusman RN  Outcome: Ongoing  Note: Patient states a current pain level of a 7.  Goal: Control of acute pain  Description: Control of acute pain  10/21/2020 1107 by Bri Gallagher RN  Outcome: Ongoing  10/20/2020 2312 by Gianluca Gusman RN  Outcome: Ongoing  Note: Patient states a current headache. Goal: Control of chronic pain  Description: Control of chronic pain  10/21/2020 1107 by Bri Gallagher RN  Outcome: Ongoing  10/20/2020 2312 by Gianluca Gusman RN  Outcome: Ongoing  Note: None verbalized this shift. Problem: Altered Mood, Depressive Behavior:  Goal: Able to verbalize acceptance of life and situations over which he or she has no control  Description: Able to verbalize acceptance of life and situations over which he or she has no control  10/21/2020 1107 by Bri Gallagher RN  Outcome: Ongoing  Note: Continues to work towards accepting life situations over which she has no control. 10/20/2020 2312 by Gianluca Gusman RN  Outcome: Ongoing  Note: Patient verbalizes very limited acceptance of situations over which he has no control. Goal: Able to verbalize and/or display a decrease in depressive symptoms  Description: Able to verbalize and/or display a decrease in depressive symptoms  10/21/2020 1107 by Bri Gallagher RN  Outcome: Ongoing  Note: Pt compliant with medications  10/20/2020 2312 by Gianluca Gusman RN  Outcome: Ongoing  Note: Patient states he continues to feel very depressed and noted with a flat affect.   Goal: Able to verbalize support systems  Description: Able to verbalize support systems  10/21/2020 1107 by Antione Castaneda RN  Outcome: Ongoing  Note: Pt reports support with his mom and counselor  10/20/2020 2312 by Janina Vogel RN  Outcome: Ongoing  Note: Patient states his mother and counselor are his current support system. Goal: Patient specific goal  Description: Patient specific goal  10/21/2020 1107 by Antione Castaneda RN  Outcome: Ongoing  Note: \"to be honest\"  10/20/2020 2312 by Janina Vogel RN  Outcome: Ongoing  Note: Patient did not state a goal this shift. Goal: Participates in care planning  Description: Participates in care planning  10/21/2020 1107 by Antione Castaneda RN  Outcome: Ongoing  10/20/2020 2312 by Janina Vogel RN  Outcome: Ongoing  Note: Care plan reviewed with patient and limited understanding verbalized. Problem: Substance Abuse:  Goal: Absence of drug withdrawal signs and symptoms  Description: Absence of drug withdrawal signs and symptoms  10/20/2020 2312 by Janina Vogel RN  Outcome: Ongoing  Note: Patient continues with tremors, anxiety and nausea at times. Problem: Discharge Planning:  Goal: Discharged to appropriate level of care  Description: Discharged to appropriate level of care  Outcome: Ongoing     Problem: Pain:  Goal: Pain level will decrease  Description: Pain level will decrease  10/21/2020 1107 by Antione Castaneda RN  Outcome: Ongoing  Note: Pt reports a generalized achiness rates it at a #8   Care plan reviewed with patient. Patient does  verbalize understanding of the plan of care and did  contribute to goal setting.

## 2020-10-22 LAB
GLUCOSE BLD-MCNC: 103 MG/DL (ref 70–108)
GLUCOSE BLD-MCNC: 136 MG/DL (ref 70–108)

## 2020-10-22 PROCEDURE — 6370000000 HC RX 637 (ALT 250 FOR IP): Performed by: STUDENT IN AN ORGANIZED HEALTH CARE EDUCATION/TRAINING PROGRAM

## 2020-10-22 PROCEDURE — 90833 PSYTX W PT W E/M 30 MIN: CPT | Performed by: PSYCHIATRY & NEUROLOGY

## 2020-10-22 PROCEDURE — 94640 AIRWAY INHALATION TREATMENT: CPT

## 2020-10-22 PROCEDURE — 6370000000 HC RX 637 (ALT 250 FOR IP): Performed by: PSYCHIATRY & NEUROLOGY

## 2020-10-22 PROCEDURE — 1240000000 HC EMOTIONAL WELLNESS R&B

## 2020-10-22 PROCEDURE — 82948 REAGENT STRIP/BLOOD GLUCOSE: CPT

## 2020-10-22 PROCEDURE — 99232 SBSQ HOSP IP/OBS MODERATE 35: CPT | Performed by: PSYCHIATRY & NEUROLOGY

## 2020-10-22 PROCEDURE — 6370000000 HC RX 637 (ALT 250 FOR IP): Performed by: FAMILY MEDICINE

## 2020-10-22 RX ORDER — METRONIDAZOLE 7.5 MG/G
GEL VAGINAL 2 TIMES DAILY
Status: CANCELLED | OUTPATIENT
Start: 2020-10-22

## 2020-10-22 RX ADMIN — ASPIRIN 81 MG: 81 TABLET ORAL at 08:13

## 2020-10-22 RX ADMIN — LORAZEPAM 1 MG: 1 TABLET ORAL at 20:26

## 2020-10-22 RX ADMIN — LORAZEPAM 2 MG: 2 TABLET ORAL at 10:32

## 2020-10-22 RX ADMIN — MULTIPLE VITAMINS W/ MINERALS TAB 1 TABLET: TAB at 08:13

## 2020-10-22 RX ADMIN — Medication 100 MG: at 08:13

## 2020-10-22 RX ADMIN — HYDROCORTISONE: 25 CREAM TOPICAL at 17:01

## 2020-10-22 RX ADMIN — FOLIC ACID 1 MG: 1 TABLET ORAL at 08:13

## 2020-10-22 RX ADMIN — BUDESONIDE AND FORMOTEROL FUMARATE DIHYDRATE 2 PUFF: 160; 4.5 AEROSOL RESPIRATORY (INHALATION) at 07:27

## 2020-10-22 RX ADMIN — ACETAMINOPHEN 650 MG: 325 TABLET ORAL at 14:43

## 2020-10-22 RX ADMIN — LORAZEPAM 1 MG: 1 TABLET ORAL at 14:44

## 2020-10-22 RX ADMIN — INSULIN GLARGINE 25 UNITS: 100 INJECTION, SOLUTION SUBCUTANEOUS at 20:27

## 2020-10-22 RX ADMIN — QUETIAPINE FUMARATE 25 MG: 25 TABLET ORAL at 08:13

## 2020-10-22 RX ADMIN — BUDESONIDE AND FORMOTEROL FUMARATE DIHYDRATE 2 PUFF: 160; 4.5 AEROSOL RESPIRATORY (INHALATION) at 18:19

## 2020-10-22 RX ADMIN — VENLAFAXINE HYDROCHLORIDE 37.5 MG: 37.5 CAPSULE, EXTENDED RELEASE ORAL at 08:13

## 2020-10-22 RX ADMIN — QUETIAPINE FUMARATE 25 MG: 25 TABLET ORAL at 20:26

## 2020-10-22 RX ADMIN — LORAZEPAM 1 MG: 1 TABLET ORAL at 08:13

## 2020-10-22 RX ADMIN — ONDANSETRON HYDROCHLORIDE 4 MG: 4 TABLET, FILM COATED ORAL at 20:26

## 2020-10-22 ASSESSMENT — PAIN DESCRIPTION - PAIN TYPE
TYPE: ACUTE PAIN
TYPE: ACUTE PAIN

## 2020-10-22 ASSESSMENT — PAIN SCALES - GENERAL
PAINLEVEL_OUTOF10: 0
PAINLEVEL_OUTOF10: 8
PAINLEVEL_OUTOF10: 7
PAINLEVEL_OUTOF10: 3

## 2020-10-22 ASSESSMENT — PAIN DESCRIPTION - FREQUENCY
FREQUENCY: INTERMITTENT
FREQUENCY: CONTINUOUS

## 2020-10-22 ASSESSMENT — PAIN DESCRIPTION - DESCRIPTORS
DESCRIPTORS: HEADACHE
DESCRIPTORS: HEADACHE

## 2020-10-22 ASSESSMENT — PAIN DESCRIPTION - ORIENTATION: ORIENTATION: ANTERIOR

## 2020-10-22 ASSESSMENT — PAIN DESCRIPTION - PROGRESSION
CLINICAL_PROGRESSION: NOT CHANGED
CLINICAL_PROGRESSION: NOT CHANGED

## 2020-10-22 ASSESSMENT — PAIN DESCRIPTION - LOCATION
LOCATION: HEAD
LOCATION: HEAD

## 2020-10-22 ASSESSMENT — PAIN DESCRIPTION - ONSET
ONSET: ON-GOING
ONSET: ON-GOING

## 2020-10-22 NOTE — PROGRESS NOTES
Group Therapy Note    Date: 10/21/2020  Start Time: 2000  End Time:  2020    Type of Group: Wrap-Up/relaxation      Patient's Goal:  \"To be honest with the doctor. \"    Notes:  Met goal    Status After Intervention:  Unchanged    Participation Level: Minimal    Participation Quality: Resistant      Speech:  normal      Thought Process/Content: Logical      Affective Functioning: Blunted      Mood: anxious and depressed      Level of consciousness:  Alert, Oriented x4 and Attentive      Response to Learning: Able to verbalize current knowledge/experience, Able to verbalize/acknowledge new learning, Able to retain information, Capable of insight and Able to change behavior      Endings: None Reported    Modes of Intervention: Education and Support      Discipline Responsible: Registered Nurse      Signature:  Nieves Singh RN

## 2020-10-22 NOTE — BH NOTE
Patient did not attend the recreation group this morning but did identify a daily goal: \"Take a shower. \"

## 2020-10-22 NOTE — PLAN OF CARE
Patient has attended one group so far today and has been out of his room for socialization with others so he is working toward his socialization goal. Patient will be encouraged to attend more groups daily during his hospital stay.

## 2020-10-22 NOTE — GROUP NOTE
Group Therapy Note    Date: 10/22/2020    Group Start Time: 1330  Group End Time: 1430  Group Topic: Psychotherapy    [facility] Adult [facility] 4E    BRANDO Arriaza    Patient is present in group. Patient active in group discussion and participated in self-check in. Patient reports feeling improved today, patient is able to discuss pros/cons of today, Pros: mood is improved, coming out of withdrawl  Cons: None reported at this time. . Patient able to discuss 3 things are grateful for which are, his families understanding of his addiction, his children and his dog. Patient able to remain future oriented, reports is looking forward to getting back to work and starting Vivitrol shot which be believes will significantly help his alcohol cravings. Patient appropriately discussed goal for treatment which is to get on the right medication, patient reports his mood has improved with the new medication he started today. At the end of group SW asked patient to choose something they can let go of while they are admitted. Patient states, the death of my father. Patient reports that his father killed himself on his 15th birthday, he reports continuously feeling guilt about the last conversation he had with his father in which the last words he said to him were 'I hate you', patient is tearful when discussing this. SW provided emotional support to patient, reminded patient that he is not responsible for the choices that his father made. Patient was accepting of this. Patient was able to provide and receive peer support appropriately. Status After Intervention:  Improved    Participation Level:  Active Listener and Interactive    Participation Quality: Appropriate, Attentive, Sharing and Supportive      Speech:  normal      Thought Process/Content: Logical      Affective Functioning: Congruent      Mood: anxious      Level of consciousness:  Alert, Oriented x4 and Attentive      Response to Learning: Able to verbalize current knowledge/experience, Able to verbalize/acknowledge new learning, Able to change behavior and Progressing to goal      Endings: None Reported    Modes of Intervention: Education, Support, Socialization, Exploration, Clarifying and Problem-solving      Discipline Responsible: /Counselor      Signature:  BRANDO Peraza

## 2020-10-22 NOTE — GROUP NOTE
Group Therapy Note    Date: 10/22/2020    Group Start Time: 1100  Group End Time: 8600  Group Topic: Healthy Living/Wellness    [facility]    Divina Acevedo RN          Notes:  Patient attended nursing group. Topic was empowerment. Status After Intervention:  Unchanged    Participation Level:  Active Listener and Interactive    Participation Quality: Appropriate, Attentive and Sharing      Speech:  normal      Thought Process/Content: Logical      Affective Functioning: Congruent      Mood: depressed      Level of consciousness:  Alert and Attentive      Response to Learning: Able to verbalize current knowledge/experience, Able to retain information and Capable of insight      Endings: None Reported    Modes of Intervention: Education, Support, Socialization, Exploration and Activity      Discipline Responsible: Registered Nurse      Signature:  Divina Acevedo RN

## 2020-10-22 NOTE — PLAN OF CARE
Problem: Safety:  Goal: Ability to remain free from injury will improve  Description: Ability to remain free from injury will improve  Outcome: Met This Shift  Note: Patient remained free from injury this shift. Problem: Falls - Risk of:  Goal: Will remain free from falls  Description: Will remain free from falls  Outcome: Met This Shift  Note: No falls were observed or reported so far this shift, gait steady when ambulating and wears non-skid slippers socks. Encourage patient to wear shower shoes while in the shower. Remains on fall precautions. Problem: Safety:  Goal: Ability to remain free from injury will improve  Description: Ability to remain free from injury will improve  Outcome: Met This Shift  Note: Patient remained free from injury this shift. Problem: Falls - Risk of:  Goal: Will remain free from falls  Description: Will remain free from falls  Outcome: Met This Shift  Note: No falls were observed or reported so far this shift, gait steady when ambulating and wears non-skid slippers socks. Encourage patient to wear shower shoes while in the shower. Remains on fall precautions. Problem: Pain:  Goal: Pain level will decrease  Description: Pain level will decrease  Outcome: Ongoing  Note: Patient reports headache pain 8 out of 10. .  Patient was given Ativan for symptoms of withdrawal, stated pain decreased after taking, rated pain a 4. Problem: Altered Mood, Depressive Behavior:  Goal: Able to verbalize and/or display a decrease in depressive symptoms  Description: Able to verbalize and/or display a decrease in depressive symptoms  Outcome: Ongoing  Note: Depressed mood and affect  States his/her mood is a depressed. Goal: Patient specific goal  Description: Patient specific goal  Outcome: Ongoing  Note: Patient reports goal for today is to talk more. This was not meant because patient isolated to himself not interacting with others this shift.     Goal: Participates in care planning  Description: Participates in care planning  Outcome: Ongoing  Note: Care plan reviewed with patient. Patient able to verbalize understanding of the plan of care and contribute to goal setting. Problem: Substance Abuse:  Goal: Absence of drug withdrawal signs and symptoms  Description: Absence of drug withdrawal signs and symptoms  Outcome: Ongoing  Note: Patient experienced symptoms of withdrawal this shift of shakes, sweats, feeling anxious, and nausea. Problem: KNOWLEDGE DEFICIT,EDUCATION,DISCHARGE PLAN  Goal: Ability to maintain appropriate glucose levels will be supported - Maintain glucose level within specified parameters  Description: Ability to maintain appropriate glucose levels will be supported - Maintain glucose level within specified parameters  Outcome: Ongoing  Note: Blood glucose this am was 103. Problem: Discharge Planning:  Goal: Discharged to appropriate level of care  Description: Discharged to appropriate level of care  Outcome: Ongoing  Note: Discharge plan unknown at present  Pt plans to return home alone in his own place. Problem: Skin Integrity/Risk  Goal: No skin breakdown during hospitalization  Outcome: Ongoing  Note: Patient has a rash that he states started when he arrived in the hospital, face is reddened in areas. No broken areas observed or voiced by patient. Problem: Coping:  Goal: Ability to identify problematic behaviors that deter socialization will improve  Description: Ability to identify problematic behaviors that deter socialization will improve  Outcome: Ongoing  Note: Patient working to identify problematic areas that deter socialization. Problem: Pain:  Goal: Pain level will decrease  Description: Pain level will decrease  Outcome: Ongoing  Note: Patient reports headache pain 8 out of 10. .  Patient was given Ativan for symptoms of withdrawal, stated pain decreased after taking, rated pain a 4.       Problem: Altered Mood, Depressive Behavior:  Goal: Able to verbalize and/or display a decrease in depressive symptoms  Description: Able to verbalize and/or display a decrease in depressive symptoms  Outcome: Ongoing  Note: Depressed mood and affect  States his/her mood is a depressed. Goal: Patient specific goal  Description: Patient specific goal  Outcome: Ongoing  Note: Patient reports goal for today is to talk more. This was not meant because patient isolated to himself not interacting with others this shift. Goal: Participates in care planning  Description: Participates in care planning  Outcome: Ongoing  Note: Care plan reviewed with patient. Patient able to verbalize understanding of the plan of care and contribute to goal setting. Problem: Substance Abuse:  Goal: Absence of drug withdrawal signs and symptoms  Description: Absence of drug withdrawal signs and symptoms  Outcome: Ongoing  Note: Patient experienced symptoms of withdrawal this shift of shakes, sweats, feeling anxious, and nausea. Problem: KNOWLEDGE DEFICIT,EDUCATION,DISCHARGE PLAN  Goal: Ability to maintain appropriate glucose levels will be supported - Maintain glucose level within specified parameters  Description: Ability to maintain appropriate glucose levels will be supported - Maintain glucose level within specified parameters  Outcome: Ongoing  Note: Blood glucose this am was 103. Problem: Discharge Planning:  Goal: Discharged to appropriate level of care  Description: Discharged to appropriate level of care  Outcome: Ongoing  Note: Discharge plan unknown at present  Pt plans to return home alone in his own place. Problem: Skin Integrity/Risk  Goal: No skin breakdown during hospitalization  Outcome: Ongoing  Note: Patient has a rash that he states started when he arrived in the hospital, face is reddened in areas. No broken areas observed or voiced by patient.       Problem: Coping:  Goal: Ability to identify problematic behaviors that deter socialization will improve  Description: Ability to identify problematic behaviors that deter socialization will improve  Outcome: Ongoing  Note: Patient working to identify problematic areas that deter socialization.

## 2020-10-22 NOTE — FLOWSHEET NOTE
Spiritual Support Group Note    Number of Participants in Group: 3                               Time: 1430    Goal: Relief from isolation and loneliness             Erin Sharing             Self-understanding and gain insight              Acceptance and belonging            Recognize they are not alone                Socialization             Empowerment       Encouragement    Topic:  [x] Spiritual Wellness and Self Care                  [x] Hope                     [x] Connecting with Divine/Others        [x] Thankfulness and Gratitude               [x]  Meaningfulness and Purpose               [] Forgiveness               [] Peace               [] Connect to [employer]     [] Other:    Participation Level:   [] Active Listener   [x] Minimal   [] Monopolizing   [x] Interactive   [] No Participation   []  Other:     Attention:   [x] Alert   [] Distractible   [] Drowsy   [] Poor   [] Other:    Manner:   [x] Cooperative   [] Suspicious   [] Withdrawn   [] Guarded   [] Irritable   [] Inhospitable   [] Other:     Others Comments from Group: Mihai Juarezt participated in the spirituality group. He/she learned the following spiritual need:  Belonging, meaning and purpose, acceptance, values, awe. He/she has the  mini ways of wellness paper. Salvador chose writing as his spiritual tool.

## 2020-10-22 NOTE — PROGRESS NOTES
Discharge planning 1327-Placed a referral to the Office of Dr. Torri Valdovinos for MAT services for this pt.

## 2020-10-22 NOTE — PROGRESS NOTES
°C). His blood pressure is 128/70 and his pulse is 84. His respiration is 18 and oxygen saturation is 100%.    Labs:   Admission on 10/20/2020   Component Date Value Ref Range Status    POC Glucose 10/20/2020 115* 70 - 108 mg/dl Final    Performed at [address] [place] [address], [address]    POC Glucose 10/20/2020 151* 70 - 108 mg/dl Final    Performed at [address] [place] [address], [address] [facility] POC Glucose 10/21/2020 130* 70 - 108 mg/dl Final    Performed at [address] [place] [address], [facility] POC Glucose 10/21/2020 152* 70 - 108 mg/dl Final    Performed at [address] [place] [address], [address] [place] [address] POC Glucose 10/22/2020 103  70 - 108 mg/dl Final    Performed at [address] [place] [id number]            Medications  Current Facility-Administered Medications: venlafaxine (EFFEXOR XR) extended release capsule 37.5 mg, 37.5 mg, Oral, Daily with breakfast  vitamin B-1 (THIAMINE) tablet 100 mg, 100 mg, Oral, Daily  QUEtiapine (SEROQUEL) tablet 25 mg, 25 mg, Oral, BID  ondansetron (ZOFRAN) tablet 4 mg, 4 mg, Oral, TID PRN  therapeutic multivitamin-minerals 1 tablet, 1 tablet, Oral, Daily  hydrOXYzine (VISTARIL) capsule 100 mg, 100 mg, Oral, 4x Daily PRN  folic acid (FOLVITE) tablet 1 mg, 1 mg, Oral, Daily  aspirin EC tablet 81 mg, 81 mg, Oral, Daily  albuterol sulfate  (90 Base) MCG/ACT inhaler 2 puff, 2 puff, Inhalation, Q6H PRN  acetaminophen (TYLENOL) tablet 650 mg, 650 mg, Oral, Q4H PRN  ibuprofen (ADVIL;MOTRIN) tablet 400 mg, 400 mg, Oral, Q6H PRN  traZODone (DESYREL) tablet 50 mg, 50 mg, Oral, Nightly PRN  magnesium hydroxide (MILK OF MAGNESIA) 400 MG/5ML suspension 30 mL, 30 mL, Oral, Daily PRN  aluminum & magnesium hydroxide-simethicone (MAALOX) 200-200-20 MG/5ML suspension 30 mL, 30 mL, Oral, Q6H PRN  nicotine (NICODERM CQ) 14 MG/24HR 1 patch, 1 patch, Transdermal, Daily  LORazepam (ATIVAN) tablet 1 mg, 1 mg, Oral, Q1H PRN **OR** LORazepam (ATIVAN) tablet 2 mg, 2 mg, Oral, Q1H PRN **OR** LORazepam (ATIVAN) tablet 3 mg, 3 mg, Oral, Q1H PRN **OR** LORazepam (ATIVAN) tablet 4 mg, 4 mg, Oral, Q1H PRN  insulin glargine (LANTUS) injection vial 25 Units, 25 Units, Subcutaneous, Daily  budesonide-formoterol (SYMBICORT) 160-4.5 MCG/ACT inhaler 2 puff, 2 puff, Inhalation, BID    ASSESSMENT  Bipolar affective disorder, currently depressed, moderate (HCC)  Dermatitis, face, suspect eczema or rosacea    PLAN  Patients symptoms are improving  Continue with current medications. For rash will try hydrocortisone 2.5% daily and follow up with PCP    Attempt to develop insight  Psycho-education conducted. Supportive Therapy conducted. Probable discharge is tomorrow  Follow-up with Joaquín Reese at [facility]    Patient denies wanting to go to sober living or rehabilitation. He plans to resume working at discharge and believes that getting back into his routine will help him. Patient wants to be discharged home. More than 16 minutes of the session was spent doing supportive psychotherapy, insight oriented psychotherapy, mindfulness based therapy, brief family therapy. Session started at 9:00 am and ended at 9:20 am.    Electronically signed by Adrián Dacosta DO on 10/22/20 at 8:37 AM EDT    **This report has been created using voice recognition software. It may contain minor errors which are inherent in voice recognition technology. **

## 2020-10-22 NOTE — PROGRESS NOTES
Discharge Planning-Salvador is scheduled for follow up counseling with [facility] on 10/28/2020 at 1:00 PM at [facility] [address].

## 2020-10-23 VITALS
HEIGHT: 62 IN | OXYGEN SATURATION: 100 % | RESPIRATION RATE: 16 BRPM | DIASTOLIC BLOOD PRESSURE: 73 MMHG | WEIGHT: 176 LBS | HEART RATE: 76 BPM | SYSTOLIC BLOOD PRESSURE: 128 MMHG | BODY MASS INDEX: 32.39 KG/M2 | TEMPERATURE: 97.3 F

## 2020-10-23 LAB — GLUCOSE BLD-MCNC: 111 MG/DL (ref 70–108)

## 2020-10-23 PROCEDURE — 6370000000 HC RX 637 (ALT 250 FOR IP): Performed by: PSYCHIATRY & NEUROLOGY

## 2020-10-23 PROCEDURE — 6370000000 HC RX 637 (ALT 250 FOR IP): Performed by: FAMILY MEDICINE

## 2020-10-23 PROCEDURE — 5130000000 HC BRIDGE APPOINTMENT

## 2020-10-23 PROCEDURE — 99238 HOSP IP/OBS DSCHRG MGMT 30/<: CPT | Performed by: PSYCHIATRY & NEUROLOGY

## 2020-10-23 PROCEDURE — 94640 AIRWAY INHALATION TREATMENT: CPT

## 2020-10-23 PROCEDURE — 82948 REAGENT STRIP/BLOOD GLUCOSE: CPT

## 2020-10-23 RX ORDER — QUETIAPINE FUMARATE 25 MG/1
25 TABLET, FILM COATED ORAL 2 TIMES DAILY
Qty: 60 TABLET | Refills: 0 | Status: SHIPPED | OUTPATIENT
Start: 2020-10-23 | End: 2020-10-28 | Stop reason: SDUPTHER

## 2020-10-23 RX ORDER — VENLAFAXINE HYDROCHLORIDE 37.5 MG/1
37.5 CAPSULE, EXTENDED RELEASE ORAL
Qty: 30 CAPSULE | Refills: 0 | Status: SHIPPED | OUTPATIENT
Start: 2020-10-24 | End: 2020-10-28 | Stop reason: SDUPTHER

## 2020-10-23 RX ORDER — TRAZODONE HYDROCHLORIDE 50 MG/1
50 TABLET ORAL NIGHTLY PRN
Qty: 30 TABLET | Refills: 0 | Status: SHIPPED | OUTPATIENT
Start: 2020-10-23 | End: 2021-03-12 | Stop reason: SDUPTHER

## 2020-10-23 RX ADMIN — QUETIAPINE FUMARATE 25 MG: 25 TABLET ORAL at 08:44

## 2020-10-23 RX ADMIN — MULTIPLE VITAMINS W/ MINERALS TAB 1 TABLET: TAB at 08:44

## 2020-10-23 RX ADMIN — VENLAFAXINE HYDROCHLORIDE 37.5 MG: 37.5 CAPSULE, EXTENDED RELEASE ORAL at 08:44

## 2020-10-23 RX ADMIN — HYDROCORTISONE: 25 CREAM TOPICAL at 08:44

## 2020-10-23 RX ADMIN — HYDROXYZINE PAMOATE 100 MG: 50 CAPSULE ORAL at 11:55

## 2020-10-23 RX ADMIN — ACETAMINOPHEN 650 MG: 325 TABLET ORAL at 06:57

## 2020-10-23 RX ADMIN — BUDESONIDE AND FORMOTEROL FUMARATE DIHYDRATE 2 PUFF: 160; 4.5 AEROSOL RESPIRATORY (INHALATION) at 09:27

## 2020-10-23 RX ADMIN — ASPIRIN 81 MG: 81 TABLET ORAL at 08:44

## 2020-10-23 RX ADMIN — FOLIC ACID 1 MG: 1 TABLET ORAL at 08:44

## 2020-10-23 RX ADMIN — Medication 100 MG: at 08:44

## 2020-10-23 ASSESSMENT — PAIN DESCRIPTION - DESCRIPTORS
DESCRIPTORS: HEADACHE
DESCRIPTORS: HEADACHE

## 2020-10-23 ASSESSMENT — PAIN DESCRIPTION - PROGRESSION
CLINICAL_PROGRESSION: GRADUALLY WORSENING
CLINICAL_PROGRESSION: NOT CHANGED

## 2020-10-23 ASSESSMENT — PAIN DESCRIPTION - LOCATION
LOCATION: HEAD
LOCATION: HEAD

## 2020-10-23 ASSESSMENT — PAIN DESCRIPTION - ONSET
ONSET: ON-GOING
ONSET: ON-GOING

## 2020-10-23 ASSESSMENT — PAIN DESCRIPTION - PAIN TYPE
TYPE: ACUTE PAIN
TYPE: ACUTE PAIN

## 2020-10-23 ASSESSMENT — PAIN SCALES - GENERAL
PAINLEVEL_OUTOF10: 0
PAINLEVEL_OUTOF10: 7
PAINLEVEL_OUTOF10: 6

## 2020-10-23 ASSESSMENT — PAIN DESCRIPTION - ORIENTATION: ORIENTATION: ANTERIOR

## 2020-10-23 ASSESSMENT — PAIN DESCRIPTION - FREQUENCY
FREQUENCY: INTERMITTENT
FREQUENCY: OTHER (COMMENT)

## 2020-10-23 NOTE — PROGRESS NOTES
Group Therapy Note    Date: 10/23/2020  Start Time: 1330  End Time:  1430  Number of Participants: 4    Type of Group: Psychotherapy      Notes: Pt is present for group. Peers discussed pro-active stress management vs reactive stress management skills. Identified and discussed examples of deep breathing, practical mindfulness and distraction skills. Peers also discussed   the use of Day dreaming as a form of guided imagery,  as proactive stress management skills. Status After Intervention:  Improved    Participation Level:  Active Listener and Interactive    Participation Quality: Appropriate, Attentive, Sharing and Supportive      Speech:  normal      Thought Process/Content: Logical  Linear      Affective Functioning: Congruent      Mood: dysphoric and euthymic      Level of consciousness:  Alert, Oriented x4 and Attentive      Response to Learning: Able to verbalize current knowledge/experience, Able to verbalize/acknowledge new learning, Able to retain information, Capable of insight, Able to change behavior and Progressing to goal      Endings: None Reported    Modes of Intervention: Education, Support, Socialization, Exploration, Clarifying, Problem-solving and Activity      Discipline Responsible: /Counselor      Signature:  Gely Harvey

## 2020-10-23 NOTE — PROGRESS NOTES
Discharge planning-for vivitrol through the MAT program, Please call [phone] to schedule follow up care or walk in Monday through Thursday begining at 0800 am and ending at 4:00 PM. Friday is 0800 and ending at 11:00 am.

## 2020-10-23 NOTE — PROGRESS NOTES
Behavioral Health   Discharge Note    Pt discharged with followings belongings:   Dentures: None  Vision - Corrective Lenses: Contacts  Hearing Aid: None  Jewelry: None  Body Piercings Removed: N/A  Clothing: Footwear, Shirt  Were All Patient Medications Collected?: Not Applicable  Other Valuables: Cell phone   Valuables sent home with patient. Valuables retrieved from safe, Security envelope number:  na and returned to patient. Patient left department with self via private car. Discharged to home. \"An Important Message from [employer] About Your Rights\" form photocopy original from admission and provided to pt at discharge N. Patient education on aftercare instructions: yes  Bridge Appointment completed: Reviewed Discharge Instructions with patient. Patient verbalizes understanding and agreement with the discharge plan using the teachback method. Patient verbalize understanding of AVS:  yes.     Status EXAM upon discharge:  Status and Exam  Normal: No  Facial Expression: Flat  Affect: Appropriate  Level of Consciousness: Alert  Mood:Normal: No  Mood: Depressed, Anxious  Motor Activity:Normal: Yes  Motor Activity: Tremors  Interview Behavior: Cooperative  Preception: V[place] to Person, Verlon Craw to Time, V[place] to Place, [place] to Situation  Attention:Normal: Yes  Attention: Distractible, Unable to Concentrate  Thought Processes: Circumstantial  Thought Content:Normal: Yes  Hallucinations: None  Delusions: No  Memory:Normal: No  Memory: Poor Recent  Insight and Judgment: No  Insight and Judgment: Poor Judgment, Poor Insight  Present Suicidal Ideation: No  Present Homicidal Ideation: No    Chelsea Caba RN

## 2020-10-23 NOTE — PROGRESS NOTES
Group Therapy Note    Date: 10/22/2020  Start Time: 2000  End Time:  2020    Type of Group: Wrap-Up/relaxation      Patient's Goal:  \"Talk more. \"    Notes:  Not met    Status After Intervention:  Unchanged    Participation Level: Minimal    Participation Quality: Appropriate and Attentive      Speech:  normal      Thought Process/Content: Logical      Affective Functioning: Blunted      Mood: anxious and depressed      Level of consciousness:  Alert, Oriented x4 and Attentive      Response to Learning: Able to verbalize current knowledge/experience, Able to verbalize/acknowledge new learning, Able to retain information, Capable of insight, Able to change behavior and Progressing to goal      Endings: None Reported    Modes of Intervention: Education and Support      Discipline Responsible: Registered Nurse      Signature:  Ninfa Redding RN

## 2020-10-23 NOTE — BH NOTE
PLAN OF CARE:     Start Time: 0900  End Time:   0930    Group Topic:  Daily Goals    Group Type:   Goal Group    Intervention/Goal:  To increase support and identify daily goals    Attendance:   Attended group    Affect:  Brightens with interaction    Behavior:  Cooperative and pleasant    Response:  Identified a daily goal for today. Daily Goal:  Go home and get back to work.      Progress:  Progressing to goal

## 2020-10-23 NOTE — PROGRESS NOTES
DIscussed discharge plans for connecting with Dr. Uriel Box office. Pt agreed to complete a [facility] [employer] [facility] satisfaction survey.

## 2020-10-23 NOTE — PLAN OF CARE
Problem: Pain:  Goal: Pain level will decrease  Description: Pain level will decrease  10/23/2020 0114 by Beatrice Conley RN  Outcome: Ongoing  Note: Patient currently denies pain. 10/22/2020 1238 by Maria Esther Kauffman RN  Outcome: Ongoing  Note: Patient reports headache pain 8 out of 10. .  Patient was given Ativan for symptoms of withdrawal, stated pain decreased after taking, rated pain a 4. Problem: Altered Mood, Depressive Behavior:  Goal: Able to verbalize and/or display a decrease in depressive symptoms  Description: Able to verbalize and/or display a decrease in depressive symptoms  10/23/2020 0114 by Beatrice Conley RN  Outcome: Ongoing  Note: Patient reports depressive thoughts, states mood is \"not well. \"  10/22/2020 1238 by Maria Esther Kauffman RN  Outcome: Ongoing  Note: Depressed mood and affect  States his/her mood is a depressed. Goal: Patient specific goal  Description: Patient specific goal  10/23/2020 0114 by Beatrice Conley RN  Outcome: Ongoing  Note: Daily goal not met per patient. 10/22/2020 1238 by Maria Esther Kauffman RN  Outcome: Ongoing  Note: Patient reports goal for today is to talk more. This was not meant because patient isolated to himself not interacting with others this shift. Goal: Participates in care planning  Description: Participates in care planning  10/23/2020 0114 by Beatrice Conley RN  Outcome: Ongoing  Note: Patient participated this shift. 10/22/2020 1238 by Maria Esther Kauffman RN  Outcome: Ongoing  Note: Care plan reviewed with patient. Patient able to verbalize understanding of the plan of care and contribute to goal setting. Problem: Substance Abuse:  Goal: Absence of drug withdrawal signs and symptoms  Description: Absence of drug withdrawal signs and symptoms  10/23/2020 0114 by Beatrice Conley RN  Outcome: Ongoing  Note: Patient reported tremors, sweats, stomach cramps and nausea, Ativan given per orders.  Patient currently resting with eyes closed, no deter socialization. Problem: Safety:  Goal: Ability to remain free from injury will improve  Description: Ability to remain free from injury will improve  10/23/2020 0114 by Edith Grover RN  Outcome: Ongoing  Note: Patient remains safe and free from harm. 10/22/2020 1238 by Romie Chavira RN  Outcome: Met This Shift  Note: Patient remained free from injury this shift. Problem: Falls - Risk of:  Goal: Will remain free from falls  Description: Will remain free from falls  10/23/2020 0114 by Edith Grover RN  Outcome: Ongoing  Note: No falls noted. Call light within reach. 10/22/2020 1238 by Romie Chavira RN  Outcome: Met This Shift  Note: No falls were observed or reported so far this shift, gait steady when ambulating and wears non-skid slippers socks. Encourage patient to wear shower shoes while in the shower. Remains on fall precautions. Care plan reviewed with patient.   Patient does verbalize understanding of the plan of care and does contribute to goal setting

## 2020-10-23 NOTE — SUICIDE SAFETY PLAN
SAFETY PLAN    A suicide Safety Plan is a document that supports someone when they are having thoughts of suicide. Warning Signs that indicate a suicidal crisis may be developing: What (situations, thoughts, feelings, body sensations, behaviors, etc.) do you experience that lets you know you are beginning to think about suicide? 1. depressed  2. sleepy  3. angry    Internal Coping Strategies:  What things can I do (relaxation techniques, hobbies, physical activities, etc.) to take my mind off my problems without contacting another person? 1. cooking  2. reading  3. Watch a movie    People and social settings that provide distraction: Who can I call or where can I go to distract me? 1. Name: Mom  Phone: [phone]  2. Name: Jaylen Taylor  Phone: [phone]   3. Place:  meeting            4. Place: [facility]    People whom I can ask for help: Who can I call when I need help - for example, friends, family, clergy, someone else? 1. Name: Braeden Cano                Phone: [phone]  2. Name: Mayra Freeman  Phone: [phone]  3. Name: Jaylen Brandon  Phone: [phone]    Professionals or [facility] agencies I can contact during a crisis: Who can I call for help - for example, my doctor, my psychiatrist, my psychologist, a mental health provider, a suicide hotline? 1. Clinician Name: Justin Lam   Phone: [phone]      Clinician Pager or Emergency Contact #:     2. Clinician Name:    Phone:       Clinician Pager or Emergency Contact #:     3. Suicide Prevention Lifeline: 8-534-207-TALK (6864)    4. [address] [place] [address] Emergency Services -  for example, [facility] suicide hotline, [facility] Hotline:       Emergency Services Address:       Emergency Services Phone:     Making the environment safe: How can I make my environment (house/apartment/living space) safer? For example, can I remove guns, medications, and other items? 1. Remove any alcohol  2.

## 2020-10-23 NOTE — BH NOTE
Group Therapy Note    Date: 10/23/2020  Start Time:  1000  End Time:   0412    Number of Participants:  7    Type of Group: Recreational/Social    Patient's Goal:  Increase socialization and concentration. Notes:   Social with others. 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 Patient demonstrated good concentration.      Status After Intervention:  Improved    Participation Level: Interactive    Participation Quality: Appropriate, Attentive and Sharing      Speech:  normal      Thought Process/Content: Logical      Affective Functioning: Congruent      Mood: euthymic      Level of consciousness:  Oriented x4      Response to Learning: Progressing to goal      Endings: None Reported    Modes of Intervention: Socialization and Activity      Discipline Responsible: Psychoeducational Specialist      Signature:  Latisha Carson

## 2020-10-23 NOTE — PROGRESS NOTES
Daily Progress Note  Kai Ghosh MD  10/23/2020  CHIEF COMPLAINT: suicidal ideation with a plan to kill self by undisclosed means    Reviewed patient's current plan of care and vital signs with nursing staff. Sleep:  8 continuous hours last night, \"good\"  Attending groups: Yes    SUBJECTIVE:    Patient notes his depression is improved. Patient denies any further withdrawal symptoms except minor tremor which is \"better\". He denies nausea, tactile sensation, diaphoresis, hallucinations (auditory and visual), anxiety, and cramping. Patient notes his rash seems better. He tried the hydrocortisone cream twice. Patient denies wanting to go to sober living or rehabilitation. He plans to resume working at discharge and believes that getting back into his routine will help him. He has an appointment for his foot to clear him to go back to work on Monday. Patient plans to see Dr Keith Quintana to get \"the shot\". Mental Status Exam  Level of consciousness:  Within normal limits  Appearance: Hospital attire, seated in chair, with good grooming and hygiene   Behavior/Motor: No abnormalities noted  Attitude toward examiner:  Cooperative, attentive, good eye contact  Speech:  spontaneous, normal rate, normal volume and well articulated  Mood:  \"decent\"  Affect: congruent  Thought processes:  linear, goal directed and coherent  Thought content:  denies homicidal ideation  Suicidal Ideation: denies suicidal ideation  Delusions:  no evidence of delusions  Perceptual Disturbance:  denies any perceptual disturbance  Cognition:  Oriented to self, location, time, and situation  Memory: age appropriate  Insight & Judgement: improving  Medication side effects:  denies     PE:   Skin: erythematous rash on bridge of nose and cheeks (improved since yesterday)    Data   height is 5' 1.5\" (1.562 m) and weight is 176 lb (79.8 kg). His tympanic temperature is 97.3 °F (36.3 °C).  His blood pressure is 128/73 and his pulse is 76. His respiration is 16 and oxygen saturation is 100%.    Labs:   Admission on 10/20/2020   Component Date Value Ref Range Status    POC Glucose 10/20/2020 115* 70 - 108 mg/dl Final    Performed at [facility], [address]    POC Glucose 10/20/2020 151* 70 - 108 mg/dl Final    Performed at [facility], [address] [facility] POC Glucose 10/21/2020 130* 70 - 108 mg/dl Final    Performed at [facility], [address] [facility] POC Glucose 10/21/2020 152* 70 - 108 mg/dl Final    Performed at [address] [place] [address], [address] [facility] POC Glucose 10/22/2020 103  70 - 108 mg/dl Final    Performed at [address] [place] [address], [address] [facility] POC Glucose 10/22/2020 136* 70 - 108 mg/dl Final    Performed at [address] [place] [address], [address] [facility] POC Glucose 10/23/2020 111* 70 - 108 mg/dl Final    Performed at [address] [place] [id number]            Medications  Current Facility-Administered Medications: hydrocortisone 2.5 % cream, , Topical, BID  venlafaxine (EFFEXOR XR) extended release capsule 37.5 mg, 37.5 mg, Oral, Daily with breakfast  vitamin B-1 (THIAMINE) tablet 100 mg, 100 mg, Oral, Daily  QUEtiapine (SEROQUEL) tablet 25 mg, 25 mg, Oral, BID  ondansetron (ZOFRAN) tablet 4 mg, 4 mg, Oral, TID PRN  therapeutic multivitamin-minerals 1 tablet, 1 tablet, Oral, Daily  hydrOXYzine (VISTARIL) capsule 100 mg, 100 mg, Oral, 4x Daily PRN  folic acid (FOLVITE) tablet 1 mg, 1 mg, Oral, Daily  aspirin EC tablet 81 mg, 81 mg, Oral, Daily  albuterol sulfate  (90 Base) MCG/ACT inhaler 2 puff, 2 puff, Inhalation, Q6H PRN  acetaminophen (TYLENOL) tablet 650 mg, 650 mg, Oral, Q4H PRN  ibuprofen (ADVIL;MOTRIN) tablet 400 mg, 400 mg, Oral, Q6H PRN  traZODone (DESYREL) tablet 50 mg, 50 mg, Oral, Nightly PRN  magnesium hydroxide (MILK OF MAGNESIA) 400 MG/5ML suspension 30 mL, 30 mL, Oral, Daily PRN  aluminum & magnesium hydroxide-simethicone (MAALOX) 200-200-20 MG/5ML suspension 30 mL, 30 mL, Oral, Q6H PRN  nicotine (NICODERM CQ) 14 MG/24HR 1 patch, 1 patch, Transdermal, Daily  LORazepam (ATIVAN) tablet 1 mg, 1 mg, Oral, Q1H PRN **OR** LORazepam (ATIVAN) tablet 2 mg, 2 mg, Oral, Q1H PRN **OR** LORazepam (ATIVAN) tablet 3 mg, 3 mg, Oral, Q1H PRN **OR** LORazepam (ATIVAN) tablet 4 mg, 4 mg, Oral, Q1H PRN  insulin glargine (LANTUS) injection vial 25 Units, 25 Units, Subcutaneous, Daily  budesonide-formoterol (SYMBICORT) 160-4.5 MCG/ACT inhaler 2 puff, 2 puff, Inhalation, BID    ASSESSMENT  Bipolar affective disorder, currently depressed, moderate (HCC)  Alcohol Withdrawal, uncomplicated  Dermatitis, face, suspect eczema    PLAN  Patients symptoms are improving  Continue with current medications. For rash continue hydrocortisone 2.5% daily and follow up with PCP    Attempt to develop insight  Psycho-education conducted. Supportive Therapy conducted. Plan to discharge today  Follow up with Dr. Elvis Razo- plan to get Vivitrol  [facility] with [facility]. Patient plans to call on Monday to schedule appointment. Patient denies wanting to go to sober living or rehabilitation. He plans to resume working at discharge and believes that getting back into his routine will help him. Patient wants to be discharged home. More than 16 minutes of the session was spent doing supportive psychotherapy, insight oriented psychotherapy, mindfulness based therapy, brief family therapy. Session started at 9:00 am and ended at 9:20 am.    Electronically signed by Katy Kuhn DO on 10/22/20 at 8:37 AM EDT    **This report has been created using voice recognition software. It may contain minor errors which are inherent in voice recognition technology. **

## 2020-10-23 NOTE — PLAN OF CARE
Problem: RESPIRATORY  Goal: Clear lung sounds  Description: Clear lung sounds  Outcome: Ongoing  Note: Symbicort given to maintain clear lung sounds
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
## 2020-10-26 ENCOUNTER — TELEPHONE (OUTPATIENT)
Dept: FAMILY MEDICINE CLINIC | Age: 35
End: 2020-10-26

## 2020-10-26 ENCOUNTER — TELEPHONE (OUTPATIENT)
Dept: PSYCHIATRY | Age: 35
End: 2020-10-26

## 2020-10-26 NOTE — TELEPHONE ENCOUNTER
[facility] Trans[facility] Initial Follow Up Call    Outreach made within 2 business days of discharge: Yes    Patient: Mihai Rowan Patient : 1985   MRN: [id number]  Reason for Admission: There are no discharge diagnoses documented for the most recent discharge. Discharge Date: 10/23/20       Spoke with: Reema Pagan    Discharge department/facility: [facility]    TCM Interactive Patient Contact:  Was patient able to fill all prescriptions: Yes  Was patient instructed to bring all medications to the follow-up visit: Yes  Is patient taking all medications as directed in the discharge summary?  Yes  Does patient understand their discharge instructions: Yes  Does patient have questions or concerns that need addressed prior to 7-14 day follow up office visit: no    Scheduled appointment with PCP within 7-14 days    Follow Up  Future Appointments   Date Time Provider Malachi Gray   10/28/2020 10:20 AM BARB Bolton CNP 5, [facility] [facility] ([address] [place] [address])

## 2020-10-28 ENCOUNTER — OFFICE VISIT (OUTPATIENT)
Dept: FAMILY MEDICINE CLINIC | Age: 35
End: 2020-10-28
Payer: COMMERCIAL

## 2020-10-28 ENCOUNTER — OFFICE VISIT (OUTPATIENT)
Dept: INTERNAL MEDICINE CLINIC | Age: 35
End: 2020-10-28
Payer: COMMERCIAL

## 2020-10-28 VITALS
DIASTOLIC BLOOD PRESSURE: 76 MMHG | BODY MASS INDEX: 32.94 KG/M2 | HEIGHT: 62 IN | SYSTOLIC BLOOD PRESSURE: 125 MMHG | WEIGHT: 179 LBS | TEMPERATURE: 98.3 F | HEART RATE: 91 BPM

## 2020-10-28 VITALS
DIASTOLIC BLOOD PRESSURE: 80 MMHG | RESPIRATION RATE: 10 BRPM | HEIGHT: 63 IN | HEART RATE: 116 BPM | TEMPERATURE: 98.9 F | SYSTOLIC BLOOD PRESSURE: 122 MMHG | BODY MASS INDEX: 31.57 KG/M2 | WEIGHT: 178.2 LBS | OXYGEN SATURATION: 96 %

## 2020-10-28 LAB
ALCOHOL URINE: ABNORMAL
AMPHETAMINE SCREEN, URINE: ABNORMAL
BARBITURATE SCREEN, URINE: ABNORMAL
BENZODIAZEPINE SCREEN, URINE: ABNORMAL
BUPRENORPHINE URINE: ABNORMAL
COCAINE METABOLITE SCREEN URINE: ABNORMAL
FENTANYL SCREEN, URINE: ABNORMAL
GABAPENTIN SCREEN, URINE: ABNORMAL
MDMA URINE: ABNORMAL
METHADONE SCREEN, URINE: ABNORMAL
METHAMPHETAMINE, URINE: ABNORMAL
OPIATE SCREEN URINE: ABNORMAL
OXYCODONE SCREEN URINE: ABNORMAL
PHENCYCLIDINE SCREEN URINE: ABNORMAL
PROPOXYPHENE SCREEN, URINE: ABNORMAL
SYNTHETIC CANNABINOIDS(K2) SCREEN, URINE: ABNORMAL
THC SCREEN, URINE: ABNORMAL
TRAMADOL SCREEN URINE: ABNORMAL
TRICYCLIC ANTIDEPRESSANTS, UR: ABNORMAL

## 2020-10-28 PROCEDURE — G8417 CALC BMI ABV UP PARAM F/U: HCPCS | Performed by: INTERNAL MEDICINE

## 2020-10-28 PROCEDURE — 1111F DSCHRG MED/CURRENT MED MERGE: CPT | Performed by: INTERNAL MEDICINE

## 2020-10-28 PROCEDURE — 99214 OFFICE O/P EST MOD 30 MIN: CPT | Performed by: INTERNAL MEDICINE

## 2020-10-28 PROCEDURE — 4004F PT TOBACCO SCREEN RCVD TLK: CPT | Performed by: INTERNAL MEDICINE

## 2020-10-28 PROCEDURE — 1111F DSCHRG MED/CURRENT MED MERGE: CPT | Performed by: NURSE PRACTITIONER

## 2020-10-28 PROCEDURE — 99215 OFFICE O/P EST HI 40 MIN: CPT | Performed by: NURSE PRACTITIONER

## 2020-10-28 PROCEDURE — G8427 DOCREV CUR MEDS BY ELIG CLIN: HCPCS | Performed by: INTERNAL MEDICINE

## 2020-10-28 PROCEDURE — G8484 FLU IMMUNIZE NO ADMIN: HCPCS | Performed by: INTERNAL MEDICINE

## 2020-10-28 PROCEDURE — 80305 DRUG TEST PRSMV DIR OPT OBS: CPT | Performed by: INTERNAL MEDICINE

## 2020-10-28 RX ORDER — VENLAFAXINE HYDROCHLORIDE 37.5 MG/1
37.5 CAPSULE, EXTENDED RELEASE ORAL
Qty: 30 CAPSULE | Refills: 2 | Status: SHIPPED | OUTPATIENT
Start: 2020-10-28 | End: 2020-12-28 | Stop reason: ALTCHOICE

## 2020-10-28 RX ORDER — PROPRANOLOL HCL 60 MG
60 CAPSULE, EXTENDED RELEASE 24HR ORAL DAILY
Qty: 30 CAPSULE | Refills: 3 | Status: SHIPPED | OUTPATIENT
Start: 2020-10-28 | End: 2020-11-12

## 2020-10-28 RX ORDER — INSULIN ASPART 100 [IU]/ML
INJECTION, SOLUTION INTRAVENOUS; SUBCUTANEOUS
Qty: 5 PEN | Refills: 3 | Status: SHIPPED | OUTPATIENT
Start: 2020-10-28 | End: 2021-03-12 | Stop reason: SDUPTHER

## 2020-10-28 RX ORDER — QUETIAPINE FUMARATE 25 MG/1
25 TABLET, FILM COATED ORAL 2 TIMES DAILY
Qty: 60 TABLET | Refills: 2 | Status: SHIPPED | OUTPATIENT
Start: 2020-10-28 | End: 2020-12-28

## 2020-10-28 RX ORDER — NALTREXONE HYDROCHLORIDE 50 MG/1
50 TABLET, FILM COATED ORAL DAILY
Qty: 7 TABLET | Refills: 0 | Status: SHIPPED | OUTPATIENT
Start: 2020-10-28 | End: 2020-11-04

## 2020-10-28 SDOH — HEALTH STABILITY: MENTAL HEALTH: HOW OFTEN DO YOU HAVE A DRINK CONTAINING ALCOHOL?: 4 OR MORE TIMES A WEEK

## 2020-10-28 SDOH — HEALTH STABILITY: MENTAL HEALTH: HOW MANY STANDARD DRINKS CONTAINING ALCOHOL DO YOU HAVE ON A TYPICAL DAY?: 10 OR MORE

## 2020-10-28 NOTE — PROGRESS NOTES
Verbal order per Dr. Artie Mcmullen for urine drug screen. Positive for Alcohol BAR. Verified results with Oliver Villasenor. Dr. Artie Mcmullen ordered Naltrexone 25mg tab given for induction purpose- no adverse reactions noted for patient. Verified dose with patient. Patient was sent home with 1 week script of  Naltrexone 50mg daily and will be seen back in the office 11/4/20.

## 2020-10-28 NOTE — PROGRESS NOTES
Transition of Care Visit/Hospital Follow Up:      Carloz Sterling is a 38 y.o. male that presents [place] [facility] [place]        Date of Discharge:   10/23/20  Was patient contacted within 2 business days of discharge (see chart for documentation):  yes - 10/26/20      Patient presents for hospital follow up. Patient recently hospitalized at [facility] for treatment of Alcohol intoxication, MDD. Symptoms prior to admission:  Alcohol intoxication, suicidal ideation     Hospital Course per DC Summary:    Hospital Course:   Carloz Sterling is a 38 y.o. male admitted to [address] [place] [address] on 10/16/2020 for alcohol intoxication and suicidal ideations, looking for detox. Patient was recently discharged from the hospital 2 days prior to admission. He was treated with ativan per protocol as he states phenobarb gives him a rash. Psych evaluated patient and wanted him transferred to the psych unit after medically stable. Patient is doing well. Due to staffing concerns, patient was kept on medical floor with plans to transfer to Psych once medically stable. Patient is day 4 of withdrawal and doing better. He will be transferred to psych, as his symptoms have improved.      1. Alcohol Withdrawal/Suicidal Ideations/Hypokalemia/Hypomagnesemia/Nasuea  - stable, right hand tremors/shaking>left. Unsure if this purely withdrawal related. While asleep there is not shaking or tremors etc. When he wakes up, the right hand/arm tremors more than left and this doesn't change much with meds, I believe. - continue ativan po prn  - K and Mag and Phos supplemented  - Nausea--> pt likely has gastritis--> GI cocktail/Protonix 40mg qd/avoid NSAIDs and Alcohol  - Psych consulted for suicidal ideations --> transfer to       2. MDD w/ SI  - lexapro, seroquel  - sitter  - SI precautions     3. IDDMII  - ISS, Glu checks achs, sliding scale + home insulin  - hypoglycemia protocol + diabetic diet     4.  COPD  - no exac  - depressed. He just had a birthday which was a the same day his dad committed suicide. Denies any SI/HI. He is also working with the [employer]    Current Outpatient Medications   Medication Sig Dispense Refill    propranolol (INDERAL LA) 60 MG extended release capsule Take 1 capsule by mouth daily 30 capsule 3    QUEtiapine (SEROQUEL) 25 MG tablet Take 1 tablet by mouth 2 times daily 60 tablet 2    venlafaxine (EFFEXOR XR) 37.5 MG extended release capsule Take 1 capsule by mouth daily (with breakfast) 30 capsule 2    insulin aspart (NOVOLOG FLEXPEN) 100 UNIT/ML injection pen Inject 2-12 units SQ TID with meals as directed per sliding scale, max dose 36 units/day 5 pen 3    hydrocortisone 2.5 % cream Apply topically 2 times daily.  1 Tube 0    traZODone (DESYREL) 50 MG tablet Take 1 tablet by mouth nightly as needed for Sleep 30 tablet 0    folic acid (FOLVITE) 1 MG tablet Take 1 tablet by mouth daily 30 tablet 3    vitamin B-1 100 MG tablet Take 1 tablet by mouth daily 30 tablet 3    hydrOXYzine (VISTARIL) 100 MG capsule Take 1 capsule by mouth 4 times daily as needed for Anxiety 60 capsule 3    insulin glargine (LANTUS SOLOSTAR) 100 UNIT/ML injection pen Inject 25 Units into the skin daily 5 pen 3    nicotine (NICOTROL) 10 MG inhaler Inhale 1 puff into the lungs as needed for Smoking cessation 1 Inhaler 3    ondansetron (ZOFRAN) 4 MG tablet Take 1 tablet by mouth 3 times daily as needed for Nausea or Vomiting 15 tablet 0    albuterol sulfate HFA (PROVENTIL HFA) 108 (90 Base) MCG/ACT inhaler Inhale 2 puffs into the lungs every 6 hours as needed for Wheezing 1 Inhaler 0    Insulin Pen Needle 30G X 8 MM MISC 1 each by Does not apply route 3 times daily 500 each 3    fluticasone-salmeterol (ADVAIR) 250-50 MCG/DOSE AEPB Inhale 1 puff into the lungs every 12 hours 60 each 5    aspirin 81 MG EC tablet Take 1 tablet by mouth daily 30 tablet 3    acetaminophen (TYLENOL) 500 MG tablet Take 1,000 mg by mouth SpO2 96%   BMI 31.18 kg/m²   General Appearance: well developed and well- nourished, in no acute distress  Head: normocephalic and atraumatic  ENT: external ear and ear canal normal bilaterally, nose without deformity, nasal mucosa and turbinates normal without polyps, oropharynx normal, dentition is normal for age, no lip or gum lesions noted  Neck: supple and non-tender without mass, no thyromegaly or thyroid nodules, no cervical lymphadenopathy  Pulmonary/Chest: clear to auscultation bilaterally- no wheezes, rales or rhonchi, normal air movement, no respiratory distress or retractions  Cardiovascular: normal rate, regular rhythm, normal S1 and S2, no murmurs, rubs, clicks, or gallops, distal pulses intact  Abdomen: soft, non-tender, non-distended, no rebound or guarding, no masses or hernias noted, no hepatospleenomegaly  Extremities: no cyanosis, clubbing or edema of the lower extremities  Psych:  Normal affect without evidence of depression or anxiety, insight and judgement are appropriate, memory appears intact  Skin: warm and dry, no rash or erythema      Diagnostic test results reviewed: inpatient labs    Patient risk of morbidity and mortality: moderate      ASSESSMENT & PLAN  Adan Greco was seen today for follow-up from hospital.    Diagnoses and all orders for this visit:    Action tremor  -     propranolol (INDERAL LA) 60 MG extended release capsule; Take 1 capsule by mouth daily  -     PA DISCHARGE MEDS RECONCILED W/ CURRENT OUTPATIENT MED LIST    Alcohol abuse  -     PA DISCHARGE MEDS RECONCILED W/ CURRENT OUTPATIENT MED LIST    Bipolar disorder, current episode depressed, severe, without psychotic features (HCC)  -     QUEtiapine (SEROQUEL) 25 MG tablet; Take 1 tablet by mouth 2 times daily  -     venlafaxine (EFFEXOR XR) 37.5 MG extended release capsule;  Take 1 capsule by mouth daily (with breakfast)  -     PA DISCHARGE MEDS RECONCILED W/ CURRENT OUTPATIENT MED LIST    Type 2 diabetes mellitus without complication, unspecified whether long term insulin use (HCC)  -     insulin aspart (NOVOLOG FLEXPEN) 100 UNIT/ML injection pen; Inject 2-12 units SQ TID with meals as directed per sliding scale, max dose 36 units/day  -     ND DISCHARGE MEDS RECONCILED W/ CURRENT OUTPATIENT MED LIST      - I suspect the Effexor is causing his tremor. However, I would like him to con't the Effexor for now as it is too early to know if the medication will help the anxiety/depression  - add Propranolol for tremors, generally I do not like to treat the side effect of one medication with another medication, but the tremor is considerably interfering with his quality of life and I don't want him to stop the Effexor just yet  - con't Seroquel  - f/u with [facility] and Dr Kar Brandon  - suicidal precautions given  - medication refills     Return in about 4 weeks (around 11/25/2020) for MDD, ETOH abuse. Level of medical complexity:  moderate    -Overall, patient is improving  -Continue current meds  -Advised to continue to closely monitor her symptoms and if any worsening to seek treatment    Riley Zimmerman received counseling on the following healthy behaviors: medication adherence and decrease in alcohol consumption  Reviewed prior labs and health maintenance. Continue current medications, diet and exercise. Discussed use, benefit, and side effects of prescribed medications. Barriers to medication compliance addressed. Patient given educational materials - see patient instructions. All patient questions answered. Patient voiced understanding.
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
## 2020-11-02 ENCOUNTER — TELEPHONE (OUTPATIENT)
Dept: INTERNAL MEDICINE CLINIC | Age: 35
End: 2020-11-02

## 2020-11-02 NOTE — TELEPHONE ENCOUNTER
Claude Huddleston- she stated that Patient insurance is contracted with [facility] for both Sublocade and Vivitrol. I faxed RX for vivitrol to [facility] today.

## 2020-11-05 ENCOUNTER — OFFICE VISIT (OUTPATIENT)
Dept: INTERNAL MEDICINE CLINIC | Age: 35
End: 2020-11-05
Payer: COMMERCIAL

## 2020-11-05 VITALS
DIASTOLIC BLOOD PRESSURE: 82 MMHG | TEMPERATURE: 97.7 F | HEART RATE: 74 BPM | SYSTOLIC BLOOD PRESSURE: 139 MMHG | HEIGHT: 62 IN | BODY MASS INDEX: 32.57 KG/M2 | WEIGHT: 177 LBS

## 2020-11-05 PROCEDURE — 80305 DRUG TEST PRSMV DIR OPT OBS: CPT | Performed by: INTERNAL MEDICINE

## 2020-11-05 PROCEDURE — 99213 OFFICE O/P EST LOW 20 MIN: CPT | Performed by: INTERNAL MEDICINE

## 2020-11-05 PROCEDURE — G8427 DOCREV CUR MEDS BY ELIG CLIN: HCPCS | Performed by: INTERNAL MEDICINE

## 2020-11-05 PROCEDURE — 4004F PT TOBACCO SCREEN RCVD TLK: CPT | Performed by: INTERNAL MEDICINE

## 2020-11-05 PROCEDURE — G8417 CALC BMI ABV UP PARAM F/U: HCPCS | Performed by: INTERNAL MEDICINE

## 2020-11-05 PROCEDURE — 1111F DSCHRG MED/CURRENT MED MERGE: CPT | Performed by: INTERNAL MEDICINE

## 2020-11-05 PROCEDURE — G8484 FLU IMMUNIZE NO ADMIN: HCPCS | Performed by: INTERNAL MEDICINE

## 2020-11-05 RX ORDER — NALTREXONE HYDROCHLORIDE 50 MG/1
50 TABLET, FILM COATED ORAL DAILY
COMMUNITY
End: 2020-11-05 | Stop reason: SDUPTHER

## 2020-11-05 RX ORDER — NALTREXONE HYDROCHLORIDE 50 MG/1
50 TABLET, FILM COATED ORAL DAILY
Qty: 7 TABLET | Refills: 0 | Status: SHIPPED | OUTPATIENT
Start: 2020-11-05 | End: 2020-11-12

## 2020-11-05 NOTE — PROGRESS NOTES
Verbal order per Dr. Kasey Hassan for urine drug screen. Negative for all drugs. Verified results with Filomena Mak MA. Dr. Kasey Hassan ordered Naltrexone 50mg tab daily for patient. Verified dose with patient. Patient was sent home with 1 week script of Naltrexone 50mg tab daily and will be seen back in the office 11/12/20.

## 2020-11-05 NOTE — PROGRESS NOTES
MEDICATION ASSISTED TREATMENT ENCOUNTER    HISTORY OF PRESENT ILLNESS  Patient presents for evaluation of opioid use and would like to be placed on medication assisted treatment  Patient is referred by Dr. Rema Akins from the hospital  I saw him for the first time 10/28\  Patient was at [facility] from 10/16 through 10/23  He had gone in for alcohol detox as well as suicidal thoughts  Patient has had problems using alcohol  Patient started using [id number] years ago  Patient says he has binges where he drinks so much he gets sick ended up in the hospital  He had a binge prior to his admission for a couple of days he would drink 1/5 of vodka daily  I put him on p.o. naltrexone  He said he is not drinking does not have beers or drink since the last visit      ROS     General: Again no urges or cravings to drink    PHYSICAL EXAM     Blood pressure 139/82, pulse 74, temperature 97.7 °F (36.5 °C), height 5' 2\" (1.575 m), weight 177 lb (80.3 kg).              General: Patient resting comfortably in no acute distress     Mental Status Examination:  Level of consciousness:  within normal limits and awake  Appearance:  well-appearing, in chair, good grooming and good hygiene  Behavior/Motor:  {Normal  Attitude toward examiner:  cooperative and attentive  Speech:  spontaneous and normal volume  Mood: normal  Affect:  appropriate  Thought processes:  linear  Thought content:  Denies homicidal ideations  Suicidal Ideation:  denies suicidal ideation  Delusions:  no evidence of delusions  Perceptual Disturbance:  denies any perceptual disturbance  Cognition:  oriented to person, place, and time    Insight : fair  Judgment: oor  Medication Side Effects:none       Eyes: Pupils are normal         Skin: No rashes, lesions or abnormalities noted        URINE DRUG SCREEN TODAY:  Recent Labs     11/05/20  1026   ALCOHOL NEG   LABAMPH NEG   LABBARB NEG   LABBENZ NEG   BUPRENUR NEG COCAIMETSCRU NEG   FENTSCRUR NEG   GABAPENTIN N/A   MDMA NEG   METAMPU NEG   LABMETH NEG   OPIATESCREENURINE NEG   OXTCOSU NEG   PHENCYCLIDINESCREENURINE NEG   PROPOXYPHENE N/A   SPICEUR NEG   THCSCREENUR NEG   TRAMADOLUR NEG   TRICYUR N/A           Diagnosis Orders   1. Severe alcohol use disorder ([facility]     2. Bipolar affective disorder, currently depressed, moderate ([facility]     3.  Recurrent major depressive episodes, moderate (HCC)           PLAN:  Urine has alcohol       Patient wants to continue with naltrexone  He has not drank in 12 hours    There were no complications  I will have Delores Joya start the process for Vivitrol  I told him he needs  counseling meetings behavioral therapy etc. with the medication

## 2020-11-07 ENCOUNTER — HOSPITAL ENCOUNTER (EMERGENCY)
Age: 35
Discharge: HOME | End: 2020-11-08
Attending: EMERGENCY MEDICINE
Payer: COMMERCIAL

## 2020-11-07 LAB
ACETAMINOPHEN LEVEL: < 5 UG/ML (ref 0–20)
ALBUMIN SERPL-MCNC: 4.6 G/DL (ref 3.5–5.1)
ALP BLD-CCNC: 147 U/L (ref 38–126)
ALT SERPL-CCNC: 41 U/L (ref 11–66)
AMPHETAMINE+METHAMPHETAMINE URINE SCREEN: NEGATIVE
ANION GAP SERPL CALCULATED.3IONS-SCNC: 18 MEQ/L (ref 8–16)
AST SERPL-CCNC: 36 U/L (ref 5–40)
BARBITURATE QUANTITATIVE URINE: POSITIVE
BASOPHILS # BLD: 1.1 %
BASOPHILS ABSOLUTE: 0.1 THOU/MM3 (ref 0–0.1)
BENZODIAZEPINE QUANTITATIVE URINE: NEGATIVE
BILIRUB SERPL-MCNC: < 0.2 MG/DL (ref 0.3–1.2)
BUN BLDV-MCNC: 8 MG/DL (ref 7–22)
CALCIUM SERPL-MCNC: 9.1 MG/DL (ref 8.5–10.5)
CANNABINOID QUANTITATIVE URINE: NEGATIVE
CHLORIDE BLD-SCNC: 107 MEQ/L (ref 98–111)
CO2: 22 MEQ/L (ref 23–33)
COCAINE METABOLITE QUANTITATIVE URINE: NEGATIVE
CREAT SERPL-MCNC: 0.9 MG/DL (ref 0.4–1.2)
EOSINOPHIL # BLD: 3.5 %
EOSINOPHILS ABSOLUTE: 0.3 THOU/MM3 (ref 0–0.4)
ERYTHROCYTE [DISTWIDTH] IN BLOOD BY AUTOMATED COUNT: 13.7 % (ref 11.5–14.5)
ERYTHROCYTE [DISTWIDTH] IN BLOOD BY AUTOMATED COUNT: 46.7 FL (ref 35–45)
ETHYL ALCOHOL, SERUM: 0.37 %
GFR SERPL CREATININE-BSD FRML MDRD: > 90 ML/MIN/1.73M2
GLUCOSE BLD-MCNC: 153 MG/DL (ref 70–108)
GLUCOSE BLD-MCNC: 235 MG/DL (ref 70–108)
HCT VFR BLD CALC: 49.2 % (ref 42–52)
HEMOGLOBIN: 17 GM/DL (ref 14–18)
IMMATURE GRANS (ABS): 0.12 THOU/MM3 (ref 0–0.07)
IMMATURE GRANULOCYTES: 1.3 %
LYMPHOCYTES # BLD: 36.8 %
LYMPHOCYTES ABSOLUTE: 3.5 THOU/MM3 (ref 1–4.8)
MCH RBC QN AUTO: 31.9 PG (ref 26–33)
MCHC RBC AUTO-ENTMCNC: 34.6 GM/DL (ref 32.2–35.5)
MCV RBC AUTO: 92.3 FL (ref 80–94)
MONOCYTES # BLD: 8.4 %
MONOCYTES ABSOLUTE: 0.8 THOU/MM3 (ref 0.4–1.3)
NUCLEATED RED BLOOD CELLS: 0 /100 WBC
OPIATES, URINE: NEGATIVE
OSMOLALITY CALCULATION: 298.3 MOSMOL/KG (ref 275–300)
OXYCODONE: NEGATIVE
PHENCYCLIDINE QUANTITATIVE URINE: NEGATIVE
PLATELET # BLD: 401 THOU/MM3 (ref 130–400)
PMV BLD AUTO: 9.7 FL (ref 9.4–12.4)
POTASSIUM SERPL-SCNC: 4.5 MEQ/L (ref 3.5–5.2)
RBC # BLD: 5.33 MILL/MM3 (ref 4.7–6.1)
SALICYLATE, SERUM: < 0.3 MG/DL (ref 2–10)
SEG NEUTROPHILS: 48.9 %
SEGMENTED NEUTROPHILS ABSOLUTE COUNT: 4.7 THOU/MM3 (ref 1.8–7.7)
SODIUM BLD-SCNC: 147 MEQ/L (ref 135–145)
T4 FREE: 1.21 NG/DL (ref 0.93–1.76)
TOTAL PROTEIN: 7.3 G/DL (ref 6.1–8)
TSH SERPL DL<=0.05 MIU/L-ACNC: 0.25 UIU/ML (ref 0.4–4.2)
WBC # BLD: 9.6 THOU/MM3 (ref 4.8–10.8)

## 2020-11-07 PROCEDURE — 99285 EMERGENCY DEPT VISIT HI MDM: CPT

## 2020-11-07 PROCEDURE — 96361 HYDRATE IV INFUSION ADD-ON: CPT

## 2020-11-07 PROCEDURE — 84443 ASSAY THYROID STIM HORMONE: CPT

## 2020-11-07 PROCEDURE — 80053 COMPREHEN METABOLIC PANEL: CPT

## 2020-11-07 PROCEDURE — 6370000000 HC RX 637 (ALT 250 FOR IP): Performed by: NURSE PRACTITIONER

## 2020-11-07 PROCEDURE — G0480 DRUG TEST DEF 1-7 CLASSES: HCPCS

## 2020-11-07 PROCEDURE — 96374 THER/PROPH/DIAG INJ IV PUSH: CPT

## 2020-11-07 PROCEDURE — 84439 ASSAY OF FREE THYROXINE: CPT

## 2020-11-07 PROCEDURE — 85025 COMPLETE CBC W/AUTO DIFF WBC: CPT

## 2020-11-07 PROCEDURE — 80307 DRUG TEST PRSMV CHEM ANLYZR: CPT

## 2020-11-07 PROCEDURE — 82948 REAGENT STRIP/BLOOD GLUCOSE: CPT

## 2020-11-07 PROCEDURE — 96372 THER/PROPH/DIAG INJ SC/IM: CPT

## 2020-11-07 PROCEDURE — 36415 COLL VENOUS BLD VENIPUNCTURE: CPT

## 2020-11-07 RX ORDER — INSULIN GLARGINE 100 [IU]/ML
25 INJECTION, SOLUTION SUBCUTANEOUS NIGHTLY
Status: DISCONTINUED | OUTPATIENT
Start: 2020-11-07 | End: 2020-11-08 | Stop reason: HOSPADM

## 2020-11-07 RX ADMIN — INSULIN GLARGINE 25 UNITS: 100 INJECTION, SOLUTION SUBCUTANEOUS at 22:53

## 2020-11-07 ASSESSMENT — ENCOUNTER SYMPTOMS
SHORTNESS OF BREATH: 0
NAUSEA: 0
ABDOMINAL PAIN: 0
BACK PAIN: 0
VOMITING: 0
SORE THROAT: 0
RHINORRHEA: 0
PHOTOPHOBIA: 0
COUGH: 0
DIARRHEA: 0

## 2020-11-07 ASSESSMENT — SLEEP AND FATIGUE QUESTIONNAIRES: DO YOU HAVE DIFFICULTY SLEEPING: COMMENT

## 2020-11-07 NOTE — ED NOTES
Pt presents to ED for suicidal thoughts without a plan today. Pt states history of two suicidal attempts in the past with none today. Pt states he consumed 1/5 vodka today with no drug use. Pt states no homicidal thoughts at this time. Pt states father committed suicide when he was 10 and he is currently in a custody de la cruz for 4 children, and stress had led him to have suicidal thoughts. Pt does not appear to be in acute distress at this time. Pt calm and cooperative at this time.        [facility]  11/07/20 6769

## 2020-11-07 NOTE — PROGRESS NOTES
Provisional Diagnosis:   Bipolar affective disorder, currently depressed, moderate (HCC)      Risk, Psychosocial and Contextual Factors: patient reports he is fighting for custody of his four kids and his father committed suicide on his birthday when he was 10years old. Current MH Treatment: [facility]- per report patient sees Alexander Quinn. Patient saw Dr. Iliana Rosa on 11/05/20 for an office visit. Present Suicidal Behavior:      Verbal: yes, no plan but has intent        Attempt: denied    Access to Weapons:  Denied    Current Suicide Risk: Low, Moderate or High:  High      Past Suicidal Behavior:       Verbal: Yes    Attempt: Yes, two lifetime suicide attempts    Self-Injurious/Self-Mutilation: Denies    Traumatic Event Within Past 2 Weeks:   Yes    Current Abuse: Denies    Legal: DUI in 2017 per history on Epic    Violence: None noted    Protective Factors:  Linked to services. Housing:    Patient lives alone    CPAP/Oxygen/Ambulation Difficulties: Denies    Basic Vital Signs Normal?: Check with Patients Nurse prior to [address]?: Check with Patients Nurse prior to Calling Psychiatry    Clinical Summary:      Patient is a 38year old male who presents to the ED voluntarily. Patient was last admitted to  from 10/20/20-10/23/20 by Dr. Som Tineo from inpatient unit. Spoke with patient who reports suicidal ideation for the past two days. Pt denies a plan but reports intent currently. Pt repots two significant stressors as detailed above. Patient reports consuming alcohol and drinking 1/5th of vodka today. Pt typically is admitted medically due to his alcohol use after reviewing records. Homicidal thoughts and/or plans denied. No delusions noted. Hallucinations denied. Pt was voluntary and cooperative with the interview. Level of Care Disposition:    Patient is not medically cleared. Handover to Clinician.

## 2020-11-07 NOTE — ED PROVIDER NOTES
[facility] EMERGENCY DEPT      CHIEF COMPLAINT       Chief Complaint   Patient presents with    Suicidal       Nurses Notes reviewed and I agree except as noted in the HPI. HISTORY OF PRESENT ILLNESS    Mihai Rowan is a 38 y.o. male who presents for suicidal ideation. Patient informs me he is an alcoholic and depressed. He was put on Vivitrol and had not drink alcohol for a week. However today he became depressed and drank 1/5 of vodka. He does not have a plan but does have a history of prior attempts. He is on multiple other medications that he feels makes his depression worse. He endorses night sweats and hallucinations at night when he \"wakes up and randomly. \"  Patient denies auditory hallucinations chest pain, abdominal pain, or other complaints. He smokes cigarettes but denies drug use. REVIEW OF SYSTEMS     Review of Systems   Constitutional: Positive for diaphoresis. Negative for appetite change, chills and fever. HENT: Negative for congestion, ear pain, rhinorrhea and sore throat. Eyes: Negative for photophobia. Respiratory: Negative for cough and shortness of breath. Cardiovascular: Negative for chest pain. Gastrointestinal: Negative for abdominal pain, diarrhea, nausea and vomiting. Endocrine: Negative for polyuria. Genitourinary: Negative for dysuria and frequency. Musculoskeletal: Negative for back pain and gait problem. Skin: Negative for rash. Neurological: Negative for dizziness, weakness and numbness. Psychiatric/Behavioral: Positive for dysphoric mood, hallucinations and suicidal ideas. Negative for confusion and sleep disturbance.         PAST MEDICAL HISTORY    has a past medical history of Asthma, COPD (chronic obstructive pulmonary disease) ([facility] [address] [address] Eczema, GERD (gastroesophageal reflux disease), History of alcohol abuse, History of marijuana use, History of tobacco abuse, Hx of seasonal allergies, Pancreatitis, Seizures ([address] and Type 2 diabetes mellitus without complication ([facility] [address] SURGICAL HISTORY      has a past surgical history that includes Upper gastrointestinal endoscopy (Left, 5/6/2019); Eye surgery (Right, 09/2019); fracture surgery (Right, 2019); and Ankle arthroscopy (Right, 8/5/2020). CURRENT MEDICATIONS       Previous Medications    ACETAMINOPHEN (TYLENOL) 500 MG TABLET    Take 1,000 mg by mouth every 6 hours as needed for Pain    ALBUTEROL SULFATE HFA (PROVENTIL HFA) 108 (90 BASE) MCG/ACT INHALER    Inhale 2 puffs into the lungs every 6 hours as needed for Wheezing    ASPIRIN 81 MG EC TABLET    Take 1 tablet by mouth daily    FLUTICASONE-SALMETEROL (ADVAIR) 250-50 MCG/DOSE AEPB    Inhale 1 puff into the lungs every 12 hours    FOLIC ACID (FOLVITE) 1 MG TABLET    Take 1 tablet by mouth daily    HYDROCORTISONE 2.5 % CREAM    Apply topically 2 times daily.     HYDROXYZINE (VISTARIL) 100 MG CAPSULE    Take 1 capsule by mouth 4 times daily as needed for Anxiety    INSULIN ASPART (NOVOLOG FLEXPEN) 100 UNIT/ML INJECTION PEN    Inject 2-12 units SQ TID with meals as directed per sliding scale, max dose 36 units/day    INSULIN GLARGINE (LANTUS SOLOSTAR) 100 UNIT/ML INJECTION PEN    Inject 25 Units into the skin daily    INSULIN PEN NEEDLE 30G X 8 MM MISC    1 each by Does not apply route 3 times daily    MULTIPLE VITAMINS-MINERALS (MENS MULTIVITAMIN PLUS) TABS    Take 1 tablet by mouth daily    NALTREXONE (DEPADE) 50 MG TABLET    Take 1 tablet by mouth daily for 7 days    NICOTINE (NICOTROL) 10 MG INHALER    Inhale 1 puff into the lungs as needed for Smoking cessation    ONDANSETRON (ZOFRAN) 4 MG TABLET    Take 1 tablet by mouth 3 times daily as needed for Nausea or Vomiting    PROPRANOLOL (INDERAL LA) 60 MG EXTENDED RELEASE CAPSULE    Take 1 capsule by mouth daily    QUETIAPINE (SEROQUEL) 25 MG TABLET    Take 1 tablet by mouth 2 times daily    TRAZODONE (DESYREL) 50 MG TABLET    Take 1 tablet by mouth nightly as needed for Sleep    VENLAFAXINE (EFFEXOR XR) 37.5 MG EXTENDED RELEASE CAPSULE    Take 1 capsule by mouth daily (with breakfast)    VITAMIN B-1 100 MG TABLET    Take 1 tablet by mouth daily       ALLERGIES     is allergic to paxil [paroxetine]. FAMILY HISTORY     He indicated that his mother is alive. He indicated that his father is . He indicated that the status of his sister is unknown.   family history includes Other in his mother and sister; Other (age of onset: 39) in his father. SOCIAL HISTORY    reports that he has been smoking cigarettes. He has a 7.50 pack-year smoking history. He has never used smokeless tobacco. He reports current alcohol use. He reports previous drug use. Drug: Marijuana. PHYSICAL EXAM     INITIAL VITALS:  height is 5' 2\" (1.575 m) and weight is 170 lb (77.1 kg). His oral temperature is 98.3 °F (36.8 °C). His blood pressure is 142/82 (abnormal) and his pulse is 119. His respiration is 18 and oxygen saturation is 96%. Physical Exam  Vitals signs and nursing note reviewed. Constitutional:       General: He is not in acute distress. Appearance: He is well-developed. He is not toxic-appearing or diaphoretic. HENT:      Head: Normocephalic and atraumatic. Right Ear: Hearing normal.      Left Ear: Hearing normal.      Nose: Nose normal. No rhinorrhea. Mouth/Throat:      Pharynx: Uvula midline. No oropharyngeal exudate. Eyes:      General: Lids are normal. No scleral icterus. Conjunctiva/sclera: Conjunctivae normal.      Pupils: Pupils are equal, round, and reactive to light. Neck:      Musculoskeletal: Normal range of motion and neck supple. No neck rigidity. Trachea: No tracheal deviation. Cardiovascular:      Rate and Rhythm: Normal rate and regular rhythm. Heart sounds: Normal heart sounds. No murmur. Pulmonary:      Effort: Pulmonary effort is normal. No respiratory distress. Breath sounds: Normal breath sounds. No stridor.  No decreased breath sounds or wheezing. Abdominal:      General: There is no distension. Palpations: Abdomen is soft. Abdomen is not rigid. Tenderness: There is no abdominal tenderness. There is no guarding. Musculoskeletal: Normal range of motion. Lymphadenopathy:      Cervical: No cervical adenopathy. Skin:     General: Skin is warm and dry. Coloration: Skin is not pale. Findings: No rash. Neurological:      Mental Status: He is alert and oriented to person, place, and time. GCS: GCS eye subscore is 4. GCS verbal subscore is 5. GCS motor subscore is 6. Gait: Gait normal.      Comments: No gross deficits observed   Psychiatric:         Mood and Affect: Mood is depressed. Speech: Speech normal.         Behavior: Behavior normal.         Thought Content: Thought content includes suicidal ideation. DIFFERENTIAL DIAGNOSIS:   Including but not limited to: Depression, suicidal ideation, adverse drug reaction to medication, schizophrenia    DIAGNOSTIC RESULTS     EKG: All E[facility]'s are [facility] who either signs or Co-signs this chart in the absence of a cardiologist.  None    RADIOLOGY: non-plain film images(s) such as CT,Ultrasound and MRI are read by the radiologist.  Plain radiographic images are visualized and preliminarily interpreted by the emergency physician unless otherwise stated below. No orders to display       LABS:   Labs Reviewed   CBC WITH AUTO DIFFERENTIAL   COMPREHENSIVE METABOLIC PANEL   ACETAMINOPHEN LEVEL   SALICYLATE LEVEL   ETHANOL   URINE DRUG SCREEN   TSH WITH REFLEX       EMERGENCY DEPARTMENT COURSE:   Vitals:    Vitals:    11/07/20 1809   BP: (!) 142/82   Pulse: 119   Resp: 18   Temp: 98.3 °F (36.8 °C)   TempSrc: Oral   SpO2: 96%   Weight: 170 lb (77.1 kg)   Height: 5' 2\" (1.575 m)       MDM:  The patient initially was seen by me in the emergency department for alcohol intoxication, depression, and suicidal ideation.   Physical exam revealed a depressed 78-year-old gentleman with a soft and nontender abdomen. Vital signs reviewed and noted stable. He was found to be tachycardic however when I was in the room it was already improved to 100 bpm.  Old records were reviewed. Appropriate labs were ordered. Patient was turned over at change of shift to [employer] NP for interpretation of results and final disposition. CRITICAL CARE:   None    CONSULTS:  None    PROCEDURES:  None    FINAL IMPRESSION      1. Depression with suicidal ideation    2. Acute alcoholic intoxication without complication ([facility])          DISPOSITION/PLAN     1. Depression with suicidal ideation    2.  Acute alcoholic intoxication without complication ([facility]    Disposition pending    (Please note that portions of this note were completed with a voice recognition program.  Efforts were made to edit the dictations but occasionally words are mis-transcribed.)    Eb Bautista PA-C 11/07/20 6:57 PM    NAA Reyes PA-C  11/07/20 Jean Mccollum PA-C  11/08/20 [id number]

## 2020-11-08 VITALS
TEMPERATURE: 98.3 F | BODY MASS INDEX: 31.28 KG/M2 | WEIGHT: 170 LBS | HEIGHT: 62 IN | SYSTOLIC BLOOD PRESSURE: 136 MMHG | RESPIRATION RATE: 18 BRPM | HEART RATE: 91 BPM | DIASTOLIC BLOOD PRESSURE: 87 MMHG | OXYGEN SATURATION: 97 %

## 2020-11-08 LAB — ETHYL ALCOHOL, SERUM: < 0.01 %

## 2020-11-08 PROCEDURE — 36415 COLL VENOUS BLD VENIPUNCTURE: CPT

## 2020-11-08 PROCEDURE — 6360000002 HC RX W HCPCS

## 2020-11-08 PROCEDURE — 2580000003 HC RX 258: Performed by: EMERGENCY MEDICINE

## 2020-11-08 PROCEDURE — G0480 DRUG TEST DEF 1-7 CLASSES: HCPCS

## 2020-11-08 RX ORDER — ONDANSETRON 2 MG/ML
INJECTION INTRAMUSCULAR; INTRAVENOUS
Status: COMPLETED
Start: 2020-11-08 | End: 2020-11-08

## 2020-11-08 RX ORDER — 0.9 % SODIUM CHLORIDE 0.9 %
500 INTRAVENOUS SOLUTION INTRAVENOUS ONCE
Status: COMPLETED | OUTPATIENT
Start: 2020-11-08 | End: 2020-11-08

## 2020-11-08 RX ORDER — ONDANSETRON 2 MG/ML
4 INJECTION INTRAMUSCULAR; INTRAVENOUS ONCE
Status: COMPLETED | OUTPATIENT
Start: 2020-11-08 | End: 2020-11-08

## 2020-11-08 RX ADMIN — ONDANSETRON 4 MG: 2 INJECTION INTRAMUSCULAR; INTRAVENOUS at 05:13

## 2020-11-08 RX ADMIN — ONDANSETRON 4 MG: 2 INJECTION INTRAMUSCULAR; INTRAVENOUS at 10:43

## 2020-11-08 RX ADMIN — SODIUM CHLORIDE 500 ML: 9 INJECTION, SOLUTION INTRAVENOUS at 10:42

## 2020-11-08 NOTE — ED PROVIDER NOTES
[facility]  eMERGENCY dEPARTMENT eNCOUnter     Pt Name: Oz Pérez  MRN: [id number]  Domingagfjl 1985  Date of evaluation: 11/8/20        Mid-level provider Note:    I have personally performed and/or participated in the history, exam and medical decision making and agree with all pertinent clinical information as noted by the previous provider. I have also reviewed and agree with the past medical, family and social history unless otherwise noted. I have personally performed a face to face diagnostic evaluation on this patient. I have reviewed the previous provider's findings and agree. Evaluation: Patient was signed out to me via Keena Gotti PA-C awaiting medical clearance, patient suicidal.  Patient's alcohol level is 0.37, will not be cleared until tomorrow morning. Discussed this with the patient he is amenable with metabolizing alcohol until discharge. He continues to note that he suicidal.  He requests his insulin glargine while here in the emergency department. This was provided. Unfortunately my shift ended and the patient was signed out to Dr. Kathleen Venegas. Please see his note for final impression and disposition and plan. 1. Depression with suicidal ideation    2. Acute alcoholic intoxication without complication ([facility] [address]          DISPOSITION/PLAN  PATIENT REFERRED TO:  No follow-up provider specified.   DISCHARGE MEDICATIONS:  New Prescriptions    No medications on file         [employer] [address], APRN - CNP  11/08/20 3815

## 2020-11-08 NOTE — ED NOTES
Pt appears to be resting comfortably on cot. Pt respirations easy and unlabored.       Latoya Fajardo RN  11/08/20 8122

## 2020-11-08 NOTE — ED NOTES
ED nurse-to-nurse bedside report    Chief Complaint   Patient presents with    Alcohol Intoxication    Depression      LOC: alert and orientated to name, place, date  Vital signs   Vitals:    11/07/20 1924 11/07/20 2301 11/08/20 0313 11/08/20 0514   BP: 129/82 95/66 116/82 122/84   Pulse: 97 102 88 89   Resp: 17 17 17 17   Temp:       TempSrc:       SpO2: 95% 98% 95% 97%   Weight:       Height:          Pain:    Pain Interventions: none  Pain Goal: 0  Oxygen: No    Current needs required room air   Telemetry: Yes  LDAs:    Continuous Infusions:   Mobility: Independent  [place] Fall Risk Score:    Fall Risk 1/8/2020   2 or more falls in past year? no   Fall with injury in past year? no     Fall Interventions: call light in reach  Report given to: Sharita Robles RN  11/08/20 9491

## 2020-11-08 NOTE — ED PROVIDER NOTES
I have seen and examined the patient myself and I have reviewed the [facility]'s chart. Please refer to [facility] provider's chart for details. I agree with [facility]'s assessment and plan. CHIEF COMPLAINTS, HISTORY OF ILLNESS, EVALUATION and MDM    This patient is a 38 y.o.male who presents to [facility] ED complaining of   Chief Complaint   Patient presents with    Alcohol Intoxication    Depression     He presents to ED with SI and ETOH intoxication. ETOH level at 7 PM was 0.35. Pending his sobriety and CAROLINE evaluation.    ETOH recheck at 9:00 AM.    Signed to Dr. Desi Green at 7:00 AM.     Chriss Bañuelos  Vitals:    11/07/20 1809 11/07/20 1924 11/07/20 2301 11/08/20 0313   BP: (!) 142/82 129/82 95/66 116/82   Pulse: 119 97 102 88   Resp: 18 17 17 17   Temp: 98.3 °F (36.8 °C)      TempSrc: Oral      SpO2: 96% 95% 98% 95%   Weight: 170 lb (77.1 kg)      Height: 5' 2\" (1.575 m)          LABS  Results for orders placed or performed during the hospital encounter of 11/07/20   CBC auto differential   Result Value Ref Range    WBC 9.6 4.8 - 10.8 thou/mm3    RBC 5.33 4.70 - 6.10 mill/mm3    Hemoglobin 17.0 14.0 - 18.0 gm/dl    Hematocrit 49.2 42.0 - 52.0 %    MCV 92.3 80.0 - 94.0 fL    MCH 31.9 26.0 - 33.0 pg    MCHC 34.6 32.2 - 35.5 gm/dl    RDW-CV 13.7 11.5 - 14.5 %    RDW-SD 46.7 (H) 35.0 - 45.0 fL    Platelets 015 (H) 205 - 400 thou/mm3    MPV 9.7 9.4 - 12.4 fL    Seg Neutrophils 48.9 %    Lymphocytes 36.8 %    Monocytes 8.4 %    Eosinophils 3.5 %    Basophils 1.1 %    Immature Granulocytes 1.3 %    Segs Absolute 4.7 1.8 - 7.7 thou/mm3    Lymphocytes Absolute 3.5 1.0 - 4.8 thou/mm3    Monocytes Absolute 0.8 0.4 - 1.3 thou/mm3    Eosinophils Absolute 0.3 0.0 - 0.4 thou/mm3    Basophils Absolute 0.1 0.0 - 0.1 thou/mm3    Immature Grans (Abs) 0.12 (H) 0.00 - 0.07 thou/mm3    nRBC 0 /100 wbc   Comprehensive Metabolic Panel   Result Value Ref Range    Glucose 235 (H) 70 - 108 mg/dL    CREATININE 0.9 0.4 - 1.2 mg/dL    BUN 8 7 - 22 mg/dL Sodium 147 (H) 135 - 145 meq/L    Potassium 4.5 3.5 - 5.2 meq/L    Chloride 107 98 - 111 meq/L    CO2 22 (L) 23 - 33 meq/L    Calcium 9.1 8.5 - 10.5 mg/dL    AST 36 5 - 40 U/L    Alkaline Phosphatase 147 (H) 38 - 126 U/L    Total Protein 7.3 6.1 - 8.0 g/dL    Alb 4.6 3.5 - 5.1 g/dL    Total Bilirubin <0.2 (L) 0.3 - 1.2 mg/dL    ALT 41 11 - 66 U/L   Acetaminophen level   Result Value Ref Range    Acetaminophen Level < 5.0 0.0 - 98.7 ug/mL   Salicylate Level   Result Value Ref Range    Salicylate, Serum < 0.3 (L) 2.0 - 10.0 mg/dL   Ethanol   Result Value Ref Range    ETHYL ALCOHOL, SERUM 0.37 0.00 %   Urine Drug Screen   Result Value Ref Range    AMPHETAMINE+METHAMPHETAMINE URINE SCREEN Negative NEGATIVE    Barbiturate Quant, Ur POSITIVE NEGATIVE    Benzodiazepine Quant, Ur Negative NEGATIVE    Cannabinoid Quant, Ur Negative NEGATIVE    Cocaine Metab Quant, Ur Negative NEGATIVE    Opiates, Urine Negative NEGATIVE    Oxycodone Negative NEGATIVE    PCP Quant, Ur Negative NEGATIVE   TSH with Reflex   Result Value Ref Range    TSH 0.251 (L) 0.400 - 4.200 uIU/mL   T4, Free   Result Value Ref Range    T4 Free 1.21 0.93 - 1.76 ng/dL   Anion Gap   Result Value Ref Range    Anion Gap 18.0 (H) 8.0 - 16.0 meq/L   Glomerular Filtration Rate, Estimated   Result Value Ref Range    Est, Glom Filt Rate >90 ml/min/1.73m2   Osmolality   Result Value Ref Range    Osmolality Calc 298.3 275.0 - 300.0 mOsmol/kg   POCT Glucose   Result Value Ref Range    POC Glucose 153 (H) 70 - 108 mg/dl       IMAGING STUDIES  No orders to display       ED MEDICATIONS  Medications   insulin glargine (LANTUS) injection vial 25 Units (25 Units Subcutaneous Given 11/7/20 5660)   ondansetron (ZOFRAN) 4 MG/2ML injection (has no administration in time range)   ondansetron (ZOFRAN) injection 4 mg (has no administration in time range)       IMPRESSION  1. Depression with suicidal ideation    2.  Acute alcoholic intoxication without complication ([facility] [address] DISPOSITION AND PLAN  Signed out.      Guillermo Kebede M.D.        Guillermo Kebede MD  11/08/20 7623

## 2020-11-08 NOTE — ED NOTES
Pt updated on POC. Pt VSS. Pt denies needs.  Call light in reach, will continue to monitor     Murray Connelly RN  11/07/20 7647

## 2020-11-08 NOTE — ED NOTES
Bedside report received from Yani Lacey. Pt is A&Ox4 at this time. Pt respirations easy and unlabored. Call light in reach, will continue to monitor.       Shira Fournier RN  11/07/20 5397

## 2020-11-08 NOTE — ED NOTES
Upon first contact with patient this RN receives bedside shift report from [facility]. Pt resting in bed with eyes closed, respirations easy and unlabored.         Serena Martinez RN  11/08/20 9161

## 2020-11-08 NOTE — ED NOTES
Pt provides urine sample at this time, pt states no further needs. Call light left within reach, constant observer Tho Reinoso observing pt upon this nurse departure. No distress noted.        Lizeth, On license of [facility]  11/07/20 2031

## 2020-11-08 NOTE — PROGRESS NOTES
BAL Re-Assess:   Status & Exam & Behavior Support Service Tabs      Present Suicidal Behavior:      Verbal: Denies    Plan:Denies    Current Suicide Risk: Low, Moderate or High: Low    Present Homicidal Behavior:    Verbal:Denies    Plan:Denies      Psychosis:    Hallucinations:Denies    Delusions: Denies      Clinical Re-Assessment Summary including Current Mental State of Patient: Pt denies any currently suicidal ideations and reports he does not remember any suicidal statements. Pt reports he is connected with Dr. Erin Juares and has been prescribed and taking Vivatrol in pill form and didn't take it yesterday. Pt reports he was suppose to report to Dr. Jose Bai on Thursday for a Vivatrol shot but didn't present. Pt reports he wants to go to the CSU to for a couple of days to get himself right again. Updated Level of Care Disposition:   Clinician consulted with Dr. Ni Irwin and pt to be discharged.

## 2020-11-08 NOTE — ED NOTES
Pt vomited 1x. Provider notified and pt mediated per [facility]. Pt updated on POC. Call light in reach, will continue to monitor.       Concepcion Schaeffer RN  11/08/20 8639

## 2020-11-08 NOTE — ED NOTES
Pt states that he is no longer suicidal, he states that he doesn't remember saying that he was suicidal, he denies any and all suicidal and self harm thoughts. He is calm and cooperative.       Cindi Ruby RN  11/08/20 2378

## 2020-11-08 NOTE — ED NOTES
Pt resting on cot, pt respirations easy and unlabored. Pt denies needs.  Will continue to monitor     Jeferson Carrillo RN  11/08/20 3616

## 2020-11-12 ENCOUNTER — OFFICE VISIT (OUTPATIENT)
Dept: INTERNAL MEDICINE CLINIC | Age: 35
End: 2020-11-12
Payer: COMMERCIAL

## 2020-11-12 VITALS
RESPIRATION RATE: 12 BRPM | HEART RATE: 73 BPM | WEIGHT: 177.4 LBS | DIASTOLIC BLOOD PRESSURE: 79 MMHG | HEIGHT: 62 IN | SYSTOLIC BLOOD PRESSURE: 143 MMHG | TEMPERATURE: 97.2 F | BODY MASS INDEX: 32.65 KG/M2

## 2020-11-12 PROCEDURE — 4004F PT TOBACCO SCREEN RCVD TLK: CPT | Performed by: INTERNAL MEDICINE

## 2020-11-12 PROCEDURE — 80305 DRUG TEST PRSMV DIR OPT OBS: CPT | Performed by: INTERNAL MEDICINE

## 2020-11-12 PROCEDURE — G8427 DOCREV CUR MEDS BY ELIG CLIN: HCPCS | Performed by: INTERNAL MEDICINE

## 2020-11-12 PROCEDURE — 99213 OFFICE O/P EST LOW 20 MIN: CPT | Performed by: INTERNAL MEDICINE

## 2020-11-12 PROCEDURE — G8484 FLU IMMUNIZE NO ADMIN: HCPCS | Performed by: INTERNAL MEDICINE

## 2020-11-12 PROCEDURE — 1111F DSCHRG MED/CURRENT MED MERGE: CPT | Performed by: INTERNAL MEDICINE

## 2020-11-12 PROCEDURE — G8417 CALC BMI ABV UP PARAM F/U: HCPCS | Performed by: INTERNAL MEDICINE

## 2020-11-12 RX ORDER — PROPRANOLOL HYDROCHLORIDE 80 MG/1
80 CAPSULE, EXTENDED RELEASE ORAL DAILY
Qty: 30 CAPSULE | Refills: 2 | Status: SHIPPED | OUTPATIENT
Start: 2020-11-12 | End: 2020-12-28 | Stop reason: ALTCHOICE

## 2020-11-12 RX ORDER — NALTREXONE HYDROCHLORIDE 50 MG/1
50 TABLET, FILM COATED ORAL DAILY
Qty: 14 TABLET | Refills: 0 | Status: CANCELLED | OUTPATIENT
Start: 2020-11-12 | End: 2020-11-26

## 2020-11-12 NOTE — PROGRESS NOTES
Verbal order per Dr. Bakari Robbins for urine drug screen. Negative for all drugs. Verified results with Alize Robbins ordered Vivitrol 380mg IM injection into R buttocks for patient. Verified dose with patient. Patient was sent home with no meds and will be seen back in the office 12/10/20.

## 2020-11-12 NOTE — PROGRESS NOTES
MEDICATION ASSISTED TREATMENT ENCOUNTER    HISTORY OF PRESENT ILLNESS  Patient presents for evaluation of alcohol use and would like to be placed on medication assisted treatment  Patient is referred by Dr. Imelda Uamna from the hospital  I saw him for the first time 11/5  Patient was at [facility] from 10/16 through 10/23  He had gone in for alcohol detox as well as suicidal thoughts   I had put him on naltrexone he was doing well until Saturday when he had a drink  He actually had quite a few went to [facility] ER  From there he went to the CSU at [facility] and was discharged yesterday  He has not had a drink since  He had skipped 1 dose of naltrexone now he is back on it  ROS     General: Again no urges or cravings to drink    PHYSICAL EXAM     Blood pressure (!) 143/79, pulse 73, temperature 97.2 °F (36.2 °C), temperature source Temporal, resp. rate 12, height 5' 2\" (1.575 m), weight 177 lb 6.4 oz (80.5 kg).              General: Patient resting comfortably in no acute distress     Mental Status Examination:  Level of consciousness:  within normal limits and awake  Appearance:  well-appearing, in chair, good grooming and good hygiene  Behavior/Motor:  {Normal  Attitude toward examiner:  cooperative and attentive  Speech:  spontaneous and normal volume  Mood: normal  Affect:  appropriate  Thought processes:  linear  Thought content:  Denies homicidal ideations  Suicidal Ideation:  denies suicidal ideation  Delusions:  no evidence of delusions  Perceptual Disturbance:  denies any perceptual disturbance  Cognition:  oriented to person, place, and time    Insight : fair  Judgment: oor  Medication Side Effects:none       Eyes: Pupils are normal         Skin: No rashes, lesions or abnormalities noted        URINE DRUG SCREEN TODAY:  Recent Labs     11/12/20  1036   ALCOHOL NEG   LABAMPH NEG   LABBARB NEG   LABBENZ NEG   BUPRENUR NEG   COCAIMETSCRU NEG FENTSCRUR NEG   GABAPENTIN N/A   MDMA NEG   METAMPU NEG   LABMETH NEG   OPIATESCREENURINE NEG   OXTCOSU NEG   PHENCYCLIDINESCREENURINE N/A   PROPOXYPHENE NEG   SPICEUR NEG   THCSCREENUR NEG   TRAMADOLUR NEG   TRICYUR N/A           Diagnosis Orders   1. Severe alcohol use disorder (HCC)  POCT Rapid Drug Screen   2.  Bipolar affective disorder, currently depressed, moderate ([facility] [address]           PLAN:  Urine has no  alcohol       Patient goes to [facility] he also has a counselor at [facility]    Patient was given 380 mg IM naltrexone without complication  I will see him back in 4 weeks

## 2020-11-12 NOTE — PROGRESS NOTES
Per Verbal order of  for urine drug screen. Patient is positive for  patient is neg for everything   . Verified with results with Romi Jacob LPN.

## 2020-11-15 ENCOUNTER — HOSPITAL ENCOUNTER (INPATIENT)
Age: 35
LOS: 3 days | Discharge: HOME | End: 2020-11-18
Attending: INTERNAL MEDICINE | Admitting: INTERNAL MEDICINE
Payer: COMMERCIAL

## 2020-11-15 PROBLEM — F10.20 ALCOHOL USE DISORDER, SEVERE, DEPENDENCE (HCC): Status: ACTIVE | Noted: 2020-11-15

## 2020-11-15 LAB
ALBUMIN SERPL-MCNC: 4.6 G/DL (ref 3.5–5.1)
ALP BLD-CCNC: 120 U/L (ref 38–126)
ALT SERPL-CCNC: 33 U/L (ref 11–66)
AMPHETAMINE+METHAMPHETAMINE URINE SCREEN: NEGATIVE
ANION GAP SERPL CALCULATED.3IONS-SCNC: 19 MEQ/L (ref 8–16)
AST SERPL-CCNC: 24 U/L (ref 5–40)
BACTERIA: ABNORMAL /HPF
BARBITURATE QUANTITATIVE URINE: POSITIVE
BASOPHILS # BLD: 0.6 %
BASOPHILS ABSOLUTE: 0.1 THOU/MM3 (ref 0–0.1)
BENZODIAZEPINE QUANTITATIVE URINE: NEGATIVE
BILIRUB SERPL-MCNC: 0.4 MG/DL (ref 0.3–1.2)
BILIRUBIN URINE: NEGATIVE
BLOOD, URINE: NEGATIVE
BUN BLDV-MCNC: 9 MG/DL (ref 7–22)
CALCIUM SERPL-MCNC: 9.9 MG/DL (ref 8.5–10.5)
CANNABINOID QUANTITATIVE URINE: NEGATIVE
CASTS 2: ABNORMAL /LPF
CASTS UA: ABNORMAL /LPF
CHARACTER, URINE: ABNORMAL
CHLORIDE BLD-SCNC: 101 MEQ/L (ref 98–111)
CO2: 24 MEQ/L (ref 23–33)
COCAINE METABOLITE QUANTITATIVE URINE: NEGATIVE
COLOR: YELLOW
CREAT SERPL-MCNC: 0.7 MG/DL (ref 0.4–1.2)
CRYSTALS, UA: ABNORMAL
EOSINOPHIL # BLD: 0.4 %
EOSINOPHILS ABSOLUTE: 0.1 THOU/MM3 (ref 0–0.4)
EPITHELIAL CELLS, UA: ABNORMAL /HPF
ERYTHROCYTE [DISTWIDTH] IN BLOOD BY AUTOMATED COUNT: 13.3 % (ref 11.5–14.5)
ERYTHROCYTE [DISTWIDTH] IN BLOOD BY AUTOMATED COUNT: 45.1 FL (ref 35–45)
ETHYL ALCOHOL, SERUM: < 0.01 %
FOLATE: 15.7 NG/ML (ref 4.8–24.2)
GFR SERPL CREATININE-BSD FRML MDRD: > 90 ML/MIN/1.73M2
GLUCOSE BLD-MCNC: 141 MG/DL (ref 70–108)
GLUCOSE BLD-MCNC: 142 MG/DL (ref 70–108)
GLUCOSE BLD-MCNC: 168 MG/DL (ref 70–108)
GLUCOSE URINE: 250 MG/DL
HCT VFR BLD CALC: 45.7 % (ref 42–52)
HEMOGLOBIN: 15.6 GM/DL (ref 14–18)
IMMATURE GRANS (ABS): 0.06 THOU/MM3 (ref 0–0.07)
IMMATURE GRANULOCYTES: 0.4 %
KETONES, URINE: ABNORMAL
LEUKOCYTE ESTERASE, URINE: NEGATIVE
LIPASE: 10.8 U/L (ref 5.6–51.3)
LYMPHOCYTES # BLD: 10.8 %
LYMPHOCYTES ABSOLUTE: 1.7 THOU/MM3 (ref 1–4.8)
MAGNESIUM: 1.4 MG/DL (ref 1.6–2.4)
MCH RBC QN AUTO: 31.3 PG (ref 26–33)
MCHC RBC AUTO-ENTMCNC: 34.1 GM/DL (ref 32.2–35.5)
MCV RBC AUTO: 91.6 FL (ref 80–94)
MISCELLANEOUS 2: ABNORMAL
MONOCYTES # BLD: 10.1 %
MONOCYTES ABSOLUTE: 1.6 THOU/MM3 (ref 0.4–1.3)
NITRITE, URINE: NEGATIVE
NUCLEATED RED BLOOD CELLS: 0 /100 WBC
OPIATES, URINE: NEGATIVE
OSMOLALITY CALCULATION: 287.9 MOSMOL/KG (ref 275–300)
OXYCODONE: NEGATIVE
PH UA: 5.5 (ref 5–9)
PHENCYCLIDINE QUANTITATIVE URINE: NEGATIVE
PHOSPHORUS: 2.7 MG/DL (ref 2.4–4.7)
PLATELET # BLD: 304 THOU/MM3 (ref 130–400)
PMV BLD AUTO: 10 FL (ref 9.4–12.4)
POTASSIUM REFLEX MAGNESIUM: 3.9 MEQ/L (ref 3.5–5.2)
PROTEIN UA: 30
RBC # BLD: 4.99 MILL/MM3 (ref 4.7–6.1)
RBC URINE: ABNORMAL /HPF
RENAL EPITHELIAL, UA: ABNORMAL
SEG NEUTROPHILS: 77.7 %
SEGMENTED NEUTROPHILS ABSOLUTE COUNT: 12.3 THOU/MM3 (ref 1.8–7.7)
SODIUM BLD-SCNC: 144 MEQ/L (ref 135–145)
SPECIFIC GRAVITY, URINE: 1.02 (ref 1–1.03)
TOTAL PROTEIN: 7.3 G/DL (ref 6.1–8)
UROBILINOGEN, URINE: 1 EU/DL (ref 0–1)
VITAMIN B-12: 446 PG/ML (ref 211–911)
WBC # BLD: 15.8 THOU/MM3 (ref 4.8–10.8)
WBC UA: ABNORMAL /HPF
YEAST: ABNORMAL

## 2020-11-15 PROCEDURE — 96376 TX/PRO/DX INJ SAME DRUG ADON: CPT

## 2020-11-15 PROCEDURE — 80053 COMPREHEN METABOLIC PANEL: CPT

## 2020-11-15 PROCEDURE — 6360000002 HC RX W HCPCS: Performed by: INTERNAL MEDICINE

## 2020-11-15 PROCEDURE — 2500000003 HC RX 250 WO HCPCS: Performed by: INTERNAL MEDICINE

## 2020-11-15 PROCEDURE — 6370000000 HC RX 637 (ALT 250 FOR IP): Performed by: INTERNAL MEDICINE

## 2020-11-15 PROCEDURE — 84100 ASSAY OF PHOSPHORUS: CPT

## 2020-11-15 PROCEDURE — 2140000000 HC CCU INTERMEDIATE R&B

## 2020-11-15 PROCEDURE — 99223 1ST HOSP IP/OBS HIGH 75: CPT | Performed by: INTERNAL MEDICINE

## 2020-11-15 PROCEDURE — 6360000002 HC RX W HCPCS: Performed by: PHYSICIAN ASSISTANT

## 2020-11-15 PROCEDURE — 82746 ASSAY OF FOLIC ACID SERUM: CPT

## 2020-11-15 PROCEDURE — 85025 COMPLETE CBC W/AUTO DIFF WBC: CPT

## 2020-11-15 PROCEDURE — 82948 REAGENT STRIP/BLOOD GLUCOSE: CPT

## 2020-11-15 PROCEDURE — 82607 VITAMIN B-12: CPT

## 2020-11-15 PROCEDURE — 96361 HYDRATE IV INFUSION ADD-ON: CPT

## 2020-11-15 PROCEDURE — 2580000003 HC RX 258: Performed by: INTERNAL MEDICINE

## 2020-11-15 PROCEDURE — G0480 DRUG TEST DEF 1-7 CLASSES: HCPCS

## 2020-11-15 PROCEDURE — 96375 TX/PRO/DX INJ NEW DRUG ADDON: CPT

## 2020-11-15 PROCEDURE — 99284 EMERGENCY DEPT VISIT MOD MDM: CPT

## 2020-11-15 PROCEDURE — 80307 DRUG TEST PRSMV CHEM ANLYZR: CPT

## 2020-11-15 PROCEDURE — 83735 ASSAY OF MAGNESIUM: CPT

## 2020-11-15 PROCEDURE — 83690 ASSAY OF LIPASE: CPT

## 2020-11-15 PROCEDURE — 2580000003 HC RX 258: Performed by: PHYSICIAN ASSISTANT

## 2020-11-15 PROCEDURE — 96374 THER/PROPH/DIAG INJ IV PUSH: CPT

## 2020-11-15 PROCEDURE — 36415 COLL VENOUS BLD VENIPUNCTURE: CPT

## 2020-11-15 PROCEDURE — 81001 URINALYSIS AUTO W/SCOPE: CPT

## 2020-11-15 PROCEDURE — 94761 N-INVAS EAR/PLS OXIMETRY MLT: CPT

## 2020-11-15 RX ORDER — PROMETHAZINE HYDROCHLORIDE 25 MG/1
12.5 TABLET ORAL EVERY 6 HOURS PRN
Status: DISCONTINUED | OUTPATIENT
Start: 2020-11-15 | End: 2020-11-18 | Stop reason: HOSPADM

## 2020-11-15 RX ORDER — NICOTINE 21 MG/24HR
1 PATCH, TRANSDERMAL 24 HOURS TRANSDERMAL DAILY
Status: DISCONTINUED | OUTPATIENT
Start: 2020-11-15 | End: 2020-11-18 | Stop reason: HOSPADM

## 2020-11-15 RX ORDER — ALBUTEROL SULFATE 90 UG/1
2 AEROSOL, METERED RESPIRATORY (INHALATION) EVERY 6 HOURS PRN
Status: DISCONTINUED | OUTPATIENT
Start: 2020-11-15 | End: 2020-11-18 | Stop reason: HOSPADM

## 2020-11-15 RX ORDER — ASPIRIN 81 MG/1
81 TABLET ORAL DAILY
Status: DISCONTINUED | OUTPATIENT
Start: 2020-11-16 | End: 2020-11-18 | Stop reason: HOSPADM

## 2020-11-15 RX ORDER — ACETAMINOPHEN 325 MG/1
650 TABLET ORAL EVERY 6 HOURS PRN
Status: DISCONTINUED | OUTPATIENT
Start: 2020-11-15 | End: 2020-11-18 | Stop reason: HOSPADM

## 2020-11-15 RX ORDER — LORAZEPAM 2 MG/ML
1 INJECTION INTRAMUSCULAR ONCE
Status: COMPLETED | OUTPATIENT
Start: 2020-11-15 | End: 2020-11-15

## 2020-11-15 RX ORDER — 0.9 % SODIUM CHLORIDE 0.9 %
1000 INTRAVENOUS SOLUTION INTRAVENOUS ONCE
Status: COMPLETED | OUTPATIENT
Start: 2020-11-15 | End: 2020-11-15

## 2020-11-15 RX ORDER — SODIUM CHLORIDE 0.9 % (FLUSH) 0.9 %
10 SYRINGE (ML) INJECTION EVERY 12 HOURS SCHEDULED
Status: DISCONTINUED | OUTPATIENT
Start: 2020-11-15 | End: 2020-11-18 | Stop reason: HOSPADM

## 2020-11-15 RX ORDER — ONDANSETRON 4 MG/1
4 TABLET, FILM COATED ORAL 3 TIMES DAILY PRN
Status: DISCONTINUED | OUTPATIENT
Start: 2020-11-15 | End: 2020-11-18 | Stop reason: HOSPADM

## 2020-11-15 RX ORDER — FOLIC ACID 5 MG/ML
1 INJECTION, SOLUTION INTRAMUSCULAR; INTRAVENOUS; SUBCUTANEOUS ONCE
Status: DISCONTINUED | OUTPATIENT
Start: 2020-11-15 | End: 2020-11-15

## 2020-11-15 RX ORDER — DEXTROSE MONOHYDRATE 25 G/50ML
12.5 INJECTION, SOLUTION INTRAVENOUS PRN
Status: DISCONTINUED | OUTPATIENT
Start: 2020-11-15 | End: 2020-11-18 | Stop reason: HOSPADM

## 2020-11-15 RX ORDER — PHENOBARBITAL SODIUM 65 MG/ML
130 INJECTION INTRAMUSCULAR
Status: DISCONTINUED | OUTPATIENT
Start: 2020-11-15 | End: 2020-11-16

## 2020-11-15 RX ORDER — ACETAMINOPHEN 650 MG/1
650 SUPPOSITORY RECTAL EVERY 6 HOURS PRN
Status: DISCONTINUED | OUTPATIENT
Start: 2020-11-15 | End: 2020-11-18 | Stop reason: HOSPADM

## 2020-11-15 RX ORDER — DEXTROSE MONOHYDRATE 50 MG/ML
100 INJECTION, SOLUTION INTRAVENOUS PRN
Status: DISCONTINUED | OUTPATIENT
Start: 2020-11-15 | End: 2020-11-18 | Stop reason: HOSPADM

## 2020-11-15 RX ORDER — ACETAMINOPHEN 325 MG/1
650 TABLET ORAL EVERY 6 HOURS PRN
Status: DISCONTINUED | OUTPATIENT
Start: 2020-11-15 | End: 2020-11-15 | Stop reason: SDUPTHER

## 2020-11-15 RX ORDER — QUETIAPINE FUMARATE 25 MG/1
25 TABLET, FILM COATED ORAL 2 TIMES DAILY
Status: DISCONTINUED | OUTPATIENT
Start: 2020-11-15 | End: 2020-11-18 | Stop reason: HOSPADM

## 2020-11-15 RX ORDER — HYDROXYZINE PAMOATE 50 MG/1
100 CAPSULE ORAL 4 TIMES DAILY PRN
Status: DISCONTINUED | OUTPATIENT
Start: 2020-11-15 | End: 2020-11-18 | Stop reason: HOSPADM

## 2020-11-15 RX ORDER — DIPHENHYDRAMINE HCL 25 MG
50 CAPSULE ORAL NIGHTLY
Status: ON HOLD | COMMUNITY
End: 2020-12-30 | Stop reason: HOSPADM

## 2020-11-15 RX ORDER — PROPRANOLOL HYDROCHLORIDE 80 MG/1
80 CAPSULE, EXTENDED RELEASE ORAL DAILY
Status: DISCONTINUED | OUTPATIENT
Start: 2020-11-16 | End: 2020-11-18 | Stop reason: HOSPADM

## 2020-11-15 RX ORDER — PHENOBARBITAL SODIUM 65 MG/ML
260 INJECTION INTRAMUSCULAR
Status: DISCONTINUED | OUTPATIENT
Start: 2020-11-15 | End: 2020-11-16

## 2020-11-15 RX ORDER — THIAMINE MONONITRATE (VIT B1) 100 MG
100 TABLET ORAL DAILY
Status: DISCONTINUED | OUTPATIENT
Start: 2020-11-16 | End: 2020-11-18 | Stop reason: HOSPADM

## 2020-11-15 RX ORDER — ONDANSETRON 2 MG/ML
4 INJECTION INTRAMUSCULAR; INTRAVENOUS ONCE
Status: COMPLETED | OUTPATIENT
Start: 2020-11-15 | End: 2020-11-15

## 2020-11-15 RX ORDER — SODIUM CHLORIDE 0.9 % (FLUSH) 0.9 %
10 SYRINGE (ML) INJECTION PRN
Status: DISCONTINUED | OUTPATIENT
Start: 2020-11-15 | End: 2020-11-18 | Stop reason: HOSPADM

## 2020-11-15 RX ORDER — LORAZEPAM 2 MG/ML
0.5 INJECTION INTRAMUSCULAR ONCE
Status: COMPLETED | OUTPATIENT
Start: 2020-11-15 | End: 2020-11-15

## 2020-11-15 RX ORDER — M-VIT,TX,IRON,MINS/CALC/FOLIC 27MG-0.4MG
TABLET ORAL DAILY
Status: DISCONTINUED | OUTPATIENT
Start: 2020-11-16 | End: 2020-11-18 | Stop reason: HOSPADM

## 2020-11-15 RX ORDER — BUDESONIDE AND FORMOTEROL FUMARATE DIHYDRATE 80; 4.5 UG/1; UG/1
2 AEROSOL RESPIRATORY (INHALATION) 2 TIMES DAILY
Status: DISCONTINUED | OUTPATIENT
Start: 2020-11-16 | End: 2020-11-18 | Stop reason: HOSPADM

## 2020-11-15 RX ORDER — TRAZODONE HYDROCHLORIDE 50 MG/1
50 TABLET ORAL NIGHTLY PRN
Status: DISCONTINUED | OUTPATIENT
Start: 2020-11-15 | End: 2020-11-18 | Stop reason: HOSPADM

## 2020-11-15 RX ORDER — THIAMINE HYDROCHLORIDE 100 MG/ML
100 INJECTION, SOLUTION INTRAMUSCULAR; INTRAVENOUS ONCE
Status: DISCONTINUED | OUTPATIENT
Start: 2020-11-15 | End: 2020-11-15

## 2020-11-15 RX ORDER — ONDANSETRON 2 MG/ML
4 INJECTION INTRAMUSCULAR; INTRAVENOUS EVERY 6 HOURS PRN
Status: DISCONTINUED | OUTPATIENT
Start: 2020-11-15 | End: 2020-11-18 | Stop reason: HOSPADM

## 2020-11-15 RX ORDER — NICOTINE POLACRILEX 4 MG
15 LOZENGE BUCCAL PRN
Status: DISCONTINUED | OUTPATIENT
Start: 2020-11-15 | End: 2020-11-18 | Stop reason: HOSPADM

## 2020-11-15 RX ORDER — MAGNESIUM SULFATE IN WATER 40 MG/ML
2 INJECTION, SOLUTION INTRAVENOUS PRN
Status: DISCONTINUED | OUTPATIENT
Start: 2020-11-15 | End: 2020-11-18 | Stop reason: HOSPADM

## 2020-11-15 RX ORDER — FOLIC ACID 1 MG/1
1 TABLET ORAL DAILY
Status: DISCONTINUED | OUTPATIENT
Start: 2020-11-16 | End: 2020-11-18 | Stop reason: HOSPADM

## 2020-11-15 RX ORDER — VENLAFAXINE HYDROCHLORIDE 37.5 MG/1
37.5 CAPSULE, EXTENDED RELEASE ORAL
Status: DISCONTINUED | OUTPATIENT
Start: 2020-11-16 | End: 2020-11-18 | Stop reason: HOSPADM

## 2020-11-15 RX ORDER — INSULIN GLARGINE 100 [IU]/ML
25 INJECTION, SOLUTION SUBCUTANEOUS NIGHTLY
Status: DISCONTINUED | OUTPATIENT
Start: 2020-11-15 | End: 2020-11-18 | Stop reason: HOSPADM

## 2020-11-15 RX ORDER — POLYETHYLENE GLYCOL 3350 17 G/17G
17 POWDER, FOR SOLUTION ORAL DAILY PRN
Status: DISCONTINUED | OUTPATIENT
Start: 2020-11-15 | End: 2020-11-18 | Stop reason: HOSPADM

## 2020-11-15 RX ORDER — LORAZEPAM 2 MG/ML
2 INJECTION INTRAMUSCULAR ONCE
Status: COMPLETED | OUTPATIENT
Start: 2020-11-15 | End: 2020-11-15

## 2020-11-15 RX ADMIN — LORAZEPAM 2 MG: 2 INJECTION INTRAMUSCULAR; INTRAVENOUS at 17:03

## 2020-11-15 RX ADMIN — MAGNESIUM SULFATE HEPTAHYDRATE 2 G: 40 INJECTION, SOLUTION INTRAVENOUS at 21:00

## 2020-11-15 RX ADMIN — QUETIAPINE FUMARATE 25 MG: 25 TABLET ORAL at 20:56

## 2020-11-15 RX ADMIN — INSULIN GLARGINE 25 UNITS: 100 INJECTION, SOLUTION SUBCUTANEOUS at 22:47

## 2020-11-15 RX ADMIN — HYDROXYZINE PAMOATE 100 MG: 50 CAPSULE ORAL at 20:56

## 2020-11-15 RX ADMIN — THIAMINE HYDROCHLORIDE 100 MG: 100 INJECTION, SOLUTION INTRAMUSCULAR; INTRAVENOUS at 23:27

## 2020-11-15 RX ADMIN — FOLIC ACID 1 MG: 5 INJECTION, SOLUTION INTRAMUSCULAR; INTRAVENOUS; SUBCUTANEOUS at 22:41

## 2020-11-15 RX ADMIN — LORAZEPAM 0.5 MG: 2 INJECTION INTRAMUSCULAR; INTRAVENOUS at 13:43

## 2020-11-15 RX ADMIN — LORAZEPAM 1 MG: 2 INJECTION INTRAMUSCULAR; INTRAVENOUS at 14:58

## 2020-11-15 RX ADMIN — ONDANSETRON 4 MG: 2 INJECTION INTRAMUSCULAR; INTRAVENOUS at 13:43

## 2020-11-15 RX ADMIN — SODIUM CHLORIDE 1000 ML: 9 INJECTION, SOLUTION INTRAVENOUS at 14:59

## 2020-11-15 RX ADMIN — SODIUM CHLORIDE 1000 ML: 9 INJECTION, SOLUTION INTRAVENOUS at 13:54

## 2020-11-15 RX ADMIN — ONDANSETRON HYDROCHLORIDE 4 MG: 4 TABLET, FILM COATED ORAL at 20:56

## 2020-11-15 RX ADMIN — Medication 10 ML: at 22:41

## 2020-11-15 ASSESSMENT — ENCOUNTER SYMPTOMS
COLOR CHANGE: 0
COUGH: 0
BACK PAIN: 0
SORE THROAT: 0
VOMITING: 1
SHORTNESS OF BREATH: 0
NAUSEA: 1
DIARRHEA: 0
ABDOMINAL PAIN: 0

## 2020-11-15 NOTE — H&P
History & Physical        Patient:  Severino Lewis  YOB: 1985    MRN: [id number]     Acct: [de-identified]    PCP: BARB Drake CNP    Date of Admission: 11/15/2020    Date of Service: Pt seen/examined on 11/15/20  and Admitted to Inpatient with expected LOS greater than two midnights due to medical therapy. Chief Complaint:  Wants help withdrawing from alcohol      History Of Present Illness:    38 y.o. male who presented to [facility] with a long hx of alcohol abuse; it is a family trait. He states he was off etoh 2 weeks but then drank 2 large bottles of vodka; he came to ER in a state of severe withdrawal and requested help to withdraw. He has plans to go to a facility afterward. He has had numerous admissions for same. He has a hx of depression, suicidality, diabetes, copd. He is single, currently unemployed. Past Medical History:          Diagnosis Date    Asthma     COPD (chronic obstructive pulmonary disease) (HCC)     Eczema     GERD (gastroesophageal reflux disease)     History of alcohol abuse     History of marijuana use     History of tobacco abuse     quit 11/21/2017    Hx of seasonal allergies     Pancreatitis     Seizures (HCC)     etoh wdl    Type 2 diabetes mellitus without complication ([facility] [address] 99/51/5547       Past Surgical History:          Procedure Laterality Date    ANKLE ARTHROSCOPY Right 8/5/2020    ANKLE ARTHROSCOPY WITH  MEDIAL DEBRIDEMENT RIGHT ANKLE, ANKLE ARTHROTOMY WITH DEBRIDEMENT performed by Elroy Ford DPM at [facility] [address] Right 09/2019    DR LUCY MURPHY [facility]    FRACTURE SURGERY Right 2019    orbital fracture surgery    UPPER GASTROINTESTINAL ENDOSCOPY Left 5/6/2019    EGD BIOPSY performed by Oly Mcclain MD at [facility]       Medications Prior to Admission:      Prior to Admission medications    Medication Sig Start Date End Date Taking?  Authorizing Provider   propranolol (INDERAL LA) 80 MG extended release capsule Take 1 capsule by mouth daily 11/12/20   BARB Chacon CNP   QUEtiapine (SEROQUEL) 25 MG tablet Take 1 tablet by mouth 2 times daily 10/28/20   BARB Chacon CNP   venlafaxine (EFFEXOR XR) 37.5 MG extended release capsule Take 1 capsule by mouth daily (with breakfast) 10/28/20   BARB Chacon CNP   insulin aspart (NOVOLOG FLEXPEN) 100 UNIT/ML injection pen Inject 2-12 units SQ TID with meals as directed per sliding scale, max dose 36 units/day 10/28/20   BARB Chacon CNP   hydrocortisone 2.5 % cream Apply topically 2 times daily.  10/23/20   Jermaine Jameson MD   traZODone (DESYREL) 50 MG tablet Take 1 tablet by mouth nightly as needed for Sleep 10/23/20   Jermaine Jameson MD   folic acid (FOLVITE) 1 MG tablet Take 1 tablet by mouth daily 10/14/20   Gayle Treadwell PA-C   vitamin B-1 100 MG tablet Take 1 tablet by mouth daily 10/14/20   Gayle Treadwell PA-C   hydrOXYzine (VISTARIL) 100 MG capsule Take 1 capsule by mouth 4 times daily as needed for Anxiety 9/9/20   BARB Chacon CNP   insulin glargine (LANTUS SOLOSTAR) 100 UNIT/ML injection pen Inject 25 Units into the skin daily 9/9/20   BARB Chacon CNP   nicotine (NICOTROL) 10 MG inhaler Inhale 1 puff into the lungs as needed for Smoking cessation 7/29/20   BARB Chacon CNP   ondansetron (ZOFRAN) 4 MG tablet Take 1 tablet by mouth 3 times daily as needed for Nausea or Vomiting 6/11/20   BARB Chacon CNP   albuterol sulfate HFA (PROVENTIL HFA) 108 (90 Base) MCG/ACT inhaler Inhale 2 puffs into the lungs every 6 hours as needed for Wheezing 12/2/19   BARB Chacon CNP   Insulin Pen Needle 30G X 8 MM MISC 1 each by Does not apply route 3 times daily 12/2/19   BARB Chacon CNP   fluticasone-salmeterol (ADVAIR) 250-50 MCG/DOSE AEPB Inhale 1 puff into the lungs every 12 hours 5/16/19   BARB Chacon - CNP aspirin 81 MG EC tablet Take 1 tablet by mouth daily 5/10/19   Lilly Traore MD   acetaminophen (TYLENOL) 500 MG tablet Take 1,000 mg by mouth every 6 hours as needed for Pain    Historical Provider, MD   Multiple Vitamins-Minerals (MENS MULTIVITAMIN PLUS) TABS Take 1 tablet by mouth daily    Historical Provider, MD       Allergies:  Paxil [paroxetine]    Social History:      The patient currently lives by self    TOBACCO:   reports that he has been smoking cigarettes. He has a 7.50 pack-year smoking history. He has never used smokeless tobacco.  ETOH:   reports current alcohol use. Family History:      . Positive as follows:  Alcohol abuse in father        Problem Relation Age of Onset    Other Mother         gestational diabetes    Other Father 39        suicide    Other Sister         hypoglycemia       Diet:  No diet orders on file    REVIEW OF SYSTEMS:   Pertinent positives as noted in the HPI. All other systems reviewed and negative. PHYSICAL EXAM:    /86   Pulse 108   Temp 98.8 °F (37.1 °C)   Resp 25   Ht 5' 2\" (1.575 m)   Wt 170 lb (77.1 kg)   SpO2 95%   BMI 31.09 kg/m²     General appearance:  Very tremulous, tachycardic  HEENT:  Normal cephalic, atraumatic without obvious deformity. Pupils equal, round, and reactive to light. Extra ocular muscles intact. Conjunctivae/corneas clear. Neck: Supple, with full range of motion. No jugular venous distention. Trachea midline. Respiratory:  Normal respiratory effort. Clear to auscultation, bilaterally without Rales/Wheezes/Rhonchi. Cardiovascular:   Tachycardic, no murmurs  Abdomen: Soft, non-tender, non-distended with normal bowel sounds. Musculoskeletal:  No clubbing, cyanosis or edema bilaterally. Full range of motion without deformity. Skin: small abrasion thigh  Neurologic:  Neurovascularly intact without any focal sensory/motor deficits.  Cranial nerves: II-XII intact, grossly non-focal.  Psychiatric:  Alert and oriented, thought content appropriate, normal insight  Capillary Refill: Brisk,< 3 seconds   Peripheral Pulses: +2 palpable, equal bilaterally       Labs:     Recent Labs     11/15/20  1313   WBC 15.8*   HGB 15.6   HCT 45.7        Recent Labs     11/15/20  1313      K 3.9      CO2 24   BUN 9   CREATININE 0.7   CALCIUM 9.9     Recent Labs     11/15/20  1313   AST 24   ALT 33   BILITOT 0.4   ALKPHOS 120     No results for input(s): INR in the last 72 hours. No results for input(s): Aramis Beery in the last 72 hours. Urinalysis:      Lab Results   Component Value Date    NITRU NEGATIVE 11/15/2020    WBCUA 0-2 11/15/2020    BACTERIA NONE SEEN 11/15/2020    RBCUA 0-2 11/15/2020    BLOODU NEGATIVE 11/15/2020    SPECGRAV 1.011 10/12/2013    GLUCOSEU 250 11/15/2020       Intake & Output:  No intake/output data recorded. No intake/output data recorded. No orders to display        DVT prophylaxis: lovenox    Code Status: Prior      Disposition:potentially rehab facility    C/Lizzy Ng 1106 Problems    Diagnosis Date Noted    [facility] use [facility], severe, dependence [facility] [F10.20] 11/15/2020       PLAN:    1. etoh withdrawal- phenobarb protocol, thiamine, folate  2. Dm- monitor, continue insulin  3. Continue other home psychiatric meds. Thank you [facility] for the opportunity to be involved in this patient's care.     Electronically signed by Lissette Yao MD on 11/15/2020 at 5:23 PM

## 2020-11-15 NOTE — ED PROVIDER NOTES
[facility]  eMERGENCY dEPARTMENT eNCOUnter          CHIEF COMPLAINT       Chief Complaint   Patient presents with    Drug / Alcohol Assessment       Nurses Notes reviewed and I agree except as noted inthe HPI. HISTORY OF PRESENT ILLNESS    Samy Otoole is a 38 y.o. male who presents to the Emergency Department for the evaluation of alcohol abuse. Patient states he has a history of daily alcohol use but has not had daily consumption for the past couple of years. He has had issues over the past couple of years with binge drinking alcohol typically over the course of 1 to 3 days, until he gets so sick that he needs to seek treatment. This occurs approximately every 1 to 2 weeks. He reports having a similar episode yesterday when he drank 2 bottles of vodka with last use of alcohol yesterday evening. He states this morning he has had chills with nausea and nonbloody emesis, shaking, anxiety, restlessness. She does believe stress is likely a trigger for his alcohol use. He has had recent stay at the crisis stabilization unit for 2 days this last week as well as followed up for his Vivitrol shot and been attending [place] [id number] meetings. He is in the process of arranging for an inpatient stay at [facility] in [place] for a 30-day detox program and plans to call tomorrow to arrange this as he has verified that they take his insurance already. He denies any associated abdominal pain, fevers, chest pain, shortness of breath, diarrhea. He denies any suicidal or homicidal ideation or hallucinations. No drug use. The HPI was provided by the patient. REVIEW OF SYSTEMS     Review of Systems   Constitutional: Positive for chills. Negative for fever. HENT: Negative for sore throat. Respiratory: Negative for cough and shortness of breath. Cardiovascular: Negative for chest pain. Gastrointestinal: Positive for nausea and vomiting. Negative for abdominal pain and diarrhea.    Genitourinary: hydrocortisone 2.5 % cream Apply topically 2 times daily. Qty: 1 Tube, Refills: 0      traZODone (DESYREL) 50 MG tablet Take 1 tablet by mouth nightly as needed for Sleep  Qty: 30 tablet, Refills: 0      folic acid (FOLVITE) 1 MG tablet Take 1 tablet by mouth daily  Qty: 30 tablet, Refills: 3      vitamin B-1 100 MG tablet Take 1 tablet by mouth daily  Qty: 30 tablet, Refills: 3      hydrOXYzine (VISTARIL) 100 MG capsule Take 1 capsule by mouth 4 times daily as needed for Anxiety  Qty: 60 capsule, Refills: 3    Associated Diagnoses: Major depressive disorder, recurrent episode, moderate with anxious distress ([address] Alcohol withdrawal syndrome without complication ([facility])      insulin glargine (LANTUS SOLOSTAR) 100 UNIT/ML injection pen Inject 25 Units into the skin daily  Qty: 5 pen, Refills: 3    Associated Diagnoses: Type 2 diabetes mellitus without complication, with long-term current use of insulin ([facility])      albuterol sulfate HFA (PROVENTIL HFA) 108 (90 Base) MCG/ACT inhaler Inhale 2 puffs into the lungs every 6 hours as needed for Wheezing  Qty: 1 Inhaler, Refills: 0    Associated Diagnoses: Moderate persistent asthma without complication      fluticasone-salmeterol (ADVAIR) 250-50 MCG/DOSE AEPB Inhale 1 puff into the lungs every 12 hours  Qty: 60 each, Refills: 5    Associated Diagnoses:  Moderate persistent asthma without complication      aspirin 81 MG EC tablet Take 1 tablet by mouth daily  Qty: 30 tablet, Refills: 3      acetaminophen (TYLENOL) 500 MG tablet Take 1,000 mg by mouth every 6 hours as needed for Pain      Multiple Vitamins-Minerals (MENS MULTIVITAMIN PLUS) TABS Take 1 tablet by mouth daily      nicotine (NICOTROL) 10 MG inhaler Inhale 1 puff into the lungs as needed for Smoking cessation  Qty: 1 Inhaler, Refills: 3    Associated Diagnoses: Cigarette nicotine dependence without complication      ondansetron (ZOFRAN) 4 MG tablet Take 1 tablet by mouth 3 times daily as needed for Nausea or Vomiting  Qty: 15 tablet, Refills: 0    Associated Diagnoses: Non-intractable vomiting with nausea, unspecified vomiting type      Insulin Pen Needle 30G X 8 MM MISC 1 each by Does not apply route 3 times daily  Qty: 500 each, Refills: 3             ALLERGIES     is allergic to paxil [paroxetine]. FAMILY HISTORY     He indicated that his mother is alive. He indicated that his father is . He indicated that the status of his sister is unknown.   family history includes Other in his mother and sister; Other (age of onset: 39) in his father. SOCIAL HISTORY      reports that he has been smoking cigarettes. He has a 7.50 pack-year smoking history. He has never used smokeless tobacco. He reports current alcohol use. He reports previous drug use. Drug: Marijuana. PHYSICAL EXAM     INITIAL VITALS:  height is 5' 2\" (1.575 m) and weight is 171 lb 11.2 oz (77.9 kg). His oral temperature is 98.7 °F (37.1 °C). His blood pressure is 136/69 and his pulse is 92. His respiration is 20 and oxygen saturation is 96%. Physical Exam  Vitals signs and nursing note reviewed. Constitutional:       Appearance: He is ill-appearing. HENT:      Head: Normocephalic and atraumatic. Eyes:      Conjunctiva/sclera: Conjunctivae normal.   Cardiovascular:      Rate and Rhythm: Tachycardia present. Pulmonary:      Effort: Pulmonary effort is normal. No respiratory distress. Musculoskeletal: Normal range of motion. Skin:     General: Skin is warm. Neurological:      Mental Status: He is alert. GCS: GCS eye subscore is 4. GCS verbal subscore is 5. GCS motor subscore is 6. Motor: Tremor present. Psychiatric:         Mood and Affect: Mood is anxious. Thought Content: Thought content does not include homicidal or suicidal ideation.          DIFFERENTIAL DIAGNOSIS:   Differential diagnoses are discussed    DIAGNOSTIC RESULTS     EKG: All EKG's are interpreted by the Emergency Department Physician who either signs or Co-signsthis chart in the absence of a cardiologist.        RADIOLOGY: non-plain film images(s) such as CT, Ultrasound and MRI are read by the radiologist.    No orders to display       LABS:      Labs Reviewed   CBC WITH AUTO DIFFERENTIAL - Abnormal; Notable for the following components:       Result Value    WBC 15.8 (*)     RDW-SD 45.1 (*)     Segs Absolute 12.3 (*)     Monocytes Absolute 1.6 (*)     All other components within normal limits   COMPREHENSIVE METABOLIC PANEL W/ REFLEX TO MG FOR LOW K - Abnormal; Notable for the following components:    Glucose 141 (*)     All other components within normal limits   ANION GAP - Abnormal; Notable for the following components:    Anion Gap 19.0 (*)     All other components within normal limits   URINE WITH REFLEXED MICRO - Abnormal; Notable for the following components:    Glucose, Ur 250 (*)     Ketones, Urine TRACE (*)     Protein, UA 30 (*)     All other components within normal limits   MAGNESIUM - Abnormal; Notable for the following components:    Magnesium 1.4 (*)     All other components within normal limits   POCT GLUCOSE - Abnormal; Notable for the following components:    POC Glucose 168 (*)     All other components within normal limits   LIPASE   ETHANOL   VITAMIN B12 & FOLATE   URINE DRUG SCREEN   GLOMERULAR FILTRATION RATE, ESTIMATED   OSMOLALITY   PHOSPHORUS   HEPATIC FUNCTION PANEL   CBC WITH AUTO DIFFERENTIAL       EMERGENCY DEPARTMENT COURSE:   Vitals:    Vitals:    11/15/20 1736 11/15/20 1741 11/15/20 1811 11/15/20 2045   BP: 127/86   136/69   Pulse: 101   92   Resp: 18   20   Temp: 98.5 °F (36.9 °C)   98.7 °F (37.1 °C)   TempSrc: Oral   Oral   SpO2: 96%  96% 96%   Weight:  171 lb 11.2 oz (77.9 kg)     Height:  5' 2\" (1.575 m)        12:53 PM EST: The patient was seen and evaluated. Patient presents for alcohol withdrawal symptoms after recent binge use.   He has a history of alcohol abuse and has had recent admissions to the hospital for

## 2020-11-16 LAB
ALBUMIN SERPL-MCNC: 3.5 G/DL (ref 3.5–5.1)
ALP BLD-CCNC: 111 U/L (ref 38–126)
ALT SERPL-CCNC: 30 U/L (ref 11–66)
AST SERPL-CCNC: 28 U/L (ref 5–40)
BASOPHILS # BLD: 0.8 %
BASOPHILS ABSOLUTE: 0.1 THOU/MM3 (ref 0–0.1)
BILIRUB SERPL-MCNC: 0.6 MG/DL (ref 0.3–1.2)
BILIRUBIN DIRECT: < 0.2 MG/DL (ref 0–0.3)
EOSINOPHIL # BLD: 3.9 %
EOSINOPHILS ABSOLUTE: 0.3 THOU/MM3 (ref 0–0.4)
ERYTHROCYTE [DISTWIDTH] IN BLOOD BY AUTOMATED COUNT: 13.2 % (ref 11.5–14.5)
ERYTHROCYTE [DISTWIDTH] IN BLOOD BY AUTOMATED COUNT: 45.1 FL (ref 35–45)
GLUCOSE BLD-MCNC: 130 MG/DL (ref 70–108)
GLUCOSE BLD-MCNC: 183 MG/DL (ref 70–108)
HCT VFR BLD CALC: 42.7 % (ref 42–52)
HEMOGLOBIN: 14.3 GM/DL (ref 14–18)
IMMATURE GRANS (ABS): 0.02 THOU/MM3 (ref 0–0.07)
IMMATURE GRANULOCYTES: 0.3 %
LYMPHOCYTES # BLD: 39.7 %
LYMPHOCYTES ABSOLUTE: 3.1 THOU/MM3 (ref 1–4.8)
MCH RBC QN AUTO: 31.1 PG (ref 26–33)
MCHC RBC AUTO-ENTMCNC: 33.5 GM/DL (ref 32.2–35.5)
MCV RBC AUTO: 92.8 FL (ref 80–94)
MONOCYTES # BLD: 14.9 %
MONOCYTES ABSOLUTE: 1.2 THOU/MM3 (ref 0.4–1.3)
NUCLEATED RED BLOOD CELLS: 0 /100 WBC
PLATELET # BLD: 226 THOU/MM3 (ref 130–400)
PMV BLD AUTO: 9.9 FL (ref 9.4–12.4)
RBC # BLD: 4.6 MILL/MM3 (ref 4.7–6.1)
SEG NEUTROPHILS: 40.4 %
SEGMENTED NEUTROPHILS ABSOLUTE COUNT: 3.2 THOU/MM3 (ref 1.8–7.7)
TOTAL PROTEIN: 5.8 G/DL (ref 6.1–8)
WBC # BLD: 7.9 THOU/MM3 (ref 4.8–10.8)

## 2020-11-16 PROCEDURE — 6370000000 HC RX 637 (ALT 250 FOR IP): Performed by: INTERNAL MEDICINE

## 2020-11-16 PROCEDURE — 2580000003 HC RX 258: Performed by: FAMILY MEDICINE

## 2020-11-16 PROCEDURE — 85025 COMPLETE CBC W/AUTO DIFF WBC: CPT

## 2020-11-16 PROCEDURE — 6360000002 HC RX W HCPCS: Performed by: INTERNAL MEDICINE

## 2020-11-16 PROCEDURE — 99232 SBSQ HOSP IP/OBS MODERATE 35: CPT | Performed by: FAMILY MEDICINE

## 2020-11-16 PROCEDURE — 94640 AIRWAY INHALATION TREATMENT: CPT

## 2020-11-16 PROCEDURE — 6360000002 HC RX W HCPCS: Performed by: FAMILY MEDICINE

## 2020-11-16 PROCEDURE — 82948 REAGENT STRIP/BLOOD GLUCOSE: CPT

## 2020-11-16 PROCEDURE — 1200000003 HC TELEMETRY R&B

## 2020-11-16 PROCEDURE — 36415 COLL VENOUS BLD VENIPUNCTURE: CPT

## 2020-11-16 PROCEDURE — 6370000000 HC RX 637 (ALT 250 FOR IP): Performed by: FAMILY MEDICINE

## 2020-11-16 PROCEDURE — 94760 N-INVAS EAR/PLS OXIMETRY 1: CPT

## 2020-11-16 PROCEDURE — 80076 HEPATIC FUNCTION PANEL: CPT

## 2020-11-16 PROCEDURE — 2580000003 HC RX 258: Performed by: INTERNAL MEDICINE

## 2020-11-16 RX ORDER — PHENOBARBITAL 32.4 MG/1
32.4 TABLET ORAL 2 TIMES DAILY
Status: DISCONTINUED | OUTPATIENT
Start: 2020-11-17 | End: 2020-11-18 | Stop reason: HOSPADM

## 2020-11-16 RX ORDER — PHENOBARBITAL 32.4 MG/1
64.8 TABLET ORAL 2 TIMES DAILY
Status: COMPLETED | OUTPATIENT
Start: 2020-11-16 | End: 2020-11-17

## 2020-11-16 RX ADMIN — PHENOBARBITAL SODIUM 218.4 MG: 65 INJECTION INTRAMUSCULAR; INTRAVENOUS at 12:48

## 2020-11-16 RX ADMIN — PHENOBARBITAL SODIUM 218.4 MG: 65 INJECTION INTRAMUSCULAR; INTRAVENOUS at 18:29

## 2020-11-16 RX ADMIN — ENOXAPARIN SODIUM 40 MG: 40 INJECTION SUBCUTANEOUS at 09:59

## 2020-11-16 RX ADMIN — FOLIC ACID 1 MG: 1 TABLET ORAL at 09:59

## 2020-11-16 RX ADMIN — PROPRANOLOL HYDROCHLORIDE 80 MG: 80 CAPSULE, EXTENDED RELEASE ORAL at 09:59

## 2020-11-16 RX ADMIN — MULTIPLE VITAMINS W/ MINERALS TAB 1 TABLET: TAB at 09:59

## 2020-11-16 RX ADMIN — PHENOBARBITAL SODIUM 218.4 MG: 65 INJECTION INTRAMUSCULAR; INTRAVENOUS at 21:42

## 2020-11-16 RX ADMIN — BUDESONIDE AND FORMOTEROL FUMARATE DIHYDRATE 2 PUFF: 80; 4.5 AEROSOL RESPIRATORY (INHALATION) at 19:26

## 2020-11-16 RX ADMIN — PHENOBARBITAL 64.8 MG: 32.4 TABLET ORAL at 23:58

## 2020-11-16 RX ADMIN — HYDROXYZINE PAMOATE 100 MG: 50 CAPSULE ORAL at 21:24

## 2020-11-16 RX ADMIN — VENLAFAXINE HYDROCHLORIDE 37.5 MG: 37.5 CAPSULE, EXTENDED RELEASE ORAL at 10:06

## 2020-11-16 RX ADMIN — Medication 10 ML: at 21:15

## 2020-11-16 RX ADMIN — ASPIRIN 81 MG: 81 TABLET, COATED ORAL at 09:59

## 2020-11-16 RX ADMIN — Medication 100 MG: at 09:59

## 2020-11-16 RX ADMIN — ONDANSETRON HYDROCHLORIDE 4 MG: 4 TABLET, FILM COATED ORAL at 21:15

## 2020-11-16 RX ADMIN — QUETIAPINE FUMARATE 25 MG: 25 TABLET ORAL at 21:15

## 2020-11-16 RX ADMIN — PHENOBARBITAL SODIUM 260 MG: 65 INJECTION INTRAMUSCULAR at 00:28

## 2020-11-16 RX ADMIN — QUETIAPINE FUMARATE 25 MG: 25 TABLET ORAL at 09:59

## 2020-11-16 RX ADMIN — INSULIN GLARGINE 25 UNITS: 100 INJECTION, SOLUTION SUBCUTANEOUS at 21:15

## 2020-11-16 ASSESSMENT — PAIN SCALES - GENERAL
PAINLEVEL_OUTOF10: 0
PAINLEVEL_OUTOF10: 0
PAINLEVEL_OUTOF10: 6
PAINLEVEL_OUTOF10: 0

## 2020-11-16 NOTE — PROGRESS NOTES
Pt admitted to  6K1 from 3B. IV site free of s/s of infection or infiltration. Vital signs obtained. Assessment and data collection initiated. Two nurse skin assessment performed by Trina TORRES and Gama Pace RN. Oriented to room. Policies and procedures for 6K explained. Trina RN discussed hourly rounding with patient addressing 5 P's. Fall prevention and safety brochure discussed with patient. Bed alarm on. Call light in reach.

## 2020-11-16 NOTE — PROGRESS NOTES
Utilize [facility] alcohol withdrawal scale (Based on [place] Modified Alcohol Withdrawal Scale). Tabulate score based on classifications including Tremor, Sweating, Hallucination, Orientation, and Agitation. Tremor: 0  Sweatin  Hallucinations: 0  Orientation: 0  Agitation: 0  Total Score: 0  Action perform as described below     Tremor:  No tremor is 0 points. Tremor on movement is 1 point. Tremor at rest is 2 points. Sweating: No Sweat 0 points. Moist is 1 point. Drenching sweats is 2 points. Hallucinations: No present 0 points. Dissuadable is 1 point. Not dissuadable is 2 points. Orientation: Oriented 0 points. Vague/detached 1 point. Disoriented/no contact 2 points. Agitation: Calm 0 points. Anxious 1 point. Panicky 2 points. Check scale every 2 hours. Discontinue scoring with 4 consecutive scorings of 0. Scale 0: No phenobarbital given. Re-assess every 60 minutes as needed. Scale 1-3: Phenobarbital 130 mg IV over 3 minutes. Re-assess every 60 minutes as needed. May administer every 60 minutes to a maximum dose of phenobarbital 1040 mg in 24 hours! Scale 4-8: Phenobarbital 260 mg IV over 5 minutes. Re-assess every 60 minutes as needed. May administer every 60 minutes to a maximum dose of phenobarbital 1040mg in 24 hours! Scale 9-10: Transfer to ICU (if not already in ICU). Administer 10mg/kg phenobarbital IV over 60 minutes. Maximum dose phenobarbital is 1040mg in 24 hours!

## 2020-11-16 NOTE — PROGRESS NOTES
Utilize [facility] alcohol withdrawal scale (Based on [place] Modified Alcohol Withdrawal Scale). Tabulate score based on classifications including Tremor, Sweating, Hallucination, Orientation, and Agitation. Tremor: 0  Sweatin  Hallucinations: 0  Orientation: 0  Agitation: 0  Total Score: 0  Action perform as described below     Tremor:  No tremor is 0 points. Tremor on movement is 1 point. Tremor at rest is 2 points. Sweating: No Sweat 0 points. Moist is 1 point. Drenching sweats is 2 points. Hallucinations: No present 0 points. Dissuadable is 1 point. Not dissuadable is 2 points. Orientation: Oriented 0 points. Vague/detached 1 point. Disoriented/no contact 2 points. Agitation: Calm 0 points. Anxious 1 point. Panicky 2 points. Check scale every 2 hours. Discontinue scoring with 4 consecutive scorings of 0. Scale 0: No phenobarbital given. Re-assess every 60 minutes as needed. Scale 1-3: Phenobarbital 130 mg IV over 3 minutes. Re-assess every 60 minutes as needed. May administer every 60 minutes to a maximum dose of phenobarbital 1040 mg in 24 hours! Scale 4-8: Phenobarbital 260 mg IV over 5 minutes. Re-assess every 60 minutes as needed. May administer every 60 minutes to a maximum dose of phenobarbital 1040mg in 24 hours! Scale 9-10: Transfer to ICU (if not already in ICU). Administer 10mg/kg phenobarbital IV over 60 minutes. Maximum dose phenobarbital is 1040mg in 24 hours!

## 2020-11-16 NOTE — PROGRESS NOTES
Utilize [facility] alcohol withdrawal scale (Based on [place] Modified Alcohol Withdrawal Scale). Tabulate score based on classifications including Tremor, Sweating, Hallucination, Orientation, and Agitation. Tremor: 2  Sweatin  Hallucinations: 0  Orientation: 0  Agitation: 1  Total Score: 4  Action perform as described below     Tremor:  No tremor is 0 points. Tremor on movement is 1 point. Tremor at rest is 2 points. Sweating: No Sweat 0 points. Moist is 1 point. Drenching sweats is 2 points. Hallucinations: No present 0 points. Dissuadable is 1 point. Not dissuadable is 2 points. Orientation: Oriented 0 points. Vague/detached 1 point. Disoriented/no contact 2 points. Agitation: Calm 0 points. Anxious 1 point. Panicky 2 points. Check scale every 2 hours. Discontinue scoring with 4 consecutive scorings of 0. Scale 0: No phenobarbital given. Re-assess every 60 minutes as needed. Scale 1-3: Phenobarbital 130 mg IV over 3 minutes. Re-assess every 60 minutes as needed. May administer every 60 minutes to a maximum dose of phenobarbital 1040 mg in 24 hours! Scale 4-8: Phenobarbital 260 mg IV over 5 minutes. Re-assess every 60 minutes as needed. May administer every 60 minutes to a maximum dose of phenobarbital 1040mg in 24 hours! Scale 9-10: Transfer to ICU (if not already in ICU). Administer 10mg/kg phenobarbital IV over 60 minutes. Maximum dose phenobarbital is 1040mg in 24 hours!

## 2020-11-16 NOTE — PROGRESS NOTES
Report called to [facility]. All questions answered. Patient aware of plan.

## 2020-11-16 NOTE — PROGRESS NOTES
PROGRESS NOTE      Patient:  Oz Pérez      Unit/Bed:6K-01/001-A    YOB: 1985    MRN: [id number]       Acct: [de-identified]     PCP: BARB Sanchez CNP    Date of Admission: 11/15/2020      Assessment/Plan:      Active Hospital Problems    Diagnosis Date Noted    Alcohol use disorder, severe, dependence ([facility] [F10.20] 11/15/2020    Affective psychosis, bipolar ([facility] [F31.9] 09/22/2020    Cigarette nicotine dependence without complication [S54.660] 42/61/8507    DKA, type 2, not at goal [facility]) [E11.10] 03/02/2020    Chronic obstructive lung disease ([facility] [J44.9] 10/13/2013     Alcohol use disorder  -Patient states last drink was 11/14. He has a history of going on binges.   He states he drank 2 large bottles of vodka on that date.  -Patient reports that previously he has checked himself into 30-day rehab for detox from alcohol and for cessation efforts  -Patient reports overnight he experienced sweating however otherwise denies anxiety, seizures, A/V hallucinations  -He does state that since he has been on a daily antidepressant, he has had a baseline tremor for which he is on propranolol  -On admission was started on phenobarbital scale  -Today: Changed to phenobarbital prophylaxis  -Folic acid and thiamine supplements  -Okay to transfer to [place] [address] floor with telemetry, continue to monitor symptoms    Insulin-dependent type 2 diabetes with hyperglycemia and glucosuria  -Would expect alcohol use/possible withdrawal to contribute to hyperglycemia  -Continue home insulin and sliding scale insulin  -Accu-Cheks before every meal and nightly  -Carb controlled diet    Leukocytosis, resolved  History of asthma, COPD  GERD  History of PTSD  -Continue home meds    Tobacco abuse  -Half pack per day x15 years  -Nicotine patches    Marijuana abuse   -No cannabinoids on UDS on this admission    Chief Complaint: Wants help withdrawing from alcohol    Hospital Course: Per H&P: \"36 y.o. male who presented to [address] [place] [address] with a long hx of alcohol abuse; it is a family trait. He states he was off etoh 2 weeks but then drank 2 large bottles of vodka; he came to ER in a state of severe withdrawal and requested help to withdraw. He has plans to go to a facility afterward.     He has had numerous admissions for same. He has a hx of depression, suicidality, diabetes, copd.     He is single, currently unemployed. \"    11/16: Doing well, vital signs stable. Subjective: Patient states he is doing okay today, states that overnight he slept well however did sweat considerably. He states he has a baseline tremor for which he takes propranolol. He states he is been having tremor since starting his antidepressant medication. He denies any worsening of tremors, seizures, audiovisual hallucinations. He denies any significant anxiety.   He states he is interested in checking himself into a 30-day rehab after being discharged from the hospital and would like some help from addiction services to help find him somewhere that will take his insurance so that he can make a self-referral.      Medications:  Reviewed    Infusion Medications    dextrose       Scheduled Medications    PHENobarbital IVPB  4 mg/kg (Ideal) Intravenous Q4H    Followed by   Tanner Last PHENobarbital  64.8 mg Oral BID    Followed by   Gil Arriaza ON 11/17/2020] PHENobarbital  32.4 mg Oral BID    aspirin  81 mg Oral Daily    budesonide-formoterol  2 puff Inhalation BID    folic acid  1 mg Oral Daily    insulin glargine  25 Units Subcutaneous Nightly    therapeutic multivitamin-minerals   Oral Daily    nicotine  1 patch Transdermal Daily    propranolol  80 mg Oral Daily    QUEtiapine  25 mg Oral BID    venlafaxine  37.5 mg Oral Daily with breakfast    thiamine  100 mg Oral Daily    phosphorus replacement protocol   Other RX Placeholder    sodium chloride flush  10 mL Intravenous 2 times per day    enoxaparin  40 mg Subcutaneous Daily

## 2020-11-16 NOTE — TELEPHONE ENCOUNTER
Future Appointments   Date Time Provider Malachi Gray   11/25/2020 10:20 AM BARB Cordoba CNP [facility] DALIALATRELL LUGO II.ELISA   12/10/2020 10:00 AM Dionicio Lewis MD [facility] KUMAR LUGO II.VIERTEL       Recent Visits  Date Type Provider Dept   10/28/20 Office Visit BARB Cordoba - CNP [facility]   09/22/20 Office Visit Susan Gresham APRN - CNP [facility]   07/29/20 Office Visit Susan Gresham, APRN - CNP [facility]   03/12/20 Office Visit Susan Gresham, APRN - CNP [facility]   12/02/19 Office Visit Susan Gresham APRN - CNP [facility]   09/03/19 Office Visit BARB Cordoba - CNP [facility]   06/17/19 Office Visit BARB Cordoba - CNP [facility]   Showing recent visits within past 540 days with a meds authorizing provider and meeting all other requirements     Future Appointments  Date Type Provider Dept   11/25/20 Appointment BARB Cordoba CNP [facility]   Showing future appointments within next 150 days with a meds authorizing provider and meeting all other requirements

## 2020-11-17 ENCOUNTER — TELEPHONE (OUTPATIENT)
Dept: FAMILY MEDICINE CLINIC | Age: 35
End: 2020-11-17

## 2020-11-17 LAB
GLUCOSE BLD-MCNC: 101 MG/DL (ref 70–108)
GLUCOSE BLD-MCNC: 149 MG/DL (ref 70–108)
GLUCOSE BLD-MCNC: 150 MG/DL (ref 70–108)
GLUCOSE BLD-MCNC: 158 MG/DL (ref 70–108)

## 2020-11-17 PROCEDURE — 6370000000 HC RX 637 (ALT 250 FOR IP): Performed by: FAMILY MEDICINE

## 2020-11-17 PROCEDURE — 6370000000 HC RX 637 (ALT 250 FOR IP): Performed by: INTERNAL MEDICINE

## 2020-11-17 PROCEDURE — 2580000003 HC RX 258: Performed by: INTERNAL MEDICINE

## 2020-11-17 PROCEDURE — 94760 N-INVAS EAR/PLS OXIMETRY 1: CPT

## 2020-11-17 PROCEDURE — 99232 SBSQ HOSP IP/OBS MODERATE 35: CPT | Performed by: INTERNAL MEDICINE

## 2020-11-17 PROCEDURE — 1200000003 HC TELEMETRY R&B

## 2020-11-17 PROCEDURE — 94640 AIRWAY INHALATION TREATMENT: CPT

## 2020-11-17 PROCEDURE — 6360000002 HC RX W HCPCS: Performed by: INTERNAL MEDICINE

## 2020-11-17 PROCEDURE — 82948 REAGENT STRIP/BLOOD GLUCOSE: CPT

## 2020-11-17 RX ADMIN — QUETIAPINE FUMARATE 25 MG: 25 TABLET ORAL at 21:20

## 2020-11-17 RX ADMIN — BUDESONIDE AND FORMOTEROL FUMARATE DIHYDRATE 2 PUFF: 80; 4.5 AEROSOL RESPIRATORY (INHALATION) at 08:59

## 2020-11-17 RX ADMIN — PHENOBARBITAL 32.4 MG: 32.4 TABLET ORAL at 21:20

## 2020-11-17 RX ADMIN — INSULIN GLARGINE 25 UNITS: 100 INJECTION, SOLUTION SUBCUTANEOUS at 21:22

## 2020-11-17 RX ADMIN — BUDESONIDE AND FORMOTEROL FUMARATE DIHYDRATE 2 PUFF: 80; 4.5 AEROSOL RESPIRATORY (INHALATION) at 18:29

## 2020-11-17 RX ADMIN — Medication 100 MG: at 10:12

## 2020-11-17 RX ADMIN — Medication 10 ML: at 10:13

## 2020-11-17 RX ADMIN — ENOXAPARIN SODIUM 40 MG: 40 INJECTION SUBCUTANEOUS at 10:12

## 2020-11-17 RX ADMIN — VENLAFAXINE HYDROCHLORIDE 37.5 MG: 37.5 CAPSULE, EXTENDED RELEASE ORAL at 10:12

## 2020-11-17 RX ADMIN — QUETIAPINE FUMARATE 25 MG: 25 TABLET ORAL at 10:12

## 2020-11-17 RX ADMIN — FOLIC ACID 1 MG: 1 TABLET ORAL at 10:12

## 2020-11-17 RX ADMIN — MULTIPLE VITAMINS W/ MINERALS TAB 1 TABLET: TAB at 10:12

## 2020-11-17 RX ADMIN — PROPRANOLOL HYDROCHLORIDE 80 MG: 80 CAPSULE, EXTENDED RELEASE ORAL at 10:12

## 2020-11-17 RX ADMIN — INSULIN LISPRO 1 UNITS: 100 INJECTION, SOLUTION INTRAVENOUS; SUBCUTANEOUS at 21:22

## 2020-11-17 RX ADMIN — Medication 10 ML: at 21:26

## 2020-11-17 RX ADMIN — PHENOBARBITAL 64.8 MG: 32.4 TABLET ORAL at 10:12

## 2020-11-17 RX ADMIN — POLYETHYLENE GLYCOL 3350 17 G: 17 POWDER, FOR SOLUTION ORAL at 10:20

## 2020-11-17 RX ADMIN — ASPIRIN 81 MG: 81 TABLET, COATED ORAL at 10:12

## 2020-11-17 ASSESSMENT — PAIN SCALES - GENERAL
PAINLEVEL_OUTOF10: 0

## 2020-11-17 NOTE — PROGRESS NOTES
Assessment and Plan:        1. etoh withdrawal- slept well last pm.  No nausea. Unsteady on feet  2. Dm- monitor        CC:  etoh abuse, want help with withdrawing  HPI:  Pt with long hx etoh misuse, multiple admits. Admitted for withdrawal then plans to go to rehab facility near [address] (12 point review of systems completed. Pertinent positives noted.  Otherwise ROS is negative) :     PMH:  Per HPI  SHX:  single  FHX: substanceabuse  Allergies: See above    Medications:     dextrose        PHENobarbital  32.4 mg Oral BID    aspirin  81 mg Oral Daily    budesonide-formoterol  2 puff Inhalation BID    folic acid  1 mg Oral Daily    insulin glargine  25 Units Subcutaneous Nightly    therapeutic multivitamin-minerals   Oral Daily    nicotine  1 patch Transdermal Daily    propranolol  80 mg Oral Daily    QUEtiapine  25 mg Oral BID    venlafaxine  37.5 mg Oral Daily with breakfast    thiamine  100 mg Oral Daily    phosphorus replacement protocol   Other RX Placeholder    sodium chloride flush  10 mL Intravenous 2 times per day    enoxaparin  40 mg Subcutaneous Daily    insulin lispro  0-12 Units Subcutaneous TID WC    insulin lispro  0-6 Units Subcutaneous Nightly    influenza virus vaccine  0.5 mL Intramuscular Prior to discharge       Vital Signs:   /71   Pulse 77   Temp 97.1 °F (36.2 °C) (Oral)   Resp 16   Ht 5' 2\" (1.575 m)   Wt 174 lb 9.6 oz (79.2 kg)   SpO2 98%   BMI 31.93 kg/m²      Intake/Output Summary (Last 24 hours) at 11/17/2020 1315  Last data filed at 11/17/2020 1226  Gross per 24 hour   Intake 3508.62 ml   Output 1200 ml   Net 2308.62 ml        Physical Examination: General appearance - alert, well appearing, and in no distress  Mental status - alert, oriented to person, place, and time  Neck - supple, no significant adenopathy, no JVD, or carotid bruits  Chest - clear to auscultation, no wheezes, rales or rhonchi, symmetric air entry  Heart - normal rate, regular rhythm, normal S1, S2, no murmurs, rubs, clicks or gallops  Abdomen - soft, nontender, nondistended, no masses or organomegaly  Neurological - alert, oriented, normal speech, no focal findings or movement disorder noted  Musculoskeletal - no muscular tenderness noted  Extremities - peripheral pulses normal, no pedal edema, no clubbing or cyanosis  Skin - normal coloration and turgor, no rashes, no suspicious skin lesions noted    Data: (All radiographs, tracings, PFTs, and imaging are personally viewed and interpreted unless otherwise noted).     Pt walked, very unsteady with wide base; will have PT see      Electronically signed by Sara Smith MD on 11/17/2020 at 1:15 PM

## 2020-11-17 NOTE — CONSULTS
[facility] attempted, pt declined stating he plan on entering rehab at [facility] Pt receptive to receiving additional resources from Clinician including additional inpatient rehab, OP AOD treatment and AA/NA meeting information.

## 2020-11-17 NOTE — CARE COORDINATION
DISASTER CHARTING    11/17/20, 12:44 PM EST    DISCHARGE ONGOING EVALUATION:     Devorah Tovar day: 2  Location: [facility]/001-A Reason for admit: Alcohol use disorder, severe, dependence [facility] [F10.20]   Barriers to Discharge: pheno protocol  PCP: BARB Candelario CNP  Patient Goals/Plan/Treatment Preferences: plans home and will follow at  rehab program.      11/17/20, 12:48 PM EST    Patient goals/plan/ treatment preferences discussed by  and . Patient goals/plan/ treatment preferences reviewed with patient/ family. Patient/ family verbalize understanding of discharge plan and are in agreement with goal/plan/treatment preferences. Understanding was demonstrated using the teach back method. AVS provided by RN at time of discharge, which includes all necessary medical information pertaining to the patients current course of illness, treatment, post-discharge goals of care, and treatment preferences.

## 2020-11-17 NOTE — TELEPHONE ENCOUNTER
----- Message from Victoria Rutherford RN sent at 11/17/2020 12:52 PM EST -----  Regarding: Hospital follow up appt  Pt will be discharged in 2 hours. Pt's confirmed phone number is [phone]. Preferred day for appt. Monday  Preferred time of day for appt. Morning  Pt's readmission risk score is 30%. Please place appt in computer and message me back \"Done\". Thank you!

## 2020-11-17 NOTE — PROGRESS NOTES
Pharmacy Medication History Note      List of current medications patient is taking is complete. Source of information: Patient    Changes made to medication list:  Medications removed (include reason, ex. therapy complete or physician discontinued):  Removed Nicotrol inhaler  Removed Zofran 4 mg tab    Medications added/doses adjusted: Added Folate 1333 mcg tab    Other notes (ex. Recent course of antibiotics, Coumadin dosing):    Denies use of other OTC or herbal medications.       Allergies reviewed      Electronically signed by Ashutosh Falcon on 11/17/2020 at 10:25 AM

## 2020-11-18 VITALS
SYSTOLIC BLOOD PRESSURE: 109 MMHG | OXYGEN SATURATION: 100 % | HEART RATE: 75 BPM | DIASTOLIC BLOOD PRESSURE: 69 MMHG | HEIGHT: 62 IN | RESPIRATION RATE: 16 BRPM | TEMPERATURE: 97.8 F | BODY MASS INDEX: 32.17 KG/M2 | WEIGHT: 174.8 LBS

## 2020-11-18 LAB — GLUCOSE BLD-MCNC: 153 MG/DL (ref 70–108)

## 2020-11-18 PROCEDURE — 99239 HOSP IP/OBS DSCHRG MGMT >30: CPT | Performed by: INTERNAL MEDICINE

## 2020-11-18 PROCEDURE — 6360000002 HC RX W HCPCS: Performed by: INTERNAL MEDICINE

## 2020-11-18 PROCEDURE — 6370000000 HC RX 637 (ALT 250 FOR IP): Performed by: INTERNAL MEDICINE

## 2020-11-18 PROCEDURE — 90686 IIV4 VACC NO PRSV 0.5 ML IM: CPT | Performed by: INTERNAL MEDICINE

## 2020-11-18 PROCEDURE — G0008 ADMIN INFLUENZA VIRUS VAC: HCPCS | Performed by: INTERNAL MEDICINE

## 2020-11-18 PROCEDURE — 94760 N-INVAS EAR/PLS OXIMETRY 1: CPT

## 2020-11-18 PROCEDURE — 82948 REAGENT STRIP/BLOOD GLUCOSE: CPT

## 2020-11-18 PROCEDURE — 94640 AIRWAY INHALATION TREATMENT: CPT

## 2020-11-18 PROCEDURE — 6370000000 HC RX 637 (ALT 250 FOR IP): Performed by: FAMILY MEDICINE

## 2020-11-18 PROCEDURE — 2580000003 HC RX 258: Performed by: INTERNAL MEDICINE

## 2020-11-18 RX ADMIN — ASPIRIN 81 MG: 81 TABLET, COATED ORAL at 07:54

## 2020-11-18 RX ADMIN — VENLAFAXINE HYDROCHLORIDE 37.5 MG: 37.5 CAPSULE, EXTENDED RELEASE ORAL at 07:55

## 2020-11-18 RX ADMIN — Medication 10 ML: at 08:02

## 2020-11-18 RX ADMIN — INFLUENZA A VIRUS A/VICTORIA/2454/2019 IVR-207 (H1N1) ANTIGEN (PROPIOLACTONE INACTIVATED), INFLUENZA A VIRUS A/HONG KONG/2671/2019 IVR-208 (H3N2) ANTIGEN (PROPIOLACTONE INACTIVATED), INFLUENZA B VIRUS B/VICTORIA/705/2018 BVR-11 ANTIGEN (PROPIOLACTONE INACTIVATED), INFLUENZA B VIRUS B/PHUKET/3073/2013 BVR-1B ANTIGEN (PROPIOLACTONE INACTIVATED) 0.5 ML: 15; 15; 15; 15 INJECTION, SUSPENSION INTRAMUSCULAR at 11:18

## 2020-11-18 RX ADMIN — Medication 100 MG: at 07:54

## 2020-11-18 RX ADMIN — QUETIAPINE FUMARATE 25 MG: 25 TABLET ORAL at 07:54

## 2020-11-18 RX ADMIN — BUDESONIDE AND FORMOTEROL FUMARATE DIHYDRATE 2 PUFF: 80; 4.5 AEROSOL RESPIRATORY (INHALATION) at 08:16

## 2020-11-18 RX ADMIN — PHENOBARBITAL 32.4 MG: 32.4 TABLET ORAL at 08:01

## 2020-11-18 RX ADMIN — FOLIC ACID 1 MG: 1 TABLET ORAL at 07:54

## 2020-11-18 RX ADMIN — MULTIPLE VITAMINS W/ MINERALS TAB 1 TABLET: TAB at 07:55

## 2020-11-18 RX ADMIN — ENOXAPARIN SODIUM 40 MG: 40 INJECTION SUBCUTANEOUS at 07:55

## 2020-11-18 ASSESSMENT — PAIN SCALES - GENERAL
PAINLEVEL_OUTOF10: 0
PAINLEVEL_OUTOF10: 0

## 2020-11-18 NOTE — PROGRESS NOTES
[address] [facility]  PHYSICAL THERAPY MISSED TREATMENT NOTE  [facility] RENAL TELEMETRY 6K    Date: 2020  Patient Name: Samy Otoole        MRN: [id number]   : 1985  (38 y.o.)  Gender: male                REASON FOR MISSED TREATMENT:  Missed Treat. Per chart review, pt to DC home today, DC summary in, stating pt ambulating without difficulty. PT will sign off.

## 2020-11-18 NOTE — DISCHARGE SUMMARY
Hospital Medicine Discharge Summary      Patient Identification:   Sveta Herrera   : 1985  MRN: [id number]   Account: [de-identified]      Patient's PCP: BARB Toribio CNP    Admit Date: 11/15/2020     Discharge Date:   20    Admitting Physician: Roxann Benitez MD     Discharge Physician: Kyler Chong MD     Discharge Diagnoses: Uncomplicated Alcohol Withdrawal with History of Alcohol Abuse, PTSD, Depression, DM2     Active Hospital Problems    Diagnosis Date Noted    PTSD (post-traumatic stress disorder) [F43.10]     Alcohol use disorder, severe, dependence ([facility] [address] [F10.20] 11/15/2020    Affective psychosis, bipolar ([facility] [address] [F31.9] 2020    Cigarette nicotine dependence without complication [C23.181]     DKA, type 2, not at goal [facility]) [E11.10] 2020    Chronic obstructive lung disease ([facility] [address] [J44.9] 10/13/2013       The patient was seen and examined on day of discharge and this discharge summary is in conjunction with any daily progress note from day of discharge. Hospital Course:   Sveta Herrera is a 38 y.o. male admitted to [facility] on 11/15/2020 for alcohol withdrawal. Has a long hx of alcohol abuse. He states he was off etoh 2 weeks but then drank 2 large bottles of vodka; he came to ER in a state of severe withdrawal and requested help to withdraw. He has plans to go to a facility afterward. He has had numerous admissions for same. He has a hx of depression, suicidality, diabetes. Pt started on Phenobarbital protocol. Did well, pt to be discharged home and will follow-up at a inpatient rehab for alcohol abuse in [facility]. Tolerating PO intake, ambulating with no difficulty.        Exam:     Vitals:  Vitals:    20 2305 20 0348 20 0751 20 0816   BP: 111/72 114/69 109/69    Pulse: 82 57 75    Resp: 16 16 16    Temp: 98 °F (36.7 °C) 97.6 °F (36.4 °C) 97.8 °F (36.6 °C)    TempSrc: Oral Oral Oral    SpO2: 97% 96% 97% 100%   Weight:  174 lb 12.8 oz (79.3 kg)     Height:         Weight: Weight: 174 lb 12.8 oz (79.3 kg)     24 hour intake/output:    Intake/Output Summary (Last 24 hours) at 11/18/2020 0851  Last data filed at 11/18/2020 0348  Gross per 24 hour   Intake 1510 ml   Output 2275 ml   Net -765 ml         Labs: For convenience and continuity at follow-up the following most recent labs are provided:      CBC:    Lab Results   Component Value Date    WBC 7.9 11/16/2020    HGB 14.3 11/16/2020    HCT 42.7 11/16/2020     11/16/2020       Renal:    Lab Results   Component Value Date     11/15/2020    K 3.9 11/15/2020     11/15/2020    CO2 24 11/15/2020    BUN 9 11/15/2020    CREATININE 0.7 11/15/2020    CALCIUM 9.9 11/15/2020    PHOS 2.7 11/15/2020         Significant Diagnostic Studies    Radiology:   No orders to display          Consults:     IP CONSULT TO SOCIAL WORK  IP CONSULT TO ADDICTION SERVICES    Disposition:    [x] Home       [] TCU       [] Rehab       [] Psych       [] SNF       [] [place]       [] Other-    Condition at Discharge: Stable    Code Status:  Full Code     Patient Instructions: Activity: activity as tolerated  Diet: DIET CARB CONTROL; Carb Control: 4 carb choices (60 gms)/meal      Follow-up visits:   No follow-up provider specified. Discharge Medications:      Chepe Florentino   Home Medication Instructions [id number]    Printed on:11/18/20 0572   Medication Information                      acetaminophen (TYLENOL) 500 MG tablet  Take 1,000 mg by mouth every 6 hours as needed for Pain             albuterol sulfate HFA (PROVENTIL HFA) 108 (90 Base) MCG/ACT inhaler  Inhale 2 puffs into the lungs every 6 hours as needed for Wheezing             aspirin 81 MG EC tablet  Take 1 tablet by mouth daily             betamethasone valerate (VALISONE) 0.1 % cream  APPLY TOPICALLY 2 TIMES DAILY.              diphenhydrAMINE (BENADRYL) 25 MG capsule  Take 50 mg by mouth 2 times daily Per self dose             fluticasone-salmeterol (ADVAIR) 250-50 MCG/DOSE AEPB  Inhale 1 puff into the lungs every 12 hours             Folic Acid (FOLATE PO)  Take 1,333 mcg by mouth daily             hydrocortisone 2.5 % cream  Apply topically 2 times daily. hydrOXYzine (VISTARIL) 100 MG capsule  Take 1 capsule by mouth 4 times daily as needed for Anxiety             insulin aspart (NOVOLOG FLEXPEN) 100 UNIT/ML injection pen  Inject 2-12 units SQ TID with meals as directed per sliding scale, max dose 36 units/day             insulin glargine (LANTUS SOLOSTAR) 100 UNIT/ML injection pen  Inject 25 Units into the skin daily             Insulin Pen Needle 30G X 8 MM MISC  1 each by Does not apply route 3 times daily             Multiple Vitamins-Minerals (MENS MULTIVITAMIN PLUS) TABS  Take 1 tablet by mouth daily             propranolol (INDERAL LA) 80 MG extended release capsule  Take 1 capsule by mouth daily             QUEtiapine (SEROQUEL) 25 MG tablet  Take 1 tablet by mouth 2 times daily             traZODone (DESYREL) 50 MG tablet  Take 1 tablet by mouth nightly as needed for Sleep             venlafaxine (EFFEXOR XR) 37.5 MG extended release capsule  Take 1 capsule by mouth daily (with breakfast)             vitamin B-1 100 MG tablet  Take 1 tablet by mouth daily                 Time Spent on discharge is more than 30 minutes in the examination, evaluation, counseling and review of medications and discharge plan. Signed: Thank you BARB Morales CNP for the opportunity to be involved in this patient's care.     Electronically signed by Gretchen Booker MD on 11/18/2020 at 8:51 AM

## 2020-11-18 NOTE — PROGRESS NOTES
Discharge teaching and instructions for diagnosis/procedure of alcohol withdaw completed with patient using teachback method. AVS reviewed. Printed prescriptions given to patient. Patient voiced understanding regarding prescriptions, follow up appointments, and care of self at home. Discharged ambulatory to  home with support per family     Educated patient on follow up appointments. As patient is going to inpatient rehab Monday he stated he already notified his PCP. Reviewed new medication. No questions voiced. Discharged with all belongings including home medications. Flu shot provided before discharge, see MAR.

## 2020-11-19 NOTE — TELEPHONE ENCOUNTER
[facility] Transitions Initial Follow Up Call    Outreach made within 2 business days of discharge: Yes    Patient: Sunil Elaine Patient : 1985   MRN: [id number]  Reason for Admission: There are no discharge diagnoses documented for the most recent discharge. Discharge Date: 20       Spoke with: SETH for patient to return call at their earliest convenience.        Discharge department/facility: [facility]

## 2020-11-20 NOTE — TELEPHONE ENCOUNTER
[facility] Transitions Initial Follow Up Call    Outreach made within 2 business days of discharge: Yes    Patient: Rolf Gonzalez Patient : 1985   MRN: [id number]  Reason for Admission: There are no discharge diagnoses documented for the most recent discharge. Discharge Date: 20       Spoke with: [facility] requesting pt to call back at earliest convenience.      Discharge department/facility: [facility]       Future Appointments   Date Time Provider Malachi Gray   12/10/2020 10:00 AM William Montero MD [facility] - Filipe Enciso LPN

## 2020-12-02 ENCOUNTER — TELEPHONE (OUTPATIENT)
Dept: INTERNAL MEDICINE CLINIC | Age: 35
End: 2020-12-02

## 2020-12-02 NOTE — TELEPHONE ENCOUNTER
Patient's Mother Nuha Gaitan - -LFKSCA that patient went to [address] [place] [address] in [facility]  On  11 23 2020- and he will be there for 30days. She requested I cancel patient appointment for 12 10 2020.

## 2020-12-28 ENCOUNTER — TELEPHONE (OUTPATIENT)
Dept: FAMILY MEDICINE CLINIC | Age: 35
End: 2020-12-28

## 2020-12-28 ENCOUNTER — VIRTUAL VISIT (OUTPATIENT)
Dept: FAMILY MEDICINE CLINIC | Age: 35
End: 2020-12-28
Payer: COMMERCIAL

## 2020-12-28 ENCOUNTER — HOSPITAL ENCOUNTER (INPATIENT)
Age: 35
LOS: 1 days | Discharge: HOME | DRG: 753 | End: 2020-12-30
Attending: EMERGENCY MEDICINE | Admitting: PSYCHIATRY & NEUROLOGY
Payer: COMMERCIAL

## 2020-12-28 DIAGNOSIS — F10.920 ACUTE ALCOHOLIC INTOXICATION WITHOUT COMPLICATION (HCC): Primary | ICD-10-CM

## 2020-12-28 DIAGNOSIS — R45.851 SUICIDAL IDEATION: ICD-10-CM

## 2020-12-28 LAB
ACETAMINOPHEN LEVEL: < 5 UG/ML (ref 0–20)
ALBUMIN SERPL-MCNC: 4.2 G/DL (ref 3.5–5.1)
ALP BLD-CCNC: 137 U/L (ref 38–126)
ALT SERPL-CCNC: 36 U/L (ref 11–66)
AMPHETAMINE+METHAMPHETAMINE URINE SCREEN: NEGATIVE
ANION GAP SERPL CALCULATED.3IONS-SCNC: 15 MEQ/L (ref 8–16)
AST SERPL-CCNC: 21 U/L (ref 5–40)
BARBITURATE QUANTITATIVE URINE: NEGATIVE
BASOPHILS # BLD: 0.7 %
BASOPHILS ABSOLUTE: 0.1 THOU/MM3 (ref 0–0.1)
BENZODIAZEPINE QUANTITATIVE URINE: NEGATIVE
BILIRUB SERPL-MCNC: < 0.2 MG/DL (ref 0.3–1.2)
BILIRUBIN URINE: NEGATIVE
BLOOD, URINE: NEGATIVE
BUN BLDV-MCNC: 7 MG/DL (ref 7–22)
CALCIUM SERPL-MCNC: 9.3 MG/DL (ref 8.5–10.5)
CANNABINOID QUANTITATIVE URINE: NEGATIVE
CHARACTER, URINE: CLEAR
CHLORIDE BLD-SCNC: 108 MEQ/L (ref 98–111)
CO2: 24 MEQ/L (ref 23–33)
COCAINE METABOLITE QUANTITATIVE URINE: NEGATIVE
COLOR: YELLOW
CREAT SERPL-MCNC: 0.7 MG/DL (ref 0.4–1.2)
EOSINOPHIL # BLD: 4.1 %
EOSINOPHILS ABSOLUTE: 0.4 THOU/MM3 (ref 0–0.4)
ERYTHROCYTE [DISTWIDTH] IN BLOOD BY AUTOMATED COUNT: 12.8 % (ref 11.5–14.5)
ERYTHROCYTE [DISTWIDTH] IN BLOOD BY AUTOMATED COUNT: 42.2 FL (ref 35–45)
ETHYL ALCOHOL, SERUM: 0.22 %
GFR SERPL CREATININE-BSD FRML MDRD: > 90 ML/MIN/1.73M2
GLUCOSE BLD-MCNC: 194 MG/DL (ref 70–108)
GLUCOSE URINE: NEGATIVE MG/DL
HCT VFR BLD CALC: 44.7 % (ref 42–52)
HEMOGLOBIN: 15.1 GM/DL (ref 14–18)
IMMATURE GRANS (ABS): 0.04 THOU/MM3 (ref 0–0.07)
IMMATURE GRANULOCYTES: 0.4 %
KETONES, URINE: NEGATIVE
LEUKOCYTE ESTERASE, URINE: NEGATIVE
LYMPHOCYTES # BLD: 29.8 %
LYMPHOCYTES ABSOLUTE: 2.7 THOU/MM3 (ref 1–4.8)
MCH RBC QN AUTO: 30.8 PG (ref 26–33)
MCHC RBC AUTO-ENTMCNC: 33.8 GM/DL (ref 32.2–35.5)
MCV RBC AUTO: 91.2 FL (ref 80–94)
MONOCYTES # BLD: 6.3 %
MONOCYTES ABSOLUTE: 0.6 THOU/MM3 (ref 0.4–1.3)
NITRITE, URINE: NEGATIVE
NUCLEATED RED BLOOD CELLS: 0 /100 WBC
OPIATES, URINE: NEGATIVE
OSMOLALITY CALCULATION: 295.7 MOSMOL/KG (ref 275–300)
OXYCODONE: NEGATIVE
PH UA: 6.5 (ref 5–9)
PHENCYCLIDINE QUANTITATIVE URINE: NEGATIVE
PLATELET # BLD: 340 THOU/MM3 (ref 130–400)
PMV BLD AUTO: 9.7 FL (ref 9.4–12.4)
POTASSIUM REFLEX MAGNESIUM: 3.9 MEQ/L (ref 3.5–5.2)
PROTEIN UA: NEGATIVE
RBC # BLD: 4.9 MILL/MM3 (ref 4.7–6.1)
SALICYLATE, SERUM: < 0.3 MG/DL (ref 2–10)
SEG NEUTROPHILS: 58.7 %
SEGMENTED NEUTROPHILS ABSOLUTE COUNT: 5.4 THOU/MM3 (ref 1.8–7.7)
SODIUM BLD-SCNC: 147 MEQ/L (ref 135–145)
SPECIFIC GRAVITY, URINE: 1.02 (ref 1–1.03)
TOTAL PROTEIN: 6.8 G/DL (ref 6.1–8)
UROBILINOGEN, URINE: 0.2 EU/DL (ref 0–1)
WBC # BLD: 9.2 THOU/MM3 (ref 4.8–10.8)

## 2020-12-28 PROCEDURE — 36415 COLL VENOUS BLD VENIPUNCTURE: CPT

## 2020-12-28 PROCEDURE — 81003 URINALYSIS AUTO W/O SCOPE: CPT

## 2020-12-28 PROCEDURE — 85025 COMPLETE CBC W/AUTO DIFF WBC: CPT

## 2020-12-28 PROCEDURE — 80307 DRUG TEST PRSMV CHEM ANLYZR: CPT

## 2020-12-28 PROCEDURE — 99285 EMERGENCY DEPT VISIT HI MDM: CPT

## 2020-12-28 PROCEDURE — 99214 OFFICE O/P EST MOD 30 MIN: CPT | Performed by: NURSE PRACTITIONER

## 2020-12-28 PROCEDURE — G0480 DRUG TEST DEF 1-7 CLASSES: HCPCS

## 2020-12-28 PROCEDURE — 80053 COMPREHEN METABOLIC PANEL: CPT

## 2020-12-28 PROCEDURE — G8428 CUR MEDS NOT DOCUMENT: HCPCS | Performed by: NURSE PRACTITIONER

## 2020-12-28 RX ORDER — QUETIAPINE FUMARATE 100 MG/1
100 TABLET, FILM COATED ORAL NIGHTLY
Qty: 30 TABLET | Refills: 5 | Status: SHIPPED | OUTPATIENT
Start: 2020-12-28

## 2020-12-28 RX ORDER — OXCARBAZEPINE 300 MG/1
300 TABLET, FILM COATED ORAL 2 TIMES DAILY
COMMUNITY
End: 2020-12-28 | Stop reason: SDUPTHER

## 2020-12-28 RX ORDER — OXCARBAZEPINE 300 MG/1
300 TABLET, FILM COATED ORAL 2 TIMES DAILY
Qty: 60 TABLET | Refills: 1 | Status: SHIPPED | OUTPATIENT
Start: 2020-12-28 | End: 2022-04-08 | Stop reason: ALTCHOICE

## 2020-12-28 ASSESSMENT — ENCOUNTER SYMPTOMS
RHINORRHEA: 1
SORE THROAT: 0
COUGH: 0
COLOR CHANGE: 0
SORE THROAT: 1
SHORTNESS OF BREATH: 0
NAUSEA: 0
ABDOMINAL PAIN: 0
CONSTIPATION: 0
EYE REDNESS: 0
DIARRHEA: 0
VOMITING: 0
VOMITING: 0
COUGH: 1
DIARRHEA: 0
WHEEZING: 0
SHORTNESS OF BREATH: 0
ABDOMINAL PAIN: 0
TROUBLE SWALLOWING: 0
RHINORRHEA: 0
NAUSEA: 0
EYE PAIN: 0

## 2020-12-28 ASSESSMENT — SLEEP AND FATIGUE QUESTIONNAIRES: DIFFICULTY ARISING: YES

## 2020-12-28 ASSESSMENT — PATIENT HEALTH QUESTIONNAIRE - PHQ9: SUM OF ALL RESPONSES TO PHQ QUESTIONS 1-9: 19

## 2020-12-28 ASSESSMENT — PAIN SCALES - GENERAL
PAINLEVEL_OUTOF10: 0
PAINLEVEL_OUTOF10: 8

## 2020-12-28 ASSESSMENT — PAIN DESCRIPTION - PAIN TYPE: TYPE: ACUTE PAIN

## 2020-12-28 NOTE — ED TRIAGE NOTES
Presents to ER by [facility] with complaints of suicidal ideation. Pt states he has a history of depression and states he has been thinking about killing himself \"for a couple of hours. \" Pt denies having a suicidal plan. Pt calm and cooperative with staff. Patient placed in safe room that is ligature resistant with continuous monitoring in place. Provider notified, requested an assessment by behavioral health . Patient belongings secured in a locked lockers outside of the room. Explained suicide prevention precautions to the patient including constant observer. Level A paged.

## 2020-12-28 NOTE — TELEPHONE ENCOUNTER
LM for patient to return call at their earliest convenience. Pt has an appt scheduled for tomorrow @ 9am. See if pt would like to have his appt changed to today.

## 2020-12-28 NOTE — ED PROVIDER NOTES
[facility] [address] COMPLAINT       Chief Complaint   Patient presents with    Suicidal       Nurses Notes reviewed and I agree except as noted in the HPI. HISTORY OF PRESENT ILLNESS    Carlin Eaton is a 38 y.o. male who presents to the emergency department for suicidal thoughts. Patient states that he has been feeling suicidal the last 2 days and reports a plan to hang himself. He denies any past suicidal attempt. Denies homicidal ideation, auditory or visual hallucinations. He states that he currently sees [facility] and also follows at [facility]. He reports that he is compliant with taking his home medications as directed. He reports being diagnosed with depression, bipolar, and PTSD. He also reports that he was in a treatment center for alcohol from October 20-23. He states that he relapsed and began drinking on November 15. He also reported that one of his daily medications that he is compliant with taking is Vivitrol. He did drink vodka this morning. Denies any drug use. No other initial complaints or concerns. REVIEW OF SYSTEMS     Review of Systems   Constitutional: Negative for diaphoresis and fever. HENT: Negative for congestion, rhinorrhea and sore throat. Eyes: Negative for visual disturbance. Respiratory: Negative for cough and shortness of breath. Cardiovascular: Negative for chest pain, palpitations and leg swelling. Gastrointestinal: Negative for abdominal pain, constipation, diarrhea, nausea and vomiting. Endocrine: Negative for polyuria. Genitourinary: Negative for dysuria. Musculoskeletal: Negative for joint swelling. Skin: Negative for rash. Neurological: Negative for dizziness, seizures, syncope, speech difficulty, weakness, numbness and headaches. Hematological: Negative for adenopathy. Psychiatric/Behavioral: Positive for suicidal ideas. Negative for confusion and self-injury. All other systems reviewed and are negative. PAST MEDICAL HISTORY    has a past medical history of Asthma, COPD (chronic obstructive pulmonary disease) ([facility] [address] Eczema, GERD (gastroesophageal reflux disease), History of alcohol abuse, History of marijuana use, History of tobacco abuse, Hx of seasonal allergies, Pancreatitis, Psychiatric problem, PTSD (post-traumatic stress disorder), Seizures ([facility] [address] and Type 2 diabetes mellitus without complication ([facility] [address] SURGICAL HISTORY      has a past surgical history that includes Upper gastrointestinal endoscopy (Left, 5/6/2019); Eye surgery (Right, 09/2019); fracture surgery (Right, 2019); and Ankle arthroscopy (Right, 8/5/2020).     CURRENT MEDICATIONS       Previous Medications    ACETAMINOPHEN (TYLENOL) 500 MG TABLET    Take 1,000 mg by mouth every 6 hours as needed for Pain    ALBUTEROL SULFATE HFA (PROVENTIL HFA) 108 (90 BASE) MCG/ACT INHALER    Inhale 2 puffs into the lungs every 6 hours as needed for Wheezing    ASPIRIN 81 MG EC TABLET    Take 1 tablet by mouth daily    DIPHENHYDRAMINE (BENADRYL) 25 MG CAPSULE    Take 50 mg by mouth 2 times daily Per self dose    FLUTICASONE-SALMETEROL (ADVAIR) 250-50 MCG/DOSE AEPB    Inhale 1 puff into the lungs every 12 hours    FOLIC ACID (FOLATE PO)    Take 1,333 mcg by mouth daily    HYDROXYZINE (VISTARIL) 100 MG CAPSULE    Take 1 capsule by mouth 4 times daily as needed for Anxiety    INSULIN ASPART (NOVOLOG FLEXPEN) 100 UNIT/ML INJECTION PEN    Inject 2-12 units SQ TID with meals as directed per sliding scale, max dose 36 units/day    INSULIN GLARGINE (LANTUS SOLOSTAR) 100 UNIT/ML INJECTION PEN    Inject 25 Units into the skin daily    INSULIN PEN NEEDLE 30G X 8 MM MISC    1 each by Does not apply route 3 times daily    MULTIPLE VITAMINS-MINERALS (MENS MULTIVITAMIN PLUS) TABS    Take 1 tablet by mouth daily OXCARBAZEPINE (TRILEPTAL) 300 MG TABLET    Take 1 tablet by mouth 2 times daily    QUETIAPINE (SEROQUEL) 100 MG TABLET    Take 1 tablet by mouth nightly    TRAZODONE (DESYREL) 50 MG TABLET    Take 1 tablet by mouth nightly as needed for Sleep    VITAMIN B-1 100 MG TABLET    Take 1 tablet by mouth daily       ALLERGIES     is allergic to paxil [paroxetine]. FAMILY HISTORY     He indicated that his mother is alive. He indicated that his father is . He indicated that the status of his sister is unknown.   family history includes Other in his mother and sister; Other (age of onset: 39) in his father. SOCIAL HISTORY      reports that he has been smoking cigarettes. He has a 7.50 pack-year smoking history. He has never used smokeless tobacco. He reports current alcohol use. He reports previous drug use. Drug: Marijuana. PHYSICAL EXAM     INITIAL VITALS:  height is 5' 2\" (1.575 m) and weight is 180 lb (81.6 kg). His temperature is 98.1 °F (36.7 °C). His blood pressure is 122/61 and his pulse is 105. His respiration is 18 and oxygen saturation is 99%. CONSTITUTIONAL: [Awake, alert, non toxic, well developed, well nourished, no acute distress, sitting on stretcher with cane]  HEAD: [Normocephalic, atraumatic]  EYES: [Pupils equal, round & reactive to light, extraocular movements intact, no nystagmus, clear conjunctiva, non-icteric sclera]  ENT: [External ear canal clear without evidence of cerumen impaction or foreign body, TM's clear without erythema or bulging. Nares patent without drainage, septum appears midline. Moist mucus membranes, oropharynx clear without exudate, erythema, or mass. Uvula midline]  NECK: [Nontender and supple. No meningismus, no appreciated lymphadenopathy. Intact full range of motion. C-spine midline without vertebral tenderness. Trachea midline.]  CHEST: [Inspection normal, no lesions, equal rise.  No crepitus or tenderness upon palpation.] CARDIOVASCULAR: [Regular rate, rhythm, normal S1 and S2. No appreciated murmurs, rubs, or gallops. No pulse deficits appreciated. Intact distal perfusion. JVD not appreciated.]  PULMONARY: [Respiratory distress absent. Respiratory effort normal. Breath sounds clear to auscultation without rhonchi, rales, or wheezing. No accessory muscle use. No stridor]  ABDOMEN: [Inspection normal, without surgical scars. Soft, non-tender, non-distended, with normoactive bowel sounds. No palpable masses, rebound, or guarding]  BACK: [Intact ROM. No midline vertebral tenderness, step off, or crepitus. No CVA tenderness.]  MUSCULOSKELETAL: [Extremities nontender to palpation. No gross deformity or evidence of external trauma. Intact range of motion. Sensation intact. No clubbing, cyanosis, or edema.]  SKIN: [Warm, dry. No jaundice, rash, urticaria, or petechiae]  NEUROLOGIC: [Alert and oriented x 3, GCS 15, normal mentation for age. Moves all four extremities. No gross sensory deficit.  Cerebellar function grossly normal.]  PSYCHIATRIC: [Normal mood and affect, thought process is clear and linear]     DIFFERENTIAL DIAGNOSIS:   Depression, ETOH intoxication, bipolar exacerbation, less likely schizophrenia, and others    DIAGNOSTIC RESULTS       RADIOLOGY: non-plain film images(s) such as CT,Ultrasound and MRI are read by the radiologist.    No orders to display     [] Visualized and interpreted by me   [] Radiologist's Wet Read Report Reviewed   [] Discussed withRadiologist.    LABS:   Labs Reviewed   COMPREHENSIVE METABOLIC PANEL W/ REFLEX TO MG FOR LOW K - Abnormal; Notable for the following components:       Result Value    Glucose 194 (*)     Sodium 147 (*)     Alkaline Phosphatase 137 (*)     Total Bilirubin <0.2 (*)     All other components within normal limits   SALICYLATE LEVEL - Abnormal; Notable for the following components:    Salicylate, Serum < 0.3 (*)     All other components within normal limits URINE RT REFLEX TO CULTURE   CBC WITH AUTO DIFFERENTIAL   ETHANOL   URINE DRUG SCREEN   ACETAMINOPHEN LEVEL   ANION GAP   GLOMERULAR FILTRATION RATE, ESTIMATED   OSMOLALITY   ETHANOL       EMERGENCY DEPARTMENT COURSE:   Vitals:    Vitals:    12/28/20 1656 12/28/20 2248   BP: 130/87 122/61   Pulse: 122 105   Resp: 15 18   Temp: 98.1 °F (36.7 °C)    SpO2: 96% 99%   Weight: 180 lb (81.6 kg)    Height: 5' 2\" (1.575 m)      Patient will need obs until sober for CAROLINE eval.    Patient's ETOH below legal limit, will be seen by CAROLINE and dispositioned accordingly. Seen by CAROLINE, will be admitted by Dr. Vanessa Allison. CRITICAL CARE:   None    CONSULTS:  CAROLINE     PROCEDURES:  None    FINAL IMPRESSION      1. Acute alcoholic intoxication without complication ([facility] [address]    2. Suicidal ideation          DISPOSITION/PLAN   admitted    PATIENT REFERRED TO:  No follow-up provider specified. DISCHARGE MEDICATIONS:  New Prescriptions    No medications on file       (Please note that portions of this note were completed with a voice recognition program.  Efforts were made to edit the dictations but occasionally words are mis-transcribed.)    Provider:  I personally performed the services described in the documentation, reviewed and edited the documentation which was dictated, and it accurately records my words and actions.     Katiana Moody MD 12/28/20 2:08 AM                Katiana Moody MD  12/29/20 [id number]

## 2020-12-28 NOTE — TELEPHONE ENCOUNTER
Pt is experiencing a fever and sinus congestion. Pt is needing a COVID test before he can return to [facility] recovery. Please advise.

## 2020-12-28 NOTE — PROGRESS NOTES
2020    TELEHEALTH EVALUATION -- Audio/Visual (During XZDAK-40 public health emergency)    HPI:    Marcial Bazan (:  1985) has requested an audio/video evaluation for the following concern(s):    URI Symptoms    HPI:      Symptoms have been present for 3 day(s). Symptoms are unchanged since they initially started. Fever? Yes - 102  Runny nose or congestion? Yes   Cough? Yes  Sore throat? Yes - slight  Headache, fatigue, joint pains, muscle aches? No  Shortness of breath/Wheezing? No  Nausea/Vomiting/Diarrhea? Yes - diarrhea  Double Sickening? No  Sick contacts? Yes - he went to some AA meetings and he thinks there were some people who were sick there    Patient has tried nyquil without improvement. Alcohol Abuse  Patient recently hospitalized in [facility] around the [facility] [place] for 1 month for ETOH abuse. He is doing well since discharge. He did have some urges to drink [place], but did not drink, and he has since been to some meetings through [place] and [place] [address] which have really helped him. He is planning on following up with Dr Megan Stephens for Vivitrol. He is also going to be following with [facility]. He has 2 recovery coaches at [facility]. He was on Trileptal 150 mg prior to going to [facility], but they upped the dose to 300 mg. He denies feeling depressed. Still has some anxiety though. He is having some difficulties sleeping. He has been taking Trazodone 50 mg night and Seroquel 50 mg nightly and they're not helping with sleep. Rash    HPI:    Length of time Sx have been present - months, he has been using betamethasone and hydrocortisone cream on his face and it's not helping at all. Rash has gotten unchanged since initially starting  Affected areas - face  Inciting events or exposures prior to rash starting? No  Pruritic? Yes  Erythematous? Yes  Weeping or drainage? No  History of Urticaria? No  Fever? No  Painful?   No    Review of Systems - General ROS: negative for - chills, fever or night sweats  Respiratory ROS: negative for - shortness of breath, stridor or wheezing      Review of Systems   Constitutional: Positive for fever. Negative for chills, diaphoresis and fatigue. HENT: Positive for congestion, rhinorrhea and sore throat. Negative for trouble swallowing. Eyes: Negative for pain, redness and visual disturbance. Respiratory: Positive for cough. Negative for shortness of breath and wheezing. Cardiovascular: Negative for chest pain, palpitations and leg swelling. Gastrointestinal: Negative for abdominal pain, diarrhea, nausea and vomiting. Endocrine: Negative for polydipsia, polyphagia and polyuria. Genitourinary: Negative for decreased urine volume, dysuria, frequency and urgency. Musculoskeletal: Negative for arthralgias and myalgias. Skin: Negative for color change and rash. Allergic/Immunologic: Negative for environmental allergies, food allergies and immunocompromised state. Neurological: Negative for dizziness, tremors, syncope and headaches. Hematological: Negative. Negative for adenopathy. Psychiatric/Behavioral: Negative for behavioral problems, confusion, self-injury and suicidal ideas. All other systems reviewed and are negative. Prior to Visit Medications    Medication Sig Taking?  Authorizing Provider   OXcarbazepine (TRILEPTAL) 300 MG tablet Take 1 tablet by mouth 2 times daily Yes BARB Reid CNP   QUEtiapine (SEROQUEL) 100 MG tablet Take 1 tablet by mouth nightly Yes BARB Reid CNP   Folic Acid (FOLATE PO) Take 1,333 mcg by mouth daily Yes Historical Provider, MD   diphenhydrAMINE (BENADRYL) 25 MG capsule Take 50 mg by mouth 2 times daily Per self dose Yes Historical Provider, MD   insulin aspart (NOVOLOG FLEXPEN) 100 UNIT/ML injection pen Inject 2-12 units SQ TID with meals as directed per sliding scale, max dose 36 units/day Yes BARB Reid CNP   traZODone (DESYREL) 50 MG tablet Take 1 tablet by mouth nightly as needed for Sleep Yes Jermaine Jameson MD   vitamin B-1 100 MG tablet Take 1 tablet by mouth daily Yes Gayle Treadwell PA-C   hydrOXYzine (VISTARIL) 100 MG capsule Take 1 capsule by mouth 4 times daily as needed for Anxiety Yes BARB Chacon CNP   insulin glargine (LANTUS SOLOSTAR) 100 UNIT/ML injection pen Inject 25 Units into the skin daily Yes BARB Chacon CNP   albuterol sulfate HFA (PROVENTIL HFA) 108 (90 Base) MCG/ACT inhaler Inhale 2 puffs into the lungs every 6 hours as needed for Wheezing Yes BARB Chacon CNP   Insulin Pen Needle 30G X 8 MM MISC 1 each by Does not apply route 3 times daily Yes BARB Chacon CNP   fluticasone-salmeterol (ADVAIR) 250-50 MCG/DOSE AEPB Inhale 1 puff into the lungs every 12 hours Yes BARB Chacon CNP   aspirin 81 MG EC tablet Take 1 tablet by mouth daily Yes Yesy Smiley MD   acetaminophen (TYLENOL) 500 MG tablet Take 1,000 mg by mouth every 6 hours as needed for Pain Yes Historical Provider, MD   Multiple Vitamins-Minerals (MENS MULTIVITAMIN PLUS) TABS Take 1 tablet by mouth daily Yes Historical Provider, MD       Social History     Tobacco Use    Smoking status: Current Every Day Smoker     Packs/day: 0.50     Years: 15.00     Pack years: 7.50     Types: Cigarettes    Smokeless tobacco: Never Used   Substance Use Topics    Alcohol use: Yes     Frequency: 4 or more times a week     Drinks per session: 10 or more     Comment: 1 gallon of vodka a day - last drank vodka 10/27/20    Drug use: Not Currently     Types: Marijuana     Comment: approx 2 or more years        Allergies   Allergen Reactions    Paxil [Paroxetine] Swelling and Rash   ,   Past Medical History:   Diagnosis Date    Asthma     COPD [facility]ch[facility] obstructive pulmonary disease) [facility]     Eczema     GERD (gastroesophageal reflux disease)     History of alcohol abuse     History of marijuana use     History of tobacco abuse     quit 11/21/2017    Hx of seasonal allergies     Pancreatitis     Psychiatric problem     PTSD (post-traumatic stress disorder)     Seizures (HCC)     etoh wdl    Type 2 diabetes mellitus without complication ([facility] 17/74/6667   ,   Past Surgical History:   Procedure Laterality Date    ANKLE ARTHROSCOPY Right 8/5/2020    ANKLE ARTHROSCOPY WITH  MEDIAL DEBRIDEMENT RIGHT ANKLE, ANKLE ARTHROTOMY WITH DEBRIDEMENT performed by Guerrero Mccoy DPM at [address] Right 09/2019    DR [facility]    FRACTURE SURGERY Right 2019    orbital fracture surgery    UPPER GASTROINTESTINAL ENDOSCOPY Left 5/6/2019    EGD BIOPSY performed by Cinda Slade MD at [facility]   ,   Family History   Problem Relation Age of Onset    Other Mother         gestational diabetes    Other Father 39        suicide    Other Sister         hypoglycemia   ,   Immunization History   Administered Date(s) Administered    Influenza Vaccine, unspecified formulation 10/03/2018    Influenza Virus Vaccine 10/03/2018    Influenza, Intradermal, Quadrivalent, Preservative Free 12/12/2017    Influenza, Quadv, IM, PF (6 mo and older Fluzone, Flulaval, Fluarix, and 3 yrs and older Afluria) 11/18/2020    Pneumococcal Polysaccharide (Tsoqixace24) 12/12/2017    Tdap (Boostrix, Adacel) 09/11/2020   ,   Health Maintenance   Topic Date Due    Varicella vaccine (1 of 2 - 2-dose childhood series) 10/11/1984    Diabetic retinal exam  10/11/1993    HIV screen  10/11/1998    Hepatitis B vaccine (1 of 3 - Risk 3-dose series) 10/11/2002    Diabetic foot exam  06/17/2020    Lipid screen  03/12/2021    A1C test (Diabetic or Prediabetic)  06/11/2021    Diabetic microalbuminuria test  06/11/2021    DTaP/Tdap/Td vaccine (2 - Td) 09/11/2030    Flu vaccine  Completed    Pneumococcal 0-64 years Vaccine  Completed    Hepatitis C screen  Completed    Hepatitis A vaccine  Aged Out    Hib vaccine  Aged Out    Meningococcal (ACWY) vaccine  Aged Out       PHYSICAL EXAMINATION:  [ INSTRUCTIONS:  \"[x]\" Indicates a positive item  \"[]\" Indicates a negative item  -- DELETE ALL ITEMS NOT EXAMINED]  Vital Signs: (As obtained by patient/caregiver or practitioner observation)    Blood pressure-  Heart rate-    Respiratory rate-    Temperature-  Pulse oximetry-     Constitutional: [x] Appears well-developed and well-nourished [x] No apparent distress      [] Abnormal-   Mental status  [x] Alert and awake  [x] Oriented to person/place/time [x]Able to follow commands      Eyes:  EOM    [x]  Normal  [] Abnormal-  Sclera  [x]  Normal  [] Abnormal -         Discharge [x]  None visible  [] Abnormal -    HENT:   [x] Normocephalic, atraumatic. [] Abnormal   [x] Mouth/Throat: Mucous membranes are moist.     External Ears [x] Normal  [] Abnormal-     Neck: [x] No visualized mass     Pulmonary/Chest: [x] Respiratory effort normal.  [x] No visualized signs of difficulty breathing or respiratory distress        [] Abnormal-      Musculoskeletal:   [x] Normal gait with no signs of ataxia         [x] Normal range of motion of neck        [] Abnormal-       Neurological:        [x] No Facial Asymmetry (Cranial nerve 7 motor function) (limited exam to video visit)          [x] No gaze palsy        [] Abnormal-         Skin:        [x] No significant exanthematous lesions or discoloration noted on facial skin         [] Abnormal-            Psychiatric:       [x] Normal Affect [x] No Hallucinations        [] Abnormal-     Other pertinent observable physical exam findings-     ASSESSMENT & PLAN  Batool Moreno was seen today for concern for covid-19 and alcohol problem. Diagnoses and all orders for this visit:    Suspected COVID-19 virus infection  -     COVID-19 Ambulatory;  Future    Eczema, unspecified type  -     External Referral To Dermatology    Bipolar disorder, current episode depressed, severe, without psychotic features authenticate this note.

## 2020-12-29 VITALS
BODY MASS INDEX: 33.13 KG/M2 | OXYGEN SATURATION: 94 % | WEIGHT: 180 LBS | DIASTOLIC BLOOD PRESSURE: 77 MMHG | TEMPERATURE: 97.6 F | SYSTOLIC BLOOD PRESSURE: 128 MMHG | RESPIRATION RATE: 17 BRPM | HEART RATE: 88 BPM | HEIGHT: 62 IN

## 2020-12-29 PROBLEM — F10.21 ALCOHOL INTOXICATION IN RELAPSED ALCOHOLIC (HCC): Status: ACTIVE | Noted: 2020-12-29

## 2020-12-29 PROBLEM — F32.9 MDD (MAJOR DEPRESSIVE DISORDER), SINGLE EPISODE: Status: ACTIVE | Noted: 2020-12-29

## 2020-12-29 LAB
AVERAGE GLUCOSE: 156 MG/DL (ref 70–126)
ETHYL ALCOHOL, SERUM: 0.02 %
GLUCOSE BLD-MCNC: 114 MG/DL (ref 70–108)
GLUCOSE BLD-MCNC: 124 MG/DL (ref 70–108)
GLUCOSE BLD-MCNC: 126 MG/DL (ref 70–108)
GLUCOSE BLD-MCNC: 132 MG/DL (ref 70–108)
HBA1C MFR BLD: 7.2 % (ref 4.4–6.4)
SARS-COV-2, NAAT: NOT DETECTED

## 2020-12-29 PROCEDURE — 94640 AIRWAY INHALATION TREATMENT: CPT

## 2020-12-29 PROCEDURE — 1240000000 HC EMOTIONAL WELLNESS R&B

## 2020-12-29 PROCEDURE — 99254 IP/OBS CNSLTJ NEW/EST MOD 60: CPT | Performed by: NURSE PRACTITIONER

## 2020-12-29 PROCEDURE — 6370000000 HC RX 637 (ALT 250 FOR IP): Performed by: NURSE PRACTITIONER

## 2020-12-29 PROCEDURE — 82948 REAGENT STRIP/BLOOD GLUCOSE: CPT

## 2020-12-29 PROCEDURE — 36415 COLL VENOUS BLD VENIPUNCTURE: CPT

## 2020-12-29 PROCEDURE — 83036 HEMOGLOBIN GLYCOSYLATED A1C: CPT

## 2020-12-29 PROCEDURE — APPSS30 APP SPLIT SHARED TIME 16-30 MINUTES: Performed by: REGISTERED NURSE

## 2020-12-29 PROCEDURE — U0002 COVID-19 LAB TEST NON-CDC: HCPCS

## 2020-12-29 PROCEDURE — G0480 DRUG TEST DEF 1-7 CLASSES: HCPCS

## 2020-12-29 PROCEDURE — 6370000000 HC RX 637 (ALT 250 FOR IP): Performed by: PSYCHIATRY & NEUROLOGY

## 2020-12-29 PROCEDURE — 99223 1ST HOSP IP/OBS HIGH 75: CPT | Performed by: PSYCHIATRY & NEUROLOGY

## 2020-12-29 PROCEDURE — 6370000000 HC RX 637 (ALT 250 FOR IP): Performed by: REGISTERED NURSE

## 2020-12-29 RX ORDER — IBUPROFEN 400 MG/1
400 TABLET ORAL EVERY 6 HOURS PRN
Status: DISCONTINUED | OUTPATIENT
Start: 2020-12-29 | End: 2020-12-30 | Stop reason: HOSPADM

## 2020-12-29 RX ORDER — NICOTINE 21 MG/24HR
1 PATCH, TRANSDERMAL 24 HOURS TRANSDERMAL DAILY
Status: DISCONTINUED | OUTPATIENT
Start: 2020-12-29 | End: 2020-12-30 | Stop reason: HOSPADM

## 2020-12-29 RX ORDER — QUETIAPINE FUMARATE 100 MG/1
100 TABLET, FILM COATED ORAL NIGHTLY
Status: DISCONTINUED | OUTPATIENT
Start: 2020-12-29 | End: 2020-12-30 | Stop reason: HOSPADM

## 2020-12-29 RX ORDER — IPRATROPIUM BROMIDE AND ALBUTEROL SULFATE 2.5; .5 MG/3ML; MG/3ML
1 SOLUTION RESPIRATORY (INHALATION) EVERY 4 HOURS PRN
Status: DISCONTINUED | OUTPATIENT
Start: 2020-12-29 | End: 2020-12-30 | Stop reason: HOSPADM

## 2020-12-29 RX ORDER — NALTREXONE HYDROCHLORIDE 50 MG/1
50 TABLET, FILM COATED ORAL DAILY
Status: DISCONTINUED | OUTPATIENT
Start: 2020-12-29 | End: 2020-12-30 | Stop reason: HOSPADM

## 2020-12-29 RX ORDER — MAGNESIUM HYDROXIDE/ALUMINUM HYDROXICE/SIMETHICONE 120; 1200; 1200 MG/30ML; MG/30ML; MG/30ML
30 SUSPENSION ORAL EVERY 6 HOURS PRN
Status: DISCONTINUED | OUTPATIENT
Start: 2020-12-29 | End: 2020-12-30 | Stop reason: HOSPADM

## 2020-12-29 RX ORDER — NALTREXONE HYDROCHLORIDE 50 MG/1
50 TABLET, FILM COATED ORAL DAILY
COMMUNITY
End: 2022-04-08 | Stop reason: ALTCHOICE

## 2020-12-29 RX ORDER — HYDROXYZINE HYDROCHLORIDE 25 MG/1
50 TABLET, FILM COATED ORAL 3 TIMES DAILY PRN
Status: DISCONTINUED | OUTPATIENT
Start: 2020-12-29 | End: 2020-12-30 | Stop reason: HOSPADM

## 2020-12-29 RX ORDER — ACETAMINOPHEN 325 MG/1
650 TABLET ORAL EVERY 4 HOURS PRN
Status: DISCONTINUED | OUTPATIENT
Start: 2020-12-29 | End: 2020-12-30 | Stop reason: HOSPADM

## 2020-12-29 RX ORDER — BUDESONIDE AND FORMOTEROL FUMARATE DIHYDRATE 160; 4.5 UG/1; UG/1
2 AEROSOL RESPIRATORY (INHALATION) 2 TIMES DAILY
Status: DISCONTINUED | OUTPATIENT
Start: 2020-12-29 | End: 2020-12-30 | Stop reason: HOSPADM

## 2020-12-29 RX ORDER — FOLIC ACID 1 MG/1
1 TABLET ORAL DAILY
Status: DISCONTINUED | OUTPATIENT
Start: 2020-12-29 | End: 2020-12-30 | Stop reason: HOSPADM

## 2020-12-29 RX ORDER — TRAZODONE HYDROCHLORIDE 50 MG/1
50 TABLET ORAL NIGHTLY PRN
Status: DISCONTINUED | OUTPATIENT
Start: 2020-12-29 | End: 2020-12-30 | Stop reason: HOSPADM

## 2020-12-29 RX ORDER — NICOTINE POLACRILEX 4 MG
15 LOZENGE BUCCAL PRN
Status: DISCONTINUED | OUTPATIENT
Start: 2020-12-29 | End: 2020-12-30 | Stop reason: HOSPADM

## 2020-12-29 RX ORDER — INSULIN GLARGINE 100 [IU]/ML
25 INJECTION, SOLUTION SUBCUTANEOUS NIGHTLY
Status: DISCONTINUED | OUTPATIENT
Start: 2020-12-29 | End: 2020-12-30 | Stop reason: HOSPADM

## 2020-12-29 RX ORDER — LANOLIN ALCOHOL/MO/W.PET/CERES
1333 CREAM (GRAM) TOPICAL DAILY
Status: DISCONTINUED | OUTPATIENT
Start: 2020-12-29 | End: 2020-12-29 | Stop reason: CLARIF

## 2020-12-29 RX ORDER — OXCARBAZEPINE 300 MG/1
300 TABLET, FILM COATED ORAL 2 TIMES DAILY
Status: DISCONTINUED | OUTPATIENT
Start: 2020-12-29 | End: 2020-12-30 | Stop reason: HOSPADM

## 2020-12-29 RX ORDER — LANOLIN ALCOHOL/MO/W.PET/CERES
100 CREAM (GRAM) TOPICAL DAILY
Status: DISCONTINUED | OUTPATIENT
Start: 2020-12-29 | End: 2020-12-30 | Stop reason: HOSPADM

## 2020-12-29 RX ORDER — M-VIT,TX,IRON,MINS/CALC/FOLIC 27MG-0.4MG
1 TABLET ORAL DAILY
Status: DISCONTINUED | OUTPATIENT
Start: 2020-12-29 | End: 2020-12-30 | Stop reason: HOSPADM

## 2020-12-29 RX ORDER — DEXTROSE MONOHYDRATE 50 MG/ML
100 INJECTION, SOLUTION INTRAVENOUS PRN
Status: DISCONTINUED | OUTPATIENT
Start: 2020-12-29 | End: 2020-12-30 | Stop reason: HOSPADM

## 2020-12-29 RX ORDER — DIPHENHYDRAMINE HCL 25 MG
25 TABLET ORAL DAILY
Status: ON HOLD | COMMUNITY
End: 2020-12-30 | Stop reason: HOSPADM

## 2020-12-29 RX ORDER — DEXTROSE MONOHYDRATE 25 G/50ML
12.5 INJECTION, SOLUTION INTRAVENOUS PRN
Status: DISCONTINUED | OUTPATIENT
Start: 2020-12-29 | End: 2020-12-30 | Stop reason: HOSPADM

## 2020-12-29 RX ADMIN — NALTREXONE HYDROCHLORIDE 50 MG: 50 TABLET, FILM COATED ORAL at 17:21

## 2020-12-29 RX ADMIN — Medication 100 MG: at 21:14

## 2020-12-29 RX ADMIN — ACETAMINOPHEN 650 MG: 325 TABLET ORAL at 04:50

## 2020-12-29 RX ADMIN — QUETIAPINE FUMARATE 100 MG: 100 TABLET ORAL at 21:09

## 2020-12-29 RX ADMIN — BUDESONIDE AND FORMOTEROL FUMARATE DIHYDRATE 2 PUFF: 160; 4.5 AEROSOL RESPIRATORY (INHALATION) at 16:37

## 2020-12-29 RX ADMIN — INSULIN GLARGINE 25 UNITS: 100 INJECTION, SOLUTION SUBCUTANEOUS at 21:09

## 2020-12-29 RX ADMIN — TRAZODONE HYDROCHLORIDE 50 MG: 50 TABLET ORAL at 21:09

## 2020-12-29 RX ADMIN — MULTIPLE VITAMINS W/ MINERALS TAB 1 TABLET: TAB at 17:21

## 2020-12-29 RX ADMIN — FOLIC ACID 1 MG: 1 TABLET ORAL at 17:21

## 2020-12-29 RX ADMIN — IBUPROFEN 400 MG: 400 TABLET, FILM COATED ORAL at 21:09

## 2020-12-29 RX ADMIN — OXCARBAZEPINE 300 MG: 300 TABLET, FILM COATED ORAL at 21:09

## 2020-12-29 ASSESSMENT — PAIN DESCRIPTION - PROGRESSION
CLINICAL_PROGRESSION: NOT CHANGED

## 2020-12-29 ASSESSMENT — PAIN DESCRIPTION - FREQUENCY
FREQUENCY: CONTINUOUS

## 2020-12-29 ASSESSMENT — PAIN DESCRIPTION - LOCATION
LOCATION: ANKLE

## 2020-12-29 ASSESSMENT — SLEEP AND FATIGUE QUESTIONNAIRES
RESTFUL SLEEP: NO
AVERAGE NUMBER OF SLEEP HOURS: 6
DO YOU HAVE DIFFICULTY SLEEPING: YES
SLEEP PATTERN: EARLY AWAKENING

## 2020-12-29 ASSESSMENT — PAIN SCALES - GENERAL
PAINLEVEL_OUTOF10: 7
PAINLEVEL_OUTOF10: 7
PAINLEVEL_OUTOF10: 6
PAINLEVEL_OUTOF10: 7
PAINLEVEL_OUTOF10: 7

## 2020-12-29 ASSESSMENT — PATIENT HEALTH QUESTIONNAIRE - PHQ9: SUM OF ALL RESPONSES TO PHQ QUESTIONS 1-9: 14

## 2020-12-29 ASSESSMENT — PAIN DESCRIPTION - ONSET: ONSET: ON-GOING

## 2020-12-29 ASSESSMENT — PAIN DESCRIPTION - PAIN TYPE
TYPE: CHRONIC PAIN
TYPE: CHRONIC PAIN

## 2020-12-29 ASSESSMENT — PAIN DESCRIPTION - ORIENTATION
ORIENTATION: RIGHT

## 2020-12-29 ASSESSMENT — PAIN DESCRIPTION - DESCRIPTORS: DESCRIPTORS: ACHING

## 2020-12-29 NOTE — PATIENT CARE CONFERENCE
Outcome: Verbalized understanding, Demonstrated Understanding and Needs reinforcement    PATIENT GOALS: \"To feel safe\"    PLAN/TREATMENT RECOMMENDATIONS UPDATE:   1. What is the most important thing we can help you with while you are here? Patient states that his goal is \"to feel safe\" and to make it through the next couple of days. 2. Who is your support system? Patient has 2 recovery coaches, counselor at [facility] and his mother. 3. Do you have follow-up providers? Patient has 2 recovery coaches Guille and Suzanne Homans from [facility]. Patient sees Leah Mcallister at [facility]. 4. Do you have the ability to pay for your medications? [employer]  5. Where will you be residing when you leave the hospital?  [place] [facility]. 6. Will need a return to work slip or FMLA paper completion?   No      GOALS UPDATE:   Time frame for Short-Term Goals: Daily    El Jenae

## 2020-12-29 NOTE — ED NOTES
Pt resting in bed at this time, patient denies any needs at this time, will continue to monitor.       Roxanne Sondheimer, [place]  26/32/26 1815

## 2020-12-29 NOTE — CONSULTS
Consult History & Physical      Patient:  Antwon Dominguez  YOB: 1985    MRN: [id number]     Acct: [de-identified]      Chief Complaint:  Hyperglycemia    Date of Service: Pt seen/examined in consultation on 12/29/2020    ASSESSMENT / PLAN:    1. Type 2 diabetes, uncontrolled. Restart home basal insulin and scale. Carb control meals. Hypoglycemic order set. Trend accu checks ac/hs and adjust regimen accordingly. Check A1c.   2. Hypernatremia, hypovolemic, mild. Encourage PO intake. 3. HX asthma with COPD overlap, no overt signs of decompensation. Resume home Advair. Duonebs PRN  4. Acute alcohol intoxication with chronic use. Recently here for ETOH detox 11/2020. Just released from IP rehab on Tuesday. Monitor closely for withdrawal symptoms. 5. Obesity. BMI 32.92.  6. Suicidal ideation. Management per primary team.   7. HX pancreatitis. 8. MDD/PTSD. History Of Present Illness:      38 y.o. male who we are asked to see/evaluate by Ortiz Traore, * for medical management of Type 2 diabetes. Patient presented to the ED with suicidal ideation of hanging himself. Admitted under psychiatry. HX of DM. Appears to be reasonably controlled OP as his last A1c was 6.9%. Home regimen includes Lantus 25 units nighty with Humalog scale for mealtime coverage. Reports good compliance when sober.      Past Medical History:        Diagnosis Date    Asthma     COPD (chronic obstructive pulmonary disease) (HCC)     Eczema     GERD (gastroesophageal reflux disease)     History of alcohol abuse     History of marijuana use     History of tobacco abuse     quit 11/21/2017    Hx of seasonal allergies     Pancreatitis     Psychiatric problem     PTSD (post-traumatic stress disorder)     Seizures ([facility]     etoh wdl    Type 2 diabetes mellitus without complication ([facility] 20/60/9582       Past Surgical History:        Procedure Laterality Date    ANKLE ARTHROSCOPY Right 8/5/2020 ANKLE ARTHROSCOPY WITH  MEDIAL DEBRIDEMENT RIGHT ANKLE, ANKLE ARTHROTOMY WITH DEBRIDEMENT performed by Demetrius Vela DPM at [address] Right 09/2019    DR A[facility]    FRACTURE SURGERY Right 2019    orbital fracture surgery    UPPER GASTROINTESTINAL ENDOSCOPY Left 5/6/2019    EGD BIOPSY performed by Liana Cruz MD at [facility]       Medications Prior to Admission:    Prior to Admission medications    Medication Sig Start Date End Date Taking?  Authorizing Provider   diphenhydrAMINE (BENADRYL) 25 MG tablet Take 25 mg by mouth daily   Yes Historical Provider, MD   naltrexone (DEPADE) 50 MG tablet Take 50 mg by mouth daily   Yes Historical Provider, MD   OXcarbazepine (TRILEPTAL) 300 MG tablet Take 1 tablet by mouth 2 times daily 12/28/20  Yes BARB Brady CNP   QUEtiapine (SEROQUEL) 100 MG tablet Take 1 tablet by mouth nightly 12/28/20  Yes BARB Brady CNP   Folic Acid (FOLATE PO) Take 1,333 mcg by mouth daily   Yes Historical Provider, MD   diphenhydrAMINE (BENADRYL) 25 MG capsule Take 50 mg by mouth nightly Per self dose    Yes Historical Provider, MD   insulin aspart (NOVOLOG FLEXPEN) 100 UNIT/ML injection pen Inject 2-12 units SQ TID with meals as directed per sliding scale, max dose 36 units/day 10/28/20  Yes BARB Brady CNP   traZODone (DESYREL) 50 MG tablet Take 1 tablet by mouth nightly as needed for Sleep 10/23/20  Yes Caden Ross MD   vitamin B-1 100 MG tablet Take 1 tablet by mouth daily 10/14/20  Yes Kathleen Hallman PA-C   hydrOXYzine (VISTARIL) 100 MG capsule Take 1 capsule by mouth 4 times daily as needed for Anxiety 9/9/20  Yes BARB Brady CNP   insulin glargine (LANTUS SOLOSTAR) 100 UNIT/ML injection pen Inject 25 Units into the skin daily  Patient taking differently: Inject 25 Units into the skin nightly  9/9/20  Yes BARB Brady CNP albuterol sulfate HFA (PROVENTIL HFA) 108 (90 Base) MCG/ACT inhaler Inhale 2 puffs into the lungs every 6 hours as needed for Wheezing 12/2/19  Yes Red Morning, APRN - CNP   Insulin Pen Needle 30G X 8 MM MISC 1 each by Does not apply route 3 times daily 12/2/19  Yes Red Morning, APRN - CNP   fluticasone-salmeterol (ADVAIR) 250-50 MCG/DOSE AEPB Inhale 1 puff into the lungs every 12 hours 5/16/19  Yes Red Morning, APRN - CNP   aspirin 81 MG EC tablet Take 1 tablet by mouth daily 5/10/19  Yes Cee Centeno MD   acetaminophen (TYLENOL) 500 MG tablet Take 1,000 mg by mouth every 6 hours as needed for Pain   Yes Historical Provider, MD   Multiple Vitamins-Minerals (MENS MULTIVITAMIN PLUS) TABS Take 1 tablet by mouth daily   Yes Historical Provider, MD       Allergies:  Paxil [paroxetine]    Social History:      The patient currently lives at home, recently released from rehab. TOBACCO:   reports that he has been smoking cigarettes. He has a 7.50 pack-year smoking history. He has never used smokeless tobacco.  ETOH:   reports current alcohol use. Family History:     Positive as follows:        Problem Relation Age of Onset    Other Mother         gestational diabetes    Other Father 39        suicide    Other Sister         hypoglycemia       Diet:  DIET GENERAL; Carb Control: 5 carb choices (75 gms)/meal    REVIEW OF SYSTEMS:   Pertinent positives as noted in the HPI. All other systems reviewed and negative. PHYSICAL EXAM:  /74   Pulse 82   Temp 97 °F (36.1 °C) (Oral)   Resp 16   Ht 5' 2\" (1.575 m)   Wt 180 lb (81.6 kg)   SpO2 99%   BMI 32.92 kg/m²   General appearance: No apparent distress, appears stated age and cooperative. HEENT: Normal cephalic, atraumatic without obvious deformity. Pupils equal, round, and reactive to light. Extra ocular muscles intact. Conjunctivae/corneas clear. Neck: Supple, with full range of motion. No jugular venous distention. Trachea midline. Respiratory:  Normal respiratory effort. Clear to auscultation, bilaterally without Rales/Wheezes/Rhonchi. Cardiovascular: Regular rate and rhythm with normal S1/S2 without murmurs, rubs or gallops. Abdomen: Soft, obese non-tender, non-distended with normal bowel sounds. Musculoskeletal: No clubbing, cyanosis or edema bilaterally. Uses a cane. Skin: Skin color, texture, turgor normal.  No rashes or lesions. Neurologic:  Neurovascularly intact without any focal sensory/motor deficits. Cranial nerves: II-XII intact, grossly non-focal.  Psychiatric: Alert and oriented, thought content appropriate, normal insight  Capillary Refill: Brisk,< 3 seconds   Peripheral Pulses: +2 palpable, equal bilaterally     Labs:     Recent Labs     12/28/20  1746   WBC 9.2   HGB 15.1   HCT 44.7        Recent Labs     12/28/20  1746   *   K 3.9      CO2 24   BUN 7   CREATININE 0.7   CALCIUM 9.3     Recent Labs     12/28/20  1746   AST 21   ALT 36   BILITOT <0.2*   ALKPHOS 137*     No results for input(s): INR in the last 72 hours. No results for input(s): Rosa Ashton in the last 72 hours. Urinalysis:    Lab Results   Component Value Date    NITRU NEGATIVE 12/28/2020    WBCUA 0-2 11/15/2020    BACTERIA NONE SEEN 11/15/2020    RBCUA 0-2 11/15/2020    BLOODU NEGATIVE 12/28/2020    SPECGRAV 1.011 10/12/2013    GLUCOSEU NEGATIVE 12/28/2020       Radiology: I have reviewed the radiology reports with the following interpretation:     No orders to display            DVT prophylaxis: Encourage ambulation. Code Status: Full Code           Thank you for the consultation.     Electronically signed by BARB Salgado CNP on 12/29/2020 at 8:22 AM

## 2020-12-29 NOTE — ED NOTES
COVID swab sent to lab. Resp regular. Pt denies needs. Security monitoring pt.       Celine Gamez RN  12/29/20 2106

## 2020-12-29 NOTE — PROGRESS NOTES
BHI Biopsychosocial Assessment    Current Level of Psychosocial Functioning     Independent    XXX  Dependent    Minimal Assist     Comments:      Psychosocial High Risk Factors (check all that apply)    Unable to obtain meds   Chronic illness/pain    Substance abuse   XXX  Lack of Family Support   Financial stress   XXX  Isolation    XXX  Inadequate Community Resources  Suicide attempt(s)  Not taking medications   Victim of crime   Developmental Delay  Unable to manage personal needs    Age 72 or older   Homeless  No transportation   Readmission within 30 days  Unemployment  XXX  Traumatic Event    Psychiatric Advanced Directive: None    Family to involve in treatment: None    Sexual Orientation:  Heterosexual    Patient Strengths: Connection to outpatient providers, strong support system, shows insight to situation, seeking help    Patient Barriers: Poor coping skills, recent relapse    Opiate education provided: None indicated    Safety plan: On-going, Q15 checks, contracts for safety    CMHC/MH history: Patient is currently connected to two recovery coaches from [facility]. Patient sees Richard Razo at [facility] for counseling. Patient recently completed 30 days at [facility]. Patient currently connected to [address] [place] [address] for IOP services. Patient was recently admitted to , was discharged 10/2020. Plan of Care: Medication management, group/individual therapies, family meetings, psycho -education, treatment team meetings to assist with stabilization    Initial Discharge Plan:  [place] Patient will follow up with [address] [place] [address], his next IOP session will be on Thursday. Clinical Summary:  Patient is a 38year old male that presents to the emergency department via [address] [place] [address] due to suicidal thoughts. Patient states that he had a plan to hang himself. Patient states that he had an increase in depression yesterday when he was thinking about not being able to see his children over the holidays. Patient states \"I don't know what happened, it happened so fast\", referring to his relapse yesterday. Patient states that he \"really didn't want to die, it was a cry for help\". Patient recently completed 30 days at [facility], patient was out of the treatment center for six days \"then I threw it all away\". Patient has a history of alcoholism, and has had treatment. Patient denies any illicit drug use, UDS reflects this. Patient reports medication compliance. Upon interview patient seemed disappointed in himself and repeatedly stated he \"screwed up\". Patient is currently connected to two recovery coaches from [facility]. Patient sees Colbert Holstein at [facility] for counseling. Patient recently completed 30 days at [facility]. Patient currently connected to [facility] for IOP services. Patient states his next IOP session is this Thursday (12/31). Patient was recently admitted to , was discharged 10/2020. Patient states that his recovery coaches support him. Patient also states that his mother is a primary support for him. Patient currently lives in [place] by himself, patient's mother helps him pay his bills.      Shantell Luna

## 2020-12-29 NOTE — PLAN OF CARE
Note: No self harm behaviors were observed or reported so far this shift. Remains on every 15 minutes precautions for safety. Goal: Patient specific goal  Description: Patient specific goal  Outcome: Ongoing  Note: Patient reports goal for today is to \"stay positive\". Patient continues to work towards goal.    Goal: Participates in care planning  Description: Participates in care planning  Outcome: Ongoing     Problem: Substance Abuse:  Goal: Absence of drug withdrawal signs and symptoms  Description: Absence of drug withdrawal signs and symptoms  Outcome: Ongoing  Note: Patient denies withdrawal symptoms at present time. Problem: Discharge Planning:  Goal: Discharged to appropriate level of care  Description: Discharged to appropriate level of care  Outcome: Ongoing  Note: Patient voices denies needs before discharge. Patient plans to be discharged to home alone, [facility]. Discharge planner working with patient to achieve optimal discharge plans, specific to individual needs. Care plan reviewed with patient. Patient verbalize understanding of the plan of care and contribute to goal setting.

## 2020-12-29 NOTE — ED NOTES
Pt resting in bed, asleep. Lab in room to draw pt. Pt denies needs. Security monitoring pt.       Otis Carrion RN  12/29/20 [id number]

## 2020-12-29 NOTE — ED NOTES
Patient resting in bed with eyes closed, pt denies any needs. Reminded patient of need for urine sample. Will continue to monitor.       Gloria [place], [place]  71/60/42 [id number]

## 2020-12-29 NOTE — H&P
Department of Psychiatry  Attending Physician Psychiatric Assessment     Reason for Admission to Psychiatric Unit:  Threat to self requiring 24 hour professional observation  Concerns about patient's safety in the community    CHIEF COMPLAINT:  Suicidal ideations with plan to kill self by hanging    History obtained from:  patient, electronic medical record and family members    HISTORY OF PRESENT ILLNESS:    Daryl Sloan is a 38 y.o. male with significant past psychiatric history of  major depression, alcohol abuse, suicidal ideation, hallucinations who presented to the ED voluntarily by [employer] with suicidal ideation and BAL 0.22. Per ED notes: \"pt states that his mother called the police because he was fearful that he would follow through. Pt has a plan to hang himself. Pt states that today he relapsed and drank 1 pint of liquor. Pt identifies triggers as his 4 children and being unable to see them d/t being in emergency custody of aunt. Pt was recently discharged from [address] [place] [address] after 30 days. He follows with [facility] for [facility] and has 2 recovery coaches at [facility]. Pt also sees a counselor at [facility]. Pt has been admitted to , most recent was 10/20/2020. Pt expresses he wants to return to [address] [place] [address]. \"    Salvador reports feeling sad, low, and down for more days than not. He endorses anhedonia and \"feeling numb with no energy\". Ml Alvarez reports his depression has been worsening over the past month as he has been thinking a lot about his children. Reports he has been thinking a lot about his kids and not being able to see them. Depression has worsened over the past couple weeks dure to the holiday season. He reports poor sleep and appetite. He has been having troubles falling asleep d/t anxiety and \"racing mind\". Salvador endorses helplessness, hopelessness, and worthlessness, which is compounded by the fact that his children's aunt is suing for permanent custody of the kids. He reports feeling \"lonely and lost\" without them. He is unable to focus and concentrate. Expresses marked guilt over starting to use alcohol after having been sober for 43 days. He is unsure what caused him to start using alcohol at that time; stating \"it was just so overwhelming\". He denies current hallucinations and paranoia. Denies withdrawal symptoms. Denies manic symptoms. Denies anxiety.        PSYCHIATRIC HISTORY:  yes - major depression, anxiety, alcohol abuse, alcohol withdrawal  Currently follows with Bhavesh Guzman  Reports two lifetime suicide attempts  One psychiatric hospital admission--10/21/2020      Past psychiatric medications includes:   Lexapro  Seroquel  Vistaril  Ativan  Paxil  Celexa    Adverse reactions from psychotropic medications: yes - Paxil: rash    Lifetime Psychiatric Review of Systems         Mellisa or Hypomania: last episode at end of September     Panic Attacks: last episode in September     Phobias: denies     Obsessions and Compulsions:denies     Body or Vocal Tics:  denies     Hallucinations: endorses auditory and visual hallucinations     Delusions: reports grandiose     Past Medical History:        Diagnosis Date    Asthma     COPD (chronic obstructive pulmonary disease) (HCC)     Eczema  GERD (gastroesophageal reflux disease)     History of alcohol abuse     History of marijuana use     History of tobacco abuse     quit 11/21/2017    Hx of seasonal allergies     Pancreatitis     Psychiatric problem     PTSD (post-traumatic stress disorder)     Seizures (HCC)     etoh wdl    Type 2 diabetes mellitus without complication ([facility] 75/60/1064       Past Surgical History:        Procedure Laterality Date    ANKLE ARTHROSCOPY Right 8/5/2020    ANKLE ARTHROSCOPY WITH  MEDIAL DEBRIDEMENT RIGHT ANKLE, ANKLE ARTHROTOMY WITH DEBRIDEMENT performed by Arley Jha DPM at [address] Right 09/2019    DR [facility]    FRACTURE SURGERY Right 2019    orbital fracture surgery    UPPER GASTROINTESTINAL ENDOSCOPY Left 5/6/2019    EGD BIOPSY performed by Yanelis Wise MD at [address] [facility]       Allergies:  Paxil [paroxetine]    Social History:     [address] LEVEL OF EDUCATION: HS grad  MARITAL STATUS: .  CHILDREN: four (Alexys Lindsay has custody of children)  OCCUPATION: unable to work--working toward applying for disability  RESIDENCE: Currently lives alone/own house   PATIENT ASSETS: reached out for help, trying to make changes     DRUG USE HISTORY  Social History     Tobacco Use   Smoking Status Current Every Day Smoker    Packs/day: 0.50    Years: 15.00    Pack years: 7.50    Types: Cigarettes   Smokeless Tobacco Never Used     Social History     Substance and Sexual Activity   Alcohol Use Yes    Frequency: 4 or more times a week    Drinks per session: 10 or more    Comment: a pint of vodka last used on 12/28/2020     Social History     Substance and Sexual Activity   Drug Use Not Currently    Types: Marijuana    Comment: approx 2 or more years       LEGAL HISTORY:   HISTORY OF INCARCERATION: yes - on probation for DUI in November 2017 (spent 5d in [facility])     Family History:       Problem Relation Age of Onset  Other Mother         gestational diabetes    Other Father 39        suicide    Other Sister         hypoglycemia       Psychiatric Family History  Confirms father was dx with MH condition; unsure what he was dx with; father did not take psych meds  one suicide in family--father by hanging  confirms substance use in family--father drank heavily and used drugs (pt unsure which drugs)    PHYSICAL EXAM:  Vitals:  /74   Pulse 82   Temp 97 °F (36.1 °C) (Oral)   Resp 16   Ht 5' 2\" (1.575 m)   Wt 180 lb (81.6 kg)   SpO2 99%   BMI 32.92 kg/m²      Review of Systems   Constitutional: Negative for chills and weight loss. HENT: Negative for ear pain and nosebleeds. Eyes: Negative for blurred vision and photophobia. Respiratory: Negative for cough, shortness of breath and wheezing. Cardiovascular: Negative for chest pain and palpitations. Gastrointestinal: Negative for abdominal pain, diarrhea and vomiting. Genitourinary: Negative for dysuria and urgency. Musculoskeletal: Negative for falls and joint pain. Skin: Negative for itching and rash. Neurological: Negative for tremors, seizures and weakness. Endo/Heme/Allergies: Does not bruise/bleed easily. Physical Exam:      Constitutional:  Appears well-developed and well-nourished, no acute distress  HENT:   Head: Normocephalic and atraumatic. Eyes: Conjunctivae are normal. Right eye exhibits no discharge. Left eye exhibits no discharge. No scleral icterus. Neck: Normal range of motion. Neck supple. Pulmonary/Chest:  No respiratory distress or accessory muscle use, no wheezing. Abdominal: Soft. Exhibits no distension. Musculoskeletal: Normal range of motion. Exhibits no edema. Neurological: cranial nerves II-XII grossly in tact, normal gait and station  Skin: Skin is warm and dry. Patient is not diaphoretic. No erythema.          Mental Status Exam:   Level of consciousness:  within normal limits and awake Appearance:  well-appearing, hospital attire, in chair, good grooming and good hygiene  Behavior/Motor:  no abnormalities noted  Attitude toward examiner:  cooperative, attentive and good eye contact  Speech:  normal rate, normal volume and monotone  Mood:  \"sad\"  Affect:  mood congruent, blunted and flat  Thought processes:  linear, goal directed and coherent  Thought content:  Denies homicidal ideation  Suicidal Ideation:  passive, without plan and without intent  Delusions:  no evidence of delusions  Perceptual Disturbance:  denies any perceptual disturbance  Cognition: Patient is oriented to person, place, time and situation  Concentration: clinically adequate  Memory: intact  Insight & Judgement: age appropriate, poor    DSM-5 Diagnosis  Bipolar disorder, current episode depressed, moderate  Alcohol intoxication in relapsed alcoholic    Psychosocial and Contextual factors:  Financial  Occupational  Relationship  Legal   Living situation  Educational     Past Medical History:   Diagnosis Date    Asthma     [facility]     Eczema     GERD (gastroesophageal reflux disease)     History of alcohol abuse     History of marijuana use     History of tobacco abuse     quit 11/21/2017    Hx of seasonal allergies     Pancreatitis     Psychiatric problem     PTSD (post-traumatic stress disorder)     Seizures ([facility]     etoh wdl    Type 2 diabetes mellitus without complication ([facility] 76/68/5181        TREATMENT PLAN    Risk Management:  close watch per standard protocol      Psychotherapy:  participation in milieu and group and individual sessions with Attending Physician,  and Physician Assistant/CNP      Estimated length of stay: It might take more than 2 midnights to stabilize patient's mood/thoughts and titrate medications to effect.        GENERAL PATIENT/FAMILY EDUCATION Patient will understand basic signs and symptoms, Patient will understand benefits/risks and potential side effects from proposed meds and Patient will understand their role in recovery. Family is  active in patient's care. Patient assets that may be helpful during treatment include: Intent to participate and engage in treatment, sufficient fund of knowledge and intellect to understand and utilize treatments. Risk level: High     Goals:    Reviewed labs  Reviewed EKG  Will obtain records and review them today. Medication adjustment: restart home meds  Consults: none        Behavioral Services  Medicare Certification     Admission Day 1  I certify that this patient's inpatient psychiatric hospital admission is medically necessary for:    x (1) treatment which could reasonably be expected to improve this patient's condition, or    x (2) diagnostic study or its equivalent.      Electronically signed by Hannah Sellers MD on 12/29/20 at 5:56 PM EST

## 2020-12-29 NOTE — PLAN OF CARE
Problem: Pain:  Goal: Pain level will decrease  Description: Pain level will decrease  Outcome: Ongoing  Note: Pain Assessment: 0-10  Pain Level: 7   Patient's Stated Pain Goal: 7   Is pain goal met at this time?   Yes  Patient reports right ankle pain, denies the need for medication this am.  Non-Pharmaceutical Pain Intervention(s): Rest

## 2020-12-29 NOTE — ED NOTES
Pt lying in bed. Resp regular. Pt given orange juice per request. Denies other needs. Security monitoring pt.       Moises Tarango, MELISSA  12/29/20 [id number]

## 2020-12-29 NOTE — ED NOTES
Assumed pt care at this time. Report received from [facility]. Pt resting in bed. Resp regular. Security monitoring pt.       Jersey Springer RN  12/28/20 [id number]       Jersey Springer RN  12/28/20 4435

## 2020-12-29 NOTE — PROGRESS NOTES
Behavioral Health   Admission Note     Admission Type:   Admission Type: Voluntary    Reason for admission:  Reason for Admission: increased suicidal ideations    PATIENT STRENGTHS:  Strengths: Communication, Connection to output provider, Positive Support    Patient Strengths and Limitations:  Limitations: Difficulty problem solving/relies on others to help solve problems, Inappropriate/potentially harmful leisure interests    Addictive Behavior:   Addictive Behavior  In the past 3 months, have you felt or has someone told you that you have a problem with:  : Excessive Fluid intake, Other(Comments)(excessive alcohol intake)  Do you have a history of Chemical Use?: No  Do you have a history of Alcohol Use?: Yes  Do you have a history of Street Drug Abuse?: No  Histroy of Prescripton Drug Abuse?: No    Medical Problems:   Past Medical History:   Diagnosis Date    Asthma     [facility]     Eczema     GERD (gastroesophageal reflux disease)     History of alcohol abuse     History of marijuana use     History of tobacco abuse     quit 11/21/2017    Hx of seasonal allergies     Pancreatitis     Psychiatric problem     PTSD (post-traumatic stress disorder)     [facility]     etoh wdl    Type 2 diabetes mellitus without complication ([facility].) 65/44/0828       Status EXAM:  Status and Exam  Normal: No  Facial Expression: Flat, Sad  Affect: Blunt  Level of Consciousness: Alert  Mood:Normal: No  Mood: Depressed, Sad, Anxious  Motor Activity:Normal: Yes  Interview Behavior: Cooperative  Preception: Newton to Person, Newton to Time, Newton to Place, [place] to Situation  Attention:Normal: Yes  Thought Processes: Circumstantial  Thought Content:Normal: Yes  Hallucinations:  Other (Comment)(only having visual hallucinations while intoxicated.)  Delusions: No  Memory:Normal: Yes  Insight and Judgment: No  Insight and Judgment: Poor Judgment, Poor Insight Present Suicidal Ideation: No  Present Homicidal Ideation: No    Pt admitted with followings belongings:  Dentures: None  Vision - Corrective Lenses: Glasses  Hearing Aid: None  Jewelry: None  Body Piercings Removed: N/A  Clothing: Footwear, Jacket / coat, Socks, Undergarments (Comment), Pants, Other (Comment)(belt)  Were All Patient Medications Collected?: Not Applicable  Other Valuables: Wallet, Other (Comment)(pocket knife)     Admission order obtained yes. Patient oriented to surroundings and program expectations and copy of patient rights given. Received admission packet:  yes. Consents reviewed, signed yes. Patient verbalize understanding:  Yes. Patient education on precautions: yes           Patient screened positive for suicide risk on CSSR-S (\"yes\" to question #4, 5, OR 6)  yes. Physician notified of risk score. Orders received 15 minute safety observation. 2 person skin assessment completed upon admission yes. Explained patients right to have family, representative or physician notified of their admission. Patient has requested for physician to be notified. Patient has declined for family/representative to be notified. Provided pt with [employer] handout entitled \"Quitting Smoking. \"  Reviewed handout with pt addressing dangers of smoking, developing coping skills, and providing basic information about quitting. Pt response to counseling:  Not interested at this time. Pt arrived to unit via campus police and ED staff. States he just was released from [facility] rehab on Tuesday and states the holidays just got to him and not being able to see his kids. So yesterday he just had a feeling come over him and relapsed on a pint of vodka then became suicidal and called his mom. Denies suicidal ideations now. Denies homicidal ideations. Denies hallucinations states he was having visual hallucinations while he was intoxicated but no longer is having hallucinations now that he is sober. States he is depressed and anxious but denies need for anxiety medications. Pt does walk with a cane because of an injury in his right ankle. Pt was cooperative with assessment and snack given. Pt is currently resting in his room at this time.            Marybel Hernandez RN

## 2020-12-29 NOTE — PROGRESS NOTES
Provisional Diagnosis:   Major depressive      Risk, Psychosocial and Contextual Factors:  Alcohol use    Current  Treatment:   Counseling- [facility] Bhavesh Guzman ([place])- 30 days  [facility]- IOP  [facility]- Recovery coaches: Valene Saint     Present Suicidal Behavior:      Verbal: yes, plan to hang self         Attempt:denies    Access to Weapons:  denies    Current Suicide Risk: Low, Moderate or High:    high    Past Suicidal Behavior:       Verbal: yes    Attempt: denies    Self-Injurious/Self-Mutilation: denies    Traumatic Event Within Past 2 Weeks: denies      Current Abuse: denies    Legal: probation (DUI)    Violence: denies    Protective Factors:  Positive support, stable housing, connection to OP    Housing:        CPAP/Oxygen/Ambulation Difficulties: none    Basic Vital Signs Normal?: Check with Patients Nurse prior to Calling Psychiatry    Critical Labs?: Check with Patients Nurse prior to Calling Psychiatry    Clinical Summary:      Patient is a 38year old male who presents to the ED voluntarily by [employer] with suicidal ideation. Pt states that his mother called the police because he was fearful that he would follow through. Pt has a plan to hang himself. Pt states that today he relapsed and drank 1 pint of liquor. Pt identifies triggers as his 4 children and being unable to see them d/t being in emergency custody of aunt. Pt was recently discharged from [address] [place] [address] after 30 days. He follows with [facility] for [facility] and has 2 recovery coaches at [facility]. Pt also sees a counselor at [facility]. Pt has been admitted to , most recent was 10/20/2020. Pt has diagnosed history of bipolar affective disorder. Pt reports increased feelings of depression, decreased sleep, increased anxiety. Pt states he has periotic auditory hallucinations. Homicidal thoughts and/or plans denied. No delusions noted. Drug use denied. Pt expresses he wants to return to [address] [place] [address]. Pt is cooperative, A/o x4. Pt has fair eye contact, appears embarrassed. Linear thought process, constricted affect. Level of Care Disposition:      BAL 0.22 at 1756 - redraw at [facility]. Handover to oncoming clinician.

## 2020-12-29 NOTE — PROGRESS NOTES
BAL Re-Assess:   Status & Exam & Behavior Support Service Tabs      Present Suicidal Behavior:      Verbal:   Yes     Plan:   Patient reports plan to hang self, patient denies intent at this time     Current Suicide Risk: Low, Moderate or High: Moderate     Present Homicidal Behavior:    Verbal:   Denies    Plan:   Denies    Psychosis:    Hallucinations:  Patient reports visual hallucinations earlier tonight     Delusions:  No delusions noted     Clinical Summary:       Patient is a 38year old male who presents to the ED voluntarily by [employer] with suicidal ideation. Pt states that his mother called the police because he was fearful that he would follow through. Pt has a plan to hang himself. Pt states that today he relapsed and drank 1 pint of liquor. Pt identifies triggers as his 4 children and being unable to see them d/t being in emergency custody of aunt.      Pt was recently discharged from [facility] after 30 days. He follows with [facility] and has 2 recovery coaches at [facility]. Pt also sees a counselor at [facility]. Pt has been admitted to , most recent was 10/20/2020. Pt has diagnosed history of bipolar affective disorder.      Pt reports increased feelings of depression, decreased sleep, increased anxiety. Pt states he has periotic auditory hallucinations.      Homicidal thoughts and/or plans denied. No delusions noted. Drug use denied.      Pt expresses he wants to return to [facility].      Pt is cooperative, A/o x4. Pt has fair eye contact, appears embarrassed.  Linear thought process, constricted affect.      Clinical Re-Assessment Summary including Current Mental State of Patient: Patient calm and cooperative at this time. Patient reports to this clinician 'I'm an alcoholic I feel like I'm going crazy'. Patient reports current scattered thoughts. Patient reports recent suicidal ideation with a plan to hang himself. Patient reports previous thoughts. Patient denies previous attempts. Patient does report follow up outpatient for mental health treatment. Patient alert and oriented x4. Updated Level of Care Disposition:      Patient medically cleraed. Spoke with Dr. Ishan Burk. Patient to be admitted for inpatient treatment. Informed patients medical provider. Report given to 4E. COVID swab requested. Informed patients nurse.

## 2020-12-29 NOTE — BH NOTE
INPATIENT RECREATIONAL THERAPY  ADULT BEHAVIORAL SERVICES  EVALUATION    REFERRING PHYSICIAN:  Dr. Liz Reddy   DIAGNOSIS:   Major Depressive Disorder   PRECAUTIONS:  Standard Precautions   HISTORY OF PRESENT ILLNESS/INJURY: Pt admitted to the unit due to suicidal ideations following recent relapse. Pt reportedly became emotional following the holidays and not being able to see his kids. Pt relapsed on a pint of vodka and became suicidal and called his mother. Pt did have a plan to hang himself. Pt reports that he is not suicidal now. Pt is documented as saying he had visual hallucinations while intoxicated, but no longer is having them. Pt does follow up with outpatient providers. Pt is cooperative and pleasant with staff. PMH:  Please see medical chart for prior medical history, allergies, and medication    HISTORY OF PSYCHIATRIC TREATMENT:  Pt has a hx of inpatient and outpatient psychiatric care. Pt was recently discharged from [address] [place] [address] after a 30 day stay. Pt follows with [facility] for IOP and does have 2 recovery coaches with [facility]. Pt also sees a counselor from [facility]. Pt was recently admitted to  on October 20, 2020. YOB: 1985  GENDER:  Male   MARITAL STATUS:     EMPLOYMENT STATUS:  Pt does have an employment hx  LIVING SITUATION/SUPPORT:  Lives alone   EDUCATIONAL LEVEL: GED  MEDICATION/DRUG USE: Pt denies any illicit drug use but reports hx of alcohol abuse.      LEISURE INTERESTS:   family activities, cooking, reading, activities with friends, playing video games,  [employer] organization, watching TV/Movies, listening to music, spiritual activities   ACTIVITY PREFERENCE: Small Group  ACTIVITY TYPES:  Indoor, Outdoor, Active, Passive   COGNITION: A&Ox4    COPING: Poor   ATTENTION: Fair   RELAXATION: Fair   SELF-ESTEEM: Poor   MOTIVATION:  Fair     SOCIAL SKILLS:   Fair   FRUSTRATION TOLERANCE:  No documented hx of violence ATTENTION SEEKING: No attention seeking behaviors observed at this time   COOPERATION: Pt cooperative with staff. AFFECT: Flat   APPEARANCE: Pt displays fair grooming and hygiene and is currently dressed in hospital scrub attire. HEARING:  No impairments noted at this time   VISION:  Corrective Lenses: Glasses    VERBAL COMMUNICATION:  No impairments noted at this time   WRITTEN COMMUNICATION:  No impairments noted at this time     COORDINATION:  Mild impairments-unsteady gait   MOBILITY:  Pt walks with a cane due to injury to right ankle   GOALS: Pt to increase socialization and knowledge of coping skills through participation in group therapy sessions following verbal encouragement from staff.

## 2020-12-29 NOTE — PLAN OF CARE
Problem: Impaired respiratory status  Goal: Clear lung sounds  Outcome: Ongoing  Note: Pt started on MDI for maintenance of COPD and pt does a MDI at home.

## 2020-12-30 LAB
ANION GAP SERPL CALCULATED.3IONS-SCNC: 10 MEQ/L (ref 8–16)
BUN BLDV-MCNC: 14 MG/DL (ref 7–22)
CALCIUM SERPL-MCNC: 9.5 MG/DL (ref 8.5–10.5)
CHLORIDE BLD-SCNC: 106 MEQ/L (ref 98–111)
CO2: 26 MEQ/L (ref 23–33)
CREAT SERPL-MCNC: 0.6 MG/DL (ref 0.4–1.2)
GFR SERPL CREATININE-BSD FRML MDRD: > 90 ML/MIN/1.73M2
GLUCOSE BLD-MCNC: 144 MG/DL (ref 70–108)
GLUCOSE BLD-MCNC: 158 MG/DL (ref 70–108)
GLUCOSE BLD-MCNC: 186 MG/DL (ref 70–108)
POTASSIUM SERPL-SCNC: 4.6 MEQ/L (ref 3.5–5.2)
SODIUM BLD-SCNC: 142 MEQ/L (ref 135–145)

## 2020-12-30 PROCEDURE — 6370000000 HC RX 637 (ALT 250 FOR IP): Performed by: PSYCHIATRY & NEUROLOGY

## 2020-12-30 PROCEDURE — 6370000000 HC RX 637 (ALT 250 FOR IP): Performed by: NURSE PRACTITIONER

## 2020-12-30 PROCEDURE — 99239 HOSP IP/OBS DSCHRG MGMT >30: CPT | Performed by: PSYCHIATRY & NEUROLOGY

## 2020-12-30 PROCEDURE — 82948 REAGENT STRIP/BLOOD GLUCOSE: CPT

## 2020-12-30 PROCEDURE — 94640 AIRWAY INHALATION TREATMENT: CPT

## 2020-12-30 PROCEDURE — 36415 COLL VENOUS BLD VENIPUNCTURE: CPT

## 2020-12-30 PROCEDURE — 80048 BASIC METABOLIC PNL TOTAL CA: CPT

## 2020-12-30 PROCEDURE — 6370000000 HC RX 637 (ALT 250 FOR IP): Performed by: REGISTERED NURSE

## 2020-12-30 RX ORDER — BUSPIRONE HYDROCHLORIDE 10 MG/1
10 TABLET ORAL 3 TIMES DAILY
Status: DISCONTINUED | OUTPATIENT
Start: 2020-12-30 | End: 2020-12-30 | Stop reason: HOSPADM

## 2020-12-30 RX ORDER — BUSPIRONE HYDROCHLORIDE 10 MG/1
10 TABLET ORAL 3 TIMES DAILY
Qty: 90 TABLET | Refills: 0 | Status: SHIPPED | OUTPATIENT
Start: 2020-12-30 | End: 2021-01-26 | Stop reason: SDUPTHER

## 2020-12-30 RX ADMIN — BUSPIRONE HYDROCHLORIDE 10 MG: 10 TABLET ORAL at 13:53

## 2020-12-30 RX ADMIN — OXCARBAZEPINE 300 MG: 300 TABLET, FILM COATED ORAL at 08:42

## 2020-12-30 RX ADMIN — INSULIN LISPRO 2 UNITS: 100 INJECTION, SOLUTION INTRAVENOUS; SUBCUTANEOUS at 11:22

## 2020-12-30 RX ADMIN — INSULIN LISPRO 2 UNITS: 100 INJECTION, SOLUTION INTRAVENOUS; SUBCUTANEOUS at 08:40

## 2020-12-30 RX ADMIN — FOLIC ACID 1 MG: 1 TABLET ORAL at 08:42

## 2020-12-30 RX ADMIN — BUSPIRONE HYDROCHLORIDE 10 MG: 10 TABLET ORAL at 11:13

## 2020-12-30 RX ADMIN — BUDESONIDE AND FORMOTEROL FUMARATE DIHYDRATE 2 PUFF: 160; 4.5 AEROSOL RESPIRATORY (INHALATION) at 07:33

## 2020-12-30 RX ADMIN — Medication 100 MG: at 08:43

## 2020-12-30 RX ADMIN — NALTREXONE HYDROCHLORIDE 50 MG: 50 TABLET, FILM COATED ORAL at 08:42

## 2020-12-30 RX ADMIN — MULTIPLE VITAMINS W/ MINERALS TAB 1 TABLET: TAB at 08:42

## 2020-12-30 NOTE — BH NOTE
Group Therapy Note    Date: 12/29/2020  Start Time: 2000  End Time:  2020  Number of Participants: 1    Type of Group: Wrap-Up    Wellness Binder Information  Module Name:    Session Number:      Patient's Goal:  Be positive    Notes: Working on goal    Status After Intervention:  Improved    Participation Level: Active Listener    Participation Quality: Appropriate      Speech:  normal      Thought Process/Content: Logical      Affective Functioning: Congruent      Mood: anxious      Level of consciousness:  Alert      Response to Learning: Able to verbalize current knowledge/experience      Endings: None Reported    Modes of Intervention: Education      Discipline Responsible: Registered Nurse      Signature:   Edwin Kay RN

## 2020-12-30 NOTE — SUICIDE SAFETY PLAN
SAFETY PLAN    A suicide Safety Plan is a document that supports someone when they are having thoughts of suicide. Warning Signs that indicate a suicidal crisis may be developing: What (situations, thoughts, feelings, body sensations, behaviors, etc.) do you experience that lets you know you are beginning to think about suicide? 1. Mental pictures  2. Racing thoughts       Internal Coping Strategies:  What things can I do (relaxation techniques, hobbies, physical activities, etc.) to take my mind off my problems without contacting another person? 1. Drawing   2. music  3. Animals     People and social settings that provide distraction: Who can I call or where can I go to distract me? 1. Name: mom   People whom I can ask for help: Who can I call when I need help - for example, friends, family, clergy, someone else? 1. Name:Gaudencio [phone]  Professionals or [address] I can contact during a crisis: Who can I call for help - for example, my doctor, my psychiatrist, my psychologist, a mental health provider, a suicide hotline? 3. Suicide Prevention Lifeline: 0-293-869-TALK (5043)    4. [address] [place] [address] Emergency Services -  for example, [facility]               Emergency Services Address:       Emergency Services Phone: [phone]    Making the environment safe: How can I make my environment (house/apartment/living space) safer?  For example, can I remove guns, medications

## 2020-12-30 NOTE — DISCHARGE INSTR - DIET
? Good nutrition is important when healing from an illness, injury, or surgery. Follow any nutrition recommendations given to you during your hospital stay. ? If you were given an oral nutrition supplement while in the hospital, continue to take this supplement at home. You can take it with meals, in-between meals, and/or before bedtime. These supplements can be purchased at most local grocery stores, pharmacies, and chain [employer]-st[employer]. ? If you have any questions about your diet or nutrition, call the hospital and ask for the dietitian.

## 2020-12-30 NOTE — PLAN OF CARE
Problem: Pain:  Goal: Pain level will decrease  Description: Pain level will decrease  12/30/2020 1235 by Juan Starkey RN  Outcome: Completed  12/30/2020 0109 by Jerica Guardado RN  Outcome: Ongoing  Note: Pt has chronic right ankle pain, pt given PRN Ibuprofen at bedtime for comfort     Problem: Depressive Behavior With or Without Suicide Precautions:  Goal: Able to verbalize and/or display a decrease in depressive symptoms  Description: Able to verbalize and/or display a decrease in depressive symptoms  12/30/2020 1235 by Juan Starkey RN  Outcome: Completed  12/30/2020 0109 by Jerica Guardado RN  Outcome: Ongoing  Note: Pt denies depression but does admit to anxiety about relapsing and wanting to get back on track with sobriety  Goal: Ability to disclose and discuss suicidal ideas will improve  Description: Ability to disclose and discuss suicidal ideas will improve  12/30/2020 1235 by Juan Starkey RN  Outcome: Completed  12/30/2020 0109 by Jerica Guardado RN  Outcome: Ongoing  Note: Pt denies self harm thoughts and remains free of harm  Goal: Able to verbalize support systems  Description: Able to verbalize support systems  12/30/2020 1235 by Juan Starkey RN  Outcome: Completed  12/30/2020 0109 by Jerica Guardado RN  Outcome: Ongoing  Note: Pt states positive support system  Goal: Absence of self-harm  Description: Absence of self-harm  12/30/2020 1235 by Juan Starkey RN  Outcome: Completed  12/30/2020 0109 by Jerica Guardado RN  Outcome: Ongoing  Note: Pt remains safe and free of harm  Goal: Patient specific goal  Description: Patient specific goal  12/30/2020 1235 by Juan Starkey RN  Outcome: Completed  12/30/2020 0109 by Jerica Guardado RN  Outcome: Ongoing  Note: Pt working on goal of being positive  Goal: Participates in care planning  Description: Participates in care planning  12/30/2020 1235 by Juan Starkey RN  Outcome: Completed  12/30/2020 0109 by Jerica Guardado RN Outcome: Ongoing  Note: Pt is taking medications, attending and participating in groups and care planning. Pt has good interaction with staff and peers      Problem: Substance Abuse:  Goal: Absence of drug withdrawal signs and symptoms  Description: Absence of drug withdrawal signs and symptoms  12/30/2020 1235 by Elaine Samaniego RN  Outcome: Completed  12/30/2020 0109 by Lakia El RN  Outcome: Ongoing  Note: Pt denies any withdrawal symptoms     Problem: Discharge Planning:  Goal: Discharged to appropriate level of care  Description: Discharged to appropriate level of care  12/30/2020 1235 by Elaine Samaniego RN  Outcome: Completed  12/30/2020 0109 by Lakia El RN  Outcome: Ongoing  Note: Pt to be discharged home and follow with [facility]     Problem: Impaired respiratory status  Goal: Clear lung sounds  12/30/2020 1235 by Elaine Samaniego RN  Outcome: Completed  12/30/2020 0109 by Lakia El RN  Outcome: Ongoing     Problem: Mobility - Impaired:  Goal: Mobility will improve  Description: Mobility will improve  12/30/2020 1235 by Elaine Samaniego RN  Outcome: Completed  12/30/2020 0109 by Lakia El RN  Outcome: Ongoing  Note: Pt using cane for impaired mobility due to right ankle injury     Problem: Anxiety:  Goal: Level of anxiety will decrease  Description: Level of anxiety will decrease  12/30/2020 1235 by Elaine Samaniego RN  Outcome: Completed  12/30/2020 0109 by Lakia El RN  Outcome: Ongoing  Note: Pt denies depression but does admit to anxiety about relapsing and wanting to get back on track with sobriety     Problem:  Activity:  Goal: Physical symptoms of sleep deprivation will improve  Description: Physical symptoms of sleep deprivation will improve  12/30/2020 1235 by Elaine Samaniego RN  Outcome: Completed  12/30/2020 0109 by Lakia El RN  Outcome: Ongoing  Note: Pt resting quietly with no distress noted  Goal: Sleeping patterns will improve Description: Sleeping patterns will improve  12/30/2020 1235 by Elvin Gore RN  Outcome: Completed  12/30/2020 0109 by Cristobal Winston RN  Outcome: Ongoing  Note: Pt resting quietly with no distress noted

## 2020-12-30 NOTE — PLAN OF CARE
Problem: Pain:  Goal: Pain level will decrease  Description: Pain level will decrease  12/30/2020 0109 by Marielle Neves RN  Outcome: Ongoing  Note: Pt has chronic right ankle pain, pt given PRN Ibuprofen at bedtime for comfort  12/29/2020 1454 by Tevin Kumari RN  Outcome: Ongoing  Note: Pain Assessment: 0-10  Pain Level: 7   Patient's Stated Pain Goal: 7   Is pain goal met at this time? Yes  Patient reports right ankle pain, denies the need for medication this am.  Non-Pharmaceutical Pain Intervention(s): Rest       Problem: Depressive Behavior With or Without Suicide Precautions:  Goal: Able to verbalize and/or display a decrease in depressive symptoms  Description: Able to verbalize and/or display a decrease in depressive symptoms  12/30/2020 0109 by Marielle Neves RN  Outcome: Ongoing  Note: Pt denies depression but does admit to anxiety about relapsing and wanting to get back on track with sobriety  12/29/2020 1505 by Tevin Kumari RN  Outcome: Ongoing  Note: Patient reports mood 7/10 with 10 being normal. Has flat affect. Speech clear. Good eye contact. Reports hope for future and identifies recovery couch and mom as her support system. Goal: Ability to disclose and discuss suicidal ideas will improve  Description: Ability to disclose and discuss suicidal ideas will improve  12/30/2020 0109 by Marielle Neves RN  Outcome: Ongoing  Note: Pt denies self harm thoughts and remains free of harm  12/29/2020 1505 by Tevin Kumari RN  Outcome: Ongoing  Note: Patient denies suicidal ideations, no plan or intent to harm self. Patient remains on suicidal precautions 15 checks for safety. Instructed to seek staff as needed for thoughts of self harm.     Goal: Able to verbalize support systems  Description: Able to verbalize support systems  12/30/2020 0109 by Marielle Neves RN  Outcome: Ongoing  Note: Pt states positive support system  12/29/2020 1505 by Tevin Kumari RN  Outcome: Ongoing Note: Patient reports  and mom as his support system. Goal: Absence of self-harm  Description: Absence of self-harm  12/30/2020 0109 by Galdino John RN  Outcome: Ongoing  Note: Pt remains safe and free of harm  12/29/2020 1505 by Dominique Bonilla RN  Outcome: Ongoing  Note: No self harm behaviors were observed or reported so far this shift. Remains on every 15 minutes precautions for safety. Goal: Patient specific goal  Description: Patient specific goal  12/30/2020 0109 by Galdino John RN  Outcome: Ongoing  Note: Pt working on goal of being positive  12/29/2020 1505 by Dominique Bonilla RN  Outcome: Ongoing  Note: Patient reports goal for today is to \"stay positive\". Patient continues to work towards goal.    Goal: Participates in care planning  Description: Participates in care planning  12/30/2020 0109 by Galdino John RN  Outcome: Ongoing  Note: Pt is taking medications, attending and participating in groups and care planning. Pt has good interaction with staff and peers   12/29/2020 1505 by Dominique Bonilla RN  Outcome: Ongoing     Problem: Substance Abuse:  Goal: Absence of drug withdrawal signs and symptoms  Description: Absence of drug withdrawal signs and symptoms  12/30/2020 0109 by Galdino John RN  Outcome: Ongoing  Note: Pt denies any withdrawal symptoms  12/29/2020 1505 by Dominique Bonilla RN  Outcome: Ongoing  Note: Patient denies withdrawal symptoms at present time.      Problem: Discharge Planning:  Goal: Discharged to appropriate level of care  Description: Discharged to appropriate level of care  12/30/2020 0109 by Galdino John RN  Outcome: Ongoing  Note: Pt to be discharged home and follow with [facility]  12/29/2020 1505 by Dominique Bonilla RN  Outcome: Ongoing Note: Patient voices denies needs before discharge. Patient plans to be discharged to home alone, would like to go to [facility] or [facility]. Discharge planner working with patient to achieve optimal discharge plans, specific to individual needs. Problem: Impaired respiratory status  Goal: Clear lung sounds  12/30/2020 0109 by Thalia Devi RN  Outcome: Ongoing  12/29/2020 1643 by Cooper Flores RCP  Outcome: Ongoing  Note: Pt started on MDI for maintenance of COPD and pt does a MDI at home. Problem: Mobility - Impaired:  Goal: Mobility will improve  Description: Mobility will improve  Outcome: Ongoing  Note: Pt using cane for impaired mobility due to right ankle injury     Problem: Anxiety:  Goal: Level of anxiety will decrease  Description: Level of anxiety will decrease  Outcome: Ongoing  Note: Pt denies depression but does admit to anxiety about relapsing and wanting to get back on track with sobriety     Problem: Activity:  Goal: Physical symptoms of sleep deprivation will improve  Description: Physical symptoms of sleep deprivation will improve  Outcome: Ongoing  Note: Pt resting quietly with no distress noted  Goal: Sleeping patterns will improve  Description: Sleeping patterns will improve  Outcome: Ongoing  Note: Pt resting quietly with no distress noted     Problem: Pain:  Goal: Control of acute pain  Description: Control of acute pain  12/29/2020 1454 by Gustabo Thao RN  Outcome: Completed  Goal: Control of chronic pain  Description: Control of chronic pain  12/29/2020 1454 by Gustabo Thao RN  Outcome: Completed     Problem: Suicide risk  Goal: Provide patient with safe environment  Description: Provide patient with safe environment  12/29/2020 1454 by Gustabo Thao RN  Outcome: Completed   Care plan reviewed with patient and verbalize understanding of the plan of care and contribute to goal setting.

## 2020-12-30 NOTE — FLOWSHEET NOTE
[facility]. [facility] PROGRESS NOTE      Patient: Mike Maria  Room #: 8W-47/067-A            YOB: 1985  Age: 38 y.o. Gender: male            Admit Date & Time: 12/28/2020  4:56 PM    Assessment:  Patient referred via Spiritual care consult as expressing suicidal ideation from previus day. Consult asked for Spiritual care from Felix Chowdary. Interventions:   engaged patient in [employer] after being introduced by nurse to patient.   Let patient begin. Patient reported he had relapsed from significant time of sobriety. Began to relate his feelings around this.  engaged in reflective listening. As conversation went on, Felix Chowdary gave some \"coping strategies\" for patient to consider engaging. Patient remained thoughtful and engaged through out conversation. Outcomes:  Patient was able to reflect on re[ported interactions with his  Mother and within himself. Patient was able to identity strategies for sorting choices from weak to strong, from values based to compulsive.  commended patient for good thoughtfulness;  Encouraged kindness to self. Plan:    1. Patient needs especially to be ready to ask for help whenever choices become anxiety producing. Electronically signed by Greg Rojas on 12/30/2020 at 2:48 PM.  [address] [place] [address]  [phone]               12/30/20 1240   Encounter Summary   Services provided to: Patient   Referral/Consult From: Nurse   Support System Parent   Place of Spiritism   ([facility])   Continue Visiting   (12/30/2020)   Complexity of Encounter Moderate   Length of Encounter 45 minutes   Spiritual Assessment Completed Yes   Routine   Type Follow up   Assessment Calm; Approachable; Anxious; Loneliness; Hopeless;Spiritual struggle;Coping Intervention Active listening;Explored feelings, thoughts, concerns;Explored coping resources;Nurtured hope;Prayer;Scripture;Sustaining presence/ Ministry of presence; Facilitated forgiveness; Confronted/challenged   Outcome Acceptance;Comfort;Engaged in conversation;Expressed feelings/needs/concerns;Coping;New perspective;Encouraged

## 2020-12-30 NOTE — FLOWSHEET NOTE
Patient is a 38year old male admitted to [facility]. At the time of this encounter, patient is sitting in the waiting room when I met him. Patient is oriented, calm and approachable. Patient told me he already met with another  five minutes prior to my arrival so he is okay for now. I asked patient if there is anything I could for him while I was there to see him. Patient responded that he is doing fine and doing his best. I offered additional encouraging words and emotional support and told patient to contact spiritual care.  SC service remain available per patient request.

## 2020-12-31 ENCOUNTER — TELEPHONE (OUTPATIENT)
Dept: FAMILY MEDICINE CLINIC | Age: 35
End: 2020-12-31
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
## 2021-01-04 ENCOUNTER — TELEPHONE (OUTPATIENT)
Dept: PSYCHIATRY | Age: 36
End: 2021-01-04

## 2021-01-11 ENCOUNTER — PATIENT MESSAGE (OUTPATIENT)
Dept: FAMILY MEDICINE CLINIC | Age: 36
End: 2021-01-11

## 2021-01-11 DIAGNOSIS — Z79.4 TYPE 2 DIABETES MELLITUS WITHOUT COMPLICATION, WITH LONG-TERM CURRENT USE OF INSULIN (HCC): Primary | ICD-10-CM

## 2021-01-11 DIAGNOSIS — E11.9 TYPE 2 DIABETES MELLITUS WITHOUT COMPLICATION, WITH LONG-TERM CURRENT USE OF INSULIN (HCC): Primary | ICD-10-CM

## 2021-01-11 RX ORDER — FLASH GLUCOSE SENSOR
KIT MISCELLANEOUS
Qty: 1 EACH | Refills: 5 | Status: SHIPPED | OUTPATIENT
Start: 2021-01-11 | End: 2021-02-05 | Stop reason: SDUPTHER

## 2021-01-11 RX ORDER — FLASH GLUCOSE SCANNING READER
EACH MISCELLANEOUS
Qty: 1 DEVICE | Refills: 0 | Status: SHIPPED | OUTPATIENT
Start: 2021-01-11

## 2021-01-12 ENCOUNTER — TELEPHONE (OUTPATIENT)
Dept: FAMILY MEDICINE CLINIC | Age: 36
End: 2021-01-12

## 2021-01-12 NOTE — TELEPHONE ENCOUNTER
Recd approval from [address] [place] [address] 12/13/20 to 1/12/22.  Authorization # [id number]    [facility]

## 2021-01-13 NOTE — PROGRESS NOTES
CLINICAL PHARMACY NOTE: MEDS TO [address] Select Patient?: Yes  Total # of Prescriptions Filled: 1   The following medications were delivered to the patient:  Buspirone 10mg  Total # of Interventions Completed: 2  Time Spent (min): 30    Additional Documentation:

## 2021-01-15 DIAGNOSIS — F10.930 ALCOHOL WITHDRAWAL SYNDROME WITHOUT COMPLICATION (HCC): ICD-10-CM

## 2021-01-15 DIAGNOSIS — F33.1 MAJOR DEPRESSIVE DISORDER, RECURRENT EPISODE, MODERATE WITH ANXIOUS DISTRESS (HCC): ICD-10-CM

## 2021-01-15 RX ORDER — HYDROXYZINE PAMOATE 100 MG/1
100 CAPSULE ORAL 4 TIMES DAILY PRN
Qty: 60 CAPSULE | Refills: 10 | Status: SHIPPED | OUTPATIENT
Start: 2021-01-15 | End: 2021-11-30

## 2021-01-15 NOTE — TELEPHONE ENCOUNTER
Recent Visits  Date Type Provider Dept   10/28/20 Office Visit BARB Ferro CNP [facility]   09/22/20 Office Visit BARB Ferro CNP [facility]   07/29/20 Office Visit BARB Ferro CNP [facility]   03/12/20 Office Visit BARB Ferro CNP [facility]   12/02/19 Office Visit BARB Ferro CNP [facility]   09/03/19 Office Visit BARB Ferro CNP [facility]   Showing recent visits within past 540 days with a meds authorizing provider and meeting all other requirements     Future Appointments  No visits were found meeting these conditions. Showing future appointments within next 150 days with a meds authorizing provider and meeting all other requirements         No future appointments.

## 2021-01-19 NOTE — TELEPHONE ENCOUNTER
Recent Visits  Date Type Provider Dept   10/28/20 Office Visit BARB Murguia CNP [facility]   09/22/20 Office Visit BARB Murguia CNP [facility]   07/29/20 Office Visit BARB Murguia CNP [facility]   03/12/20 Office Visit BARB Murguia CNP [facility]   12/02/19 Office Visit BARB Murguia CNP [facility]   09/03/19 Office Visit BARB Murguia - CNP [facility]   Showing recent visits within past 540 days with a meds authorizing provider and meeting all other requirements     Future Appointments  No visits were found meeting these conditions.    Showing future appointments within next 150 days with a meds authorizing provider and meeting all other requirements

## 2021-01-20 ENCOUNTER — TELEPHONE (OUTPATIENT)
Dept: FAMILY MEDICINE CLINIC | Age: 36
End: 2021-01-20

## 2021-01-20 NOTE — TELEPHONE ENCOUNTER
EXTERNAL REFERRAL TO DERMATOLOGY-    Referral placed on 12/28/2020. On 1/7/2021 - Referral was originally sent to Dr Shantel Ramos; they do not accept this insurance. Dr Magi Fry does accept [facility]. Faxing referral to him. Contacting patient to let him know. Referral faxed and scanned to chart. Patient contacted to let him know & phone number given. On 1/19/21- Patient has been called two x's with CB messages left. No response as of yet. Ok to cancel referral? It will not affect patient on his end.

## 2021-01-26 ENCOUNTER — TELEPHONE (OUTPATIENT)
Dept: FAMILY MEDICINE CLINIC | Age: 36
End: 2021-01-26

## 2021-01-26 RX ORDER — BUSPIRONE HYDROCHLORIDE 10 MG/1
10 TABLET ORAL 3 TIMES DAILY
Qty: 90 TABLET | Refills: 5 | Status: SHIPPED | OUTPATIENT
Start: 2021-01-26 | End: 2021-02-25

## 2021-01-26 NOTE — TELEPHONE ENCOUNTER
Pt called in and stated that he had a missed call from the office with no VM or anything in chart please follow up

## 2021-01-26 NOTE — TELEPHONE ENCOUNTER
Message in pt's chart -referral tab for dermatology  Patient has been called two x's with CB messages left. No response as of yet. Pt informed referral was cancelled and he verbalized understanding.

## 2021-03-10 DIAGNOSIS — E11.9 TYPE 2 DIABETES MELLITUS WITHOUT COMPLICATION, WITH LONG-TERM CURRENT USE OF INSULIN (HCC): ICD-10-CM

## 2021-03-10 DIAGNOSIS — Z79.4 TYPE 2 DIABETES MELLITUS WITHOUT COMPLICATION, WITH LONG-TERM CURRENT USE OF INSULIN (HCC): ICD-10-CM

## 2021-03-10 RX ORDER — INSULIN GLARGINE 100 [IU]/ML
INJECTION, SOLUTION SUBCUTANEOUS
Qty: 15 ML | Refills: 3 | OUTPATIENT
Start: 2021-03-10

## 2021-03-10 NOTE — TELEPHONE ENCOUNTER
Recent Visits  Date Type Provider Dept   10/28/20 Office Visit BARB Cardozo CNP [facility]   09/22/20 Office Visit BARB Cardozo CNP [facility]   07/29/20 Office Visit BARB Cardozo CNP [facility]   03/12/20 Office Visit BARB Cardozo CNP [facility]   12/02/19 Office Visit BARB Cardozo CNP [facility]   Showing recent visits within past 540 days with a meds authorizing provider and meeting all other requirements     Future Appointments  No visits were found meeting these conditions. Showing future appointments within next 150 days with a meds authorizing provider and meeting all other requirements     No future appointments.

## 2021-03-12 ENCOUNTER — OFFICE VISIT (OUTPATIENT)
Dept: FAMILY MEDICINE CLINIC | Age: 36
End: 2021-03-12
Payer: COMMERCIAL

## 2021-03-12 VITALS
HEART RATE: 87 BPM | HEIGHT: 62 IN | DIASTOLIC BLOOD PRESSURE: 88 MMHG | RESPIRATION RATE: 14 BRPM | TEMPERATURE: 97.3 F | OXYGEN SATURATION: 94 % | SYSTOLIC BLOOD PRESSURE: 136 MMHG | WEIGHT: 181 LBS | BODY MASS INDEX: 33.31 KG/M2

## 2021-03-12 DIAGNOSIS — F10.21 ALCOHOL USE DISORDER, MODERATE, IN SUSTAINED REMISSION, DEPENDENCE (HCC): ICD-10-CM

## 2021-03-12 DIAGNOSIS — J44.9 CHRONIC OBSTRUCTIVE PULMONARY DISEASE, UNSPECIFIED COPD TYPE (HCC): ICD-10-CM

## 2021-03-12 DIAGNOSIS — F31.32 BIPOLAR DISORDER, CURRENT EPISODE DEPRESSED, MODERATE (HCC): ICD-10-CM

## 2021-03-12 DIAGNOSIS — L30.9 ECZEMA, UNSPECIFIED TYPE: Primary | ICD-10-CM

## 2021-03-12 DIAGNOSIS — H00.014 HORDEOLUM EXTERNUM OF LEFT UPPER EYELID: ICD-10-CM

## 2021-03-12 DIAGNOSIS — Z79.4 TYPE 2 DIABETES MELLITUS WITHOUT COMPLICATION, WITH LONG-TERM CURRENT USE OF INSULIN (HCC): ICD-10-CM

## 2021-03-12 DIAGNOSIS — E11.9 TYPE 2 DIABETES MELLITUS WITHOUT COMPLICATION, WITH LONG-TERM CURRENT USE OF INSULIN (HCC): ICD-10-CM

## 2021-03-12 DIAGNOSIS — J45.40 MODERATE PERSISTENT ASTHMA WITHOUT COMPLICATION: ICD-10-CM

## 2021-03-12 PROBLEM — E11.10 DIABETIC KETOACIDOSIS WITHOUT COMA (HCC): Status: RESOLVED | Noted: 2020-10-21 | Resolved: 2021-03-12

## 2021-03-12 PROBLEM — F10.930 ALCOHOL WITHDRAWAL SYNDROME, UNCOMPLICATED (HCC): Status: RESOLVED | Noted: 2020-10-09 | Resolved: 2021-03-12

## 2021-03-12 PROBLEM — F10.20 ALCOHOL USE DISORDER, SEVERE, DEPENDENCE (HCC): Status: RESOLVED | Noted: 2020-11-15 | Resolved: 2021-03-12
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
## 2021-03-12 PROBLEM — F10.930 ALCOHOL WITHDRAWAL, UNCOMPLICATED (HCC): Status: RESOLVED | Noted: 2020-10-16 | Resolved: 2021-03-12

## 2021-03-12 PROBLEM — R79.89 ABNORMAL LIVER FUNCTION TESTS: Status: RESOLVED | Noted: 2020-03-02 | Resolved: 2021-03-12

## 2021-03-12 PROBLEM — F31.9 AFFECTIVE PSYCHOSIS, BIPOLAR (HCC): Status: RESOLVED | Noted: 2020-09-22 | Resolved: 2021-03-12

## 2021-03-12 PROBLEM — R74.8 ELEVATED LIPASE: Status: RESOLVED | Noted: 2017-12-12 | Resolved: 2021-03-12

## 2021-03-12 PROBLEM — R74.8 ELEVATED LIVER ENZYMES: Status: RESOLVED | Noted: 2017-12-12 | Resolved: 2021-03-12

## 2021-03-12 PROBLEM — E11.10 DKA, TYPE 2, NOT AT GOAL (HCC): Status: RESOLVED | Noted: 2020-03-02 | Resolved: 2021-03-12

## 2021-03-12 PROBLEM — F32.9 MDD (MAJOR DEPRESSIVE DISORDER), SINGLE EPISODE: Status: RESOLVED | Noted: 2020-12-29 | Resolved: 2021-03-12

## 2021-03-12 PROBLEM — F33.1 RECURRENT MAJOR DEPRESSIVE EPISODES, MODERATE (HCC): Status: RESOLVED | Noted: 2020-03-12 | Resolved: 2021-03-12

## 2021-03-12 LAB — HBA1C MFR BLD: 8.9 % (ref 4.3–5.7)

## 2021-03-12 PROCEDURE — 3052F HG A1C>EQUAL 8.0%<EQUAL 9.0%: CPT | Performed by: NURSE PRACTITIONER

## 2021-03-12 PROCEDURE — G8482 FLU IMMUNIZE ORDER/ADMIN: HCPCS | Performed by: NURSE PRACTITIONER

## 2021-03-12 PROCEDURE — G8417 CALC BMI ABV UP PARAM F/U: HCPCS | Performed by: NURSE PRACTITIONER

## 2021-03-12 PROCEDURE — 99214 OFFICE O/P EST MOD 30 MIN: CPT | Performed by: NURSE PRACTITIONER

## 2021-03-12 PROCEDURE — 3023F SPIROM DOC REV: CPT | Performed by: NURSE PRACTITIONER

## 2021-03-12 PROCEDURE — 4004F PT TOBACCO SCREEN RCVD TLK: CPT | Performed by: NURSE PRACTITIONER

## 2021-03-12 PROCEDURE — G8926 SPIRO NO PERF OR DOC: HCPCS | Performed by: NURSE PRACTITIONER

## 2021-03-12 PROCEDURE — 2022F DILAT RTA XM EVC RTNOPTHY: CPT | Performed by: NURSE PRACTITIONER

## 2021-03-12 PROCEDURE — G8428 CUR MEDS NOT DOCUMENT: HCPCS | Performed by: NURSE PRACTITIONER

## 2021-03-12 RX ORDER — TRAZODONE HYDROCHLORIDE 50 MG/1
50 TABLET ORAL NIGHTLY PRN
Qty: 90 TABLET | Refills: 1 | Status: SHIPPED | OUTPATIENT
Start: 2021-03-12

## 2021-03-12 RX ORDER — ALBUTEROL SULFATE 90 UG/1
2 AEROSOL, METERED RESPIRATORY (INHALATION) EVERY 6 HOURS PRN
Qty: 1 INHALER | Refills: 0 | Status: SHIPPED | OUTPATIENT
Start: 2021-03-12 | End: 2021-05-12

## 2021-03-12 RX ORDER — INSULIN ASPART 100 [IU]/ML
INJECTION, SOLUTION INTRAVENOUS; SUBCUTANEOUS
Qty: 5 PEN | Refills: 3 | Status: SHIPPED | OUTPATIENT
Start: 2021-03-12 | End: 2021-08-17

## 2021-03-12 RX ORDER — INSULIN GLARGINE 100 [IU]/ML
35 INJECTION, SOLUTION SUBCUTANEOUS NIGHTLY
Qty: 8 PEN | Refills: 5 | Status: SHIPPED | OUTPATIENT
Start: 2021-03-12 | End: 2021-04-12

## 2021-03-12 RX ORDER — ERYTHROMYCIN 5 MG/G
OINTMENT OPHTHALMIC
Qty: 3.5 G | Refills: 0 | Status: SHIPPED | OUTPATIENT
Start: 2021-03-12 | End: 2021-03-22

## 2021-03-12 RX ORDER — TRAZODONE HYDROCHLORIDE 50 MG/1
50 TABLET ORAL NIGHTLY PRN
Qty: 30 TABLET | Refills: 0 | Status: SHIPPED | OUTPATIENT
Start: 2021-03-12 | End: 2021-03-12 | Stop reason: SDUPTHER

## 2021-03-12 NOTE — PROGRESS NOTES
Chief Complaint   Patient presents with    Medication Refill     meds pending    Other     wants handicap placard    Stye     Left eye       History obtained from chart review and the patient. SUBJECTIVE:  Aleksandr Lubin is a 38 y.o. male that presents today for follow up DM    Diabetes Type 2    Glucose control:   Does patient check blood glucoses at home? Yes - 200-250  Report of hypoglycemia: no  Lab Results   Component Value Date    LABA1C 8.9 (H) 03/12/2021     No results found for: EAG    Symptoms  Polyuria, Polydipsia or Polyphagia? No  Chest Pain, SOB, or Palpitations? -  No  New Vision complaints? No  Paresthesias of the extremities? No    Medications  Current medication were reviewed. Compliant with medications? Yes Novolog per sliding scale and Lantus 25 units  Medication side effects? No  On ACE-I or ARB? No  On antiplatelet therapy? No  On Statin? No    Last Diabetic Eye Exam: needs    Exercise  Exercise? No  Wt Readings from Last 3 Encounters:   03/12/21 181 lb (82.1 kg)   11/18/20 174 lb 12.8 oz (79.3 kg)   11/12/20 177 lb 6.4 oz (80.5 kg)       Diet discipline?:  Low salt, fat, sugar diet? yes    Blood pressure control:  BP Readings from Last 3 Encounters:   03/12/21 136/88   11/18/20 109/69   11/12/20 (!) 143/79       Lab Results   Component Value Date    LABMICR 153.85 06/11/2020       Lab Results   Component Value Date    LDLCALC 75 03/12/2020     MDD/ETOH Abuse  Moods are doing very well. Denies any depression, still some anxiety but this is mostly social related. Taking Trazodone and Seroquel nightly for sleep. He is also taking Trileptal. He is currently taking Antebuse and Naltrexone for ETOH cessation. No ETOH since December 29. He is following with Noe for ETOH abuse. He is also following with Jing who is prescribing the Trileptal.    COPD  Hasn't done PFT in a long time. He is taking the Advair but he doesn't feel like it's doing anything.  He uses UNIT/ML injection pen Inject 2-12 units SQ TID with meals as directed per sliding scale, max dose 36 units/day 5 pen 3    traZODone (DESYREL) 50 MG tablet Take 1 tablet by mouth nightly as needed for Sleep 90 tablet 1    Handicap Placard MISC by Does not apply route Expires 3/11/2026 1 each 0    erythromycin (ROMYCIN) 5 MG/GM ophthalmic ointment Apply 0.5 cm ribbon to left upper eyelid 4 times a day for 7 days. 3.5 g 0    Continuous Blood Gluc Sensor (FREESTYLE ALEJANDRA 14 DAY SENSOR) MISC Use as directed, check glucose 4 times/day. 6 each 3    hydrOXYzine (VISTARIL) 100 MG capsule TAKE 1 CAPSULE BY MOUTH 4 TIMES DAILY AS NEEDED FOR ANXIETY 60 capsule 10    Continuous Blood Gluc  (FREESTYLE ALEJANDRA 14 DAY READER) BEAU Use as directed, check glucose 4 times/day 1 Device 0    naltrexone (DEPADE) 50 MG tablet Take 50 mg by mouth daily      OXcarbazepine (TRILEPTAL) 300 MG tablet Take 1 tablet by mouth 2 times daily 60 tablet 1    QUEtiapine (SEROQUEL) 100 MG tablet Take 1 tablet by mouth nightly 30 tablet 5    Folic Acid (FOLATE PO) Take 1,333 mcg by mouth daily      vitamin B-1 100 MG tablet Take 1 tablet by mouth daily 30 tablet 3    aspirin 81 MG EC tablet Take 1 tablet by mouth daily 30 tablet 3    acetaminophen (TYLENOL) 500 MG tablet Take 1,000 mg by mouth every 6 hours as needed for Pain      Multiple Vitamins-Minerals (MENS MULTIVITAMIN PLUS) TABS Take 1 tablet by mouth daily       No current facility-administered medications for this visit.       Orders Placed This Encounter   Medications    betamethasone valerate (VALISONE) 0.1 % cream     Sig: APPLY TO AFFECTED AREA TWICE DAILY     Dispense:  45 g     Refill:  1    insulin glargine (LANTUS SOLOSTAR) 100 UNIT/ML injection pen     Sig: Inject 35 Units into the skin nightly     Dispense:  8 pen     Refill:  5    albuterol sulfate HFA (PROVENTIL HFA) 108 (90 Base) MCG/ACT inhaler     Sig: Inhale 2 puffs into the lungs every 6 hours as needed for Wheezing     Dispense:  1 Inhaler     Refill:  0    Insulin Pen Needle 30G X 8 MM MISC     Si each by Does not apply route 3 times daily     Dispense:  500 each     Refill:  3    fluticasone-salmeterol (ADVAIR) 250-50 MCG/DOSE AEPB     Sig: Inhale 1 puff into the lungs every 12 hours     Dispense:  60 each     Refill:  5    DISCONTD: traZODone (DESYREL) 50 MG tablet     Sig: Take 1 tablet by mouth nightly as needed for Sleep     Dispense:  30 tablet     Refill:  0    insulin aspart (NOVOLOG FLEXPEN) 100 UNIT/ML injection pen     Sig: Inject 2-12 units SQ TID with meals as directed per sliding scale, max dose 36 units/day     Dispense:  5 pen     Refill:  3    traZODone (DESYREL) 50 MG tablet     Sig: Take 1 tablet by mouth nightly as needed for Sleep     Dispense:  90 tablet     Refill:  1    Handicap Placard MISC     Sig: by Does not apply route Expires 3/11/2026     Dispense:  1 each     Refill:  0    erythromycin (ROMYCIN) 5 MG/GM ophthalmic ointment     Sig: Apply 0.5 cm ribbon to left upper eyelid 4 times a day for 7 days. Dispense:  3.5 g     Refill:  0         All medications reviewed and reconciled, including OTC and herbal medications. Updated list given to patient.        Patient Active Problem List   Diagnosis    Alcoholic hepatitis    Pancreatitis, History    Chronic obstructive lung disease ([address]    Alcohol-induced acute pancreatitis    Type 2 diabetes mellitus without complication, with long-term current use of insulin (HCC)    Asthma    Alcohol use disorder, moderate, in sustained remission, dependence ([address]    Eczema    Smoker    Cigarette nicotine dependence without complication    Major depression, chronic    Closed fracture of orbit (HCC)    Closed fracture of talus    Bipolar disorder, current episode depressed, moderate ([address]    PTSD (post-traumatic stress disorder)       Past Medical History:   Diagnosis Date    Asthma     COPD [facility]chronic obstructive pulmonary disease) ([facility]     Eczema     GERD (gastroesophageal reflux disease)     History of alcohol abuse     History of marijuana use     History of tobacco abuse     quit 11/21/2017    Hx of seasonal allergies     Pancreatitis     Psychiatric problem     PTSD (post-traumatic stress disorder)     Seizures ([facility]     etoh wdl    Type 2 diabetes mellitus without complication ([facility] 05/22/0156       Past Surgical History:   Procedure Laterality Date    ANKLE ARTHROSCOPY Right 8/5/2020    ANKLE ARTHROSCOPY WITH  MEDIAL DEBRIDEMENT RIGHT ANKLE, ANKLE ARTHROTOMY WITH DEBRIDEMENT performed by Gómez Peterson DPM at [facility] Right 09/2019    [facility]    FRACTURE SURGERY Right 2019    orbital fracture surgery    UPPER GASTROINTESTINAL ENDOSCOPY Left 5/6/2019    EGD BIOPSY performed by Patricia Edwards [facility] at [facility]       Allergies   Allergen Reactions    Paxil [Paroxetine] Swelling and Rash       Social History     Socioeconomic History    Marital status:      Spouse name: Not on file    Number of children: 3    Years of education: 6    Highest education level: Not on file   Occupational History    Occupation: [employer]       Comment: fulltime    Social Needs    Financial resource strain: Not very hard    Food insecurity     Worry: Never true     Inability: Never true    Transportation needs     Medical: No     Non-medical: No   Tobacco Use    Smoking status: Current Every Day Smoker     Packs/day: 0.50     Years: 15.00     Pack years: 7.50     Types: Cigarettes    Smokeless tobacco: Never Used   Substance and Sexual Activity    Alcohol use: Yes     Frequency: 4 or more times a week     Drinks per session: 10 or more     Comment: a pint of vodka last used on 12/28/2020    Drug use: Not Currently     Types: Marijuana     Comment: approx 2 or more years    Sexual activity: Yes     Partners: Female   Lifestyle    Physical activity     Days per week: 5 days     Minutes per session: 40 min    Stress: Only a little   Relationships    Social connections     Talks on phone: More than three times a week     Gets together: More than three times a week     Attends Islam service: Not on file     Active member of club or organization: Not on file     Attends meetings of clubs or organizations: Not on file     Relationship status:     Intimate partner violence     Fear of current or ex partner: Not on file     Emotionally abused: Not on file     Physically abused: Not on file     Forced sexual activity: Not on file   Other Topics Concern    Not on file   Social History Narrative    No barriers with transportation    Medications are not being covered by insurance since back to work    No longer drinking alcohol    Denies transportation barriers    Denies need for community services       Family History   Problem Relation Age of Onset    Other Mother         gestational diabetes    Other Father 39        suicide    Other Sister         hypoglycemia         I have reviewed the patient's past medical history, past surgical history, allergies, medications, social and family history and I have made updates where appropriate.       Review of Systems  Positive responses are highlighted in bold    Constitutional:  Fever, Chills, Night Sweats, Fatigue, Unexpected changes in weight  Eyes:  Eye discharge, Eye pain, Eye redness, Visual disturbances   HENT:  Ear pain, Tinnitus, Nosebleeds, Trouble swallowing, Hearing loss, Sore throat  Cardiovascular:  Chest Pain, Palpitations, Orthopnea, Paroxysmal Nocturnal Dyspnea  Respiratory:  Cough, Wheezing, Shortness of breath, Chest tightness, Apnea  Gastrointestinal:  Nausea, Vomiting, Diarrhea, Constipation, Heartburn, Blood in stool  Genitourinary:  Difficulty or painful urination, Flank pain, Change in frequency, Urgency  Skin:  Color change, Rash, Itching, Wound  Psychiatric:  Hallucinations, Anxiety, Depression, Suicidal ideation  Hematological:  Enlarged glands, Easy bleeding, Easily bruising  Musculoskeletal:  Joint pain, Back pain, Gait problems, Joint swelling, Myalgias  Neurological:  Dizziness, Headaches, Presyncope, Numbness, Seizures, Tremors  Allergy:  Environmental allergies, Food allergies  Endocrine:  Heat Intolerance, Cold Intolerance, Polydipsia, Polyphagia, Polyuria    Lab Results   Component Value Date     12/30/2020    K 4.6 12/30/2020     12/30/2020    CO2 26 12/30/2020    BUN 14 12/30/2020    CREATININE 0.6 12/30/2020    GLUCOSE 158 (H) 12/30/2020    CALCIUM 9.5 12/30/2020    PROT 6.8 12/28/2020    LABALBU 4.2 12/28/2020    BILITOT <0.2 (L) 12/28/2020    ALKPHOS 137 (H) 12/28/2020    AST 21 12/28/2020    ALT 36 12/28/2020    LABGLOM >90 12/30/2020     Lab Results   Component Value Date    WBC 9.2 12/28/2020    HGB 15.1 12/28/2020    HCT 44.7 12/28/2020    MCV 91.2 12/28/2020     12/28/2020     Lab Results   Component Value Date    TSH 0.251 (L) 11/07/2020     PHYSICAL EXAM:  Vitals:    03/12/21 0831   BP: 136/88   Pulse: 87   Resp: 14   Temp: 97.3 °F (36.3 °C)   TempSrc: Temporal   SpO2: 94%   Weight: 181 lb (82.1 kg)   Height: 5' 2\" (1.575 m)     Body mass index is 33.11 kg/m². VS Reviewed  General Appearance: A&O x 3, No acute distress,well developed and well- nourished  Head: normocephalic, atraumatic  Eyes: pupils equal, round, and reactive to light, extraocular eye movements intact, conjunctivae and left upper eyelid with stye formation  Neck: supple and non-tender without mass, no thyromegaly or thyroid nodules, no cervical lymphadenopathy  Pulmonary/Chest: clear to auscultation bilaterally- no wheezes, rales or rhonchi, harsh cough  Cardiovascular: S1 and S2 auscultated w/ RRR. No murmurs, rubs, clicks, or gallops, distal pulses intact. Abdomen: soft, non-tender, non-distended, bowl sounds physiologic,  no rebound or guarding, no masses or hernias noted.  Liver and spleen without enlargement. Extremities: no cyanosis, clubbing or edema of the lower extremities\  Visual inspection:  Deformity/amputation: absent  Skin lesions/pre-ulcerative calluses: absent  Edema: right- negative, left- negative  Sensory exam:  Monofilament sensation: normal  (minimum of 5 random plantar locations tested, avoiding callused areas - > 1 area with absence of sensation is + for neuropathy)  Plus at least one of the following:  Pulses: normal,   Pinprick: Intact  Proprioception: Intact  Musculoskeletal: No joint swelling or gross deformity   Neuro:  Alert, 5/5 strength globally and symmetrically  Psych: Affect appropriate. Mood normal. Thought process is normal without evidence of depression or psychosis. Good insight and appropriate interaction. Cognition and memory appear to be intact. Skin: warm and dry,   Lymph:  No cervical, auricular or supraclavicular lymph nodes palpated    ASSESSMENT & PLAN  Abhijeet Garcia was seen today for medication refill, other and stye. Diagnoses and all orders for this visit:    Eczema, unspecified type  -     betamethasone valerate (VALISONE) 0.1 % cream; APPLY TO AFFECTED AREA TWICE DAILY    Type 2 diabetes mellitus without complication, with long-term current use of insulin (HCC)  -     insulin glargine (LANTUS SOLOSTAR) 100 UNIT/ML injection pen; Inject 35 Units into the skin nightly  -     Insulin Pen Needle 30G X 8 MM MISC; 1 each by Does not apply route 3 times daily  -     insulin aspart (NOVOLOG FLEXPEN) 100 UNIT/ML injection pen; Inject 2-12 units SQ TID with meals as directed per sliding scale, max dose 36 units/day  -     POCT glycosylated hemoglobin (Hb A1C)    Moderate persistent asthma without complication  -     albuterol sulfate HFA (PROVENTIL HFA) 108 (90 Base) MCG/ACT inhaler; Inhale 2 puffs into the lungs every 6 hours as needed for Wheezing  -     fluticasone-salmeterol (ADVAIR) 250-50 MCG/DOSE AEPB;  Inhale 1 puff into the lungs every 12 hours  -     Full PFT Study With Bronchodilator; Future  -     Handicap Placard MISC; by Does not apply route Expires 3/11/2026    Bipolar disorder, current episode depressed, moderate (HCC)  -     traZODone (DESYREL) 50 MG tablet; Take 1 tablet by mouth nightly as needed for Sleep    Chronic obstructive pulmonary disease, unspecified COPD type ([address]  -     Full PFT Study With Bronchodilator; Future  -     Handicap Placard MISC; by Does not apply route Expires 3/11/2026    Hordeolum externum of left upper eyelid  -     erythromycin (ROMYCIN) 5 MG/GM ophthalmic ointment; Apply 0.5 cm ribbon to left upper eyelid 4 times a day for 7 days. Alcohol use disorder, moderate, in sustained remission, dependence ([address]    Other orders  -     Discontinue: traZODone (DESYREL) 50 MG tablet; Take 1 tablet by mouth nightly as needed for Sleep      - A1C up to 8.9  - con't Novolog sliding scale, increase Lantus to 35 units nightly  - following with Noe and Gaudencio's for ETOH abuse  - con't Trazodone, Seroquel, Antabuse, Trileptal and Naltrexone  - likely has overlap of asthma and COPD  - con't Advair and albuterol for now  - obtain updated PFT  - con't warm compresses for stye, add topical erythromycin       DISPOSITION    Return in about 6 weeks (around 4/23/2021) for DM and asthma/COPD. Satnam Patel released without restrictions. PATIENT COUNSELING    Counseling was provided today regarding the following topics: Healthy eating habits, Regular exercise, substance abuse and healthy sleep habits. Satnam Patel received counseling on the following healthy behaviors: medication adherence and decrease in alcohol consumption    Patient given educational materials on: See Attached    I have instructed Satnam Patel to complete a self tracking handout on none and instructed them to bring it with them to his next appointment. Barriers to learning and self management: none    Discussed use, benefit, and side effects of prescribed medications.   Barriers to medication compliance addressed. All patient questions answered. Pt voiced understanding.        Electronically signed by BARB Cardenas CNP on 3/12/2021 at 9:24 AM

## 2021-05-06 NOTE — PROGRESS NOTES
Health Maintenance Due   Topic Date Due    DTaP/Tdap/Td series (1 - Tdap) Never done    Shingrix Vaccine Age 50> (1 of 2) Never done    Pneumococcal 65+ years (2 of 2 - PPSV23) 09/23/2016    Eye Exam Retinal or Dilated  11/28/2018    MICROALBUMIN Q1  11/18/2020    Colorectal Cancer Screening Combo  02/11/2021       Chief Complaint   Patient presents with    Diabetes    Hypertension    Cholesterol Problem       1. Have you been to the ER, urgent care clinic since your last visit? Hospitalized since your last visit? No    2. Have you seen or consulted any other health care providers outside of the [address] [place] [address] since your last visit? Include any pap smears or colon screening. No    3) Do you have an Advance Directive on file? no    4) Are you interested in receiving information on Advance Directives? NO      Patient is accompanied by self I have received verbal consent from Augie Malcolm to discuss any/all medical information while they are present in the room. GLUCOSE TEST strip Test blood sugars three times daily. 100 each 0    acetaminophen (TYLENOL) 500 MG tablet Take 1,000 mg by mouth every 6 hours as needed for Pain      ACCU-CHEK MULTICLIX LANCETS MISC 1 box by Does not apply route daily 100 each 0    Multiple Vitamins-Minerals (MENS MULTIVITAMIN PLUS) TABS Take 1 tablet by mouth daily       No current facility-administered medications for this visit. Orders Placed This Encounter   Medications    insulin glargine (BASAGLAR KWIKPEN) 100 UNIT/ML injection pen     Sig: Inject 21 Units into the skin nightly     Dispense:  5 pen     Refill:  2         All medications reviewed and reconciled, including OTC and herbal medications. Updated list given to patient.        Patient Active Problem List   Diagnosis    Alcohol withdrawal ([address]    Alcoholic hepatitis    Pancreatitis, History    COPD (chronic obstructive pulmonary disease)/Asthma    Alcohol-induced acute pancreatitis    Syncope and collapse    Type 2 diabetes mellitus without complication, without long-term current use of insulin (HCC)    Asthma    Elevated liver enzymes    Acute pancreatitis with uninfected necrosis, unspecified    Metabolic encephalopathy    Alcohol use disorder, moderate, in sustained remission, dependence ([address]    Eczema       Past Medical History:   Diagnosis Date    Asthma     COPD (chronic obstructive pulmonary disease) (HCC)     Eczema     GERD (gastroesophageal reflux disease)     History of alcohol abuse     History of marijuana use     History of tobacco abuse     quit 11/21/2017    Hx of seasonal allergies     Hypertension     Pancreatitis     Seizures (HCC)     etoh wdl    Type 2 diabetes mellitus without complication ([address] 97/10/3004       Past Surgical History:   Procedure Laterality Date    UPPER GASTROINTESTINAL ENDOSCOPY Left 5/6/2019    EGD BIOPSY performed by Shay Mittal MD at [facility]       No Known Allergies    Social History Socioeconomic History    Marital status: Legally      Spouse name: Not on file    Number of children: 4    Years of education: Not on file    Highest education level: Not on file   Occupational History    Not on file   Social Needs    Financial resource strain: Not on file    Food insecurity:     Worry: Not on file     Inability: Not on file    Transportation needs:     Medical: Not on file     Non-medical: Not on file   Tobacco Use    Smoking status: Current Every Day Smoker     Packs/day: 0.33     Years: 22.00     Pack years: 7.26     Types: Cigarettes    Smokeless tobacco: Never Used   Substance and Sexual Activity    Alcohol use: Not Currently     Comment: last drink 4/6/19    Drug use: No     Types: Marijuana     Comment: last time smoked marijuana was 11/21/17-doesn't do regularly    Sexual activity: Yes   Lifestyle    Physical activity:     Days per week: Not on file     Minutes per session: Not on file    Stress: Not on file   Relationships    Social connections:     Talks on phone: Not on file     Gets together: Not on file     Attends Yazidi service: Not on file     Active member of club or organization: Not on file     Attends meetings of clubs or organizations: Not on file     Relationship status: Not on file    Intimate partner violence:     Fear of current or ex partner: Not on file     Emotionally abused: Not on file     Physically abused: Not on file     Forced sexual activity: Not on file   Other Topics Concern    Not on file   Social History Narrative    Not on file       Family History   Problem Relation Age of Onset    Other Mother         gestational diabetes    Other Father 39        suicide    Other Sister         hypoglycemia         I have reviewed the patient's past medical history, past surgical history, allergies, medications, social and family history and I have made updates where appropriate.       Review of Systems  Positive responses are

## 2021-05-12 DIAGNOSIS — J45.40 MODERATE PERSISTENT ASTHMA WITHOUT COMPLICATION: ICD-10-CM

## 2021-06-08 DIAGNOSIS — J45.40 MODERATE PERSISTENT ASTHMA WITHOUT COMPLICATION: ICD-10-CM

## 2021-06-08 RX ORDER — ALBUTEROL SULFATE 90 UG/1
AEROSOL, METERED RESPIRATORY (INHALATION)
Qty: 18 G | Refills: 0 | OUTPATIENT
Start: 2021-06-08

## 2021-06-08 NOTE — TELEPHONE ENCOUNTER
Recent Visits  Date Type Provider Dept   03/12/21 Office Visit BARB Galeano CNP [facility]   10/28/20 Office Visit BARB Galeano CNP [facility]   09/22/20 Office Visit BARB Galeano CNP [facility]   07/29/20 Office Visit BARB Galeano CNP [facility]   03/12/20 Office Visit BARB Galeano CNP [facility]   Showing recent visits within past 540 days with a meds authorizing provider and meeting all other requirements  Future Appointments  No visits were found meeting these conditions.   Showing future appointments within next 150 days with a meds authorizing provider and meeting all other requirements

## 2021-06-11 DIAGNOSIS — L30.9 ECZEMA, UNSPECIFIED TYPE: ICD-10-CM

## 2021-06-14 NOTE — TELEPHONE ENCOUNTER
Recent Visits  Date Type Provider Dept   03/12/21 Office Visit Adelaida Caraballo, APRN - CNP [facility]   10/28/20 Office Visit Adelaida Flatten, APRN - CNP [facility]   09/22/20 Office Visit Adelaida Flatten, APRN - CNP [facility]   07/29/20 Office Visit Adelaida Flatten, APRN - CNP [facility]   03/12/20 Office Visit Adelaida Flatten, APRN - CNP [facility]   Showing recent visits within past 540 days with a meds authorizing provider and meeting all other requirements  Future Appointments  No visits were found meeting these conditions.   Showing future appointments within next 150 days with a meds authorizing provider and meeting all other requirements

## 2021-07-19 ENCOUNTER — OFFICE VISIT (OUTPATIENT)
Dept: FAMILY MEDICINE CLINIC | Age: 36
End: 2021-07-19
Payer: COMMERCIAL

## 2021-07-19 VITALS
RESPIRATION RATE: 14 BRPM | HEART RATE: 104 BPM | OXYGEN SATURATION: 96 % | WEIGHT: 168 LBS | SYSTOLIC BLOOD PRESSURE: 118 MMHG | HEIGHT: 63 IN | DIASTOLIC BLOOD PRESSURE: 74 MMHG | TEMPERATURE: 98.3 F | BODY MASS INDEX: 29.77 KG/M2

## 2021-07-19 DIAGNOSIS — J45.40 MODERATE PERSISTENT ASTHMA WITHOUT COMPLICATION: Primary | ICD-10-CM

## 2021-07-19 DIAGNOSIS — Z79.4 TYPE 2 DIABETES MELLITUS WITHOUT COMPLICATION, WITH LONG-TERM CURRENT USE OF INSULIN (HCC): ICD-10-CM

## 2021-07-19 DIAGNOSIS — E11.9 TYPE 2 DIABETES MELLITUS WITHOUT COMPLICATION, WITH LONG-TERM CURRENT USE OF INSULIN (HCC): ICD-10-CM

## 2021-07-19 LAB — HBA1C MFR BLD: 9.6 % (ref 4.3–5.7)

## 2021-07-19 PROCEDURE — 2022F DILAT RTA XM EVC RTNOPTHY: CPT | Performed by: NURSE PRACTITIONER

## 2021-07-19 PROCEDURE — 3046F HEMOGLOBIN A1C LEVEL >9.0%: CPT | Performed by: NURSE PRACTITIONER

## 2021-07-19 PROCEDURE — G8417 CALC BMI ABV UP PARAM F/U: HCPCS | Performed by: NURSE PRACTITIONER

## 2021-07-19 PROCEDURE — G8427 DOCREV CUR MEDS BY ELIG CLIN: HCPCS | Performed by: NURSE PRACTITIONER

## 2021-07-19 PROCEDURE — 99214 OFFICE O/P EST MOD 30 MIN: CPT | Performed by: NURSE PRACTITIONER

## 2021-07-19 PROCEDURE — 4004F PT TOBACCO SCREEN RCVD TLK: CPT | Performed by: NURSE PRACTITIONER

## 2021-07-19 RX ORDER — METFORMIN HYDROCHLORIDE 500 MG/1
500 TABLET, EXTENDED RELEASE ORAL 2 TIMES DAILY
Qty: 180 TABLET | Refills: 1 | Status: SHIPPED | OUTPATIENT
Start: 2021-07-19 | End: 2021-12-07

## 2021-07-19 RX ORDER — BUPROPION HYDROCHLORIDE 75 MG/1
75 TABLET ORAL DAILY
COMMUNITY
End: 2022-08-02 | Stop reason: ALTCHOICE

## 2021-07-19 NOTE — PROGRESS NOTES
Chief Complaint   Patient presents with    Follow-up     PFT  [facility]        History obtained from chart review and the patient. SUBJECTIVE:  Sveta Herrera is a 38 y.o. male that presents today for follow up DM    Diabetes Type 2    Glucose control:   Does patient check blood glucoses at home? Yes - 450, almost 500 highest, average fasting 200-250  Report of hypoglycemia: no  Lab Results   Component Value Date    LABA1C 9.6 (H) 07/19/2021     No results found for: EAG    Symptoms  Polyuria, Polydipsia or Polyphagia? No  Chest Pain, SOB, or Palpitations? -  No  New Vision complaints? No  Paresthesias of the extremities? No    Medications  Current medication were reviewed. Compliant with medications? Yes Novolog per sliding scale and Lantus 35 units  Medication side effects? No  On ACE-I or ARB? No  On antiplatelet therapy? No  On Statin? No    Last Diabetic Eye Exam: needs    Exercise  Exercise? No  Wt Readings from Last 3 Encounters:   07/19/21 168 lb (76.2 kg)   03/12/21 181 lb (82.1 kg)   11/18/20 174 lb 12.8 oz (79.3 kg)       Diet discipline?:  Low salt, fat, sugar diet? yes    Blood pressure control:  BP Readings from Last 3 Encounters:   07/19/21 118/74   03/12/21 136/88   11/18/20 109/69       Lab Results   Component Value Date    LABMICR 153.85 06/11/2020       Lab Results   Component Value Date    LDLCALC 75 03/12/2020     COPD  Had PFT done last week at [facility] He is taking the Advair 250/50. He uses albuterol about 2-3 times/day. Has wheezing at night. He is smoking cigarettes, smoking maybe 1/4 pack/day. He notes that allergies have been worse and this is affecting his breathing.       Age/Gender Health Maintenance    Lipid   Lab Results   Component Value Date    CHOL 182 05/04/2019    CHOL 165 12/18/2017     Lab Results   Component Value Date    TRIG 128 05/04/2019    TRIG 135 12/18/2017     Lab Results   Component Value Date    HDL 49 03/12/2020    HDL 84 05/04/2019    HDL 51 12/18/2017 Lab Results   Component Value Date    LDLCALC 75 03/12/2020    LDLCALC 72 05/04/2019    LDLCALC 87 12/18/2017     DM Screen   Lab Results   Component Value Date    LABA1C 9.6 (H) 07/19/2021     Colon Cancer Screening - 46  Lung Cancer Screening (Age 54 to [de-identified] with 30 pack year hx, current smoker or quit within past 15 years) - n/a    Tetanus - needs  Influenza Vaccine - needs  Pneumonia Vaccine - 65  Zostavax - 50  HPV Vaccine - n/a    PSA testing discussion - 52  AAA Screening - n/a    Falls screening - n/a    Current Outpatient Medications   Medication Sig Dispense Refill    buPROPion (WELLBUTRIN) 75 MG tablet Take 75 mg by mouth daily      fluticasone-salmeterol (ADVAIR DISKUS) 500-50 MCG/DOSE diskus inhaler Inhale 1 puff into the lungs every 12 hours 60 each 3    metFORMIN (GLUCOPHAGE-XR) 500 MG extended release tablet Take 1 tablet by mouth 2 times daily 180 tablet 1    albuterol sulfate  (90 Base) MCG/ACT inhaler INHALE 1 TO 2 PUFFS BY MOUTH EVERY 4 TO 6 HOURS AS NEEDED 18 g 3    betamethasone valerate (VALISONE) 0.1 % cream APPLY TO AFFECTED AREA TWICE DAILY 45 g 1    WIXELA INHUB 250-50 MCG/DOSE AEPB INHALE ONE (1) PUFF BY MOUTH ONCE DAILY 1 Inhaler 5    insulin glargine (LANTUS SOLOSTAR) 100 UNIT/ML injection pen Inject 35 Units into the skin nightly 15 mL 3    Insulin Pen Needle 30G X 8 MM MISC 1 each by Does not apply route 3 times daily 500 each 3    insulin aspart (NOVOLOG FLEXPEN) 100 UNIT/ML injection pen Inject 2-12 units SQ TID with meals as directed per sliding scale, max dose 36 units/day 5 pen 3    traZODone (DESYREL) 50 MG tablet Take 1 tablet by mouth nightly as needed for Sleep 90 tablet 1    Handicap Placard MISC by Does not apply route Expires 3/11/2026 1 each 0    Continuous Blood Gluc Sensor (FREESTYLE ALEJANDRA 14 DAY SENSOR) MISC Use as directed, check glucose 4 times/day.  6 each 3    hydrOXYzine (VISTARIL) 100 MG capsule TAKE 1 CAPSULE BY MOUTH 4 TIMES DAILY AS NEEDED FOR ANXIETY 60 capsule 10    Continuous Blood Gluc  (FREESTYLE ALEJANDRA 14 DAY READER) BEAU Use as directed, check glucose 4 times/day 1 Device 0    naltrexone (DEPADE) 50 MG tablet Take 50 mg by mouth daily      OXcarbazepine (TRILEPTAL) 300 MG tablet Take 1 tablet by mouth 2 times daily 60 tablet 1    QUEtiapine (SEROQUEL) 100 MG tablet Take 1 tablet by mouth nightly 30 tablet 5    Folic Acid (FOLATE PO) Take 1,333 mcg by mouth daily      vitamin B-1 100 MG tablet Take 1 tablet by mouth daily 30 tablet 3    aspirin 81 MG EC tablet Take 1 tablet by mouth daily 30 tablet 3    acetaminophen (TYLENOL) 500 MG tablet Take 1,000 mg by mouth every 6 hours as needed for Pain      Multiple Vitamins-Minerals (MENS MULTIVITAMIN PLUS) TABS Take 1 tablet by mouth daily       No current facility-administered medications for this visit. Orders Placed This Encounter   Medications    fluticasone-salmeterol (ADVAIR DISKUS) 500-50 MCG/DOSE diskus inhaler     Sig: Inhale 1 puff into the lungs every 12 hours     Dispense:  60 each     Refill:  3    metFORMIN (GLUCOPHAGE-XR) 500 MG extended release tablet     Sig: Take 1 tablet by mouth 2 times daily     Dispense:  180 tablet     Refill:  1         All medications reviewed and reconciled, including OTC and herbal medications. Updated list given to patient.        Patient Active Problem List   Diagnosis    Alcoholic hepatitis    Pancreatitis, History    Chronic obstructive lung disease ([address]    Alcohol-induced acute pancreatitis    Type 2 diabetes mellitus without complication, with long-term current use of insulin (HCC)    Asthma    Alcohol use disorder, moderate, in sustained remission, dependence ([address]    Eczema    Smoker    Cigarette nicotine dependence without complication    Major depression, chronic    Closed fracture of orbit (HCC)    Closed fracture of talus    Bipolar disorder, current episode depressed, moderate (HCC)    PTSD (post-traumatic stress disorder)       Past Medical History:   Diagnosis Date    Asthma     COPD (chronic obstructive pulmonary disease) (HCC)     Eczema     GERD (gastroesophageal reflux disease)     History of alcohol abuse     History of marijuana use     History of tobacco abuse     quit 11/21/2017    Hx of seasonal allergies     Pancreatitis     Psychiatric problem     PTSD (post-traumatic stress disorder)     Seizures ([address]     etoh wdl    Type 2 diabetes mellitus without complication ([address] 03/00/0776       Past Surgical History:   Procedure Laterality Date    ANKLE ARTHROSCOPY Right 8/5/2020    ANKLE ARTHROSCOPY WITH  MEDIAL DEBRIDEMENT RIGHT ANKLE, ANKLE ARTHROTOMY WITH DEBRIDEMENT performed by Landy Hussein DPM at [address] Right 09/2019    [address] [place]    FRACTURE SURGERY Right 2019    orbital fracture surgery    UPPER GASTROINTESTINAL ENDOSCOPY Left 5/6/2019    EGD BIOPSY performed by Savannah Skaggs MD [facility]       Allergies   Allergen Reactions    Paxil [Paroxetine] Swelling and Rash       Social History     Socioeconomic History    Marital status:      Spouse name: Not on file    Number of children: 3    Years of education: 6    Highest education level: Not on file   Occupational History    Occupation: [employer]       Comment: fulltime    Tobacco Use    Smoking status: Current Every Day Smoker     Packs/day: 0.50     Years: 15.00     Pack years: 7.50     Types: Cigarettes    Smokeless tobacco: Never Used   Vaping Use    Vaping Use: Former   Substance and Sexual Activity    Alcohol use: Yes     Comment: a pint of vodka last used on 12/28/2020    Drug use: Not Currently     Types: Marijuana     Comment: approx 2 or more years    Sexual activity: Yes     Partners: Female   Other Topics Concern    Not on file   Social History Narrative    No barriers with transportation    Medications are not being covered by insurance since back to work    No longer drinking alcohol    Denies transportation barriers    Denies need for community services     Social Determinants of Health     Financial Resource Strain:     Difficulty of Paying Living Expenses:    Food Insecurity:     Worried About Running Out of Food in the Last Year:     [address] in the Last Year:    Transportation Needs:     Lack of Transportation (Medical):  Lack of Transportation (Non-Medical):    Physical Activity:     Days of Exercise per Week:     Minutes of Exercise per Session:    Stress:     Feeling of Stress :    Social Connections:     Frequency of Communication with Friends and Family:     Frequency of Social Gatherings with Friends and Family:     Attends Oriental orthodox Services:     Active Member of Clubs or Organizations:     Attends Club or Organization Meetings:     Marital Status:    Intimate Partner Violence:     Fear of Current or Ex-Partner:     Emotionally Abused:     Physically Abused:     Sexually Abused:        Family History   Problem Relation Age of Onset    Other Mother         gestational diabetes    Other Father 39        suicide    Other Sister         hypoglycemia         I have reviewed the patient's past medical history, past surgical history, allergies, medications, social and family history and I have made updates where appropriate.       Review of Systems  Positive responses are highlighted in bold    Constitutional:  Fever, Chills, Night Sweats, Fatigue, Unexpected changes in weight  Eyes:  Eye discharge, Eye pain, Eye redness, Visual disturbances   HENT:  Ear pain, Tinnitus, Nosebleeds, Trouble swallowing, Hearing loss, Sore throat  Cardiovascular:  Chest Pain, Palpitations, Orthopnea, Paroxysmal Nocturnal Dyspnea  Respiratory:  Cough, Wheezing, Shortness of breath, Chest tightness, Apnea  Gastrointestinal:  Nausea, Vomiting, Diarrhea, Constipation, Heartburn, Blood in stool  Genitourinary:  Difficulty or painful urination, Flank pain, Change in frequency, Urgency  Skin:  Color change, Rash, Itching, Wound  Psychiatric:  Hallucinations, Anxiety, Depression, Suicidal ideation  Hematological:  Enlarged glands, Easy bleeding, Easily bruising  Musculoskeletal:  Joint pain, Back pain, Gait problems, Joint swelling, Myalgias  Neurological:  Dizziness, Headaches, Presyncope, Numbness, Seizures, Tremors  Allergy:  Environmental allergies, Food allergies  Endocrine:  Heat Intolerance, Cold Intolerance, Polydipsia, Polyphagia, Polyuria    Lab Results   Component Value Date     12/30/2020    K 4.6 12/30/2020     12/30/2020    CO2 26 12/30/2020    BUN 14 12/30/2020    CREATININE 0.6 12/30/2020    GLUCOSE 158 (H) 12/30/2020    CALCIUM 9.5 12/30/2020    PROT 6.8 12/28/2020    LABALBU 4.2 12/28/2020    BILITOT <0.2 (L) 12/28/2020    ALKPHOS 137 (H) 12/28/2020    AST 21 12/28/2020    ALT 36 12/28/2020    LABGLOM >90 12/30/2020     Lab Results   Component Value Date    WBC 9.2 12/28/2020    HGB 15.1 12/28/2020    HCT 44.7 12/28/2020    MCV 91.2 12/28/2020     12/28/2020     Lab Results   Component Value Date    TSH 0.251 (L) 11/07/2020     PHYSICAL EXAM:  Vitals:    07/19/21 1420   BP: 118/74   Pulse: 104   Resp: 14   Temp: 98.3 °F (36.8 °C)   TempSrc: Oral   SpO2: 96%   Weight: 168 lb (76.2 kg)   Height: 5' 2.5\" (1.588 m)     Body mass index is 30.24 kg/m². VS Reviewed  General Appearance: A&O x 3, No acute distress,well developed and well- nourished  Head: normocephalic, atraumatic  Eyes: pupils equal, round, and reactive to light, extraocular eye movements intact, conjunctivae and left upper eyelid with stye formation  Neck: supple and non-tender without mass, no thyromegaly or thyroid nodules, no cervical lymphadenopathy  Pulmonary/Chest: clear to auscultation bilaterally- no wheezes, rales or rhonchi, harsh cough  Cardiovascular: S1 and S2 auscultated w/ RRR.  No murmurs, rubs, clicks, or gallops, distal pulses intact. Abdomen: soft, non-tender, non-distended, bowl sounds physiologic,  no rebound or guarding, no masses or hernias noted. Liver and spleen without enlargement. Extremities: no cyanosis, clubbing or edema of the lower extremities\  Musculoskeletal: No joint swelling or gross deformity   Neuro:  Alert, 5/5 strength globally and symmetrically  Psych: Affect appropriate. Mood normal. Thought process is normal without evidence of depression or psychosis. Good insight and appropriate interaction. Cognition and memory appear to be intact. Skin: warm and dry,   Lymph:  No cervical, auricular or supraclavicular lymph nodes palpated    ASSESSMENT & PLAN  Jony Tomlinson was seen today for follow-up. Diagnoses and all orders for this visit:    Moderate persistent asthma without complication  -     fluticasone-salmeterol (ADVAIR DISKUS) 500-50 MCG/DOSE diskus inhaler; Inhale 1 puff into the lungs every 12 hours    Type 2 diabetes mellitus without complication, with long-term current use of insulin (HCC)  -     POCT glycosylated hemoglobin (Hb A1C)  -     metFORMIN (GLUCOPHAGE-XR) 500 MG extended release tablet; Take 1 tablet by mouth 2 times daily      - PFT results pending  - increase Advair to 500/50  - con't albuterol prn  - con't Novolog and Lantus  - add Metformin-his liver function has been very stable and he has been sober from ETOH for reasonable time frame now (8 months)  - discussed benefits, risks and SE meds     DISPOSITION    Return in about 6 weeks (around 8/30/2021) for asthma, DM. Jony Tomlinson released without restrictions. PATIENT COUNSELING    Counseling was provided today regarding the following topics: Healthy eating habits, Regular exercise, substance abuse and healthy sleep habits.     Jony Tomlinson received counseling on the following healthy behaviors: medication adherence and decrease in alcohol consumption    Patient given educational materials on: See Attached    I have instructed Jony Tomlinson to complete a self tracking handout on none and instructed them to bring it with them to his next appointment. Barriers to learning and self management: none    Discussed use, benefit, and side effects of prescribed medications. Barriers to medication compliance addressed. All patient questions answered. Pt voiced understanding.        Electronically signed by BARB Campuzano CNP on 7/19/2021 at 2:34 PM

## 2021-08-16 DIAGNOSIS — E11.9 TYPE 2 DIABETES MELLITUS WITHOUT COMPLICATION, WITH LONG-TERM CURRENT USE OF INSULIN (HCC): ICD-10-CM

## 2021-08-16 DIAGNOSIS — Z79.4 TYPE 2 DIABETES MELLITUS WITHOUT COMPLICATION, WITH LONG-TERM CURRENT USE OF INSULIN (HCC): ICD-10-CM

## 2021-08-16 RX ORDER — INSULIN GLARGINE 100 [IU]/ML
INJECTION, SOLUTION SUBCUTANEOUS
Qty: 15 ML | Refills: 3 | Status: SHIPPED | OUTPATIENT
Start: 2021-08-16 | End: 2021-11-17 | Stop reason: SDUPTHER

## 2021-08-16 NOTE — TELEPHONE ENCOUNTER
Recent Visits  Date Type Provider Dept   07/19/21 Office Visit Reyna Tang, APRN - CNP [facility]   03/12/21 Office Visit Reyna Tang, APRN - CNP [facility]   10/28/20 Office Visit Reyna Tang, APRN - CNP [facility]   09/22/20 Office Visit Reyna Tang, APRN - CNP [facility]   07/29/20 Office Visit Reyna Tang, APRN - CNP [facility]   03/12/20 Office Visit Reyna Tang, APRN - CNP [facility]   Showing recent visits within past 540 days with a meds authorizing provider and meeting all other requirements  Future Appointments  Date Type Provider Dept   08/30/21 Appointment Reyna TangBARB - CNP [facility]   Showing future appointments within next 150 days with a meds authorizing provider and meeting all other requirements    Future Appointments   Date Time Provider Malachi Gray   8/30/2021  2:00 PM BARB Morales

## 2021-08-17 DIAGNOSIS — Z79.4 TYPE 2 DIABETES MELLITUS WITHOUT COMPLICATION, WITH LONG-TERM CURRENT USE OF INSULIN (HCC): ICD-10-CM

## 2021-08-17 DIAGNOSIS — E11.9 TYPE 2 DIABETES MELLITUS WITHOUT COMPLICATION, WITH LONG-TERM CURRENT USE OF INSULIN (HCC): ICD-10-CM

## 2021-08-17 DIAGNOSIS — L30.9 ECZEMA, UNSPECIFIED TYPE: ICD-10-CM

## 2021-08-17 RX ORDER — INSULIN ASPART 100 [IU]/ML
INJECTION, SOLUTION INTRAVENOUS; SUBCUTANEOUS
Qty: 15 ML | Refills: 1 | Status: SHIPPED | OUTPATIENT
Start: 2021-08-17 | End: 2021-11-30

## 2021-09-02 ENCOUNTER — PATIENT MESSAGE (OUTPATIENT)
Dept: FAMILY MEDICINE CLINIC | Age: 36
End: 2021-09-02

## 2021-09-02 NOTE — TELEPHONE ENCOUNTER
From: Will Miller  To: Lana Cervantes, BARB - CNP  Sent: 9/2/2021 1:57 PM EDT  Subject: Non-Urgent Medical Question    Sorry I missed are a appointment Monday for some reason I had it down for next week. I'll set a new one up. I need a paper from you saying I can't work right now because of my foot. I'm still working on disability but job and family services need a paper saying I can't work at the moment. I'd ask my foot doctor but I quit going to him cause he didn't do my surgery right and I'm getting into my mom's doctor but I have to wait a month to get in. If you can I'd appreciate.

## 2021-09-02 NOTE — LETTER
[address] [place] [address]  [address] [place] [address]. [facility] [address]  Phone: [phone]  Fax: [phone] [facility], BARB - CNP        September 3, 2021     Patient: Samy Otoole   YOB: 1985   Date of Visit: 9/2/2021       To Whom It May Concern: It is my medical opinion that Arlet Vargas is medically unable to work until 11/1/21 due to right ankle fracture and complications related to it's repair. If you have any questions or concerns, please don't hesitate to call.     Sincerely,        BARB Garza CNP

## 2021-10-20 DIAGNOSIS — Z79.4 TYPE 2 DIABETES MELLITUS WITHOUT COMPLICATION, WITH LONG-TERM CURRENT USE OF INSULIN (HCC): ICD-10-CM

## 2021-10-20 DIAGNOSIS — E11.9 TYPE 2 DIABETES MELLITUS WITHOUT COMPLICATION, WITH LONG-TERM CURRENT USE OF INSULIN (HCC): ICD-10-CM

## 2021-10-20 DIAGNOSIS — L30.9 ECZEMA, UNSPECIFIED TYPE: ICD-10-CM

## 2021-10-20 RX ORDER — INSULIN ASPART 100 [IU]/ML
INJECTION, SOLUTION INTRAVENOUS; SUBCUTANEOUS
Qty: 15 ML | Refills: 3 | OUTPATIENT
Start: 2021-10-20

## 2021-10-20 NOTE — TELEPHONE ENCOUNTER
Informed pt that appointment is needed before refills could be filled. Pt states he will check his schedule and contact us with a day that works for him.

## 2021-11-12 DIAGNOSIS — J45.40 MODERATE PERSISTENT ASTHMA WITHOUT COMPLICATION: ICD-10-CM

## 2021-11-15 RX ORDER — ALBUTEROL SULFATE 90 UG/1
AEROSOL, METERED RESPIRATORY (INHALATION)
Qty: 18 G | Refills: 3 | OUTPATIENT
Start: 2021-11-15

## 2021-11-17 ENCOUNTER — TELEPHONE (OUTPATIENT)
Dept: FAMILY MEDICINE CLINIC | Age: 36
End: 2021-11-17

## 2021-11-17 DIAGNOSIS — E11.9 TYPE 2 DIABETES MELLITUS WITHOUT COMPLICATION, WITH LONG-TERM CURRENT USE OF INSULIN (HCC): ICD-10-CM

## 2021-11-17 DIAGNOSIS — J45.40 MODERATE PERSISTENT ASTHMA WITHOUT COMPLICATION: ICD-10-CM

## 2021-11-17 DIAGNOSIS — Z79.4 TYPE 2 DIABETES MELLITUS WITHOUT COMPLICATION, WITH LONG-TERM CURRENT USE OF INSULIN (HCC): ICD-10-CM

## 2021-11-17 RX ORDER — INSULIN GLARGINE 100 [IU]/ML
INJECTION, SOLUTION SUBCUTANEOUS
Qty: 15 ML | Refills: 0 | Status: SHIPPED | OUTPATIENT
Start: 2021-11-17 | End: 2021-12-10 | Stop reason: SDUPTHER

## 2021-11-17 NOTE — TELEPHONE ENCOUNTER
I did send his Rx refill to the [facility] pharmacy to hold him over until his appt.

## 2021-11-17 NOTE — TELEPHONE ENCOUNTER
[employer] message sent to inform pt    just informing you that Sherie Hills did send your medication to [address] [place] [address] to hold you over until your appointment in December. If you have any further questions you can give us a call at [phone].    Thank you

## 2021-12-07 ENCOUNTER — OFFICE VISIT (OUTPATIENT)
Dept: FAMILY MEDICINE CLINIC | Age: 36
End: 2021-12-07
Payer: COMMERCIAL

## 2021-12-07 VITALS
HEART RATE: 104 BPM | HEIGHT: 63 IN | SYSTOLIC BLOOD PRESSURE: 122 MMHG | WEIGHT: 187 LBS | OXYGEN SATURATION: 95 % | DIASTOLIC BLOOD PRESSURE: 78 MMHG | RESPIRATION RATE: 12 BRPM | TEMPERATURE: 98.2 F | BODY MASS INDEX: 33.13 KG/M2

## 2021-12-07 DIAGNOSIS — M25.571 CHRONIC PAIN OF RIGHT ANKLE: ICD-10-CM

## 2021-12-07 DIAGNOSIS — F10.930 ALCOHOL WITHDRAWAL SYNDROME WITHOUT COMPLICATION (HCC): ICD-10-CM

## 2021-12-07 DIAGNOSIS — E11.9 TYPE 2 DIABETES MELLITUS WITHOUT COMPLICATION, WITH LONG-TERM CURRENT USE OF INSULIN (HCC): Primary | ICD-10-CM

## 2021-12-07 DIAGNOSIS — Z79.4 TYPE 2 DIABETES MELLITUS WITHOUT COMPLICATION, WITH LONG-TERM CURRENT USE OF INSULIN (HCC): Primary | ICD-10-CM

## 2021-12-07 DIAGNOSIS — K21.9 GASTROESOPHAGEAL REFLUX DISEASE, UNSPECIFIED WHETHER ESOPHAGITIS PRESENT: ICD-10-CM

## 2021-12-07 DIAGNOSIS — J45.40 MODERATE PERSISTENT ASTHMA WITHOUT COMPLICATION: ICD-10-CM

## 2021-12-07 DIAGNOSIS — L30.9 ECZEMA, UNSPECIFIED TYPE: ICD-10-CM

## 2021-12-07 DIAGNOSIS — G89.29 CHRONIC PAIN OF RIGHT ANKLE: ICD-10-CM

## 2021-12-07 DIAGNOSIS — F33.1 MAJOR DEPRESSIVE DISORDER, RECURRENT EPISODE, MODERATE WITH ANXIOUS DISTRESS (HCC): ICD-10-CM

## 2021-12-07 DIAGNOSIS — R07.89 ATYPICAL CHEST PAIN: ICD-10-CM

## 2021-12-07 LAB
CREATININE URINE POCT: 50
HBA1C MFR BLD: 10 %
MICROALBUMIN/CREAT 24H UR: 10 MG/G{CREAT}
MICROALBUMIN/CREAT UR-RTO: NORMAL

## 2021-12-07 PROCEDURE — G8417 CALC BMI ABV UP PARAM F/U: HCPCS | Performed by: NURSE PRACTITIONER

## 2021-12-07 PROCEDURE — 82044 UR ALBUMIN SEMIQUANTITATIVE: CPT | Performed by: NURSE PRACTITIONER

## 2021-12-07 PROCEDURE — 2022F DILAT RTA XM EVC RTNOPTHY: CPT | Performed by: NURSE PRACTITIONER

## 2021-12-07 PROCEDURE — G8484 FLU IMMUNIZE NO ADMIN: HCPCS | Performed by: NURSE PRACTITIONER

## 2021-12-07 PROCEDURE — 3046F HEMOGLOBIN A1C LEVEL >9.0%: CPT | Performed by: NURSE PRACTITIONER

## 2021-12-07 PROCEDURE — 99214 OFFICE O/P EST MOD 30 MIN: CPT | Performed by: NURSE PRACTITIONER

## 2021-12-07 PROCEDURE — 83036 HEMOGLOBIN GLYCOSYLATED A1C: CPT | Performed by: NURSE PRACTITIONER

## 2021-12-07 PROCEDURE — 4004F PT TOBACCO SCREEN RCVD TLK: CPT | Performed by: NURSE PRACTITIONER

## 2021-12-07 PROCEDURE — G8428 CUR MEDS NOT DOCUMENT: HCPCS | Performed by: NURSE PRACTITIONER

## 2021-12-07 RX ORDER — METFORMIN HYDROCHLORIDE 500 MG/1
1000 TABLET, EXTENDED RELEASE ORAL 2 TIMES DAILY
Qty: 360 TABLET | Refills: 1 | Status: SHIPPED | OUTPATIENT
Start: 2021-12-07 | End: 2022-04-08 | Stop reason: ALTCHOICE

## 2021-12-07 RX ORDER — ALBUTEROL SULFATE 90 UG/1
AEROSOL, METERED RESPIRATORY (INHALATION)
Qty: 18 G | Refills: 3 | Status: SHIPPED | OUTPATIENT
Start: 2021-12-07 | End: 2022-04-18

## 2021-12-07 RX ORDER — HYDROXYZINE PAMOATE 100 MG/1
CAPSULE ORAL
Qty: 60 CAPSULE | Refills: 10 | Status: SHIPPED | OUTPATIENT
Start: 2021-12-07

## 2021-12-07 RX ORDER — BUPROPION HYDROCHLORIDE 75 MG/1
75 TABLET ORAL DAILY
Qty: 60 TABLET | Status: CANCELLED | OUTPATIENT
Start: 2021-12-07

## 2021-12-07 RX ORDER — OMEPRAZOLE 40 MG/1
40 CAPSULE, DELAYED RELEASE ORAL
Qty: 30 CAPSULE | Refills: 5 | Status: SHIPPED | OUTPATIENT
Start: 2021-12-07 | End: 2022-10-21

## 2021-12-07 RX ORDER — BUSPIRONE HYDROCHLORIDE 10 MG/1
10 TABLET ORAL 3 TIMES DAILY
Qty: 270 TABLET | Refills: 1 | Status: SHIPPED | OUTPATIENT
Start: 2021-12-07
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
## 2021-12-10 DIAGNOSIS — E11.9 TYPE 2 DIABETES MELLITUS WITHOUT COMPLICATION, WITH LONG-TERM CURRENT USE OF INSULIN (HCC): ICD-10-CM

## 2021-12-10 DIAGNOSIS — Z79.4 TYPE 2 DIABETES MELLITUS WITHOUT COMPLICATION, WITH LONG-TERM CURRENT USE OF INSULIN (HCC): ICD-10-CM

## 2021-12-10 RX ORDER — INSULIN GLARGINE 100 [IU]/ML
INJECTION, SOLUTION SUBCUTANEOUS
Qty: 15 ML | Refills: 0 | Status: SHIPPED | OUTPATIENT
Start: 2021-12-10 | End: 2022-01-10

## 2021-12-10 RX ORDER — INSULIN ASPART 100 [IU]/ML
INJECTION, SOLUTION INTRAVENOUS; SUBCUTANEOUS
Qty: 15 ML | Refills: 1 | Status: SHIPPED | OUTPATIENT
Start: 2021-12-10 | End: 2022-02-14 | Stop reason: SDUPTHER

## 2022-01-06 ENCOUNTER — NURSE TRIAGE (OUTPATIENT)
Dept: OTHER | Facility: CLINIC | Age: 37
End: 2022-01-06

## 2022-01-06 NOTE — TELEPHONE ENCOUNTER
It sounds like he's having a reaction to the vaccine. It should honestly resolve in 24 hours or so. I can see him tomorrow, if he's still having symptoms. He may wake up in the morning and feel completely fine.

## 2022-01-06 NOTE — TELEPHONE ENCOUNTER
Received call from [facility]  at [facility] with [employer] Complaint. Subjective: Caller states \"I got the 2nd dose of the vaccine and now I am having chills and abdominal pain \"     Current Symptoms:   +2nd dose of vaccine (Erling Nipper) at 11:30 yesterday 1/5/2022- \"I got sick at 12 midnight\"   +nausea  +dizziness   +\"serious stomach pain\"   +leg pain- body aches  +vomiting -\"vomiting every 5 minutes since midnight - stopped about 5 minutes ago\"- water colored per pt    +chills   +RLQ abdominal pain -intermittent   +dark urine output   +has  been able to keep fluids down the last 2 hours   -denies shortness of breath, chest pain       Onset: 1 days ago; sudden    Associated Symptoms: reduced activity, reduced appetite, reduced fluid intake, increased wakefulness    Pain Severity: 7/10; throbbing; intermittent    Temperature: 97.0  orally    What has been tried:   No medications or interventions     LMP: NA Pregnant: NA    Recommended disposition:See HCP in 4 hours- advised pt to proceed to [facility] or ED if no appointment available - pt in agreement with this plan. Care advice provided, patient verbalizes understanding; denies any other questions or concerns; instructed to call back for any new or worsening symptoms. Writer provided warm transfer to Clara Herring  at [facility] for appointment scheduling     Attention Provider: Thank you for allowing me to participate in the care of your patient. The patient was connected to triage in response to information provided to the ECC/PSC. Please do not respond through this encounter as the response is not directed to a shared pool.          Reason for Disposition   [1] MILD-MODERATE pain AND [2] constant AND [3] present > 2 hours    Protocols used: ABDOMINAL PAIN - MALE-ADULT-AH

## 2022-02-14 DIAGNOSIS — Z79.4 TYPE 2 DIABETES MELLITUS WITHOUT COMPLICATION, WITH LONG-TERM CURRENT USE OF INSULIN (HCC): ICD-10-CM

## 2022-02-14 DIAGNOSIS — E11.9 TYPE 2 DIABETES MELLITUS WITHOUT COMPLICATION, WITH LONG-TERM CURRENT USE OF INSULIN (HCC): ICD-10-CM

## 2022-02-14 DIAGNOSIS — L30.9 ECZEMA, UNSPECIFIED TYPE: ICD-10-CM

## 2022-02-14 RX ORDER — INSULIN ASPART 100 [IU]/ML
INJECTION, SOLUTION INTRAVENOUS; SUBCUTANEOUS
Qty: 15 ML | Refills: 1 | Status: SHIPPED | OUTPATIENT
Start: 2022-02-14 | End: 2022-03-14

## 2022-02-14 NOTE — TELEPHONE ENCOUNTER
Last visit- 12/7/2021  Next visit- 4/5/2022    Requested Prescriptions     Pending Prescriptions Disp Refills    betamethasone valerate (VALISONE) 0.1 % cream 45 g 1     Sig: APPLY TO AFFECTED AREA TOPICALLY TWICE DAILY    Insulin Aspart FlexPen 100 UNIT/ML SOPN 15 mL 1     Sig: INJECT 2-12 UNITS SUBCUTANEOUSLY THREE TIMES A DAY WITH MEALS PER SLIDING SCALE

## 2022-03-11 DIAGNOSIS — L30.9 ECZEMA, UNSPECIFIED TYPE: ICD-10-CM

## 2022-03-11 DIAGNOSIS — Z79.4 TYPE 2 DIABETES MELLITUS WITHOUT COMPLICATION, WITH LONG-TERM CURRENT USE OF INSULIN (HCC): ICD-10-CM

## 2022-03-11 DIAGNOSIS — E11.9 TYPE 2 DIABETES MELLITUS WITHOUT COMPLICATION, WITH LONG-TERM CURRENT USE OF INSULIN (HCC): ICD-10-CM

## 2022-03-14 RX ORDER — INSULIN ASPART 100 [IU]/ML
INJECTION, SOLUTION INTRAVENOUS; SUBCUTANEOUS
Qty: 15 ML | Refills: 1 | Status: SHIPPED | OUTPATIENT
Start: 2022-03-14 | End: 2022-05-13

## 2022-03-26 ENCOUNTER — NURSE ONLY (OUTPATIENT)
Dept: LAB | Age: 37
End: 2022-03-26

## 2022-03-26 DIAGNOSIS — E11.9 TYPE 2 DIABETES MELLITUS WITHOUT COMPLICATION, WITH LONG-TERM CURRENT USE OF INSULIN (HCC): ICD-10-CM

## 2022-03-26 DIAGNOSIS — Z79.4 TYPE 2 DIABETES MELLITUS WITHOUT COMPLICATION, WITH LONG-TERM CURRENT USE OF INSULIN (HCC): ICD-10-CM

## 2022-03-26 LAB
CHOLESTEROL, FASTING: 165 MG/DL (ref 100–199)
HDLC SERPL-MCNC: 54 MG/DL
LDL CHOLESTEROL CALCULATED: 75 MG/DL
TRIGLYCERIDE, FASTING: 179 MG/DL (ref 0–199)

## 2022-03-28 ENCOUNTER — TELEPHONE (OUTPATIENT)
Dept: FAMILY MEDICINE CLINIC | Age: 37
End: 2022-03-28

## 2022-03-28 ENCOUNTER — PATIENT MESSAGE (OUTPATIENT)
Dept: FAMILY MEDICINE CLINIC | Age: 37
End: 2022-03-28

## 2022-03-28 NOTE — TELEPHONE ENCOUNTER
----- Message from BARB Merida - CNP sent at 3/28/2022  7:18 AM EDT -----  Let Mary Loyd know his cholesterol labs are within normal range.

## 2022-04-08 ENCOUNTER — OFFICE VISIT (OUTPATIENT)
Dept: FAMILY MEDICINE CLINIC | Age: 37
End: 2022-04-08
Payer: COMMERCIAL

## 2022-04-08 VITALS
RESPIRATION RATE: 14 BRPM | TEMPERATURE: 98.3 F | DIASTOLIC BLOOD PRESSURE: 78 MMHG | HEIGHT: 63 IN | WEIGHT: 175 LBS | BODY MASS INDEX: 31.01 KG/M2 | SYSTOLIC BLOOD PRESSURE: 116 MMHG | OXYGEN SATURATION: 97 % | HEART RATE: 90 BPM

## 2022-04-08 DIAGNOSIS — Z79.4 TYPE 2 DIABETES MELLITUS WITHOUT COMPLICATION, WITH LONG-TERM CURRENT USE OF INSULIN (HCC): Primary | ICD-10-CM

## 2022-04-08 DIAGNOSIS — F10.21 ALCOHOL USE DISORDER, MODERATE, IN SUSTAINED REMISSION, DEPENDENCE (HCC): ICD-10-CM

## 2022-04-08 DIAGNOSIS — F31.32 BIPOLAR DISORDER, CURRENT EPISODE DEPRESSED, MODERATE (HCC): ICD-10-CM

## 2022-04-08 DIAGNOSIS — E11.9 TYPE 2 DIABETES MELLITUS WITHOUT COMPLICATION, WITH LONG-TERM CURRENT USE OF INSULIN (HCC): Primary | ICD-10-CM

## 2022-04-08 LAB — HBA1C MFR BLD: 9.7 % (ref 4.3–5.7)

## 2022-04-08 PROCEDURE — 4004F PT TOBACCO SCREEN RCVD TLK: CPT | Performed by: NURSE PRACTITIONER

## 2022-04-08 PROCEDURE — G8427 DOCREV CUR MEDS BY ELIG CLIN: HCPCS | Performed by: NURSE PRACTITIONER

## 2022-04-08 PROCEDURE — G8417 CALC BMI ABV UP PARAM F/U: HCPCS | Performed by: NURSE PRACTITIONER

## 2022-04-08 PROCEDURE — 3046F HEMOGLOBIN A1C LEVEL >9.0%: CPT | Performed by: NURSE PRACTITIONER

## 2022-04-08 PROCEDURE — 2022F DILAT RTA XM EVC RTNOPTHY: CPT | Performed by: NURSE PRACTITIONER

## 2022-04-08 PROCEDURE — 99214 OFFICE O/P EST MOD 30 MIN: CPT | Performed by: NURSE PRACTITIONER

## 2022-04-08 RX ORDER — NALTREXONE 380 MG
380 KIT INTRAMUSCULAR ONCE
COMMUNITY

## 2022-04-08 RX ORDER — CITALOPRAM 10 MG/1
10 TABLET ORAL DAILY
COMMUNITY

## 2022-04-08 ASSESSMENT — PATIENT HEALTH QUESTIONNAIRE - PHQ9
8. MOVING OR SPEAKING SO SLOWLY THAT OTHER PEOPLE COULD HAVE NOTICED. OR THE OPPOSITE, BEING SO FIGETY OR RESTLESS THAT YOU HAVE BEEN MOVING AROUND A LOT MORE THAN USUAL: 0
4. FEELING TIRED OR HAVING LITTLE ENERGY: 0
5. POOR APPETITE OR OVEREATING: 0
9. THOUGHTS THAT YOU WOULD BE BETTER OFF DEAD, OR OF HURTING YOURSELF: 0
SUM OF ALL RESPONSES TO PHQ9 QUESTIONS 1 & 2: 2
6. FEELING BAD ABOUT YOURSELF - OR THAT YOU ARE A FAILURE OR HAVE LET YOURSELF OR YOUR FAMILY DOWN: 0
3. TROUBLE FALLING OR STAYING ASLEEP: 1
SUM OF ALL RESPONSES TO PHQ QUESTIONS 1-9: 3
SUM OF ALL RESPONSES TO PHQ QUESTIONS 1-9: 3
1. LITTLE INTEREST OR PLEASURE IN DOING THINGS: 0
SUM OF ALL RESPONSES TO PHQ QUESTIONS 1-9: 3
2. FEELING DOWN, DEPRESSED OR HOPELESS: 2
7. TROUBLE CONCENTRATING ON THINGS, SUCH AS READING THE NEWSPAPER OR WATCHING TELEVISION: 0
SUM OF ALL RESPONSES TO PHQ QUESTIONS 1-9: 3

## 2022-04-08 NOTE — PROGRESS NOTES
Chief Complaint   Patient presents with    Follow-up     DM - stopped taking metformin a couple months ago    [facility]     No recent DM eye exam        History obtained from chart review and the patient. SUBJECTIVE:  Nicholas Carrasco is a 43 y.o. male that presents today for follow up DM    Diabetes Type 2    Glucose control:   Does patient check blood glucoses at home? Yes - 250 range, up to 350 after meals  Report of hypoglycemia: no  Lab Results   Component Value Date    LABA1C 9.7 (H) 04/08/2022     No results found for: EAG    Symptoms  Polyuria, Polydipsia or Polyphagia? No  Chest Pain, SOB, or Palpitations? -  No  New Vision complaints? No  Paresthesias of the extremities? No    Medications  Current medication were reviewed. Compliant with medications? Yes, taking insulins, stopped metformin because of diarrhea. Medication side effects? No  On ACE-I or ARB? No  On antiplatelet therapy? No  On Statin? No    Last Diabetic Eye Exam: needs    Exercise  Exercise? No  Wt Readings from Last 3 Encounters:   04/08/22 175 lb (79.4 kg)   12/07/21 187 lb (84.8 kg)   07/19/21 168 lb (76.2 kg)       Diet discipline?:  Low salt, fat, sugar diet?  fair    Blood pressure control:  BP Readings from Last 3 Encounters:   04/08/22 116/78   12/07/21 122/78   07/19/21 118/74       Lab Results   Component Value Date    LABMICR 153.85 06/11/2020       Lab Results   Component Value Date    LDLCALC 75 03/26/2022       ETOH Abuse/Bipolar Disorder  He has had relapse with ETOH beginning of February. He is back on track. He did have period of time for several weeks where he wasn't taking his meds. He got very depressed. He is doing much better now. He got a new psychiatrist and FRC. He is on Vivitrol injections.     Age/Gender Health Maintenance    Lipid   Lab Results   Component Value Date    CHOL 182 05/04/2019    CHOL 165 12/18/2017     Lab Results   Component Value Date    TRIG 128 05/04/2019    TRIG 135 12/18/2017     Lab Results   Component Value Date    HDL 54 03/26/2022    HDL 49 03/12/2020    HDL 84 05/04/2019     Lab Results   Component Value Date    LDLCALC 75 03/26/2022    LDLCALC 75 03/12/2020    LDLCALC 72 05/04/2019     DM Screen   Lab Results   Component Value Date    LABA1C 9.7 (H) 04/08/2022     Colon Cancer Screening - 46  Lung Cancer Screening (Age 54 to [de-identified] with 30 pack year hx, current smoker or quit within past 15 years) - n/a    Tetanus - needs  Influenza Vaccine - needs  Pneumonia Vaccine - 65  Zostavax - 50  HPV Vaccine - n/a    PSA testing discussion - 52  AAA Screening - n/a    Falls screening - n/a    Current Outpatient Medications   Medication Sig Dispense Refill    naltrexone (VIVITROL) 380 MG injection Inject 380 mg into the muscle once      citalopram (CELEXA) 10 MG tablet Take 10 mg by mouth daily      SITagliptin (JANUVIA) 100 MG tablet Take 1 tablet by mouth daily 90 tablet 1    dapagliflozin (FARXIGA) 10 MG tablet Take 1 tablet by mouth every morning 90 tablet 1    Insulin Aspart FlexPen 100 UNIT/ML SOPN INJECT 2-12 UNITS SUBCUTANEOUSLY THREE TIMES A DAY WITH MEALS PER SLIDING SCALE 15 mL 1    betamethasone valerate (VALISONE) 0.1 % cream APPLY TO AFFECTED AREA TOPICALLY TWICE DAILY 45 g 1    fluticasone-salmeterol (ADVAIR) 500-50 MCG/DOSE diskus inhaler INHALE ONE (1) PUFF INTO THE LUNGS EVERY 12 HOURS 60 each 3    LANTUS SOLOSTAR 100 UNIT/ML injection pen INJECT 35 UNITS SUBCUTANEOUSLY NIGHTLY 15 mL 2    albuterol sulfate  (90 Base) MCG/ACT inhaler TAKE 1-2 PUFFS BY MOUTH EVERY 4-6 HOURS AS NEEDED 18 g 3    hydrOXYzine (VISTARIL) 100 MG capsule TAKE ONE (1) CAPSULE BY MOUTH FOUR TIMES A DAY AS NEEDED FOR ANXIETY 60 capsule 10    hydrocortisone 2.5 % cream Apply topically 2 times daily.  28 g 2    busPIRone (BUSPAR) 10 MG tablet Take 1 tablet by mouth 3 times daily 270 tablet 1    omeprazole (PRILOSEC) 40 MG delayed release capsule Take 1 capsule by mouth every morning (before breakfast) 30 capsule 5    buPROPion (WELLBUTRIN) 75 MG tablet Take 75 mg by mouth daily      Insulin Pen Needle 30G X 8 MM MISC 1 each by Does not apply route 3 times daily 500 each 3    traZODone (DESYREL) 50 MG tablet Take 1 tablet by mouth nightly as needed for Sleep 90 tablet 1    Handicap Placard MISC by Does not apply route Expires 3/11/2026 1 each 0    Continuous Blood Gluc Sensor (FREESTYLE ALEJANDRA 14 DAY SENSOR) MISC Use as directed, check glucose 4 times/day. 6 each 3    Continuous Blood Gluc  (FREESTYLE ALEJANDRA 14 DAY READER) BEAU Use as directed, check glucose 4 times/day 1 Device 0    QUEtiapine (SEROQUEL) 100 MG tablet Take 1 tablet by mouth nightly 30 tablet 5    Folic Acid (FOLATE PO) Take 1,333 mcg by mouth daily      vitamin B-1 100 MG tablet Take 1 tablet by mouth daily 30 tablet 3    aspirin 81 MG EC tablet Take 1 tablet by mouth daily 30 tablet 3    acetaminophen (TYLENOL) 500 MG tablet Take 1,000 mg by mouth every 6 hours as needed for Pain      Multiple Vitamins-Minerals (MENS MULTIVITAMIN PLUS) TABS Take 1 tablet by mouth daily       No current facility-administered medications for this visit. Orders Placed This Encounter   Medications    SITagliptin (JANUVIA) 100 MG tablet     Sig: Take 1 tablet by mouth daily     Dispense:  90 tablet     Refill:  1    dapagliflozin (FARXIGA) 10 MG tablet     Sig: Take 1 tablet by mouth every morning     Dispense:  90 tablet     Refill:  1         All medications reviewed and reconciled, including OTC and herbal medications. Updated list given to patient.        Patient Active Problem List   Diagnosis    Alcoholic hepatitis    Pancreatitis, History    Alcohol-induced acute pancreatitis    Type 2 diabetes mellitus without complication, with long-term current use of insulin (HCC)    Asthma    Alcohol use disorder, moderate, in sustained remission, dependence (HCC)    Eczema    Smoker    Cigarette nicotine dependence without complication    Major depression, chronic    Closed fracture of orbit (HCC)    Closed fracture of talus    Bipolar disorder, current episode depressed, moderate (HCC)    PTSD (post-traumatic stress disorder)    Gastroesophageal reflux disease       Past Medical History:   Diagnosis Date    Asthma     COPD (chronic obstructive pulmonary disease) (HCC)     Eczema     GERD (gastroesophageal reflux disease)     History of alcohol abuse     History of marijuana use     History of tobacco abuse     quit 11/21/2017    Hx of seasonal allergies     Pancreatitis     Psychiatric problem     PTSD (post-traumatic stress disorder)     Seizures ([facility] [address]     etoh wdl    Type 2 diabetes mellitus without complication ([facility] [address] 52/97/1776       Past Surgical History:   Procedure Laterality Date    ANKLE ARTHROSCOPY Right 8/5/2020    ANKLE ARTHROSCOPY WITH  MEDIAL DEBRIDEMENT RIGHT ANKLE, ANKLE ARTHROTOMY WITH DEBRIDEMENT performed by Homero Mckeon DPM at [address] Right 09/2019    [facility] [place]    FRACTURE SURGERY Right 2019    orbital fracture surgery    UPPER GASTROINTESTINAL ENDOSCOPY Left 5/6/2019    EGD BIOPSY performed by Terra Grace MD at [facility]       Allergies   Allergen Reactions    Paxil [Paroxetine] Swelling and Rash       Social History     Socioeconomic History    Marital status:      Spouse name: Not on file    Number of children: 3    Years of education: 6    Highest education level: Not on file   Occupational History    Occupation: [employer]       Comment: fulltime    Tobacco Use    Smoking status: Current Every Day Smoker     Packs/day: 0.50     Years: 15.00     Pack years: 7.50     Types: Cigarettes    Smokeless tobacco: Never Used   Vaping Use    Vaping Use: Former   Substance and Sexual Activity    Alcohol use: Yes     Comment: a pint of vodka last used on 12/28/2020    Drug use: Not Currently     Types: Marijuana Jenae Rajan)     Comment: approx 2 or more years    Sexual activity: Yes     Partners: Female   Other Topics Concern    Not on file   Social History Narrative    No barriers with transportation    Medications are not being covered by insurance since back to work    No longer drinking alcohol    Denies transportation barriers    Denies need for community services     Social Determinants of Health     Financial Resource Strain:     Difficulty of Paying Living Expenses: Not on file   Food Insecurity:     Worried About Running Out of Food in the Last Year: Not on file    Ngozi of Food in the Last Year: Not on file   Transportation Needs:     Lack of Transportation (Medical): Not on file    Lack of Transportation (Non-Medical):  Not on file   Physical Activity:     Days of Exercise per Week: Not on file    Minutes of Exercise per Session: Not on file   Stress:     Feeling of Stress : Not on file   Social Connections:     Frequency of Communication with Friends and Family: Not on file    Frequency of Social Gatherings with Friends and Family: Not on file    Attends Muslim Services: Not on file    Active Member of [address] [place] [address] or Organizations: Not on file    Attends Club or Organization Meetings: Not on file    Marital Status: Not on file   Intimate Partner Violence:     Fear of Current or Ex-Partner: Not on file    Emotionally Abused: Not on file    Physically Abused: Not on file    Sexually Abused: Not on file   Housing Stability:     Unable to Pay for Housing in the Last Year: Not on file    Number of [place] in the Last Year: Not on file    Unstable Housing in the Last Year: Not on file       Family History   Problem Relation Age of Onset    Other Mother         gestational diabetes    Other Father 39        suicide    Other Sister         hypoglycemia         I have reviewed the patient's past medical history, past surgical history, allergies, medications, social and family history and I have Height: 5' 2.5\" (1.588 m)     Body mass index is 31.5 kg/m². VS Reviewed  General Appearance: A&O x 3, No acute distress,well developed and well- nourished  Head: normocephalic, atraumatic  Eyes: pupils equal, round, and reactive to light, extraocular eye movements intact, conjunctivae and left upper eyelid with stye formation  Neck: supple and non-tender without mass, no thyromegaly or thyroid nodules, no cervical lymphadenopathy  Pulmonary/Chest: clear to auscultation bilaterally- no wheezes, rales or rhonchi, harsh cough  Cardiovascular: S1 and S2 auscultated w/ RRR. No murmurs, rubs, clicks, or gallops, distal pulses intact. Abdomen: soft, non-tender, non-distended, bowl sounds physiologic,  no rebound or guarding, no masses or hernias noted. Liver and spleen without enlargement. Extremities: no cyanosis, clubbing or edema of the lower extremities  Musculoskeletal: No joint swelling or gross deformity   Neuro:  Alert, 5/5 strength globally and symmetrically  Psych: Affect appropriate. Mood normal. Thought process is normal without evidence of depression or psychosis. Good insight and appropriate interaction. Cognition and memory appear to be intact. Skin: warm and dry  Lymph:  No cervical, auricular or supraclavicular lymph nodes palpated    ASSESSMENT & PLAN  Isabelle Mehta was seen today for follow-up and health maintenance. Diagnoses and all orders for this visit:    Type 2 diabetes mellitus without complication, with long-term current use of insulin ([facility])  -     POCT glycosylated hemoglobin (Hb A1C)  -     SITagliptin (JANUVIA) 100 MG tablet; Take 1 tablet by mouth daily  -     dapagliflozin (FARXIGA) 10 MG tablet; Take 1 tablet by mouth every morning    Bipolar disorder, current episode depressed, moderate ([facility])      - A1C 9.7, not at goal  - stop meformin due to poor tolerability  - will trial Januvia and Nathan Dubin  - he does have Hx of ETOH induced pancreatitis.  But as long as he is treated for ETOH abuse and remains in remission, I am comfortable with treating him with these medications  - consider Trulicity as another option  - following with [facility] for bipolar disorder and ETOH abuse  - con't Celexa and Wellbutrin, Seroquel    DISPOSITION    Return in about 3 months (around 7/8/2022) for diabetes. Forrest Cee released without restrictions. PATIENT COUNSELING    Counseling was provided today regarding the following topics: Healthy eating habits, Regular exercise, substance abuse and healthy sleep habits. Forrest Cee received counseling on the following healthy behaviors: medication adherence and decrease in alcohol consumption    Patient given educational materials on: See Attached    I have instructed Forrest Cee to complete a self tracking handout on none and instructed them to bring it with them to his next appointment. Barriers to learning and self management: none    Discussed use, benefit, and side effects of prescribed medications. Barriers to medication compliance addressed. All patient questions answered. Pt voiced understanding.        Electronically signed by BARB Pugh CNP on 4/8/2022 at 10:10 AM

## 2022-04-14 ENCOUNTER — TELEPHONE (OUTPATIENT)
Dept: FAMILY MEDICINE CLINIC | Age: 37
End: 2022-04-14

## 2022-04-14 NOTE — TELEPHONE ENCOUNTER
Pt states he is nauseated and unable to eat since starting new medication anurias and farxiga, not sure which one is causing the issue

## 2022-04-14 NOTE — TELEPHONE ENCOUNTER
I started him on [place]. I would try stopping the [place] first and see if this eliminates the nausea. This can cause some nausea, the Januvia not so much.

## 2022-04-18 DIAGNOSIS — E11.9 TYPE 2 DIABETES MELLITUS WITHOUT COMPLICATION, WITH LONG-TERM CURRENT USE OF INSULIN (HCC): ICD-10-CM

## 2022-04-18 DIAGNOSIS — J45.40 MODERATE PERSISTENT ASTHMA WITHOUT COMPLICATION: ICD-10-CM

## 2022-04-18 DIAGNOSIS — Z79.4 TYPE 2 DIABETES MELLITUS WITHOUT COMPLICATION, WITH LONG-TERM CURRENT USE OF INSULIN (HCC): ICD-10-CM

## 2022-04-18 RX ORDER — INSULIN GLARGINE 100 [IU]/ML
INJECTION, SOLUTION SUBCUTANEOUS
Qty: 15 ML | Refills: 10 | Status: SHIPPED | OUTPATIENT
Start: 2022-04-18 | End: 2022-08-02

## 2022-05-12 DIAGNOSIS — J45.40 MODERATE PERSISTENT ASTHMA WITHOUT COMPLICATION: ICD-10-CM

## 2022-05-12 RX ORDER — FLUTICASONE PROPIONATE AND SALMETEROL 500; 50 UG/1; UG/1
POWDER RESPIRATORY (INHALATION)
Qty: 60 EACH | Refills: 5 | Status: SHIPPED | OUTPATIENT
Start: 2022-05-12 | End: 2022-10-31

## 2022-05-13 DIAGNOSIS — E11.9 TYPE 2 DIABETES MELLITUS WITHOUT COMPLICATION, WITH LONG-TERM CURRENT USE OF INSULIN (HCC): ICD-10-CM

## 2022-05-13 DIAGNOSIS — L30.9 ECZEMA, UNSPECIFIED TYPE: ICD-10-CM

## 2022-05-13 DIAGNOSIS — Z79.4 TYPE 2 DIABETES MELLITUS WITHOUT COMPLICATION, WITH LONG-TERM CURRENT USE OF INSULIN (HCC): ICD-10-CM

## 2022-05-13 RX ORDER — INSULIN ASPART 100 [IU]/ML
INJECTION, SOLUTION INTRAVENOUS; SUBCUTANEOUS
Qty: 15 ML | Refills: 10 | Status: SHIPPED | OUTPATIENT
Start: 2022-05-13 | End: 2022-11-03

## 2022-07-05 DIAGNOSIS — E11.9 TYPE 2 DIABETES MELLITUS WITHOUT COMPLICATION, WITH LONG-TERM CURRENT USE OF INSULIN (HCC): ICD-10-CM

## 2022-07-05 DIAGNOSIS — Z79.4 TYPE 2 DIABETES MELLITUS WITHOUT COMPLICATION, WITH LONG-TERM CURRENT USE OF INSULIN (HCC): ICD-10-CM

## 2022-07-05 RX ORDER — FLASH GLUCOSE SENSOR
KIT MISCELLANEOUS
Qty: 2 EACH | Refills: 5 | Status: SHIPPED | OUTPATIENT
Start: 2022-07-05

## 2022-07-06 DIAGNOSIS — J45.40 MODERATE PERSISTENT ASTHMA WITHOUT COMPLICATION: ICD-10-CM

## 2022-07-06 NOTE — TELEPHONE ENCOUNTER
Recent Visits  Date Type Provider Dept   04/08/22 Office Visit BARB Martinez CNP [facility]   12/07/21 Office Visit BARB Martinez CNP [facility]   07/19/21 Office Visit BARB Martinez CNP [facility]   03/12/21 Office Visit BARB Martinez - CNP [facility]   Showing recent visits within past 540 days with a meds authorizing provider and meeting all other requirements  Future Appointments  Date Type Provider Dept   08/02/22 Appointment BARB Martinez CNP [facility]   Showing future appointments within next 150 days with a meds authorizing provider and meeting all other requirements    Future Appointments   Date Time Provider Malachi Gray   8/2/2022  8:20 AM BARB Martinez

## 2022-08-02 ENCOUNTER — OFFICE VISIT (OUTPATIENT)
Dept: FAMILY MEDICINE CLINIC | Age: 37
End: 2022-08-02
Payer: COMMERCIAL

## 2022-08-02 VITALS
SYSTOLIC BLOOD PRESSURE: 128 MMHG | OXYGEN SATURATION: 96 % | RESPIRATION RATE: 14 BRPM | BODY MASS INDEX: 31.29 KG/M2 | WEIGHT: 176.6 LBS | HEIGHT: 63 IN | TEMPERATURE: 98.7 F | HEART RATE: 102 BPM | DIASTOLIC BLOOD PRESSURE: 80 MMHG

## 2022-08-02 DIAGNOSIS — K70.10 ALCOHOLIC HEPATITIS WITHOUT ASCITES: ICD-10-CM

## 2022-08-02 DIAGNOSIS — E05.90 HYPERTHYROIDISM, SUBCLINICAL: ICD-10-CM

## 2022-08-02 DIAGNOSIS — Z79.4 TYPE 2 DIABETES MELLITUS WITHOUT COMPLICATION, WITH LONG-TERM CURRENT USE OF INSULIN (HCC): Primary | ICD-10-CM

## 2022-08-02 DIAGNOSIS — E11.9 TYPE 2 DIABETES MELLITUS WITHOUT COMPLICATION, WITH LONG-TERM CURRENT USE OF INSULIN (HCC): Primary | ICD-10-CM

## 2022-08-02 LAB — HBA1C MFR BLD: 10 % (ref 4.3–5.7)

## 2022-08-02 PROCEDURE — 4004F PT TOBACCO SCREEN RCVD TLK: CPT | Performed by: NURSE PRACTITIONER

## 2022-08-02 PROCEDURE — 2022F DILAT RTA XM EVC RTNOPTHY: CPT | Performed by: NURSE PRACTITIONER

## 2022-08-02 PROCEDURE — 3046F HEMOGLOBIN A1C LEVEL >9.0%: CPT | Performed by: NURSE PRACTITIONER

## 2022-08-02 PROCEDURE — G8417 CALC BMI ABV UP PARAM F/U: HCPCS | Performed by: NURSE PRACTITIONER

## 2022-08-02 PROCEDURE — G8427 DOCREV CUR MEDS BY ELIG CLIN: HCPCS | Performed by: NURSE PRACTITIONER

## 2022-08-02 PROCEDURE — 99214 OFFICE O/P EST MOD 30 MIN: CPT | Performed by: NURSE PRACTITIONER

## 2022-08-02 RX ORDER — BUPROPION HYDROCHLORIDE 150 MG/1
TABLET ORAL
COMMUNITY
Start: 2022-07-27

## 2022-08-02 RX ORDER — INSULIN GLARGINE 100 [IU]/ML
INJECTION, SOLUTION SUBCUTANEOUS
Qty: 15 ML | Refills: 2 | Status: SHIPPED | OUTPATIENT
Start: 2022-08-02 | End: 2022-10-24

## 2022-08-02 RX ORDER — DOXYCYCLINE HYCLATE 50 MG/1
CAPSULE ORAL
COMMUNITY
Start: 2022-07-18

## 2022-08-02 NOTE — PROGRESS NOTES
Chief Complaint   Patient presents with    Follow-up     DM       History obtained from chart review and the patient. SUBJECTIVE:  Lesly Yi is a 43 y.o. male that presents today for follow up DM    Diabetes Type 2    Glucose control:   Does patient check blood glucoses at home? Yes - 250 range, up to 350 after meals  Report of hypoglycemia: no  Lab Results   Component Value Date    LABA1C 10.0 (H) 08/02/2022     No results found for: EAG    Symptoms  Polyuria, Polydipsia or Polyphagia? No  Chest Pain, SOB, or Palpitations? -  No  New Vision complaints? No  Paresthesias of the extremities? No    Medications  Current medication were reviewed. Compliant with medications? Yes, taking insulins, januvia and Farxiga  Medication side effects? No  On ACE-I or ARB? No  On antiplatelet therapy? No  On Statin? No    Last Diabetic Eye Exam: needs    Exercise  Exercise? No  Wt Readings from Last 3 Encounters:   08/02/22 176 lb 9.6 oz (80.1 kg)   04/08/22 175 lb (79.4 kg)   12/07/21 187 lb (84.8 kg)       Diet discipline?:  Low salt, fat, sugar diet? Fair, appetite has been low    Blood pressure control:  BP Readings from Last 3 Encounters:   08/02/22 128/80   04/08/22 116/78   12/07/21 122/78       Lab Results   Component Value Date    LABMICR 153.85 06/11/2020       Lab Results   Component Value Date    LDLCALC 75 03/26/2022       ETOH Abuse  He has had relapse with ETOH beginning of February. He did go to nursing home in June and served 20 days for a DUI, but otherwise has been doing well.     Age/Gender Health Maintenance    Lipid   Lab Results   Component Value Date    CHOL 182 05/04/2019    CHOL 165 12/18/2017     Lab Results   Component Value Date    TRIG 128 05/04/2019    TRIG 135 12/18/2017     Lab Results   Component Value Date    HDL 54 03/26/2022    HDL 49 03/12/2020    HDL 84 05/04/2019     Lab Results   Component Value Date    LDLCALC 75 03/26/2022    LDLCALC 75 03/12/2020    LDLCALC 72 05/04/2019 breakfast) 30 capsule 5    Insulin Pen Needle 30G X 8 MM MISC 1 each by Does not apply route 3 times daily 500 each 3    traZODone (DESYREL) 50 MG tablet Take 1 tablet by mouth nightly as needed for Sleep 90 tablet 1    Handicap Placard MISC by Does not apply route Expires 3/11/2026 1 each 0    Continuous Blood Gluc  (FREESTYLE ALEJANDRA 14 DAY READER) BEAU Use as directed, check glucose 4 times/day 1 Device 0    QUEtiapine (SEROQUEL) 100 MG tablet Take 1 tablet by mouth nightly 30 tablet 5    Folic Acid (FOLATE PO) Take 1,333 mcg by mouth daily      vitamin B-1 100 MG tablet Take 1 tablet by mouth daily 30 tablet 3    aspirin 81 MG EC tablet Take 1 tablet by mouth daily 30 tablet 3    acetaminophen (TYLENOL) 500 MG tablet Take 1,000 mg by mouth every 6 hours as needed for Pain      Multiple Vitamins-Minerals (MENS MULTIVITAMIN PLUS) TABS Take 1 tablet by mouth daily       No current facility-administered medications for this visit. Orders Placed This Encounter   Medications    insulin glargine (LANTUS SOLOSTAR) 100 UNIT/ML injection pen     Sig: Inject 45 units SQ nightly. Dispense:  15 mL     Refill:  2           All medications reviewed and reconciled, including OTC and herbal medications. Updated list given to patient.        Patient Active Problem List   Diagnosis    Alcoholic hepatitis    Pancreatitis, History    Alcohol-induced acute pancreatitis    Type 2 diabetes mellitus without complication, with long-term current use of insulin (HCC)    Asthma    Alcohol use disorder, moderate, in sustained remission, dependence (HCC)    Eczema    Smoker    Cigarette nicotine dependence without complication    Major depression, chronic    Closed fracture of orbit (HCC)    Closed fracture of talus    Bipolar disorder, current episode depressed, moderate (HCC)    PTSD (post-traumatic stress disorder)    Gastroesophageal reflux disease       Past Medical History:   Diagnosis Date    Asthma     COPD (chronic Health     Financial Resource Strain: Not on file   Food Insecurity: Not on file   Transportation Needs: Not on file   Physical Activity: Not on file   Stress: Not on file   Social Connections: Not on file   Intimate Partner Violence: Not on file   Housing Stability: Not on file       Family History   Problem Relation Age of Onset    Other Mother         gestational diabetes    Other Father 39        suicide    Other Sister         hypoglycemia         I have reviewed the patient's past medical history, past surgical history, allergies, medications, social and family history and I have made updates where appropriate.       Review of Systems  Positive responses are highlighted in bold    Constitutional:  Fever, Chills, Night Sweats, Fatigue, Unexpected changes in weight  Eyes:  Eye discharge, Eye pain, Eye redness, Visual disturbances   HENT:  Ear pain, Tinnitus, Nosebleeds, Trouble swallowing, Hearing loss, Sore throat  Cardiovascular:  Chest Pain, Palpitations, Orthopnea, Paroxysmal Nocturnal Dyspnea  Respiratory:  Cough, Wheezing, Shortness of breath, Chest tightness, Apnea  Gastrointestinal:  Nausea, Vomiting, Diarrhea, Constipation, Heartburn, Blood in stool  Genitourinary:  Difficulty or painful urination, Flank pain, Change in frequency, Urgency  Skin:  Color change, Rash, Itching, Wound  Psychiatric:  Hallucinations, Anxiety, Depression, Suicidal ideation  Hematological:  Enlarged glands, Easy bleeding, Easily bruising  Musculoskeletal:  Joint pain, Back pain, Gait problems, Joint swelling, Myalgias  Neurological:  Dizziness, Headaches, Presyncope, Numbness, Seizures, Tremors  Allergy:  Environmental allergies, Food allergies  Endocrine:  Heat Intolerance, Cold Intolerance, Polydipsia, Polyphagia, Polyuria    Lab Results   Component Value Date     03/26/2022    K 3.1 (L) 03/26/2022    CL 99 03/26/2022    CO2 26 03/26/2022    BUN 10 03/26/2022    CREATININE 0.6 03/26/2022    GLUCOSE 177 (H) 03/26/2022 CALCIUM 9.7 03/26/2022    PROT 6.8 03/26/2022    LABALBU 4.6 03/26/2022    BILITOT 0.7 03/26/2022    ALKPHOS 103 03/26/2022    AST 67 (H) 03/26/2022    ALT 79 (H) 03/26/2022    LABGLOM >90 03/26/2022     Lab Results   Component Value Date    WBC 12.2 (H) 03/26/2022    HGB 16.1 03/26/2022    HCT 47.1 03/26/2022    MCV 94.2 (H) 03/26/2022     03/26/2022     Lab Results   Component Value Date    TSH 0.251 (L) 11/07/2020     PHYSICAL EXAM:  Vitals:    08/02/22 0821   BP: 128/80   Pulse: (!) 102   Resp: 14   Temp: 98.7 °F (37.1 °C)   TempSrc: Oral   SpO2: 96%   Weight: 176 lb 9.6 oz (80.1 kg)   Height: 5' 2.5\" (1.588 m)       Body mass index is 31.79 kg/m². VS Reviewed  General Appearance: A&O x 3, No acute distress,well developed and well- nourished  Head: normocephalic, atraumatic  Eyes: pupils equal, round, and reactive to light, extraocular eye movements intact, conjunctivae and left upper eyelid with stye formation  Neck: supple and non-tender without mass, no thyromegaly or thyroid nodules, no cervical lymphadenopathy  Pulmonary/Chest: clear to auscultation bilaterally- no wheezes, rales or rhonchi, harsh cough  Cardiovascular: S1 and S2 auscultated w/ RRR. No murmurs, rubs, clicks, or gallops, distal pulses intact. Abdomen: soft, non-tender, non-distended, bowl sounds physiologic,  no rebound or guarding, no masses or hernias noted. Liver and spleen without enlargement. Extremities: no cyanosis, clubbing or edema of the lower extremities  Musculoskeletal: No joint swelling or gross deformity   Neuro:  Alert, 5/5 strength globally and symmetrically  Psych: Affect appropriate. Mood normal. Thought process is normal without evidence of depression or psychosis. Good insight and appropriate interaction. Cognition and memory appear to be intact.   Skin: warm and dry  Lymph:  No cervical, auricular or supraclavicular lymph nodes palpated    ASSESSMENT & PLAN  Neville Ramirez was seen today for follow-up. Diagnoses and all orders for this visit:    Type 2 diabetes mellitus without complication, with long-term current use of insulin ([facility])  -     insulin glargine (LANTUS SOLOSTAR) 100 UNIT/ML injection pen; Inject 45 units SQ nightly. -     POCT glycosylated hemoglobin (Hb I2V)    Alcoholic hepatitis without ascites    Hyperthyroidism, subclinical  -     TSH With Reflex Ft4; Future    - A1C 10 which is not well controlled  - addition of oral antidiabetics (Farxiga and Januvia) really didn't add any additional benefit. Will stop the Januvia and increase Lantus to 45 units  - con't Humalog per sliding scale  - alcoholism in remission  - repeat thyroid lab    DISPOSITION    Return in about 6 weeks (around 9/13/2022) for diabetes. Peg Paiz released without restrictions. PATIENT COUNSELING    Counseling was provided today regarding the following topics: Healthy eating habits, Regular exercise, substance abuse and healthy sleep habits. Peg Paiz received counseling on the following healthy behaviors: medication adherence and decrease in alcohol consumption    Patient given educational materials on: See Attached    I have instructed Peg Paiz to complete a self tracking handout on none and instructed them to bring it with them to his next appointment. Barriers to learning and self management: none    Discussed use, benefit, and side effects of prescribed medications. Barriers to medication compliance addressed. All patient questions answered. Pt voiced understanding.        Electronically signed by BARB Curtis CNP on 8/2/2022 at 8:55 AM

## 2022-09-08 ENCOUNTER — TELEPHONE (OUTPATIENT)
Dept: FAMILY MEDICINE CLINIC | Age: 37
End: 2022-09-08

## 2022-09-08 NOTE — TELEPHONE ENCOUNTER
Left message for pt    Called pt to move his appt time on 9/13/22    Radha Doan has an appointment that morning and needs this pt's appt rescheduled to later that day

## 2022-09-12 NOTE — TELEPHONE ENCOUNTER
Future Appointments   Date Time Provider Malachi Gray   10/3/2022  8:20 AM Chloé Delcid, APRN - [id number] [place]  [email or web address]

## 2022-09-29 ENCOUNTER — NURSE ONLY (OUTPATIENT)
Dept: LAB | Age: 37
End: 2022-09-29

## 2022-09-29 DIAGNOSIS — E05.90 HYPERTHYROIDISM, SUBCLINICAL: ICD-10-CM

## 2022-09-29 LAB
ALT SERPL-CCNC: 21 U/L (ref 11–66)
ANION GAP SERPL CALCULATED.3IONS-SCNC: 13 MEQ/L (ref 8–16)
AST SERPL-CCNC: 16 U/L (ref 5–40)
BASOPHILS # BLD: 0.6 %
BASOPHILS ABSOLUTE: 0.1 THOU/MM3 (ref 0–0.1)
BUN BLDV-MCNC: 12 MG/DL (ref 7–22)
CALCIUM SERPL-MCNC: 9.4 MG/DL (ref 8.5–10.5)
CHLORIDE BLD-SCNC: 101 MEQ/L (ref 98–111)
CO2: 21 MEQ/L (ref 23–33)
CREAT SERPL-MCNC: 0.5 MG/DL (ref 0.4–1.2)
EOSINOPHIL # BLD: 1.7 %
EOSINOPHILS ABSOLUTE: 0.2 THOU/MM3 (ref 0–0.4)
ERYTHROCYTE [DISTWIDTH] IN BLOOD BY AUTOMATED COUNT: 13.8 % (ref 11.5–14.5)
ERYTHROCYTE [DISTWIDTH] IN BLOOD BY AUTOMATED COUNT: 45.8 FL (ref 35–45)
GFR SERPL CREATININE-BSD FRML MDRD: > 90 ML/MIN/1.73M2
GLUCOSE BLD-MCNC: 316 MG/DL (ref 70–108)
HAV IGM SER IA-ACNC: NEGATIVE
HCT VFR BLD CALC: 44.6 % (ref 42–52)
HEMOGLOBIN: 15.5 GM/DL (ref 14–18)
HEPATITIS B CORE IGM ANTIBODY: NEGATIVE
HEPATITIS B SURFACE ANTIGEN: NEGATIVE
HEPATITIS C ANTIBODY: NEGATIVE
IMMATURE GRANS (ABS): 0.1 THOU/MM3 (ref 0–0.07)
IMMATURE GRANULOCYTES: 0.9 %
LYMPHOCYTES # BLD: 19 %
LYMPHOCYTES ABSOLUTE: 2.1 THOU/MM3 (ref 1–4.8)
MCH RBC QN AUTO: 31.8 PG (ref 26–33)
MCHC RBC AUTO-ENTMCNC: 34.8 GM/DL (ref 32.2–35.5)
MCV RBC AUTO: 91.4 FL (ref 80–94)
MONOCYTES # BLD: 10 %
MONOCYTES ABSOLUTE: 1.1 THOU/MM3 (ref 0.4–1.3)
NUCLEATED RED BLOOD CELLS: 0 /100 WBC
PLATELET # BLD: 278 THOU/MM3 (ref 130–400)
PMV BLD AUTO: 10.2 FL (ref 9.4–12.4)
POTASSIUM SERPL-SCNC: 3.8 MEQ/L (ref 3.5–5.2)
RBC # BLD: 4.88 MILL/MM3 (ref 4.7–6.1)
SEG NEUTROPHILS: 67.8 %
SEGMENTED NEUTROPHILS ABSOLUTE COUNT: 7.7 THOU/MM3 (ref 1.8–7.7)
SODIUM BLD-SCNC: 135 MEQ/L (ref 135–145)
TSH SERPL DL<=0.05 MIU/L-ACNC: 0.99 UIU/ML (ref 0.4–4.2)
WBC # BLD: 11.3 THOU/MM3 (ref 4.8–10.8)

## 2022-10-03 ENCOUNTER — OFFICE VISIT (OUTPATIENT)
Dept: FAMILY MEDICINE CLINIC | Age: 37
End: 2022-10-03
Payer: COMMERCIAL

## 2022-10-03 VITALS
HEIGHT: 63 IN | TEMPERATURE: 98.1 F | BODY MASS INDEX: 33.91 KG/M2 | HEART RATE: 83 BPM | WEIGHT: 191.4 LBS | DIASTOLIC BLOOD PRESSURE: 76 MMHG | SYSTOLIC BLOOD PRESSURE: 126 MMHG | RESPIRATION RATE: 14 BRPM | OXYGEN SATURATION: 96 %

## 2022-10-03 DIAGNOSIS — Z79.4 TYPE 2 DIABETES MELLITUS WITHOUT COMPLICATION, WITH LONG-TERM CURRENT USE OF INSULIN (HCC): Primary | ICD-10-CM

## 2022-10-03 DIAGNOSIS — E11.9 TYPE 2 DIABETES MELLITUS WITHOUT COMPLICATION, WITH LONG-TERM CURRENT USE OF INSULIN (HCC): Primary | ICD-10-CM

## 2022-10-03 DIAGNOSIS — L03.011 PARONYCHIA OF FINGER OF RIGHT HAND: ICD-10-CM

## 2022-10-03 PROCEDURE — G8427 DOCREV CUR MEDS BY ELIG CLIN: HCPCS | Performed by: NURSE PRACTITIONER

## 2022-10-03 PROCEDURE — 2022F DILAT RTA XM EVC RTNOPTHY: CPT | Performed by: NURSE PRACTITIONER

## 2022-10-03 PROCEDURE — 99214 OFFICE O/P EST MOD 30 MIN: CPT | Performed by: NURSE PRACTITIONER

## 2022-10-03 PROCEDURE — G8417 CALC BMI ABV UP PARAM F/U: HCPCS | Performed by: NURSE PRACTITIONER

## 2022-10-03 PROCEDURE — 3046F HEMOGLOBIN A1C LEVEL >9.0%: CPT | Performed by: NURSE PRACTITIONER

## 2022-10-03 PROCEDURE — G8484 FLU IMMUNIZE NO ADMIN: HCPCS | Performed by: NURSE PRACTITIONER

## 2022-10-03 PROCEDURE — 4004F PT TOBACCO SCREEN RCVD TLK: CPT | Performed by: NURSE PRACTITIONER

## 2022-10-03 RX ORDER — CEPHALEXIN 500 MG/1
500 CAPSULE ORAL 3 TIMES DAILY
Qty: 30 CAPSULE | Refills: 0 | Status: SHIPPED | OUTPATIENT
Start: 2022-10-03 | End: 2022-10-13

## 2022-10-03 SDOH — ECONOMIC STABILITY: FOOD INSECURITY: WITHIN THE PAST 12 MONTHS, YOU WORRIED THAT YOUR FOOD WOULD RUN OUT BEFORE YOU GOT MONEY TO BUY MORE.: NEVER TRUE

## 2022-10-03 SDOH — ECONOMIC STABILITY: FOOD INSECURITY: WITHIN THE PAST 12 MONTHS, THE FOOD YOU BOUGHT JUST DIDN'T LAST AND YOU DIDN'T HAVE MONEY TO GET MORE.: NEVER TRUE

## 2022-10-03 ASSESSMENT — PATIENT HEALTH QUESTIONNAIRE - PHQ9
SUM OF ALL RESPONSES TO PHQ QUESTIONS 1-9: 2
SUM OF ALL RESPONSES TO PHQ9 QUESTIONS 1 & 2: 2
1. LITTLE INTEREST OR PLEASURE IN DOING THINGS: 0
DEPRESSION UNABLE TO ASSESS: FUNCTIONAL CAPACITY MOTIVATION LIMITS ACCURACY
2. FEELING DOWN, DEPRESSED OR HOPELESS: 2
SUM OF ALL RESPONSES TO PHQ QUESTIONS 1-9: 2

## 2022-10-03 ASSESSMENT — SOCIAL DETERMINANTS OF HEALTH (SDOH): HOW HARD IS IT FOR YOU TO PAY FOR THE VERY BASICS LIKE FOOD, HOUSING, MEDICAL CARE, AND HEATING?: NOT HARD AT ALL

## 2022-10-03 NOTE — PROGRESS NOTES
Chief Complaint   Patient presents with    Diabetes     6 week follow up. Right ring  finger infection- 2 weeks         History obtained from chart review and the patient. SUBJECTIVE:  Lauren Dickey is a 43 y.o. male that presents today for follow up DM    Sugars all over the place. He has also gained some weight. Diabetes Type 2    Glucose control:   Does patient check blood glucoses at home? Yes - fasting 250 range, has had as high as 400  Report of hypoglycemia: no  Lab Results   Component Value Date    LABA1C 10.0 (H) 08/02/2022     No results found for: EAG    Symptoms  Polyuria, Polydipsia or Polyphagia? No  Chest Pain, SOB, or Palpitations? -  No  New Vision complaints? No  Paresthesias of the extremities? No    Medications  Current medication were reviewed. Compliant with medications? Yes, taking insulins  Medication side effects? No  On ACE-I or ARB? No  On antiplatelet therapy? No  On Statin? No    Last Diabetic Eye Exam: needs    Exercise  Exercise? No  Wt Readings from Last 3 Encounters:   10/03/22 191 lb 6.4 oz (86.8 kg)   08/02/22 176 lb 9.6 oz (80.1 kg)   04/08/22 175 lb (79.4 kg)       Diet discipline?:  Low salt, fat, sugar diet? Fair, appetite has been low    Blood pressure control:  BP Readings from Last 3 Encounters:   10/03/22 126/76   08/02/22 128/80   04/08/22 116/78       Lab Results   Component Value Date    LABMICR 153.85 06/11/2020       Lab Results   Component Value Date    LDLCALC 75 03/26/2022     Also complains of infected finger. He has habit of chewing his nails and it got infected. It was draining purulent drainage about 1 week ago and then started getting swollen and painful again.     Age/Gender Health Maintenance    Lipid   Lab Results   Component Value Date    CHOL 182 05/04/2019    CHOL 165 12/18/2017     Lab Results   Component Value Date    TRIG 128 05/04/2019    TRIG 135 12/18/2017     Lab Results   Component Value Date    HDL 54 03/26/2022    HDL 49 03/12/2020    HDL 84 05/04/2019     Lab Results   Component Value Date    LDLCALC 75 03/26/2022    LDLCALC 75 03/12/2020    LDLCALC 72 05/04/2019     DM Screen   Lab Results   Component Value Date    LABA1C 10.0 (H) 08/02/2022     Colon Cancer Screening - 46  Lung Cancer Screening (Age 54 to [de-identified] with 30 pack year hx, current smoker or quit within past 15 years) - n/a    Tetanus - needs  Influenza Vaccine - needs  Pneumonia Vaccine - 65  Zostavax - 50  HPV Vaccine - n/a    PSA testing discussion - 52  AAA Screening - n/a    Falls screening - n/a    Current Outpatient Medications   Medication Sig Dispense Refill    Tirzepatide (MOUNJARO) 2.5 MG/0.5ML SOPN SC injection Inject 0.5 mLs into the skin once a week for 4 doses 2 mL 0    cephALEXin (KEFLEX) 500 MG capsule Take 1 capsule by mouth 3 times daily for 10 days 30 capsule 0    mupirocin (BACTROBAN) 2 % ointment Apply topically 3 times daily. 22 g 0    buPROPion (WELLBUTRIN XL) 150 MG extended release tablet TAKE 1 TABLET BY MOUTH ONCE DAILY      doxycycline (VIBRAMYCIN) 50 MG capsule TAKE 1 CAPSULE BY MOUTH TWICE DAILY      insulin glargine (LANTUS SOLOSTAR) 100 UNIT/ML injection pen Inject 45 units SQ nightly.  15 mL 2    VENTOLIN  (90 Base) MCG/ACT inhaler INHALE 1 TO 2 PUFFS BY MOUTH EVERY 4 TO 6 HOURS AS NEEDED 18 g 10    Continuous Blood Gluc Sensor (FREESTYLE ALEJANDRA 14 DAY SENSOR) MISC USE AS DIRECTED ,  USE  TO  CHECK  GLUCOSE  4  TIMES  DAILY 2 each 5    betamethasone valerate (VALISONE) 0.1 % cream APPLY TO AFFECTED AREA TOPICALLY TWICE DAILY 45 g 10    Insulin Aspart FlexPen 100 UNIT/ML SOPN INJECT 2-12 UNITS SUBCUTANEOUSLY THREE TIMES A DAY WITH MEALS PER SLIDING SCALE 15 mL 10    WIXELA INHUB 500-50 MCG/ACT AEPB diskus inhaler INHALE ONE (1) PUFF BY MOUTH EVERY 12 HOURS 60 each 5    naltrexone (VIVITROL) 380 MG injection Inject 380 mg into the muscle once      citalopram (CELEXA) 10 MG tablet Take 10 mg by mouth daily      dapagliflozin (FARXIGA) 10 MG tablet Take 1 tablet by mouth every morning 90 tablet 1    hydrOXYzine (VISTARIL) 100 MG capsule TAKE ONE (1) CAPSULE BY MOUTH FOUR TIMES A DAY AS NEEDED FOR ANXIETY 60 capsule 10    hydrocortisone 2.5 % cream Apply topically 2 times daily. 28 g 2    busPIRone (BUSPAR) 10 MG tablet Take 1 tablet by mouth 3 times daily 270 tablet 1    omeprazole (PRILOSEC) 40 MG delayed release capsule Take 1 capsule by mouth every morning (before breakfast) 30 capsule 5    Insulin Pen Needle 30G X 8 MM MISC 1 each by Does not apply route 3 times daily 500 each 3    traZODone (DESYREL) 50 MG tablet Take 1 tablet by mouth nightly as needed for Sleep 90 tablet 1    Handicap Placard MISC by Does not apply route Expires 3/11/2026 1 each 0    Continuous Blood Gluc  (FREESTYLE ALEJANDRA 14 DAY READER) BEAU Use as directed, check glucose 4 times/day 1 Device 0    QUEtiapine (SEROQUEL) 100 MG tablet Take 1 tablet by mouth nightly 30 tablet 5    Folic Acid (FOLATE PO) Take 1,333 mcg by mouth daily      vitamin B-1 100 MG tablet Take 1 tablet by mouth daily 30 tablet 3    aspirin 81 MG EC tablet Take 1 tablet by mouth daily 30 tablet 3    acetaminophen (TYLENOL) 500 MG tablet Take 1,000 mg by mouth every 6 hours as needed for Pain      Multiple Vitamins-Minerals (MENS MULTIVITAMIN PLUS) TABS Take 1 tablet by mouth daily       No current facility-administered medications for this visit. Orders Placed This Encounter   Medications    Tirzepatide (MOUNJARO) 2.5 MG/0.5ML SOPN SC injection     Sig: Inject 0.5 mLs into the skin once a week for 4 doses     Dispense:  2 mL     Refill:  0    cephALEXin (KEFLEX) 500 MG capsule     Sig: Take 1 capsule by mouth 3 times daily for 10 days     Dispense:  30 capsule     Refill:  0    mupirocin (BACTROBAN) 2 % ointment     Sig: Apply topically 3 times daily. Dispense:  22 g     Refill:  0             All medications reviewed and reconciled, including OTC and herbal medications.  Updated list given to patient.        Patient Active Problem List   Diagnosis    Alcoholic hepatitis    Pancreatitis, History    Alcohol-induced acute pancreatitis    Type 2 diabetes mellitus without complication, with long-term current use of insulin (HCC)    Asthma    Alcohol use disorder, moderate, in sustained remission, dependence (HCC)    Eczema    Smoker    Cigarette nicotine dependence without complication    Major depression, chronic    Closed fracture of orbit (HCC)    Closed fracture of talus    Bipolar disorder, current episode depressed, moderate (HCC)    PTSD (post-traumatic stress disorder)    Gastroesophageal reflux disease       Past Medical History:   Diagnosis Date    Asthma     COPD (chronic obstructive pulmonary disease) (HCC)     Eczema     GERD (gastroesophageal reflux disease)     History of alcohol abuse     History of marijuana use     History of tobacco abuse     quit 11/21/2017    Hx of seasonal allergies     Pancreatitis     Psychiatric problem     PTSD (post-traumatic stress disorder)     Seizures ([facility] [address]     etoh wdl    Type 2 diabetes mellitus without complication ([facility] [address] 30/28/0168       Past Surgical History:   Procedure Laterality Date    ANKLE ARTHROSCOPY Right 8/5/2020    ANKLE ARTHROSCOPY WITH  MEDIAL DEBRIDEMENT RIGHT ANKLE, ANKLE ARTHROTOMY WITH DEBRIDEMENT performed by Ajith Bass DPM at [facility] Right 09/2019    DR Chance [address]    FRACTURE SURGERY Right 2019    orbital fracture surgery    UPPER GASTROINTESTINAL ENDOSCOPY Left 5/6/2019    EGD BIOPSY performed by Jacquie Gross MD at [facility]       Allergies   Allergen Reactions    Paxil [Paroxetine] Swelling and Rash       Social History     Socioeconomic History    Marital status:      Spouse name: Not on file    Number of children: 4    Years of education: 11    Highest education level: Not on file   Occupational History    Occupation: [employer]       Comment: fulltime    Tobacco Use    Smoking status: Every Day     Packs/day: 0.50     Years: 15.00     Pack years: 7.50     Types: Cigarettes    Smokeless tobacco: Never   Vaping Use    Vaping Use: Former   Substance and Sexual Activity    Alcohol use: Yes     Comment: a pint of vodka last used on 12/28/2020    Drug use: Not Currently     Types: Marijuana (Weed)     Comment: approx 2 or more years    Sexual activity: Yes     Partners: Female   Other Topics Concern    Not on file   Social History Narrative    No barriers with transportation    Medications are not being covered by insurance since back to work    No longer drinking alcohol    Denies transportation barriers    Denies need for community services     Social Determinants of Health     Financial Resource Strain: Low Risk     Difficulty of Paying Living Expenses: Not hard at all   Food Insecurity: No Food Insecurity    Worried About Running Out of Food in the Last Year: Never true    Ran Out of Food in the Last Year: Never true   Transportation Needs: Not on file   Physical Activity: Not on file   Stress: Not on file   Social Connections: Not on file   Intimate Partner Violence: Not on file   Housing Stability: Not on file       Family History   Problem Relation Age of Onset    Other Mother         gestational diabetes    Other Father 39        suicide    Other Sister         hypoglycemia         I have reviewed the patient's past medical history, past surgical history, allergies, medications, social and family history and I have made updates where appropriate.       Review of Systems  Positive responses are highlighted in bold    Constitutional:  Fever, Chills, Night Sweats, Fatigue, Unexpected changes in weight  Eyes:  Eye discharge, Eye pain, Eye redness, Visual disturbances   HENT:  Ear pain, Tinnitus, Nosebleeds, Trouble swallowing, Hearing loss, Sore throat  Cardiovascular:  Chest Pain, Palpitations, Orthopnea, Paroxysmal Nocturnal Dyspnea  Respiratory:  Cough, Wheezing, Shortness of breath, Chest tightness, Apnea  Gastrointestinal:  Nausea, Vomiting, Diarrhea, Constipation, Heartburn, Blood in stool  Genitourinary:  Difficulty or painful urination, Flank pain, Change in frequency, Urgency  Skin:  Color change, Rash, Itching, Wound  Psychiatric:  Hallucinations, Anxiety, Depression, Suicidal ideation  Hematological:  Enlarged glands, Easy bleeding, Easily bruising  Musculoskeletal:  Joint pain, Back pain, Gait problems, Joint swelling, Myalgias  Neurological:  Dizziness, Headaches, Presyncope, Numbness, Seizures, Tremors  Allergy:  Environmental allergies, Food allergies  Endocrine:  Heat Intolerance, Cold Intolerance, Polydipsia, Polyphagia, Polyuria    Lab Results   Component Value Date     09/29/2022    K 3.8 09/29/2022     09/29/2022    CO2 21 (L) 09/29/2022    BUN 12 09/29/2022    CREATININE 0.5 09/29/2022    GLUCOSE 316 (H) 09/29/2022    CALCIUM 9.4 09/29/2022    PROT 6.8 03/26/2022    LABALBU 4.6 03/26/2022    BILITOT 0.7 03/26/2022    ALKPHOS 103 03/26/2022    AST 16 09/29/2022    ALT 21 09/29/2022    LABGLOM >90 09/29/2022     Lab Results   Component Value Date    WBC 11.3 (H) 09/29/2022    HGB 15.5 09/29/2022    HCT 44.6 09/29/2022    MCV 91.4 09/29/2022     09/29/2022     Lab Results   Component Value Date    TSH 0.989 09/29/2022     PHYSICAL EXAM:  Vitals:    10/03/22 1405   BP: 126/76   Pulse: 83   Resp: 14   Temp: 98.1 °F (36.7 °C)   SpO2: 96%   Weight: 191 lb 6.4 oz (86.8 kg)   Height: 5' 2.5\" (1.588 m)         Body mass index is 34.45 kg/m².          VS Reviewed  General Appearance: A&O x 3, No acute distress,well developed and well- nourished  Head: normocephalic, atraumatic  Eyes: pupils equal, round, and reactive to light, extraocular eye movements intact, conjunctivae normal  Neck: supple and non-tender without mass, no thyromegaly or thyroid nodules, no cervical lymphadenopathy  Pulmonary/Chest: clear to auscultation bilaterally- no wheezes, rales or rhonchi, harsh cough  Cardiovascular: S1 and S2 auscultated w/ RRR. No murmurs, rubs, clicks, or gallops, distal pulses intact. Abdomen: soft, non-tender, non-distended, bowl sounds physiologic,  no rebound or guarding, no masses or hernias noted. Liver and spleen without enlargement. Extremities: no cyanosis, clubbing or edema of the lower extremities  Musculoskeletal: No joint swelling or gross deformity   Neuro:  Alert, 5/5 strength globally and symmetrically  Psych: Affect appropriate. Mood normal. Thought process is normal without evidence of depression or psychosis. Good insight and appropriate interaction. Cognition and memory appear to be intact. Skin: warm and dry, right ring finger nailbed erythematous and swollen  Lymph:  No cervical, auricular or supraclavicular lymph nodes palpated    ASSESSMENT & PLAN  Anyi Arndt was seen today for diabetes. Diagnoses and all orders for this visit:    Type 2 diabetes mellitus without complication, with long-term current use of insulin (HCC)  -     Tirzepatide (MOUNJARO) 2.5 MG/0.5ML SOPN SC injection; Inject 0.5 mLs into the skin once a week for 4 doses    Paronychia of finger of right hand  -     cephALEXin (KEFLEX) 500 MG capsule; Take 1 capsule by mouth 3 times daily for 10 days  -     mupirocin (BACTROBAN) 2 % ointment; Apply topically 3 times daily. - DM still poorly controlled  - con't Lantus and short acting insulin per sliding scale  - add Mounjaro, I think this will really help control the DM and also help with weight loss  - discussed side effects  - start Keflex and topical bactroban, rec'd also warm compresses    DISPOSITION    Return for diabetes. Anyi Arndt released without restrictions. PATIENT COUNSELING    Counseling was provided today regarding the following topics: Healthy eating habits, Regular exercise, substance abuse and healthy sleep habits.     Anyi Arndt received counseling on the following healthy behaviors: medication adherence and decrease in alcohol consumption    Patient given educational materials on: See Attached    I have instructed Romi Babin to complete a self tracking handout on none and instructed them to bring it with them to his next appointment. Barriers to learning and self management: none    Discussed use, benefit, and side effects of prescribed medications. Barriers to medication compliance addressed. All patient questions answered. Pt voiced understanding.        Electronically signed by BARB Medina CNP on 10/3/2022 at 2:17 PM

## 2022-10-20 DIAGNOSIS — K21.9 GASTROESOPHAGEAL REFLUX DISEASE, UNSPECIFIED WHETHER ESOPHAGITIS PRESENT: ICD-10-CM

## 2022-10-20 DIAGNOSIS — R07.89 ATYPICAL CHEST PAIN: ICD-10-CM

## 2022-10-21 RX ORDER — OMEPRAZOLE 40 MG/1
CAPSULE, DELAYED RELEASE ORAL
Qty: 90 CAPSULE | Refills: 1 | Status: SHIPPED | OUTPATIENT
Start: 2022-10-21

## 2022-10-21 NOTE — TELEPHONE ENCOUNTER
Recent Visits  Date Type Provider Dept   10/03/22 Office Visit BARB Back CNP [facility]   08/02/22 Office Visit BARB Back CNP [facility]   04/08/22 Office Visit BARB Back CNP [facility]   12/07/21 Office Visit BARB Back CNP [facility]   07/19/21 Office Visit BABR Back CNP [facility]   Showing recent visits within past 540 days with a meds authorizing provider and meeting all other requirements  Future Appointments  Date Type Provider Dept   10/31/22 Appointment BARB Back CNP [facility]   Showing future appointments within next 150 days with a meds authorizing provider and meeting all other requirements      Future Appointments   Date Time Provider Malachi Gray   10/31/2022  3:00 PM BARB Back

## 2022-10-23 DIAGNOSIS — Z79.4 TYPE 2 DIABETES MELLITUS WITHOUT COMPLICATION, WITH LONG-TERM CURRENT USE OF INSULIN (HCC): ICD-10-CM

## 2022-10-23 DIAGNOSIS — E11.9 TYPE 2 DIABETES MELLITUS WITHOUT COMPLICATION, WITH LONG-TERM CURRENT USE OF INSULIN (HCC): ICD-10-CM

## 2022-10-24 RX ORDER — INSULIN GLARGINE 100 [IU]/ML
INJECTION, SOLUTION SUBCUTANEOUS
Qty: 15 ML | Refills: 10 | Status: SHIPPED | OUTPATIENT
Start: 2022-10-24 | End: 2022-11-03

## 2022-10-31 DIAGNOSIS — J45.40 MODERATE PERSISTENT ASTHMA WITHOUT COMPLICATION: ICD-10-CM

## 2022-10-31 DIAGNOSIS — Z79.4 TYPE 2 DIABETES MELLITUS WITHOUT COMPLICATION, WITH LONG-TERM CURRENT USE OF INSULIN (HCC): ICD-10-CM

## 2022-10-31 DIAGNOSIS — E11.9 TYPE 2 DIABETES MELLITUS WITHOUT COMPLICATION, WITH LONG-TERM CURRENT USE OF INSULIN (HCC): ICD-10-CM

## 2022-10-31 RX ORDER — INSULIN GLARGINE 100 [IU]/ML
INJECTION, SOLUTION SUBCUTANEOUS
Qty: 15 ML | Refills: 10 | OUTPATIENT
Start: 2022-10-31

## 2022-10-31 RX ORDER — FLUTICASONE PROPIONATE AND SALMETEROL 50; 500 UG/1; UG/1
POWDER RESPIRATORY (INHALATION)
Qty: 60 EACH | Refills: 5 | Status: SHIPPED | OUTPATIENT
Start: 2022-10-31

## 2022-11-02 ENCOUNTER — TELEPHONE (OUTPATIENT)
Dept: FAMILY MEDICINE CLINIC | Age: 37
End: 2022-11-02

## 2022-11-02 NOTE — TELEPHONE ENCOUNTER
----- Message from Mike Spencer sent at 11/2/2022 10:01 AM EDT -----  Subject: Message to Provider    QUESTIONS  Information for Provider? Patient made an appt for f/u this Thursday. he   is wondering also could we give him a shot Vivitrol for alcohol   dependency. Been getting it somewhere where there are no nurses can   administer it anymore. he has the shot would bring it in? then he would   have to have proof it was given to him and get back to [facility]. call him on this.   ---------------------------------------------------------------------------  --------------  Wale LAL  [phone]; OK to leave message on voicemail  ---------------------------------------------------------------------------  --------------  SCRIPT ANSWERS  Relationship to Patient?  Self

## 2022-11-02 NOTE — TELEPHONE ENCOUNTER
I'm ok with this as long as someone else is ordering and managing the Vivitrol. Is this something he will need on a monthly basis?

## 2022-11-02 NOTE — TELEPHONE ENCOUNTER
Pt informed and verbalized understanding.      He thinks it will just be one time/ hopefully they will be better staffed and able to give the next injection

## 2022-11-03 ENCOUNTER — OFFICE VISIT (OUTPATIENT)
Dept: FAMILY MEDICINE CLINIC | Age: 37
End: 2022-11-03
Payer: COMMERCIAL

## 2022-11-03 VITALS
HEART RATE: 102 BPM | WEIGHT: 186.2 LBS | RESPIRATION RATE: 14 BRPM | HEIGHT: 63 IN | DIASTOLIC BLOOD PRESSURE: 86 MMHG | TEMPERATURE: 98.7 F | OXYGEN SATURATION: 94 % | SYSTOLIC BLOOD PRESSURE: 124 MMHG | BODY MASS INDEX: 32.99 KG/M2

## 2022-11-03 DIAGNOSIS — F10.21 ALCOHOL USE DISORDER, MODERATE, IN SUSTAINED REMISSION (HCC): Primary | ICD-10-CM

## 2022-11-03 DIAGNOSIS — Z79.4 TYPE 2 DIABETES MELLITUS WITHOUT COMPLICATION, WITH LONG-TERM CURRENT USE OF INSULIN (HCC): ICD-10-CM

## 2022-11-03 DIAGNOSIS — E11.9 TYPE 2 DIABETES MELLITUS WITHOUT COMPLICATION, WITH LONG-TERM CURRENT USE OF INSULIN (HCC): ICD-10-CM

## 2022-11-03 LAB — HBA1C MFR BLD: 10.2 % (ref 4.3–5.7)

## 2022-11-03 PROCEDURE — 3046F HEMOGLOBIN A1C LEVEL >9.0%: CPT | Performed by: NURSE PRACTITIONER

## 2022-11-03 PROCEDURE — 99214 OFFICE O/P EST MOD 30 MIN: CPT | Performed by: NURSE PRACTITIONER

## 2022-11-03 PROCEDURE — G8484 FLU IMMUNIZE NO ADMIN: HCPCS | Performed by: NURSE PRACTITIONER

## 2022-11-03 PROCEDURE — 4004F PT TOBACCO SCREEN RCVD TLK: CPT | Performed by: NURSE PRACTITIONER

## 2022-11-03 PROCEDURE — G8417 CALC BMI ABV UP PARAM F/U: HCPCS | Performed by: NURSE PRACTITIONER

## 2022-11-03 PROCEDURE — G8427 DOCREV CUR MEDS BY ELIG CLIN: HCPCS | Performed by: NURSE PRACTITIONER

## 2022-11-03 PROCEDURE — 2022F DILAT RTA XM EVC RTNOPTHY: CPT | Performed by: NURSE PRACTITIONER

## 2022-11-03 RX ORDER — INSULIN ASPART 100 [IU]/ML
10 INJECTION, SOLUTION INTRAVENOUS; SUBCUTANEOUS
Qty: 15 ML | Refills: 10 | Status: SHIPPED | OUTPATIENT
Start: 2022-11-03

## 2022-11-03 RX ORDER — INSULIN GLARGINE 100 [IU]/ML
27 INJECTION, SOLUTION SUBCUTANEOUS 2 TIMES DAILY
Qty: 15 ML | Refills: 10 | Status: SHIPPED | OUTPATIENT
Start: 2022-11-03

## 2022-11-03 RX ORDER — BLOOD-GLUCOSE SENSOR
EACH MISCELLANEOUS
Qty: 6 EACH | Refills: 1 | Status: SHIPPED | OUTPATIENT
Start: 2022-11-03

## 2022-11-03 NOTE — PROGRESS NOTES
Pt got his vivitrol injection today at office visit. Ulises Ribeiro.    [address]: [id number]  Lot: [id number]  Expiration: 01/30/2025

## 2022-11-03 NOTE — PROGRESS NOTES
Chief Complaint   Patient presents with    Diabetes       History obtained from chart review and the patient. SUBJECTIVE:  Nicole Alvarado is a 42 y.o. male that presents today for follow up DM    Tried the [facility] but stopped it after 2 weeks because he was having side effects. He was having nausea/vomiting both times he took it. He is complaining of weight gain. Patient is also following with [facility] for ETOH abuse. He is getting Vivitrol injections, reports that apparently they are having a nursing shortage and he needs this months' Vivitrol shot given to him. Diabetes Type 2    Glucose control:   Does patient check blood glucoses at home? Yes - fasting 300 range  Report of hypoglycemia: no  Lab Results   Component Value Date    LABA1C 10.2 (H) 11/03/2022     No results found for: EAG    Symptoms  Polyuria, Polydipsia or Polyphagia? No  Chest Pain, SOB, or Palpitations? -  No  New Vision complaints? No  Paresthesias of the extremities? No    Medications  Current medication were reviewed. Compliant with medications? Yes, taking insulins  Medication side effects? No  On ACE-I or ARB? No  On antiplatelet therapy? No  On Statin? No    Last Diabetic Eye Exam: needs    Exercise  Exercise? No  Wt Readings from Last 3 Encounters:   11/03/22 186 lb 3.2 oz (84.5 kg)   10/03/22 191 lb 6.4 oz (86.8 kg)   08/02/22 176 lb 9.6 oz (80.1 kg)       Diet discipline?:  Low salt, fat, sugar diet? Fair, appetite has been low    Blood pressure control:  BP Readings from Last 3 Encounters:   11/03/22 124/86   10/03/22 126/76   08/02/22 128/80       Lab Results   Component Value Date    LABMICR 153.85 06/11/2020       Lab Results   Component Value Date    LDLCALC 75 03/26/2022     Also complains of infected finger. He has habit of chewing his nails and it got infected. It was draining purulent drainage about 1 week ago and then started getting swollen and painful again.     Age/Gender Health Maintenance    Lipid   Lab Results   Component Value Date    CHOL 182 05/04/2019    CHOL 165 12/18/2017     Lab Results   Component Value Date    TRIG 128 05/04/2019    TRIG 135 12/18/2017     Lab Results   Component Value Date    HDL 54 03/26/2022    HDL 49 03/12/2020    HDL 84 05/04/2019     Lab Results   Component Value Date    LDLCALC 75 03/26/2022    LDLCALC 75 03/12/2020    LDLCALC 72 05/04/2019     DM Screen   Lab Results   Component Value Date    LABA1C 10.2 (H) 11/03/2022     Colon Cancer Screening - 46  Lung Cancer Screening (Age 54 to [de-identified] with 30 pack year hx, current smoker or quit within past 15 years) - n/a    Tetanus - needs  Influenza Vaccine - needs  Pneumonia Vaccine - 65  Zostavax - 50  HPV Vaccine - n/a    PSA testing discussion - 55  AAA Screening - n/a    Falls screening - n/a    Current Outpatient Medications   Medication Sig Dispense Refill    insulin glargine (LANTUS SOLOSTAR) 100 UNIT/ML injection pen Inject 27 Units into the skin 2 times daily 15 mL 10    Insulin Aspart FlexPen 100 UNIT/ML SOPN Inject 10 Units into the skin 3 times daily (with meals) Plus 2-12 units per sliding scale, max daily dose 66 units 15 mL 10    Continuous Blood Gluc Sensor (FREESTYLE ALEJANDRA 3 SENSOR) MISC Use as directed every 14 days.  6 each 1    ADVAIR DISKUS 500-50 MCG/ACT AEPB diskus inhaler INHALE 1 PUFF BY MOUTH EVERY 12 HOURS 60 each 5    omeprazole (PRILOSEC) 40 MG delayed release capsule TAKE 1 CAPSULE BY MOUTH ONCE DAILY IN THE MORNING BEFORE BREAKFAST 90 capsule 1    buPROPion (WELLBUTRIN XL) 150 MG extended release tablet TAKE 1 TABLET BY MOUTH ONCE DAILY      doxycycline (VIBRAMYCIN) 50 MG capsule TAKE 1 CAPSULE BY MOUTH TWICE DAILY      VENTOLIN  (90 Base) MCG/ACT inhaler INHALE 1 TO 2 PUFFS BY MOUTH EVERY 4 TO 6 HOURS AS NEEDED 18 g 10    Continuous Blood Gluc Sensor (FREESTYLE ALEJANDRA 14 DAY SENSOR) MISC USE AS DIRECTED ,  USE  TO  CHECK  GLUCOSE  4  TIMES  DAILY 2 each 5    betamethasone valerate (VALISONE) 0.1 % cream APPLY TO AFFECTED AREA TOPICALLY TWICE DAILY 45 g 10    naltrexone (VIVITROL) 380 MG injection Inject 380 mg into the muscle once      citalopram (CELEXA) 10 MG tablet Take 10 mg by mouth daily      hydrOXYzine (VISTARIL) 100 MG capsule TAKE ONE (1) CAPSULE BY MOUTH FOUR TIMES A DAY AS NEEDED FOR ANXIETY 60 capsule 10    hydrocortisone 2.5 % cream Apply topically 2 times daily. 28 g 2    busPIRone (BUSPAR) 10 MG tablet Take 1 tablet by mouth 3 times daily 270 tablet 1    Insulin Pen Needle 30G X 8 MM MISC 1 each by Does not apply route 3 times daily 500 each 3    traZODone (DESYREL) 50 MG tablet Take 1 tablet by mouth nightly as needed for Sleep 90 tablet 1    Handicap Placard MISC by Does not apply route Expires 3/11/2026 1 each 0    Continuous Blood Gluc  (FREESTYLE ALEJANDRA 14 DAY READER) BEAU Use as directed, check glucose 4 times/day 1 Device 0    QUEtiapine (SEROQUEL) 100 MG tablet Take 1 tablet by mouth nightly 30 tablet 5    Folic Acid (FOLATE PO) Take 1,333 mcg by mouth daily      vitamin B-1 100 MG tablet Take 1 tablet by mouth daily 30 tablet 3    aspirin 81 MG EC tablet Take 1 tablet by mouth daily 30 tablet 3    acetaminophen (TYLENOL) 500 MG tablet Take 1,000 mg by mouth every 6 hours as needed for Pain      Multiple Vitamins-Minerals (MENS MULTIVITAMIN PLUS) TABS Take 1 tablet by mouth daily       No current facility-administered medications for this visit. Orders Placed This Encounter   Medications    insulin glargine (LANTUS SOLOSTAR) 100 UNIT/ML injection pen     Sig: Inject 27 Units into the skin 2 times daily     Dispense:  15 mL     Refill:  10    Insulin Aspart FlexPen 100 UNIT/ML SOPN     Sig: Inject 10 Units into the skin 3 times daily (with meals) Plus 2-12 units per sliding scale, max daily dose 66 units     Dispense:  15 mL     Refill:  10    Continuous Blood Gluc Sensor (FREESTYLE ALEJANDRA 3 SENSOR) MISC     Sig: Use as directed every 14 days. Dispense:  6 each     Refill:  1               All medications reviewed and reconciled, including OTC and herbal medications. Updated list given to patient.        Patient Active Problem List   Diagnosis    Alcoholic hepatitis    Pancreatitis, History    Alcohol-induced acute pancreatitis    Type 2 diabetes mellitus without complication, with long-term current use of insulin (HCC)    Asthma    Alcohol use disorder, moderate, in sustained remission, dependence (HCC)    Eczema    Smoker    Cigarette nicotine dependence without complication    Major depression, chronic    Closed fracture of orbit (HCC)    Closed fracture of talus    Bipolar disorder, current episode depressed, moderate (HCC)    PTSD (post-traumatic stress disorder)    Gastroesophageal reflux disease       Past Medical History:   Diagnosis Date    Asthma     COPD (chronic obstructive pulmonary disease) (HCC)     Eczema     GERD (gastroesophageal reflux disease)     History of alcohol abuse     History of marijuana use     History of tobacco abuse     quit 11/21/2017    Hx of seasonal allergies     Pancreatitis     Psychiatric problem     PTSD (post-traumatic stress disorder)     Seizures ([facility] [address]     etoh wdl    Type 2 diabetes mellitus without complication ([facility] [address] 80/43/4340       Past Surgical History:   Procedure Laterality Date    ANKLE ARTHROSCOPY Right 8/5/2020    ANKLE ARTHROSCOPY WITH  MEDIAL DEBRIDEMENT RIGHT ANKLE, ANKLE ARTHROTOMY WITH DEBRIDEMENT performed by Cortney Quinn DPM at [facility] Right 09/2019    DR Chance [address]    FRACTURE SURGERY Right 2019    orbital fracture surgery    UPPER GASTROINTESTINAL ENDOSCOPY Left 5/6/2019    EGD BIOPSY performed by Avery Renee MD at [facility]       Allergies   Allergen Reactions    Paxil [Paroxetine] Swelling and Rash       Social History     Socioeconomic History    Marital status:      Spouse name: Not on file    Number of children: 4    Years of education: 11    Highest education level: Not on file   Occupational History    Occupation: [employer]       Comment: fulltime    Tobacco Use    Smoking status: Every Day     Packs/day: 0.50     Years: 15.00     Pack years: 7.50     Types: Cigarettes    Smokeless tobacco: Never   Vaping Use    Vaping Use: Former   Substance and Sexual Activity    Alcohol use: Yes     Comment: a pint of vodka last used on 12/28/2020    Drug use: Not Currently     Types: Marijuana (Weed)     Comment: approx 2 or more years    Sexual activity: Yes     Partners: Female   Other Topics Concern    Not on file   Social History Narrative    No barriers with transportation    Medications are not being covered by insurance since back to work    No longer drinking alcohol    Denies transportation barriers    Denies need for community services     Social Determinants of Health     Financial Resource Strain: Low Risk     Difficulty of Paying Living Expenses: Not hard at all   Food Insecurity: No Food Insecurity    Worried About Running Out of Food in the Last Year: Never true    Ran Out of Food in the Last Year: Never true   Transportation Needs: Not on file   Physical Activity: Not on file   Stress: Not on file   Social Connections: Not on file   Intimate Partner Violence: Not on file   Housing Stability: Not on file       Family History   Problem Relation Age of Onset    Other Mother         gestational diabetes    Other Father 39        suicide    Other Sister         hypoglycemia         I have reviewed the patient's past medical history, past surgical history, allergies, medications, social and family history and I have made updates where appropriate.       Review of Systems  Positive responses are highlighted in bold    Constitutional:  Fever, Chills, Night Sweats, Fatigue, Unexpected changes in weight  Eyes:  Eye discharge, Eye pain, Eye redness, Visual disturbances   HENT:  Ear pain, Tinnitus, Nosebleeds, Trouble swallowing, Hearing loss, Sore throat  Cardiovascular:  Chest Pain, Palpitations, Orthopnea, Paroxysmal Nocturnal Dyspnea  Respiratory:  Cough, Wheezing, Shortness of breath, Chest tightness, Apnea  Gastrointestinal:  Nausea, Vomiting, Diarrhea, Constipation, Heartburn, Blood in stool  Genitourinary:  Difficulty or painful urination, Flank pain, Change in frequency, Urgency  Skin:  Color change, Rash, Itching, Wound  Psychiatric:  Hallucinations, Anxiety, Depression, Suicidal ideation  Hematological:  Enlarged glands, Easy bleeding, Easily bruising  Musculoskeletal:  Joint pain, Back pain, Gait problems, Joint swelling, Myalgias  Neurological:  Dizziness, Headaches, Presyncope, Numbness, Seizures, Tremors  Allergy:  Environmental allergies, Food allergies  Endocrine:  Heat Intolerance, Cold Intolerance, Polydipsia, Polyphagia, Polyuria    Lab Results   Component Value Date     09/29/2022    K 3.8 09/29/2022     09/29/2022    CO2 21 (L) 09/29/2022    BUN 12 09/29/2022    CREATININE 0.5 09/29/2022    GLUCOSE 316 (H) 09/29/2022    CALCIUM 9.4 09/29/2022    PROT 6.8 03/26/2022    LABALBU 4.6 03/26/2022    BILITOT 0.7 03/26/2022    ALKPHOS 103 03/26/2022    AST 16 09/29/2022    ALT 21 09/29/2022    LABGLOM >90 09/29/2022     Lab Results   Component Value Date    WBC 11.3 (H) 09/29/2022    HGB 15.5 09/29/2022    HCT 44.6 09/29/2022    MCV 91.4 09/29/2022     09/29/2022     Lab Results   Component Value Date    TSH 0.989 09/29/2022     PHYSICAL EXAM:  Vitals:    11/03/22 0950   BP: 124/86   Pulse: (!) 102   Resp: 14   Temp: 98.7 °F (37.1 °C)   TempSrc: Oral   SpO2: 94%   Weight: 186 lb 3.2 oz (84.5 kg)   Height: 5' 2.5\" (1.588 m)           Body mass index is 33.51 kg/m².          VS Reviewed  General Appearance: A&O x 3, No acute distress,well developed and well- nourished  Head: normocephalic, atraumatic  Eyes: pupils equal, round, and reactive to light, extraocular eye movements intact, conjunctivae normal  Neck: supple and non-tender without mass, no thyromegaly or thyroid nodules, no cervical lymphadenopathy  Pulmonary/Chest: clear to auscultation bilaterally- no wheezes, rales or rhonchi, harsh cough  Cardiovascular: S1 and S2 auscultated w/ RRR. No murmurs, rubs, clicks, or gallops, distal pulses intact. Abdomen: soft, non-tender, non-distended, bowl sounds physiologic,  no rebound or guarding, no masses or hernias noted. Liver and spleen without enlargement. Extremities: no cyanosis, clubbing or edema of the lower extremities  Musculoskeletal: No joint swelling or gross deformity   Neuro:  Alert, 5/5 strength globally and symmetrically  Psych: Affect appropriate. Mood normal. Thought process is normal without evidence of depression or psychosis. Good insight and appropriate interaction. Cognition and memory appear to be intact. Skin: warm and dry, right ring finger nailbed erythematous and swollen  Lymph:  No cervical, auricular or supraclavicular lymph nodes palpated    ASSESSMENT & PLAN  Yahaira Theodore was seen today for diabetes. Diagnoses and all orders for this visit:    Alcohol use disorder, moderate, in sustained remission ([address]    Type 2 diabetes mellitus without complication, with long-term current use of insulin ([facility])  -     POCT glycosylated hemoglobin (Hb A1C)  -     insulin glargine (LANTUS SOLOSTAR) 100 UNIT/ML injection pen; Inject 27 Units into the skin 2 times daily  -     Insulin, Fasting; Future  -     C-Peptide, Serum; Future  -     Insulin Aspart FlexPen 100 UNIT/ML SOPN; Inject 10 Units into the skin 3 times daily (with meals) Plus 2-12 units per sliding scale, max daily dose 66 units  -     Continuous Blood Gluc Sensor (FREESTYLE ALEJANDRA 3 SENSOR) [facility]; Use as directed every 14 days.     - Vivitrol injection given in office today, see nursing note  - suspect he is not making much insulin, will obtain fasting insulin and C peptide level  - d/c Farxiga-hasn't made any improvement in A1C  - increase Lantus dose to 27 units twice a day  - increase Humalog to 10 units TID with meals plus sliding scale    DISPOSITION    Return in about 6 weeks (around 12/15/2022) for diabetes. Tuan Oscar released without restrictions. PATIENT COUNSELING    Counseling was provided today regarding the following topics: Healthy eating habits, Regular exercise, substance abuse and healthy sleep habits. Tuan Oscar received counseling on the following healthy behaviors: medication adherence and decrease in alcohol consumption    Patient given educational materials on: See Attached    I have instructed Tuan Oscar to complete a self tracking handout on none and instructed them to bring it with them to his next appointment. Barriers to learning and self management: none    Discussed use, benefit, and side effects of prescribed medications. Barriers to medication compliance addressed. All patient questions answered. Pt voiced understanding.        Electronically signed by BARB Bardales CNP on 11/3/2022 at 10:19 AM

## 2022-11-03 NOTE — LETTER
[address] [place] [address]. [facility] [address]  Phone: [phone]  Fax: [phone]    BARB Ga CNP        November 3, 2022     Patient: Sarthak Gil   YOB: 1985   Date of Visit: 11/3/2022       To Whom It May Concern: It is my medical opinion that Micha Jimenez received his monthly Vivitrol injection in our office today. If you have any questions or concerns, please don't hesitate to call.     Sincerely,        BARB Ga CNP

## 2022-11-17 DIAGNOSIS — F10.930 ALCOHOL WITHDRAWAL SYNDROME WITHOUT COMPLICATION (HCC): ICD-10-CM

## 2022-11-17 DIAGNOSIS — F33.1 MAJOR DEPRESSIVE DISORDER, RECURRENT EPISODE, MODERATE WITH ANXIOUS DISTRESS (HCC): ICD-10-CM

## 2022-11-17 RX ORDER — HYDROXYZINE PAMOATE 100 MG/1
CAPSULE ORAL
Qty: 60 CAPSULE | Refills: 10 | Status: SHIPPED | OUTPATIENT
Start: 2022-11-17

## 2022-11-17 NOTE — TELEPHONE ENCOUNTER
Ivon from [facility] rx requesting   Please approve or refuse Rx request:  Requested Prescriptions     Pending Prescriptions Disp Refills    hydrOXYzine pamoate (VISTARIL) 100 MG capsule 60 capsule 10     Sig: TAKE ONE (1) CAPSULE BY MOUTH FOUR TIMES A DAY AS NEEDED FOR ANXIETY       Next appointment:  12/12/2022

## 2022-12-01 DIAGNOSIS — Z79.4 TYPE 2 DIABETES MELLITUS WITHOUT COMPLICATION, WITH LONG-TERM CURRENT USE OF INSULIN (HCC): ICD-10-CM

## 2022-12-01 DIAGNOSIS — E11.9 TYPE 2 DIABETES MELLITUS WITHOUT COMPLICATION, WITH LONG-TERM CURRENT USE OF INSULIN (HCC): ICD-10-CM

## 2022-12-01 RX ORDER — INSULIN GLARGINE 100 [IU]/ML
INJECTION, SOLUTION SUBCUTANEOUS
Qty: 15 ML | Refills: 10 | OUTPATIENT
Start: 2022-12-01

## 2022-12-20 DIAGNOSIS — E11.9 TYPE 2 DIABETES MELLITUS WITHOUT COMPLICATION, WITH LONG-TERM CURRENT USE OF INSULIN (HCC): ICD-10-CM

## 2022-12-20 DIAGNOSIS — Z79.4 TYPE 2 DIABETES MELLITUS WITHOUT COMPLICATION, WITH LONG-TERM CURRENT USE OF INSULIN (HCC): ICD-10-CM

## 2022-12-20 RX ORDER — INSULIN GLARGINE 100 [IU]/ML
INJECTION, SOLUTION SUBCUTANEOUS
Qty: 15 ML | Refills: 10 | OUTPATIENT
Start: 2022-12-20

## 2023-01-05 ENCOUNTER — NURSE ONLY (OUTPATIENT)
Dept: LAB | Age: 38
End: 2023-01-05

## 2023-01-05 DIAGNOSIS — E11.9 TYPE 2 DIABETES MELLITUS WITHOUT COMPLICATION, WITH LONG-TERM CURRENT USE OF INSULIN (HCC): Primary | ICD-10-CM

## 2023-01-05 DIAGNOSIS — E11.9 TYPE 2 DIABETES MELLITUS WITHOUT COMPLICATION, WITH LONG-TERM CURRENT USE OF INSULIN (HCC): ICD-10-CM

## 2023-01-05 DIAGNOSIS — Z79.4 TYPE 2 DIABETES MELLITUS WITHOUT COMPLICATION, WITH LONG-TERM CURRENT USE OF INSULIN (HCC): Primary | ICD-10-CM

## 2023-01-05 DIAGNOSIS — Z79.4 TYPE 2 DIABETES MELLITUS WITHOUT COMPLICATION, WITH LONG-TERM CURRENT USE OF INSULIN (HCC): ICD-10-CM

## 2023-01-05 LAB
ALBUMIN SERPL-MCNC: 4.3 G/DL (ref 3.5–5.1)
ALP BLD-CCNC: 129 U/L (ref 38–126)
ALT SERPL-CCNC: 36 U/L (ref 11–66)
ANION GAP SERPL CALCULATED.3IONS-SCNC: 19 MEQ/L (ref 8–16)
AST SERPL-CCNC: 22 U/L (ref 5–40)
BASOPHILS # BLD: 0.7 %
BASOPHILS ABSOLUTE: 0.1 THOU/MM3 (ref 0–0.1)
BILIRUB SERPL-MCNC: 0.4 MG/DL (ref 0.3–1.2)
BUN BLDV-MCNC: 10 MG/DL (ref 7–22)
CALCIUM SERPL-MCNC: 9.4 MG/DL (ref 8.5–10.5)
CHLORIDE BLD-SCNC: 100 MEQ/L (ref 98–111)
CO2: 21 MEQ/L (ref 23–33)
CREAT SERPL-MCNC: 0.7 MG/DL (ref 0.4–1.2)
EOSINOPHIL # BLD: 1.7 %
EOSINOPHILS ABSOLUTE: 0.2 THOU/MM3 (ref 0–0.4)
ERYTHROCYTE [DISTWIDTH] IN BLOOD BY AUTOMATED COUNT: 13 % (ref 11.5–14.5)
ERYTHROCYTE [DISTWIDTH] IN BLOOD BY AUTOMATED COUNT: 42.5 FL (ref 35–45)
GFR SERPL CREATININE-BSD FRML MDRD: > 60 ML/MIN/1.73M2
GLUCOSE BLD-MCNC: 230 MG/DL (ref 70–108)
HBV SURFACE AB TITR SER: NEGATIVE {TITER}
HCT VFR BLD CALC: 49.7 % (ref 42–52)
HEMOGLOBIN: 17.1 GM/DL (ref 14–18)
HEPATITIS B SURFACE ANTIGEN: NEGATIVE
HEPATITIS C ANTIBODY: NEGATIVE
IMMATURE GRANS (ABS): 0.1 THOU/MM3 (ref 0–0.07)
IMMATURE GRANULOCYTES: 0.7 %
INSULIN, RANDOM OR FASTING: 13.9 MU/L
LYMPHOCYTES # BLD: 24.7 %
LYMPHOCYTES ABSOLUTE: 3.4 THOU/MM3 (ref 1–4.8)
MCH RBC QN AUTO: 31.4 PG (ref 26–33)
MCHC RBC AUTO-ENTMCNC: 34.4 GM/DL (ref 32.2–35.5)
MCV RBC AUTO: 91.2 FL (ref 80–94)
MONOCYTES # BLD: 11.6 %
MONOCYTES ABSOLUTE: 1.6 THOU/MM3 (ref 0.4–1.3)
NUCLEATED RED BLOOD CELLS: 0 /100 WBC
PLATELET # BLD: 304 THOU/MM3 (ref 130–400)
PMV BLD AUTO: 10.8 FL (ref 9.4–12.4)
POTASSIUM SERPL-SCNC: 3.9 MEQ/L (ref 3.5–5.2)
RBC # BLD: 5.45 MILL/MM3 (ref 4.7–6.1)
RPR: NONREACTIVE
SEG NEUTROPHILS: 60.6 %
SEGMENTED NEUTROPHILS ABSOLUTE COUNT: 8.4 THOU/MM3 (ref 1.8–7.7)
SODIUM BLD-SCNC: 140 MEQ/L (ref 135–145)
TOTAL PROTEIN: 6.5 G/DL (ref 6.1–8)
WBC # BLD: 13.8 THOU/MM3 (ref 4.8–10.8)

## 2023-01-05 RX ORDER — BLOOD-GLUCOSE TRANSMITTER
EACH MISCELLANEOUS
Qty: 1 EACH | Refills: 2 | Status: SHIPPED | OUTPATIENT
Start: 2023-01-05

## 2023-01-05 RX ORDER — BLOOD-GLUCOSE SENSOR
EACH MISCELLANEOUS
Qty: 1 EACH | Refills: 2 | Status: SHIPPED | OUTPATIENT
Start: 2023-01-05

## 2023-01-05 NOTE — TELEPHONE ENCOUNTER
Please sign the scripts for the DexCom CGM. This patient is going to be starting on an OmniPod 5 pump and that is the CGM that works with that pump for the closed loop system.  Thank you

## 2023-01-06 ENCOUNTER — TELEPHONE (OUTPATIENT)
Dept: FAMILY MEDICINE CLINIC | Age: 38
End: 2023-01-06

## 2023-01-06 LAB
C-PEPTIDE: 3.8 NG/ML (ref 1.1–4.4)
HEPATITIS B CORE TOTAL ANTIBODY: NONREACTIVE
HIV AG/AB: NONREACTIVE

## 2023-01-06 NOTE — TELEPHONE ENCOUNTER
----- Message from BARB Winkler CNP sent at 1/6/2023  9:14 AM EST -----  Let Tami Yap know his insulin level and C peptide (which is a precursor to insulin) is in normal range.

## 2023-01-08 LAB
QUANTI TB GOLD PLUS: NEGATIVE
QUANTI TB1 MINUS NIL: 0 IU/ML (ref 0–0.34)
QUANTI TB2 MINUS NIL: 0 IU/ML (ref 0–0.34)
QUANTIFERON MITOGEN MINUS NIL: > 10 IU/ML
QUANTIFERON NIL: 0.02 IU/ML

## 2023-01-10 ENCOUNTER — OFFICE VISIT (OUTPATIENT)
Dept: FAMILY MEDICINE CLINIC | Age: 38
End: 2023-01-10
Payer: COMMERCIAL

## 2023-01-10 VITALS
SYSTOLIC BLOOD PRESSURE: 118 MMHG | HEART RATE: 113 BPM | BODY MASS INDEX: 33.42 KG/M2 | HEIGHT: 63 IN | WEIGHT: 188.6 LBS | RESPIRATION RATE: 14 BRPM | TEMPERATURE: 98.6 F | OXYGEN SATURATION: 98 % | DIASTOLIC BLOOD PRESSURE: 80 MMHG

## 2023-01-10 DIAGNOSIS — Z79.4 TYPE 2 DIABETES MELLITUS WITHOUT COMPLICATION, WITH LONG-TERM CURRENT USE OF INSULIN (HCC): Primary | ICD-10-CM

## 2023-01-10 DIAGNOSIS — F31.32 BIPOLAR DISORDER, CURRENT EPISODE DEPRESSED, MODERATE (HCC): ICD-10-CM

## 2023-01-10 DIAGNOSIS — E11.9 TYPE 2 DIABETES MELLITUS WITHOUT COMPLICATION, WITH LONG-TERM CURRENT USE OF INSULIN (HCC): Primary | ICD-10-CM

## 2023-01-10 DIAGNOSIS — F10.21 ALCOHOL USE DISORDER, MODERATE, IN SUSTAINED REMISSION (HCC): ICD-10-CM

## 2023-01-10 LAB
HBA1C MFR BLD: 12.5 % (ref 4.3–5.7)
MICROALB/CREAT RATIO POC: < 30 MG/G
MICROALBUMIN/CREAT UR-RTO: 30 MG/L
POC CREATININE: 100 MG/DL

## 2023-01-10 PROCEDURE — G8417 CALC BMI ABV UP PARAM F/U: HCPCS | Performed by: NURSE PRACTITIONER

## 2023-01-10 PROCEDURE — 82044 UR ALBUMIN SEMIQUANTITATIVE: CPT | Performed by: NURSE PRACTITIONER

## 2023-01-10 PROCEDURE — 4004F PT TOBACCO SCREEN RCVD TLK: CPT | Performed by: NURSE PRACTITIONER

## 2023-01-10 PROCEDURE — 3046F HEMOGLOBIN A1C LEVEL >9.0%: CPT | Performed by: NURSE PRACTITIONER

## 2023-01-10 PROCEDURE — G8427 DOCREV CUR MEDS BY ELIG CLIN: HCPCS | Performed by: NURSE PRACTITIONER

## 2023-01-10 PROCEDURE — G8484 FLU IMMUNIZE NO ADMIN: HCPCS | Performed by: NURSE PRACTITIONER

## 2023-01-10 PROCEDURE — 99214 OFFICE O/P EST MOD 30 MIN: CPT | Performed by: NURSE PRACTITIONER

## 2023-01-10 PROCEDURE — 2022F DILAT RTA XM EVC RTNOPTHY: CPT | Performed by: NURSE PRACTITIONER

## 2023-01-10 ASSESSMENT — PATIENT HEALTH QUESTIONNAIRE - PHQ9
6. FEELING BAD ABOUT YOURSELF - OR THAT YOU ARE A FAILURE OR HAVE LET YOURSELF OR YOUR FAMILY DOWN: 0
SUM OF ALL RESPONSES TO PHQ QUESTIONS 1-9: 1
SUM OF ALL RESPONSES TO PHQ9 QUESTIONS 1 & 2: 1
8. MOVING OR SPEAKING SO SLOWLY THAT OTHER PEOPLE COULD HAVE NOTICED. OR THE OPPOSITE, BEING SO FIGETY OR RESTLESS THAT YOU HAVE BEEN MOVING AROUND A LOT MORE THAN USUAL: 0
SUM OF ALL RESPONSES TO PHQ QUESTIONS 1-9: 1
7. TROUBLE CONCENTRATING ON THINGS, SUCH AS READING THE NEWSPAPER OR WATCHING TELEVISION: 0
1. LITTLE INTEREST OR PLEASURE IN DOING THINGS: 0
9. THOUGHTS THAT YOU WOULD BE BETTER OFF DEAD, OR OF HURTING YOURSELF: 0
3. TROUBLE FALLING OR STAYING ASLEEP: 0
SUM OF ALL RESPONSES TO PHQ QUESTIONS 1-9: 1
4. FEELING TIRED OR HAVING LITTLE ENERGY: 0
5. POOR APPETITE OR OVEREATING: 0
2. FEELING DOWN, DEPRESSED OR HOPELESS: 1
SUM OF ALL RESPONSES TO PHQ QUESTIONS 1-9: 1

## 2023-01-10 NOTE — TELEPHONE ENCOUNTER
[facility] Trans[facility] Initial Follow Up Call    Outreach made within 2 business days of discharge: Yes    Patient: Sunil Elaine Patient : 1985   MRN: [id number]  Reason for Admission: There are no discharge diagnoses documented for the most recent discharge. Discharge Date: 20       Spoke with: yessenia    Discharge department/facility: [facility]    TCM Interactive Patient Contact:  Was patient able to fill all prescriptions: No: N/A  Was patient instructed to bring all medications to the follow-up visit: No: N/A  Is patient taking all medications as directed in the discharge summary? Yes  Does patient understand their discharge instructions: Yes  Does patient have questions or concerns that need addressed prior to 7-14 day follow up office visit: no    Scheduled appointment with PCP within 7-14 days    Follow Up  No future appointments.     Brittany Phillips CMA (AAMA)
Patient ambulatory to the ER with a report of coughing and weakness since this morning.

## 2023-01-10 NOTE — PROGRESS NOTES
Chief Complaint   Patient presents with    Diabetes       History obtained from chart review and the patient. SUBJECTIVE:  Kurt Starkey is a 42 y.o. male that presents today for follow up DM    Patient is also following with [facility] for ETOH abuse. He is getting Vivitrol injections through [facility]. [facility] and [facility] are also managing mental health meds. Depression is doing decent, holidays were a little low, but otherwise doing ok. Doesn't have kids, their aunt has custody. Got an DELANEY in Feb and relapsed with ETOH November, but has been clean since November    Diabetes Type 2    Glucose control:   Does patient check blood glucoses at home? Yes - highest 400  Report of hypoglycemia: no  Lab Results   Component Value Date    LABA1C 10.2 (H) 11/03/2022     No results found for: EAG    Symptoms  Polyuria, Polydipsia or Polyphagia? No  Chest Pain, SOB, or Palpitations? -  No  New Vision complaints? No  Paresthesias of the extremities? No    Medications  Current medication were reviewed. Compliant with medications? Yes, taking insulins  Medication side effects? No  On ACE-I or ARB? No  On antiplatelet therapy? No  On Statin? No    Last Diabetic Eye Exam: needs    Exercise  Exercise? No  Wt Readings from Last 3 Encounters:   01/10/23 188 lb 9.6 oz (85.5 kg)   11/03/22 186 lb 3.2 oz (84.5 kg)   10/03/22 191 lb 6.4 oz (86.8 kg)       Diet discipline?:  Low salt, fat, sugar diet?   poor    Blood pressure control:  BP Readings from Last 3 Encounters:   01/10/23 118/80   11/03/22 124/86   10/03/22 126/76       Lab Results   Component Value Date    LABMICR 153.85 06/11/2020       Lab Results   Component Value Date    LDLCALC 75 03/26/2022     Age/Gender Health Maintenance    Lipid   Lab Results   Component Value Date    CHOL 182 05/04/2019    CHOL 165 12/18/2017     Lab Results   Component Value Date    TRIG 128 05/04/2019    TRIG 135 12/18/2017     Lab Results   Component Value Date    HDL 54 03/26/2022 HDL 49 03/12/2020    HDL 84 05/04/2019     Lab Results   Component Value Date    LDLCALC 75 03/26/2022    [address] 75 03/12/2020    LDLCALC 72 05/04/2019     DM Screen   Lab Results   Component Value Date    LABA1C 10.2 (H) 11/03/2022     Colon Cancer Screening - 46  Lung Cancer Screening (Age 54 to [de-identified] with 30 pack year hx, current smoker or quit within past 15 years) - n/a    Tetanus - needs  Influenza Vaccine - needs  Pneumonia Vaccine - 65  Zostavax - 50  HPV Vaccine - n/a    PSA testing discussion - 55  AAA Screening - n/a    Falls screening - n/a    Current Outpatient Medications   Medication Sig Dispense Refill    Continuous Blood Gluc Transmit (DEXCOM G6 TRANSMITTER) MISC Use as Directed 1 each 2    Continuous Blood Gluc Sensor (DEXCOM G6 SENSOR) MISC Replace Sensor every 10 Days. 1 each 2    hydrOXYzine pamoate (VISTARIL) 100 MG capsule TAKE ONE (1) CAPSULE BY MOUTH FOUR TIMES A DAY AS NEEDED FOR ANXIETY 60 capsule 10    insulin glargine (LANTUS SOLOSTAR) 100 UNIT/ML injection pen Inject 27 Units into the skin 2 times daily 15 mL 10    Insulin Aspart FlexPen 100 UNIT/ML SOPN Inject 10 Units into the skin 3 times daily (with meals) Plus 2-12 units per sliding scale, max daily dose 66 units 15 mL 10    Continuous Blood Gluc Sensor (FREESTYLE ALEJANDRA 3 SENSOR) MISC Use as directed every 14 days.  6 each 1    ADVAIR DISKUS 500-50 MCG/ACT AEPB diskus inhaler INHALE 1 PUFF BY MOUTH EVERY 12 HOURS 60 each 5    omeprazole (PRILOSEC) 40 MG delayed release capsule TAKE 1 CAPSULE BY MOUTH ONCE DAILY IN THE MORNING BEFORE BREAKFAST 90 capsule 1    buPROPion (WELLBUTRIN XL) 150 MG extended release tablet TAKE 1 TABLET BY MOUTH ONCE DAILY      doxycycline (VIBRAMYCIN) 50 MG capsule TAKE 1 CAPSULE BY MOUTH TWICE DAILY      VENTOLIN  (90 Base) MCG/ACT inhaler INHALE 1 TO 2 PUFFS BY MOUTH EVERY 4 TO 6 HOURS AS NEEDED 18 g 10    Continuous Blood Gluc Sensor (FREESTYLE ALEJANDRA 14 DAY SENSOR) MISC USE AS DIRECTED ,  USE  TO CHECK  GLUCOSE  4  TIMES  DAILY 2 each 5    betamethasone valerate (VALISONE) 0.1 % cream APPLY TO AFFECTED AREA TOPICALLY TWICE DAILY 45 g 10    naltrexone (VIVITROL) 380 MG injection Inject 380 mg into the muscle once      citalopram (CELEXA) 10 MG tablet Take 10 mg by mouth daily      hydrocortisone 2.5 % cream Apply topically 2 times daily. 28 g 2    busPIRone (BUSPAR) 10 MG tablet Take 1 tablet by mouth 3 times daily 270 tablet 1    Insulin Pen Needle 30G X 8 MM MISC 1 each by Does not apply route 3 times daily 500 each 3    traZODone (DESYREL) 50 MG tablet Take 1 tablet by mouth nightly as needed for Sleep 90 tablet 1    Handicap Placard MISC by Does not apply route Expires 3/11/2026 1 each 0    Continuous Blood Gluc  (FREESTYLE ALEJANDRA 14 DAY READER) BEAU Use as directed, check glucose 4 times/day 1 Device 0    QUEtiapine (SEROQUEL) 100 MG tablet Take 1 tablet by mouth nightly 30 tablet 5    Folic Acid (FOLATE PO) Take 1,333 mcg by mouth daily      vitamin B-1 100 MG tablet Take 1 tablet by mouth daily 30 tablet 3    aspirin 81 MG EC tablet Take 1 tablet by mouth daily 30 tablet 3    acetaminophen (TYLENOL) 500 MG tablet Take 1,000 mg by mouth every 6 hours as needed for Pain      Multiple Vitamins-Minerals (MENS MULTIVITAMIN PLUS) TABS Take 1 tablet by mouth daily       No current facility-administered medications for this visit. No orders of the defined types were placed in this encounter. All medications reviewed and reconciled, including OTC and herbal medications. Updated list given to patient.        Patient Active Problem List   Diagnosis    Pancreatitis, History    Alcohol-induced acute pancreatitis    Type 2 diabetes mellitus without complication, with long-term current use of insulin (HCC)    Asthma    Alcohol use disorder, moderate, in sustained remission (HCC)    Eczema    Smoker    Cigarette nicotine dependence without complication    Major depression, chronic    Closed fracture of orbit (HCC)    Closed fracture of talus    Bipolar disorder, current episode depressed, moderate (HCC)    PTSD (post-traumatic stress disorder)    Gastroesophageal reflux disease       Past Medical History:   Diagnosis Date    Asthma     COPD (chronic obstructive pulmonary disease) (HCC)     Eczema     GERD (gastroesophageal reflux disease)     History of alcohol abuse     History of marijuana use     History of tobacco abuse     quit 11/21/2017    Hx of seasonal allergies     Pancreatitis     Psychiatric problem     PTSD (post-traumatic stress disorder)     Seizures ([facility]     etoh wdl    Type 2 diabetes mellitus without complication ([facility] [address] 21/94/0764       Past Surgical History:   Procedure Laterality Date    ANKLE ARTHROSCOPY Right 8/5/2020    ANKLE ARTHROSCOPY WITH  MEDIAL DEBRIDEMENT RIGHT ANKLE, ANKLE ARTHROTOMY WITH DEBRIDEMENT performed by Ben Olmos DPM at [facility] Right 09/2019    DR SHENG Guardado 16 Right 2019    orbital fracture surgery    UPPER GASTROINTESTINAL ENDOSCOPY Left 5/6/2019    EGD BIOPSY performed by Dago Stoddard MD at [facility]       Allergies   Allergen Reactions    Paxil [Paroxetine] Swelling and Rash       Social History     Socioeconomic History    Marital status:      Spouse name: Not on file    Number of children: 4    Years of education: 11    Highest education level: Not on file   Occupational History    Occupation: [employer]       Comment: fulltime    Tobacco Use    Smoking status: Every Day     Packs/day: 0.50     Years: 15.00     Pack years: 7.50     Types: Cigarettes    Smokeless tobacco: Never   Vaping Use    Vaping Use: Former   Substance and Sexual Activity    Alcohol use: Yes     Comment: a pint of vodka last used on 12/28/2020    Drug use: Not Currently     Types: Marijuana (Weed)     Comment: approx 2 or more years    Sexual activity: Yes     Partners: Female   Other Topics Concern    Not on file   Social History Narrative    No barriers with transportation    Medications are not being covered by insurance since back to work    No longer drinking alcohol    Denies transportation barriers    Denies need for community services     Social Determinants of Health     Financial Resource Strain: Low Risk     Difficulty of Paying Living Expenses: Not hard at all   Food Insecurity: No Food Insecurity    Worried About Running Out of Food in the Last Year: Never true    Ran Out of Food in the Last Year: Never true   Transportation Needs: Not on file   Physical Activity: Not on file   Stress: Not on file   Social Connections: Not on file   Intimate Partner Violence: Not on file   Housing Stability: Not on file       Family History   Problem Relation Age of Onset    Other Mother         gestational diabetes    Other Father 39        suicide    Other Sister         hypoglycemia         I have reviewed the patient's past medical history, past surgical history, allergies, medications, social and family history and I have made updates where appropriate.       Review of Systems  Positive responses are highlighted in bold    Constitutional:  Fever, Chills, Night Sweats, Fatigue, Unexpected changes in weight  Eyes:  Eye discharge, Eye pain, Eye redness, Visual disturbances   HENT:  Ear pain, Tinnitus, Nosebleeds, Trouble swallowing, Hearing loss, Sore throat  Cardiovascular:  Chest Pain, Palpitations, Orthopnea, Paroxysmal Nocturnal Dyspnea  Respiratory:  Cough, Wheezing, Shortness of breath, Chest tightness, Apnea  Gastrointestinal:  Nausea, Vomiting, Diarrhea, Constipation, Heartburn, Blood in stool  Genitourinary:  Difficulty or painful urination, Flank pain, Change in frequency, Urgency  Skin:  Color change, Rash, Itching, Wound  Psychiatric:  Hallucinations, Anxiety, Depression, Suicidal ideation  Hematological:  Enlarged glands, Easy bleeding, Easily bruising  Musculoskeletal:  Joint pain, Back pain, Gait problems, Joint swelling, Myalgias  Neurological:  Dizziness, Headaches, Presyncope, Numbness, Seizures, Tremors  Allergy:  Environmental allergies, Food allergies  Endocrine:  Heat Intolerance, Cold Intolerance, Polydipsia, Polyphagia, Polyuria    Lab Results   Component Value Date     01/05/2023    K 3.9 01/05/2023     01/05/2023    CO2 21 (L) 01/05/2023    BUN 10 01/05/2023    CREATININE 0.7 01/05/2023    GLUCOSE 230 (H) 01/05/2023    CALCIUM 9.4 01/05/2023    PROT 6.5 01/05/2023    LABALBU 4.3 01/05/2023    BILITOT 0.4 01/05/2023    ALKPHOS 129 (H) 01/05/2023    AST 22 01/05/2023    ALT 36 01/05/2023    LABGLOM >60 01/05/2023     Lab Results   Component Value Date    WBC 13.8 (H) 01/05/2023    HGB 17.1 01/05/2023    HCT 49.7 01/05/2023    MCV 91.2 01/05/2023     01/05/2023     Lab Results   Component Value Date    TSH 0.989 09/29/2022     PHYSICAL EXAM:  Vitals:    01/10/23 0850   BP: 118/80   Pulse: (!) 113   Resp: 14   Temp: 98.6 °F (37 °C)   TempSrc: Oral   SpO2: 98%   Weight: 188 lb 9.6 oz (85.5 kg)   Height: 5' 2.5\" (1.588 m)           Body mass index is 33.95 kg/m². VS Reviewed  General Appearance: A&O x 3, No acute distress,well developed and well- nourished  Head: normocephalic, atraumatic  Eyes: pupils equal, round, and reactive to light, extraocular eye movements intact, conjunctivae normal  Neck: supple and non-tender without mass, no thyromegaly or thyroid nodules, no cervical lymphadenopathy  Pulmonary/Chest: clear to auscultation bilaterally- no wheezes, rales or rhonchi, harsh cough  Cardiovascular: S1 and S2 auscultated w/ RRR. No murmurs, rubs, clicks, or gallops, distal pulses intact. Abdomen: soft, non-tender, non-distended, bowl sounds physiologic,  no rebound or guarding, no masses or hernias noted. Liver and spleen without enlargement.    Extremities: no cyanosis, clubbing or edema of the lower extremities  Visual inspection:  Deformity/amputation: absent  Skin lesions/pre-ulcerative calluses: absent  Edema: right- negative, left- negative  Sensory exam:  Monofilament sensation: normal  (minimum of 5 random plantar locations tested, avoiding callused areas - > 1 area with absence of sensation is + for neuropathy)  Plus at least one of the following:  Pulses: normal,   Pinprick: Intact  Proprioception: Intact  Musculoskeletal: No joint swelling or gross deformity   Neuro:  Alert, 5/5 strength globally and symmetrically  Psych: Affect appropriate. Mood normal. Thought process is normal without evidence of depression or psychosis. Good insight and appropriate interaction. Cognition and memory appear to be intact. Skin: warm and dry  Lymph:  No cervical, auricular or supraclavicular lymph nodes palpated    ASSESSMENT & PLAN  Zuleyma Estrada was seen today for diabetes. Diagnoses and all orders for this visit:    Type 2 diabetes mellitus without complication, with long-term current use of insulin ([facility])  -      DIABETES FOOT EXAM  -     POCT microalbumin    Bipolar disorder, current episode depressed, moderate ([facility])    Alcohol use disorder, moderate, in sustained remission ([facility])    - Vivitrol injections per [facility]  - [facility] managing mental health needs  - A1C 12.5 which is not controlled  - diet likely big component  - following with diabetic center who is starting insulin pump tomorrow  - see back 3 months    DISPOSITION    Return in about 3 months (around 4/10/2023) for diabetes. Zuleyma Estrada released without restrictions. PATIENT COUNSELING    Counseling was provided today regarding the following topics: Healthy eating habits, Regular exercise, substance abuse and healthy sleep habits.     Zuleyma Estrada received counseling on the following healthy behaviors: medication adherence and decrease in alcohol consumption    Patient given educational materials on: See Attached    I have instructed Zuleyma Estrada to complete a self tracking handout on none and instructed them to bring it with them to his next appointment. Barriers to learning and self management: none    Discussed use, benefit, and side effects of prescribed medications. Barriers to medication compliance addressed. All patient questions answered. Pt voiced understanding.        Electronically signed by BARB Higgins CNP on 1/10/2023 at 9:08 AM

## 2023-01-11 ENCOUNTER — OFFICE VISIT (OUTPATIENT)
Dept: INTERNAL MEDICINE CLINIC | Age: 38
End: 2023-01-11

## 2023-01-11 VITALS
BODY MASS INDEX: 32.71 KG/M2 | SYSTOLIC BLOOD PRESSURE: 132 MMHG | DIASTOLIC BLOOD PRESSURE: 74 MMHG | HEART RATE: 97 BPM | TEMPERATURE: 97.2 F | HEIGHT: 63 IN | WEIGHT: 184.6 LBS

## 2023-01-11 DIAGNOSIS — Z79.4 TYPE 2 DIABETES MELLITUS WITHOUT COMPLICATION, WITH LONG-TERM CURRENT USE OF INSULIN (HCC): Primary | ICD-10-CM

## 2023-01-11 DIAGNOSIS — E11.9 TYPE 2 DIABETES MELLITUS WITHOUT COMPLICATION, WITH LONG-TERM CURRENT USE OF INSULIN (HCC): Primary | ICD-10-CM

## 2023-01-11 PROCEDURE — 99999 PR OFFICE/OUTPT VISIT,PROCEDURE ONLY: CPT | Performed by: INTERNAL MEDICINE

## 2023-01-11 NOTE — PROGRESS NOTES
Salvador present for pump new instructions on OmniPod 5 insulin pump. Last dose of Lantus taken 1/10/22 in AM (date). Blood sugar today 447 at 2 hrpp. Pump instructions completed per Insulin/ OmniPod  checklist and scanned in to EPIC chart. Instructed to check blood sugars 4+ times per day per Dexcom and to call blood sugars to the [facility] . Follow up appointment with AIME Vidal in 2 week(s) for follow up (reason) . Harrison Amy states He is willing to participate in this plan of care and verbalized understanding of all instructions provided. Teach back used to verify comprehension. Total time involved in direct patient education: 120 minutes. Keep quick sugar (glucose tabs, candy, juice, honey) readily available! Eat 15-30      gm of any quick acting carbohydrate until blood sugar is above 80; then eat a       protein/carb snack or a meal. FREQUENT LOWS REQUIRE A CALL TO RN.    PUMP SETTINGS:            Basal rate: 0000 is 1.25 units/h            Carbohydrate ratio: 0000 is 7.6   grams/unit                            Insulin sensitivity: 0000 is 28 mg/dL per unit      Blood sugar goal: 120mg      Active insulin: 4 hrs                         Review your instructional handouts. Get these out when you are changing your site. 24 hour support from pump company is available. Contact number on back of pump or meter. Maira Mejia

## 2023-01-11 NOTE — PATIENT INSTRUCTIONS
Bolus for all meals and snacks (not more than 3-4 snacks per day)  Bolus=you can give a bolus for carbohydrate and your blood sugar                    Or just carbohydrates or just blood sugar  We will get a script for urine ketone strips          --use these if your blood sugars are over 250                *this tells you if you are making ketones and going in to              Ketoacidosis  Go to the pharmacy and ask for your sensors ASAP

## 2023-01-12 ENCOUNTER — TELEPHONE (OUTPATIENT)
Dept: INTERNAL MEDICINE CLINIC | Age: 38
End: 2023-01-12

## 2023-01-12 NOTE — TELEPHONE ENCOUNTER
Lillian Petersen has started on the OmniPod 5    He needs a prescription for urine ketone strips. Script pended.

## 2023-01-18 ENCOUNTER — TELEPHONE (OUTPATIENT)
Dept: INTERNAL MEDICINE CLINIC | Age: 38
End: 2023-01-18

## 2023-01-19 NOTE — TELEPHONE ENCOUNTER
Reviewed Dexcom CGM and Omnipod download(s). -Trends: V. High: 18%, High: 36%, Target: 46%, Low:0%, V. Low: 0%   -Average Bmg/dL    Assessment:   Follow-up 1 week post Omnipod 5 start  Control has continued to improve as therapy progresses  Some boluses without carb estimates noted    Recommendations/Plan:   No changes to pump settings; may benefit from strengthening carb ratio in the future?   Encouraged Forrest Cee to enter carbohydrates for boluses    For Pharmacy Admin Tracking Only    Program: Medical Group  Time Spent (min): 430 Kari Salazar, PharmD, [facility]  Internal Medicine Clinical Pharmacist  [phone]

## 2023-02-02 DIAGNOSIS — F33.1 MAJOR DEPRESSIVE DISORDER, RECURRENT EPISODE, MODERATE WITH ANXIOUS DISTRESS (HCC): ICD-10-CM

## 2023-02-02 RX ORDER — BUSPIRONE HYDROCHLORIDE 10 MG/1
TABLET ORAL
Qty: 270 TABLET | Refills: 0 | Status: SHIPPED | OUTPATIENT
Start: 2023-02-02

## 2023-02-02 NOTE — TELEPHONE ENCOUNTER
Recent Visits  Date Type Provider Dept   01/10/23 Office Visit BARB Bowden CNP [facility]   11/03/22 Office Visit BARB Bowden CNP [facility]   10/03/22 Office Visit BARB Bowden CNP [facility]   08/02/22 Office Visit BARB Bowden CNP [facility]   04/08/22 Office Visit BARB Bowden CNP [facility]   12/07/21 Office Visit BARB Bowden CNP [facility]   Showing recent visits within past 540 days with a meds authorizing provider and meeting all other requirements  Future Appointments  Date Type Provider Dept   04/11/23 Appointment BARB Bowden CNP [facility]   Showing future appointments within next 150 days with a meds authorizing provider and meeting all other requirements    Future Appointments   Date Time Provider Malachi Gray   3/14/2023  4:00 PM Shahbaz Serrano 26   4/11/2023  9:00 AM BARB Bowden 86   5/2/2023  4:00 PM Ivon Hull, MELISSA [address] [facility]

## 2023-02-14 ENCOUNTER — TELEPHONE (OUTPATIENT)
Dept: INTERNAL MEDICINE CLINIC | Age: 38
End: 2023-02-14

## 2023-02-14 NOTE — TELEPHONE ENCOUNTER
Per Renata Nicole RN, CDE's request, went to print off patient's OmniPod 5 download but it is not been connected since 2/2/23. I called patient and left message asking for a return call to let us know if he is still on the OmniPod 5. I also sent a message through [address]
no

## 2023-03-19 DIAGNOSIS — L30.9 ECZEMA, UNSPECIFIED TYPE: ICD-10-CM

## 2023-03-20 NOTE — TELEPHONE ENCOUNTER
Recent Visits  Date Type Provider Dept   01/10/23 Office Visit BARB Grace - CNP [facility]   11/03/22 Office Visit BARB Grace CNP [facility]   10/03/22 Office Visit BARB Grace - CNP [facility]   08/02/22 Office Visit BARB Grace - CNP [facility]   04/08/22 Office Visit BARB Grace - CNP [facility]   12/07/21 Office Visit BARB Grace - CNP [facility]   Showing recent visits within past 540 days with a meds authorizing provider and meeting all other requirements  Future Appointments  Date Type Provider Dept   04/11/23 Appointment BARB Grace CNP [facility]   Showing future appointments within next 150 days with a meds authorizing provider and meeting all other requirements    Future Appointments   Date Time Provider Malachi Gray   4/10/2023  8:00 AM Shahbaz Omer 26   4/11/2023  9:00 AM Pop Grace 19   5/2/2023  4:00 PM Dave Roberts RN [address] [facility]

## 2023-04-11 ENCOUNTER — ENROLLMENT (OUTPATIENT)
Dept: INTERNAL MEDICINE CLINIC | Age: 38
End: 2023-04-11

## 2023-04-26 DIAGNOSIS — K21.9 GASTROESOPHAGEAL REFLUX DISEASE, UNSPECIFIED WHETHER ESOPHAGITIS PRESENT: ICD-10-CM

## 2023-04-26 DIAGNOSIS — R07.89 ATYPICAL CHEST PAIN: ICD-10-CM

## 2023-04-26 RX ORDER — OMEPRAZOLE 40 MG/1
CAPSULE, DELAYED RELEASE ORAL
Qty: 90 CAPSULE | Refills: 1 | Status: SHIPPED | OUTPATIENT
Start: 2023-04-26

## 2023-04-29 ENCOUNTER — HOSPITAL ENCOUNTER (EMERGENCY)
Age: 38
Discharge: HOME | End: 2023-04-29
Attending: STUDENT IN AN ORGANIZED HEALTH CARE EDUCATION/TRAINING PROGRAM
Payer: COMMERCIAL

## 2023-04-29 ENCOUNTER — APPOINTMENT (OUTPATIENT)
Dept: CT IMAGING | Age: 38
End: 2023-04-29
Payer: COMMERCIAL

## 2023-04-29 VITALS
OXYGEN SATURATION: 100 % | HEART RATE: 68 BPM | RESPIRATION RATE: 26 BRPM | BODY MASS INDEX: 32.04 KG/M2 | DIASTOLIC BLOOD PRESSURE: 72 MMHG | SYSTOLIC BLOOD PRESSURE: 138 MMHG | TEMPERATURE: 98.4 F | WEIGHT: 178 LBS

## 2023-04-29 DIAGNOSIS — R11.2 NAUSEA AND VOMITING, UNSPECIFIED VOMITING TYPE: Primary | ICD-10-CM

## 2023-04-29 LAB
ALBUMIN SERPL BCG-MCNC: 4.8 G/DL (ref 3.5–5.1)
ALP SERPL-CCNC: 159 U/L (ref 38–126)
ALT SERPL W/O P-5'-P-CCNC: 25 U/L (ref 11–66)
ANION GAP SERPL CALC-SCNC: 18 MEQ/L (ref 8–16)
AST SERPL-CCNC: 21 U/L (ref 5–40)
BACTERIA URNS QL MICRO: ABNORMAL /HPF
BASOPHILS ABSOLUTE: 0.1 THOU/MM3 (ref 0–0.1)
BASOPHILS NFR BLD AUTO: 0.3 %
BILIRUB SERPL-MCNC: 0.8 MG/DL (ref 0.3–1.2)
BILIRUB UR QL STRIP.AUTO: NEGATIVE
BUN SERPL-MCNC: 10 MG/DL (ref 7–22)
CALCIUM SERPL-MCNC: 9.8 MG/DL (ref 8.5–10.5)
CASTS #/AREA URNS LPF: ABNORMAL /LPF
CASTS 2: ABNORMAL /LPF
CHARACTER UR: CLEAR
CHLORIDE SERPL-SCNC: 103 MEQ/L (ref 98–111)
CO2 SERPL-SCNC: 22 MEQ/L (ref 23–33)
COLOR: YELLOW
CREAT SERPL-MCNC: 0.7 MG/DL (ref 0.4–1.2)
CRYSTALS URNS MICRO: ABNORMAL
DEPRECATED RDW RBC AUTO: 41.7 FL (ref 35–45)
EOSINOPHIL NFR BLD AUTO: 0.1 %
EOSINOPHILS ABSOLUTE: 0 THOU/MM3 (ref 0–0.4)
EPITHELIAL CELLS, UA: ABNORMAL /HPF
ERYTHROCYTE [DISTWIDTH] IN BLOOD BY AUTOMATED COUNT: 13 % (ref 11.5–14.5)
FLUAV RNA RESP QL NAA+PROBE: NOT DETECTED
FLUBV RNA RESP QL NAA+PROBE: NOT DETECTED
GFR SERPL CREATININE-BSD FRML MDRD: > 60 ML/MIN/1.73M2
GLUCOSE SERPL-MCNC: 257 MG/DL (ref 70–108)
GLUCOSE UR QL STRIP.AUTO: 500 MG/DL
HCT VFR BLD AUTO: 48.8 % (ref 42–52)
HGB BLD-MCNC: 16.5 GM/DL (ref 14–18)
HGB UR QL STRIP.AUTO: NEGATIVE
IMM GRANULOCYTES # BLD AUTO: 0.07 THOU/MM3 (ref 0–0.07)
IMM GRANULOCYTES NFR BLD AUTO: 0.4 %
KETONES UR QL STRIP.AUTO: 80
LIPASE SERPL-CCNC: 7.1 U/L (ref 5.6–51.3)
LYMPHOCYTES ABSOLUTE: 1.5 THOU/MM3 (ref 1–4.8)
LYMPHOCYTES NFR BLD AUTO: 8.8 %
MCH RBC QN AUTO: 29.8 PG (ref 26–33)
MCHC RBC AUTO-ENTMCNC: 33.8 GM/DL (ref 32.2–35.5)
MCV RBC AUTO: 88.2 FL (ref 80–94)
MISCELLANEOUS 2: ABNORMAL
MONOCYTES ABSOLUTE: 0.7 THOU/MM3 (ref 0.4–1.3)
MONOCYTES NFR BLD AUTO: 4.3 %
NEUTROPHILS NFR BLD AUTO: 86.1 %
NITRITE UR QL STRIP: NEGATIVE
NRBC BLD AUTO-RTO: 0 /100 WBC
OSMOLALITY SERPL CALC.SUM OF ELEC: 292.8 MOSMOL/KG (ref 275–300)
PH UR STRIP.AUTO: 7.5 [PH] (ref 5–9)
PLATELET # BLD AUTO: 362 THOU/MM3 (ref 130–400)
PMV BLD AUTO: 11.3 FL (ref 9.4–12.4)
POTASSIUM SERPL-SCNC: 3.8 MEQ/L (ref 3.5–5.2)
PROT SERPL-MCNC: 7.7 G/DL (ref 6.1–8)
PROT UR STRIP.AUTO-MCNC: ABNORMAL MG/DL
RBC # BLD AUTO: 5.53 MILL/MM3 (ref 4.7–6.1)
RBC URINE: ABNORMAL /HPF
RENAL EPI CELLS #/AREA URNS HPF: ABNORMAL /[HPF]
SARS-COV-2 RNA RESP QL NAA+PROBE: NOT DETECTED
SEGMENTED NEUTROPHILS ABSOLUTE COUNT: 14.6 THOU/MM3 (ref 1.8–7.7)
SODIUM SERPL-SCNC: 143 MEQ/L (ref 135–145)
SP GR UR REFRACT.AUTO: > 1.03 (ref 1–1.03)
UROBILINOGEN, URINE: 1 EU/DL (ref 0–1)
WBC # BLD AUTO: 16.9 THOU/MM3 (ref 4.8–10.8)
WBC #/AREA URNS HPF: ABNORMAL /HPF
WBC #/AREA URNS HPF: NEGATIVE /[HPF]
YEAST LIKE FUNGI URNS QL MICRO: ABNORMAL

## 2023-04-29 PROCEDURE — 74177 CT ABD & PELVIS W/CONTRAST: CPT

## 2023-04-29 PROCEDURE — 96372 THER/PROPH/DIAG INJ SC/IM: CPT

## 2023-04-29 PROCEDURE — 2580000003 HC RX 258: Performed by: STUDENT IN AN ORGANIZED HEALTH CARE EDUCATION/TRAINING PROGRAM

## 2023-04-29 PROCEDURE — 93005 ELECTROCARDIOGRAM TRACING: CPT | Performed by: STUDENT IN AN ORGANIZED HEALTH CARE EDUCATION/TRAINING PROGRAM

## 2023-04-29 PROCEDURE — 81001 URINALYSIS AUTO W/SCOPE: CPT

## 2023-04-29 PROCEDURE — 85025 COMPLETE CBC W/AUTO DIFF WBC: CPT

## 2023-04-29 PROCEDURE — 80053 COMPREHEN METABOLIC PANEL: CPT

## 2023-04-29 PROCEDURE — 6360000004 HC RX CONTRAST MEDICATION: Performed by: STUDENT IN AN ORGANIZED HEALTH CARE EDUCATION/TRAINING PROGRAM

## 2023-04-29 PROCEDURE — 87636 SARSCOV2 & INF A&B AMP PRB: CPT

## 2023-04-29 PROCEDURE — 83690 ASSAY OF LIPASE: CPT

## 2023-04-29 PROCEDURE — 36415 COLL VENOUS BLD VENIPUNCTURE: CPT

## 2023-04-29 PROCEDURE — 96374 THER/PROPH/DIAG INJ IV PUSH: CPT

## 2023-04-29 PROCEDURE — 99285 EMERGENCY DEPT VISIT HI MDM: CPT

## 2023-04-29 PROCEDURE — 6360000002 HC RX W HCPCS: Performed by: STUDENT IN AN ORGANIZED HEALTH CARE EDUCATION/TRAINING PROGRAM

## 2023-04-29 RX ORDER — ONDANSETRON 4 MG/1
4 TABLET, ORALLY DISINTEGRATING ORAL 3 TIMES DAILY PRN
Qty: 9 TABLET | Refills: 0 | Status: SHIPPED | OUTPATIENT
Start: 2023-04-29 | End: 2023-05-02

## 2023-04-29 RX ORDER — DROPERIDOL 2.5 MG/ML
0.62 INJECTION, SOLUTION INTRAMUSCULAR; INTRAVENOUS ONCE
Status: COMPLETED | OUTPATIENT
Start: 2023-04-29 | End: 2023-04-29

## 2023-04-29 RX ORDER — DROPERIDOL 2.5 MG/ML
1.25 INJECTION, SOLUTION INTRAMUSCULAR; INTRAVENOUS ONCE
Status: COMPLETED | OUTPATIENT
Start: 2023-04-29 | End: 2023-04-29

## 2023-04-29 RX ORDER — 0.9 % SODIUM CHLORIDE 0.9 %
1000 INTRAVENOUS SOLUTION INTRAVENOUS ONCE
Status: COMPLETED | OUTPATIENT
Start: 2023-04-29 | End: 2023-04-29

## 2023-04-29 RX ORDER — DICYCLOMINE HYDROCHLORIDE 10 MG/ML
20 INJECTION INTRAMUSCULAR ONCE
Status: COMPLETED | OUTPATIENT
Start: 2023-04-29 | End: 2023-04-29

## 2023-04-29 RX ADMIN — SODIUM CHLORIDE 1000 ML: 9 INJECTION, SOLUTION INTRAVENOUS at 19:35

## 2023-04-29 RX ADMIN — IOPAMIDOL 80 ML: 755 INJECTION, SOLUTION INTRAVENOUS at 16:56

## 2023-04-29 RX ADMIN — DROPERIDOL 0.62 MG: 2.5 INJECTION, SOLUTION INTRAMUSCULAR; INTRAVENOUS at 19:35

## 2023-04-29 RX ADMIN — DROPERIDOL 1.25 MG: 2.5 INJECTION, SOLUTION INTRAMUSCULAR; INTRAVENOUS at 14:22

## 2023-04-29 RX ADMIN — SODIUM CHLORIDE 1000 ML: 9 INJECTION, SOLUTION INTRAVENOUS at 14:22

## 2023-04-29 RX ADMIN — DICYCLOMINE HYDROCHLORIDE 20 MG: 10 INJECTION, SOLUTION INTRAMUSCULAR at 15:34

## 2023-04-29 NOTE — ED TRIAGE NOTES
Pt arrives to ED from home with c/o emesis since 430am, pt states he felt like he was going to pass out, and has not been able to keep anything down.  Denies being around anyone sick  Pt states he has COPD and asthma, taking medications

## 2023-04-29 NOTE — ED NOTES
Patient resting in bed. Respirations easy and unlabored. No distress noted. Call light within reach.        Zachary Skill, RN  04/29/23 [id number]

## 2023-04-29 NOTE — ED NOTES
Patient resting in bed. Respirations easy and unlabored. No distress noted. Call light within reach.        Tariq Casey RN  04/29/23 [id number]

## 2023-04-29 NOTE — ED PROVIDER NOTES
Scores utilized:      External Documentation Reviewed:   Previous patient encounter documents & history available on EMR was reviewed   See Formal Diagnostic Results above for the lab and radiology tests and orders. Please see ED course for further reassessments, treatments, MDM, and final disposition. Case discussed with specialties other than Emergency Medicine: Not Applicable    Shared Decision-Making was performed, disposition discussed with the patient/family and questions answered. Social determinants of health impacting treatment or disposition:      Code Status:  Not addressed during this ED visit    Summary of Patient Presentation:   ED Course as of 04/29/23 2129   Sat Apr 29, 2023   1432 WBC(!): 16.9 [EL]   1507 Anion Gap(!): 18.0 [EL]   1813 CO2(!): 22 [EL]   1843 CT ABDOMEN PELVIS W IV CONTRAST Additional Contrast? None  IMPRESSION:  1. No acute bowel obstruction or acute inflammatory bowel process  2. Marked atrophy of the pancreas. Calcific densities are seen in the head and uncinate process of the pancreas with a few calcifications in the body of the pancreas. Findings can relate to calcific chronic pancreatitis in the right clinical setting.    [EL]   1920 EKG 12 Lead  NSR qtc 441 [EL]      ED Course User Index  [EL] Venancio Kelsey MD       ED Medications administered this visit:  (None if blank)  Medications   droperidol (INAPSINE) injection 1.25 mg (1.25 mg IntraVENous Given 4/29/23 1422)   0.9 % sodium chloride bolus (0 mLs IntraVENous Stopped 4/29/23 1559)   dicyclomine (BENTYL) injection 20 mg (20 mg IntraMUSCular Given 4/29/23 1534)   iopamidol (ISOVUE-370) 76 % injection 80 mL (80 mLs IntraVENous Given 4/29/23 1656)   droperidol (INAPSINE) injection 0.625 mg (0.625 mg IntraVENous Given 4/29/23 1935)   0.9 % sodium chloride bolus (0 mLs IntraVENous Stopped 4/29/23 2050)       Vitals Reviewed:    Vitals:    04/29/23 1427 04/29/23 1559 04/29/23 1715 04/29/23 1821   BP: 138/72

## 2023-04-29 NOTE — ED NOTES
Patient resting in bed. Respirations easy and unlabored. No distress noted. Call light within reach.        Reyna Guzman RN  04/29/23 [id number]

## 2023-04-29 NOTE — ED NOTES
Patient resting in bed. Respirations easy and unlabored. No distress noted. Call light within reach.        Mony Marina RN  04/29/23 3697

## 2023-04-29 NOTE — ED NOTES
Patient resting in bed. Respirations easy and unlabored. No distress noted. Call light within reach.        Jerry Hill RN  04/29/23 0876

## 2023-04-29 NOTE — ED NOTES
Patient resting in bed. Respirations easy and unlabored. No distress noted. Call light within reach.        Cecilia Plunkett RN  04/29/23 1288

## 2023-04-30 LAB
EKG ATRIAL RATE: 64 BPM
EKG P AXIS: 15 DEGREES
EKG P-R INTERVAL: 112 MS
EKG Q-T INTERVAL: 428 MS
EKG QRS DURATION: 78 MS
EKG QTC CALCULATION (BAZETT): 441 MS
EKG R AXIS: 31 DEGREES
EKG T AXIS: 36 DEGREES
EKG VENTRICULAR RATE: 64 BPM

## 2023-04-30 PROCEDURE — 93010 ELECTROCARDIOGRAM REPORT: CPT | Performed by: INTERNAL MEDICINE

## 2023-05-02 ENCOUNTER — OFFICE VISIT (OUTPATIENT)
Dept: INTERNAL MEDICINE CLINIC | Age: 38
End: 2023-05-02
Payer: COMMERCIAL

## 2023-05-02 ENCOUNTER — TELEPHONE (OUTPATIENT)
Dept: FAMILY MEDICINE CLINIC | Age: 38
End: 2023-05-02

## 2023-05-02 VITALS
DIASTOLIC BLOOD PRESSURE: 58 MMHG | SYSTOLIC BLOOD PRESSURE: 100 MMHG | TEMPERATURE: 96.2 F | BODY MASS INDEX: 30.02 KG/M2 | HEART RATE: 66 BPM | HEIGHT: 63 IN | WEIGHT: 169.4 LBS

## 2023-05-02 DIAGNOSIS — Z79.4 TYPE 2 DIABETES MELLITUS WITHOUT COMPLICATION, WITH LONG-TERM CURRENT USE OF INSULIN (HCC): Primary | ICD-10-CM

## 2023-05-02 DIAGNOSIS — E11.9 TYPE 2 DIABETES MELLITUS WITHOUT COMPLICATION, WITH LONG-TERM CURRENT USE OF INSULIN (HCC): Primary | ICD-10-CM

## 2023-05-02 PROCEDURE — G0108 DIAB MANAGE TRN  PER INDIV: HCPCS | Performed by: REGISTERED NURSE

## 2023-05-02 RX ORDER — INSULIN GLARGINE-YFGN 100 [IU]/ML
25 INJECTION, SOLUTION SUBCUTANEOUS 2 TIMES DAILY
COMMUNITY
Start: 2023-04-18

## 2023-05-02 NOTE — TELEPHONE ENCOUNTER
Noted, can we give him a call and see if he still would like to do the test? He may need to get it rescheduled.

## 2023-05-02 NOTE — PROGRESS NOTES
[facility]  [facility] [address] [facility], [address]  [phone] (phone)  [phone] (fax)    Patient ID: Mario Rudolph 1985  Referring Provider: AIME López CNP     Patient's name and  were verified. Subjective:    He presents for a follow-up diabetic visit. He has type 2 diabetes mellitus. Home regimen includes: Insulin He is compliant some of the time. Assessment:     Lab Results   Component Value Date/Time    LABA1C 7.3 2023 07:00 AM    LABA1C 10.0 2021 10:23 AM    BUN 10 2023 01:35 PM    CREATININE 0.7 2023 01:35 PM     Vitals:    23 1740 23 1741   BP: (!) 98/56 (!) 100/58   Site: Right Upper Arm Left Upper Arm   Position: Sitting Sitting   Pulse: 66    Temp: (!) 96.2 °F (35.7 °C)    Weight: 169 lb 6.4 oz (76.8 kg)    Height: 5' 2.5\" (1.588 m)      Wt Readings from Last 3 Encounters:   23 169 lb 6.4 oz (76.8 kg)   23 178 lb (80.7 kg)   23 178 lb 9.6 oz (81 kg)     Ht Readings from Last 3 Encounters:   23 5' 2.5\" (1.588 m)   23 5' 2.5\" (1.588 m)   23 5' 2.5\" (1.588 m)       Diabetes Pharmacotherapy:  Semglee 25 units morning and night  Novolog 5 units plus group 2 scale  OmniPod 5 pump--currently not using/ denied    Glucose Trends:   Glucose at 2 hrs PPD today resulted at 151mg/dl  Current monitoring regimen: Dexcom CGM - checks 4+ times daily  Home blood sugar trends:    -V.  High: 4%, High: 12%, Target: 84%, Low: 0%, V. Low: 0%   -Average Glucose: 146mg/dL   -overall, glucose levels are doing well with rare glucose excursion  Any episodes of hypoglycemia? no   -Treats with candy    Lifestyle Factors:   Previous visit with dietician: no  Current diet: B: up 8am skips            L:12-1pm 50%  hotpocket                       D: 6pm  meat/ veg/ starch occas cake                       Snacks: orange or chips maybe 3 times per day                       Beverages: stopped regular pop; unsw tea or water; CHAKA Zee

## 2023-05-02 NOTE — PATIENT INSTRUCTIONS
Restart your Dexcom  We will ry to check with your insurance/ pharmacy about approval                For insulin pods for OmniPod  Look back after meals--especially meals that are different               -how did your blood respond? Do you need to think about            Something different the next time your choose that?                       --more Novolog Insulin or less carbs? Always carry something with you to treat a low Blood sugar! Great job with your exercise!!                 ?not feeling well or not enough time?  Try to get to 5-10 minutes --stay in the routine

## 2023-05-03 ENCOUNTER — TELEPHONE (OUTPATIENT)
Dept: INTERNAL MEDICINE CLINIC | Age: 38
End: 2023-05-03

## 2023-05-03 ENCOUNTER — PATIENT MESSAGE (OUTPATIENT)
Dept: FAMILY MEDICINE CLINIC | Age: 38
End: 2023-05-03

## 2023-05-03 DIAGNOSIS — R11.2 NAUSEA AND VOMITING, UNSPECIFIED VOMITING TYPE: Primary | ICD-10-CM

## 2023-05-03 RX ORDER — ONDANSETRON 4 MG/1
4 TABLET, ORALLY DISINTEGRATING ORAL 3 TIMES DAILY PRN
Qty: 21 TABLET | Refills: 0 | Status: SHIPPED | OUTPATIENT
Start: 2023-05-03

## 2023-05-03 NOTE — TELEPHONE ENCOUNTER
From: Dixie Narayan  To: Bernadette Remedies  Sent: 5/3/2023 11:16 AM EDT  Subject: Jose De Jesus Fregoso Question     Hi Gaurav Ibrahim I been sick past couple days with what feels like the flu. I went to the hospital Saturday and they gave me nausea medication ODT 4 mg. They only gave 9 pills and Im down to one left and I dont think this will be done for another day or so. Is there anyway I can get a couple more nausea pills to get me through this.

## 2023-05-03 NOTE — TELEPHONE ENCOUNTER
Patient was in for an appointment on 5/2/2023. Patient stated he was told by the pharmacy that his Pods are not covered for his OmniPod pump. I called [address] and they would not give me any information due to me not being a part of AIME Donnelly CNP (ordering PCP) office. I called AIME Donnelly CNP's office and spoke with Telma. She stated she would talk to AIME López CNP in regards to this to help patient get his pods.

## 2023-05-03 NOTE — TELEPHONE ENCOUNTER
Pt has rescheduled his appt  Future Appointments   Date Time Provider Malachi Recinosi   5/16/2023  1:00 PM Gayla Gonzalez MD [facility] -   7/10/2023 11:00 AM Mirian Ariza RD, LD SRPX Physic [facility]   7/12/2023  2:00 PM Danielle Patel APRN - [id number] [address]   8/3/2023 11:00 AM Mirta Councilman, [address]

## 2023-05-16 ENCOUNTER — APPOINTMENT (OUTPATIENT)
Dept: CT IMAGING | Age: 38
End: 2023-05-16
Payer: COMMERCIAL

## 2023-05-16 ENCOUNTER — HOSPITAL ENCOUNTER (EMERGENCY)
Age: 38
Discharge: HOME | End: 2023-05-16
Attending: EMERGENCY MEDICINE
Payer: COMMERCIAL

## 2023-05-16 VITALS
WEIGHT: 160 LBS | HEART RATE: 70 BPM | SYSTOLIC BLOOD PRESSURE: 112 MMHG | HEIGHT: 62 IN | OXYGEN SATURATION: 100 % | DIASTOLIC BLOOD PRESSURE: 62 MMHG | BODY MASS INDEX: 29.44 KG/M2 | RESPIRATION RATE: 16 BRPM | TEMPERATURE: 97.7 F

## 2023-05-16 DIAGNOSIS — N30.01 ACUTE CYSTITIS WITH HEMATURIA: Primary | ICD-10-CM

## 2023-05-16 DIAGNOSIS — R11.0 NAUSEA: ICD-10-CM

## 2023-05-16 DIAGNOSIS — R10.84 GENERALIZED ABDOMINAL PAIN: ICD-10-CM

## 2023-05-16 LAB
ALBUMIN SERPL BCG-MCNC: 4.7 G/DL (ref 3.5–5.1)
ALP SERPL-CCNC: 187 U/L (ref 38–126)
ALT SERPL W/O P-5'-P-CCNC: 60 U/L (ref 11–66)
ANION GAP SERPL CALC-SCNC: 19 MEQ/L (ref 8–16)
AST SERPL-CCNC: 31 U/L (ref 5–40)
B-OH-BUTYR SERPL-MSCNC: 6.79 MG/DL (ref 0.2–2.81)
BACTERIA URNS QL MICRO: ABNORMAL /HPF
BASE EXCESS BLDA CALC-SCNC: -2.9 MMOL/L (ref -2–3)
BASOPHILS ABSOLUTE: 0.1 THOU/MM3 (ref 0–0.1)
BASOPHILS NFR BLD AUTO: 0.3 %
BILIRUB CONJ SERPL-MCNC: < 0.2 MG/DL (ref 0–0.3)
BILIRUB SERPL-MCNC: 0.7 MG/DL (ref 0.3–1.2)
BILIRUB UR QL STRIP.AUTO: ABNORMAL
BUN SERPL-MCNC: 9 MG/DL (ref 7–22)
CALCIUM SERPL-MCNC: 10.2 MG/DL (ref 8.5–10.5)
CASTS #/AREA URNS LPF: ABNORMAL /LPF
CASTS 2: ABNORMAL /LPF
CHARACTER UR: ABNORMAL
CHLORIDE SERPL-SCNC: 100 MEQ/L (ref 98–111)
CO2 SERPL-SCNC: 18 MEQ/L (ref 23–33)
COLLECTED BY:: ABNORMAL
COLOR: ABNORMAL
CREAT SERPL-MCNC: 0.6 MG/DL (ref 0.4–1.2)
CRYSTALS URNS MICRO: ABNORMAL
DEPRECATED RDW RBC AUTO: 40.7 FL (ref 35–45)
DEVICE: ABNORMAL
EOSINOPHIL NFR BLD AUTO: 0.7 %
EOSINOPHILS ABSOLUTE: 0.1 THOU/MM3 (ref 0–0.4)
EPITHELIAL CELLS, UA: ABNORMAL /HPF
ERYTHROCYTE [DISTWIDTH] IN BLOOD BY AUTOMATED COUNT: 12.9 % (ref 11.5–14.5)
GFR SERPL CREATININE-BSD FRML MDRD: > 60 ML/MIN/1.73M2
GLUCOSE SERPL-MCNC: 302 MG/DL (ref 70–108)
GLUCOSE UR QL STRIP.AUTO: 500 MG/DL
HCO3 BLDA-SCNC: 19 MMOL/L (ref 23–28)
HCT VFR BLD AUTO: 49.8 % (ref 42–52)
HGB BLD-MCNC: 16.7 GM/DL (ref 14–18)
HGB UR QL STRIP.AUTO: ABNORMAL
ICTOTEST: NEGATIVE
IMM GRANULOCYTES # BLD AUTO: 0.11 THOU/MM3 (ref 0–0.07)
IMM GRANULOCYTES NFR BLD AUTO: 0.5 %
KETONES UR QL STRIP.AUTO: 80
LIPASE SERPL-CCNC: 9.2 U/L (ref 5.6–51.3)
LYMPHOCYTES ABSOLUTE: 2.8 THOU/MM3 (ref 1–4.8)
LYMPHOCYTES NFR BLD AUTO: 13.5 %
MCH RBC QN AUTO: 29.2 PG (ref 26–33)
MCHC RBC AUTO-ENTMCNC: 33.5 GM/DL (ref 32.2–35.5)
MCV RBC AUTO: 87.2 FL (ref 80–94)
MISCELLANEOUS 2: ABNORMAL
MONOCYTES ABSOLUTE: 1.2 THOU/MM3 (ref 0.4–1.3)
MONOCYTES NFR BLD AUTO: 5.9 %
MUCOUS THREADS URNS QL MICRO: ABNORMAL
NEUTROPHILS NFR BLD AUTO: 79.1 %
NITRITE UR QL STRIP: NEGATIVE
NRBC BLD AUTO-RTO: 0 /100 WBC
NT-PROBNP SERPL IA-MCNC: 46.3 PG/ML (ref 0–124)
OSMOLALITY SERPL CALC.SUM OF ELEC: 283.8 MOSMOL/KG (ref 275–300)
PCO2 TEMP ADJ BLDMV: 28 MMHG (ref 41–51)
PH BLDMV: 7.45 [PH] (ref 7.31–7.41)
PH UR STRIP.AUTO: 5.5 [PH] (ref 5–9)
PLATELET # BLD AUTO: 383 THOU/MM3 (ref 130–400)
PMV BLD AUTO: 11 FL (ref 9.4–12.4)
PO2 BLDMV: 26 MMHG (ref 25–40)
POTASSIUM SERPL-SCNC: 4.3 MEQ/L (ref 3.5–5.2)
PROT SERPL-MCNC: 7.9 G/DL (ref 6.1–8)
PROT UR STRIP.AUTO-MCNC: 30 MG/DL
RBC # BLD AUTO: 5.71 MILL/MM3 (ref 4.7–6.1)
RBC URINE: ABNORMAL /HPF
RENAL EPI CELLS #/AREA URNS HPF: ABNORMAL /[HPF]
SAO2 % BLDMV: 52 %
SEGMENTED NEUTROPHILS ABSOLUTE COUNT: 16.3 THOU/MM3 (ref 1.8–7.7)
SODIUM SERPL-SCNC: 137 MEQ/L (ref 135–145)
SP GR UR REFRACT.AUTO: > 1.03 (ref 1–1.03)
TROPONIN T: < 0.01 NG/ML
UROBILINOGEN, URINE: 1 EU/DL (ref 0–1)
WBC # BLD AUTO: 20.6 THOU/MM3 (ref 4.8–10.8)
WBC #/AREA URNS HPF: ABNORMAL /HPF
WBC #/AREA URNS HPF: NEGATIVE /[HPF]
YEAST LIKE FUNGI URNS QL MICRO: ABNORMAL

## 2023-05-16 PROCEDURE — 96374 THER/PROPH/DIAG INJ IV PUSH: CPT

## 2023-05-16 PROCEDURE — 82248 BILIRUBIN DIRECT: CPT

## 2023-05-16 PROCEDURE — 6360000002 HC RX W HCPCS: Performed by: STUDENT IN AN ORGANIZED HEALTH CARE EDUCATION/TRAINING PROGRAM

## 2023-05-16 PROCEDURE — 81001 URINALYSIS AUTO W/SCOPE: CPT

## 2023-05-16 PROCEDURE — 36415 COLL VENOUS BLD VENIPUNCTURE: CPT

## 2023-05-16 PROCEDURE — 6360000004 HC RX CONTRAST MEDICATION: Performed by: EMERGENCY MEDICINE

## 2023-05-16 PROCEDURE — 93010 ELECTROCARDIOGRAM REPORT: CPT | Performed by: INTERNAL MEDICINE

## 2023-05-16 PROCEDURE — 82803 BLOOD GASES ANY COMBINATION: CPT

## 2023-05-16 PROCEDURE — 83880 ASSAY OF NATRIURETIC PEPTIDE: CPT

## 2023-05-16 PROCEDURE — 82010 KETONE BODYS QUAN: CPT

## 2023-05-16 PROCEDURE — 85025 COMPLETE CBC W/AUTO DIFF WBC: CPT

## 2023-05-16 PROCEDURE — 6370000000 HC RX 637 (ALT 250 FOR IP): Performed by: STUDENT IN AN ORGANIZED HEALTH CARE EDUCATION/TRAINING PROGRAM

## 2023-05-16 PROCEDURE — 83690 ASSAY OF LIPASE: CPT

## 2023-05-16 PROCEDURE — 80053 COMPREHEN METABOLIC PANEL: CPT

## 2023-05-16 PROCEDURE — 84484 ASSAY OF TROPONIN QUANT: CPT

## 2023-05-16 PROCEDURE — 74177 CT ABD & PELVIS W/CONTRAST: CPT

## 2023-05-16 PROCEDURE — 99285 EMERGENCY DEPT VISIT HI MDM: CPT

## 2023-05-16 PROCEDURE — 2580000003 HC RX 258: Performed by: STUDENT IN AN ORGANIZED HEALTH CARE EDUCATION/TRAINING PROGRAM

## 2023-05-16 PROCEDURE — 93005 ELECTROCARDIOGRAM TRACING: CPT | Performed by: STUDENT IN AN ORGANIZED HEALTH CARE EDUCATION/TRAINING PROGRAM

## 2023-05-16 RX ORDER — ONDANSETRON 2 MG/ML
4 INJECTION INTRAMUSCULAR; INTRAVENOUS ONCE
Status: DISCONTINUED | OUTPATIENT
Start: 2023-05-16 | End: 2023-05-16

## 2023-05-16 RX ORDER — DICYCLOMINE HYDROCHLORIDE 10 MG/1
10 CAPSULE ORAL 4 TIMES DAILY
Qty: 40 CAPSULE | Refills: 0 | Status: SHIPPED | OUTPATIENT
Start: 2023-05-16 | End: 2023-05-26

## 2023-05-16 RX ORDER — DROPERIDOL 2.5 MG/ML
0.62 INJECTION, SOLUTION INTRAMUSCULAR; INTRAVENOUS ONCE
Status: COMPLETED | OUTPATIENT
Start: 2023-05-16 | End: 2023-05-16

## 2023-05-16 RX ORDER — 0.9 % SODIUM CHLORIDE 0.9 %
1000 INTRAVENOUS SOLUTION INTRAVENOUS ONCE
Status: COMPLETED | OUTPATIENT
Start: 2023-05-16 | End: 2023-05-16

## 2023-05-16 RX ORDER — CEPHALEXIN 500 MG/1
500 CAPSULE ORAL 4 TIMES DAILY
Qty: 28 CAPSULE | Refills: 0 | Status: SHIPPED | OUTPATIENT
Start: 2023-05-16 | End: 2023-05-23

## 2023-05-16 RX ADMIN — DROPERIDOL 0.62 MG: 2.5 INJECTION, SOLUTION INTRAMUSCULAR; INTRAVENOUS at 14:44

## 2023-05-16 RX ADMIN — LIDOCAINE HYDROCHLORIDE: 20 SOLUTION ORAL; TOPICAL at 15:07

## 2023-05-16 RX ADMIN — SODIUM CHLORIDE 1000 ML: 9 INJECTION, SOLUTION INTRAVENOUS at 14:51

## 2023-05-16 RX ADMIN — IOPAMIDOL 80 ML: 755 INJECTION, SOLUTION INTRAVENOUS at 15:43

## 2023-05-16 ASSESSMENT — PAIN SCALES - GENERAL: PAINLEVEL_OUTOF10: 8

## 2023-05-16 ASSESSMENT — PAIN - FUNCTIONAL ASSESSMENT: PAIN_FUNCTIONAL_ASSESSMENT: 0-10

## 2023-05-16 ASSESSMENT — PAIN DESCRIPTION - ORIENTATION: ORIENTATION: LEFT;LOWER

## 2023-05-16 ASSESSMENT — PAIN DESCRIPTION - LOCATION: LOCATION: ABDOMEN

## 2023-05-16 NOTE — DISCHARGE INSTRUCTIONS
As we discussed please follow-up with your primary doctor later this week to ensure the appropriate outpatient management and follow-up is arranged for the blood in your urine. They will also be able to make sure that your abdominal pain, nausea and vomiting do not recur. Remember that if you have recurrent symptoms, fevers, chills, difficulty urinating or having bowel movements do not hesitate to return to the emergency department immediately.

## 2023-05-16 NOTE — ED TRIAGE NOTES
Pt presents to the ED through lobby with c/o abdominal pain and vomiting. Pt states pain is 8 out of 10 in his left lower abdomen. Pt states he vomited pta. Pt did not take anything for pain but last took zofran around 1100. Pt denies any history of abdominal surgeries.

## 2023-05-16 NOTE — ED PROVIDER NOTES
ATTENDING NOTE:    I supervised and discussed the history, physical exam and the management of this patient with the resident. I reviewed the resident's note and agree with the documented findings and plan of care. Please see my additional note. I personally saw and examined the patient. I have reviewed and agree with the resident's findings, including all diagnostic interpretations and treatment plans as written. I was present for the key portion of any procedures performed and the inclusive time noted in any critical care statement.     Electronically verified by Callie Meng MD  05/16/23 [id number]
muscular tenderness. Right lower leg: No edema. Left lower leg: No edema. Skin:     General: Skin is warm and dry. Capillary Refill: Capillary refill takes less than 2 seconds. Findings: No bruising, erythema or lesion. Neurological:      General: No focal deficit present. Mental Status: He is alert and oriented to person, place, and time. Sensory: No sensory deficit. FORMAL DIAGNOSTIC RESULTS     RADIOLOGY: Interpretation per the Radiologist below, if available at the time of this note (none if blank):    CT ABDOMEN PELVIS W IV CONTRAST Additional Contrast? None   Final Result   1. The previously identified CT findings of chronic pancreatitis appear stable. No acute inflammatory changes are present. 2. Normal appendix. No bowel obstruction. Thickening of the distal colon may be artifactual related to underdistention. Correlate clinically for colitis. No bowel obstruction or pericolonic inflammation is observed. Chronic findings are discussed. **This report has been created using voice recognition software. It may contain minor errors which are inherent in voice recognition technology. **      Final report electronically signed by Dr Te Reblolar on 5/16/2023 3:56 PM          LABS: (none if blank)  Labs Reviewed   CBC WITH AUTO DIFFERENTIAL - Abnormal; Notable for the following components:       Result Value    WBC 20.6 (*)     Segs Absolute 16.3 (*)     Immature Grans (Abs) 0.11 (*)     All other components within normal limits   BASIC METABOLIC PANEL - Abnormal; Notable for the following components:    CO2 18 (*)     Glucose 302 (*)     All other components within normal limits   HEPATIC FUNCTION PANEL - Abnormal; Notable for the following components:    Alkaline Phosphatase 187 (*)     All other components within normal limits   BETA-HYDROXYBUTYRATE - Abnormal; Notable for the following components:    Beta-Hydroxybutyrate 6.79 (*)     All other

## 2023-05-18 LAB
EKG ATRIAL RATE: 70 BPM
EKG P AXIS: 28 DEGREES
EKG P-R INTERVAL: 114 MS
EKG Q-T INTERVAL: 414 MS
EKG QRS DURATION: 80 MS
EKG QTC CALCULATION (BAZETT): 447 MS
EKG R AXIS: 33 DEGREES
EKG T AXIS: 30 DEGREES
EKG VENTRICULAR RATE: 70 BPM

## 2023-05-26 NOTE — PROGRESS NOTES
[facility]  Initial Interdisciplinary Treatment Plan NOTE    Review Date & Time: 10/21/2020     Patient was in treatment team.  See Multidisciplinary Treatment Team sheet for participants. Admission Type:   Admission Type: Voluntary    Reason for admission:  Reason for Admission: alcohol abuse suicidal thoughts before admit to 6K      Estimated Length of Stay Update:  10/22/2020  Estimated Discharge Date Update: 10/24/2020    PATIENT STRENGTHS:  Patient Strengths Strengths: Communication, Positive Support, Social Skills, Motivated, Other(Past sobriety with AA.)  Patient Strengths and Limitations:Limitations: Inappropriate/potentially harmful leisure interests, Tendency to isolate self  Addictive Behavior:Addictive Behavior  In the past 3 months, have you felt or has someone told you that you have a problem with:  : Other(Comments)(alcohol)  Do you have a history of Chemical Use?: No  Do you have a history of Alcohol Use?: Yes  Do you have a history of Street Drug Abuse?: No  Histroy of Prescripton Drug Abuse?: No  Medical Problems:  Past Medical History:   Diagnosis Date    Asthma     COPD (chronic obstructive pulmonary disease) ([facility] [address]     Eczema     GERD (gastroesophageal reflux disease)     History of alcohol abuse     History of marijuana use     History of tobacco abuse     quit 11/21/2017    Hx of seasonal allergies     Pancreatitis     Seizures ([facility] [address]     etoh wdl    Type 2 diabetes mellitus without complication ([facility] [address] 59/30/8562       EDUCATION:   Learner Progress Toward Treatment Goals: Reviewed results and recommendations of this team, Reviewed group plan and strategies, Reviewed signs, symptoms and risk of self harm and violent behavior and Reviewed goals and plan of care    Method: Individual    Outcome: Verbalized understanding and Demonstrated Understanding    PATIENT GOALS:See below    PLAN/TREATMENT RECOMMENDATIONS UPDATE:   1.  What is the most important thing we can help Lane Hale is a 25 y.o. female who presents today for the following:  Chief Complaint   Patient presents with    Post-Op Check     Pt. Had leep procedure done. She still complains of bleeding          HPI  Patient is a 26-year-old G2, P2 female who presents today 2 weeks status post LEEP procedure for postoperative check. She complains of heavy menstrual bleeding which started shortly after the procedure. OB History          2    Para        Term   2            AB        Living   2         SAB        IAB        Ectopic        Molar        Multiple        Live Births                          @VS2@   OBGyn Exam   Is a well-developed well-nourished female no apparent distress  She is alert and oriented x3  Pelvic  External genitalia are within normal limits  Urethra is midline there are no apparent urethral lesions the bladder is within normal limits  Vagina is with normal rugae there is moderate blood present in the vaginal vault  The cervix appears to be well-healed at this time  Pathology showed high-grade lesion with complete excision all margins clear  [unfilled]       Assessment:  Normal postop exam  Dysfunctional uterine bleeding  Plan: Provera 10 mg p.o. daily x10 days, pelvic ultrasound and follow-up post ultrasound    No orders of the defined types were placed in this encounter.

## 2023-06-25 DIAGNOSIS — J45.41 MODERATE PERSISTENT ASTHMA WITH ACUTE EXACERBATION: ICD-10-CM

## 2023-06-26 RX ORDER — FLUTICASONE PROPIONATE AND SALMETEROL 50; 500 UG/1; UG/1
POWDER RESPIRATORY (INHALATION)
Qty: 60 EACH | Refills: 10 | Status: SHIPPED | OUTPATIENT
Start: 2023-06-26

## 2023-06-26 NOTE — TELEPHONE ENCOUNTER
Recent Visits  Date Type Provider Dept   06/15/23 Office Visit Mae Jayden, APRN - CNP [facility]   04/12/23 Office Visit BARB Draper - CNP [facility]   01/10/23 Office Visit BARB Draper - CNP [facility]   11/03/22 Office Visit BARB Draper - CNP [facility]   10/03/22 Office Visit BARB Draper CNP [facility]   08/02/22 Office Visit BARB Draper - CNP [facility]   04/08/22 Office Visit BARB Draper - CNP [facility]   Showing recent visits within past 540 days with a meds authorizing provider and meeting all other requirements  Future Appointments  Date Type Provider Dept   07/12/23 Appointment BARB Draper CNP [facility]   Showing future appointments within next 150 days with a meds authorizing provider and meeting all other requirements    Future Appointments   Date Time Provider [address] [place] [address]   7/10/2023 11:00 AM Michelle Honeycutt RD, LD SRPX Physic [facility]   7/12/2023  2:00 PM BARB Draper - [address] [facility]   8/3/2023 11:00 AM Lisbet Padron[address]

## 2023-07-08 DIAGNOSIS — J45.41 MODERATE PERSISTENT ASTHMA WITH ACUTE EXACERBATION: ICD-10-CM

## 2023-07-10 RX ORDER — ALBUTEROL SULFATE 2.5 MG/3ML
SOLUTION RESPIRATORY (INHALATION)
Qty: 300 ML | Refills: 0 | Status: SHIPPED | OUTPATIENT
Start: 2023-07-10

## 2023-07-10 NOTE — TELEPHONE ENCOUNTER
Future Appointments   Date Time Provider [address]  [facility]   2023  2:00 PM BARB Whitaker [facility] [place] [facility]   8/3/2023 11:00 AM Emanuel Denton, [facility]

## 2023-07-17 DIAGNOSIS — E11.9 TYPE 2 DIABETES MELLITUS WITHOUT COMPLICATION, WITH LONG-TERM CURRENT USE OF INSULIN (HCC): ICD-10-CM

## 2023-07-17 DIAGNOSIS — Z79.4 TYPE 2 DIABETES MELLITUS WITHOUT COMPLICATION, WITH LONG-TERM CURRENT USE OF INSULIN (HCC): ICD-10-CM

## 2023-07-18 RX ORDER — PROCHLORPERAZINE 25 MG/1
SUPPOSITORY RECTAL
Qty: 1 EACH | Refills: 0 | Status: SHIPPED | OUTPATIENT
Start: 2023-07-18

## 2023-07-19 ENCOUNTER — OFFICE VISIT (OUTPATIENT)
Dept: FAMILY MEDICINE CLINIC | Age: 38
End: 2023-07-19
Payer: COMMERCIAL

## 2023-07-19 VITALS
HEART RATE: 100 BPM | OXYGEN SATURATION: 98 % | WEIGHT: 151.6 LBS | RESPIRATION RATE: 18 BRPM | DIASTOLIC BLOOD PRESSURE: 76 MMHG | SYSTOLIC BLOOD PRESSURE: 128 MMHG | TEMPERATURE: 97.6 F | BODY MASS INDEX: 27.9 KG/M2 | HEIGHT: 62 IN

## 2023-07-19 DIAGNOSIS — R11.2 NAUSEA AND VOMITING, UNSPECIFIED VOMITING TYPE: ICD-10-CM

## 2023-07-19 DIAGNOSIS — Z79.4 TYPE 2 DIABETES MELLITUS WITHOUT COMPLICATION, WITH LONG-TERM CURRENT USE OF INSULIN (HCC): ICD-10-CM

## 2023-07-19 DIAGNOSIS — G25.81 RLS (RESTLESS LEGS SYNDROME): ICD-10-CM

## 2023-07-19 DIAGNOSIS — E11.9 TYPE 2 DIABETES MELLITUS WITHOUT COMPLICATION, WITH LONG-TERM CURRENT USE OF INSULIN (HCC): ICD-10-CM

## 2023-07-19 DIAGNOSIS — Z09 HOSPITAL DISCHARGE FOLLOW-UP: Primary | ICD-10-CM

## 2023-07-19 PROCEDURE — 3051F HG A1C>EQUAL 7.0%<8.0%: CPT | Performed by: NURSE PRACTITIONER

## 2023-07-19 PROCEDURE — 99214 OFFICE O/P EST MOD 30 MIN: CPT | Performed by: NURSE PRACTITIONER

## 2023-07-19 PROCEDURE — 2022F DILAT RTA XM EVC RTNOPTHY: CPT | Performed by: NURSE PRACTITIONER

## 2023-07-19 PROCEDURE — G8417 CALC BMI ABV UP PARAM F/U: HCPCS | Performed by: NURSE PRACTITIONER

## 2023-07-19 PROCEDURE — 4004F PT TOBACCO SCREEN RCVD TLK: CPT | Performed by: NURSE PRACTITIONER

## 2023-07-19 PROCEDURE — 1111F DSCHRG MED/CURRENT MED MERGE: CPT | Performed by: NURSE PRACTITIONER

## 2023-07-19 PROCEDURE — G8427 DOCREV CUR MEDS BY ELIG CLIN: HCPCS | Performed by: NURSE PRACTITIONER

## 2023-07-19 RX ORDER — ROPINIROLE 0.25 MG/1
TABLET, FILM COATED ORAL
Qty: 60 TABLET | Refills: 3 | Status: SHIPPED | OUTPATIENT
Start: 2023-07-19

## 2023-07-19 RX ORDER — ONDANSETRON 4 MG/1
4 TABLET, ORALLY DISINTEGRATING ORAL EVERY 6 HOURS PRN
Qty: 60 TABLET | Refills: 0 | Status: SHIPPED | OUTPATIENT
Start: 2023-07-19

## 2023-07-19 NOTE — PROGRESS NOTES
Transition of Care Visit/Hospital Follow Up:      Tevin Arevalo is a 42 y.o. male that presents for Other (Follow up form hospital )      Date of Discharge:   7/12/23  Was patient contacted within 2 business days of discharge (see chart for documentation):  no      Patient presents for hospital follow up. Patient recently hospitalized at [facility] for treatment of DKA. Symptoms prior to admission:  N/V     Hospital Course per DC Summary:    Hospital Course  DAILY ICU EVENTS:  7/9: Admitted to ICU with DKA. GI consulted for nausea, vomiting. Received 2 L NS in ED,  started on IV fluids in ICU. Transitioned from insulin drip to SQ insulin, tolerating diet  7/10: Episodes of nausea overnight, tolerated diet this morning. Doing well on SQ insulin. Seen by GI for chronic nausea, vomiting  7/11: Nauseous with abdominal pain overnight, resolved this morning. NPO for gastric  emptying study. Holding lantus until diet resumed. Hemodynamically stable on room air  7/12: Nauseous this morning, resolved with BM. Discharge home    Medication changes at discharge:  Med list reconciled today    Clinical course since discharge:      Has been doing well with ETOH sobriety  Last ETOH was 2/26  Going to [facility]    Patient is home is doing ok  Still having a lot of nausea/vomiting  Taking Zofran and it does help, but he has to take it every 6 hours.  If he doesn't take it he won't be able to stop vomiting  Has seen GI (bipin) and they are scheduling EGD/colonoscopy    Sugars are all over the place  Highest 350  Lowest 70  Staying average in the 175-200  Diabetic center not able to get omnipod approved  Taking Semglee 27 units bid  Taking Novolog 5 units with meals plus sliding scale  Eating meal once a day, but snacking 2-3 times/day    Has horrible RLS at night  Legs ache, he has to move them to get them to stop    Current Outpatient Medications   Medication Sig Dispense Refill    ondansetron (ZOFRAN-ODT) 4 MG

## 2023-08-09 NOTE — LETTER
-- DO NOT REPLY / DO NOT REPLY ALL --  -- Message is from [facility] ([facility]) --    General Patient Message: Patient was scheduled for colonoscopy on 8/15 but, the doctor who was scheduled to do colonoscopy no longer works at that location, the new doctor is booking out 3mo out. Patient wants to know if there is a different location or doctor he can see for the colonoscopy.     Caller Information       Type Contact Phone/Fax    08/03/2023 04:08 PM CDT Phone (Incoming) Nirmal Stearns (Self) [phone] (M)    08/09/2023 08:54 AM CDT Phone (Incoming) Nirmal Stearns (Self) [phone] (M)        Alternative phone number: no    Can a detailed message be left? Yes    Message Turnaround:     Is it Working Hours? Yes - Working Hours     [place]:    Please give this turnaround time to the caller:   \"This message will be sent to [state Provider's name]. The clinical team will fulfill your request as soon as they review your message.\"                 [facility] 4[facility]  [address] [facility] [address]  Phone: [phone]             March 4, 2020    Patient: Elinor Haynes   YOB: 1985   Date of Visit: 3/2/2020       To Whom It May Concern:    Terra Chowdary was seen and treated in our facility  beginning 3/2/2020 until 3/4/2020. He may return to work on 3/10/2020.       Sincerely,       Miguel Kent RN         Signature:__________________________________

## 2023-09-15 DIAGNOSIS — F33.1 MAJOR DEPRESSIVE DISORDER, RECURRENT EPISODE, MODERATE WITH ANXIOUS DISTRESS (HCC): ICD-10-CM

## 2023-09-15 DIAGNOSIS — F10.930 ALCOHOL WITHDRAWAL SYNDROME WITHOUT COMPLICATION (HCC): ICD-10-CM

## 2023-09-18 RX ORDER — HYDROXYZINE PAMOATE 100 MG/1
CAPSULE ORAL
Qty: 60 CAPSULE | Refills: 10 | Status: SHIPPED | OUTPATIENT
Start: 2023-09-18

## 2023-09-18 NOTE — TELEPHONE ENCOUNTER
Recent Visits  Date Type Provider Dept   07/19/23 Office Visit Jaswant Wilson, APRN - CNP [facility]   06/15/23 Office Visit Emily Eagle, APRN - CNP [facility]   04/12/23 Office Visit Jaswant Wilson, APRN - CNP [facility]   01/10/23 Office Visit Jaswant Wilson, APRN - CNP [facility]   11/03/22 Office Visit Jaswant Wilson, APRN - CNP [facility]   10/03/22 Office Visit Jaswant Wilson, APRN - CNP [facility]   08/02/22 Office Visit Jaswant Wilson, APRN - CNP [facility]   04/08/22 Office Visit Jaswant Wilson, APRN - CNP [facility]   Showing recent visits within past 540 days with a meds authorizing provider and meeting all other requirements  Future Appointments  Date Type Provider Dept   09/19/23 Appointment BARB Gonzalez CNP [facility]   Showing future appointments within next 150 days with a meds authorizing provider and meeting all other requirements

## 2023-09-25 RX ORDER — INSULIN GLARGINE-YFGN 100 [IU]/ML
INJECTION, SOLUTION SUBCUTANEOUS
Qty: 15 ML | Refills: 10 | OUTPATIENT
Start: 2023-09-25

## 2023-09-25 NOTE — TELEPHONE ENCOUNTER
Recent Visits  Date Type Provider Dept   07/19/23 Office Visit Tracie Nash, APRN - CNP [facility]   06/15/23 Office Visit Celia Felicia, APRN - CNP [facility]   04/12/23 Office Visit Tracie Nash, APRN - CNP [facility]   01/10/23 Office Visit Tracie Nash, APRN - CNP [facility]   11/03/22 Office Visit Tracie Nash, APRN - CNP [facility]   10/03/22 Office Visit Tracie Nash, APRN - CNP [facility]   08/02/22 Office Visit Tracie Nash, APRN - CNP [facility]   04/08/22 Office Visit Tracie Nash, APRN - CNP [facility]   Showing recent visits within past 540 days with a meds authorizing provider and meeting all other requirements  Future Appointments  No visits were found meeting these conditions.   Showing future appointments within next 150 days with a meds authorizing provider and meeting all other requirements

## 2023-09-26 ENCOUNTER — OFFICE VISIT (OUTPATIENT)
Dept: FAMILY MEDICINE CLINIC | Age: 38
End: 2023-09-26
Payer: COMMERCIAL

## 2023-09-26 VITALS
DIASTOLIC BLOOD PRESSURE: 60 MMHG | BODY MASS INDEX: 25.65 KG/M2 | HEIGHT: 62 IN | SYSTOLIC BLOOD PRESSURE: 120 MMHG | WEIGHT: 139.4 LBS | RESPIRATION RATE: 12 BRPM | TEMPERATURE: 97 F | OXYGEN SATURATION: 98 % | HEART RATE: 82 BPM

## 2023-09-26 DIAGNOSIS — R11.2 NAUSEA AND VOMITING, UNSPECIFIED VOMITING TYPE: ICD-10-CM

## 2023-09-26 DIAGNOSIS — E11.9 TYPE 2 DIABETES MELLITUS WITHOUT COMPLICATION, WITH LONG-TERM CURRENT USE OF INSULIN (HCC): Primary | ICD-10-CM

## 2023-09-26 DIAGNOSIS — Z79.4 TYPE 2 DIABETES MELLITUS WITHOUT COMPLICATION, WITH LONG-TERM CURRENT USE OF INSULIN (HCC): Primary | ICD-10-CM

## 2023-09-26 LAB — HBA1C MFR BLD: 7.4 % (ref 4.3–5.7)

## 2023-09-26 PROCEDURE — 2022F DILAT RTA XM EVC RTNOPTHY: CPT | Performed by: NURSE PRACTITIONER

## 2023-09-26 PROCEDURE — 4004F PT TOBACCO SCREEN RCVD TLK: CPT | Performed by: NURSE PRACTITIONER

## 2023-09-26 PROCEDURE — 99214 OFFICE O/P EST MOD 30 MIN: CPT | Performed by: NURSE PRACTITIONER

## 2023-09-26 PROCEDURE — 3051F HG A1C>EQUAL 7.0%<8.0%: CPT | Performed by: NURSE PRACTITIONER

## 2023-09-26 PROCEDURE — G8417 CALC BMI ABV UP PARAM F/U: HCPCS | Performed by: NURSE PRACTITIONER

## 2023-09-26 PROCEDURE — G8427 DOCREV CUR MEDS BY ELIG CLIN: HCPCS | Performed by: NURSE PRACTITIONER

## 2023-09-26 RX ORDER — BUPRENORPHINE HYDROCHLORIDE AND NALOXONE HYDROCHLORIDE DIHYDRATE 8; 2 MG/1; MG/1
1 TABLET SUBLINGUAL DAILY
COMMUNITY

## 2023-09-26 NOTE — PROGRESS NOTES
medication adherence and decrease in alcohol consumption    Patient given educational materials on: See Attached    I have instructed Martha Montiel to complete a self tracking handout on none and instructed them to bring it with them to his next appointment. Barriers to learning and self management: none    Discussed use, benefit, and side effects of prescribed medications. Barriers to medication compliance addressed. All patient questions answered. Pt voiced understanding.        Electronically signed by BARB Whitaker CNP on 2023 at 10:47 AM

## 2023-09-27 ENCOUNTER — OFFICE VISIT (OUTPATIENT)
Dept: INTERNAL MEDICINE CLINIC | Age: 38
End: 2023-09-27

## 2023-09-27 DIAGNOSIS — Z79.4 TYPE 2 DIABETES MELLITUS WITHOUT COMPLICATION, WITH LONG-TERM CURRENT USE OF INSULIN (HCC): Primary | ICD-10-CM

## 2023-09-27 DIAGNOSIS — E11.9 TYPE 2 DIABETES MELLITUS WITHOUT COMPLICATION, WITH LONG-TERM CURRENT USE OF INSULIN (HCC): Primary | ICD-10-CM

## 2023-09-27 NOTE — PATIENT INSTRUCTIONS
Start checking your blood sugar before your work outs    2. Consider a 1 hour workout, instead of 2 hours    3. Try to get 2 meals a day   -One meal can be a protein drink   -Consider using a fitness tracking parisa like My Fitness Pal    4. Consider the flu shot and the Prevnar 20 pneumonia vaccine    5.  Pharmacist to look into Omnipod 5 coverage

## 2023-09-30 DIAGNOSIS — E11.9 TYPE 2 DIABETES MELLITUS WITHOUT COMPLICATION, WITH LONG-TERM CURRENT USE OF INSULIN (HCC): ICD-10-CM

## 2023-09-30 DIAGNOSIS — Z79.4 TYPE 2 DIABETES MELLITUS WITHOUT COMPLICATION, WITH LONG-TERM CURRENT USE OF INSULIN (HCC): ICD-10-CM

## 2023-10-02 VITALS
DIASTOLIC BLOOD PRESSURE: 63 MMHG | HEART RATE: 73 BPM | WEIGHT: 137.4 LBS | BODY MASS INDEX: 25.13 KG/M2 | SYSTOLIC BLOOD PRESSURE: 109 MMHG | TEMPERATURE: 97.3 F

## 2023-10-02 DIAGNOSIS — E11.9 TYPE 2 DIABETES MELLITUS WITHOUT COMPLICATION, WITH LONG-TERM CURRENT USE OF INSULIN (HCC): ICD-10-CM

## 2023-10-02 DIAGNOSIS — Z79.4 TYPE 2 DIABETES MELLITUS WITHOUT COMPLICATION, WITH LONG-TERM CURRENT USE OF INSULIN (HCC): ICD-10-CM

## 2023-10-02 RX ORDER — INSULIN ASPART 100 [IU]/ML
5 INJECTION, SOLUTION INTRAVENOUS; SUBCUTANEOUS
Qty: 15 ML | Refills: 5 | Status: SHIPPED | OUTPATIENT
Start: 2023-10-02

## 2023-10-02 RX ORDER — INSULIN ASPART 100 [IU]/ML
INJECTION, SOLUTION INTRAVENOUS; SUBCUTANEOUS
Qty: 15 ML | Refills: 10 | OUTPATIENT
Start: 2023-10-02

## 2023-10-02 NOTE — TELEPHONE ENCOUNTER
Recent Visits  Date Type Provider Dept   09/26/23 Office Visit Andrea Dickinson APRN - CNP [facility]   07/19/23 Office Visit Andrea Dickinson, APRN - CNP [facility]   06/15/23 Office Visit Aristeo Schroederkevin, APRN - CNP [facility]   04/12/23 Office Visit Andrea Dickinson APRN - CNP [facility]   01/10/23 Office Visit Andrea Dickinson APRN - CNP [facility]   11/03/22 Office Visit Andrea Dickinson APRN - CNP [facility]   10/03/22 Office Visit Andrea Dickinson APRN - CNP [facility]   08/02/22 Office Visit Andrea Dickinson APRN - CNP [facility]   Showing recent visits within past 540 days with a meds authorizing provider and meeting all other requirements  Future Appointments  Date Type Provider Dept   12/26/23 Appointment Andrea Dickinson APRN - CNP [facility]   Showing future appointments within next 150 days with a meds authorizing provider and meeting all other requirements

## 2023-10-08 ENCOUNTER — PATIENT MESSAGE (OUTPATIENT)
Dept: FAMILY MEDICINE CLINIC | Age: 38
End: 2023-10-08

## 2023-10-08 DIAGNOSIS — F17.210 CIGARETTE NICOTINE DEPENDENCE WITHOUT COMPLICATION: Primary | ICD-10-CM

## 2023-10-11 ENCOUNTER — TELEPHONE (OUTPATIENT)
Dept: PHARMACY | Age: 38
End: 2023-10-11

## 2023-10-11 NOTE — TELEPHONE ENCOUNTER
Referral to [facility]. [facility] received from AIME Chery CNP for Smoking Cessation Counseling and Medication Management per Consult Agreement. Called patient, left message with instructions for patient to call the clinic back at [phone], option 2.

## 2023-10-16 DIAGNOSIS — R07.89 ATYPICAL CHEST PAIN: ICD-10-CM

## 2023-10-16 DIAGNOSIS — K21.9 GASTROESOPHAGEAL REFLUX DISEASE, UNSPECIFIED WHETHER ESOPHAGITIS PRESENT: ICD-10-CM

## 2023-10-16 RX ORDER — INSULIN GLARGINE-YFGN 100 [IU]/ML
INJECTION, SOLUTION SUBCUTANEOUS
Qty: 15 ML | Refills: 10 | Status: SHIPPED | OUTPATIENT
Start: 2023-10-16

## 2023-10-16 NOTE — TELEPHONE ENCOUNTER
Recent Visits  Date Type Provider Dept   09/26/23 Office Visit BARB Dahl CNP [facility]   07/19/23 Office Visit Jerrod Eduardo, BARB - CNP [facility]   06/15/23 Office Visit Agatha Last APRN - CNP [facility]   04/12/23 Office Visit Jerrod Eduardo, APRN - CNP [facility]   01/10/23 Office Visit BARB Dahl - CNP [facility]   11/03/22 Office Visit BARB Dahl - CNP [facility]   10/03/22 Office Visit BARB Dahl - CNP [facility]   08/02/22 Office Visit BARB Dahl - CNP [facility]   Showing recent visits within past 540 days with a meds authorizing provider and meeting all other requirements  Future Appointments  Date Type Provider Dept   12/26/23 Appointment BARB Dahl CNP [facility]   Showing future appointments within next 150 days with a meds authorizing provider and meeting all other requirements

## 2023-10-17 RX ORDER — OMEPRAZOLE 40 MG/1
CAPSULE, DELAYED RELEASE ORAL
Qty: 30 CAPSULE | Refills: 10 | Status: SHIPPED | OUTPATIENT
Start: 2023-10-17

## 2023-10-20 RX ORDER — INSULIN PMP CART,AUT,G6/7,CNTR
EACH SUBCUTANEOUS
Qty: 10 EACH | Refills: 5 | Status: SHIPPED | OUTPATIENT
Start: 2023-10-20

## 2023-10-20 NOTE — TELEPHONE ENCOUNTER
We received notice of Omnipod 5 pump approval.     Dayana Calabrese,     Please review pended orders for Rodríguez's insulin pump.      Thank you for allowing me to participate in the care of your patient,     Miguel Khan, PharmD, [address] [place] [address]  Internal Medicine Clinical Pharmacist  [phone]

## 2023-10-24 ENCOUNTER — OFFICE VISIT (OUTPATIENT)
Dept: INTERNAL MEDICINE CLINIC | Age: 38
End: 2023-10-24
Payer: COMMERCIAL

## 2023-10-24 VITALS — WEIGHT: 133.8 LBS | BODY MASS INDEX: 24.62 KG/M2 | HEIGHT: 62 IN

## 2023-10-24 DIAGNOSIS — E11.65 TYPE 2 DIABETES MELLITUS WITH HYPERGLYCEMIA, WITH LONG-TERM CURRENT USE OF INSULIN (HCC): Primary | ICD-10-CM

## 2023-10-24 DIAGNOSIS — Z79.4 TYPE 2 DIABETES MELLITUS WITH HYPERGLYCEMIA, WITH LONG-TERM CURRENT USE OF INSULIN (HCC): Primary | ICD-10-CM

## 2023-10-24 PROCEDURE — NBSRV NON-BILLABLE SERVICE: Performed by: DIETITIAN, REGISTERED

## 2023-10-24 PROCEDURE — 97802 MEDICAL NUTRITION INDIV IN: CPT | Performed by: DIETITIAN, REGISTERED

## 2023-10-24 NOTE — PROGRESS NOTES
protein + non- starchy veggies    Snack time:  15 - 20 gms carbohydrates + 1 oz protein    5.)  Choose lean protein most of the time and Cut in 1/2 added fats to help with weight loss efforts. 6.) Bring a 1-2 week food log to next dietitian appt. Thanks. Fasting BS (1st thing in the morning) or before a meal (at least 4 hour since last ate), BS goal:  90 - 130  2 hours after the start of a meal, BS goal:  100 - 150     7.) Great job with your exercise habits - now let include healthy eating habits with balanced meals 3x/day    -Nutrition prescription: 1600 calories/day, 135 - 180 g carbs/day. Comprehension verified using teachback method. Monitoring/Evaluation:   -Followup visit: 9 weeks with dietitian.   -Receptiveness to education/goals: Agreeable.  -Evaluation of education: Indicates understanding.  -Readiness to change: precontemplative- eating 3 meals/day instead of only 1 meal/day, balanced meal planning.  -Expected compliance: fair. Thank you for your referral of this patient. Total time involved in direct patient education: 60 minutes for initial MNT visit.

## 2023-10-24 NOTE — PATIENT INSTRUCTIONS
1. )  Get familiar with reading the nutrition facts label by looking at serving size and total carbohydrates. - Without a label refer to your carb count guide booklet. 2.)  Measure foods for accuracy in carb counting.    3.)  Healthy carb choices:  whole grains, whole wheat pasta, starchy vegetables, fresh fruit and lowfat/non-fat milk and yogurt. 4.)  Your meal plan is found on the inside cover of your carb counting guide booklet:  1st meal or Brkf:  30-45 gms carbohydrates + 1-2 oz protein  2nd meal or Lunch: 45 gms carbohydrates + 2-3 oz protein + non-starchy veggies  3rd meal or Dinner:  45-60 gms carbohydrates + 2-3 oz protein + non- starchy veggies    Snack time:  15 - 20 gms carbohydrates + 1 oz protein    5.)  Choose lean protein most of the time and Cut in 1/2 added fats to help with weight loss efforts. 6.) Bring a 1-2 week food log to next dietitian appt. Thanks.   Fasting BS (1st thing in the morning) or before a meal (at least 4 hour since last ate), BS goal:  90 - 130  2 hours after the start of a meal, BS goal:  100 - 150     7.) Great job with your exercise habits - now let include healthy eating habits with balanced meals 3x/day

## 2023-11-08 ENCOUNTER — HOSPITAL ENCOUNTER (OUTPATIENT)
Dept: PHARMACY | Age: 38
Setting detail: THERAPIES SERIES
Discharge: HOME | End: 2023-11-08
Payer: COMMERCIAL

## 2023-11-08 PROCEDURE — 99212 OFFICE O/P EST SF 10 MIN: CPT | Performed by: PHARMACIST

## 2023-11-08 RX ORDER — NICOTINE 21 MG/24HR
1 PATCH, TRANSDERMAL 24 HOURS TRANSDERMAL DAILY
Qty: 42 PATCH | Refills: 0 | Status: SHIPPED | OUTPATIENT
Start: 2023-11-08 | End: 2023-12-20

## 2023-11-08 NOTE — PROGRESS NOTES
Medication Management   [facility]. [facility]  [phone] (phone)  [phone] (fax)    Ana Madison is a 34 y.o. male has been referred to our service by Eulogio Foreman CNP for Smoking Cessation Counseling and Medication Management per Consult Agreement. Patient acknowledges working in consult agreement with clinical pharmacist and referring physician. Pt reports they are ready and motivated to quit. History:  Family/friends for support: everybody  Career: not currently  Home environment/smoke free zones in house: No  Past Medical history/diagnosis reviewed:  Yes  Medication list reviewed: Yes    Past Medical History:      Past Medical History:   Diagnosis Date    Asthma     COPD (chronic obstructive pulmonary disease) ([address])     Eczema     GERD (gastroesophageal reflux disease)     History of alcohol abuse     History of marijuana use     History of tobacco abuse     quit 11/21/2017    Hx of seasonal allergies     Pancreatitis     Psychiatric problem     PTSD (post-traumatic stress disorder)     Seizures ([address])     etoh wdl    Type 2 diabetes mellitus without complication ([address] [facility]) 87/75/7158         Scaling:  Importance level: 10  Confidence level: 4     Fagerstrom Test:    How soon after you wake up do you smoke your first cigarette?   within 5\"(3)   Do you find it difficult to refrain from smoking in places where it is forbidden, e.g., in [place], at [employer], [address] [place] [address]? Yes (1 if yes)   Which cigarette would you hate to most give up? all others(0)  How many cigarettes per day do you smoke? 31 or more(3)   Do you smoke more frequently during the first hours of waking than during the rest of the day? No (1 if yes)   Do you smoke if you are so ill that you are in bed most of the day? Yes (1 if yes)    Classification of Dependence:  0-2 Very Low  3-4 Low  5    Moderate  6-7 High  8-10 Very High   Score: 8, I went over the results with the pt.      Tobacco use:  Current use:  #/packs

## 2023-11-22 ENCOUNTER — TELEPHONE (OUTPATIENT)
Dept: PHARMACY | Age: 38
End: 2023-11-22

## 2023-11-22 NOTE — TELEPHONE ENCOUNTER
Pt missed his appt for smoking cessation on 11/15/23 so called pt to see if pt wants to reschedule. I spoke with pt and we set up new appt for 11/29 at 0830 to be done in the office. PT had no questions at this time.

## 2023-11-29 ENCOUNTER — APPOINTMENT (OUTPATIENT)
Dept: PHARMACY | Age: 38
End: 2023-11-29
Payer: COMMERCIAL

## 2023-12-06 ENCOUNTER — TELEPHONE (OUTPATIENT)
Dept: PHARMACY | Age: 38
End: 2023-12-06

## 2023-12-06 NOTE — TELEPHONE ENCOUNTER
Called pt at this time for our scheduled smoking cessation phone appt. There was no answer, so I left a message asking the pt to call us back.

## 2023-12-26 ENCOUNTER — TELEMEDICINE (OUTPATIENT)
Dept: FAMILY MEDICINE CLINIC | Age: 38
End: 2023-12-26
Payer: COMMERCIAL

## 2023-12-26 DIAGNOSIS — Z79.4 TYPE 2 DIABETES MELLITUS WITHOUT COMPLICATION, WITH LONG-TERM CURRENT USE OF INSULIN (HCC): ICD-10-CM

## 2023-12-26 DIAGNOSIS — E11.9 TYPE 2 DIABETES MELLITUS WITHOUT COMPLICATION, WITH LONG-TERM CURRENT USE OF INSULIN (HCC): ICD-10-CM

## 2023-12-26 DIAGNOSIS — R11.2 NAUSEA AND VOMITING, UNSPECIFIED VOMITING TYPE: Primary | ICD-10-CM

## 2023-12-26 DIAGNOSIS — R68.89 FLU-LIKE SYMPTOMS: ICD-10-CM

## 2023-12-26 PROCEDURE — 99214 OFFICE O/P EST MOD 30 MIN: CPT | Performed by: NURSE PRACTITIONER

## 2023-12-26 PROCEDURE — 3051F HG A1C>EQUAL 7.0%<8.0%: CPT | Performed by: NURSE PRACTITIONER

## 2023-12-26 PROCEDURE — G8428 CUR MEDS NOT DOCUMENT: HCPCS | Performed by: NURSE PRACTITIONER

## 2023-12-26 PROCEDURE — 2022F DILAT RTA XM EVC RTNOPTHY: CPT | Performed by: NURSE PRACTITIONER

## 2023-12-26 RX ORDER — PROMETHAZINE HYDROCHLORIDE 25 MG/1
25 SUPPOSITORY RECTAL EVERY 6 HOURS PRN
Qty: 60 SUPPOSITORY | Refills: 1 | Status: SHIPPED | OUTPATIENT
Start: 2023-12-26

## 2023-12-26 RX ORDER — ONDANSETRON 8 MG/1
8 TABLET, ORALLY DISINTEGRATING ORAL 3 TIMES DAILY PRN
Qty: 60 TABLET | Refills: 1 | Status: SHIPPED | OUTPATIENT
Start: 2023-12-26

## 2023-12-26 NOTE — PROGRESS NOTES
week (around 1/2/2024) for DM. Debbie Klein, was evaluated through a synchronous (real-time) audio-video encounter. The patient (or guardian if applicable) is aware that this is a billable service, which includes applicable co-pays. This Virtual Visit was conducted with patient's (and/or legal guardian's) consent. Patient identification was verified, and a caregiver was present when appropriate. The patient was located at Home: [address] [address]  Provider was located at [address]): [address] [place] [address],  [facility] were provided through a video synchronous discussion virtually to substitute for in-person clinic visit. Patient and provider were located at their individual homes. --BARB Jung CNP on 12/26/2023 at 10:32 AM    An electronic signature was used to authenticate this note.

## 2024-01-03 ENCOUNTER — TELEMEDICINE (OUTPATIENT)
Dept: INTERNAL MEDICINE CLINIC | Age: 39
End: 2024-01-03
Payer: COMMERCIAL

## 2024-01-03 DIAGNOSIS — Z79.4 TYPE 2 DIABETES MELLITUS WITHOUT COMPLICATION, WITH LONG-TERM CURRENT USE OF INSULIN (HCC): Primary | ICD-10-CM

## 2024-01-03 DIAGNOSIS — E11.9 TYPE 2 DIABETES MELLITUS WITHOUT COMPLICATION, WITH LONG-TERM CURRENT USE OF INSULIN (HCC): Primary | ICD-10-CM

## 2024-01-03 PROCEDURE — 99442 PR PHYS/QHP TELEPHONE EVALUATION 11-20 MIN: CPT | Performed by: DIETITIAN, REGISTERED

## 2024-01-03 NOTE — PROGRESS NOTES
[facility] Professional Services  [facility]. [facility]  [address] [place] [address]  [phone] (phone)  [phone] (fax)    Patient Name: Rodríguez Burt. Date of Birth: [id number]. MRN: [id number]      Assessment: Patient is a 38 y.o. male seen for follow-up MNT visit for Type 2 DB.     -Nutritionally relevant labs:   Lab Results   Component Value Date/Time    LABA1C 7.4 (H) 09/26/2023 09:25 AM    LABA1C 7.3 (H) 06/15/2023 01:22 PM    LABA1C 7.3 (H) 04/12/2023 07:00 AM    LABA1C 10.0 12/07/2021 10:23 AM    LABA1C 7.2 (H) 12/29/2020 09:33 AM    LABA1C 6.9 (H) 06/11/2020 12:36 PM    GLUCOSE 303 (H) 12/22/2023 02:11 PM    GLUCOSE 230 (H) 11/06/2023 12:19 PM    CHOL 182 05/04/2019 05:09 AM    HDL 54 03/26/2022 10:46 AM    LDLCALC 75 03/26/2022 10:46 AM    TRIG 128 05/04/2019 05:09 AM     DB meds:  Aspart Insulin 5 u TID  Semglee 27 units BID  Pt stated he received Omnipod 5 insulin pump and started to use this again, but experienced his BS dropping too low, so stopped using.  Pt states only taking Semglee 27 units once/day AM since the start of getting sick 2 weeks ago.    -Blood sugar trends: Downloaded Dexcom CGM report Dec 11 - Dec. 24, 2023. Plan to scan in EHR.  TIR: 40%, High: 35%, Very High 25%    Problems with stomach x 2 weeks - Nausea and vomiting.  Ate yesterday and the day before.  Trying to drink plenty of water.  Pt states he gets cold and then sweats a lot.  BS's high only during vomiting.  On nausea medication.  Was exercising prior to current illness @ least 20 minutes/day.  Pt wanting to try supplement drinks.    -Impression of Dietary Intake:  inadequate nutrient/energy and protein intake d/t extended Nausea and vomiting.    Current Outpatient Medications on File Prior to Visit   Medication Sig Dispense Refill    ondansetron (ZOFRAN-ODT) 8 MG TBDP disintegrating tablet Place 1 tablet under the tongue 3 times daily as needed for Nausea or Vomiting 60 tablet 1

## 2024-01-05 ENCOUNTER — OFFICE VISIT (OUTPATIENT)
Dept: FAMILY MEDICINE CLINIC | Age: 39
End: 2024-01-05
Payer: COMMERCIAL

## 2024-01-05 VITALS
HEART RATE: 91 BPM | OXYGEN SATURATION: 97 % | SYSTOLIC BLOOD PRESSURE: 106 MMHG | BODY MASS INDEX: 23.89 KG/M2 | DIASTOLIC BLOOD PRESSURE: 66 MMHG | RESPIRATION RATE: 14 BRPM | WEIGHT: 129.8 LBS | HEIGHT: 62 IN | TEMPERATURE: 99.5 F

## 2024-01-05 DIAGNOSIS — E11.9 TYPE 2 DIABETES MELLITUS WITHOUT COMPLICATION, WITH LONG-TERM CURRENT USE OF INSULIN (HCC): ICD-10-CM

## 2024-01-05 DIAGNOSIS — Z79.4 TYPE 2 DIABETES MELLITUS WITHOUT COMPLICATION, WITH LONG-TERM CURRENT USE OF INSULIN (HCC): ICD-10-CM

## 2024-01-05 DIAGNOSIS — F12.10 CANNABIS ABUSE, DAILY USE: ICD-10-CM

## 2024-01-05 DIAGNOSIS — J06.9 ACUTE UPPER RESPIRATORY INFECTION: Primary | ICD-10-CM

## 2024-01-05 DIAGNOSIS — F31.32 BIPOLAR DISORDER, CURRENT EPISODE DEPRESSED, MODERATE (HCC): ICD-10-CM

## 2024-01-05 DIAGNOSIS — R11.2 NAUSEA AND VOMITING, UNSPECIFIED VOMITING TYPE: ICD-10-CM

## 2024-01-05 DIAGNOSIS — G43.909 MIGRAINE WITHOUT STATUS MIGRAINOSUS, NOT INTRACTABLE, UNSPECIFIED MIGRAINE TYPE: ICD-10-CM

## 2024-01-05 DIAGNOSIS — K86.1 CHRONIC PANCREATITIS, UNSPECIFIED PANCREATITIS TYPE (HCC): ICD-10-CM

## 2024-01-05 LAB — HBA1C MFR BLD: 7.9 % (ref 4.3–5.7)

## 2024-01-05 PROCEDURE — 4004F PT TOBACCO SCREEN RCVD TLK: CPT | Performed by: NURSE PRACTITIONER

## 2024-01-05 PROCEDURE — G8427 DOCREV CUR MEDS BY ELIG CLIN: HCPCS | Performed by: NURSE PRACTITIONER

## 2024-01-05 PROCEDURE — 99214 OFFICE O/P EST MOD 30 MIN: CPT | Performed by: NURSE PRACTITIONER

## 2024-01-05 PROCEDURE — G8420 CALC BMI NORM PARAMETERS: HCPCS | Performed by: NURSE PRACTITIONER

## 2024-01-05 PROCEDURE — G8484 FLU IMMUNIZE NO ADMIN: HCPCS | Performed by: NURSE PRACTITIONER

## 2024-01-05 PROCEDURE — 3051F HG A1C>EQUAL 7.0%<8.0%: CPT | Performed by: NURSE PRACTITIONER

## 2024-01-05 PROCEDURE — 2022F DILAT RTA XM EVC RTNOPTHY: CPT | Performed by: NURSE PRACTITIONER

## 2024-01-05 RX ORDER — PROPRANOLOL HYDROCHLORIDE 10 MG/1
TABLET ORAL
COMMUNITY
Start: 2023-12-07

## 2024-01-05 RX ORDER — SUMATRIPTAN 50 MG/1
50 TABLET, FILM COATED ORAL
Qty: 9 TABLET | Refills: 3 | Status: SHIPPED | OUTPATIENT
Start: 2024-01-05 | End: 2024-01-05

## 2024-01-05 RX ORDER — TRIAMCINOLONE ACETONIDE 1 MG/G
CREAM TOPICAL
COMMUNITY

## 2024-01-05 RX ORDER — AZITHROMYCIN 250 MG/1
250 TABLET, FILM COATED ORAL SEE ADMIN INSTRUCTIONS
Qty: 6 TABLET | Refills: 0 | Status: SHIPPED | OUTPATIENT
Start: 2024-01-05 | End: 2024-01-10

## 2024-01-05 ASSESSMENT — PATIENT HEALTH QUESTIONNAIRE - PHQ9
SUM OF ALL RESPONSES TO PHQ QUESTIONS 1-9: 5
7. TROUBLE CONCENTRATING ON THINGS, SUCH AS READING THE NEWSPAPER OR WATCHING TELEVISION: 0
3. TROUBLE FALLING OR STAYING ASLEEP: 1
SUM OF ALL RESPONSES TO PHQ9 QUESTIONS 1 & 2: 0
8. MOVING OR SPEAKING SO SLOWLY THAT OTHER PEOPLE COULD HAVE NOTICED. OR THE OPPOSITE, BEING SO FIGETY OR RESTLESS THAT YOU HAVE BEEN MOVING AROUND A LOT MORE THAN USUAL: 0
9. THOUGHTS THAT YOU WOULD BE BETTER OFF DEAD, OR OF HURTING YOURSELF: 0
SUM OF ALL RESPONSES TO PHQ QUESTIONS 1-9: 5
SUM OF ALL RESPONSES TO PHQ QUESTIONS 1-9: 5
1. LITTLE INTEREST OR PLEASURE IN DOING THINGS: 0
2. FEELING DOWN, DEPRESSED OR HOPELESS: 0
6. FEELING BAD ABOUT YOURSELF - OR THAT YOU ARE A FAILURE OR HAVE LET YOURSELF OR YOUR FAMILY DOWN: 0
4. FEELING TIRED OR HAVING LITTLE ENERGY: 1
10. IF YOU CHECKED OFF ANY PROBLEMS, HOW DIFFICULT HAVE THESE PROBLEMS MADE IT FOR YOU TO DO YOUR WORK, TAKE CARE OF THINGS AT HOME, OR GET ALONG WITH OTHER PEOPLE: 2
5. POOR APPETITE OR OVEREATING: 3
SUM OF ALL RESPONSES TO PHQ QUESTIONS 1-9: 5

## 2024-01-05 NOTE — PROGRESS NOTES
Cold sxs x 2 weeks patient states that his sxs are better today but wanted to see if he could get a z-pack or something for more relief   
Results   Component Value Date    WBC 10.4 12/22/2023    HGB 16.2 12/22/2023    HCT 46.3 12/22/2023    MCV 86.9 12/22/2023     12/22/2023     Lab Results   Component Value Date    TSH 0.989 09/29/2022     CT abdomen/pelvic 11/6/23  FINDINGS:    The visualized lung bases are unremarkable.  The visualized portions of    the heart are within normal limits.        Normal liver.  Normal gallbladder and extrahepatic biliary system.  Normal    spleen.  There are pancreatic calcifications in the distribution of the    ducts consistent with chronic pancreatitis.        Normal bilateral adrenal glands.        Normal right kidney.  Normal left kidney.        Normal visualized stomach.  No dilated small bowel.  The proximal colon is    poorly evaluated given lack of distention.  The appendix is not seen but    no secondary signs of appendicitis.        Normal abdominal aorta.  Normal inferior vena cava.  Normal    retroperitoneum.        Normal urinary bladder.        Normal abdominal wall.  Normal osseous structures.    ___________________________________        IMPRESSION:    1.  No acute inflammatory process or bowel obstruction.       PHYSICAL EXAM:  Vitals:    01/05/24 1008 01/05/24 1023   BP: (!) 82/60 106/66   Pulse: 91    Resp: 14    Temp: 99.5 °F (37.5 °C)    TempSrc: Temporal    SpO2: 97%    Weight: 58.9 kg (129 lb 12.8 oz)    Height: 1.575 m (5' 2\")        Body mass index is 23.74 kg/m².       VS Reviewed  General Appearance: A&O x 3, No acute distress,well developed and well- nourished  Head: normocephalic, atraumatic  Eyes: pupils equal, round, and reactive to light, extraocular eye movements intact, conjunctivae normal  Neck: supple and non-tender without mass, no thyromegaly or thyroid nodules, no cervical lymphadenopathy  Pulmonary/Chest: clear to auscultation bilaterally- no wheezes, rales or rhonchi, harsh cough  Cardiovascular: S1 and S2 auscultated w/ RRR. No murmurs, rubs, clicks, or gallops, distal

## 2024-01-08 RX ORDER — PROCHLORPERAZINE 25 MG/1
SUPPOSITORY RECTAL
Qty: 1 EACH | Refills: 0 | Status: SHIPPED | OUTPATIENT
Start: 2024-01-08

## 2024-01-12 DIAGNOSIS — E11.9 TYPE 2 DIABETES MELLITUS WITHOUT COMPLICATION, WITH LONG-TERM CURRENT USE OF INSULIN (HCC): ICD-10-CM

## 2024-01-12 DIAGNOSIS — Z79.4 TYPE 2 DIABETES MELLITUS WITHOUT COMPLICATION, WITH LONG-TERM CURRENT USE OF INSULIN (HCC): ICD-10-CM

## 2024-01-15 RX ORDER — PROCHLORPERAZINE 25 MG/1
SUPPOSITORY RECTAL
Qty: 3 EACH | Refills: 0 | Status: SHIPPED | OUTPATIENT
Start: 2024-01-15

## 2024-01-25 ENCOUNTER — TELEPHONE (OUTPATIENT)
Dept: FAMILY MEDICINE CLINIC | Age: 39
End: 2024-01-25

## 2024-01-25 DIAGNOSIS — R41.840 POOR CONCENTRATION: Primary | ICD-10-CM

## 2024-01-25 DIAGNOSIS — F31.32 BIPOLAR DISORDER, CURRENT EPISODE DEPRESSED, MODERATE (HCC): ICD-10-CM

## 2024-01-25 DIAGNOSIS — E11.9 TYPE 2 DIABETES MELLITUS WITHOUT COMPLICATION, WITH LONG-TERM CURRENT USE OF INSULIN (HCC): ICD-10-CM

## 2024-01-25 DIAGNOSIS — Z79.4 TYPE 2 DIABETES MELLITUS WITHOUT COMPLICATION, WITH LONG-TERM CURRENT USE OF INSULIN (HCC): ICD-10-CM

## 2024-01-25 RX ORDER — ARIPIPRAZOLE 5 MG/1
5 TABLET ORAL DAILY
COMMUNITY

## 2024-01-25 RX ORDER — CITALOPRAM 40 MG/1
TABLET ORAL
COMMUNITY
Start: 2024-01-25

## 2024-01-25 RX ORDER — ARIPIPRAZOLE 5 MG/1
TABLET ORAL
COMMUNITY
Start: 2024-01-25 | End: 2024-01-25

## 2024-01-25 RX ORDER — QUETIAPINE FUMARATE 25 MG/1
TABLET, FILM COATED ORAL
COMMUNITY
Start: 2024-01-25

## 2024-01-25 RX ORDER — ONDANSETRON 4 MG/1
TABLET, FILM COATED ORAL
COMMUNITY
Start: 2024-01-19 | End: 2024-01-25 | Stop reason: SDUPTHER

## 2024-01-25 NOTE — TELEPHONE ENCOUNTER
So he would need to be referred to a psychologist for testing. They might have someone at [facility] or [facility] who can do the testing. If not, I can refer him.

## 2024-01-25 NOTE — TELEPHONE ENCOUNTER
Looks like med list updated. No, I'm not comfortable prescribing Ritalin for him. Due to his other psychiatric conditions, it really should be prescribed by a psychiatrist.     Did someone diagnose him with ADHD? If so, who?

## 2024-01-25 NOTE — TELEPHONE ENCOUNTER
Pt informed and verbalized understanding.     Pt has not been diagnosed with ADHD- he would like to be tested for it

## 2024-01-25 NOTE — TELEPHONE ENCOUNTER
Pt informed and verbalized understanding.   Pt didn't like the dr at [facility] so he went to [facility] and they recommended he call his PCP    Pt would like a referral placed -no preference on providers

## 2024-01-25 NOTE — TELEPHONE ENCOUNTER
Patient was seen at [facility] today and the counselor there has started him on abilify 5 mg po qd, changed celexa from 20 mg to 40 mg, and has lowered his seroquel from 50 mg to 25 mg to taper off to discontinue.   Patient states that the counselor is wanting to get him started on ritalin but can not prescribe  and would like to see if PCP is able to prescribe.    Does patient need an appointment to discuss this?

## 2024-01-25 NOTE — TELEPHONE ENCOUNTER
----- Message from Michelle Muñoz sent at 1/25/2024 12:37 PM EST -----  Subject: Appointment Request    Reason for Call: Established Patient Appointment needed: Routine Existing   Condition Follow Up    QUESTIONS    Reason for appointment request? No appointments available during search     Additional Information for Provider? Patient needs an appt with Nathan campo virtual visit to discuss meds from his behavioral health doctor. call   him if could get an apt before Monday.  ---------------------------------------------------------------------------  --------------  CALL BACK INFO  [phone]; OK to leave message on voicemail  ---------------------------------------------------------------------------  --------------  SCRIPT ANSWERS

## 2024-01-25 NOTE — TELEPHONE ENCOUNTER
Referral placed to Dr Bruce. I also use Dr Cabello, but he doesn't accept Medicaid. Dr Bruce will see him, but it could be easily 6 months to get an appt, GRACE.

## 2024-01-26 DIAGNOSIS — E11.9 TYPE 2 DIABETES MELLITUS WITHOUT COMPLICATION, WITH LONG-TERM CURRENT USE OF INSULIN (HCC): ICD-10-CM

## 2024-01-26 DIAGNOSIS — Z79.4 TYPE 2 DIABETES MELLITUS WITHOUT COMPLICATION, WITH LONG-TERM CURRENT USE OF INSULIN (HCC): ICD-10-CM

## 2024-01-26 RX ORDER — INSULIN ASPART 100 [IU]/ML
INJECTION, SOLUTION INTRAVENOUS; SUBCUTANEOUS
Qty: 15 ML | Refills: 10 | OUTPATIENT
Start: 2024-01-26

## 2024-01-26 RX ORDER — INSULIN ASPART 100 [IU]/ML
INJECTION, SOLUTION INTRAVENOUS; SUBCUTANEOUS
Qty: 15 ML | Refills: 10 | Status: SHIPPED | OUTPATIENT
Start: 2024-01-26

## 2024-01-26 NOTE — TELEPHONE ENCOUNTER
Recent Visits  Date Type Provider Dept   01/05/24 Office Visit Nathan López APRN - CNP [facility]   09/26/23 Office Visit Nathan López, BARB - CNP [facility]   07/19/23 Office Visit Nathan López, APRN - CNP [facility]   06/15/23 Office Visit Lenin Ro APRN - CNP [facility]   04/12/23 Office Visit Nathan López APRN - CNP [facility]   01/10/23 Office Visit Nathan López APRN - CNP [facility]   11/03/22 Office Visit Nathan López APRN - CNP [facility]   10/03/22 Office Visit Nathan López, BARB - CNP [facility]   Showing recent visits within past 540 days with a meds authorizing provider and meeting all other requirements  Future Appointments  Date Type Provider Dept   04/08/24 Appointment Nathan López APRN - CNP [facility]   Showing future appointments within next 150 days with a meds authorizing provider and meeting all other requirements

## 2024-02-14 DIAGNOSIS — L30.9 ECZEMA, UNSPECIFIED TYPE: ICD-10-CM

## 2024-03-25 DIAGNOSIS — E11.9 TYPE 2 DIABETES MELLITUS WITHOUT COMPLICATION, WITH LONG-TERM CURRENT USE OF INSULIN (HCC): ICD-10-CM

## 2024-03-25 DIAGNOSIS — Z79.4 TYPE 2 DIABETES MELLITUS WITHOUT COMPLICATION, WITH LONG-TERM CURRENT USE OF INSULIN (HCC): ICD-10-CM

## 2024-03-26 RX ORDER — PROCHLORPERAZINE 25 MG/1
SUPPOSITORY RECTAL
Qty: 3 EACH | Refills: 0 | Status: SHIPPED | OUTPATIENT
Start: 2024-03-26

## 2024-04-20 ENCOUNTER — HOSPITAL ENCOUNTER (EMERGENCY)
Age: 39
Discharge: HOME | End: 2024-04-20
Payer: COMMERCIAL

## 2024-04-20 VITALS
DIASTOLIC BLOOD PRESSURE: 75 MMHG | HEART RATE: 69 BPM | RESPIRATION RATE: 16 BRPM | OXYGEN SATURATION: 97 % | TEMPERATURE: 97.8 F | SYSTOLIC BLOOD PRESSURE: 141 MMHG

## 2024-04-20 DIAGNOSIS — R11.2 NAUSEA AND VOMITING, UNSPECIFIED VOMITING TYPE: Primary | ICD-10-CM

## 2024-04-20 LAB — GLUCOSE BLD STRIP.AUTO-MCNC: 207 MG/DL (ref 70–108)

## 2024-04-20 PROCEDURE — 82948 REAGENT STRIP/BLOOD GLUCOSE: CPT

## 2024-04-20 PROCEDURE — 96365 THER/PROPH/DIAG IV INF INIT: CPT

## 2024-04-20 PROCEDURE — 96361 HYDRATE IV INFUSION ADD-ON: CPT

## 2024-04-20 PROCEDURE — 6360000002 HC RX W HCPCS

## 2024-04-20 PROCEDURE — 96374 THER/PROPH/DIAG INJ IV PUSH: CPT

## 2024-04-20 PROCEDURE — 99214 OFFICE O/P EST MOD 30 MIN: CPT

## 2024-04-20 PROCEDURE — 99215 OFFICE O/P EST HI 40 MIN: CPT

## 2024-04-20 RX ORDER — 0.9 % SODIUM CHLORIDE 0.9 %
1000 INTRAVENOUS SOLUTION INTRAVENOUS ONCE
Status: DISCONTINUED | OUTPATIENT
Start: 2024-04-20 | End: 2024-04-20 | Stop reason: HOSPADM

## 2024-04-20 RX ORDER — ONDANSETRON 4 MG/1
4 TABLET, ORALLY DISINTEGRATING ORAL ONCE
Status: DISCONTINUED | OUTPATIENT
Start: 2024-04-20 | End: 2024-04-20

## 2024-04-20 RX ORDER — ONDANSETRON 2 MG/ML
4 INJECTION INTRAMUSCULAR; INTRAVENOUS ONCE
Status: COMPLETED | OUTPATIENT
Start: 2024-04-20 | End: 2024-04-20

## 2024-04-20 RX ADMIN — ONDANSETRON 4 MG: 2 INJECTION INTRAMUSCULAR; INTRAVENOUS at 10:37

## 2024-04-20 ASSESSMENT — ENCOUNTER SYMPTOMS
DIARRHEA: 0
ABDOMINAL PAIN: 1
NAUSEA: 1
VOMITING: 1

## 2024-04-20 NOTE — ED NOTES
To [facility] with complaints of vomiting for the past hour. States no abd pain initially, now having pain in lower mid abd. Pt states he has been trying to wean off of his suboxone for the past few days and is unsure if that is the problem. Pt dry heaving in room. INT started, zofran given. Accucheck 928     Susy Rios, MELISSA  04/20/24 0647

## 2024-04-20 NOTE — ED PROVIDER NOTES
[facility] URGENT CARE  Urgent Care Encounter       CHIEF COMPLAINT       Chief Complaint   Patient presents with    Emesis    Abdominal Pain       Nurses Notes reviewed and I agree except as noted in the HPI.  HISTORY OF PRESENT ILLNESS   Rodríguez Burt is a 38 y.o. male who presents with complaint of emesis, nausea, and abdominal pain.  Patient reports symptoms started an hour and a half ago.  Patient reports taking a Zofran at 8 AM.  Patient reports that this has happened in the past.  Patient reports that he is currently on Suboxone, but is currently trying to wean himself off.  Patient is a type II diabetic but is insulin-dependent.    The history is provided by the patient.       REVIEW OF SYSTEMS     Review of Systems   Gastrointestinal:  Positive for abdominal pain, nausea and vomiting. Negative for diarrhea.   All other systems reviewed and are negative.      PAST MEDICAL HISTORY         Diagnosis Date    Asthma     COPD (chronic obstructive pulmonary disease) ([facility])     Eczema     GERD (gastroesophageal reflux disease)     History of alcohol abuse     History of marijuana use     History of tobacco abuse     quit 11/21/2017    Hx of seasonal allergies     Pancreatitis     Psychiatric problem     PTSD (post-traumatic stress disorder)     Seizures (HCC)     etoh wdl    Type 2 diabetes mellitus without complication ([facility]) 12/09/2017       SURGICALHISTORY     Patient  has a past surgical history that includes Upper gastrointestinal endoscopy (Left, 5/6/2019); Eye surgery (Right, 09/2019); fracture surgery (Right, 2019); and Ankle arthroscopy (Right, 8/5/2020).    CURRENT MEDICATIONS       Previous Medications    ACETAMINOPHEN (TYLENOL) 500 MG TABLET    Take 2 tablets by mouth every 6 hours as needed for Pain    ACETONE, URINE, TEST STRIP    Test urine for ketones as needed for blood sugars over 250    ADVAIR DISKUS 500-50 MCG/ACT AEPB DISKUS INHALER    INHALE ONE (1) PUFF BY MOUTH EVERY 12 HOURS

## 2024-05-13 DIAGNOSIS — J45.41 MODERATE PERSISTENT ASTHMA WITH ACUTE EXACERBATION: ICD-10-CM

## 2024-05-14 RX ORDER — FLUTICASONE PROPIONATE AND SALMETEROL 50; 500 UG/1; UG/1
POWDER RESPIRATORY (INHALATION)
Qty: 60 EACH | Refills: 10 | Status: SHIPPED | OUTPATIENT
Start: 2024-05-14

## 2024-05-14 NOTE — TELEPHONE ENCOUNTER
No future appointments.   Recent Visits  Date Type Provider Dept   01/05/24 Office Visit Nathan López APRN - CNP [facility]   09/26/23 Office Visit Nathan López APRN - CNP [facility]   07/19/23 Office Visit Nathan López APRN - CNP [facility]   06/15/23 Office Visit Lenin Ro APRN - CNP [facility]   04/12/23 Office Visit Nathan López APRN - CNP [facility]   01/10/23 Office Visit Nathan López, BARB - CNP [facility]   Showing recent visits within past 540 days with a meds authorizing provider and meeting all other requirements  Future Appointments  No visits were found meeting these conditions.  Showing future appointments within next 150 days with a meds authorizing provider and meeting all other requirements

## 2024-08-12 DIAGNOSIS — F33.1 MAJOR DEPRESSIVE DISORDER, RECURRENT EPISODE, MODERATE WITH ANXIOUS DISTRESS (HCC): ICD-10-CM

## 2024-08-12 DIAGNOSIS — F10.930 ALCOHOL WITHDRAWAL SYNDROME WITHOUT COMPLICATION (HCC): ICD-10-CM

## 2024-08-13 RX ORDER — HYDROXYZINE PAMOATE 100 MG
CAPSULE ORAL
Qty: 60 CAPSULE | Refills: 10 | Status: SHIPPED | OUTPATIENT
Start: 2024-08-13

## 2024-08-13 NOTE — TELEPHONE ENCOUNTER
Future Appointments   Date Time Provider Department Center   8/15/2024 10:00 AM Nathan López, APRN - CNP [facility] DE[facility]

## 2024-08-15 ENCOUNTER — OFFICE VISIT (OUTPATIENT)
Dept: FAMILY MEDICINE CLINIC | Age: 39
End: 2024-08-15

## 2024-08-15 ENCOUNTER — TELEPHONE (OUTPATIENT)
Dept: FAMILY MEDICINE CLINIC | Age: 39
End: 2024-08-15

## 2024-08-15 VITALS
TEMPERATURE: 97.7 F | HEIGHT: 62 IN | DIASTOLIC BLOOD PRESSURE: 72 MMHG | HEART RATE: 67 BPM | SYSTOLIC BLOOD PRESSURE: 122 MMHG | OXYGEN SATURATION: 98 % | BODY MASS INDEX: 22.89 KG/M2 | RESPIRATION RATE: 12 BRPM | WEIGHT: 124.4 LBS

## 2024-08-15 DIAGNOSIS — R19.7 DIARRHEA, UNSPECIFIED TYPE: ICD-10-CM

## 2024-08-15 DIAGNOSIS — Z79.4 TYPE 2 DIABETES MELLITUS WITHOUT COMPLICATION, WITH LONG-TERM CURRENT USE OF INSULIN (HCC): Primary | ICD-10-CM

## 2024-08-15 DIAGNOSIS — J45.41 MODERATE PERSISTENT ASTHMA WITH ACUTE EXACERBATION: ICD-10-CM

## 2024-08-15 DIAGNOSIS — R06.02 SHORTNESS OF BREATH: ICD-10-CM

## 2024-08-15 DIAGNOSIS — E11.9 TYPE 2 DIABETES MELLITUS WITHOUT COMPLICATION, WITH LONG-TERM CURRENT USE OF INSULIN (HCC): Primary | ICD-10-CM

## 2024-08-15 DIAGNOSIS — R11.2 NAUSEA AND VOMITING, UNSPECIFIED VOMITING TYPE: ICD-10-CM

## 2024-08-15 DIAGNOSIS — K90.9 INTESTINAL MALABSORPTION, UNSPECIFIED TYPE: ICD-10-CM

## 2024-08-15 DIAGNOSIS — F10.21 ALCOHOL USE DISORDER, MODERATE, IN SUSTAINED REMISSION (HCC): ICD-10-CM

## 2024-08-15 LAB — HBA1C MFR BLD: 9.5 % (ref 4.3–5.7)

## 2024-08-15 RX ORDER — ALBUTEROL SULFATE 2.5 MG/3ML
2.5 SOLUTION RESPIRATORY (INHALATION) EVERY 4 HOURS PRN
Qty: 300 ML | Refills: 1 | Status: SHIPPED | OUTPATIENT
Start: 2024-08-15

## 2024-08-15 RX ORDER — INSULIN GLARGINE-YFGN 100 [IU]/ML
10 INJECTION, SOLUTION SUBCUTANEOUS 2 TIMES DAILY
Qty: 15 ML | Refills: 10 | Status: SHIPPED | OUTPATIENT
Start: 2024-08-15 | End: 2024-08-15

## 2024-08-15 RX ORDER — BLOOD-GLUCOSE METER
1 EACH MISCELLANEOUS DAILY
Qty: 1 KIT | Refills: 0 | Status: SHIPPED | OUTPATIENT
Start: 2024-08-15

## 2024-08-15 RX ORDER — LANCETS
1 EACH MISCELLANEOUS DAILY
Qty: 100 EACH | Refills: 3 | Status: SHIPPED | OUTPATIENT
Start: 2024-08-15

## 2024-08-15 RX ORDER — INSULIN GLARGINE 100 [IU]/ML
10 INJECTION, SOLUTION SUBCUTANEOUS 2 TIMES DAILY
Qty: 5 ADJUSTABLE DOSE PRE-FILLED PEN SYRINGE | Refills: 3 | Status: SHIPPED | OUTPATIENT
Start: 2024-08-15

## 2024-08-15 RX ORDER — MONTELUKAST SODIUM 10 MG/1
10 TABLET ORAL DAILY
Qty: 90 TABLET | Refills: 1 | Status: SHIPPED | OUTPATIENT
Start: 2024-08-15

## 2024-08-15 RX ORDER — ALBUTEROL SULFATE 90 UG/1
2 AEROSOL, METERED RESPIRATORY (INHALATION) 4 TIMES DAILY PRN
Qty: 8 G | Refills: 2 | Status: SHIPPED | OUTPATIENT
Start: 2024-08-15

## 2024-08-15 RX ORDER — ONDANSETRON 8 MG/1
8 TABLET, ORALLY DISINTEGRATING ORAL 3 TIMES DAILY PRN
Qty: 60 TABLET | Refills: 1 | Status: SHIPPED | OUTPATIENT
Start: 2024-08-15

## 2024-08-15 SDOH — ECONOMIC STABILITY: FOOD INSECURITY: WITHIN THE PAST 12 MONTHS, THE FOOD YOU BOUGHT JUST DIDN'T LAST AND YOU DIDN'T HAVE MONEY TO GET MORE.: NEVER TRUE

## 2024-08-15 SDOH — ECONOMIC STABILITY: INCOME INSECURITY: HOW HARD IS IT FOR YOU TO PAY FOR THE VERY BASICS LIKE FOOD, HOUSING, MEDICAL CARE, AND HEATING?: NOT HARD AT ALL

## 2024-08-15 SDOH — ECONOMIC STABILITY: FOOD INSECURITY: WITHIN THE PAST 12 MONTHS, YOU WORRIED THAT YOUR FOOD WOULD RUN OUT BEFORE YOU GOT MONEY TO BUY MORE.: NEVER TRUE

## 2024-08-15 NOTE — TELEPHONE ENCOUNTER
Insulin Glargine - yfgn ( Semglee ) is not covered under patients plan.     Coverage is provided when the member has a history of at least 120 days of therapy with two preferred medications having a similar duration of action as the requested medication, which include but are not limited to:     Lantus (brand covered by the plan), Levemir, and Toujeo.     Please advise, thank you.

## 2024-08-15 NOTE — PROGRESS NOTES
Chief Complaint   Patient presents with    Diabetes     Has not been checking BS, States stopped insulin because he was having low BS. Also red left eye, started yesterday. Doesn't hurt or itch. No known injury    Health Maintenance     Has eye appointment on the 28th       History obtained from chart review and the patient.    SUBJECTIVE:  Rodríguez Burt is a 38 y.o. male that presents today for follow up DM    Hasn't been back to the diabetic center    Needs an EKG done for his suboxone clinic ([employer])  Would like this done today    Has been having loose diarrhea stools for a couple months  He has also been having mucous, oily stools    Diabetes Type 2    Glucose control:   Does patient check blood glucoses at home?  Yes - he was having really low blood sugars, so he stopped checking sugars and also taking his long acting insulins  Report of hypoglycemia: no  Lab Results   Component Value Date    LABA1C 9.5 (H) 08/15/2024     Lab Results   Component Value Date     (H) 12/29/2020       Symptoms  Polyuria, Polydipsia or Polyphagia?   No  Chest Pain, SOB, or Palpitations? -  No  New Vision complaints? No  Paresthesias of the extremities?  No    Medications  Current medication were reviewed.  Compliant with medications?  Yes, taking Semglee 27 units AM,  Novolog 10 units TID  Medication side effects?  No  On ACE-I or ARB?  No  On antiplatelet therapy?  No  On Statin?  No    Last Diabetic Eye Exam: needs    Exercise  Exercise? Yes he is exercising almost every day  Wt Readings from Last 3 Encounters:   08/15/24 56.4 kg (124 lb 6.4 oz)   01/05/24 58.9 kg (129 lb 12.8 oz)   10/24/23 60.7 kg (133 lb 12.8 oz)       Diet discipline?:  Low salt, fat, sugar diet?  Much better    Blood pressure control:  BP Readings from Last 3 Encounters:   08/15/24 122/72   04/20/24 (!) 141/75   01/05/24 106/66     No results found for: \"LDLDIRECT\"    Asthma  Asthma is doing well  He is compliant with taking his

## 2024-09-11 ENCOUNTER — PATIENT MESSAGE (OUTPATIENT)
Dept: FAMILY MEDICINE CLINIC | Age: 39
End: 2024-09-11

## 2024-09-11 DIAGNOSIS — K21.9 GASTROESOPHAGEAL REFLUX DISEASE, UNSPECIFIED WHETHER ESOPHAGITIS PRESENT: ICD-10-CM

## 2024-09-11 DIAGNOSIS — J45.41 MODERATE PERSISTENT ASTHMA WITH ACUTE EXACERBATION: Primary | ICD-10-CM

## 2024-09-11 DIAGNOSIS — R07.89 ATYPICAL CHEST PAIN: ICD-10-CM

## 2024-09-11 RX ORDER — NEBULIZER ACCESSORIES
KIT MISCELLANEOUS
Qty: 1 KIT | Refills: 0 | Status: SHIPPED | OUTPATIENT
Start: 2024-09-11

## 2024-09-12 RX ORDER — OMEPRAZOLE 40 MG/1
CAPSULE, DELAYED RELEASE ORAL
Qty: 30 CAPSULE | Refills: 10 | Status: SHIPPED | OUTPATIENT
Start: 2024-09-12

## 2024-09-18 ENCOUNTER — PATIENT MESSAGE (OUTPATIENT)
Dept: FAMILY MEDICINE CLINIC | Age: 39
End: 2024-09-18

## 2024-09-20 DIAGNOSIS — E11.9 TYPE 2 DIABETES MELLITUS WITHOUT COMPLICATION, WITH LONG-TERM CURRENT USE OF INSULIN (HCC): ICD-10-CM

## 2024-09-20 DIAGNOSIS — Z79.4 TYPE 2 DIABETES MELLITUS WITHOUT COMPLICATION, WITH LONG-TERM CURRENT USE OF INSULIN (HCC): ICD-10-CM

## 2024-09-23 RX ORDER — INSULIN ASPART 100 [IU]/ML
INJECTION, SOLUTION INTRAVENOUS; SUBCUTANEOUS
Qty: 15 ML | Refills: 10 | Status: SHIPPED | OUTPATIENT
Start: 2024-09-23

## 2024-10-26 DIAGNOSIS — J45.41 MODERATE PERSISTENT ASTHMA WITH ACUTE EXACERBATION: ICD-10-CM

## 2024-10-28 RX ORDER — ALBUTEROL SULFATE 90 UG/1
INHALANT RESPIRATORY (INHALATION)
Qty: 9 G | Refills: 1 | Status: SHIPPED | OUTPATIENT
Start: 2024-10-28

## 2024-10-28 NOTE — TELEPHONE ENCOUNTER
Recent Visits  Date Type Provider Dept   08/15/24 Office Visit Nathan López APRN - CNP [facility]   01/05/24 Office Visit Nathan López APRN - CNP [facility]   09/26/23 Office Visit Nathan López APRN - CNP [facility]   07/19/23 Office Visit Nathan López APRN - CNP [facility]   06/15/23 Office Visit Lenin Ro APRN - CNP [facility]   Showing recent visits within past 540 days with a meds authorizing provider and meeting all other requirements  Future Appointments  Date Type Provider Dept   11/15/24 Appointment Nathan López APRN - CNP [facility]   Showing future appointments within next 150 days with a meds authorizing provider and meeting all other requirements

## 2024-11-06 ENCOUNTER — HOSPITAL ENCOUNTER (EMERGENCY)
Age: 39
Discharge: HOME | End: 2024-11-06
Payer: MEDICAID

## 2024-11-06 VITALS
WEIGHT: 117 LBS | OXYGEN SATURATION: 99 % | BODY MASS INDEX: 21.53 KG/M2 | RESPIRATION RATE: 16 BRPM | DIASTOLIC BLOOD PRESSURE: 81 MMHG | HEART RATE: 81 BPM | TEMPERATURE: 98.6 F | SYSTOLIC BLOOD PRESSURE: 114 MMHG | HEIGHT: 62 IN

## 2024-11-06 DIAGNOSIS — J44.1 CHRONIC OBSTRUCTIVE PULMONARY DISEASE WITH ACUTE EXACERBATION (HCC): ICD-10-CM

## 2024-11-06 DIAGNOSIS — J06.9 ACUTE UPPER RESPIRATORY INFECTION: Primary | ICD-10-CM

## 2024-11-06 PROCEDURE — 99213 OFFICE O/P EST LOW 20 MIN: CPT

## 2024-11-06 RX ORDER — AZITHROMYCIN 250 MG/1
TABLET, FILM COATED ORAL
Qty: 6 TABLET | Refills: 0 | Status: SHIPPED | OUTPATIENT
Start: 2024-11-06 | End: 2024-11-16

## 2024-11-06 ASSESSMENT — ENCOUNTER SYMPTOMS
COUGH: 1
EYES NEGATIVE: 1
GASTROINTESTINAL NEGATIVE: 1
SINUS PRESSURE: 1

## 2024-11-06 ASSESSMENT — PAIN DESCRIPTION - LOCATION: LOCATION: GENERALIZED

## 2024-11-06 ASSESSMENT — PAIN DESCRIPTION - DESCRIPTORS: DESCRIPTORS: ACHING

## 2024-11-06 ASSESSMENT — PAIN - FUNCTIONAL ASSESSMENT
PAIN_FUNCTIONAL_ASSESSMENT: 0-10
PAIN_FUNCTIONAL_ASSESSMENT: ACTIVITIES ARE NOT PREVENTED

## 2024-11-06 NOTE — ED TRIAGE NOTES
Patient to UC for bilateral ear pain, sore throat, muscle aches, cough, congestion and headache. States symptoms started 11/5. States he has been using dayquil and nyquil with no relief.

## 2024-11-06 NOTE — ED PROVIDER NOTES
[facility] URGENT CARE  UrgentCare Encounter      CHIEFCOMPLAINT       Chief Complaint   Patient presents with    Ear Pain    Pharyngitis    Cough    Muscle Pain    Headache    Chest Congestion       Nurses Notes reviewed and I agree except as noted in the HPI.  HISTORY OF PRESENT ILLNESS   Rodríguez Burt is a 39 y.o. male who presents with symptoms of bilateral ear pain, sore throat, cough, body aches, headache and chest congestion that started last night.  Patient states he thinks he has a sinus infection.  Patient states he does have a history of COPD.  And thinks he needs an antibiotic.    REVIEW OF SYSTEMS     Review of Systems   Constitutional:  Negative for fatigue.   HENT:  Positive for congestion, ear pain and sinus pressure.    Eyes: Negative.    Respiratory:  Positive for cough.    Cardiovascular: Negative.    Gastrointestinal: Negative.    Endocrine: Negative.    Genitourinary: Negative.    Musculoskeletal:  Positive for myalgias.   Skin: Negative.    Allergic/Immunologic: Positive for environmental allergies.   Neurological:  Positive for headaches.       PAST MEDICAL HISTORY         Diagnosis Date    Asthma     COPD (chronic obstructive pulmonary disease) (HCC)     Eczema     GERD (gastroesophageal reflux disease)     History of alcohol abuse     History of marijuana use     History of tobacco abuse     quit 11/21/2017    Hx of seasonal allergies     Pancreatitis     Psychiatric problem     PTSD (post-traumatic stress disorder)     Seizures (HCC)     etoh wdl    Type 2 diabetes mellitus without complication ([facility]) 12/09/2017       SURGICAL HISTORY     Patient  has a past surgical history that includes Upper gastrointestinal endoscopy (Left, 5/6/2019); Eye surgery (Right, 09/2019); fracture surgery (Right, 2019); and Ankle arthroscopy (Right, 8/5/2020).    CURRENT MEDICATIONS       Discharge Medication List as of 11/6/2024 10:37 AM        CONTINUE these medications which have NOT CHANGED

## 2024-12-01 ENCOUNTER — HOSPITAL ENCOUNTER (EMERGENCY)
Age: 39
Discharge: PSYCHIATRIC OR SUBSTANCE USE FACILITY | End: 2024-12-02
Payer: MEDICAID

## 2024-12-01 DIAGNOSIS — R45.851 DEPRESSION WITH SUICIDAL IDEATION: Primary | ICD-10-CM

## 2024-12-01 DIAGNOSIS — F32.A DEPRESSION WITH SUICIDAL IDEATION: Primary | ICD-10-CM

## 2024-12-01 DIAGNOSIS — F10.920 ACUTE ALCOHOLIC INTOXICATION WITHOUT COMPLICATION (HCC): ICD-10-CM

## 2024-12-01 LAB
ALBUMIN SERPL BCG-MCNC: 4.1 G/DL (ref 3.5–5.1)
ALP SERPL-CCNC: 133 U/L (ref 38–126)
ALT SERPL W/O P-5'-P-CCNC: 41 U/L (ref 11–66)
ANION GAP SERPL CALC-SCNC: 13 MEQ/L (ref 8–16)
APAP SERPL-MCNC: < 5 UG/ML (ref 0–20)
AST SERPL-CCNC: 28 U/L (ref 5–40)
BASOPHILS ABSOLUTE: 0.1 THOU/MM3 (ref 0–0.1)
BASOPHILS NFR BLD AUTO: 0.5 %
BILIRUB CONJ SERPL-MCNC: < 0.1 MG/DL (ref 0.1–13.8)
BILIRUB SERPL-MCNC: 0.2 MG/DL (ref 0.3–1.2)
BUN SERPL-MCNC: 7 MG/DL (ref 7–22)
CALCIUM SERPL-MCNC: 8.5 MG/DL (ref 8.5–10.5)
CHLORIDE SERPL-SCNC: 111 MEQ/L (ref 98–111)
CO2 SERPL-SCNC: 21 MEQ/L (ref 23–33)
CREAT SERPL-MCNC: 0.4 MG/DL (ref 0.4–1.2)
DEPRECATED RDW RBC AUTO: 45.5 FL (ref 35–45)
EOSINOPHIL NFR BLD AUTO: 1.7 %
EOSINOPHILS ABSOLUTE: 0.2 THOU/MM3 (ref 0–0.4)
ERYTHROCYTE [DISTWIDTH] IN BLOOD BY AUTOMATED COUNT: 13.4 % (ref 11.5–14.5)
ETHANOL SERPL-MCNC: 0.21 % (ref 0–0.08)
GFR SERPL CREATININE-BSD FRML MDRD: > 90 ML/MIN/1.73M2
GLUCOSE SERPL-MCNC: 101 MG/DL (ref 70–108)
HCT VFR BLD AUTO: 40.4 % (ref 42–52)
HGB BLD-MCNC: 13.9 GM/DL (ref 14–18)
IMM GRANULOCYTES # BLD AUTO: 0.04 THOU/MM3 (ref 0–0.07)
IMM GRANULOCYTES NFR BLD AUTO: 0.3 %
LYMPHOCYTES ABSOLUTE: 3 THOU/MM3 (ref 1–4.8)
LYMPHOCYTES NFR BLD AUTO: 25.5 %
MCH RBC QN AUTO: 31.4 PG (ref 26–33)
MCHC RBC AUTO-ENTMCNC: 34.4 GM/DL (ref 32.2–35.5)
MCV RBC AUTO: 91.4 FL (ref 80–94)
MONOCYTES ABSOLUTE: 0.8 THOU/MM3 (ref 0.4–1.3)
MONOCYTES NFR BLD AUTO: 6.9 %
NEUTROPHILS ABSOLUTE: 7.7 THOU/MM3 (ref 1.8–7.7)
NEUTROPHILS NFR BLD AUTO: 65.1 %
NRBC BLD AUTO-RTO: 0 /100 WBC
OSMOLALITY SERPL CALC.SUM OF ELEC: 286.8 MOSMOL/KG (ref 275–300)
PLATELET # BLD AUTO: 283 THOU/MM3 (ref 130–400)
PMV BLD AUTO: 9.5 FL (ref 9.4–12.4)
POTASSIUM SERPL-SCNC: 3.4 MEQ/L (ref 3.5–5.2)
PROT SERPL-MCNC: 6.5 G/DL (ref 6.1–8)
RBC # BLD AUTO: 4.42 MILL/MM3 (ref 4.7–6.1)
SALICYLATES SERPL-MCNC: < 0.3 MG/DL (ref 2–10)
SODIUM SERPL-SCNC: 145 MEQ/L (ref 135–145)
TSH SERPL DL<=0.005 MIU/L-ACNC: 0.44 UIU/ML (ref 0.4–4.2)
WBC # BLD AUTO: 11.9 THOU/MM3 (ref 4.8–10.8)

## 2024-12-01 PROCEDURE — 36415 COLL VENOUS BLD VENIPUNCTURE: CPT

## 2024-12-01 PROCEDURE — 84443 ASSAY THYROID STIM HORMONE: CPT

## 2024-12-01 PROCEDURE — 99285 EMERGENCY DEPT VISIT HI MDM: CPT

## 2024-12-01 PROCEDURE — 85025 COMPLETE CBC W/AUTO DIFF WBC: CPT

## 2024-12-01 PROCEDURE — 80143 DRUG ASSAY ACETAMINOPHEN: CPT

## 2024-12-01 PROCEDURE — 80179 DRUG ASSAY SALICYLATE: CPT

## 2024-12-01 PROCEDURE — 6370000000 HC RX 637 (ALT 250 FOR IP): Performed by: PHYSICIAN ASSISTANT

## 2024-12-01 PROCEDURE — 80053 COMPREHEN METABOLIC PANEL: CPT

## 2024-12-01 PROCEDURE — 82248 BILIRUBIN DIRECT: CPT

## 2024-12-01 PROCEDURE — 82077 ASSAY SPEC XCP UR&BREATH IA: CPT

## 2024-12-01 RX ORDER — NICOTINE 21 MG/24HR
1 PATCH, TRANSDERMAL 24 HOURS TRANSDERMAL DAILY
Status: DISCONTINUED | OUTPATIENT
Start: 2024-12-02 | End: 2024-12-01

## 2024-12-01 RX ORDER — NICOTINE 21 MG/24HR
1 PATCH, TRANSDERMAL 24 HOURS TRANSDERMAL ONCE
Status: DISCONTINUED | OUTPATIENT
Start: 2024-12-01 | End: 2024-12-02 | Stop reason: HOSPADM

## 2024-12-01 ASSESSMENT — PATIENT HEALTH QUESTIONNAIRE - PHQ9
SUM OF ALL RESPONSES TO PHQ QUESTIONS 1-9: 22
5. POOR APPETITE OR OVEREATING: MORE THAN HALF THE DAYS
6. FEELING BAD ABOUT YOURSELF - OR THAT YOU ARE A FAILURE OR HAVE LET YOURSELF OR YOUR FAMILY DOWN: NEARLY EVERY DAY
3. TROUBLE FALLING OR STAYING ASLEEP: NEARLY EVERY DAY
SUM OF ALL RESPONSES TO PHQ9 QUESTIONS 1 & 2: 4
SUM OF ALL RESPONSES TO PHQ QUESTIONS 1-9: 20
10. IF YOU CHECKED OFF ANY PROBLEMS, HOW DIFFICULT HAVE THESE PROBLEMS MADE IT FOR YOU TO DO YOUR WORK, TAKE CARE OF THINGS AT HOME, OR GET ALONG WITH OTHER PEOPLE: EXTREMELY DIFFICULT
7. TROUBLE CONCENTRATING ON THINGS, SUCH AS READING THE NEWSPAPER OR WATCHING TELEVISION: NEARLY EVERY DAY
9. THOUGHTS THAT YOU WOULD BE BETTER OFF DEAD, OR OF HURTING YOURSELF: MORE THAN HALF THE DAYS
2. FEELING DOWN, DEPRESSED OR HOPELESS: MORE THAN HALF THE DAYS
4. FEELING TIRED OR HAVING LITTLE ENERGY: MORE THAN HALF THE DAYS
SUM OF ALL RESPONSES TO PHQ QUESTIONS 1-9: 22
8. MOVING OR SPEAKING SO SLOWLY THAT OTHER PEOPLE COULD HAVE NOTICED. OR THE OPPOSITE, BEING SO FIGETY OR RESTLESS THAT YOU HAVE BEEN MOVING AROUND A LOT MORE THAN USUAL: NEARLY EVERY DAY
1. LITTLE INTEREST OR PLEASURE IN DOING THINGS: MORE THAN HALF THE DAYS
SUM OF ALL RESPONSES TO PHQ QUESTIONS 1-9: 22

## 2024-12-01 ASSESSMENT — SLEEP AND FATIGUE QUESTIONNAIRES
DO YOU USE A SLEEP AID: NO
DO YOU HAVE DIFFICULTY SLEEPING: YES
SLEEP PATTERN: DIFFICULTY FALLING ASLEEP

## 2024-12-01 ASSESSMENT — PAIN - FUNCTIONAL ASSESSMENT
PAIN_FUNCTIONAL_ASSESSMENT: NONE - DENIES PAIN
PAIN_FUNCTIONAL_ASSESSMENT: NONE - DENIES PAIN

## 2024-12-01 ASSESSMENT — LIFESTYLE VARIABLES
HOW MANY STANDARD DRINKS CONTAINING ALCOHOL DO YOU HAVE ON A TYPICAL DAY: 3 OR 4
HOW OFTEN DO YOU HAVE A DRINK CONTAINING ALCOHOL: MONTHLY OR LESS

## 2024-12-02 VITALS
OXYGEN SATURATION: 100 % | RESPIRATION RATE: 18 BRPM | HEIGHT: 62 IN | BODY MASS INDEX: 22.82 KG/M2 | TEMPERATURE: 98.2 F | WEIGHT: 124 LBS | SYSTOLIC BLOOD PRESSURE: 127 MMHG | HEART RATE: 90 BPM | DIASTOLIC BLOOD PRESSURE: 75 MMHG

## 2024-12-02 LAB
AMPHETAMINES UR QL SCN: NEGATIVE
BARBITURATES UR QL SCN: NEGATIVE
BENZODIAZ UR QL SCN: NEGATIVE
BILIRUB UR QL STRIP.AUTO: NEGATIVE
BZE UR QL SCN: NEGATIVE
CANNABINOIDS UR QL SCN: POSITIVE
CHARACTER UR: CLEAR
COLOR, UA: YELLOW
ETHANOL SERPL-MCNC: 0.08 % (ref 0–0.08)
ETHANOL SERPL-MCNC: 0.12 % (ref 0–0.08)
FENTANYL: NEGATIVE
GLUCOSE UR QL STRIP.AUTO: NEGATIVE MG/DL
HGB UR QL STRIP.AUTO: NEGATIVE
KETONES UR QL STRIP.AUTO: 15
NITRITE UR QL STRIP: NEGATIVE
OPIATES UR QL SCN: NEGATIVE
OXYCODONE: NEGATIVE
PCP UR QL SCN: NEGATIVE
PH UR STRIP.AUTO: 5.5 [PH] (ref 5–9)
PROT UR STRIP.AUTO-MCNC: NEGATIVE MG/DL
SP GR UR REFRACT.AUTO: 1.02 (ref 1–1.03)
UROBILINOGEN, URINE: 0.2 EU/DL (ref 0–1)
WBC #/AREA URNS HPF: NEGATIVE /[HPF]

## 2024-12-02 PROCEDURE — 6370000000 HC RX 637 (ALT 250 FOR IP): Performed by: PHYSICIAN ASSISTANT

## 2024-12-02 PROCEDURE — 80307 DRUG TEST PRSMV CHEM ANLYZR: CPT

## 2024-12-02 PROCEDURE — 81003 URINALYSIS AUTO W/O SCOPE: CPT

## 2024-12-02 PROCEDURE — 82077 ASSAY SPEC XCP UR&BREATH IA: CPT

## 2024-12-02 RX ORDER — ONDANSETRON 4 MG/1
4 TABLET, ORALLY DISINTEGRATING ORAL ONCE
Status: COMPLETED | OUTPATIENT
Start: 2024-12-02 | End: 2024-12-02

## 2024-12-02 RX ORDER — BUPRENORPHINE AND NALOXONE 8; 2 MG/1; MG/1
1 FILM, SOLUBLE BUCCAL; SUBLINGUAL DAILY
Status: DISCONTINUED | OUTPATIENT
Start: 2024-12-02 | End: 2024-12-02 | Stop reason: HOSPADM

## 2024-12-02 RX ADMIN — BUPRENORPHINE AND NALOXONE 1 FILM: 8; 2 FILM BUCCAL; SUBLINGUAL at 10:50

## 2024-12-02 RX ADMIN — ONDANSETRON 4 MG: 4 TABLET, ORALLY DISINTEGRATING ORAL at 04:09

## 2024-12-02 ASSESSMENT — PAIN - FUNCTIONAL ASSESSMENT: PAIN_FUNCTIONAL_ASSESSMENT: NONE - DENIES PAIN

## 2024-12-02 NOTE — ED NOTES
Urine sample collected at this time. Pt returned to cot and resting with eyes closed and lights turned off. Constant monitoring in place.

## 2024-12-02 NOTE — ED NOTES
Pt resting on cot with eyes closed. Respirations even and unlabored. No needs voiced. Constant monitor in place. Ligature safe room.

## 2024-12-02 NOTE — PROGRESS NOTES
Update given to pt on plan of care and acceptance. Gave him phone number for Howie Sher and contact number. Pt requests pt safe room phone, phone given.

## 2024-12-02 NOTE — ED NOTES
Pt resting quietly in bed. Respirations even and unlabored. Call light in reach. Pt voices no further questions or concerns. Constant observation in place.

## 2024-12-02 NOTE — ED NOTES
Pt resting on cot with eyes closed. Respirations even and unlabored. No needs voiced. Pt denies urge to urinate at this time. Constant monitor in place.

## 2024-12-02 NOTE — ED NOTES
Pt resting on cot with eyes closed. Pt remands in ligature safe area with constant monitor. Respirations even and unlabored.

## 2024-12-02 NOTE — ED PROVIDER NOTES
[facility] EMERGENCY DEPT      Pt Name: Rodríguez Burt  MRN: [id number]  Birthdate 1985  Date of evaluation: 2024  Provider: Cindy Madden PA-C    CHIEF COMPLAINT       Chief Complaint   Patient presents with    Suicidal       Nurses Notes reviewed and I agree except as noted in the HPI.      HISTORY OF PRESENT ILLNESS    Rodríguez Burt is a 39 y.o. male who presents from home driven by mother for suicidal ideation.  Patient reports being suicidal with a plan to hang himself in his basement.  He has had prior attempts with the last being 2 years ago.  Patient's mother called, he told her his plan, and she came over to get him.  Patient's father was in senior living and  by hanging himself when the patient was 14.      Patient admits to being an alcoholic and relapsed this past week.  He reports nausea and headache due to hangover.  He has not been sleeping well, sleeping only a few hours per day.  Patient affirms dizziness and auditory hallucinations described as noise from a TV but the TV is not on.  Patient denies hearing voices or visual hallucinations.      Patient denies fever, chills, chest pain, dyspnea, abdominal pain, headache, blurred vision, vomiting, URI symptoms, any falls, or other complaints.  Patient uses medical marijuana but denies illicit drug use.  Patient is an insulin-dependent diabetic but reports his sugars have been normal.    REVIEW OF SYSTEMS     Review of Systems   Constitutional:  Negative for appetite change and fever.   HENT:  Negative for congestion, rhinorrhea and sore throat.    Eyes:  Negative for visual disturbance.   Respiratory:  Negative for cough and shortness of breath.    Cardiovascular:  Negative for chest pain.   Gastrointestinal:  Positive for nausea. Negative for abdominal pain, diarrhea and vomiting.   Genitourinary:  Negative for decreased urine volume.   Musculoskeletal:  Negative for gait problem.   Skin:  Negative for rash and wound.

## 2024-12-02 NOTE — PROGRESS NOTES
[facility] CRISIS ASSESSMENT    SITUATION  Chief Complaint per ED Provider or Assigned Nurse report:   Suicidal ideation     Chief Complaint per Patient report  Suicidal thoughts.     Chief Complaint per Collateral contact report (Identify who and if they are present with the patient or if contacted by phone)      If collateral was not obtained why (if obtained then NA):      Provisional Diagnosis (ICD or DSM approved diagnosis only) : Major Depressive Disorder Recurrent Moderate without Psychotic Features.      BACKGROUND  Risk, Psychosocial and Contextual Factors: (EXAMPLE - homeless, lack of social support, lack of family, unemployed, debt, legal, etc.): , history of self harm .    Protective Factors:  Active outpatient care for mental health/substance use treatment. Four children, employment.     Current MH Treatment: [facility] and online therapy.       Past MH Treatment or Hospitalization (Previous 6 months):      [facility], and online therapy.    Present Suicidal Behavior (Include specific information below):      Verbal:  xxxxx    Attempt:  Denies    Access to Weapons:   Denies    Access to the Means of self harm or harm to others identified:   Denies     C-SSRS Current Suicide Risk: Low, Moderate or High:    High      Past Suicidal Behavior (Include specific information below):       Verbal:  xxx    Attempts:   xxxx    Self-Injurious/Self-Mutilation: (Specify what, how often, last time, method, etc.)   Denies    Traumatic Event Within Past 2 Weeks: (Specify)  Denies    Current Abuse:  (type, perpetrator, systems involved, injuries, etc.)  Denies      Legal Involvement: (Specify)   Denies    Violence: (Specify)   Denies    Housing:   Stable housing.     CPAP/Oxygen/Ambulation Difficulties Provide pertinent results HERE.  (\"See H&P\" or \"Record\" is not acceptable response): COPD, Asthma    Critical Lab Results (Provide pertinent results HERE.  \"See H&P\" or \"Record\" is not acceptable response.:

## 2024-12-02 NOTE — ED NOTES
Pt resting on cot with lights off. Pt given glass of ice water at this time. Respirations unlabored. Pt stating is nauseous at this time. Provider notified. Pt remains in ligature resistant room with constant monitoring.

## 2024-12-02 NOTE — PROGRESS NOTES
BAL 24 Hour Re-Assess:   Status & Exam & Behavior Support Service Tabs      Present Suicidal Behavior:      Verbal: xxxx    Plan:  xxxxx    Current Suicide Risk: Low, Moderate or High: High      Present Homicidal Behavior:    Verbal:  Denies    Plan:  Denies      Psychosis:    Hallucinations: Patient denies current hallucinations but states recently he 'heard the TV when it was not on'.     Delusions:  Denies      Clinical Re-Assessment Summary including Current Mental State of Patient:       Patient is a [age] year old male presenting alone to the ER. Patient is  with four children. Patient is employed at [employer]. Patient reports he began consuming alcohol after two years of sobriety. Patient reports 'I gave up today and said f'.. it'. Patient states 'I was tired , I was done'. Patient reports suicidal thoughts with a plan to hang himself in the basement. Patient reports his father hung himself on his [age] birthday. Patient has a history of past attempts for suicide. Patient has active outpatient care with [facility] for psychiatry. Patient has online therapy. Patient reports he hears the television at times when it is turned off. Patient reports he is not certain if he is hallucinating  or 'just thinking'. Patient denies thought plan or intent to harm others.     Patient is alert and oriented by four. Patient is cooperative with the interview. Patient reports current suicidal thoughts with a plan to hang himself. Patient reports his family is 'mad' at him for various reasons. Patient reports increase in social stressors. Patient is tearful during the interview.        Updated Level of Care Disposition:   Consulted with Yonathan Burch PA-C concerning the mental status of patient.   Consulted with Dr. Carter concerning the mental status of patient. Per 7E staff there are no male beds and no pending discharges for male beds. Patient to be transferred. Updated ER staff on plan of care. Updated

## 2024-12-02 NOTE — ED NOTES
Blood work collected at this time. Pt given glass of ice water. Pt remains in constant monitoring. No needs voiced. Respirations even and unlabored.

## 2024-12-02 NOTE — ED NOTES
Pt appears to be resting on cot with eyes closed and lights off. No distress noted. RR even and unlabored.

## 2024-12-02 NOTE — ED NOTES
Pt resting on cot. Patient is making phone calls. States that he has a tele health visit that he needs to cancel. No distress noted.

## 2024-12-02 NOTE — ED TRIAGE NOTES
Pt to ED with c/o suicidal ideation. Pt reports that he relapsed drinking alcohol earlier this morning. Pt states that his mom found home passed out and wanted him to be seen. Pt has hx of suicide attempts in his lifetime. States his plan is to hang himself. Pt tearful but cooperative on arrival. Patient placed in safe room that is ligature resistant with continuous monitoring in place. Provider notified, requested an assessment by behavioral health . Patient belongings secured in a locked lockers outside of the room. Explained suicide prevention precautions to the patient including constant observer.

## 2024-12-02 NOTE — PROGRESS NOTES
0945  Pt requesting his suboxone which is on him in locker. Notified ER provider Yonathan JACOME. Pt does not want to go into withdrawal. Breakfast tray also given.    1000  Per [facility],    Caller's Name:Gina      Bed Number:Assigned upon arrival     Level of Care:     Report Number:[phone] ex [id number]     Accepting MD (Full name):Cornelius Morales MD     [facility]ing Facility:[facility]      Address to accepting facility:[address] [place] [address]  [place]  Did you read back and verify address to caller:YES    They state transport is 2pm via [facility]. They will need transport paperwork.    Notified Er staff.

## 2024-12-02 NOTE — PROGRESS NOTES
0830  Handover from Clinician Krissy. Pt to be transferred for inpatient psychiatric treatment. No male beds on 7E at this time per report.  0871  Report given to Queenie TORRES with [facility]. Placement to be sought.

## 2024-12-02 NOTE — ED PROVIDER NOTES
Patient was signed out to me by off going APC, Cindy Jacobo.  Patient is currently being observed for concern of suicidal ideation.  Patient was originally driven here by his mother.  Apparently he states he wants to hang himself.  He has had prior attempts.  History includes his father having hung himself when the patient was 14.  Patient admits to alcoholism he did relapse last week.  He has tried getting on confirmed amount in the last 24 hours.  He is not having any shaking or seizure activity.  Patient did report nausea and a slight headache.  He denies any visual disturbances he states he feels noise in his head and some syncope.  He denies any recent injury or illness otherwise.  Patient is an insulin-dependent diabetic.  Patient was resting comfortably in his room.  Heart lung sounds are normal.  He does smell of alcohol.  Abdomen is nontender.  Patient is depressed and tearful.  GCS of 15.  He does have a suicidal plan and ideation.  Patient's alcohol value on arrival was too high to be evaluated by social work at the time.  Redraw on my arrival at 7 AM was 0.08 social work and psych was consulted at this time.    Behavioral health does need to be admitted.  Patient does not present any obvious medical issues that would preclude him from admission at this time.  Due to the lack of male beds here in behavioral health, patient will need to be transferred to another facility.  [facility] will be consulted.  Patient will be transferred to [facility] behavioral [facility]. Dr. Morales accepting        ICD-10-CM    1. Depression with suicidal ideation  F32.A     R45.851       2. Acute alcoholic intoxication without complication ([facility])  F10.920              Yonathan Burch, PA-C  12/02/24 1738

## 2024-12-18 ENCOUNTER — OFFICE VISIT (OUTPATIENT)
Dept: FAMILY MEDICINE CLINIC | Age: 39
End: 2024-12-18

## 2024-12-18 VITALS
WEIGHT: 135.2 LBS | OXYGEN SATURATION: 98 % | SYSTOLIC BLOOD PRESSURE: 130 MMHG | HEART RATE: 84 BPM | TEMPERATURE: 96.9 F | RESPIRATION RATE: 12 BRPM | DIASTOLIC BLOOD PRESSURE: 82 MMHG | HEIGHT: 62 IN | BODY MASS INDEX: 24.88 KG/M2

## 2024-12-18 DIAGNOSIS — F17.210 CIGARETTE NICOTINE DEPENDENCE WITHOUT COMPLICATION: ICD-10-CM

## 2024-12-18 DIAGNOSIS — E11.9 TYPE 2 DIABETES MELLITUS WITHOUT COMPLICATION, WITH LONG-TERM CURRENT USE OF INSULIN (HCC): Primary | ICD-10-CM

## 2024-12-18 DIAGNOSIS — Z79.4 TYPE 2 DIABETES MELLITUS WITHOUT COMPLICATION, WITH LONG-TERM CURRENT USE OF INSULIN (HCC): Primary | ICD-10-CM

## 2024-12-18 DIAGNOSIS — L30.9 ECZEMA, UNSPECIFIED TYPE: ICD-10-CM

## 2024-12-18 DIAGNOSIS — R11.2 NAUSEA AND VOMITING, UNSPECIFIED VOMITING TYPE: ICD-10-CM

## 2024-12-18 DIAGNOSIS — F10.21 ALCOHOL USE DISORDER, MODERATE, IN SUSTAINED REMISSION (HCC): ICD-10-CM

## 2024-12-18 DIAGNOSIS — J06.9 VIRAL URI: ICD-10-CM

## 2024-12-18 DIAGNOSIS — F31.32 BIPOLAR DISORDER, CURRENT EPISODE DEPRESSED, MODERATE (HCC): ICD-10-CM

## 2024-12-18 LAB
HBA1C MFR BLD: 7.4 % (ref 4.3–5.7)
INFLUENZA VIRUS A RNA: NEGATIVE
INFLUENZA VIRUS B RNA: NEGATIVE
Lab: 1234
QC PASS/FAIL: NORMAL
SARS-COV-2 RDRP RESP QL NAA+PROBE: NEGATIVE

## 2024-12-18 RX ORDER — ONDANSETRON 8 MG/1
8 TABLET, ORALLY DISINTEGRATING ORAL 3 TIMES DAILY PRN
Qty: 60 TABLET | Refills: 1 | Status: SHIPPED | OUTPATIENT
Start: 2024-12-18

## 2024-12-18 RX ORDER — CLONIDINE HYDROCHLORIDE 0.1 MG/1
0.1 TABLET ORAL 3 TIMES DAILY PRN
COMMUNITY

## 2024-12-18 RX ORDER — PRAZOSIN HYDROCHLORIDE 5 MG/1
CAPSULE ORAL
COMMUNITY
Start: 2024-12-02

## 2024-12-18 RX ORDER — LAMOTRIGINE 25 MG/1
TABLET ORAL
COMMUNITY
Start: 2024-12-09

## 2024-12-18 RX ORDER — NICOTINE POLACRILEX 4 MG/1
GUM, CHEWING ORAL
COMMUNITY
Start: 2024-10-09

## 2024-12-18 RX ORDER — NICOTINE 4 MG/1
INHALANT RESPIRATORY (INHALATION)
Qty: 168 EACH | Refills: 0 | Status: SHIPPED | OUTPATIENT
Start: 2024-12-18

## 2024-12-18 RX ORDER — CARIPRAZINE 1.5 MG/1
CAPSULE, GELATIN COATED ORAL
COMMUNITY
Start: 2024-12-04

## 2024-12-18 RX ORDER — HYDROXYZINE HYDROCHLORIDE 50 MG/1
TABLET, FILM COATED ORAL
COMMUNITY
Start: 2024-12-09

## 2024-12-18 RX ORDER — INSULIN GLARGINE 100 [IU]/ML
15 INJECTION, SOLUTION SUBCUTANEOUS 2 TIMES DAILY
Qty: 5 ADJUSTABLE DOSE PRE-FILLED PEN SYRINGE | Refills: 3 | Status: SHIPPED | OUTPATIENT
Start: 2024-12-18

## 2024-12-18 RX ORDER — MIRTAZAPINE 30 MG/1
30 TABLET, FILM COATED ORAL NIGHTLY
COMMUNITY

## 2024-12-18 RX ORDER — BENZTROPINE MESYLATE 0.5 MG/1
TABLET ORAL
COMMUNITY

## 2024-12-18 RX ORDER — ATOMOXETINE 40 MG/1
40 CAPSULE ORAL EVERY MORNING
COMMUNITY
Start: 2024-12-09

## 2024-12-18 RX ORDER — METOCLOPRAMIDE 5 MG/1
TABLET ORAL
COMMUNITY
Start: 2024-12-09

## 2024-12-18 RX ORDER — PROPRANOLOL HYDROCHLORIDE 10 MG/1
TABLET ORAL
COMMUNITY
Start: 2024-12-10

## 2024-12-18 NOTE — PROGRESS NOTES
Chief Complaint   Patient presents with    Diabetes     Average BS is 200 fasting. Also having cold symptoms for 2 days. Cough, sore throat, nasal congestion, headache, loss of taste and smell, body aches. Has been using Flonase and Dayquil       History obtained from chart review and the patient.    SUBJECTIVE:  Rodríguez Burt is a 39 y.o. male that presents today for follow up DM    URI Symptoms    HPI:      Symptoms have been present for 2 day(s).  Symptoms are unchanged since they initially started.    Fever? No  Runny nose or congestion?  Yes, and has lost taste and smell   Cough?  Yes  Sore throat?  Yes  Headache, fatigue, joint pains, muscle aches?  Yes - headache  Shortness of breath/Wheezing?  No  Nausea/Vomiting/Diarrhea?  No  Double Sickening?  No  Sick contacts? Yes    Patient has tried dayquil and flonase without improvement.        Diabetes Type 2    Glucose control:   Does patient check blood glucoses at home?  Yes -average around 200  Report of hypoglycemia: no  Lab Results   Component Value Date    LABA1C 7.4 (H) 12/18/2024     Lab Results   Component Value Date     (H) 12/29/2020       Symptoms  Polyuria, Polydipsia or Polyphagia?   No  Chest Pain, SOB, or Palpitations? -  No  New Vision complaints? No  Paresthesias of the extremities?  No    Medications  Current medication were reviewed.  Compliant with medications?  Yes, taking Lantus 10 units twice a day,  Novolog 5 units TID plus sliding scale  Medication side effects?  No  On ACE-I or ARB?  No  On antiplatelet therapy?  No  On Statin?  No    Last Diabetic Eye Exam: needs    Exercise  Exercise? Yes he is exercising almost every day  Wt Readings from Last 3 Encounters:   12/18/24 61.3 kg (135 lb 3.2 oz)   12/01/24 56.2 kg (124 lb)   11/06/24 53.1 kg (117 lb)       Diet discipline?:  Low salt, fat, sugar diet?  Much better    Blood pressure control:  BP Readings from Last 3 Encounters:   12/18/24 130/82   12/02/24 127/75   11/06/24

## 2024-12-18 NOTE — TELEPHONE ENCOUNTER
Recent Visits  Date Type Provider Dept   08/15/24 Office Visit Nathan López APRN - CNP [facility]   01/05/24 Office Visit Nathan López APRN - CNP [facility]   09/26/23 Office Visit Nathan López APRN - CNP [facility]   07/19/23 Office Visit Nathan López APRN - CNP [facility]   Showing recent visits within past 540 days with a meds authorizing provider and meeting all other requirements  Today's Visits  Date Type Provider Dept   12/18/24 Office Visit Nathan López APRN - CNP [facility]   Showing today's visits with a meds authorizing provider and meeting all other requirements  Future Appointments  Date Type Provider Dept   03/17/25 Appointment Nathan López APRN - CNP [facility]   Showing future appointments within next 150 days with a meds authorizing provider and meeting all other requirements

## 2024-12-18 NOTE — TELEPHONE ENCOUNTER
Pt called in stating he has nausea and has been on it for awhile and is wanting an Rx for more.        River please.

## 2025-01-10 DIAGNOSIS — J45.41 MODERATE PERSISTENT ASTHMA WITH ACUTE EXACERBATION: ICD-10-CM

## 2025-01-10 DIAGNOSIS — L30.9 ECZEMA, UNSPECIFIED TYPE: ICD-10-CM

## 2025-01-13 RX ORDER — BETAMETHASONE VALERATE 1.2 MG/G
CREAM TOPICAL
Qty: 45 G | Refills: 10 | Status: SHIPPED | OUTPATIENT
Start: 2025-01-13

## 2025-01-13 RX ORDER — ALBUTEROL SULFATE 90 UG/1
INHALANT RESPIRATORY (INHALATION)
Qty: 8.5 G | Refills: 10 | Status: SHIPPED | OUTPATIENT
Start: 2025-01-13

## 2025-01-13 NOTE — TELEPHONE ENCOUNTER
Recent Visits  Date Type Provider Dept   12/18/24 Office Visit Nathan López APRN - CNP [facility]   08/15/24 Office Visit Nathan López APRN - [facility]NP [facility]   01/05/24 Office Visit Nathan López APRN - [facility]NP [facility]   09/26/23 Office Visit Nathan López APRN - CNP [facility]   Showing recent visits within past 540 days with a meds authorizing provider and meeting all other requirements  Future Appointments  Date Type Provider Dept   03/17/25 Appointment Nathan López APRN - CNP [facility]   Showing future appointments within next 150 days with a meds authorizing provider and meeting all other requirements

## 2025-03-03 ENCOUNTER — HOSPITAL ENCOUNTER (EMERGENCY)
Age: 40
Discharge: HOME | End: 2025-03-03
Attending: EMERGENCY MEDICINE
Payer: MEDICAID

## 2025-03-03 ENCOUNTER — APPOINTMENT (OUTPATIENT)
Dept: GENERAL RADIOLOGY | Age: 40
End: 2025-03-03
Payer: MEDICAID

## 2025-03-03 ENCOUNTER — APPOINTMENT (OUTPATIENT)
Dept: CT IMAGING | Age: 40
End: 2025-03-03
Payer: MEDICAID

## 2025-03-03 VITALS
HEART RATE: 87 BPM | RESPIRATION RATE: 21 BRPM | DIASTOLIC BLOOD PRESSURE: 68 MMHG | OXYGEN SATURATION: 100 % | TEMPERATURE: 97.9 F | SYSTOLIC BLOOD PRESSURE: 118 MMHG

## 2025-03-03 DIAGNOSIS — R10.84 GENERALIZED ABDOMINAL PAIN: Primary | ICD-10-CM

## 2025-03-03 LAB
ALBUMIN SERPL BCG-MCNC: 4.5 G/DL (ref 3.4–4.9)
ALP SERPL-CCNC: 177 U/L (ref 40–129)
ALT SERPL W/O P-5'-P-CCNC: 32 U/L (ref 10–50)
ANION GAP SERPL CALC-SCNC: 19 MEQ/L (ref 8–16)
AST SERPL-CCNC: 29 U/L (ref 10–50)
BASOPHILS ABSOLUTE: 0.1 THOU/MM3 (ref 0–0.1)
BASOPHILS NFR BLD AUTO: 0.6 %
BILIRUB SERPL-MCNC: 0.6 MG/DL (ref 0.3–1.2)
BUN SERPL-MCNC: 13 MG/DL (ref 8–23)
CALCIUM SERPL-MCNC: 9.7 MG/DL (ref 8.6–10)
CHLORIDE SERPL-SCNC: 102 MEQ/L (ref 98–111)
CO2 SERPL-SCNC: 17 MEQ/L (ref 22–29)
CREAT SERPL-MCNC: 0.6 MG/DL (ref 0.7–1.2)
DEPRECATED RDW RBC AUTO: 39.2 FL (ref 35–45)
EKG ATRIAL RATE: 49 BPM
EKG P AXIS: 9 DEGREES
EKG P-R INTERVAL: 108 MS
EKG Q-T INTERVAL: 500 MS
EKG QRS DURATION: 88 MS
EKG QTC CALCULATION (BAZETT): 451 MS
EKG R AXIS: 29 DEGREES
EKG T AXIS: 34 DEGREES
EKG VENTRICULAR RATE: 49 BPM
EOSINOPHIL NFR BLD AUTO: 0.7 %
EOSINOPHILS ABSOLUTE: 0.1 THOU/MM3 (ref 0–0.4)
ERYTHROCYTE [DISTWIDTH] IN BLOOD BY AUTOMATED COUNT: 12.7 % (ref 11.5–14.5)
GFR SERPL CREATININE-BSD FRML MDRD: > 90 ML/MIN/1.73M2
GLUCOSE SERPL-MCNC: 250 MG/DL (ref 74–109)
HCT VFR BLD AUTO: 42.5 % (ref 42–52)
HGB BLD-MCNC: 14.7 GM/DL (ref 14–18)
IMM GRANULOCYTES # BLD AUTO: 0.03 THOU/MM3 (ref 0–0.07)
IMM GRANULOCYTES NFR BLD AUTO: 0.3 %
LACTIC ACID, SEPSIS: 2 MMOL/L (ref 0.5–1.9)
LIPASE SERPL-CCNC: 6 U/L (ref 13–60)
LYMPHOCYTES ABSOLUTE: 2 THOU/MM3 (ref 1–4.8)
LYMPHOCYTES NFR BLD AUTO: 18.9 %
MAGNESIUM SERPL-MCNC: 1.8 MG/DL (ref 1.6–2.6)
MCH RBC QN AUTO: 29.6 PG (ref 26–33)
MCHC RBC AUTO-ENTMCNC: 34.6 GM/DL (ref 32.2–35.5)
MCV RBC AUTO: 85.5 FL (ref 80–94)
MONOCYTES ABSOLUTE: 0.5 THOU/MM3 (ref 0.4–1.3)
MONOCYTES NFR BLD AUTO: 4.6 %
NEUTROPHILS ABSOLUTE: 8 THOU/MM3 (ref 1.8–7.7)
NEUTROPHILS NFR BLD AUTO: 74.9 %
NRBC BLD AUTO-RTO: 0 /100 WBC
OSMOLALITY SERPL CALC.SUM OF ELEC: 284.2 MOSMOL/KG (ref 275–300)
PLATELET # BLD AUTO: 293 THOU/MM3 (ref 130–400)
PMV BLD AUTO: 9.5 FL (ref 9.4–12.4)
POTASSIUM SERPL-SCNC: 3.5 MEQ/L (ref 3.5–5.2)
PROT SERPL-MCNC: 6.9 G/DL (ref 6.4–8.3)
RBC # BLD AUTO: 4.97 MILL/MM3 (ref 4.7–6.1)
SODIUM SERPL-SCNC: 138 MEQ/L (ref 135–145)
TROPONIN, HIGH SENSITIVITY: < 6 NG/L (ref 0–12)
WBC # BLD AUTO: 10.7 THOU/MM3 (ref 4.8–10.8)

## 2025-03-03 PROCEDURE — 99285 EMERGENCY DEPT VISIT HI MDM: CPT

## 2025-03-03 PROCEDURE — 2580000003 HC RX 258: Performed by: EMERGENCY MEDICINE

## 2025-03-03 PROCEDURE — 96374 THER/PROPH/DIAG INJ IV PUSH: CPT

## 2025-03-03 PROCEDURE — 84484 ASSAY OF TROPONIN QUANT: CPT

## 2025-03-03 PROCEDURE — 74177 CT ABD & PELVIS W/CONTRAST: CPT

## 2025-03-03 PROCEDURE — 74022 RADEX COMPL AQT ABD SERIES: CPT

## 2025-03-03 PROCEDURE — 83735 ASSAY OF MAGNESIUM: CPT

## 2025-03-03 PROCEDURE — 6360000002 HC RX W HCPCS: Performed by: EMERGENCY MEDICINE

## 2025-03-03 PROCEDURE — 83605 ASSAY OF LACTIC ACID: CPT

## 2025-03-03 PROCEDURE — 80053 COMPREHEN METABOLIC PANEL: CPT

## 2025-03-03 PROCEDURE — 83690 ASSAY OF LIPASE: CPT

## 2025-03-03 PROCEDURE — 36415 COLL VENOUS BLD VENIPUNCTURE: CPT

## 2025-03-03 PROCEDURE — 93005 ELECTROCARDIOGRAM TRACING: CPT | Performed by: EMERGENCY MEDICINE

## 2025-03-03 PROCEDURE — 6360000004 HC RX CONTRAST MEDICATION: Performed by: EMERGENCY MEDICINE

## 2025-03-03 PROCEDURE — 85025 COMPLETE CBC W/AUTO DIFF WBC: CPT

## 2025-03-03 RX ORDER — 0.9 % SODIUM CHLORIDE 0.9 %
1000 INTRAVENOUS SOLUTION INTRAVENOUS ONCE
Status: COMPLETED | OUTPATIENT
Start: 2025-03-03 | End: 2025-03-03

## 2025-03-03 RX ORDER — IOPAMIDOL 755 MG/ML
80 INJECTION, SOLUTION INTRAVASCULAR
Status: COMPLETED | OUTPATIENT
Start: 2025-03-03 | End: 2025-03-03

## 2025-03-03 RX ORDER — ONDANSETRON 2 MG/ML
4 INJECTION INTRAMUSCULAR; INTRAVENOUS ONCE
Status: COMPLETED | OUTPATIENT
Start: 2025-03-03 | End: 2025-03-03

## 2025-03-03 RX ADMIN — ONDANSETRON 4 MG: 2 INJECTION, SOLUTION INTRAMUSCULAR; INTRAVENOUS at 15:55

## 2025-03-03 RX ADMIN — IOPAMIDOL 80 ML: 755 INJECTION, SOLUTION INTRAVENOUS at 16:56

## 2025-03-03 RX ADMIN — SODIUM CHLORIDE 1000 ML: 0.9 INJECTION, SOLUTION INTRAVENOUS at 15:54

## 2025-03-03 ASSESSMENT — PAIN - FUNCTIONAL ASSESSMENT
PAIN_FUNCTIONAL_ASSESSMENT: 0-10
PAIN_FUNCTIONAL_ASSESSMENT: 0-10

## 2025-03-03 ASSESSMENT — PAIN DESCRIPTION - ONSET: ONSET: PROGRESSIVE

## 2025-03-03 ASSESSMENT — PAIN DESCRIPTION - LOCATION
LOCATION: ABDOMEN
LOCATION: ABDOMEN

## 2025-03-03 ASSESSMENT — PAIN DESCRIPTION - ORIENTATION: ORIENTATION: LOWER

## 2025-03-03 ASSESSMENT — PAIN DESCRIPTION - DESCRIPTORS: DESCRIPTORS: SHARP

## 2025-03-03 ASSESSMENT — PAIN SCALES - GENERAL
PAINLEVEL_OUTOF10: 10
PAINLEVEL_OUTOF10: 10

## 2025-03-03 ASSESSMENT — PAIN DESCRIPTION - PAIN TYPE: TYPE: ACUTE PAIN

## 2025-03-03 ASSESSMENT — PAIN DESCRIPTION - FREQUENCY: FREQUENCY: CONTINUOUS

## 2025-03-03 NOTE — ED PROVIDER NOTES
[facility] EMERGENCY DEPARTMENT  EMERGENCY DEPARTMENT ENCOUNTER          Pt Name: Rodríguez Burt  MRN: [id number]  Birthdate 1985  Date of evaluation: 3/3/2025  Physician: Deon Drake MD  Supervising Attending Physician: Brad Salgado DO       CHIEF COMPLAINT       Chief Complaint   Patient presents with    Abdominal Pain    Vomiting         HISTORY OF PRESENT ILLNESS    HPI  Rodríguez Burt is a 39 y.o. male who presents to the emergency department from home, as a walk in to the ED lobby for evaluation of abdominal pain with nausea vomiting.  Patient reports that he had a EGD done this morning.  He stated that whenever he went home he began to have abdominal pain.  He stated that he has also vomited since then.  Denies any chest pain shortness of breath.  He denies passing any gas or having a bowel movement since the procedure.  He reports the pain all over his abdomen.  He denies any blood in his vomit.  No fever or chills.  The patient has no other acute complaints at this time.            PAST MEDICAL AND SURGICAL HISTORY     Past Medical History:   Diagnosis Date    Asthma     COPD (chronic obstructive pulmonary disease) (HCC)     Eczema     GERD (gastroesophageal reflux disease)     History of alcohol abuse     History of marijuana use     History of tobacco abuse     quit 11/21/2017    Hx of seasonal allergies     Pancreatitis     Psychiatric problem     PTSD (post-traumatic stress disorder)     Seizures ([facility])     etoh wdl    Type 2 diabetes mellitus without complication ([facility]) 12/09/2017     Past Surgical History:   Procedure Laterality Date    ANKLE ARTHROSCOPY Right 8/5/2020    ANKLE ARTHROSCOPY WITH  MEDIAL DEBRIDEMENT RIGHT ANKLE, ANKLE ARTHROTOMY WITH DEBRIDEMENT performed by Alireza Tobias DPM at [facility] OR    EYE SURGERY Right 09/2019    DR AYALA [facility]    FRACTURE SURGERY Right 2019    orbital fracture surgery    UPPER GASTROINTESTINAL ENDOSCOPY

## 2025-03-03 NOTE — ED TRIAGE NOTES
Pt presents to the ER for lower abdominal pain  that started today. Pt states that he had an EGD done this morning and started vomiting and having abdominal pain once he returned back home. Pt states he had the EGD done due to having oily stool. Pt states his pain is a 10 out of 10 and it is sharp.

## 2025-03-03 NOTE — ED NOTES
Pt noted to have slid from chair onto floor. States he \"almost fell out\". When asked for clarification, pt states he felt like he was going to pass out. Pt assisted onto cot. Is in stable condition. ED tech to collect vitals.

## 2025-03-03 NOTE — ED TRIAGE NOTES
Presents to ED with c/o abdominal pain and emesis that started around 1300 today. Patient had EGD done this morning. Alert and oriented. Respirations easy and unlabored.

## 2025-03-12 NOTE — CARE COORDINATION
12/7/17, 2:31 PM      Hellen Ansari       Admitted from: ER, patient presented after having a syncopal episode at home. 12/7/2017/ [address] [facility] day: 0   Location: [facility]14/014-A Reason for admit: Syncope and collapse [R55] Status: Inpt. Admit order signed?: yes  PMH:  has a past medical history of Alcohol abuse; Asthma; COPD (chronic obstructive pulmonary disease) ([address] and Eczema. Procedure: N/A  Pertinent abnormal Imaging:CT of head and facial bones. Medications:  Scheduled Meds:   mometasone-formoterol  2 puff Inhalation BID    therapeutic multivitamin-minerals  1 tablet Oral Daily    aspirin  81 mg Oral Daily    sodium chloride flush  10 mL Intravenous 2 times per day    enoxaparin  40 mg Subcutaneous Q24H    pantoprazole  40 mg Intravenous Daily     Continuous Infusions:   sodium chloride 75 mL/hr at 12/07/17 1039      Pertinent Info/Orders/Treatment Plan: Echocardiogram completed, IV fluids, IV Solu-medol x 1 dose,  IV Morphine x 2 does, prn Tylenol for pain control, CBC and BMP in a.m., oxygen, orthostatic pressures, seizure precautions, up as tolerated. Diet: DIET GENERAL;   DVT Prophylaxis: Lovenox  Smoking status:  reports that he has been smoking. He has been smoking about 0.50 packs per day. He does not have any smokeless tobacco history on file.    Influenza Vaccination Screening Completed: yes  Pneumonia Vaccination Screening Completed: yes  Core measures: VTE  PCP: Scarlett Hernandez CNP  Readmission:   No  Risk Score:  6.5   Discharge Planning  Current Residence:  Private Residence  Living Arrangements:  Family Members   Support Systems:  Children  Current Services PTA:     Potential Assistance Needed:  N/A  Potential Assistance Purchasing Medications:  No  Does patient want to participate in local refill/ meds to beds program?  No  Type of Home Care Services:  None  Patient expects to be discharged to:  private residence  Expected Discharge date:  12/09/17  Follow Up Appointment: Conjunctivae and eyelids appear normal,  Sclerae : White without injection

## 2025-03-31 ENCOUNTER — TELEPHONE (OUTPATIENT)
Dept: FAMILY MEDICINE CLINIC | Age: 40
End: 2025-03-31

## 2025-03-31 DIAGNOSIS — E11.9 TYPE 2 DIABETES MELLITUS WITHOUT COMPLICATION, WITH LONG-TERM CURRENT USE OF INSULIN: Primary | ICD-10-CM

## 2025-03-31 DIAGNOSIS — Z79.4 TYPE 2 DIABETES MELLITUS WITHOUT COMPLICATION, WITH LONG-TERM CURRENT USE OF INSULIN: Primary | ICD-10-CM

## 2025-03-31 RX ORDER — INSULIN LISPRO 100 [IU]/ML
5 INJECTION, SOLUTION INTRAVENOUS; SUBCUTANEOUS
Qty: 15 ML | Refills: 1 | Status: SHIPPED | OUTPATIENT
Start: 2025-03-31

## 2025-03-31 NOTE — TELEPHONE ENCOUNTER
Received a Prior Auth request for brand Novolog Flexpen as the insulin Aspart is on back order.       PA not able to be done as Novolog is a non preferred med and there is preferred meds available.     Preferred meds include:  Humalog U-100 Kwikpen, Vial  Insulin Aspart ( this is on backorder )  Insulin Lispro    Please send to [facility] pharmacy    Thank you.

## 2025-03-31 NOTE — TELEPHONE ENCOUNTER
Left message on answering machine. Requested pt to call back at [phone], at their earliest convenience.

## 2025-04-01 NOTE — TELEPHONE ENCOUNTER
Left message on answering machine. Requested pt to call back at [phone], at their earliest convenience.

## 2025-04-02 NOTE — TELEPHONE ENCOUNTER
Left message on answering machine. Requested pt to call back at [phone], at their earliest convenience.     We have been unable to reach this patient by phone.  A letter is being sent.

## 2025-04-07 ENCOUNTER — HOSPITAL ENCOUNTER (INPATIENT)
Age: 40
LOS: 3 days | Discharge: HOME | End: 2025-04-11
Attending: STUDENT IN AN ORGANIZED HEALTH CARE EDUCATION/TRAINING PROGRAM | Admitting: PSYCHIATRY & NEUROLOGY
Payer: MEDICAID

## 2025-04-07 DIAGNOSIS — R45.851 DEPRESSION WITH SUICIDAL IDEATION: ICD-10-CM

## 2025-04-07 DIAGNOSIS — F33.1 MAJOR DEPRESSIVE DISORDER, RECURRENT EPISODE, MODERATE WITH ANXIOUS DISTRESS (HCC): ICD-10-CM

## 2025-04-07 DIAGNOSIS — F10.90 ALCOHOL USE: Primary | ICD-10-CM

## 2025-04-07 DIAGNOSIS — F32.A DEPRESSION WITH SUICIDAL IDEATION: ICD-10-CM

## 2025-04-07 PROCEDURE — 99285 EMERGENCY DEPT VISIT HI MDM: CPT

## 2025-04-07 RX ORDER — LANOLIN ALCOHOL/MO/W.PET/CERES
100 CREAM (GRAM) TOPICAL ONCE
Status: COMPLETED | OUTPATIENT
Start: 2025-04-08 | End: 2025-04-08

## 2025-04-07 RX ORDER — MULTIVITAMIN WITH IRON
1 TABLET ORAL ONCE
Status: COMPLETED | OUTPATIENT
Start: 2025-04-08 | End: 2025-04-08

## 2025-04-07 RX ORDER — FOLIC ACID 1 MG/1
1 TABLET ORAL ONCE
Status: COMPLETED | OUTPATIENT
Start: 2025-04-08 | End: 2025-04-08

## 2025-04-07 RX ORDER — NICOTINE 21 MG/24HR
1 PATCH, TRANSDERMAL 24 HOURS TRANSDERMAL ONCE
Status: COMPLETED | OUTPATIENT
Start: 2025-04-08 | End: 2025-04-09

## 2025-04-07 ASSESSMENT — PAIN - FUNCTIONAL ASSESSMENT: PAIN_FUNCTIONAL_ASSESSMENT: NONE - DENIES PAIN

## 2025-04-08 PROBLEM — F33.2 MDD (MAJOR DEPRESSIVE DISORDER), RECURRENT SEVERE, WITHOUT PSYCHOSIS (HCC): Status: ACTIVE | Noted: 2025-04-08

## 2025-04-08 LAB
ALBUMIN SERPL BCG-MCNC: 4.6 G/DL (ref 3.4–4.9)
ALP SERPL-CCNC: 164 U/L (ref 40–129)
ALT SERPL W/O P-5'-P-CCNC: 44 U/L (ref 10–50)
AMPHETAMINES UR QL SCN: NEGATIVE
ANION GAP SERPL CALC-SCNC: 16 MEQ/L (ref 8–16)
APTT PPP: 34.9 SECONDS (ref 22–38)
AST SERPL-CCNC: 38 U/L (ref 10–50)
BARBITURATES UR QL SCN: NEGATIVE
BASOPHILS ABSOLUTE: 0.1 THOU/MM3 (ref 0–0.1)
BASOPHILS NFR BLD AUTO: 0.6 %
BENZODIAZ UR QL SCN: NEGATIVE
BILIRUB CONJ SERPL-MCNC: 0.2 MG/DL (ref 0–0.2)
BILIRUB SERPL-MCNC: 0.3 MG/DL (ref 0.3–1.2)
BILIRUB UR QL STRIP.AUTO: NEGATIVE
BUN SERPL-MCNC: 6 MG/DL (ref 8–23)
BZE UR QL SCN: NEGATIVE
CALCIUM SERPL-MCNC: 9.5 MG/DL (ref 8.6–10)
CANNABINOIDS UR QL SCN: POSITIVE
CHARACTER UR: CLEAR
CHLORIDE SERPL-SCNC: 103 MEQ/L (ref 98–111)
CO2 SERPL-SCNC: 23 MEQ/L (ref 22–29)
COLOR, UA: YELLOW
CREAT SERPL-MCNC: 0.6 MG/DL (ref 0.7–1.2)
DEPRECATED RDW RBC AUTO: 39.2 FL (ref 35–45)
EKG ATRIAL RATE: 96 BPM
EKG P AXIS: 59 DEGREES
EKG P-R INTERVAL: 144 MS
EKG Q-T INTERVAL: 362 MS
EKG QRS DURATION: 78 MS
EKG QTC CALCULATION (BAZETT): 457 MS
EKG R AXIS: 57 DEGREES
EKG T AXIS: 74 DEGREES
EKG VENTRICULAR RATE: 96 BPM
EOSINOPHIL NFR BLD AUTO: 2.6 %
EOSINOPHILS ABSOLUTE: 0.3 THOU/MM3 (ref 0–0.4)
ERYTHROCYTE [DISTWIDTH] IN BLOOD BY AUTOMATED COUNT: 12.9 % (ref 11.5–14.5)
ETHANOL SERPL-MCNC: 0.04 % (ref 0–0.08)
ETHANOL SERPL-MCNC: 0.26 % (ref 0–0.08)
FENTANYL: NEGATIVE
GFR SERPL CREATININE-BSD FRML MDRD: > 90 ML/MIN/1.73M2
GLUCOSE BLD STRIP.AUTO-MCNC: 219 MG/DL (ref 70–108)
GLUCOSE BLD STRIP.AUTO-MCNC: 224 MG/DL (ref 70–108)
GLUCOSE SERPL-MCNC: 205 MG/DL (ref 74–109)
GLUCOSE UR QL STRIP.AUTO: NEGATIVE MG/DL
HCT VFR BLD AUTO: 45.7 % (ref 42–52)
HGB BLD-MCNC: 16.1 GM/DL (ref 14–18)
HGB UR QL STRIP.AUTO: NEGATIVE
IMM GRANULOCYTES # BLD AUTO: 0.04 THOU/MM3 (ref 0–0.07)
IMM GRANULOCYTES NFR BLD AUTO: 0.4 %
INR PPP: 0.92 (ref 0.85–1.13)
KETONES UR QL STRIP.AUTO: NEGATIVE
LIPASE SERPL-CCNC: 11 U/L (ref 13–60)
LYMPHOCYTES ABSOLUTE: 4.6 THOU/MM3 (ref 1–4.8)
LYMPHOCYTES NFR BLD AUTO: 40.4 %
MCH RBC QN AUTO: 29.4 PG (ref 26–33)
MCHC RBC AUTO-ENTMCNC: 35.2 GM/DL (ref 32.2–35.5)
MCV RBC AUTO: 83.5 FL (ref 80–94)
MONOCYTES ABSOLUTE: 0.6 THOU/MM3 (ref 0.4–1.3)
MONOCYTES NFR BLD AUTO: 5.7 %
NEUTROPHILS ABSOLUTE: 5.7 THOU/MM3 (ref 1.8–7.7)
NEUTROPHILS NFR BLD AUTO: 50.3 %
NITRITE UR QL STRIP: NEGATIVE
NRBC BLD AUTO-RTO: 0 /100 WBC
OPIATES UR QL SCN: NEGATIVE
OSMOLALITY SERPL CALC.SUM OF ELEC: 286.7 MOSMOL/KG (ref 275–300)
OXYCODONE: NEGATIVE
PCP UR QL SCN: NEGATIVE
PH UR STRIP.AUTO: 6.5 [PH] (ref 5–9)
PLATELET # BLD AUTO: 292 THOU/MM3 (ref 130–400)
PMV BLD AUTO: 9.5 FL (ref 9.4–12.4)
POTASSIUM SERPL-SCNC: 3.8 MEQ/L (ref 3.5–5.2)
PROT SERPL-MCNC: 7 G/DL (ref 6.4–8.3)
PROT UR STRIP.AUTO-MCNC: NEGATIVE MG/DL
RBC # BLD AUTO: 5.47 MILL/MM3 (ref 4.7–6.1)
SODIUM SERPL-SCNC: 142 MEQ/L (ref 135–145)
SP GR UR REFRACT.AUTO: < 1.005 (ref 1–1.03)
UROBILINOGEN, URINE: 0.2 EU/DL (ref 0–1)
WBC # BLD AUTO: 11.3 THOU/MM3 (ref 4.8–10.8)
WBC #/AREA URNS HPF: NEGATIVE /[HPF]

## 2025-04-08 PROCEDURE — 85610 PROTHROMBIN TIME: CPT

## 2025-04-08 PROCEDURE — 6370000000 HC RX 637 (ALT 250 FOR IP): Performed by: STUDENT IN AN ORGANIZED HEALTH CARE EDUCATION/TRAINING PROGRAM

## 2025-04-08 PROCEDURE — 1240000000 HC EMOTIONAL WELLNESS R&B

## 2025-04-08 PROCEDURE — 94640 AIRWAY INHALATION TREATMENT: CPT

## 2025-04-08 PROCEDURE — 82948 REAGENT STRIP/BLOOD GLUCOSE: CPT

## 2025-04-08 PROCEDURE — 93005 ELECTROCARDIOGRAM TRACING: CPT | Performed by: STUDENT IN AN ORGANIZED HEALTH CARE EDUCATION/TRAINING PROGRAM

## 2025-04-08 PROCEDURE — 82248 BILIRUBIN DIRECT: CPT

## 2025-04-08 PROCEDURE — 82077 ASSAY SPEC XCP UR&BREATH IA: CPT

## 2025-04-08 PROCEDURE — 81003 URINALYSIS AUTO W/O SCOPE: CPT

## 2025-04-08 PROCEDURE — 80053 COMPREHEN METABOLIC PANEL: CPT

## 2025-04-08 PROCEDURE — 96374 THER/PROPH/DIAG INJ IV PUSH: CPT

## 2025-04-08 PROCEDURE — 6360000002 HC RX W HCPCS: Performed by: EMERGENCY MEDICINE

## 2025-04-08 PROCEDURE — 2580000003 HC RX 258: Performed by: STUDENT IN AN ORGANIZED HEALTH CARE EDUCATION/TRAINING PROGRAM

## 2025-04-08 PROCEDURE — 36415 COLL VENOUS BLD VENIPUNCTURE: CPT

## 2025-04-08 PROCEDURE — 85730 THROMBOPLASTIN TIME PARTIAL: CPT

## 2025-04-08 PROCEDURE — 83690 ASSAY OF LIPASE: CPT

## 2025-04-08 PROCEDURE — 6360000002 HC RX W HCPCS: Performed by: STUDENT IN AN ORGANIZED HEALTH CARE EDUCATION/TRAINING PROGRAM

## 2025-04-08 PROCEDURE — 93010 ELECTROCARDIOGRAM REPORT: CPT | Performed by: NUCLEAR MEDICINE

## 2025-04-08 PROCEDURE — 85025 COMPLETE CBC W/AUTO DIFF WBC: CPT

## 2025-04-08 PROCEDURE — 80307 DRUG TEST PRSMV CHEM ANLYZR: CPT

## 2025-04-08 PROCEDURE — 96376 TX/PRO/DX INJ SAME DRUG ADON: CPT

## 2025-04-08 PROCEDURE — 94761 N-INVAS EAR/PLS OXIMETRY MLT: CPT

## 2025-04-08 PROCEDURE — 6370000000 HC RX 637 (ALT 250 FOR IP): Performed by: PHYSICIAN ASSISTANT

## 2025-04-08 RX ORDER — PRAZOSIN HYDROCHLORIDE 5 MG/1
5 CAPSULE ORAL NIGHTLY
Status: DISCONTINUED | OUTPATIENT
Start: 2025-04-08 | End: 2025-04-11 | Stop reason: HOSPADM

## 2025-04-08 RX ORDER — INSULIN LISPRO 100 [IU]/ML
0-12 INJECTION, SOLUTION INTRAVENOUS; SUBCUTANEOUS
Status: DISCONTINUED | OUTPATIENT
Start: 2025-04-08 | End: 2025-04-11 | Stop reason: HOSPADM

## 2025-04-08 RX ORDER — ZIPRASIDONE HYDROCHLORIDE 60 MG/1
60 CAPSULE ORAL 2 TIMES DAILY
Status: ON HOLD | COMMUNITY
Start: 2025-04-04 | End: 2025-04-10 | Stop reason: HOSPADM

## 2025-04-08 RX ORDER — BREXPIPRAZOLE 4 MG/1
4 TABLET ORAL DAILY
COMMUNITY
Start: 2025-04-04

## 2025-04-08 RX ORDER — MAGNESIUM HYDROXIDE/ALUMINUM HYDROXICE/SIMETHICONE 120; 1200; 1200 MG/30ML; MG/30ML; MG/30ML
30 SUSPENSION ORAL EVERY 6 HOURS PRN
Status: DISCONTINUED | OUTPATIENT
Start: 2025-04-08 | End: 2025-04-11 | Stop reason: HOSPADM

## 2025-04-08 RX ORDER — MONTELUKAST SODIUM 10 MG/1
10 TABLET ORAL DAILY
Status: DISCONTINUED | OUTPATIENT
Start: 2025-04-08 | End: 2025-04-11 | Stop reason: HOSPADM

## 2025-04-08 RX ORDER — IBUPROFEN 400 MG/1
400 TABLET, FILM COATED ORAL EVERY 6 HOURS PRN
Status: DISCONTINUED | OUTPATIENT
Start: 2025-04-08 | End: 2025-04-11 | Stop reason: HOSPADM

## 2025-04-08 RX ORDER — BUPRENORPHINE AND NALOXONE 8; 2 MG/1; MG/1
1.5 FILM, SOLUBLE BUCCAL; SUBLINGUAL DAILY
Status: DISCONTINUED | OUTPATIENT
Start: 2025-04-09 | End: 2025-04-11 | Stop reason: HOSPADM

## 2025-04-08 RX ORDER — METHYLPHENIDATE HYDROCHLORIDE 10 MG/1
10 TABLET ORAL
Refills: 0 | Status: DISCONTINUED | OUTPATIENT
Start: 2025-04-08 | End: 2025-04-11 | Stop reason: HOSPADM

## 2025-04-08 RX ORDER — HYDROXYZINE PAMOATE 25 MG/1
100 CAPSULE ORAL DAILY
Status: DISCONTINUED | OUTPATIENT
Start: 2025-04-08 | End: 2025-04-11 | Stop reason: HOSPADM

## 2025-04-08 RX ORDER — PROPRANOLOL HYDROCHLORIDE 10 MG/1
15 TABLET ORAL 4 TIMES DAILY PRN
Status: DISCONTINUED | OUTPATIENT
Start: 2025-04-08 | End: 2025-04-11 | Stop reason: HOSPADM

## 2025-04-08 RX ORDER — ALBUTEROL SULFATE 90 UG/1
2 INHALANT RESPIRATORY (INHALATION)
Status: DISCONTINUED | OUTPATIENT
Start: 2025-04-08 | End: 2025-04-08

## 2025-04-08 RX ORDER — ONDANSETRON 2 MG/ML
4 INJECTION INTRAMUSCULAR; INTRAVENOUS ONCE
Status: COMPLETED | OUTPATIENT
Start: 2025-04-08 | End: 2025-04-08

## 2025-04-08 RX ORDER — BENZTROPINE MESYLATE 1 MG/1
0.5 TABLET ORAL 2 TIMES DAILY
Status: DISCONTINUED | OUTPATIENT
Start: 2025-04-08 | End: 2025-04-11 | Stop reason: HOSPADM

## 2025-04-08 RX ORDER — ALBUTEROL SULFATE 90 UG/1
2 INHALANT RESPIRATORY (INHALATION)
Status: DISCONTINUED | OUTPATIENT
Start: 2025-04-09 | End: 2025-04-11 | Stop reason: HOSPADM

## 2025-04-08 RX ORDER — BUDESONIDE AND FORMOTEROL FUMARATE DIHYDRATE 160; 4.5 UG/1; UG/1
2 AEROSOL RESPIRATORY (INHALATION)
Status: DISCONTINUED | OUTPATIENT
Start: 2025-04-08 | End: 2025-04-11 | Stop reason: HOSPADM

## 2025-04-08 RX ORDER — CLONIDINE HYDROCHLORIDE 0.1 MG/1
0.1 TABLET, EXTENDED RELEASE ORAL 2 TIMES DAILY
COMMUNITY
Start: 2025-04-04

## 2025-04-08 RX ORDER — ONDANSETRON 4 MG/1
8 TABLET, ORALLY DISINTEGRATING ORAL EVERY 8 HOURS PRN
Status: DISCONTINUED | OUTPATIENT
Start: 2025-04-08 | End: 2025-04-11 | Stop reason: HOSPADM

## 2025-04-08 RX ORDER — CALCIUM CARBONATE/VITAMIN D3 600 MG-10
1 TABLET ORAL EVERY MORNING
COMMUNITY
Start: 2025-04-04

## 2025-04-08 RX ORDER — MIRTAZAPINE 30 MG/1
30 TABLET, FILM COATED ORAL NIGHTLY
Status: DISCONTINUED | OUTPATIENT
Start: 2025-04-08 | End: 2025-04-11 | Stop reason: HOSPADM

## 2025-04-08 RX ORDER — ACETAMINOPHEN 325 MG/1
650 TABLET ORAL EVERY 4 HOURS PRN
Status: DISCONTINUED | OUTPATIENT
Start: 2025-04-08 | End: 2025-04-11 | Stop reason: HOSPADM

## 2025-04-08 RX ORDER — GLUCAGON 1 MG/ML
1 KIT INJECTION PRN
Status: DISCONTINUED | OUTPATIENT
Start: 2025-04-08 | End: 2025-04-11 | Stop reason: HOSPADM

## 2025-04-08 RX ORDER — HYDROXYZINE HYDROCHLORIDE 25 MG/1
50 TABLET, FILM COATED ORAL 3 TIMES DAILY PRN
Status: DISCONTINUED | OUTPATIENT
Start: 2025-04-08 | End: 2025-04-11 | Stop reason: HOSPADM

## 2025-04-08 RX ORDER — TRAZODONE HYDROCHLORIDE 50 MG/1
50 TABLET ORAL NIGHTLY PRN
Status: DISCONTINUED | OUTPATIENT
Start: 2025-04-08 | End: 2025-04-11 | Stop reason: HOSPADM

## 2025-04-08 RX ORDER — LAMOTRIGINE 150 MG/1
150 TABLET ORAL 2 TIMES DAILY
COMMUNITY
Start: 2025-04-04

## 2025-04-08 RX ORDER — FAMOTIDINE 40 MG/1
40 TABLET, FILM COATED ORAL DAILY
COMMUNITY
Start: 2025-03-03

## 2025-04-08 RX ORDER — FOLIC ACID 1 MG/1
1 TABLET ORAL DAILY
Status: DISCONTINUED | OUTPATIENT
Start: 2025-04-09 | End: 2025-04-11 | Stop reason: HOSPADM

## 2025-04-08 RX ORDER — NICOTINE 21 MG/24HR
1 PATCH, TRANSDERMAL 24 HOURS TRANSDERMAL DAILY
Status: DISCONTINUED | OUTPATIENT
Start: 2025-04-09 | End: 2025-04-11 | Stop reason: HOSPADM

## 2025-04-08 RX ORDER — PANTOPRAZOLE SODIUM 40 MG/1
40 TABLET, DELAYED RELEASE ORAL
Status: DISCONTINUED | OUTPATIENT
Start: 2025-04-09 | End: 2025-04-11 | Stop reason: HOSPADM

## 2025-04-08 RX ORDER — BENZOCAINE/MENTHOL 6 MG-10 MG
LOZENGE MUCOUS MEMBRANE 2 TIMES DAILY
Status: DISCONTINUED | OUTPATIENT
Start: 2025-04-08 | End: 2025-04-11 | Stop reason: HOSPADM

## 2025-04-08 RX ORDER — BUSPIRONE HYDROCHLORIDE 10 MG/1
20 TABLET ORAL 3 TIMES DAILY
Status: DISCONTINUED | OUTPATIENT
Start: 2025-04-08 | End: 2025-04-11 | Stop reason: HOSPADM

## 2025-04-08 RX ORDER — BUPRENORPHINE AND NALOXONE 8; 2 MG/1; MG/1
1 FILM, SOLUBLE BUCCAL; SUBLINGUAL DAILY
COMMUNITY

## 2025-04-08 RX ORDER — BUPRENORPHINE AND NALOXONE 8; 2 MG/1; MG/1
1.5 FILM, SOLUBLE BUCCAL; SUBLINGUAL DAILY
Status: COMPLETED | OUTPATIENT
Start: 2025-04-08 | End: 2025-04-08

## 2025-04-08 RX ORDER — METOCLOPRAMIDE 10 MG/1
5 TABLET ORAL
Status: DISCONTINUED | OUTPATIENT
Start: 2025-04-08 | End: 2025-04-11 | Stop reason: HOSPADM

## 2025-04-08 RX ORDER — PROMETHAZINE HYDROCHLORIDE 25 MG/1
25 SUPPOSITORY RECTAL EVERY 6 HOURS PRN
Status: DISCONTINUED | OUTPATIENT
Start: 2025-04-08 | End: 2025-04-11 | Stop reason: HOSPADM

## 2025-04-08 RX ORDER — CLONIDINE HYDROCHLORIDE 0.1 MG/1
0.1 TABLET, EXTENDED RELEASE ORAL 2 TIMES DAILY
Status: DISCONTINUED | OUTPATIENT
Start: 2025-04-08 | End: 2025-04-11 | Stop reason: HOSPADM

## 2025-04-08 RX ORDER — VILOXAZINE HYDROCHLORIDE 200 MG/1
1 CAPSULE, EXTENDED RELEASE ORAL EVERY MORNING
COMMUNITY
Start: 2025-04-01

## 2025-04-08 RX ORDER — ZIPRASIDONE HYDROCHLORIDE 60 MG/1
60 CAPSULE ORAL 2 TIMES DAILY WITH MEALS
Status: DISCONTINUED | OUTPATIENT
Start: 2025-04-08 | End: 2025-04-09

## 2025-04-08 RX ORDER — DUPILUMAB 300 MG/2ML
300 INJECTION, SOLUTION SUBCUTANEOUS
COMMUNITY
Start: 2025-03-06

## 2025-04-08 RX ORDER — FAMOTIDINE 20 MG/1
40 TABLET, FILM COATED ORAL DAILY
Status: DISCONTINUED | OUTPATIENT
Start: 2025-04-08 | End: 2025-04-11 | Stop reason: HOSPADM

## 2025-04-08 RX ORDER — SODIUM CHLORIDE, SODIUM LACTATE, POTASSIUM CHLORIDE, AND CALCIUM CHLORIDE .6; .31; .03; .02 G/100ML; G/100ML; G/100ML; G/100ML
1000 INJECTION, SOLUTION INTRAVENOUS ONCE
Status: COMPLETED | OUTPATIENT
Start: 2025-04-08 | End: 2025-04-08

## 2025-04-08 RX ORDER — CHLORDIAZEPOXIDE HYDROCHLORIDE 10 MG/1
10 CAPSULE, GELATIN COATED ORAL EVERY 8 HOURS PRN
Status: DISCONTINUED | OUTPATIENT
Start: 2025-04-08 | End: 2025-04-11 | Stop reason: HOSPADM

## 2025-04-08 RX ORDER — METHYLPHENIDATE HYDROCHLORIDE 36 MG/1
36 TABLET ORAL EVERY MORNING
COMMUNITY
Start: 2025-04-04

## 2025-04-08 RX ORDER — MULTIVITAMIN WITH IRON
1 TABLET ORAL DAILY
Status: DISCONTINUED | OUTPATIENT
Start: 2025-04-08 | End: 2025-04-11 | Stop reason: HOSPADM

## 2025-04-08 RX ADMIN — ALBUTEROL SULFATE 2 PUFF: 90 AEROSOL, METERED RESPIRATORY (INHALATION) at 16:20

## 2025-04-08 RX ADMIN — MONTELUKAST 10 MG: 10 TABLET, FILM COATED ORAL at 17:25

## 2025-04-08 RX ADMIN — METOCLOPRAMIDE 5 MG: 10 TABLET ORAL at 17:08

## 2025-04-08 RX ADMIN — Medication 100 MG: at 00:22

## 2025-04-08 RX ADMIN — FOLIC ACID 1 MG: 1 TABLET ORAL at 00:22

## 2025-04-08 RX ADMIN — ONDANSETRON 8 MG: 4 TABLET, ORALLY DISINTEGRATING ORAL at 17:06

## 2025-04-08 RX ADMIN — HYDROXYZINE PAMOATE 100 MG: 25 CAPSULE ORAL at 17:29

## 2025-04-08 RX ADMIN — FAMOTIDINE 40 MG: 20 TABLET, FILM COATED ORAL at 17:28

## 2025-04-08 RX ADMIN — BUSPIRONE HYDROCHLORIDE 20 MG: 10 TABLET ORAL at 17:05

## 2025-04-08 RX ADMIN — ZIPRASIDONE HYDROCHLORIDE 60 MG: 60 CAPSULE ORAL at 17:39

## 2025-04-08 RX ADMIN — BUDESONIDE AND FORMOTEROL FUMARATE DIHYDRATE 2 PUFF: 160; 4.5 AEROSOL RESPIRATORY (INHALATION) at 16:20

## 2025-04-08 RX ADMIN — INSULIN LISPRO 4 UNITS: 100 INJECTION, SOLUTION INTRAVENOUS; SUBCUTANEOUS at 17:38

## 2025-04-08 RX ADMIN — Medication 1 TABLET: at 04:20

## 2025-04-08 RX ADMIN — BUPRENORPHINE AND NALOXONE 1.5 FILM: 8; 2 FILM, SOLUBLE BUCCAL; SUBLINGUAL at 08:02

## 2025-04-08 RX ADMIN — SODIUM CHLORIDE, SODIUM LACTATE, POTASSIUM CHLORIDE, AND CALCIUM CHLORIDE 1000 ML: .6; .31; .03; .02 INJECTION, SOLUTION INTRAVENOUS at 00:45

## 2025-04-08 RX ADMIN — ONDANSETRON 4 MG: 2 INJECTION, SOLUTION INTRAMUSCULAR; INTRAVENOUS at 00:44

## 2025-04-08 RX ADMIN — ONDANSETRON 4 MG: 2 INJECTION, SOLUTION INTRAMUSCULAR; INTRAVENOUS at 04:22

## 2025-04-08 RX ADMIN — Medication 1 TABLET: at 17:40

## 2025-04-08 RX ADMIN — BREXPIPRAZOLE 4 MG: 2 TABLET ORAL at 17:30

## 2025-04-08 ASSESSMENT — PAIN DESCRIPTION - PAIN TYPE: TYPE: CHRONIC PAIN

## 2025-04-08 ASSESSMENT — PAIN - FUNCTIONAL ASSESSMENT
PAIN_FUNCTIONAL_ASSESSMENT: NONE - DENIES PAIN
PAIN_FUNCTIONAL_ASSESSMENT: PREVENTS OR INTERFERES SOME ACTIVE ACTIVITIES AND ADLS
PAIN_FUNCTIONAL_ASSESSMENT: NONE - DENIES PAIN

## 2025-04-08 ASSESSMENT — PATIENT HEALTH QUESTIONNAIRE - PHQ9
1. LITTLE INTEREST OR PLEASURE IN DOING THINGS: MORE THAN HALF THE DAYS
SUM OF ALL RESPONSES TO PHQ QUESTIONS 1-9: 16
2. FEELING DOWN, DEPRESSED OR HOPELESS: MORE THAN HALF THE DAYS
SUM OF ALL RESPONSES TO PHQ QUESTIONS 1-9: 3
SUM OF ALL RESPONSES TO PHQ QUESTIONS 1-9: 16
5. POOR APPETITE OR OVEREATING: SEVERAL DAYS
9. THOUGHTS THAT YOU WOULD BE BETTER OFF DEAD, OR OF HURTING YOURSELF: MORE THAN HALF THE DAYS
3. TROUBLE FALLING OR STAYING ASLEEP: MORE THAN HALF THE DAYS
SUM OF ALL RESPONSES TO PHQ QUESTIONS 1-9: 14
SUM OF ALL RESPONSES TO PHQ QUESTIONS 1-9: 3
8. MOVING OR SPEAKING SO SLOWLY THAT OTHER PEOPLE COULD HAVE NOTICED. OR THE OPPOSITE, BEING SO FIGETY OR RESTLESS THAT YOU HAVE BEEN MOVING AROUND A LOT MORE THAN USUAL: NOT AT ALL
1. LITTLE INTEREST OR PLEASURE IN DOING THINGS: SEVERAL DAYS
2. FEELING DOWN, DEPRESSED OR HOPELESS: MORE THAN HALF THE DAYS
SUM OF ALL RESPONSES TO PHQ QUESTIONS 1-9: 16
10. IF YOU CHECKED OFF ANY PROBLEMS, HOW DIFFICULT HAVE THESE PROBLEMS MADE IT FOR YOU TO DO YOUR WORK, TAKE CARE OF THINGS AT HOME, OR GET ALONG WITH OTHER PEOPLE: EXTREMELY DIFFICULT
7. TROUBLE CONCENTRATING ON THINGS, SUCH AS READING THE NEWSPAPER OR WATCHING TELEVISION: MORE THAN HALF THE DAYS
6. FEELING BAD ABOUT YOURSELF - OR THAT YOU ARE A FAILURE OR HAVE LET YOURSELF OR YOUR FAMILY DOWN: NEARLY EVERY DAY
4. FEELING TIRED OR HAVING LITTLE ENERGY: MORE THAN HALF THE DAYS

## 2025-04-08 ASSESSMENT — PAIN DESCRIPTION - LOCATION
LOCATION: ABDOMEN
LOCATION: ABDOMEN

## 2025-04-08 ASSESSMENT — SLEEP AND FATIGUE QUESTIONNAIRES
DO YOU USE A SLEEP AID: YES
SLEEP PATTERN: DIFFICULTY FALLING ASLEEP;RESTFUL
DO YOU HAVE DIFFICULTY SLEEPING: COMMENT
AVERAGE NUMBER OF SLEEP HOURS: 6
DO YOU HAVE DIFFICULTY SLEEPING: YES

## 2025-04-08 ASSESSMENT — PAIN SCALES - GENERAL
PAINLEVEL_OUTOF10: 6
PAINLEVEL_OUTOF10: 5

## 2025-04-08 ASSESSMENT — PAIN DESCRIPTION - DESCRIPTORS: DESCRIPTORS: ACHING

## 2025-04-08 ASSESSMENT — LIFESTYLE VARIABLES
HOW MANY STANDARD DRINKS CONTAINING ALCOHOL DO YOU HAVE ON A TYPICAL DAY: 5 OR 6
HOW OFTEN DO YOU HAVE A DRINK CONTAINING ALCOHOL: MONTHLY OR LESS

## 2025-04-08 ASSESSMENT — PAIN DESCRIPTION - ORIENTATION: ORIENTATION: LOWER

## 2025-04-08 NOTE — ED PROVIDER NOTES
Transfer of Care Note:   I have personally performed a face to face diagnostic evaluation on this patient. The patient's initial evaluation and plan have been discussed with the prior provider who initially evaluated the patient. Nursing Notes, Past Medical Hx, Past Surgical Hx, Social Hx, Allergies, and Family Hx were all reviewed.    (Please note that portions of this note were completed with a voice recognition program.  Efforts were made to edit the dictations but occasionally words are mis-transcribed.)    7:10 AM EDT: The patient was evaluated.     Rodríguez Burt is a 39 y.o. male who presents to the Emergency Department for the evaluation of drunk and suicidal.        RADIOLOGY: non-plain film images(s) such as CT, Ultrasound and MRI are read by the radiologist.  No orders to display       ED LABS:  Labs Reviewed   BASIC METABOLIC PANEL - Abnormal; Notable for the following components:       Result Value    Glucose 205 (*)     BUN 6 (*)     Creatinine 0.6 (*)     All other components within normal limits   CBC WITH AUTO DIFFERENTIAL - Abnormal; Notable for the following components:    WBC 11.3 (*)     All other components within normal limits   HEPATIC FUNCTION PANEL - Abnormal; Notable for the following components:    Alkaline Phosphatase 164 (*)     All other components within normal limits   LIPASE - Abnormal; Notable for the following components:    Lipase 11.0 (*)     All other components within normal limits   PROTIME-INR   APTT   URINE DRUG SCREEN   URINALYSIS WITH REFLEX TO CULTURE   ETHANOL   ANION GAP   OSMOLALITY   GLOMERULAR FILTRATION RATE, ESTIMATED   ETHANOL   POCT GLUCOSE       MDM:  Patient arrived with alcohol intoxication and suicidal thoughts.  Due for repeat ethanol level at 10 AM.  Will also need CAROLINE evaluation once sober.    Repeat EtOH is 0.04, CAROLINE Елена evaluating patient.    CAROLINE evaluated patient, Dr. SMITH will admit.    FINAL IMPRESSION      1. Alcohol use    2. Depression with

## 2025-04-08 NOTE — PLAN OF CARE
Problem: Respiratory - Adult  Goal: Clear lung sounds  Outcome: Progressing   Continue breathing medications to improve lung sounds. Patient mutually agreed on goals.

## 2025-04-08 NOTE — PROGRESS NOTES
Behavioral Health   Admission Note   Admission Type: Involuntary    Reason for Admission: \"suicidal thoughts\"    Patient Strengths/Barriers  Strengths (Must Choose Two): Stable housing, Support from family, Motivation level for treatment  Barriers: Recreational/leisure/hobbies, Support from friends    Addictive Behavior  In the Past 3 Months, Have You Felt or Has Someone Told You That You Have a Problem With  : None    Medical Problems:   Past Medical History:   Diagnosis Date    Asthma     COPD (chronic obstructive pulmonary disease) ([facility])     Eczema     GERD (gastroesophageal reflux disease)     History of alcohol abuse     History of marijuana use     History of tobacco abuse     quit 11/21/2017    Hx of seasonal allergies     Pancreatitis     Psychiatric problem     PTSD (post-traumatic stress disorder)     Seizures ([facility])     etoh wdl    Type 2 diabetes mellitus without complication 12/09/2017       Status EXAM:  Mental Status and Behavioral Exam  Normal: No  Level of Assistance: Independent/Self  Facial Expression: Flat, Sad, Worried  Affect: Appropriate  Level of Consciousness: Alert  Frequency of Checks: 4 times per hour, close  Mood:Normal: No  Mood: Depressed, Anxious, Sad, Worthless, low self-esteem  Motor Activity:Normal: Yes  Eye Contact: Good  Observed Behavior: Cooperative, Friendly  Sexual Misconduct History: Current - no  Preception: [place] to person, [place] to time, [place] to place, [place] to situation  Attention:Normal: Yes  Thought Processes: Unremarkable  Thought Content:Normal: Yes  Depression Symptoms: Feelings of hopelessess, Feelings of worthlessness, Loss of interest, Isolative, Sleep disturbance  Anxiety Symptoms: Generalized  Mellisa Symptoms: No problems reported or observed.  Hallucinations: None  Delusions: No  Memory:Normal: Yes  Insight and Judgment: No  Insight and Judgment: Poor judgment, Poor insight, Unrealistic    Pt admitted with followings belongings:  Dental Appliances:

## 2025-04-08 NOTE — ED NOTES
Patient changed into safety scrubs. Patient belongings secured in a locked lockers outside of the room. Patient resting in bed. Respirations easy and unlabored. No distress noted. Call light within reach. Writer remains at bedside. Lights dimmed for comfort.

## 2025-04-08 NOTE — ED NOTES
Pt appears to be sleeping in bed. Chest rise and fall symmetrical. RR easy and unlabored. No needs voiced. Continued video monitoring.

## 2025-04-08 NOTE — ED NOTES
This RN to bedside. RR even and unlabored at this time. Pt resting with eyes closed but responsive to verbal stimuli. Call light in room at this time.

## 2025-04-08 NOTE — ED TRIAGE NOTES
Pt presents to ED from home with complaints of suicidal ideation. Pt states he drank 1/5 of a handle of vodka tonight, as well as using marijuana. Pt reports having a plan. Pt is calm and cooperative with RN during triage. Pt appears tearful at this time. Pt brought to room 11 due to unsteady gait. Pt is A&Ox4, respirations equal and unlabored, VSS.

## 2025-04-08 NOTE — ED NOTES
Report to Benny TORRES   Pt w/ RUE pain s/p fall yesterday. Reviewed CD from PMD office. Confirming scaffold and radial head fracture. Will consult ortho, give ibuprofen, cast, and d/c.

## 2025-04-08 NOTE — ED NOTES
Pt requesting to void in the hallway bathroom. Pt ambulated with staggering gait to the restroom, approximately 120 feet total distance. Pt repeating \"sorry, I'm drunk\" on the way to void. Urine specimen obtained in preparation of lab test. Pt returned to room ED-11 in stable condition. Patient sitting in bed. Respirations easy and unlabored. No distress noted. Call light within reach.

## 2025-04-08 NOTE — ED NOTES
Pt states he is feeling nausea. Pt states, \"I take a lot of Zofran at home everyday, so I probably need more.\" Carol TORRES notified.

## 2025-04-08 NOTE — ED PROVIDER NOTES
[facility]  EMERGENCY DEPARTMENT ENCOUNTER      Patient Name: Rodríguez Burt  MRN: [id number]  YOB: 1985  Date of Evaluation: 4/7/2025  Attending Physician: Marquis Carrillo MD    CHIEF COMPLAINT       Chief Complaint   Patient presents with    Suicidal       HISTORY OF PRESENT ILLNESS    HPI    History obtained from chart review and the patient.    Rodríguez is a 39 y.o. old male who presents to the emergency department by Walk In for evaluation of being suicidal and alcohol use.  Patient reports he is an alcoholic and drinks daily currently attends .  Has had prior admissions for alcohol withdrawal including a prior alcohol withdrawal seizure.  Reports more recently he is also suicidal and has a plan to hang himself.  Prior to coming this evening he did drink 1/5 of the handle of low quality vodka.  Reports some nausea now.  Denies any recent cough cold fever runny nose.  He does smoke about 5 marijuana second cigarettes daily as well as a pack of cigarettes daily.    He is also on a stable dose of buprenorphine 12 mg daily, previously was taking prescription and illicit pills.    Chart reviewed, relevant history summarized in HPI above.      REVIEW OF SYSTEMS   Review of Systems  Negative unless documented in HPI    PAST MEDICAL AND SURGICAL HISTORY   Rodríguez  has a past medical history of Asthma, COPD (chronic obstructive pulmonary disease) ([facility]), Eczema, GERD (gastroesophageal reflux disease), History of alcohol abuse, History of marijuana use, History of tobacco abuse, seasonal allergies, Pancreatitis, Psychiatric problem, PTSD (post-traumatic stress disorder), Seizures (HCC), and Type 2 diabetes mellitus without complication (12/09/2017).    Rodríguez  has a past surgical history that includes Upper gastrointestinal endoscopy (Left, 5/6/2019); Eye surgery (Right, 09/2019); fracture surgery (Right, 2019); and Ankle arthroscopy (Right, 8/5/2020).    CURRENT

## 2025-04-08 NOTE — ED NOTES
Pt resting in bed with eyes closed. RR easy and unlabored. Chest rise and fall symmetrically. No needs voiced at this time. Continued video monitoring.

## 2025-04-08 NOTE — ED NOTES
Pt ambulated with steady gait approximately 150 feet in total to the bathroom to void at this time. Pt returned to ED-11. Carol TORRES at bedside to medicate pt.

## 2025-04-08 NOTE — PROGRESS NOTES
safety tray  1102  Ethanol collected at 1024 resulted at .04   1151  Pt given milk and sandwich box per request.  1155  Consulted with medical provider. Patient is medically stabilized.  1156  Consulted with Dr. Trevino. Patient to be admitted to 7E. Call transferred.  1200  Updated pt and who was hoping to go home however once EMC was explained, no further concerns noted.  1202  Updated ER provider on case.  1212  Report given to Karen TORRES on 7E. Bed to be 12B.

## 2025-04-08 NOTE — ED NOTES
Pt moved to room 26. Pt resting in bed. Provided with ice water on request. Pt denies any other needs. Continued video monitoring in process. Report from Carol TORRES. Assumed care at this time.

## 2025-04-08 NOTE — ED NOTES
Pt c/o nausea at this time. Provider notified and pt medicated per MAR. Updated pt on plan of care. Voiced no needs. Sitter at bedside.

## 2025-04-08 NOTE — ED NOTES
Pt noted tearful and this time w concerns to go home. No acute distress noted. Call light in room at this time.

## 2025-04-08 NOTE — ED NOTES
EKG completed at this time. Lab draw completed by phlebotomist at this time. Pt resting in ED cot, tearful.

## 2025-04-08 NOTE — ED NOTES
Pt asked this LPN, \"can I check myself out and go home at this bed?\" Pt educated, nodding head, remains in ED cot. Writer remains in sitter position. No distress noted.

## 2025-04-09 PROBLEM — E44.0 MODERATE MALNUTRITION: Status: ACTIVE | Noted: 2025-04-09

## 2025-04-09 LAB
GLUCOSE BLD STRIP.AUTO-MCNC: 138 MG/DL (ref 70–108)
GLUCOSE BLD STRIP.AUTO-MCNC: 193 MG/DL (ref 70–108)
GLUCOSE BLD STRIP.AUTO-MCNC: 308 MG/DL (ref 70–108)
GLUCOSE BLD STRIP.AUTO-MCNC: 355 MG/DL (ref 70–108)

## 2025-04-09 PROCEDURE — 90792 PSYCH DIAG EVAL W/MED SRVCS: CPT | Performed by: PSYCHIATRY & NEUROLOGY

## 2025-04-09 PROCEDURE — 1240000000 HC EMOTIONAL WELLNESS R&B

## 2025-04-09 PROCEDURE — 6370000000 HC RX 637 (ALT 250 FOR IP): Performed by: PHYSICIAN ASSISTANT

## 2025-04-09 PROCEDURE — 82948 REAGENT STRIP/BLOOD GLUCOSE: CPT

## 2025-04-09 PROCEDURE — 6370000000 HC RX 637 (ALT 250 FOR IP): Performed by: PSYCHIATRY & NEUROLOGY

## 2025-04-09 PROCEDURE — 94640 AIRWAY INHALATION TREATMENT: CPT

## 2025-04-09 RX ADMIN — BUPRENORPHINE AND NALOXONE 1.5 FILM: 8; 2 FILM, SOLUBLE BUCCAL; SUBLINGUAL at 09:58

## 2025-04-09 RX ADMIN — METOCLOPRAMIDE 5 MG: 10 TABLET ORAL at 17:28

## 2025-04-09 RX ADMIN — ALBUTEROL SULFATE 2 PUFF: 90 AEROSOL, METERED RESPIRATORY (INHALATION) at 10:13

## 2025-04-09 RX ADMIN — METOCLOPRAMIDE 5 MG: 10 TABLET ORAL at 21:37

## 2025-04-09 RX ADMIN — BREXPIPRAZOLE 4 MG: 2 TABLET ORAL at 09:56

## 2025-04-09 RX ADMIN — BENZTROPINE MESYLATE 0.5 MG: 1 TABLET ORAL at 09:55

## 2025-04-09 RX ADMIN — FAMOTIDINE 40 MG: 20 TABLET, FILM COATED ORAL at 09:56

## 2025-04-09 RX ADMIN — HYDROCORTISONE: 1 CREAM TOPICAL at 08:06

## 2025-04-09 RX ADMIN — METOCLOPRAMIDE 5 MG: 10 TABLET ORAL at 09:55

## 2025-04-09 RX ADMIN — CLONIDINE HYDROCHLORIDE 0.1 MG: 0.1 TABLET, EXTENDED RELEASE ORAL at 22:23

## 2025-04-09 RX ADMIN — BUDESONIDE AND FORMOTEROL FUMARATE DIHYDRATE 2 PUFF: 160; 4.5 AEROSOL RESPIRATORY (INHALATION) at 10:13

## 2025-04-09 RX ADMIN — HYDROXYZINE PAMOATE 100 MG: 25 CAPSULE ORAL at 09:54

## 2025-04-09 RX ADMIN — METHYLPHENIDATE HYDROCHLORIDE 10 MG: 10 TABLET ORAL at 14:11

## 2025-04-09 RX ADMIN — LAMOTRIGINE 150 MG: 25 TABLET ORAL at 21:29

## 2025-04-09 RX ADMIN — BUDESONIDE AND FORMOTEROL FUMARATE DIHYDRATE 2 PUFF: 160; 4.5 AEROSOL RESPIRATORY (INHALATION) at 21:35

## 2025-04-09 RX ADMIN — ALBUTEROL SULFATE 2 PUFF: 90 AEROSOL, METERED RESPIRATORY (INHALATION) at 21:35

## 2025-04-09 RX ADMIN — METOCLOPRAMIDE 5 MG: 10 TABLET ORAL at 12:07

## 2025-04-09 RX ADMIN — LAMOTRIGINE 150 MG: 25 TABLET ORAL at 09:52

## 2025-04-09 RX ADMIN — PANTOPRAZOLE SODIUM 40 MG: 40 TABLET, DELAYED RELEASE ORAL at 10:36

## 2025-04-09 RX ADMIN — FOLIC ACID 1 MG: 1 TABLET ORAL at 09:57

## 2025-04-09 RX ADMIN — MIRTAZAPINE 30 MG: 30 TABLET, FILM COATED ORAL at 21:29

## 2025-04-09 RX ADMIN — BENZTROPINE MESYLATE 0.5 MG: 1 TABLET ORAL at 22:19

## 2025-04-09 RX ADMIN — Medication 1 TABLET: at 09:57

## 2025-04-09 RX ADMIN — METHYLPHENIDATE HYDROCHLORIDE 10 MG: 10 TABLET ORAL at 10:36

## 2025-04-09 RX ADMIN — INSULIN LISPRO 2 UNITS: 100 INJECTION, SOLUTION INTRAVENOUS; SUBCUTANEOUS at 10:01

## 2025-04-09 RX ADMIN — Medication 1000 UNITS: at 09:53

## 2025-04-09 RX ADMIN — BUSPIRONE HYDROCHLORIDE 20 MG: 10 TABLET ORAL at 14:40

## 2025-04-09 RX ADMIN — INSULIN LISPRO 8 UNITS: 100 INJECTION, SOLUTION INTRAVENOUS; SUBCUTANEOUS at 12:06

## 2025-04-09 RX ADMIN — INSULIN LISPRO 10 UNITS: 100 INJECTION, SOLUTION INTRAVENOUS; SUBCUTANEOUS at 17:32

## 2025-04-09 RX ADMIN — BUSPIRONE HYDROCHLORIDE 20 MG: 10 TABLET ORAL at 09:54

## 2025-04-09 RX ADMIN — PROPRANOLOL HYDROCHLORIDE 15 MG: 10 TABLET ORAL at 21:30

## 2025-04-09 RX ADMIN — HYDROCORTISONE: 1 CREAM TOPICAL at 22:20

## 2025-04-09 RX ADMIN — PRAZOSIN HYDROCHLORIDE 5 MG: 5 CAPSULE ORAL at 21:29

## 2025-04-09 RX ADMIN — BUSPIRONE HYDROCHLORIDE 20 MG: 10 TABLET ORAL at 21:29

## 2025-04-09 RX ADMIN — METHYLPHENIDATE HYDROCHLORIDE 10 MG: 10 TABLET ORAL at 17:29

## 2025-04-09 RX ADMIN — MONTELUKAST 10 MG: 10 TABLET, FILM COATED ORAL at 09:56

## 2025-04-09 ASSESSMENT — PAIN SCALES - GENERAL
PAINLEVEL_OUTOF10: 0
PAINLEVEL_OUTOF10: 0
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
## 2025-04-09 NOTE — PATIENT CARE CONFERENCE
Behavioral Health  Initial Interdisciplinary Treatment Plan NOTE    REVIEW DATE AND TIME: 4/9/25 1116    PATIENT was IN TREATMENT TEAM.  See Multidisciplinary Treatment Team sheet for participants.    ADMISSION TYPE:   Admission Type: Involuntary    REASON FOR ADMISSION:  Reason for Admission: \"suicidal thoughts\"      Estimated Length of Stay Update:  3-5 days  Estimated Discharge Date Update: 4/10/25    Patient Strengths/Barriers  Strengths (Must Choose Two): Stable housing, Support from family, Motivation level for treatment  Barriers: Recreational/leisure/hobbies, Support from friends  Addictive Behavior:Addictive Behavior  In the Past 3 Months, Have You Felt or Has Someone Told You That You Have a Problem With  : None  Medical Problems:  Past Medical History:   Diagnosis Date    Asthma     COPD (chronic obstructive pulmonary disease) (HCC)     Eczema     GERD (gastroesophageal reflux disease)     History of alcohol abuse     History of marijuana use     History of tobacco abuse     quit 11/21/2017    Hx of seasonal allergies     Pancreatitis     Psychiatric problem     PTSD (post-traumatic stress disorder)     Seizures (HCC)     etoh wdl    Type 2 diabetes mellitus without complication 12/09/2017       EDUCATION:   Learner Progress Toward Treatment Goals: Reviewed results and recommendations of this team, Reviewed group plan and strategies, Reviewed signs, symptoms and risk of self harm and violent behavior, and Reviewed goals and plan of care    Method: Individual    Outcome: Verbalized understanding and Demonstrated Understanding    PATIENT GOALS: \"To get back on track\"    OQ PRIORITIES IDENTIFIED BY THE PATIENT ON ADMISSION: (These are the symptoms that the patient has identified that are impacting them the most at the time of admission based on their own input on the OQ.  These priorities are to be included in all of their care while admitted.)        92 - Suicidal Ideation/Substance Abuse    PLAN/TREATMENT  RECOMMENDATIONS UPDATE:   What is the most important thing we can help you with while you are here? See above  Who is your support system? Mother, Therapist, and Best Friend  Do you have follow-up providers? Patient is connected with counseling through [facility]. Patient is connected with telehealth psychiatry through [facility]. Patient states he is wanted to get connected with different agency for psychiatry due to his provider continuing to switch his medication. Discussed follow up for psychiatry with [facility]. Patient is interested. This clinician also discussed [facility] IOP with patient. Patient is interested in IOP.   Do you have the ability to pay for your medications? Yes, [facility]  Where will you be residing when you leave the hospital? By self  Will need a return to work slip or FMLA paper completion? No      GOALS UPDATE:   Time frame for Short-Term Goals: Daily    BRANDO Rosado

## 2025-04-09 NOTE — PROGRESS NOTES
Psychosocial Assessment    Current Level of Psychosocial Functioning     Independent        XXX  Dependent    Minimal Assist     Comments:      Psychosocial High Risk Factors (check all that apply)    Unable to obtain meds   Chronic illness/pain    Substance abuse       XXX Alcohol  Lack of Family Support   Financial stress       XXX Due to being fired from his job  Isolation   Inadequate Community Resources  Suicide attempt(s)      XXX History  Not taking medications   Victim of crime   Developmental Delay  Unable to manage personal needs    Age 65 or older   Homeless  No transportation       XXX  Readmission within 30 days  Unemployment      XXX  Traumatic Event    Family/Supports identified: Mother, Therapist, and Best Friend    Sexual Orientation:  Heterosexual    Patient Personal Strengths: Communication and Motivation    Patient Protective Factors: Stable housing, support system, motivation for treatment, connected with services, involvement with AA    Patient Risk Factors: Long history of alcoholism, recently got fired from job, recent medication changes    Safety plan:  Discussed the safety planning process with the patient.    CMHC/MH history: Patient identifies previous inpatient psychiatric admissions with the latest being in 12/2024 at [facility]. Patient denies previous suicide attempts with the most recent being three years ago by attempting to hang self.     Plan of Care:  medication management, group/individual therapies, family meetings, psycho -education, treatment team meetings to assist with stabilization    Initial Discharge Plan:  Patient is connected with counseling through [facility]. Patient is connected with telehealth psychiatry through [facility]. Patient states he is wanted to get connected with different agency for psychiatry due to his provider continuing to switch his medication. Discussed follow  up for psychiatry with [facility]. Patient is interested. This clinician also discussed [facility] IOP with patient. Patient is interested in IOP.     Clinical Summary: Rodríguez is a 39 year old male who was admitted to the unit from the ED on a EMC. Patient reports that he was admitted due to worsening suicidal thoughts and alcohol intoxication. Patient states that he has had fleeting suicidal thoughts over the past three years but they got significantly worse on Monday and he began drinking. Patient states that he recalls drinking one fifth of liquor that evening. BAL was at 0.26 upon ED presentation. Patient reports that prior to him drinking on Monday, he had been sober for a week. Patient identifies a long history of alcoholism. Patient states that he first had alcohol at the age of 10 when his father gave it to him. Patient states his father was an alcoholic who completed suicide on patient's 14th birthday. Patient state that his longest period of sobriety is two years which lasted until 12/1/2024. 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 Patient reports that he lives by himself. Patient has four children ages 20, 17, 13, and 9 with his ex-wife. Patient states that he only gets to see his nine year old child once a week during a supervised visit that lasts an hour. Patient states that he does not speak with his other three children due to them choosing to not speak with him. Patient was polite and cooperative with assessment.

## 2025-04-09 NOTE — PROGRESS NOTES
Patient was not seen lastn night , romy not assess him .    I already did the head to toe . His BS is 191

## 2025-04-09 NOTE — PROGRESS NOTES
Comprehensive Nutrition Assessment    Type and Reason for Visit:  Initial, Positive nutrition screen (poor oral intake, unplanned weight loss, ONS)    Nutrition Recommendations/Plan:   Continue  MVI, folvite. Recommend consider thiamin supplementation.   Continue ONS: Glucerna TID.  Continue current diet. Encouraged carb controlled diet selections, patient declined any diet questions.    Encouraged patient to follow-up with PCP regarding insulin adjustments with weight loss, states he stopped taking lantus d/t having lows at home.    He is on remeron which may help with appetite stimulation.  Chronic GI issues reported-note on pepcid, reglan, prn zofran.    Encouraged cessation of marijuana and alcohol as well to assist in GI issues.  Encouraged oral intake, good protein sources, and continued home ONS use.      Malnutrition Assessment:  Malnutrition Status:  Moderate malnutrition (04/09/25 1232)    Context:  Chronic Illness     Findings of the 6 clinical characteristics of malnutrition:  Energy Intake:  75% or less estimated energy requirements for 1 month or longer  Weight Loss:  Unable to assess (only stated weight on admit, if accurate 16.3% in the last 4 months, 38.4% loss in the last 2 years if current weight accurate)     Body Fat Loss:   (Moderate Body Fat Loss) Orbital, Triceps, Fat Overlying Ribs   Muscle Mass Loss:   (Moderate Muscle Mass Loss) Temples (temporalis), Clavicles (pectoralis & deltoids), Thigh (quadriceps), Calf (gastrocnemius)  Fluid Accumulation:  No fluid accumulation     Strength:  Not Performed    Nutrition Assessment:     Pt. moderately malnourished AEB criteria as listed above.  At risk for further nutrition compromise r/t admit with major depressive disorder, bipolar, suicidal ideation, alcohol use/abuse, marijuana use, chronic GI issues (intermittent nausea, vomiting, diarrhea), and underlying medical condition (hx: asthma, eczema, pancreatitis, DM, marijuana, tobacco, alcohol

## 2025-04-09 NOTE — PLAN OF CARE
Problem: Pain  Goal: Verbalizes/displays adequate comfort level or baseline comfort level  Outcome: Progressing  Flowsheets (Taken 4/9/2025 1649)  Verbalizes/displays adequate comfort level or baseline comfort level:   Encourage patient to monitor pain and request assistance   Administer analgesics based on type and severity of pain and evaluate response   Assess pain using appropriate pain scale     Problem: Risk for Elopement  Goal: Patient will not exit the unit/facility without proper excort  Outcome: Progressing  Flowsheets (Taken 4/9/2025 1649)  Nursing Interventions for Elopement Risk:   Make sure patient has all necessary personal care items   Reduce environmental triggers  Note: Patient has not been observed to be checking the exit doors or demonstrating behaviors of attempting to leave the unit.       Problem: Depression  Goal: Will be euthymic at discharge  Description: INTERVENTIONS:  1. Administer medication as ordered  2. Provide emotional support via 1:1 interaction with staff  3. Encourage involvement in milieu/groups/activities  4. Monitor for social isolation  Outcome: Progressing  Note: Pt reports depression so far this shift but denies suicidal thoughts.     Problem: Anxiety  Goal: Will report anxiety at manageable levels  Description: INTERVENTIONS:  1. Administer medication as ordered  2. Teach and rehearse alternative coping skills  3. Provide emotional support with 1:1 interaction with staff  Outcome: Progressing  Flowsheets (Taken 4/9/2025 1649)  Will report anxiety at manageable levels:   Administer medication as ordered   Teach and rehearse alternative coping skills   Provide emotional support with 1:1 interaction with staff     Problem: Drug Abuse/Detox  Goal: Will have no detox symptoms and will verbalize plan for changing drug-related behavior  Description: INTERVENTIONS:  1. Administer medication as ordered  2. Monitor physical status  3. Provide emotional support with 1:1

## 2025-04-09 NOTE — PROGRESS NOTES
Behavioral Services  Medicare Certification Upon Admission    I certify that this patient's inpatient psychiatric hospital admission is medically necessary for:    [x] (1) Treatment which could reasonably be expected to improve this patient's condition,       [x] (2) Or for diagnostic study;     AND     [x](2) The inpatient psychiatric services are provided while the individual is under the care of a physician and are included in the individualized plan of care.    Estimated length of stay/service 3-5 days    Plan for post-hospital care hc    Electronically signed by Baylee Trevino MD on 4/9/2025 at 9:28 AM

## 2025-04-10 DIAGNOSIS — J45.41 MODERATE PERSISTENT ASTHMA WITH ACUTE EXACERBATION: ICD-10-CM

## 2025-04-10 LAB
GLUCOSE BLD STRIP.AUTO-MCNC: 242 MG/DL (ref 70–108)
GLUCOSE BLD STRIP.AUTO-MCNC: 248 MG/DL (ref 70–108)
GLUCOSE BLD STRIP.AUTO-MCNC: 254 MG/DL (ref 70–108)
GLUCOSE BLD STRIP.AUTO-MCNC: 301 MG/DL (ref 70–108)

## 2025-04-10 PROCEDURE — 6370000000 HC RX 637 (ALT 250 FOR IP): Performed by: PSYCHIATRY & NEUROLOGY

## 2025-04-10 PROCEDURE — 1240000000 HC EMOTIONAL WELLNESS R&B

## 2025-04-10 PROCEDURE — 6370000000 HC RX 637 (ALT 250 FOR IP): Performed by: PHYSICIAN ASSISTANT

## 2025-04-10 PROCEDURE — 82948 REAGENT STRIP/BLOOD GLUCOSE: CPT

## 2025-04-10 PROCEDURE — 99232 SBSQ HOSP IP/OBS MODERATE 35: CPT | Performed by: PSYCHIATRY & NEUROLOGY

## 2025-04-10 PROCEDURE — APPSS30 APP SPLIT SHARED TIME 16-30 MINUTES: Performed by: PHYSICIAN ASSISTANT

## 2025-04-10 PROCEDURE — 94640 AIRWAY INHALATION TREATMENT: CPT

## 2025-04-10 RX ORDER — BUSPIRONE HYDROCHLORIDE 10 MG/1
20 TABLET ORAL 3 TIMES DAILY
Qty: 90 TABLET | Refills: 0
Start: 2025-04-10

## 2025-04-10 RX ADMIN — FOLIC ACID 1 MG: 1 TABLET ORAL at 08:46

## 2025-04-10 RX ADMIN — METOCLOPRAMIDE 5 MG: 10 TABLET ORAL at 16:55

## 2025-04-10 RX ADMIN — BENZTROPINE MESYLATE 0.5 MG: 1 TABLET ORAL at 08:47

## 2025-04-10 RX ADMIN — BUSPIRONE HYDROCHLORIDE 20 MG: 10 TABLET ORAL at 08:47

## 2025-04-10 RX ADMIN — LAMOTRIGINE 150 MG: 25 TABLET ORAL at 21:20

## 2025-04-10 RX ADMIN — BUDESONIDE AND FORMOTEROL FUMARATE DIHYDRATE 2 PUFF: 160; 4.5 AEROSOL RESPIRATORY (INHALATION) at 09:15

## 2025-04-10 RX ADMIN — INSULIN LISPRO 4 UNITS: 100 INJECTION, SOLUTION INTRAVENOUS; SUBCUTANEOUS at 11:52

## 2025-04-10 RX ADMIN — BENZTROPINE MESYLATE 0.5 MG: 1 TABLET ORAL at 21:18

## 2025-04-10 RX ADMIN — IBUPROFEN 400 MG: 400 TABLET, FILM COATED ORAL at 21:24

## 2025-04-10 RX ADMIN — INSULIN LISPRO 4 UNITS: 100 INJECTION, SOLUTION INTRAVENOUS; SUBCUTANEOUS at 08:48

## 2025-04-10 RX ADMIN — MIRTAZAPINE 30 MG: 30 TABLET, FILM COATED ORAL at 21:21

## 2025-04-10 RX ADMIN — METHYLPHENIDATE HYDROCHLORIDE 10 MG: 10 TABLET ORAL at 11:47

## 2025-04-10 RX ADMIN — PANTOPRAZOLE SODIUM 40 MG: 40 TABLET, DELAYED RELEASE ORAL at 08:47

## 2025-04-10 RX ADMIN — METOCLOPRAMIDE 5 MG: 10 TABLET ORAL at 21:18

## 2025-04-10 RX ADMIN — ACETAMINOPHEN 650 MG: 325 TABLET ORAL at 11:51

## 2025-04-10 RX ADMIN — ALBUTEROL SULFATE 2 PUFF: 90 AEROSOL, METERED RESPIRATORY (INHALATION) at 09:15

## 2025-04-10 RX ADMIN — METHYLPHENIDATE HYDROCHLORIDE 10 MG: 10 TABLET ORAL at 08:48

## 2025-04-10 RX ADMIN — INSULIN LISPRO 8 UNITS: 100 INJECTION, SOLUTION INTRAVENOUS; SUBCUTANEOUS at 16:54

## 2025-04-10 RX ADMIN — ALBUTEROL SULFATE 2 PUFF: 90 AEROSOL, METERED RESPIRATORY (INHALATION) at 21:01

## 2025-04-10 RX ADMIN — TRAZODONE HYDROCHLORIDE 50 MG: 50 TABLET ORAL at 21:24

## 2025-04-10 RX ADMIN — METOCLOPRAMIDE 5 MG: 10 TABLET ORAL at 11:47

## 2025-04-10 RX ADMIN — BUPRENORPHINE AND NALOXONE 1.5 FILM: 8; 2 FILM, SOLUBLE BUCCAL; SUBLINGUAL at 08:48

## 2025-04-10 RX ADMIN — Medication 1 TABLET: at 08:47

## 2025-04-10 RX ADMIN — BUDESONIDE AND FORMOTEROL FUMARATE DIHYDRATE 2 PUFF: 160; 4.5 AEROSOL RESPIRATORY (INHALATION) at 21:39

## 2025-04-10 RX ADMIN — BUSPIRONE HYDROCHLORIDE 20 MG: 10 TABLET ORAL at 14:37

## 2025-04-10 RX ADMIN — MONTELUKAST 10 MG: 10 TABLET, FILM COATED ORAL at 08:45

## 2025-04-10 RX ADMIN — ONDANSETRON 8 MG: 4 TABLET, ORALLY DISINTEGRATING ORAL at 08:54

## 2025-04-10 RX ADMIN — LAMOTRIGINE 150 MG: 25 TABLET ORAL at 08:46

## 2025-04-10 RX ADMIN — CLONIDINE HYDROCHLORIDE 0.1 MG: 0.1 TABLET, EXTENDED RELEASE ORAL at 21:48

## 2025-04-10 RX ADMIN — METHYLPHENIDATE HYDROCHLORIDE 10 MG: 10 TABLET ORAL at 16:54

## 2025-04-10 RX ADMIN — METOCLOPRAMIDE 5 MG: 10 TABLET ORAL at 08:45

## 2025-04-10 RX ADMIN — HYDROXYZINE PAMOATE 100 MG: 25 CAPSULE ORAL at 08:47

## 2025-04-10 RX ADMIN — BUSPIRONE HYDROCHLORIDE 20 MG: 10 TABLET ORAL at 21:20

## 2025-04-10 RX ADMIN — BREXPIPRAZOLE 4 MG: 2 TABLET ORAL at 09:15

## 2025-04-10 RX ADMIN — FAMOTIDINE 40 MG: 20 TABLET, FILM COATED ORAL at 08:48

## 2025-04-10 RX ADMIN — PRAZOSIN HYDROCHLORIDE 5 MG: 5 CAPSULE ORAL at 21:20

## 2025-04-10 RX ADMIN — Medication 1000 UNITS: at 09:15

## 2025-04-10 ASSESSMENT — PAIN DESCRIPTION - ORIENTATION: ORIENTATION: RIGHT;LEFT

## 2025-04-10 ASSESSMENT — PAIN SCALES - GENERAL
PAINLEVEL_OUTOF10: 0
PAINLEVEL_OUTOF10: 0
PAINLEVEL_OUTOF10: 6
PAINLEVEL_OUTOF10: 0
PAINLEVEL_OUTOF10: 0
PAINLEVEL_OUTOF10: 8

## 2025-04-10 ASSESSMENT — PAIN DESCRIPTION - DESCRIPTORS
DESCRIPTORS: ACHING
DESCRIPTORS: ACHING

## 2025-04-10 ASSESSMENT — PAIN SCALES - WONG BAKER
WONGBAKER_NUMERICALRESPONSE: NO HURT
WONGBAKER_NUMERICALRESPONSE: NO HURT

## 2025-04-10 ASSESSMENT — PAIN DESCRIPTION - LOCATION
LOCATION: MOUTH
LOCATION: MOUTH

## 2025-04-10 ASSESSMENT — PAIN - FUNCTIONAL ASSESSMENT: PAIN_FUNCTIONAL_ASSESSMENT: ACTIVITIES ARE NOT PREVENTED

## 2025-04-10 NOTE — PLAN OF CARE
Problem: Pain  Goal: Verbalizes/displays adequate comfort level or baseline comfort level  4/10/2025 1130 by Humes, Heather, RN  Outcome: Progressing  Flowsheets (Taken 4/9/2025 1649 by Vasquez Allan RN)  Verbalizes/displays adequate comfort level or baseline comfort level:   Encourage patient to monitor pain and request assistance   Administer analgesics based on type and severity of pain and evaluate response   Assess pain using appropriate pain scale  Note: Patient denies pain at this time.      Problem: Risk for Elopement  Goal: Patient will not exit the unit/facility without proper excort  4/10/2025 1130 by Humes, Heather, RN  Outcome: Progressing  Flowsheets (Taken 4/9/2025 1649 by Vasquez Allan RN)  Nursing Interventions for Elopement Risk:   Make sure patient has all necessary personal care items   Reduce environmental triggers  Note: No attempts to elope so far this shift.      Problem: Discharge Planning  Goal: Discharge to home or other facility with appropriate resources  4/10/2025 1130 by Humes, Heather, RN  Outcome: Progressing  Flowsheets (Taken 4/9/2025 1649 by Vasquez Allan RN)  Discharge to home or other facility with appropriate resources:   Identify barriers to discharge with patient and caregiver   Identify discharge learning needs (meds, wound care, etc)   Arrange for needed discharge resources and transportation as appropriate  Note: Discharge planning ongoing at this time.      Problem: Depression  Goal: Will be euthymic at discharge  Description: INTERVENTIONS:  1. Administer medication as ordered  2. Provide emotional support via 1:1 interaction with staff  3. Encourage involvement in milieu/groups/activities  4. Monitor for social isolation  4/10/2025 1130 by Humes, Heather, RN  Outcome: Progressing  Note: Ongoing, patient rates current mood as a 7/10 with 10 being the best mood.      Problem: Anxiety  Goal: Will report anxiety at manageable levels  Description: INTERVENTIONS:  1.  and co-morbidity symptoms are monitored and maintained or improved  4/10/2025 1130 by Humes, Heather, RN  Outcome: Progressing  Flowsheets (Taken 4/10/2025 0236 by Carlos Alberto Jimenez, RN)  Care Plan - Patient's Chronic Conditions and Co-Morbidity Symptoms are Monitored and Maintained or Improved:   Monitor and assess patient's chronic conditions and comorbid symptoms for stability, deterioration, or improvement   Collaborate with multidisciplinary team to address chronic and comorbid conditions and prevent exacerbation or deterioration   Update acute care plan with appropriate goals if chronic or comorbid symptoms are exacerbated and prevent overall improvement and discharge  Note: Ongoing, please see flowsheet documentation.      Problem: Safety - Adult  Goal: Free from fall injury  Outcome: Progressing  Flowsheets (Taken 4/10/2025 1130)  Free From Fall Injury: Instruct family/caregiver on patient safety  Note: Patient free from falls so far this shift.     Care plan reviewed with patient.  Patient does verbalize understanding of the plan of care and does contribute to goal setting

## 2025-04-10 NOTE — PLAN OF CARE
Problem: Respiratory - Adult  Goal: Clear lung sounds  4/10/2025 0919 by Akua Sher, RCP  Outcome: Progressing

## 2025-04-10 NOTE — PLAN OF CARE
Problem: Coping  Goal: Pt/Family able to verbalize concerns and demonstrate effective coping strategies  Description: INTERVENTIONS:  1. Assist patient/family to identify coping skills, available support systems and cultural and spiritual values  2. Provide emotional support, including active listening and acknowledgement of concerns of patient and caregivers  3. Reduce environmental stimuli, as able  4. Instruct patient/family in relaxation techniques, as appropriate  5. Assess for spiritual pain/suffering and initiate Spiritual Care, Psychosocial Clinical Specialist consults as needed  Outcome: Not Progressing  Flowsheets (Taken 4/10/2025 0283)  Patient/family able to verbalize anxieties, fears, and concerns, and demonstrate effective coping:   Assist patient/family to identify coping skills, available support systems and cultural and spiritual values   Provide emotional support, including active listening and acknowledgement of concerns of patient and caregivers  Note: Pt has attended 1/4 group therapy sessions offered thus far today. Pt has been given maximum verbal encouragement to attend group therapy sessions, pt has been isolative to his room the majority of the day. Intermittently, pt is seen out on the unit interacting with others. Plan to continue to monitor progress and encourage participation in group therapy programming.

## 2025-04-10 NOTE — PROGRESS NOTES
Spiritual Support Group Note    Number of Participants in Group: 10                              Goal: The course today focused on guilt words and how to work on ones inner monologue to assist in behavioral health.  This care focused on guilt words like Would, Could and should and how they can signs of moral injury. This was explored and how changes of perspectives can enable a healthier life. The participants also explored re framing PTSD and avoiding addiction triggers in our own heads.       Topic:  [x] Spiritual Wellness and Self Care                  [] Hope                     [] Connecting with Divine/Others        [] Thankfulness and Gratitude               [x]  Meaningfulness and Purpose               [] Forgiveness               [x] Peace               [] Connect to Community     [] Other:    Participation Level:   [x] Active Listener   [] Minimal   [] Monopolizing   [x] Interactive   [] No Participation   []  Other:     Attention:   [x] Alert   [] Distractible   [] Drowsy   [] Poor   [] Other:    Manner:   [x] Cooperative   [] Suspicious   [] Withdrawn   [] Guarded   [] Irritable   [] Inhospitable   [] Other:

## 2025-04-10 NOTE — BH NOTE
Pt declined to attend 1030 Recreation group therapy session offered today. Pt was provided maximum verbal encouragement to attend group therapy session. Pt remained resting in bed.

## 2025-04-10 NOTE — PROGRESS NOTES
Spiritual Health History and Assessment/Progress Note  [facility]    Behavioral Health, Spiritual/Emotional Needs,  ,  , Spirituality Group    Name: Rodríguez Burt MRN: [id number]    Age: 39 y.o.     Sex: male   Language: English   Worship: Jewish   MDD (major depressive disorder), recurrent severe, without psychosis (HCC)     Date: 4/10/2025            Total Time Calculated: 40 min              Spiritual Assessment began in [facility]ULT [facility]        Referral/Consult From: Nurse   Encounter Overview/Reason: Behavioral Health, Spiritual/Emotional Needs  Service Provided For: Patient    Erin, Belief, Meaning:   Patient identifies as spiritual, is connected with a erin tradition or spiritual practice, and has beliefs or practices that help with coping during difficult times  Family/Friends No family/friends present      Importance and Influence:  Patient has spiritual/personal beliefs that influence decisions regarding their health  Family/Friends No family/friends present    Community:  Patient is connected with a spiritual community, feels well-supported. Support system includes: Parent/s and Erin Community, and expresses feelings of isolation: disconnected from family/friends, feeling there is no one to turn to for help, feeling no one understands, and Other: The patient shared how his drinking has led to the loss of access to his children. This led to a discussion of his father who started the patient drinking at 10 and eventually committed suicide on his 14th birthday. He shared his father  after he removed him for drinking. The patient shared similar behavior and suicidality after losing access to his children. The reason he is alive is the support  broke.    Family/Friends No family/friends present    Assessment and Plan of Care:     Patient Interventions include: Facilitated expression of thoughts and feelings, Explored spiritual coping/struggle/distress, Engaged in

## 2025-04-10 NOTE — BH NOTE
INPATIENT RECREATIONAL THERAPY  ADULT BEHAVIORAL SERVICES  EVALUATION    REFERRING PHYSICIAN:  Baylee Trevino MD  DIAGNOSIS:  MDD, RECURRENT SEVERE, without psychosis   PRECAUTIONS:  standard  HISTORY OF PRESENT ILLNESS/INJURY:    a 39 y.o. male with a history of bipolar affective disorder, alcohol abuse, suicidal ideation  who presented to the emergency department  voluntarily due to alcohol intoxication and suicidal thoughts with plan to hang himself.      Per the CAROLINE social work note:  \"EMC was completed. Pt ethanol was .26. CAROLINE assessment was not completed when pt arrived.   Completed CAROLINE assessment with pt. His ethanol was .04 or lower. Pt reports he \"kind of snapped or broke\" last night. Reports he \"jumped a gun last night\". Reports off and on suicidal thoughts. Reports last night he had suicidal thoughts and his plan was to hang himself (pt states that's always his plan). Pt reports he called his mom for help before he did anything. Pt states \"I needed to be here last night\". Pt reports loss of job on Thursday. Relapsed on alcohol a little bit Friday and then last night.  PMH:  Please see medical chart for prior medical history, allergies, and medicatio    HISTORY OF PSYCHIATRIC TREATMENT:  Outpatient psychiatric provider:  active with virtual psychiatry and counseling. Also does EMDR. Arleen BEGUM?  Suicide attempts: 2 past attempts  Inpatient psychiatric admissions: [facility]24 for four days. Admitted to [facility] in October and December 2020  DATE OF BIRTH:  [birth year]  GENDER:  .   MARITAL STATUS:    EMPLOYMENT STATUS:   Unemployed - recently lost job   LIVING SITUATION/SUPPORT:Currently lives alone/own house     EDUCATIONAL LEVEL: HS grad   MEDICATION/DRUG USE:  Alcohol Use Yes     Comment: \"just relapsed today\"      Social History           Substance and Sexual Activity   Drug Use Yes    Frequency: 14.0 times per week    Types: Marijuana (Weed)     LEISURE  INTERESTS:  cooking/cleaning/going to outpatient meetings  ACTIVITY PREFERENCE: no preference   ACTIVITY TYPES:  indoor/outdoor/active/passive  COGNITION: A&xO4    COPING: poor  ATTENTION: fair  RELAXATION: poor  SELF-ESTEEM: poor  MOTIVATION:  poor    SOCIAL SKILLS:  fair  FRUSTRATION TOLERANCE:  no documented hx of violent or aggressive behavior  ATTENTION SEEKING: no attention seeking behaviors observed at this time  COOPERATION: pt. Was pleasant and agreeable to the TR assessment   AFFECT: blunt-flat  APPEARANCE: pt. Displays fair grooming and hygiene and wears hospital attire.     HEARING:  WNL  VISION:  WNL  VERBAL COMMUNICATION: no impairment noted at this time   WRITTEN COMMUNICATION:  no impairment noted at this time    COORDINATION: no impairment noted at this time   MOBILITY: pt. Is able to ambulate independently on the unit   GOALS: .to increase socialization and knowledge of coping skills through participation in group therapy sessions following verbal encouragement from staff.

## 2025-04-10 NOTE — PATIENT CARE CONFERENCE
Behavioral Health   Day 3 Interdisciplinary Treatment Plan NOTE    Review Date & Time: 4/10/25 1248    Patient was in treatment team    Admission Type:   Admission Type: Involuntary    Reason for admission:  Reason for Admission: \"suicidal thoughts\"  Estimated Length of Stay Update:  1-2 days  Estimated Discharge Date Update: 4/14/25    Patient Strengths/Barriers  Strengths (Must Choose Two): Stable housing, Support from family, Motivation level for treatment  Barriers: Recreational/leisure/hobbies, Support from friends  Addictive Behavior:Addictive Behavior  In the Past 3 Months, Have You Felt or Has Someone Told You That You Have a Problem With  : None  Medical Problems:  Past Medical History:   Diagnosis Date    Asthma     COPD (chronic obstructive pulmonary disease) (HCC)     Eczema     GERD (gastroesophageal reflux disease)     History of alcohol abuse     History of marijuana use     History of tobacco abuse     quit 11/21/2017    Hx of seasonal allergies     Pancreatitis     Psychiatric problem     PTSD (post-traumatic stress disorder)     Seizures ([facility])     etoh wdl    Type 2 diabetes mellitus without complication 12/09/2017       Risk:  Fall Risk   Alvin Scale Alvin Scale Score: 23    Status EXAM:   Mental Status and Behavioral Exam  Normal: No  Level of Assistance: Independent/Self  Facial Expression: Flat, Sad  Affect: Blunt  Level of Consciousness: Alert  Frequency of Checks: 4 times per hour, close  Mood:Normal: No  Mood: Depressed  Motor Activity:Normal: Yes  Eye Contact: Good  Observed Behavior: Cooperative  Sexual Misconduct History: Current - no  Preception: [place] to person, [place] to time, [place] to place, [place] to situation  Attention:Normal: Yes  Thought Processes: Unremarkable  Thought Content:Normal: Yes  Depression Symptoms: No problems reported or observed.  Anxiety Symptoms: Generalized  Mellisa Symptoms: No problems reported or observed.  Hallucinations: None  Delusions:

## 2025-04-10 NOTE — DISCHARGE INSTR - DIET

## 2025-04-10 NOTE — PROGRESS NOTES
Department of Psychiatry  Progress Note     Chief Complaint:  suicidal thoughts with plan to hang himself     PROGRESS:  Salvador was seen in his room.  He was receptive to interaction and brightened upon approach  He stated that he was doing better today  He rated his mood 7 out of 10 with 10 being the best  He reported that his depression is improving  He also reported that he is not feeling as nauseous today  He denied any active suicidal thoughts during interview.  He contracted for safety.  He reported he is feeling more hopeful about his situation.  He is looking forward to visitation with his daughter tomorrow.  He said that is scheduled for 12 PM and he would like to be discharged before then.  He is worried if he does not make it to the visitation that they will not let him see his daughter moving forward.  He reported that he slept good last night.  Staff reported he slept 7.5 hours continuous  Appetite has been better  He has been compliant with his medications.  He has not noticed any worsening of his mood from the discontinuation of Geodon.  He denied any side effects  He has been isolative to his room and has not been attending groups  He has been talking to his mom on the phone.  She is supportive    Suicidal ideations: denies active suicidal ideation  Compliance with medications: good   Medication side effects: absent  ROS: Patient has new complaints:  no  Sleep quality: 7.5 hours continuous last night per staff  Attending groups: no      OBJECTIVE      Medications  Current Facility-Administered Medications: acetaminophen (TYLENOL) tablet 650 mg, 650 mg, Oral, Q4H PRN  ibuprofen (ADVIL;MOTRIN) tablet 400 mg, 400 mg, Oral, Q6H PRN  magnesium hydroxide (MILK OF MAGNESIA) 400 MG/5ML suspension 30 mL, 30 mL, Oral, Daily PRN  aluminum & magnesium hydroxide-simethicone (MAALOX PLUS) 200-200-20 MG/5ML suspension 30 mL, 30 mL, Oral, Q6H PRN  hydrOXYzine HCl (ATARAX) tablet 50 mg, 50 mg, Oral, TID PRN  traZODone  (DESYREL) tablet 50 mg, 50 mg, Oral, Nightly PRN  nicotine (NICODERM CQ) 21 MG/24HR 1 patch, 1 patch, TransDERmal, Daily  busPIRone (BUSPAR) tablet 20 mg, 20 mg, Oral, TID  hydrocortisone 1 % cream, , Topical, BID  hydrOXYzine pamoate (VISTARIL) capsule 100 mg, 100 mg, Oral, Daily  pantoprazole (PROTONIX) tablet 40 mg, 40 mg, Oral, QAM AC  budesonide-formoterol (SYMBICORT) 160-4.5 MCG/ACT inhaler 2 puff, 2 puff, Inhalation, BID RT  benztropine (COGENTIN) tablet 0.5 mg, 0.5 mg, Oral, BID  buprenorphine-naloxone (SUBOXONE) 8-2 MG SL film 1.5 Film, 1.5 Film, SubLINGual, Daily  cloNIDine (KAPVAY) extended release tablet 0.1 mg (Patient Supplied), 0.1 mg, Oral, BID  famotidine (PEPCID) tablet 40 mg, 40 mg, Oral, Daily  folic acid (FOLVITE) tablet 1 mg, 1 mg, Oral, Daily  lamoTRIgine (LAMICTAL) tablet 150 mg, 150 mg, Oral, BID  methylphenidate (RITALIN) tablet 10 mg, 10 mg, Oral, TID WC  multivitamin 1 tablet, 1 tablet, Oral, Daily  prazosin (MINIPRESS) capsule 5 mg, 5 mg, Oral, Nightly  brexpiprazole (REXULTI) tablet 4 mg, 4 mg, Oral, Daily  Viloxazine HCl ER CP24 1 capsule (Patient Supplied), 1 capsule, Oral, QAM  propranolol (INDERAL) tablet 15 mg, 15 mg, Oral, 4x Daily PRN  metoclopramide (REGLAN) tablet 5 mg, 5 mg, Oral, 4x Daily AC & HS  ondansetron (ZOFRAN-ODT) disintegrating tablet 8 mg, 8 mg, Oral, Q8H PRN  promethazine (PHENERGAN) suppository 25 mg, 25 mg, Rectal, Q6H PRN  mirtazapine (REMERON) tablet 30 mg, 30 mg, Oral, Nightly  insulin lispro (HUMALOG,ADMELOG) injection vial 0-12 Units, 0-12 Units, SubCUTAneous, TID AC  glucose chewable tablet 16 g, 4 tablet, Oral, PRN  glucagon injection 1 mg, 1 mg, SubCUTAneous, PRN  montelukast (SINGULAIR) tablet 10 mg, 10 mg, Oral, Daily  vitamin E capsule 1,000 Units, 1,000 Units, Oral, Daily  chlordiazePOXIDE (LIBRIUM) capsule 10 mg, 10 mg, Oral, Q8H PRN  albuterol sulfate HFA (PROVENTIL;VENTOLIN;PROAIR) 108 (90 Base) MCG/ACT inhaler 2 puff, 2 puff, Inhalation, BID RT

## 2025-04-10 NOTE — DISCHARGE INSTRUCTIONS
[facility] Hotline:  [phone]    Crisis phone numbers:  [facility], and [facility] [phone].  [facility], and [facility] [place] [phone]  [facility] [phone].  [facility] [phone].  [facility] [phone].  [facility], [facility] [phone].    [facility]  [address], [place] [address]  [phone]    [facility] [place] [facility]  [address] [facility]  [address], [place] [address]  [place], [place] [id number] [phone]    [facility]  [address] [address] [place] [address]  [phone]    [facility]  Rec[facility]  [address] [place] [address]  [phone]    [facility]  [address] [place] [address]  [phone]    [facility]  [address], [place] [id number] [phone]    [facility]  [address] [place] [address]  [phone]    [facility]  Recovery and Wellness Center  [address], [place] [address] [phone]    [facility]  [address] [address]  [phone]    [address] [address]  [phone]    [facility]  [address] [place], [place] [address] [phone]    [facility]  [address] [place] [address]  [phone]    [facility]  [address] [place] [address], [place] [address] [phone]

## 2025-04-10 NOTE — PLAN OF CARE
Problem: Pain  Goal: Verbalizes/displays adequate comfort level or baseline comfort level  4/10/2025 0236 by Carlos Alberto Jimenez RN  Outcome: Progressing  Flowsheets (Taken 4/9/2025 1649 by Vasquez Allan RN)  Verbalizes/displays adequate comfort level or baseline comfort level:   Encourage patient to monitor pain and request assistance   Administer analgesics based on type and severity of pain and evaluate response   Assess pain using appropriate pain scale  4/9/2025 1649 by Vasquez Allan RN  Outcome: Progressing  Flowsheets (Taken 4/9/2025 1649)  Verbalizes/displays adequate comfort level or baseline comfort level:   Encourage patient to monitor pain and request assistance   Administer analgesics based on type and severity of pain and evaluate response   Assess pain using appropriate pain scale     Problem: Risk for Elopement  Goal: Patient will not exit the unit/facility without proper excort  4/10/2025 0236 by Carlos Alberto Jimenez RN  Outcome: Progressing  Flowsheets (Taken 4/9/2025 1649 by Vasquez Allan RN)  Nursing Interventions for Elopement Risk:   Make sure patient has all necessary personal care items   Reduce environmental triggers  4/9/2025 1649 by Vasquez Allan RN  Outcome: Progressing  Flowsheets (Taken 4/9/2025 1649)  Nursing Interventions for Elopement Risk:   Make sure patient has all necessary personal care items   Reduce environmental triggers  Note: Patient has not been observed to be checking the exit doors or demonstrating behaviors of attempting to leave the unit.       Problem: Discharge Planning  Goal: Discharge to home or other facility with appropriate resources  4/10/2025 0236 by Carlos Alberto Jimenez RN  Outcome: Progressing  Flowsheets (Taken 4/9/2025 1649 by Vasquez Allan RN)  Discharge to home or other facility with appropriate resources:   Identify barriers to discharge with patient and caregiver   Identify discharge learning needs (meds, wound care, etc)   Arrange for needed  Co-morbidities  Goal: Patient's chronic conditions and co-morbidity symptoms are monitored and maintained or improved  Outcome: Progressing  Flowsheets (Taken 4/10/2025 0236)  Care Plan - Patient's Chronic Conditions and Co-Morbidity Symptoms are Monitored and Maintained or Improved:   Monitor and assess patient's chronic conditions and comorbid symptoms for stability, deterioration, or improvement   Collaborate with multidisciplinary team to address chronic and comorbid conditions and prevent exacerbation or deterioration   Update acute care plan with appropriate goals if chronic or comorbid symptoms are exacerbated and prevent overall improvement and discharge  Note: Patient is in good condition .      Problem: Discharge Planning  Goal: Discharge to home or other facility with appropriate resources  4/10/2025 0236 by Carlos Alberto Jimenez RN  Outcome: Progressing  Flowsheets (Taken 4/9/2025 1649 by Vasquez Allan, RN)  Discharge to home or other facility with appropriate resources:   Identify barriers to discharge with patient and caregiver   Identify discharge learning needs (meds, wound care, etc)   Arrange for needed discharge resources and transportation as appropriate  4/9/2025 1649 by Vasquez Allan, RN  Outcome: Not Progressing  Flowsheets (Taken 4/9/2025 1649)  Discharge to home or other facility with appropriate resources:   Identify barriers to discharge with patient and caregiver   Identify discharge learning needs (meds, wound care, etc)   Arrange for needed discharge resources and transportation as appropriate

## 2025-04-10 NOTE — RT PROTOCOL NOTE
RT Inhaler-Nebulizer Bronchodilator Protocol Note    There is a bronchodilator order in the chart from a provider indicating to follow the RT Bronchodilator Protocol and there is an “Initiate RT Inhaler-Nebulizer Bronchodilator Protocol” order as well (see protocol at bottom of note).    CXR Findings:  No results found.    The findings from the last RT Protocol Assessment were as follows:   History Pulmonary Disease: Chronic pulmonary disease  Respiratory Pattern: Regular pattern and RR 12-20 bpm  Breath Sounds: Slightly diminished and/or crackles  Cough: Strong, spontaneous, non-productive  Indication for Bronchodilator Therapy: Decreased or absent breath sounds  Bronchodilator Assessment Score: 4    Aerosolized bronchodilator medication orders have been revised according to the RT Inhaler-Nebulizer Bronchodilator Protocol below.    Respiratory Therapist to perform RT Therapy Protocol Assessment initially then follow the protocol.  Repeat RT Therapy Protocol Assessment PRN for score 0-3 or on second treatment, BID, and PRN for scores above 3.    No Indications - adjust the frequency to every 6 hours PRN wheezing or bronchospasm, if no treatments needed after 48 hours then discontinue using Per Protocol order mode.     If indication present, adjust the RT bronchodilator orders based on the Bronchodilator Assessment Score as indicated below.  Use Inhaler orders unless patient has one or more of the following: on home nebulizer, not able to hold breath for 10 seconds, is not alert and oriented, cannot activate and use MDI correctly, or respiratory rate 25 breaths per minute or more, then use the equivalent nebulizer order(s) with same Frequency and PRN reasons based on the score.  If a patient is on this medication at home then do not decrease Frequency below that used at home.    0-3 - enter or revise RT bronchodilator order(s) to equivalent RT Bronchodilator order with Frequency of every 4 hours PRN for wheezing or 
RT Inhaler-Nebulizer Bronchodilator Protocol Note    There is a bronchodilator order in the chart from a provider indicating to follow the RT Bronchodilator Protocol and there is an “Initiate RT Inhaler-Nebulizer Bronchodilator Protocol” order as well (see protocol at bottom of note).    CXR Findings:  No results found.    The findings from the last RT Protocol Assessment were as follows:   History Pulmonary Disease: Chronic pulmonary disease  Respiratory Pattern: Regular pattern and RR 12-20 bpm  Breath Sounds: Slightly diminished and/or crackles  Cough: Strong, spontaneous, non-productive  Indication for Bronchodilator Therapy: On home bronchodilators  Bronchodilator Assessment Score: 4    Aerosolized bronchodilator medication orders have been revised according to the RT Inhaler-Nebulizer Bronchodilator Protocol below.    Respiratory Therapist to perform RT Therapy Protocol Assessment initially then follow the protocol.  Repeat RT Therapy Protocol Assessment PRN for score 0-3 or on second treatment, BID, and PRN for scores above 3.    No Indications - adjust the frequency to every 6 hours PRN wheezing or bronchospasm, if no treatments needed after 48 hours then discontinue using Per Protocol order mode.     If indication present, adjust the RT bronchodilator orders based on the Bronchodilator Assessment Score as indicated below.  Use Inhaler orders unless patient has one or more of the following: on home nebulizer, not able to hold breath for 10 seconds, is not alert and oriented, cannot activate and use MDI correctly, or respiratory rate 25 breaths per minute or more, then use the equivalent nebulizer order(s) with same Frequency and PRN reasons based on the score.  If a patient is on this medication at home then do not decrease Frequency below that used at home.    0-3 - enter or revise RT bronchodilator order(s) to equivalent RT Bronchodilator order with Frequency of every 4 hours PRN for wheezing or increased 
bronchodilator orders with one order with TID Frequency and one order with Frequency of every 4 hours PRN wheezing or increased work of breathing using Per Protocol order mode.       11-13 - enter or revise RT Bronchodilator order(s) to one equivalent RT bronchodilator order with QID Frequency and an Albuterol order with Frequency of every 4 hours PRN wheezing or increased work of breathing using Per Protocol order mode.      Greater than 13 - enter or revise RT Bronchodilator order(s) to one equivalent RT bronchodilator order with every 4 hours Frequency and an Albuterol order with Frequency of every 2 hours PRN wheezing or increased work of breathing using Per Protocol order mode.     RT to enter RT Home Evaluation for COPD & MDI Assessment order using Per Protocol order mode.    Electronically signed by Lenora Metz RCP on 4/9/2025 at 9:42 PM

## 2025-04-11 VITALS
TEMPERATURE: 98.6 F | HEIGHT: 62 IN | BODY MASS INDEX: 20.24 KG/M2 | OXYGEN SATURATION: 99 % | DIASTOLIC BLOOD PRESSURE: 71 MMHG | HEART RATE: 80 BPM | SYSTOLIC BLOOD PRESSURE: 112 MMHG | WEIGHT: 110 LBS | RESPIRATION RATE: 16 BRPM

## 2025-04-11 LAB — GLUCOSE BLD STRIP.AUTO-MCNC: 256 MG/DL (ref 70–108)

## 2025-04-11 PROCEDURE — 6370000000 HC RX 637 (ALT 250 FOR IP): Performed by: PSYCHIATRY & NEUROLOGY

## 2025-04-11 PROCEDURE — 6370000000 HC RX 637 (ALT 250 FOR IP): Performed by: PHYSICIAN ASSISTANT

## 2025-04-11 PROCEDURE — 94640 AIRWAY INHALATION TREATMENT: CPT

## 2025-04-11 PROCEDURE — 94761 N-INVAS EAR/PLS OXIMETRY MLT: CPT

## 2025-04-11 PROCEDURE — 82948 REAGENT STRIP/BLOOD GLUCOSE: CPT

## 2025-04-11 PROCEDURE — 99238 HOSP IP/OBS DSCHRG MGMT 30/<: CPT | Performed by: PSYCHIATRY & NEUROLOGY

## 2025-04-11 RX ORDER — FLUTICASONE PROPIONATE AND SALMETEROL 50; 500 UG/1; UG/1
POWDER RESPIRATORY (INHALATION)
Qty: 60 EACH | Refills: 11 | Status: SHIPPED | OUTPATIENT
Start: 2025-04-11

## 2025-04-11 RX ADMIN — FAMOTIDINE 40 MG: 20 TABLET, FILM COATED ORAL at 08:18

## 2025-04-11 RX ADMIN — LAMOTRIGINE 150 MG: 25 TABLET ORAL at 08:17

## 2025-04-11 RX ADMIN — FOLIC ACID 1 MG: 1 TABLET ORAL at 08:18

## 2025-04-11 RX ADMIN — PANTOPRAZOLE SODIUM 40 MG: 40 TABLET, DELAYED RELEASE ORAL at 06:52

## 2025-04-11 RX ADMIN — BENZTROPINE MESYLATE 0.5 MG: 1 TABLET ORAL at 08:17

## 2025-04-11 RX ADMIN — BUSPIRONE HYDROCHLORIDE 20 MG: 10 TABLET ORAL at 08:18

## 2025-04-11 RX ADMIN — METHYLPHENIDATE HYDROCHLORIDE 10 MG: 10 TABLET ORAL at 08:18

## 2025-04-11 RX ADMIN — INSULIN LISPRO 6 UNITS: 100 INJECTION, SOLUTION INTRAVENOUS; SUBCUTANEOUS at 08:13

## 2025-04-11 RX ADMIN — Medication 1000 UNITS: at 08:16

## 2025-04-11 RX ADMIN — BREXPIPRAZOLE 4 MG: 2 TABLET ORAL at 08:17

## 2025-04-11 RX ADMIN — BUPRENORPHINE AND NALOXONE 1.5 FILM: 8; 2 FILM, SOLUBLE BUCCAL; SUBLINGUAL at 08:14

## 2025-04-11 RX ADMIN — MONTELUKAST 10 MG: 10 TABLET, FILM COATED ORAL at 08:18

## 2025-04-11 RX ADMIN — METOCLOPRAMIDE 5 MG: 10 TABLET ORAL at 06:52

## 2025-04-11 RX ADMIN — BUDESONIDE AND FORMOTEROL FUMARATE DIHYDRATE 2 PUFF: 160; 4.5 AEROSOL RESPIRATORY (INHALATION) at 10:30

## 2025-04-11 RX ADMIN — HYDROXYZINE PAMOATE 100 MG: 25 CAPSULE ORAL at 08:18

## 2025-04-11 RX ADMIN — Medication 1 TABLET: at 08:18

## 2025-04-11 RX ADMIN — ALBUTEROL SULFATE 2 PUFF: 90 AEROSOL, METERED RESPIRATORY (INHALATION) at 10:30

## 2025-04-11 ASSESSMENT — PAIN DESCRIPTION - LOCATION: LOCATION: TEETH

## 2025-04-11 ASSESSMENT — PAIN DESCRIPTION - DESCRIPTORS: DESCRIPTORS: SHARP

## 2025-04-11 ASSESSMENT — PAIN SCALES - WONG BAKER: WONGBAKER_NUMERICALRESPONSE: HURTS A LITTLE BIT

## 2025-04-11 ASSESSMENT — PAIN - FUNCTIONAL ASSESSMENT: PAIN_FUNCTIONAL_ASSESSMENT: ACTIVITIES ARE NOT PREVENTED

## 2025-04-11 ASSESSMENT — PAIN DESCRIPTION - PAIN TYPE: TYPE: CHRONIC PAIN

## 2025-04-11 ASSESSMENT — PAIN DESCRIPTION - ORIENTATION: ORIENTATION: RIGHT

## 2025-04-11 ASSESSMENT — PAIN SCALES - GENERAL: PAINLEVEL_OUTOF10: 7

## 2025-04-11 NOTE — PLAN OF CARE
Cooperative with assessment (see Chart). Secluded in room, but did interact as necessary when in milieu. Smiled once, but for the most part, affect seemed distant and blunted. Expresses hope for future and d/c tomorrow. Cristino SI/HI with assessment. Care plan reviewed with patient.  Patient verbalizes understanding of the plan of care and contribute to goal setting. Nursing will continue to monitor q 15 minutes and PRN for any change in status/needs.    Problem: Coping  Goal: Pt/Family able to verbalize concerns and demonstrate effective coping strategies  Description: INTERVENTIONS:  1. Assist patient/family to identify coping skills, available support systems and cultural and spiritual values  2. Provide emotional support, including active listening and acknowledgement of concerns of patient and caregivers  3. Reduce environmental stimuli, as able  4. Instruct patient/family in relaxation techniques, as appropriate  5. Assess for spiritual pain/suffering and initiate Spiritual Care, Psychosocial Clinical Specialist consults as needed  4/10/2025 2254 by El Tamez RN  Outcome: Progressing  Flowsheets (Taken 4/10/2025 2227)  Patient/family able to verbalize anxieties, fears, and concerns, and demonstrate effective coping: Assist patient/family to identify coping skills, available support systems and cultural and spiritual values  4/10/2025 1544 by Ashwin Wolf, CTRS  Outcome: Not Progressing  Flowsheets (Taken 4/10/2025 1544)  Patient/family able to verbalize anxieties, fears, and concerns, and demonstrate effective coping:   Assist patient/family to identify coping skills, available support systems and cultural and spiritual values   Provide emotional support, including active listening and acknowledgement of concerns of patient and caregivers  Note: Pt has attended 1/4 group therapy sessions offered thus far today. Pt has been given maximum verbal encouragement to attend group therapy sessions, pt has been  isolative to his room the majority of the day. Intermittently, pt is seen out on the unit interacting with others. Plan to continue to monitor progress and encourage participation in group therapy programming.

## 2025-04-11 NOTE — PROGRESS NOTES
Behavioral Health   Discharge Note    Pt discharged with followings belongings:   Dental Appliances: None  Vision - Corrective Lenses: Eyeglasses  Hearing Aid: None  Jewelry: None  Body Piercings Removed: N/A  Clothing: Jacket/Coat, Footwear, Sweater, Pants, Hat  Other Valuables: Wallet   Valuables retrieved from safe, security envelope number:  NA and returned to patient.  Patient left department with transport via ambulation NA.  Discharged to home. \"An Important Message from Medicare About Your Rights\" (IMM) form photocopy original from admission and provided to pt at least 4 hours prior to discharge N/A. \"An Important Message from [employer] About Your Rights\" (IMM) form photocopy original from admission. N/A. If pt left within 4 hours of receiving 2nd delivery of IMM, this is because pt was agreeable with hospital discharge.  Patient/guardian education on aftercare instructions: Yes  Bridge appointment completed:  yes.  Reviewed Discharge Instructions with patient/family/nursing facility.  Patient/family verbalizes understanding and agreement with the discharge plan using the teachback method.   Information faxed to NA by NA Patient/family verbalize understanding of AVS:Yes    Status EXAM upon discharge:  Mental Status and Behavioral Exam  Normal: No  Level of Assistance: Independent/Self  Facial Expression: Flat  Affect: Blunt, Constricted  Level of Consciousness: Alert  Frequency of Checks: 4 times per hour, close  Mood:Normal: No  Mood: Ambivalent, Empty  Motor Activity:Normal: Yes  Eye Contact: Fair  Observed Behavior: Cooperative, Friendly, Guarded  Sexual Misconduct History: Current - no  Preception: [place] to person, [place] to time, [place] to place, [place] to situation  Attention:Normal: Yes  Thought Processes: Unremarkable  Thought Content:Normal: Yes  Depression Symptoms: Change in energy level, Impaired concentration  Anxiety Symptoms: Generalized  Mellisa Symptoms: No problems reported or

## 2025-04-11 NOTE — DISCHARGE SUMMARY
Provider Discharge Summary     Patient ID:  Rodríguez Burt  [id number]  39 y.o.  1985    Admit date: 4/7/2025    Discharge date and time: 4/11/2025  4:02 PM     Admitting Physician: Baylee Trevino MD     Discharge Physician: Baylee Trevino MD    Admission Diagnoses: MDD (major depressive disorder), recurrent severe, without psychosis (HCC) [F33.2]  Depression with suicidal ideation [F32.A, R45.851]  Alcohol use [F10.90]    Discharge Diagnoses:      MDD (major depressive disorder), recurrent severe, without psychosis (HCC)     Patient Active Problem List   Diagnosis Code    Pancreatitis, History K85.90    Alcohol-induced acute pancreatitis K85.20    Type 2 diabetes mellitus without complication, with long-term current use of insulin E11.9, Z79.4    Asthma J45.909    Alcohol use disorder, moderate, in sustained remission F10.21    Eczema L30.9    Smoker F17.200    Cigarette nicotine dependence without complication F17.210    Major depression, chronic F32.9    Closed fracture of orbit (HCC) S02.85XA    Closed fracture of talus S92.109A    Bipolar disorder, current episode depressed, moderate (HCC) F31.32    PTSD (post-traumatic stress disorder) F43.10    Gastroesophageal reflux disease K21.9    MDD (major depressive disorder), recurrent severe, without psychosis (HCC) F33.2    Moderate malnutrition E44.0        Admission Condition: poor    Discharged Condition: stable    Indication for Admission: threat to self    History of Present Illnes (present tense wording is of findings from admission exam and are not necessarily indicative of current findings):   Rodríguez Burt is a 39 y.o. male with a history of bipolar affective disorder, alcohol abuse, suicidal ideation  who presented to the emergency department  voluntarily due to alcohol intoxication and suicidal thoughts with plan to hang himself.      Per the [facility] social work note:  \"EMC was completed. Pt ethanol was .26. [facility] assessment was not

## 2025-04-11 NOTE — PLAN OF CARE
RN  Outcome: Progressing     Problem: Chronic Conditions and Co-morbidities  Goal: Patient's chronic conditions and co-morbidity symptoms are monitored and maintained or improved  4/11/2025 0950 by Cele Aadms RN  Outcome: Completed  Flowsheets (Taken 4/11/2025 0800)  Care Plan - Patient's Chronic Conditions and Co-Morbidity Symptoms are Monitored and Maintained or Improved: Monitor and assess patient's chronic conditions and comorbid symptoms for stability, deterioration, or improvement  4/10/2025 2254 by El Tamez RN  Outcome: Progressing  Flowsheets (Taken 4/10/2025 2227)  Care Plan - Patient's Chronic Conditions and Co-Morbidity Symptoms are Monitored and Maintained or Improved: Monitor and assess patient's chronic conditions and comorbid symptoms for stability, deterioration, or improvement     Problem: Safety - Adult  Goal: Free from fall injury  4/11/2025 0950 by Cele Adams RN  Outcome: Completed  4/10/2025 2254 by El Tamez RN  Outcome: Progressing     Problem: Coping  Goal: Pt/Family able to verbalize concerns and demonstrate effective coping strategies  Description: INTERVENTIONS:  1. Assist patient/family to identify coping skills, available support systems and cultural and spiritual values  2. Provide emotional support, including active listening and acknowledgement of concerns of patient and caregivers  3. Reduce environmental stimuli, as able  4. Instruct patient/family in relaxation techniques, as appropriate  5. Assess for spiritual pain/suffering and initiate Spiritual Care, Psychosocial Clinical Specialist consults as needed  4/11/2025 0950 by Cele Adams RN  Outcome: Completed  Flowsheets (Taken 4/11/2025 0800)  Patient/family able to verbalize anxieties, fears, and concerns, and demonstrate effective coping: Assess for spiritual pain/suffering and initiate Spiritual Care, Psychosocial Clinical Specialist consults as needed  4/10/2025 2254 by El Tamez RN  Outcome:

## 2025-04-11 NOTE — PROGRESS NOTES
Discharge planning- Rodríguez is to go to [place] for a diagnostic assessment with [facility] on Wednesday, April 16 at 1:45pm.

## 2025-04-11 NOTE — TELEPHONE ENCOUNTER
Recent Visits  Date Type Provider Dept   12/18/24 Office Visit Nathan López APRN - CNP [facility]   08/15/24 Office Visit Nathan López APRN - CNP [facility]   01/05/24 Office Visit Nathan López APRN - CNP [facility]   Showing recent visits within past 540 days with a meds authorizing provider and meeting all other requirements  Future Appointments  No visits were found meeting these conditions.  Showing future appointments within next 150 days with a meds authorizing provider and meeting all other requirements

## 2025-04-18 ENCOUNTER — TELEPHONE (OUTPATIENT)
Age: 40
End: 2025-04-18

## 2025-05-01 ENCOUNTER — RESULTS FOLLOW-UP (OUTPATIENT)
Dept: FAMILY MEDICINE CLINIC | Age: 40
End: 2025-05-01

## 2025-05-01 ENCOUNTER — OFFICE VISIT (OUTPATIENT)
Dept: FAMILY MEDICINE CLINIC | Age: 40
End: 2025-05-01
Payer: MEDICAID

## 2025-05-01 DIAGNOSIS — E11.9 TYPE 2 DIABETES MELLITUS WITHOUT COMPLICATION, WITH LONG-TERM CURRENT USE OF INSULIN (HCC): ICD-10-CM

## 2025-05-01 DIAGNOSIS — J45.41 MODERATE PERSISTENT ASTHMA WITH ACUTE EXACERBATION: ICD-10-CM

## 2025-05-01 DIAGNOSIS — Z79.4 TYPE 2 DIABETES MELLITUS WITHOUT COMPLICATION, WITH LONG-TERM CURRENT USE OF INSULIN (HCC): ICD-10-CM

## 2025-05-01 DIAGNOSIS — K90.9 STEATORRHEA: Primary | ICD-10-CM

## 2025-05-01 DIAGNOSIS — F10.21 ALCOHOL USE DISORDER, MODERATE, IN SUSTAINED REMISSION (HCC): ICD-10-CM

## 2025-05-01 DIAGNOSIS — K86.0 ALCOHOL-INDUCED CHRONIC PANCREATITIS (HCC): ICD-10-CM

## 2025-05-01 LAB
CREAT UR-MCNC: 85.2 MG/DL
HBA1C MFR BLD: 12.2 % (ref 4.3–5.7)
MICROALBUMIN UR-MCNC: < 2 MG/DL
MICROALBUMIN/CREAT RATIO PNL UR: 23 MG/G (ref 0–30)

## 2025-05-01 PROCEDURE — G8427 DOCREV CUR MEDS BY ELIG CLIN: HCPCS | Performed by: NURSE PRACTITIONER

## 2025-05-01 PROCEDURE — 3046F HEMOGLOBIN A1C LEVEL >9.0%: CPT | Performed by: NURSE PRACTITIONER

## 2025-05-01 PROCEDURE — 1111F DSCHRG MED/CURRENT MED MERGE: CPT | Performed by: NURSE PRACTITIONER

## 2025-05-01 PROCEDURE — 99214 OFFICE O/P EST MOD 30 MIN: CPT | Performed by: NURSE PRACTITIONER

## 2025-05-01 PROCEDURE — 2022F DILAT RTA XM EVC RTNOPTHY: CPT | Performed by: NURSE PRACTITIONER

## 2025-05-01 PROCEDURE — 4004F PT TOBACCO SCREEN RCVD TLK: CPT | Performed by: NURSE PRACTITIONER

## 2025-05-01 PROCEDURE — G8420 CALC BMI NORM PARAMETERS: HCPCS | Performed by: NURSE PRACTITIONER

## 2025-05-01 RX ORDER — INSULIN GLARGINE 100 [IU]/ML
15 INJECTION, SOLUTION SUBCUTANEOUS 2 TIMES DAILY
Qty: 5 ADJUSTABLE DOSE PRE-FILLED PEN SYRINGE | Refills: 3 | Status: SHIPPED | OUTPATIENT
Start: 2025-05-01

## 2025-05-01 RX ORDER — ZIPRASIDONE HYDROCHLORIDE 60 MG/1
60 CAPSULE ORAL 2 TIMES DAILY WITH MEALS
COMMUNITY

## 2025-05-01 RX ORDER — INSULIN ASPART 100 [IU]/ML
INJECTION, SOLUTION INTRAVENOUS; SUBCUTANEOUS
COMMUNITY
Start: 2025-04-10

## 2025-05-01 RX ORDER — ASPIRIN 81 MG/1
81 TABLET, CHEWABLE ORAL DAILY
COMMUNITY

## 2025-05-01 RX ORDER — ASCORBIC ACID 500 MG
500 TABLET ORAL DAILY
COMMUNITY

## 2025-05-01 RX ORDER — MONTELUKAST SODIUM 10 MG/1
10 TABLET ORAL DAILY
Qty: 90 TABLET | Refills: 1 | Status: SHIPPED | OUTPATIENT
Start: 2025-05-01

## 2025-05-01 NOTE — PROGRESS NOTES
Chief Complaint   Patient presents with    Diabetes     States had a relapse 23 days ago. Was only one day but ended up in Hospital. Also has been having \"high\" readings nearly every day since his relapse.        History obtained from chart review and the patient.    SUBJECTIVE:  Rodríguez Burt is a 39 y.o. male that presents today for follow up DM      Diabetes Type 2    Glucose control:   Does patient check blood glucoses at home?  Yes -running high, reader hasn't been able to read his sugars a few times  Report of hypoglycemia: no  Lab Results   Component Value Date    LABA1C 12.2 (H) 05/01/2025     Lab Results   Component Value Date     (H) 12/29/2020       Symptoms  Polyuria, Polydipsia or Polyphagia?   No  Chest Pain, SOB, or Palpitations? -  No  New Vision complaints? No  Paresthesias of the extremities?  No    Medications  Current medication were reviewed.  Compliant with medications?  Yes, taking Lantus 10 units twice a day,  Novolog 5 units TID plus sliding scale  Medication side effects?  No  On ACE-I or ARB?  No  On antiplatelet therapy?  No  On Statin?  No    Last Diabetic Eye Exam: needs    Exercise  Exercise? Yes he is exercising almost every day  Wt Readings from Last 3 Encounters:   04/08/25 49.9 kg (110 lb)   12/18/24 61.3 kg (135 lb 3.2 oz)   12/01/24 56.2 kg (124 lb)       Diet discipline?:  Low salt, fat, sugar diet?  Much better    Blood pressure control:  BP Readings from Last 3 Encounters:   04/11/25 112/71   03/03/25 118/68   12/18/24 130/82     No results found for: \"LDLDIRECT\"    ETOH Use Disorder/Bipolar Disorder  Has had 2 ETOH relapses in the last 4 months  Got admitted to [facility] for depression with suicidal ideation  Doing a little better mentally  Has been sober 23 days  He is seeing a psychiatry and therapist (telehealth through [facility])  He is working on trying to find a new psychiatrist. Feels overmedicated  Has been to [facility]    C/o steatorrhea

## 2025-05-12 ENCOUNTER — TELEPHONE (OUTPATIENT)
Dept: FAMILY MEDICINE CLINIC | Age: 40
End: 2025-05-12

## 2025-05-12 NOTE — TELEPHONE ENCOUNTER
I received notification from Dr Ochoa's office that they will not accept the referral as he has been dismissed from their practice. He has also been dismissed from [facility]'s office. I would let Rodríguez know that really his only option locally is [place]. I would rec'd he f/u with Jeny regarding his ongoing oily bowel movements and his pancreatitis.

## 2025-05-12 NOTE — TELEPHONE ENCOUNTER
Left message on answering machine. Requested pt to call back at [phone], at their earliest convenience.

## 2025-05-13 NOTE — TELEPHONE ENCOUNTER
Pt informed of note . Pt voiced understanding with no further questions at this time.  Will fu with Jeny

## 2025-05-14 ENCOUNTER — LAB (OUTPATIENT)
Dept: LAB | Age: 40
End: 2025-05-14

## 2025-05-14 DIAGNOSIS — Z79.4 TYPE 2 DIABETES MELLITUS WITHOUT COMPLICATION, WITH LONG-TERM CURRENT USE OF INSULIN (HCC): ICD-10-CM

## 2025-05-14 DIAGNOSIS — E11.9 TYPE 2 DIABETES MELLITUS WITHOUT COMPLICATION, WITH LONG-TERM CURRENT USE OF INSULIN (HCC): ICD-10-CM

## 2025-05-14 LAB
25(OH)D3 SERPL-MCNC: 7 NG/ML (ref 30–100)
ALBUMIN SERPL BCG-MCNC: 3.7 G/DL (ref 3.4–4.9)
ALP SERPL-CCNC: 131 U/L (ref 40–129)
ALT SERPL W/O P-5'-P-CCNC: 26 U/L (ref 10–50)
ANION GAP SERPL CALC-SCNC: 10 MEQ/L (ref 8–16)
AST SERPL-CCNC: 23 U/L (ref 10–50)
BASOPHILS ABSOLUTE: 0.1 THOU/MM3 (ref 0–0.1)
BASOPHILS NFR BLD AUTO: 0.8 %
BILIRUB SERPL-MCNC: < 0.2 MG/DL (ref 0.3–1.2)
BUN SERPL-MCNC: 11 MG/DL (ref 8–23)
CALCIUM SERPL-MCNC: 8.6 MG/DL (ref 8.6–10)
CHLORIDE SERPL-SCNC: 103 MEQ/L (ref 98–111)
CHOLESTEROL, FASTING: 142 MG/DL (ref 100–199)
CK SERPL-CCNC: 69 U/L (ref 39–308)
CO2 SERPL-SCNC: 24 MEQ/L (ref 22–29)
CREAT SERPL-MCNC: 0.7 MG/DL (ref 0.7–1.2)
CREAT UR-MCNC: 34.6 MG/DL
DEPRECATED MEAN GLUCOSE BLD GHB EST-ACNC: 318 MG/DL (ref 70–126)
DEPRECATED RDW RBC AUTO: 43.5 FL (ref 35–45)
EOSINOPHIL NFR BLD AUTO: 5.4 %
EOSINOPHILS ABSOLUTE: 0.5 THOU/MM3 (ref 0–0.4)
ERYTHROCYTE [DISTWIDTH] IN BLOOD BY AUTOMATED COUNT: 13.2 % (ref 11.5–14.5)
FOLATE SERPL-MCNC: > 20 NG/ML (ref 4.6–34.8)
GFR SERPL CREATININE-BSD FRML MDRD: > 90 ML/MIN/1.73M2
GLUCOSE SERPL-MCNC: 413 MG/DL (ref 74–109)
HBA1C MFR BLD HPLC: 12.6 % (ref 4–6)
HCT VFR BLD AUTO: 39.3 % (ref 42–52)
HDLC SERPL-MCNC: 66 MG/DL
HGB BLD-MCNC: 12.9 GM/DL (ref 14–18)
IMM GRANULOCYTES # BLD AUTO: 0.03 THOU/MM3 (ref 0–0.07)
IMM GRANULOCYTES NFR BLD AUTO: 0.4 %
LDLC SERPL CALC-MCNC: 65 MG/DL
LYMPHOCYTES ABSOLUTE: 2.7 THOU/MM3 (ref 1–4.8)
LYMPHOCYTES NFR BLD AUTO: 32 %
MCH RBC QN AUTO: 29.6 PG (ref 26–33)
MCHC RBC AUTO-ENTMCNC: 32.8 GM/DL (ref 32.2–35.5)
MCV RBC AUTO: 90.1 FL (ref 80–94)
MICROALBUMIN UR-MCNC: < 2 MG/DL
MICROALBUMIN/CREAT RATIO PNL UR: 58 MG/G (ref 0–30)
MONOCYTES ABSOLUTE: 0.6 THOU/MM3 (ref 0.4–1.3)
MONOCYTES NFR BLD AUTO: 6.9 %
NEUTROPHILS ABSOLUTE: 4.6 THOU/MM3 (ref 1.8–7.7)
NEUTROPHILS NFR BLD AUTO: 54.5 %
NRBC BLD AUTO-RTO: 0 /100 WBC
PLATELET # BLD AUTO: 219 THOU/MM3 (ref 130–400)
PMV BLD AUTO: 10.1 FL (ref 9.4–12.4)
POTASSIUM SERPL-SCNC: 4.2 MEQ/L (ref 3.5–5.2)
PROT SERPL-MCNC: 5.9 G/DL (ref 6.4–8.3)
RBC # BLD AUTO: 4.36 MILL/MM3 (ref 4.7–6.1)
SODIUM SERPL-SCNC: 137 MEQ/L (ref 135–145)
T4 FREE SERPL-MCNC: 1 NG/DL (ref 0.92–1.68)
TRIGLYCERIDE, FASTING: 55 MG/DL (ref 0–199)
TSH SERPL DL<=0.05 MIU/L-ACNC: 0.71 UIU/ML (ref 0.27–4.2)
VIT B12 SERPL-MCNC: 447 PG/ML (ref 232–1245)
WBC # BLD AUTO: 8.5 THOU/MM3 (ref 4.8–10.8)

## 2025-05-15 LAB — T3FREE SERPL-MCNC: 2.81 PG/ML (ref 2–4.4)

## 2025-06-04 ENCOUNTER — HOSPITAL ENCOUNTER (EMERGENCY)
Age: 40
Discharge: HOME | End: 2025-06-04
Payer: MEDICAID

## 2025-06-04 VITALS
SYSTOLIC BLOOD PRESSURE: 112 MMHG | OXYGEN SATURATION: 98 % | WEIGHT: 124 LBS | RESPIRATION RATE: 18 BRPM | HEART RATE: 98 BPM | TEMPERATURE: 98.9 F | DIASTOLIC BLOOD PRESSURE: 65 MMHG | BODY MASS INDEX: 22.68 KG/M2

## 2025-06-04 DIAGNOSIS — M25.561 ACUTE PAIN OF BOTH KNEES: Primary | ICD-10-CM

## 2025-06-04 DIAGNOSIS — M25.562 ACUTE PAIN OF BOTH KNEES: Primary | ICD-10-CM

## 2025-06-04 PROCEDURE — 99213 OFFICE O/P EST LOW 20 MIN: CPT

## 2025-06-04 RX ORDER — IBUPROFEN 600 MG/1
600 TABLET, FILM COATED ORAL 3 TIMES DAILY PRN
Qty: 30 TABLET | Refills: 0 | Status: SHIPPED | OUTPATIENT
Start: 2025-06-04

## 2025-06-04 ASSESSMENT — PAIN DESCRIPTION - ORIENTATION: ORIENTATION: RIGHT;LEFT

## 2025-06-04 ASSESSMENT — PAIN - FUNCTIONAL ASSESSMENT: PAIN_FUNCTIONAL_ASSESSMENT: 0-10

## 2025-06-04 ASSESSMENT — ENCOUNTER SYMPTOMS
GASTROINTESTINAL NEGATIVE: 1
RESPIRATORY NEGATIVE: 1

## 2025-06-04 ASSESSMENT — PAIN DESCRIPTION - LOCATION: LOCATION: KNEE

## 2025-06-04 ASSESSMENT — PAIN DESCRIPTION - DESCRIPTORS: DESCRIPTORS: ACHING;SHOOTING

## 2025-06-04 ASSESSMENT — PAIN SCALES - GENERAL: PAINLEVEL_OUTOF10: 8

## 2025-06-04 ASSESSMENT — PAIN DESCRIPTION - FREQUENCY: FREQUENCY: CONTINUOUS

## 2025-06-04 ASSESSMENT — PAIN DESCRIPTION - PAIN TYPE: TYPE: ACUTE PAIN

## 2025-06-04 NOTE — DISCHARGE INSTRUCTIONS
Rest.  Ice and elevate.  Tylenol Motrin as needed for pain.  Follow-up with primary care provider for any new or worsening symptoms Go to emergency department for any other symptoms or concerns deemed emergent.

## 2025-06-04 NOTE — ED PROVIDER NOTES
[facility] URGENT CARE  UrgentCare Encounter      CHIEFCOMPLAINT       Chief Complaint   Patient presents with    Knee Pain     (B)       Nurses Notes reviewed and I agree except as noted in the HPI.  HISTORY OF PRESENT ILLNESS   Rodríguez Burt is a 39 y.o. male who presents today for bilateral knee pain.  States this started approximately 1 week ago.  Denies any injury.  Does complain that he has had some swelling to the left knee.    REVIEW OF SYSTEMS     Review of Systems   Constitutional: Negative.    Respiratory: Negative.     Cardiovascular: Negative.    Gastrointestinal: Negative.    Musculoskeletal:  Positive for arthralgias and joint swelling.   Neurological: Negative.    Psychiatric/Behavioral: Negative.         PAST MEDICAL HISTORY         Diagnosis Date    Asthma     COPD (chronic obstructive pulmonary disease) (HCC)     Eczema     GERD (gastroesophageal reflux disease)     History of alcohol abuse     History of marijuana use     History of tobacco abuse     quit 11/21/2017    Hx of seasonal allergies     Pancreatitis     Psychiatric problem     PTSD (post-traumatic stress disorder)     Seizures ([facility])     etoh wdl    Type 2 diabetes mellitus without complication ([facility]) 12/09/2017       SURGICAL HISTORY     Patient  has a past surgical history that includes Upper gastrointestinal endoscopy (Left, 5/6/2019); Eye surgery (Right, 09/2019); fracture surgery (Right, 2019); and Ankle arthroscopy (Right, 8/5/2020).    CURRENT MEDICATIONS       Discharge Medication List as of 6/4/2025  1:12 PM        CONTINUE these medications which have NOT CHANGED    Details   naloxone 4 MG/0.1ML LIQD nasal spray INSERT 1 TO 2 SPRAYS INTRANASALLY FOR SUSPECTED OVERDOSEHistorical Med      Insulin Aspart FlexPen 100 UNIT/ML SOPN INJECT 5 UNITS SUBCUTANEOUSLY THREE TIMES A DAY WITH MEALS *MAX DAILY DOSE: 66 UNITS*Historical Med      aspirin 81 MG chewable tablet Take 1 tablet by mouth dailyHistorical Med      vitamin C

## 2025-06-04 NOTE — ED TRIAGE NOTES
Patient ambulated to room with c/o bilateral knee pain beginning one week ago. Denies injury. C/o edema to left knee.

## 2025-06-05 ENCOUNTER — OFFICE VISIT (OUTPATIENT)
Dept: FAMILY MEDICINE CLINIC | Age: 40
End: 2025-06-05
Payer: MEDICAID

## 2025-06-05 VITALS
OXYGEN SATURATION: 99 % | DIASTOLIC BLOOD PRESSURE: 74 MMHG | HEART RATE: 99 BPM | HEIGHT: 62 IN | WEIGHT: 120.8 LBS | RESPIRATION RATE: 12 BRPM | TEMPERATURE: 96.8 F | BODY MASS INDEX: 22.23 KG/M2 | SYSTOLIC BLOOD PRESSURE: 114 MMHG

## 2025-06-05 DIAGNOSIS — M25.561 ACUTE PAIN OF BOTH KNEES: Primary | ICD-10-CM

## 2025-06-05 DIAGNOSIS — Z79.4 TYPE 2 DIABETES MELLITUS WITHOUT COMPLICATION, WITH LONG-TERM CURRENT USE OF INSULIN (HCC): ICD-10-CM

## 2025-06-05 DIAGNOSIS — M25.562 ACUTE PAIN OF BOTH KNEES: Primary | ICD-10-CM

## 2025-06-05 DIAGNOSIS — E11.9 TYPE 2 DIABETES MELLITUS WITHOUT COMPLICATION, WITH LONG-TERM CURRENT USE OF INSULIN (HCC): ICD-10-CM

## 2025-06-05 PROCEDURE — 99214 OFFICE O/P EST MOD 30 MIN: CPT | Performed by: NURSE PRACTITIONER

## 2025-06-05 PROCEDURE — G8427 DOCREV CUR MEDS BY ELIG CLIN: HCPCS | Performed by: NURSE PRACTITIONER

## 2025-06-05 PROCEDURE — 3046F HEMOGLOBIN A1C LEVEL >9.0%: CPT | Performed by: NURSE PRACTITIONER

## 2025-06-05 PROCEDURE — G8420 CALC BMI NORM PARAMETERS: HCPCS | Performed by: NURSE PRACTITIONER

## 2025-06-05 PROCEDURE — 2022F DILAT RTA XM EVC RTNOPTHY: CPT | Performed by: NURSE PRACTITIONER

## 2025-06-05 PROCEDURE — 4004F PT TOBACCO SCREEN RCVD TLK: CPT | Performed by: NURSE PRACTITIONER

## 2025-06-05 RX ORDER — LANOLIN ALCOHOL/MO/W.PET/CERES
100 CREAM (GRAM) TOPICAL EVERY MORNING
COMMUNITY
Start: 2025-05-16

## 2025-06-05 RX ORDER — MULTIVITAMIN WITH FOLIC ACID 400 MCG
1 TABLET ORAL EVERY MORNING
COMMUNITY
Start: 2025-05-30

## 2025-06-05 RX ORDER — METHYLPHENIDATE HYDROCHLORIDE 54 MG/1
54 TABLET ORAL EVERY MORNING
COMMUNITY
Start: 2025-05-16

## 2025-06-05 RX ORDER — BETAMETHASONE VALERATE 1.2 MG/G
CREAM TOPICAL
COMMUNITY
Start: 2025-05-08

## 2025-06-05 NOTE — PROGRESS NOTES
self tracking handout on none and instructed them to bring it with them to his next appointment.     Barriers to learning and self management: none    Discussed use, benefit, and side effects of prescribed medications.  Barriers to medication compliance addressed.  All patient questions answered.  Pt voiced understanding.       Electronically signed by BARB Ferris CNP on 6/5/2025 at 3:16 PM

## 2025-06-12 ENCOUNTER — TELEPHONE (OUTPATIENT)
Dept: FAMILY MEDICINE CLINIC | Age: 40
End: 2025-06-12

## 2025-06-12 ENCOUNTER — HOSPITAL ENCOUNTER (OUTPATIENT)
Age: 40
Discharge: HOME | End: 2025-06-12
Payer: MEDICAID

## 2025-06-12 ENCOUNTER — HOSPITAL ENCOUNTER (OUTPATIENT)
Dept: GENERAL RADIOLOGY | Age: 40
Discharge: HOME | End: 2025-06-12
Payer: MEDICAID

## 2025-06-12 ENCOUNTER — RESULTS FOLLOW-UP (OUTPATIENT)
Dept: FAMILY MEDICINE CLINIC | Age: 40
End: 2025-06-12

## 2025-06-12 DIAGNOSIS — M25.562 ACUTE PAIN OF BOTH KNEES: ICD-10-CM

## 2025-06-12 DIAGNOSIS — M25.561 ACUTE PAIN OF BOTH KNEES: ICD-10-CM

## 2025-06-12 LAB
CK SERPL-CCNC: 128 U/L (ref 39–308)
CRP SERPL-MCNC: < 0.3 MG/DL (ref 0–0.5)
ERYTHROCYTE [SEDIMENTATION RATE] IN BLOOD BY WESTERGREN METHOD: 3 MM/HR (ref 0–20)
RHEUMATOID FACT SERPL-ACNC: < 10 IU/ML (ref 0–13)
URATE SERPL-MCNC: 3.3 MG/DL (ref 3.7–7)

## 2025-06-12 PROCEDURE — 84550 ASSAY OF BLOOD/URIC ACID: CPT

## 2025-06-12 PROCEDURE — 85651 RBC SED RATE NONAUTOMATED: CPT

## 2025-06-12 PROCEDURE — 82550 ASSAY OF CK (CPK): CPT

## 2025-06-12 PROCEDURE — 86430 RHEUMATOID FACTOR TEST QUAL: CPT

## 2025-06-12 PROCEDURE — 86200 CCP ANTIBODY: CPT

## 2025-06-12 PROCEDURE — 86038 ANTINUCLEAR ANTIBODIES: CPT

## 2025-06-12 PROCEDURE — 86140 C-REACTIVE PROTEIN: CPT

## 2025-06-12 PROCEDURE — 36415 COLL VENOUS BLD VENIPUNCTURE: CPT

## 2025-06-12 PROCEDURE — 73562 X-RAY EXAM OF KNEE 3: CPT

## 2025-06-12 NOTE — TELEPHONE ENCOUNTER
Pt informed of results . Pt voiced understanding.    Asked what are his next steps? Still in pain nothing seems to help.

## 2025-06-12 NOTE — TELEPHONE ENCOUNTER
Pt informed of note . Pt voiced understanding with no further questions at this time.     Already has appt 06/17/2025 with ortho

## 2025-06-12 NOTE — TELEPHONE ENCOUNTER
I'm still waiting on the lab results, let's see how these come back. I also did place a referral to Orthopedic, so they should be calling him.

## 2025-06-12 NOTE — TELEPHONE ENCOUNTER
Nathan López, APRN - CNP  [facility] Clinical Staff    Let Salvador know his knee xrays are normal.

## 2025-06-12 NOTE — TELEPHONE ENCOUNTER
Message  Received: Today  Nathan López, APRN - CNP  [facility] Clinical Staff    Let Salvador know his labs so far are in normal ranges, but two are still pending.

## 2025-06-14 LAB — NUCLEAR IGG SER QL IA: NORMAL

## 2025-06-16 ENCOUNTER — TELEPHONE (OUTPATIENT)
Dept: FAMILY MEDICINE CLINIC | Age: 40
End: 2025-06-16

## 2025-06-16 NOTE — TELEPHONE ENCOUNTER
Pt informed of results . Pt voiced understanding.    Patient was wondering if he can get a MRI of knees. He says that pain is worse.

## 2025-06-16 NOTE — TELEPHONE ENCOUNTER
Pt informed of note . Pt voiced understanding with no further questions at this time.     He is see Dr. Fletcher tomorrow at 2:30pm

## 2025-06-16 NOTE — TELEPHONE ENCOUNTER
Message  Received: Today  Nathan López, APRN - CNP  [facility] Clinical Staff    Let Salvador know one more of his autoimmune labs came back normal. 1 is still pending.

## 2025-06-16 NOTE — TELEPHONE ENCOUNTER
The problem is, I'm very limited in what I can give him for pain. I really don't have many options. I would really prefer Ortho see him first. They may be able to do knee injections which could help, but I'd rather they see him first. I would also want them to order the MRI if they deem it necessary. Have they called him to schedule yet?

## 2025-06-17 ENCOUNTER — TELEPHONE (OUTPATIENT)
Dept: FAMILY MEDICINE CLINIC | Age: 40
End: 2025-06-17

## 2025-06-17 LAB — CYCLIC CITRULLINATED PEPTIDE ANTIBODY IGG: 0.6 U/ML (ref 0–7)

## 2025-06-17 NOTE — TELEPHONE ENCOUNTER
----- Message from BARB Mcmillan - CNP sent at 6/17/2025  8:22 AM EDT -----  Let Salvador know his last autoimmune lab came back normal.

## 2025-06-19 ENCOUNTER — OFFICE VISIT (OUTPATIENT)
Dept: FAMILY MEDICINE CLINIC | Age: 40
End: 2025-06-19
Payer: MEDICAID

## 2025-06-19 VITALS
TEMPERATURE: 96.8 F | WEIGHT: 125.8 LBS | HEART RATE: 108 BPM | DIASTOLIC BLOOD PRESSURE: 60 MMHG | HEIGHT: 62 IN | SYSTOLIC BLOOD PRESSURE: 110 MMHG | BODY MASS INDEX: 23.15 KG/M2 | OXYGEN SATURATION: 99 % | RESPIRATION RATE: 12 BRPM

## 2025-06-19 DIAGNOSIS — E11.9 TYPE 2 DIABETES MELLITUS WITHOUT COMPLICATION, WITH LONG-TERM CURRENT USE OF INSULIN (HCC): ICD-10-CM

## 2025-06-19 DIAGNOSIS — Z79.4 TYPE 2 DIABETES MELLITUS WITHOUT COMPLICATION, WITH LONG-TERM CURRENT USE OF INSULIN (HCC): ICD-10-CM

## 2025-06-19 DIAGNOSIS — M25.561 ACUTE PAIN OF BOTH KNEES: Primary | ICD-10-CM

## 2025-06-19 DIAGNOSIS — M25.562 ACUTE PAIN OF BOTH KNEES: Primary | ICD-10-CM

## 2025-06-19 PROCEDURE — 99214 OFFICE O/P EST MOD 30 MIN: CPT | Performed by: NURSE PRACTITIONER

## 2025-06-19 PROCEDURE — 4004F PT TOBACCO SCREEN RCVD TLK: CPT | Performed by: NURSE PRACTITIONER

## 2025-06-19 PROCEDURE — G8427 DOCREV CUR MEDS BY ELIG CLIN: HCPCS | Performed by: NURSE PRACTITIONER

## 2025-06-19 PROCEDURE — 3046F HEMOGLOBIN A1C LEVEL >9.0%: CPT | Performed by: NURSE PRACTITIONER

## 2025-06-19 PROCEDURE — 2022F DILAT RTA XM EVC RTNOPTHY: CPT | Performed by: NURSE PRACTITIONER

## 2025-06-19 PROCEDURE — G8420 CALC BMI NORM PARAMETERS: HCPCS | Performed by: NURSE PRACTITIONER

## 2025-06-19 RX ORDER — MELOXICAM 15 MG/1
TABLET ORAL
COMMUNITY
Start: 2025-06-17

## 2025-06-19 RX ORDER — INSULIN GLARGINE 100 [IU]/ML
20 INJECTION, SOLUTION SUBCUTANEOUS 2 TIMES DAILY
Qty: 5 ADJUSTABLE DOSE PRE-FILLED PEN SYRINGE | Refills: 3 | Status: SHIPPED | OUTPATIENT
Start: 2025-06-19

## 2025-06-19 NOTE — PROGRESS NOTES
Chief Complaint   Patient presents with    Diabetes     BS averaging 200's. States seen [facility] and gave him another medication to try for pain       History obtained from chart review and the patient.    SUBJECTIVE:  Rodríguez Burt is a 39 y.o. male that presents today for bilateral knee pain    DM  Sugars running in the 200's  Not as compliant with monitoring sugars as he should  He has also missed some doses of insulins over the last month  Supposed to be taking Lantus 15 units BID, Humalog 5 units with meals    Bilateral Knee Pain  C/o bilat knee pain, swelling for about 2-3 weeks  No specific injury, just woke up with it  Pain is sharp and achy, he does have associated stiffness  Improves with walking, resting makes it worse  Tried tylenol/motrin, topicals without improvement  He has since seen OIO regarding his pain, prescribed Mobic-hasn't picked it up yet      Age/Gender Health Maintenance    Lipid   Lab Results   Component Value Date    CHOL 182 05/04/2019    CHOL 165 12/18/2017     Lab Results   Component Value Date    TRIG 128 05/04/2019    TRIG 135 12/18/2017     Lab Results   Component Value Date    HDL 66 05/14/2025    HDL 54 03/26/2022    HDL 49 03/12/2020     No components found for: \"LDLCHOLESTEROL\", \"LDLCALC\"    DM Screen   Lab Results   Component Value Date    LABA1C 12.6 (H) 05/14/2025     Colon Cancer Screening - 45  Lung Cancer Screening (Age 50 to 80 with 30 pack year hx, current smoker or quit within past 15 years) - 50    Tetanus - every 10 years  Influenza Vaccine - yearly  Pneumonia Vaccine - 65  Zostavax - 50  HPV Vaccine - n/a    PSA testing discussion - 55  AAA Screening - 65    Current Outpatient Medications   Medication Sig Dispense Refill    meloxicam (MOBIC) 15 MG tablet       insulin glargine (LANTUS SOLOSTAR) 100 UNIT/ML injection pen Inject 20 Units into the skin 2 times daily 5 Adjustable Dose Pre-filled Pen Syringe 3    thiamine 100 MG tablet Take 1 tablet by mouth every

## 2025-07-09 DIAGNOSIS — F10.930 ALCOHOL WITHDRAWAL SYNDROME WITHOUT COMPLICATION (HCC): ICD-10-CM

## 2025-07-09 DIAGNOSIS — F33.1 MAJOR DEPRESSIVE DISORDER, RECURRENT EPISODE, MODERATE WITH ANXIOUS DISTRESS (HCC): ICD-10-CM

## 2025-07-10 RX ORDER — HYDROXYZINE PAMOATE 100 MG
CAPSULE ORAL
Qty: 60 CAPSULE | Refills: 11 | Status: SHIPPED | OUTPATIENT
Start: 2025-07-10

## 2025-07-10 NOTE — TELEPHONE ENCOUNTER
Recent Visits  Date Type Provider Dept   06/19/25 Office Visit Nathan López APRN - CNP [facility]   06/05/25 Office Visit Nathan López APRN - CNP [facility]   05/01/25 Office Visit Nathan López APRN - CNP [facility]   12/18/24 Office Visit Nathan López APRN - CNP [facility]   08/15/24 Office Visit Nathan López APRN - CNP [facility]   Showing recent visits within past 540 days with a meds authorizing provider and meeting all other requirements  Future Appointments  Date Type Provider Dept   08/22/25 Appointment Nathan López APRN - CNP [facility]   Showing future appointments within next 150 days with a meds authorizing provider and meeting all other requirements

## 2025-07-21 ENCOUNTER — PATIENT MESSAGE (OUTPATIENT)
Dept: FAMILY MEDICINE CLINIC | Age: 40
End: 2025-07-21

## 2025-07-21 DIAGNOSIS — M25.561 ACUTE PAIN OF BOTH KNEES: Primary | ICD-10-CM

## 2025-07-21 DIAGNOSIS — M25.562 ACUTE PAIN OF BOTH KNEES: Primary | ICD-10-CM

## 2025-07-21 RX ORDER — MELOXICAM 15 MG/1
15 TABLET ORAL DAILY
Qty: 30 TABLET | Refills: 0 | Status: SHIPPED | OUTPATIENT
Start: 2025-07-21 | End: 2025-08-20

## 2025-07-21 NOTE — TELEPHONE ENCOUNTER
Requesting refill in a [employer] message.    Med pended.   Pharmacy up to date.     Recent Visits  Date Type Provider Dept   06/19/25 Office Visit Nathan López APRN - CNP [facility]   06/05/25 Office Visit Nathan López APRN - CNP [facility]   05/01/25 Office Visit Nathan López APRN - CNP [facility]   12/18/24 Office Visit Nathan López APRN - CNP [facility]   08/15/24 Office Visit Nathan López APRN - CNP [facility]   Showing recent visits within past 540 days with a meds authorizing provider and meeting all other requirements  Future Appointments  Date Type Provider Dept   08/22/25 Appointment Nathan López APRN - CNP [facility]   Showing future appointments within next 150 days with a meds authorizing provider and meeting all other requirements

## 2025-08-07 DIAGNOSIS — K21.9 GASTROESOPHAGEAL REFLUX DISEASE, UNSPECIFIED WHETHER ESOPHAGITIS PRESENT: ICD-10-CM

## 2025-08-07 DIAGNOSIS — R07.89 ATYPICAL CHEST PAIN: ICD-10-CM

## 2025-08-08 RX ORDER — OMEPRAZOLE 40 MG/1
CAPSULE, DELAYED RELEASE ORAL
Qty: 30 CAPSULE | Refills: 11 | Status: SHIPPED | OUTPATIENT
Start: 2025-08-08

## 2025-08-26 ENCOUNTER — OFFICE VISIT (OUTPATIENT)
Dept: FAMILY MEDICINE CLINIC | Age: 40
End: 2025-08-26
Payer: MEDICAID

## 2025-08-26 VITALS
RESPIRATION RATE: 17 BRPM | HEART RATE: 78 BPM | WEIGHT: 137 LBS | SYSTOLIC BLOOD PRESSURE: 110 MMHG | HEIGHT: 62 IN | DIASTOLIC BLOOD PRESSURE: 60 MMHG | OXYGEN SATURATION: 98 % | BODY MASS INDEX: 25.21 KG/M2

## 2025-08-26 DIAGNOSIS — E11.9 TYPE 2 DIABETES MELLITUS WITHOUT COMPLICATION, WITH LONG-TERM CURRENT USE OF INSULIN (HCC): Primary | ICD-10-CM

## 2025-08-26 DIAGNOSIS — Z79.4 TYPE 2 DIABETES MELLITUS WITHOUT COMPLICATION, WITH LONG-TERM CURRENT USE OF INSULIN (HCC): Primary | ICD-10-CM

## 2025-08-26 LAB — HBA1C MFR BLD: 9.2 % (ref 4.3–5.7)

## 2025-08-26 PROCEDURE — 3046F HEMOGLOBIN A1C LEVEL >9.0%: CPT | Performed by: NURSE PRACTITIONER

## 2025-08-26 PROCEDURE — 99214 OFFICE O/P EST MOD 30 MIN: CPT | Performed by: NURSE PRACTITIONER

## 2025-08-26 RX ORDER — CELECOXIB 200 MG/1
CAPSULE ORAL
COMMUNITY
Start: 2025-08-14

## 2025-08-26 RX ORDER — CYCLOBENZAPRINE HCL 5 MG
5 TABLET ORAL 3 TIMES DAILY PRN
COMMUNITY
Start: 2025-07-25

## 2025-08-26 RX ORDER — INSULIN GLARGINE 100 [IU]/ML
INJECTION, SOLUTION SUBCUTANEOUS
Qty: 5 ADJUSTABLE DOSE PRE-FILLED PEN SYRINGE | Refills: 5 | Status: SHIPPED | OUTPATIENT
Start: 2025-08-26

## 2025-08-26 RX ORDER — ASPIRIN 81 MG
TABLET,CHEWABLE ORAL
COMMUNITY
Start: 2025-08-11

## 2025-08-26 RX ORDER — INSULIN ASPART 100 [IU]/ML
10 INJECTION, SOLUTION INTRAVENOUS; SUBCUTANEOUS
Qty: 5 ADJUSTABLE DOSE PRE-FILLED PEN SYRINGE | Refills: 3 | Status: SHIPPED | OUTPATIENT
Start: 2025-08-26

## 2025-08-26 RX ORDER — MULTIVIT/IRON SULF/FOLIC ACID 15MG-0.4MG
1 TABLET ORAL EVERY MORNING
COMMUNITY
Start: 2025-08-11

## (undated) DEVICE — [ARTHROSCOPY PUMP,  DO NOT USE IF PACKAGE IS DAMAGED,  KEEP DRY,  KEEP AWAY FROM SUNLIGHT,  PROTECT FROM HEAT AND RADIOACTIVE SOURCES.]: Brand: FLOSTEADY

## (undated) DEVICE — SOLUTION IV 1000ML 0.9% SOD CHL PH 5 INJ USP VIAFLX PLAS

## (undated) DEVICE — CONMED SCOPE SAVER BITE BLOCK, 20X27 MM: Brand: SCOPE SAVER

## (undated) DEVICE — SET LNR RED GRN W/ BASE CLEANASCOPE

## (undated) DEVICE — KIT COLON W 11OZ LUB AND 2 END BRSH TBNG L10FT DIA025IN

## (undated) DEVICE — BLADE CLIPPER GEN PURP NS

## (undated) DEVICE — Z DISCONTINUED BY MEDLINE USE 2711682 TRAY SKIN PREP DRY W/ PREM GLV

## (undated) DEVICE — GLOVE SURG SZ 65 THK91MIL LTX FREE SYN POLYISOPRENE

## (undated) DEVICE — SHEET, ORTHO, SPLIT, STERILE: Brand: MEDLINE

## (undated) DEVICE — PACK PROCEDURE SURG SET UP SRMC

## (undated) DEVICE — COVER ARMBRD W13XL28.5IN IMPERV BLU FOR OP RM

## (undated) DEVICE — [AGGRESSIVE PLUS, SMALL-JOINT CUTTER, ARTHROSCOPIC SHAVER BLADE,  DO NOT RESTERILIZE,  DO NOT USE IF PACKAGE IS DAMAGED,  KEEP DRY,  KEEP AWAY FROM SUNLIGHT]: Brand: FORMULA

## (undated) DEVICE — ELECTRODE PT RET AD L9FT HI MOIST COND ADH HYDRGEL CORDED

## (undated) DEVICE — GLOVE SURG SZ 8 L11.77IN FNGR THK9.8MIL STRW LTX POLYMER

## (undated) DEVICE — APPLICATOR MEDICATED 26 CC SOLUTION HI LT ORNG CHLORAPREP

## (undated) DEVICE — PREP SOL PVP IODINE 4%  4 OZ/BTL

## (undated) DEVICE — BANDAGE COMPR E SGL LAYERED CLSR BGE W/ CLP W4INXL15FT

## (undated) DEVICE — HYPODERMIC SAFETY NEEDLE: Brand: MAGELLAN

## (undated) DEVICE — GLOVE ORANGE PI 8   MSG9080

## (undated) DEVICE — GOWN,SIRUS,NONRNF,SETINSLV,XL,20/CS: Brand: MEDLINE

## (undated) DEVICE — BANDAGE,GAUZE,4.5"X4.1YD,STERILE,LF: Brand: MEDLINE

## (undated) DEVICE — KNEE ARTHROSCOPY V-LF: Brand: MEDLINE INDUSTRIES, INC.

## (undated) DEVICE — DRAPE,TOP,102X53,STERILE: Brand: MEDLINE

## (undated) DEVICE — TUBING IV STOPCOCK 48 CM 3 W

## (undated) DEVICE — GOWN,SIRUS,NON REINFRCD,LARGE,SET IN SL: Brand: MEDLINE

## (undated) DEVICE — PADDING,UNDERCAST,COTTON, 4"X4YD STERILE: Brand: MEDLINE

## (undated) DEVICE — FORCEP RAD JAW W/NEEDLE 160CM

## (undated) DEVICE — PENCIL ES L3M BTTN SWCH HOLSTER W/ BLDE ELECTRD EDGE

## (undated) DEVICE — INTENDED FOR TISSUE SEPARATION, AND OTHER PROCEDURES THAT REQUIRE A SHARP SURGICAL BLADE TO PUNCTURE OR CUT.: Brand: BARD-PARKER ® CARBON RIB-BACK BLADES

## (undated) DEVICE — IMPREGNATED GAUZE DRESSING: Brand: CUTICERIN 7.5X7.5CM CTN 50

## (undated) DEVICE — GLOVE ORANGE PI 7 1/2   MSG9075

## (undated) DEVICE — Z INACTIVE USE 2660664 SOLUTION IRRIG 3000ML 0.9% SOD CHL USP UROMATIC PLAS CONT

## (undated) DEVICE — CONNECTOR TBNG AUX H2O JET DISP FOR OLY 160/180 SER